# Patient Record
Sex: MALE | Race: WHITE | NOT HISPANIC OR LATINO | Employment: OTHER | URBAN - METROPOLITAN AREA
[De-identification: names, ages, dates, MRNs, and addresses within clinical notes are randomized per-mention and may not be internally consistent; named-entity substitution may affect disease eponyms.]

---

## 2017-01-18 ENCOUNTER — ALLSCRIPTS OFFICE VISIT (OUTPATIENT)
Dept: OTHER | Facility: OTHER | Age: 82
End: 2017-01-18

## 2017-01-18 DIAGNOSIS — E78.5 HYPERLIPIDEMIA: ICD-10-CM

## 2017-01-18 DIAGNOSIS — Z91.81 HISTORY OF FALLING: ICD-10-CM

## 2017-01-18 DIAGNOSIS — Z13.1 ENCOUNTER FOR SCREENING FOR DIABETES MELLITUS: ICD-10-CM

## 2017-01-18 DIAGNOSIS — M25.551 PAIN IN RIGHT HIP: ICD-10-CM

## 2017-01-20 ENCOUNTER — APPOINTMENT (OUTPATIENT)
Dept: LAB | Facility: CLINIC | Age: 82
End: 2017-01-20
Payer: COMMERCIAL

## 2017-01-20 ENCOUNTER — HOSPITAL ENCOUNTER (OUTPATIENT)
Dept: RADIOLOGY | Facility: CLINIC | Age: 82
Discharge: HOME | End: 2017-01-20
Payer: COMMERCIAL

## 2017-01-20 DIAGNOSIS — Z91.81 HISTORY OF FALLING: ICD-10-CM

## 2017-01-20 DIAGNOSIS — M25.551 PAIN IN RIGHT HIP: ICD-10-CM

## 2017-01-20 DIAGNOSIS — Z13.1 ENCOUNTER FOR SCREENING FOR DIABETES MELLITUS: ICD-10-CM

## 2017-01-20 DIAGNOSIS — E78.5 HYPERLIPIDEMIA: ICD-10-CM

## 2017-01-20 LAB
25(OH)D3 SERPL-MCNC: 27.4 NG/ML (ref 30–100)
ANION GAP SERPL CALCULATED.3IONS-SCNC: 5 MMOL/L (ref 4–13)
BUN SERPL-MCNC: 15 MG/DL (ref 5–25)
CALCIUM SERPL-MCNC: 9.1 MG/DL (ref 8.3–10.1)
CHLORIDE SERPL-SCNC: 105 MMOL/L (ref 100–108)
CHOLEST SERPL-MCNC: 153 MG/DL (ref 50–200)
CO2 SERPL-SCNC: 29 MMOL/L (ref 21–32)
CREAT SERPL-MCNC: 1.39 MG/DL (ref 0.6–1.3)
GFR SERPL CREATININE-BSD FRML MDRD: 48.8 ML/MIN/1.73SQ M
GLUCOSE SERPL-MCNC: 96 MG/DL (ref 65–140)
HDLC SERPL-MCNC: 102 MG/DL (ref 40–60)
LDLC SERPL CALC-MCNC: 40 MG/DL (ref 0–100)
POTASSIUM SERPL-SCNC: 4.3 MMOL/L (ref 3.5–5.3)
SODIUM SERPL-SCNC: 139 MMOL/L (ref 136–145)
TRIGL SERPL-MCNC: 56 MG/DL

## 2017-01-20 PROCEDURE — 36415 COLL VENOUS BLD VENIPUNCTURE: CPT

## 2017-01-20 PROCEDURE — 72100 X-RAY EXAM L-S SPINE 2/3 VWS: CPT

## 2017-01-20 PROCEDURE — 80048 BASIC METABOLIC PNL TOTAL CA: CPT

## 2017-01-20 PROCEDURE — 82306 VITAMIN D 25 HYDROXY: CPT

## 2017-01-20 PROCEDURE — 80061 LIPID PANEL: CPT

## 2017-01-20 PROCEDURE — 73502 X-RAY EXAM HIP UNI 2-3 VIEWS: CPT

## 2017-05-04 ENCOUNTER — ALLSCRIPTS OFFICE VISIT (OUTPATIENT)
Dept: OTHER | Facility: OTHER | Age: 82
End: 2017-05-04

## 2017-05-04 DIAGNOSIS — R68.89 OTHER GENERAL SYMPTOMS AND SIGNS: ICD-10-CM

## 2017-05-04 DIAGNOSIS — R20.0 ANESTHESIA OF SKIN: ICD-10-CM

## 2017-05-04 DIAGNOSIS — R20.2 PARESTHESIA OF SKIN: ICD-10-CM

## 2017-05-04 LAB — HBA1C MFR BLD HPLC: 5.3 %

## 2017-05-05 ENCOUNTER — LAB CONVERSION - ENCOUNTER (OUTPATIENT)
Dept: OTHER | Facility: OTHER | Age: 82
End: 2017-05-05

## 2017-05-05 LAB
TSH SERPL DL<=0.05 MIU/L-ACNC: 2 MIU/L (ref 0.4–4.5)
VIT B12 SERPL-MCNC: 1425 PG/ML (ref 200–1100)

## 2017-05-16 ENCOUNTER — ALLSCRIPTS OFFICE VISIT (OUTPATIENT)
Dept: OTHER | Facility: OTHER | Age: 82
End: 2017-05-16

## 2017-06-06 ENCOUNTER — OFFICE VISIT (OUTPATIENT)
Dept: URGENT CARE | Facility: CLINIC | Age: 82
End: 2017-06-06
Payer: COMMERCIAL

## 2017-06-06 PROCEDURE — 99204 OFFICE O/P NEW MOD 45 MIN: CPT

## 2017-06-06 PROCEDURE — S9088 SERVICES PROVIDED IN URGENT: HCPCS

## 2017-07-10 ENCOUNTER — ALLSCRIPTS OFFICE VISIT (OUTPATIENT)
Dept: OTHER | Facility: OTHER | Age: 82
End: 2017-07-10

## 2017-08-10 ENCOUNTER — ALLSCRIPTS OFFICE VISIT (OUTPATIENT)
Dept: OTHER | Facility: OTHER | Age: 82
End: 2017-08-10

## 2017-10-11 ENCOUNTER — ALLSCRIPTS OFFICE VISIT (OUTPATIENT)
Dept: OTHER | Facility: OTHER | Age: 82
End: 2017-10-11

## 2017-10-11 DIAGNOSIS — Z87.891 PERSONAL HISTORY OF NICOTINE DEPENDENCE: ICD-10-CM

## 2017-10-11 DIAGNOSIS — Z91.81 HISTORY OF FALLING: ICD-10-CM

## 2017-10-11 DIAGNOSIS — R26.81 UNSTEADINESS ON FEET: ICD-10-CM

## 2017-10-11 DIAGNOSIS — R68.89 OTHER GENERAL SYMPTOMS AND SIGNS: ICD-10-CM

## 2017-10-11 DIAGNOSIS — R06.02 SHORTNESS OF BREATH: ICD-10-CM

## 2017-10-11 DIAGNOSIS — E55.9 VITAMIN D DEFICIENCY: ICD-10-CM

## 2017-10-11 DIAGNOSIS — D64.9 ANEMIA: ICD-10-CM

## 2017-10-13 ENCOUNTER — LAB CONVERSION - ENCOUNTER (OUTPATIENT)
Dept: OTHER | Facility: OTHER | Age: 82
End: 2017-10-13

## 2017-10-13 LAB
BASOPHILS # BLD AUTO: 0.3 %
BASOPHILS # BLD AUTO: 32 CELLS/UL (ref 0–200)
DEPRECATED RDW RBC AUTO: 13 % (ref 11–15)
EOSINOPHIL # BLD AUTO: 1.8 %
EOSINOPHIL # BLD AUTO: 191 CELLS/UL (ref 15–500)
FOLATE SERPL-MCNC: 12.8 NG/ML
HCT VFR BLD AUTO: 32.3 % (ref 38.5–50)
HGB BLD-MCNC: 10.7 G/DL (ref 13.2–17.1)
IRON SATN MFR SERPL: 13 % (CALC) (ref 15–60)
IRON SERPL-MCNC: 32 MCG/DL (ref 50–180)
LYMPHOCYTES # BLD AUTO: 26.4 %
LYMPHOCYTES # BLD AUTO: 2798 CELLS/UL (ref 850–3900)
MCH RBC QN AUTO: 30 PG (ref 27–33)
MCHC RBC AUTO-ENTMCNC: 33.1 G/DL (ref 32–36)
MCV RBC AUTO: 90.5 FL (ref 80–100)
MONOCYTES # BLD AUTO: 869 CELLS/UL (ref 200–950)
MONOCYTES (HISTORICAL): 8.2 %
NEUTROPHILS # BLD AUTO: 63.3 %
NEUTROPHILS # BLD AUTO: 6710 CELLS/UL (ref 1500–7800)
PLATELET # BLD AUTO: 166 THOUSAND/UL (ref 140–400)
PMV BLD AUTO: 11.4 FL (ref 7.5–12.5)
RBC # BLD AUTO: 3.57 MILLION/UL (ref 4.2–5.8)
TIBC SERPL-MCNC: 247 MCG/DL (CALC) (ref 250–425)
TSH SERPL DL<=0.05 MIU/L-ACNC: 0.8 MIU/L (ref 0.4–4.5)
VIT B12 SERPL-MCNC: 1371 PG/ML (ref 200–1100)
WBC # BLD AUTO: 10.6 THOUSAND/UL (ref 3.8–10.8)

## 2017-10-13 NOTE — PROGRESS NOTES
Assessment  1  Status post fall (V15 88) (Z91 81)   2  Unstable gait (781 2) (R26 81)   3  Elevated blood sugar (790 29) (R73 9)    Plan  Cold intolerance, Unstable gait    · (1) TSH; Status:Active; Requested for:11Oct2017;   Elevated blood sugar    · Lancets Micro Thin 33G Miscellaneous; TEST 3 TIMES DAILY (Dx 250 00)   · Glucometer; Status:Complete;   Done: 60UHT1077   · Glucose Monitoring Strips; Status:Complete;   Done: 29WOG7921  Status post fall    · 1 - Tiki CARRIZALES, Miranda Ashford (Cardiology) Co-Management  *  Status: Active  Requested for:  75AMD2910  Care Summary provided  : Yes  Status post fall, Unstable gait    · (1) IRON PANEL; Status:Active; Requested for:11Oct2017;   Unstable gait    · Calcium 600+D 600-200 MG-UNIT Oral Tablet; TAKE 1 TABLET DAILY   · (1) CBC/PLT/DIFF; Status:Active; Requested for:11Oct2017;    · (1) FOLATE; Status:Active; Requested for:11Oct2017;    · (1) VITAMIN B12; Status:Active; Requested for:11Oct2017;     Discussion/Summary    S/P Fall and Unstable gait- will check TSH, CBC, B12, iron elevated at this time, he is not interested in physical therapy  Also referred to Cardiologist for heart evaluation  blood glucose- given a glucometer and supplies to test at home  up in 2-3 weeks  Possible side effects of new medications were reviewed with the patient/guardian today  The treatment plan was reviewed with the patient/guardian  The patient/guardian understands and agrees with the treatment plan      Chief Complaint  Patient is here today with complaints of being cold all the time and had a dizzy spell last night and fell in bathroom      History of Present Illness  Patient is here because he feel last night in the bathroom he felt dizzy  He says that he walked to the bathroom and fell down  He says that he is always cold  He denies head trauma  He denies any urinary or fecal incontinence when the episode occurred  He denies any palpitation or chest pain at this time   he was found to have elevated blood glucose in the past       Review of Systems    Constitutional: No fever or chills, feels well, no tiredness, no recent weight gain or weight loss  Eyes: No complaints of eye pain, no red eyes, no discharge from eyes, no itchy eyes  Cardiovascular: No complaints of slow heart rate, no fast heart rate, no chest pain, no palpitations, no leg claudication, no lower extremity  Respiratory: No complaints of shortness of breath, no wheezing, no cough, no SOB on exertion, no orthopnea or PND  Gastrointestinal: No complaints of abdominal pain, no constipation, no nausea or vomiting, no diarrhea or bloody stools  Musculoskeletal: as noted in HPI  Integumentary: No complaints of skin rash or skin lesions, no itching, no skin wound, no dry skin  Neurological: No compliants of headache, no confusion, no convulsions, no numbness or tingling, no dizziness or fainting, no limb weakness, no difficulty walking  Endocrine: No complaints of proptosis, no hot flashes, no muscle weakness, no erectile dysfunction, no deepening of the voice, no feelings of weakness  ROS reviewed  Active Problems  1  Cellulitis of chest wall (682 2) (L03 313)   2  Change in multiple pigmented skin lesions (709 00) (L81 9)   3  Chronic lower back pain (724 2,338 29) (M54 5,G89 29)   4  Chronic pain (338 29) (G89 29)   5  Cold intolerance (780 99) (R68 89)   6  Dermatitis (692 9) (L30 9)   7  Diabetes mellitus screening (V77 1) (Z13 1)   8  Elevated blood sugar (790 29) (R73 9)   9  Generalized osteoarthritis (715 00) (M15 9)   10  Herniated lumbar intervertebral disc (722 10) (M51 26)   11  Hyperlipidemia (272 4) (E78 5)   12  Impaired fasting glucose (790 21) (R73 01)   13  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)   14  Need for pneumococcal vaccination (V03 82) (Z23)   15  Needs flu shot (V04 81) (Z23)   16  Numbness and tingling in left hand (782 0) (R20 0,R20 2)   17   Pain in shoulder region, right (719 41) (M25 511)   18  Palpitations (785 1) (R00 2)   19  Positive depression screening (796 4) (Z13 89)   20  Right hip pain (719 45) (M25 551)   21  Rotator cuff tear arthropathy, right (716 81) (M12 811)   22  Screening for depression (V79 0) (Z13 89)   23  Screening for neurological condition (V80 09) (Z13 89)   24  Screening for prostate cancer (V76 44) (Z12 5)   25  Screening for skin condition (V82 0) (Z13 89)   26  Seborrheic keratosis (702 19) (L82 1)   27  Skin rash (782 1) (R21)   28  Status post fall (V15 88) (Z91 81)   29  Tachycardia (785 0) (R00 0)   30  Visual disturbances (368 9) (H53 9)    Past Medical History  1  History of Dupuytren contracture (728 6) (M72 0)   2  History of Elevated glucose (790 29) (R73 09)   3  History of Former smoker (V15 82) (N63 735)   4  History of Fracture of rib of left side (807 00) (S22 32XA)   5  History of Hemothorax, left (511 89) (J94 2)   6  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)    The active problems and past medical history were reviewed and updated today  Surgical History  1  History of Cataract Surgery   2  History of Eye Surgery   3  History of Hand Surgery   4  History of Knee Arthroscopy (Therapeutic)   5  History of Nerve Block Transforaminal Epidural   6  History of Thoracentesis With Insertion Of Tube    Family History  Mother    1  Family history of lung cancer (V16 1) (Z80 1)   2  Family history of malignant neoplasm of brain (V16 8) (Z80 8)  Father    3  Family history of Cerebrovascular accident (CVA) due to occlusion of cerebral artery   4  Family history of lung cancer (V16 1) (Z80 1)   5  Family history of malignant neoplasm of brain (V16 8) (Z80 8)    Social History   · Former smoker (V15 82) (X19 087)   ·    · Previous  service   · Retired   · Three children    Current Meds   1  Aspirin 81 MG TABS Recorded   2  Atenolol 25 MG Oral Tablet; TAKE ONE-HALF (1/2) TABLET DAILY;    Therapy: 65QPE2455 to (Last X32NQQ5862)  Requested for: 86MDI3145 Ordered   3  Clobetasol Propionate 0 05 % External Ointment; APPLY SPARINGLY TO AFFECTED   AREA(S) TWICE DAILY; Therapy: 21VVD6728 to (Last Rx:46Inr0555)  Requested for: 53GNZ5209 Ordered   4  Fish Oil CAPS; Therapy: (Recorded:2017) to Recorded   5  Lifescan One Touch Ultra Test Strips; test once daily dx diabetes; Therapy: 25Oam7896 to ([phone])  Requested for: 2017; Last   Rx:2017 Ordered   6  Lifescan One Touch Ultramini Meter; test once daily Dx 250; Therapy: 2017 to (Last Rx:2017)  Requested for: 26Cyc8194 Ordered   7  LifeScan Unistik II Lancets Miscellaneous; Test once daily (dx 250 00); Therapy: 05Eky9751 to (Last Rx:2017)  Requested for: 01Cqq6969 Ordered   8  Meloxicam 15 MG Oral Tablet; Take 1 tablet by mouth daily; Therapy: 2016 to (Evaluate:2018)  Requested for: 87XRE2092; Last   Rx:83Xnv2184 Ordered   9  Potassium Chloride ER 10 MEQ Oral Tablet Extended Release; Therapy: (Recorded:2017) to Recorded   10  Simvastatin 20 MG Oral Tablet; TAKE 1 TABLET DAILY; Therapy: 58VSB2090 to (Evaluate:2018)  Requested for: 82VOZ0653; Last    Rx:2017 Ordered    Allergies  1  No Known Drug Allergies    Vitals  Vital Signs    Recorded: 34AXS1702 04:08PM   Temperature 98 3 F   Heart Rate 55   Systolic 308   Diastolic 70   Height 5 ft 9 in   Weight 156 lb 2 08 oz   BMI Calculated 23 06   BSA Calculated 1 86   O2 Saturation 93     Physical Exam    Constitutional   General appearance: No acute distress, well appearing and well nourished  Eyes   Conjunctiva and lids: No swelling, erythema, or discharge  Pulmonary   Respiratory effort: No increased work of breathing or signs of respiratory distress  Auscultation of lungs: Clear to auscultation, equal breath sounds bilaterally, no wheezes, no rales, no rhonci      Cardiovascular   Auscultation of heart: Normal rate and rhythm, normal S1 and S2, without murmurs  Musculoskeletal unstable gait walks with a walker  Skin   Skin and subcutaneous tissue: Normal without rashes or lesions  Psychiatric   Orientation to person, place and time: Normal          Future Appointments    Date/Time Provider Specialty Site   02/14/2018 10:20 AM ANT [place]   Dermatology [facility]     Signatures   Electronically signed by : Glenna Connolly MD; Oct 11 2017  5:06PM EST                       (Author)

## 2017-10-25 ENCOUNTER — GENERIC CONVERSION - ENCOUNTER (OUTPATIENT)
Dept: OTHER | Facility: OTHER | Age: 82
End: 2017-10-25

## 2017-10-26 ENCOUNTER — LAB CONVERSION - ENCOUNTER (OUTPATIENT)
Dept: OTHER | Facility: OTHER | Age: 82
End: 2017-10-26

## 2017-10-26 LAB — 25(OH)D3 SERPL-MCNC: >150 NG/ML (ref 30–100)

## 2017-10-31 ENCOUNTER — ALLSCRIPTS OFFICE VISIT (OUTPATIENT)
Dept: OTHER | Facility: OTHER | Age: 82
End: 2017-10-31

## 2017-10-31 ENCOUNTER — GENERIC CONVERSION - ENCOUNTER (OUTPATIENT)
Dept: OTHER | Facility: OTHER | Age: 82
End: 2017-10-31

## 2017-11-01 ENCOUNTER — LAB CONVERSION - ENCOUNTER (OUTPATIENT)
Dept: OTHER | Facility: OTHER | Age: 82
End: 2017-11-01

## 2017-11-01 LAB — FECAL GLOBIN BY IMMUNOCHEM (HISTORICAL): NORMAL

## 2017-11-17 ENCOUNTER — HOSPITAL ENCOUNTER (OUTPATIENT)
Dept: NON INVASIVE DIAGNOSTICS | Facility: CLINIC | Age: 82
Discharge: HOME | End: 2017-11-17
Payer: COMMERCIAL

## 2017-11-17 DIAGNOSIS — Z87.891 PERSONAL HISTORY OF NICOTINE DEPENDENCE: ICD-10-CM

## 2017-11-17 DIAGNOSIS — R06.02 SHORTNESS OF BREATH: ICD-10-CM

## 2017-11-17 PROCEDURE — A9502 TC99M TETROFOSMIN: HCPCS

## 2017-11-17 PROCEDURE — 93017 CV STRESS TEST TRACING ONLY: CPT

## 2017-11-17 PROCEDURE — 93306 TTE W/DOPPLER COMPLETE: CPT

## 2017-11-17 PROCEDURE — 78452 HT MUSCLE IMAGE SPECT MULT: CPT

## 2017-11-17 RX ORDER — AMINOPHYLLINE DIHYDRATE 25 MG/ML
50 INJECTION, SOLUTION INTRAVENOUS ONCE
Status: CANCELLED | OUTPATIENT
Start: 2017-11-17 | End: 2017-11-17

## 2017-11-17 RX ADMIN — REGADENOSON 0.4 MG: 0.08 INJECTION, SOLUTION INTRAVENOUS at 12:55

## 2017-11-20 ENCOUNTER — GENERIC CONVERSION - ENCOUNTER (OUTPATIENT)
Dept: OTHER | Facility: OTHER | Age: 82
End: 2017-11-20

## 2017-11-28 ENCOUNTER — GENERIC CONVERSION - ENCOUNTER (OUTPATIENT)
Dept: OTHER | Facility: OTHER | Age: 82
End: 2017-11-28

## 2018-01-10 NOTE — RESULT NOTES
Verified Results  * XR SPINE CERVICAL COMPLETE 4 OR 5 VIEW 84Nra7879 10:32AM Marquis Picking Order Number: [id number]     Test Name Result Flag Reference   XR SPINE CERVICAL COMPLETE 4 OR 5 VW (Report)     CERVICAL SPINE     INDICATION: Paresthesias, left arm numbness     COMPARISON: None     VIEWS: 5; 5 images     FINDINGS:     No evidence of fracture or subluxation  Severe multilevel degenerative changes with posterior facet hypertrophy, neuroforaminal narrowing, mild anterolisthesis C5 on C6 and C6 on C7  Minimal disc space narrowing C6-T1  The prevertebral soft tissues are within normal limits  The lung apices are intact  IMPRESSION:     Severe degenerative change         Workstation performed: [id number]     Signed by:   Kareem Carrasco MD   7/20/16

## 2018-01-12 VITALS
DIASTOLIC BLOOD PRESSURE: 70 MMHG | WEIGHT: 156.13 LBS | HEIGHT: 69 IN | OXYGEN SATURATION: 93 % | BODY MASS INDEX: 23.12 KG/M2 | SYSTOLIC BLOOD PRESSURE: 118 MMHG | HEART RATE: 55 BPM | TEMPERATURE: 98.3 F

## 2018-01-12 VITALS
HEART RATE: 86 BPM | BODY MASS INDEX: 23.44 KG/M2 | OXYGEN SATURATION: 92 % | HEIGHT: 69 IN | WEIGHT: 158.25 LBS | TEMPERATURE: 96.9 F | SYSTOLIC BLOOD PRESSURE: 136 MMHG | DIASTOLIC BLOOD PRESSURE: 66 MMHG

## 2018-01-12 NOTE — RESULT NOTES
Verified Results  NM MYOCARDIAL PERFUSION SPECT (RX STRESS AND/OR REST) [id number] 10:07AM Teto Posada Order Number: [id number]    - Patient Instructions: To schedule this appointment, please contact Central Scheduling at [phone]  Test Name Result Flag Reference   NM MYOCARDIAL PERFUSION SPECT (RX STRESS AND/OR REST) (Report)     [address] [place], [address] [place] [address]   [phone]     Rest/Stress Gated SPECT Myocardial Perfusion Imaging After Regadenoson     Patient: Juliana Haddad   MR number: [id number]   Account number: [de-identified]   : 1933   Age: 80 years   Gender: Male   Status: Outpatient   Location: [facility]   Height: 69 in   Weight: 154 lb   BP: 116/ 84 mmHg     Allergies: ALLERGIES NOT ON FILE     Diagnosis: R06 02 - Shortness of breath     Primary Physician: Lopez Vega MD   Interpreting Physician: Elba Bruno MD   Referring Physician: Elba Bruno MD   Technician: Gabrielle Ulloa BS, BA, AART(N)   Group: [facility]   Other: Arlester Boast, MS, CCT     INDICATIONS: Detection of coronary artery disease  HISTORY: The patient is a 80year old  male  Chest pain status: no chest pain  Other symptoms: dyspnea  Coronary artery disease risk factors: dyslipidemia  Cardiovascular history: none significant  Medications: a lipid lowering   agent  Previous test results: abnormal ECG  PHYSICAL EXAM: Baseline physical exam screening: normal      REST ECG: Normal sinus rhythm  PROCEDURE: The study was performed at [address] [place] [address]  A regadenoson infusion pharmacologic stress test was performed  Gated SPECT myocardial perfusion imaging was performed after stress and at rest  Systolic blood pressure   was 116 mmHg, at the start of the study  Diastolic blood pressure was 84 mmHg, at the start of the study  The heart rate was 52 bpm, at the start of the study     Regadenoson protocol:   HR bpm SBP mmHg DBP mmHg Symptoms Rhythm/conduct   Baseline 52 116 84 none NSR, no ectopy   Immediate 75 146 66 none NSR, no ectopy   1 min 64 -- -- none NSR, no ectopy   2 min 63 136 66 none NSR, no ectopy     STRESS SUMMARY: Duration of pharmacologic stress was 3 min  Maximal heart rate during stress was 75 bpm  The rate-pressure product for the peak heart rate and blood pressure was 77638  There was no chest pain during stress  The stress test   was terminated due to protocol completion  The stress ECG was negative for ischemia  There were no stress arrhythmias or conduction abnormalities  ISOTOPE ADMINISTRATION:   Resting isotope administration Stress isotope administration   Agent Tetrofosmin Tetrofosmin   Dose 10 33 mCi 32 9 mCi   Date 11/17/2017 11/17/2017   Injection-image interval 30 min 45 min     The radiopharmaceutical was injected at the peak effect of pharmacologic stress  PERFUSION DEFECTS:   - There were no perfusion defects  GATED SPECT:   The calculated left ventricular ejection fraction was 62 %  Left ventricular ejection fraction was within normal limits by visual estimate  There was no left ventricular regional abnormality  SUMMARY:   - Stress results: There was no chest pain during stress  - ECG conclusions: The stress ECG was negative for ischemia  - Perfusion imaging: There were no perfusion defects    - Gated SPECT: The calculated left ventricular ejection fraction was 62 %  Left ventricular ejection fraction was within normal limits by visual estimate  There was no left ventricular regional abnormality  IMPRESSIONS: Normal study  Myocardial perfusion imaging was normal at rest and with stress  Prepared and signed by     Karsten Guardado MD   Signed 11/17/2017 17:57:32     ECHO COMPLETE WITH CONTRAST IF INDICATED 47ETK1730 10:05AM Tana Dockery Order Number: [id number]    - Patient Instructions:  To schedule this appointment, please contact Central Scheduling at [phone]  Test Name Result Flag Reference   ECHO COMPLETE WITH CONTRAST IF INDICATED (Report)     [facility], [address] [facility]   [phone]     Transthoracic Echocardiogram   2D, M-mode, Doppler, and Color Doppler     Study date: 2017     Patient: Duglas Caruso   MR number: [id number]   Account number: [de-identified]   : 1933   Age: 80 years   Gender: Male   Status: Outpatient   Location: [facility]   Height: 69 in   Weight: 154 lb   BP: 144/ 60 mmHg     Indications: Dyspnea on Excertion  Diagnoses: R06 09 - Other forms of dyspnea     Sonographer: Chappell RCS   Interpreting Physician: Vale Johnson MD   Primary Physician: Seda Pool MD   Referring Physician: Vale Johnson MD   Group: [facility]     LEFT VENTRICLE:   Systolic function was normal  Ejection fraction was estimated in the range of 55 % to 65 %  There were no regional wall motion abnormalities  There was mild assymetrical hypertrophy  Features were consistent with a pseudonormal left ventricular filling pattern, with concomitant abnormal relaxation and increased filling pressure (grade 2 diastolic dysfunction)  MITRAL VALVE:   There was mild regurgitation  AORTIC VALVE:   The valve was trileaflet  Leaflets exhibited mild calcification and normal cuspal separation  There was mild regurgitation  TRICUSPID VALVE:   There was trace regurgitation  PULMONIC VALVE:   There was mild regurgitation  HISTORY: PRIOR HISTORY: Risk factors: medication-treated hypercholesterolemia  PROCEDURE: The study was performed in the [address] [place] [address]  This was a routine study  The transthoracic approach was used  The study included complete 2D imaging, M-mode, complete spectral Doppler, and color Doppler  The   heart rate was 56 bpm, at the start of the study   Image quality was adequate  LEFT VENTRICLE: Size was normal  Systolic function was normal  Ejection fraction was estimated in the range of 55 % to 65 %  There were no regional wall motion abnormalities  There was mild assymetrical hypertrophy  DOPPLER: Features were   consistent with a pseudonormal left ventricular filling pattern, with concomitant abnormal relaxation and increased filling pressure (grade 2 diastolic dysfunction)  RIGHT VENTRICLE: The size was normal  Systolic function was normal  Wall thickness was normal      LEFT ATRIUM: Size was normal      RIGHT ATRIUM: Size was normal      MITRAL VALVE: Valve structure was normal  There was normal leaflet separation  DOPPLER: The transmitral velocity was within the normal range  There was no evidence for stenosis  There was mild regurgitation  AORTIC VALVE: The valve was trileaflet  Leaflets exhibited mild calcification and normal cuspal separation  DOPPLER: Transaortic velocity was within the normal range  There was no evidence for stenosis  There was mild regurgitation  TRICUSPID VALVE: The valve structure was normal  There was normal leaflet separation  DOPPLER: The transtricuspid velocity was within the normal range  There was no evidence for stenosis  There was trace regurgitation  PULMONIC VALVE: Leaflets exhibited normal thickness, no calcification, and normal cuspal separation  DOPPLER: The transpulmonic velocity was within the normal range  There was mild regurgitation  PERICARDIUM: There was no pericardial effusion  The pericardium was normal in appearance  AORTA: The root exhibited normal size  SYSTEMIC VEINS: IVC: The inferior vena cava was normal in size and course  Respirophasic changes were normal      SYSTEM MEASUREMENT TABLES     Apical four chamber   4 chamber Left Atrium Volume Index; Planimetry; End Systole; Apical four chamber;: 21 73 cm2   Left Ventricular End Diastolic Volume; Method of Disks, Single Plane;  Apical four chamber;: 86 4 ml   Left Ventricular End Systolic Volume; Method of Disks, Single Plane; Apical four chamber;: 58 8 ml   Right Atrium Systolic Area; Planimetry; End Systole; Apical four chamber;: 15 37 cm2   Right Ventricular Internal Diastolic Dimension; End Diastole; Apical four chamber;: 33 2 mm   TAPSE: 19 mm     Unspecified Scan Mode   Aortic Root Diameter; End Systole;: 35 9 mm   Left atrial diameter; End Diastole;: 36 2 mm   Cardiac Output; Teichholz; Systole;: 4 14 L/min   Interventricular Septum Diastolic Thickness; Teichholz; End Diastole;: 12 1 mm   Left Ventricle Internal End Diastolic Dimension; Teichholz;: 46 6 mm   Left Ventricle Internal Systolic Dimension; Teichholz; End Systole;: 26 2 mm   Left Ventricle Mass; Mass AVCube with Teichholz; End Diastole;: 178 g   Left Ventricle Posterior Wall Diastolic Thickness; Teichholz; End Diastole;: 9 3 mm   Left Ventricular Ejection Fraction; Teichholz;: 75 %   Left Ventricular End Diastolic Volume; Teichholz;: 100 3 ml   Left Ventricular End Systolic Volume; Teichholz;: 25 1 ml   Stroke volume;  Marrianne Risk;: 75 2 ml   Mitral Valve Area; Area by Pressure Half-Time; Systole;: 2 44 cm2   Mitral Valve E to A Ratio; Systole;: 1 1     Intersoci[employer] Commission Accredited Echocardiography Laboratory     Prepared and electronically signed by     Buck Tompkins MD   Signed [id number] 17:48:00

## 2018-01-13 VITALS
DIASTOLIC BLOOD PRESSURE: 66 MMHG | OXYGEN SATURATION: 93 % | TEMPERATURE: 97.4 F | HEIGHT: 69 IN | SYSTOLIC BLOOD PRESSURE: 128 MMHG | HEART RATE: 61 BPM | WEIGHT: 160 LBS | BODY MASS INDEX: 23.7 KG/M2

## 2018-01-14 VITALS
DIASTOLIC BLOOD PRESSURE: 82 MMHG | BODY MASS INDEX: 23.47 KG/M2 | HEART RATE: 88 BPM | TEMPERATURE: 96 F | OXYGEN SATURATION: 92 % | HEIGHT: 69 IN | WEIGHT: 158.5 LBS | SYSTOLIC BLOOD PRESSURE: 130 MMHG

## 2018-01-15 NOTE — RESULT NOTES
Verified Results  (1) LDL,DIRECT [id number] 10:08AM [facility]: [id number]  TW Order Number: [id number] Order Number: [id number] Order Number: [id number]     Test Name Result Flag Reference   LDL, DIRECT 56 mg/dl  0-100   LDL Cholesterol:        Optimal          <100 mg/dl        Near Optimal     100-129 mg/dl        Above Optimal          Borderline High   130-159 mg/dl          High              160-189 mg/dl          Very High        >189 mg/dl     (1) TRIGLYCERIDE 51PJC3494 10:08AM [facility] Number: [id number]  TW Order Number: [id number] Order Number: [id number] Order Number: [id number]     Test Name Result Flag Reference   TRIGLYCERIDES 49 mg/dL  <=150   Specimen collection should occur prior to N-Acetylcysteine or Metamizole administration due to the potential for falsely depressed results  Triglyceride:         Normal              <150 mg/dl       Borderline High    150-199 mg/dl       High               200-499 mg/dl       Very High          >499 mg/dl     (1) COMPREHENSIVE METABOLIC PANEL 04GAG2832 37:95XE [facility] Order Number: [id number]  TW Order Number: [id number] Order Number: [id number] Order Number: [id number]     Test Name Result Flag Reference   GLUCOSE,RANDM 102 mg/dL     If the patient is fasting, the ADA then defines impaired fasting glucose as > 100 mg/dL and diabetes as > or equal to 123 mg/dL     SODIUM 139 mmol/L  136-145   POTASSIUM 4 5 mmol/L  3 5-5 3   CHLORIDE 100 mmol/L  100-108   CARBON DIOXIDE 29 mmol/L  21-32   ANION GAP (CALC) 10 mmol/L  4-13   BLOOD UREA NITROGEN 19 mg/dL  5-25   CREATININE 1 41 mg/dL H 0 60-1 30   Standardized to IDMS reference method   CALCIUM 9 3 mg/dL  8 3-10 1   BILI, TOTAL 1 00 mg/dL  0 20-1 00   ALK PHOSPHATAS 61 U/L     ALT (SGPT) 24 U/L  12-78   AST(SGOT) 21 U/L 5-45   ALBUMIN 3 8 g/dL  3 5-5 0   TOTAL PROTEIN 8 0 g/dL  6 4-8 2   eGFR Non-African American 48 1 ml/min/1 73sq [facility] [employer] Disease Education Program recommendations are as follows:  GFR calculation is accurate only with a steady state creatinine  Chronic Kidney disease less than 60 ml/min/1 73 sq  meters  Kidney failure less than 15 ml/min/1 73 sq  meters

## 2018-01-15 NOTE — RESULT NOTES
Verified Results  (1) PSA (SCREEN) (Dx V76 44 Screen for Prostate Cancer) [id number] 09:46PM [facility] Order Number: [id number]     Order Number: [id number]     Test Name Result Flag Reference   PROSTATE SPECIFIC ANTIGEN 0 4 ng/mL  0 0-4 0     (1) COMPREHENSIVE METABOLIC PANEL [id number] 58:57QA [facility] Order Number: [id number]      National Kidney Disease Education Program recommendations are as follows:  GFR calculation is accurate only with a steady state creatinine  Chronic Kidney disease less than 60 ml/min/1 73 sq  meters  Kidney failure less than 15 ml/min/1 73 sq  meters  Test Name Result Flag Reference   GLUCOSE,RANDM 100 mg/dL     If the patient is fasting, the ADA then defines impaired fasting glucose as > 100 mg/dL and diabetes as > or equal to 123 mg/dL  SODIUM 140 mmol/L  136-145   POTASSIUM 4 6 mmol/L  3 5-5 3   CHLORIDE 102 mmol/L  100-108   CARBON DIOXIDE 26 mmol/L  21-32   ANION GAP (CALC) 12 mmol/L  4-13   BLOOD UREA NITROGEN 16 mg/dL  5-25   CREATININE 1 26 mg/dL  0 60-1 30   Standardized to IDMS reference method   CALCIUM 9 4 mg/dL  8 3-10 1   BILI, TOTAL 0 72 mg/dL  0 20-1 00   ALK PHOSPHATAS 41 U/L L    ALT (SGPT) 22 U/L  12-78   AST(SGOT) 16 U/L  5-45   ALBUMIN 3 5 g/dL  3 5-5 0   TOTAL PROTEIN 7 2 g/dL  6 4-8 2   eGFR Non-African American 54 8 ml/min/1 73sq m       (1) TSH [id number] 06:09PM [facility] Order Number: [id number]    Patients undergoing fluorescein dye angiography may retain small amounts of fluorescein in the body for 48-72 hours post procedure  Samples containing fluorescein can produce falsely depressed TSH values  If the patient had this procedure,a specimen should be resubmitted post fluorescein clearance       Test Name Result Flag Reference   TSH 1 980 uIU/mL  0 358-3 740     (1) LIPID PANEL FASTING W DIRECT LDL REFLEX [id number] 06:06PM [facility] Order Number: [id number]      Triglyceride:         Normal <150 mg/dl       Borderline High    150-199 mg/dl       High               200-499 mg/dl       Very High          >499 mg/dl  Cholesterol:         Desirable        <200 mg/dl      Borderline High  200-239 mg/dl      High             >239 mg/dl  HDL Cholesterol:        High    >59 mg/dL      Low     <41 mg/dL  LDL Cholesterol:        Optimal          <100 mg/dl         Near Optimal     100-129 mg/dl        Above Optimal          Borderline High   130-159 mg/dl          High              160-189 mg/dl          Very High        >189 mg/dl  LDL CALCULATED:    This screening LDL is a calculated result  It does not have the accuracy of the Direct Measured LDL in the monitoring of patients with hyperlipidemia and/or statin therapy  Direct Measure LDL (BXR196) must be ordered separately in these patients       Test Name Result Flag Reference   CHOLESTEROL 174 mg/dL     LDL CHOLESTEROL CALCULATED 60 mg/dL  0-100   TRIGLYCERIDES 40 mg/dL  <=150   HDL,DIRECT 106 mg/dL H 40-60     (1) CBC/PLT/DIFF [id number] 05:58PM [facility] Garcia Order Number: [id number]     Order Number: [id number]     Test Name Result Flag Reference   WBC COUNT 7 43 Thousand/uL  4 31-10 16   RBC COUNT 3 97 Million/uL  3 88-5 62   HEMOGLOBIN 12 7 g/dL  12 0-17 0   HEMATOCRIT 38 4 %  36 5-49 3   MCV 96 7 fL  82 0-98 0   MCH 32 0 pg  26 8-34 3   MCHC 33 1 g/dL  31 4-37 4   RDW 12 7 %  11 6-15 1   MPV 11 3 fL  8 9-12 7   PLATELET COUNT 968 Thousands/uL  149-390   NEUTROPHILS RELATIVE PERCENT 49 %  43-75   LYMPHOCYTES RELATIVE PERCENT 36 %  14-44   MONOCYTES RELATIVE PERCENT 9 %  4-12   EOSINOPHILS RELATIVE PERCENT 6 %  0-6   BASOPHILS RELATIVE PERCENT 0 %  0-1   NEUTROPHILS ABSOLUTE COUNT 3 69 Thousands/µL  1 85-7 62   LYMPHOCYTES ABSOLUTE COUNT 2 67 Thousands/µL  0 60-4 47   MONOCYTES ABSOLUTE COUNT 0 64 Thousand/µL  0 17-1 22   EOSINOPHILS ABSOLUTE COUNT 0 41 Thousand/µL  0 00-0 61   BASOPHILS ABSOLUTE COUNT 0 02 Thousands/µL  0 00-0 10

## 2018-01-17 NOTE — RESULT NOTES
Verified Results  (1) BASIC METABOLIC PROFILE [id number] 09:24AM Bunny Feliz Order Number: [id number]  TW Order Number: [id number]     Test Name Result Flag Reference   GLUCOSE,RANDM 101 mg/dL     If the patient is fasting, the ADA then defines impaired fasting glucose as > 100 mg/dL and diabetes as > or equal to 123 mg/dL  SODIUM 141 mmol/L  136-145   POTASSIUM 4 6 mmol/L  3 5-5 3   CHLORIDE 103 mmol/L  100-108   CARBON DIOXIDE 30 mmol/L  21-32   ANION GAP (CALC) 8 mmol/L  4-13   BLOOD UREA NITROGEN 24 mg/dL  5-25   CREATININE 1 41 mg/dL H 0 60-1 30   Standardized to IDMS reference method   CALCIUM 8 8 mg/dL  8 3-10 1   eGFR Non-African American 48 1 ml/min/1 73sq m     [facility] [employer] Disease Education Program recommendations are as follows:  GFR calculation is accurate only with a steady state creatinine  Chronic Kidney disease less than 60 ml/min/1 73 sq  meters  Kidney failure less than 15 ml/min/1 73 sq  meters

## 2018-01-22 VITALS
BODY MASS INDEX: 23.14 KG/M2 | HEART RATE: 52 BPM | WEIGHT: 156.25 LBS | TEMPERATURE: 97.5 F | HEIGHT: 69 IN | DIASTOLIC BLOOD PRESSURE: 62 MMHG | SYSTOLIC BLOOD PRESSURE: 136 MMHG | OXYGEN SATURATION: 93 %

## 2018-01-22 VITALS
SYSTOLIC BLOOD PRESSURE: 150 MMHG | WEIGHT: 159.5 LBS | HEART RATE: 80 BPM | HEIGHT: 69 IN | BODY MASS INDEX: 23.62 KG/M2 | DIASTOLIC BLOOD PRESSURE: 74 MMHG

## 2018-01-22 VITALS
WEIGHT: 154.38 LBS | DIASTOLIC BLOOD PRESSURE: 60 MMHG | SYSTOLIC BLOOD PRESSURE: 144 MMHG | HEART RATE: 52 BPM | BODY MASS INDEX: 22.87 KG/M2 | HEIGHT: 69 IN | OXYGEN SATURATION: 100 %

## 2018-02-05 ENCOUNTER — TRANSCRIBE ORDERS (OUTPATIENT)
Dept: ADMINISTRATIVE | Facility: HOSPITAL | Age: 83
End: 2018-02-05

## 2018-02-05 DIAGNOSIS — I73.9 PAD (PERIPHERAL ARTERY DISEASE) (HCC): Primary | ICD-10-CM

## 2018-02-08 ENCOUNTER — HOSPITAL ENCOUNTER (OUTPATIENT)
Dept: RADIOLOGY | Facility: MEDICAL CENTER | Age: 83
Discharge: HOME | End: 2018-02-08
Payer: COMMERCIAL

## 2018-02-08 DIAGNOSIS — I73.9 PAD (PERIPHERAL ARTERY DISEASE) (HCC): ICD-10-CM

## 2018-02-08 PROCEDURE — 93925 LOWER EXTREMITY STUDY: CPT

## 2018-02-08 PROCEDURE — 93922 UPR/L XTREMITY ART 2 LEVELS: CPT | Performed by: SURGERY

## 2018-02-08 PROCEDURE — 93923 UPR/LXTR ART STDY 3+ LVLS: CPT

## 2018-02-08 PROCEDURE — 93925 LOWER EXTREMITY STUDY: CPT | Performed by: SURGERY

## 2018-02-13 ENCOUNTER — OFFICE VISIT (OUTPATIENT)
Dept: VASCULAR SURGERY | Facility: CLINIC | Age: 83
End: 2018-02-13
Payer: COMMERCIAL

## 2018-02-13 VITALS
RESPIRATION RATE: 18 BRPM | WEIGHT: 156 LBS | SYSTOLIC BLOOD PRESSURE: 126 MMHG | BODY MASS INDEX: 20.67 KG/M2 | DIASTOLIC BLOOD PRESSURE: 74 MMHG | HEART RATE: 74 BPM | HEIGHT: 73 IN

## 2018-02-13 DIAGNOSIS — I73.9 SEVERE PERIPHERAL ARTERIAL DISEASE (HCC): Primary | ICD-10-CM

## 2018-02-13 PROCEDURE — 99204 OFFICE O/P NEW MOD 45 MIN: CPT | Performed by: PHYSICIAN ASSISTANT

## 2018-02-13 RX ORDER — MELOXICAM 15 MG/1
1 TABLET ORAL DAILY
COMMUNITY
Start: 2016-04-22 | End: 2018-04-13 | Stop reason: SDUPTHER

## 2018-02-13 RX ORDER — SIMVASTATIN 20 MG
1 TABLET ORAL DAILY
COMMUNITY
Start: 2016-01-14 | End: 2018-02-22 | Stop reason: SDUPTHER

## 2018-02-13 NOTE — PATIENT INSTRUCTIONS
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 ; est clearance 48     Patient complains of severe bilateral left greater than right foot pain with swelling numbness and paresthesias  He is unable to sleep due to foot pain requiring him to dangle his leg over the bed  Recent noninvasive studies suggest lower extremity vascular disease  Overall neurovascularly intact  No concerning open wounds  We reviewed the noninvasive studies together and treatment options  He would like to proceed with angiography +/- intervention  Recommend bilateral lower extremity angiography with an eye toward intervention particularly on the left for symptom relief  We discussed the indications, risks and benefits of angiography and the patient agrees to proceed  We will set him up for angiography issues can be coordinated  We discussed the increased risk of contrast induced nephropathy  He should be brought into the hospital couple hours earlier for IV hydration  Will need to follow up renal function  Check electrolytes  We will continue with risk reduction strategies, including: statin therapy, antiplatelet therapy, activity as tolerated and a heart healthy diet  We should consider intensifying statin therapy but this can be addressed in the future  He may also need Plavix which will also be addressed in the future  Peripheral Artery Disease   AMBULATORY CARE:   Peripheral artery disease (PAD)  is narrow, weak, or blocked arteries  It may affect any arteries outside of your heart and brain  PAD is usually the result of a buildup of fat and cholesterol, also called plaque, along your artery walls  Inflammation, a blood clot, or abnormal cell growth could also block your arteries  PAD prevents normal blood flow to your legs and arms   You are at risk of an amputation if poor blood flow keeps wounds from healing or causes gangrene (tissue death)  Without treatment, PAD can also cause a heart attack or stroke  Common symptoms include:  Mild PAD usually does not cause symptoms  As the disease worsens over time, you may have the following:  · Pain or cramps in your leg or hip while you walk     · A numb, weak, or heavy feeling in your legs     · Dry, scaly, red, or pale skin on your legs     · Thick or brittle nails, or hair loss on your arms and legs     · Foot sores that will not heal     · Burning or aching in your feet and toes while resting (this may be worse when you lie down)  Call 911 for the following:   · You have any of the following signs of a heart attack:      ¨ Squeezing, pressure, or pain in your chest that lasts longer than 5 minutes or returns    ¨ Discomfort or pain in your back, neck, jaw, stomach, or arm     ¨ Trouble breathing    ¨ Nausea or vomiting    ¨ Lightheadedness or a sudden cold sweat, especially with chest pain or trouble breathing    · You have any of the following signs of a stroke:      ¨ Numbness or drooping on one side of your face     ¨ Weakness in an arm or leg    ¨ Confusion or difficulty speaking    ¨ Dizziness, a severe headache, or vision loss  Seek care immediately if:   · You have sores or wounds that will not heal      · You notice black or discolored skin on your arm or leg  · Your skin is cool to the touch  Contact your healthcare provider if:   · You have leg pain when you walk 1/8 mile (200 meters) or less, even with treatment  · Your legs are red, dry, or pale, even with treatment  · You have questions or concerns about your condition or care  Treatment for PAD  can help reduce your risk of a heart attack, stroke, or amputation  You may need more than one of the following:  · Medicines  may be given to prevent blood clots and reduce the risk of a heart attack or stroke  You may be given medicine to help prevent your PAD from getting worse      · A supervised exercise program  helps you stay active in normal daily activities and may prevent disability  Healthcare providers will help you safely walk or do strength training exercises 3 times a week for 30 to 60 minutes  You will do this for several months, then transition to walking on your own  · Angioplasty  is a procedure to open your artery so blood can flow through normally  A thin tube called a catheter is used to insert a small balloon into your artery  The balloon is inflated to open your blocked artery, and then removed  A tube called a stent may be placed in your artery to hold it open  · Bypass surgery  is used to make a new connection to your artery with a vein from another part of your body, or an artificial graft  The vein or graft is attached to your artery above and below your blockage  This allows blood to flow around the blocked portion of your artery  Manage and prevent PAD:   · Walk for 30 to 60 minutes at least 4 times a week  Your healthcare provider may also refer you to an supervised exercise program  The program helps increase how far you can walk without pain  It also helps you stay active in normal daily activities and may prevent disability caused by PAD  · Do not smoke  Nicotine and other chemicals in cigarettes and cigars can worsen PAD  They can also increase your risk for a heart attack or stroke  Ask your healthcare provider for information if you currently smoke and need help to quit  E-cigarettes or smokeless tobacco still contain nicotine  Talk to your healthcare provider before you use these products  · Manage other health conditions  Take your medicines as directed and follow your healthcare provider's instructions if you have high blood pressure or high cholesterol  Perform foot care and check your blood sugar levels as directed if you have diabetes  · Eat heart healthy foods  Eat whole grains, fruits, and vegetables every day  Limit salt and high-fat foods   Ask your healthcare provider for more information on a heart healthy diet  Ask if you need to lose weight  Your healthcare provider can help you create a healthy weight-loss plan  Follow up with your healthcare provider as directed:  Write down your questions so you remember to ask them during your visits  © 2017 [address] [employer] Information is for End User's use only and may not be sold, redistributed or otherwise used for commercial purposes  All illustrations and images included in CareNotes® are the copyrighted property of [employer]  or [employer]  The above information is an  only  It is not intended as medical advice for individual conditions or treatments  Talk to your doctor, nurse or pharmacist before following any medical regimen to see if it is safe and effective for you  Angiogram   WHAT YOU NEED TO KNOW:   What do I need to know about an angiogram?  An angiogram is used to examine blood flow through your arteries  Arteries carry blood from your heart to your body  How do I prepare for the procedure? Your healthcare provider will tell you how to prepare for your procedure  He may tell you not to eat or drink anything after midnight on the day of your procedure  He will tell you what medicines to take or not take on the day of your procedure  Contrast liquid will be used during the procedure to help your arteries show up better in the pictures  Tell your healthcare provider if you have ever had an allergic reaction to contrast liquid  Arrange to have someone drive you home after your procedure  What will happen during the procedure? · You may be given medicine to help you relax or make you drowsy  You may get local anesthesia to numb the area where the angiogram catheter will go in  Hair may be removed from the procedure site  · A catheter will be put into an artery in your leg near your groin, or in your arm   The catheter travels through the artery to the area being studied  Contrast liquid is put through the catheter to help your blood vessels and organs show up better in the x-ray pictures  You may feel warm as the liquid is put into the catheter  You may get a headache or feel nauseated  These feelings should go away quickly  · Your healthcare providers will remove the catheter and apply pressure to the wound for several minutes  They may place a pressure bandage or other pressure device over the wound to help stop any bleeding  What will happen after the procedure? You will be on a heart monitor until you are fully awake  A heart monitor continuously records the electrical activity of your heart  Healthcare providers will frequently monitor your vital signs and pulses  They will also frequently check your wound for bleeding  Keep your leg straight  Do not  get out of bed until your healthcare provider says it is okay  After you are monitored for several hours, you may be able to go home  What are the risks of the procedure? · You may bleed heavily after your catheter is removed  The catheter may damage your artery, and you may need surgery to fix the damage  You could have kidney problems from the contrast liquid  You could have an allergic reaction to the contrast liquid or numbing medicine  · You may develop a blood clot that causes pain and swelling, and stops blood from flowing  A blood clot in your leg can break loose and travel to your lungs and become life-threatening  CARE AGREEMENT:   You have the right to help plan your care  Learn about your health condition and how it may be treated  Discuss treatment options with your caregivers to decide what care you want to receive  You always have the right to refuse treatment  The above information is an  only  It is not intended as medical advice for individual conditions or treatments   Talk to your doctor, nurse or pharmacist before following any medical regimen to see if it is safe and effective for you  © 2017 [facility] Information is for End User's use only and may not be sold, redistributed or otherwise used for commercial purposes  All illustrations and images included in CareNotes® are the copyrighted property of A [employer] M [employer]  or [employer] Pe[employer]

## 2018-02-13 NOTE — ASSESSMENT & PLAN NOTE
Peripheral arterial disease with rest pain  Will need lower extremity angiogram +/- LLE intervention  He will require a hydration protocol prior to angiography  We will schedule at next available

## 2018-02-13 NOTE — PROGRESS NOTES
Assessment/Plan:    Severe peripheral arterial disease ([address] )  Peripheral arterial disease with rest pain  Will need lower extremity angiogram +/- LLE intervention  He will require a hydration protocol prior to angiography  We will schedule at next available  Check CBC, lytes, PT/INR  Note: There was a problem with placing order into [facility] for AMB referral to IR for which help desk was notified  In the meantime, our office is aware and will help facilitate the study as soon as it can be coordinated  Discussion:    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 ; est clearance 48     Patient complains of severe bilateral left greater than right foot pain with swelling numbness and paresthesias  He is unable to sleep due to foot pain requiring him to dangle his leg over the bed  Recent noninvasive studies suggest lower extremity vascular disease  Overall neurovascularly intact  No concerning open wounds  We reviewed the noninvasive studies together and treatment options  He would like to proceed with angiography +/- intervention  Recommend bilateral lower extremity angiography with an eye toward intervention particularly on the left for symptom relief  We discussed the indications, risks and benefits of angiography and the patient agrees to proceed  We will set him up for angiography issues can be coordinated  We discussed the increased risk of contrast induced nephropathy  He should be brought into the hospital couple hours earlier for IV hydration  Will need to follow up renal function  Check electrolytes, cbc, PT/INR  Continue Tylenol for pain  We will continue with risk reduction strategies, including: statin therapy, antiplatelet therapy, activity as tolerated and a heart healthy diet  We should consider intensifying statin therapy but this can be addressed in the future    He may also need Plavix which will also be addressed in the future  Subjective:      Patient ID: Marguerite Blake is a 80 y o  male  HPI   Mr Marguerite Blake is an 70-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 who comes in for evaluation of peripheral arterial disease  His has a PMH s/f hypertension, hyperlipidemia, impaired glucose, chronic fatigue, chronic shortness of breath, dizziness, falls and chronic kidney disease stage 3 with serum creatinine 1 4 range  He comes in today for an urgent office visit for evaluation of leg pain  He describes left leg pain, swelling and paresthesias  He is very anxious because he is not sleeping well at night  For the past few weeks he has gotten up during the night for leg pain (particularly the left leg) requiring dangling his legs over the bed  He has no wounds or sores  No fevers or chills  His overall functional status is limited but gives no definite history of prior claudication symptoms  On examination, he has good, 2+ femoral pulses bilaterally but no palpable pulses in his feet  The left great toe has a small area of redness the medial aspect which she states occurred when he recently bumped his toe  No sign of infection or discharge  He is overall motor-sensory intact  There are AT/PT dopplerable signals on the left and an AT dopplerable signal on the right  We reviewed lower extremity arterial duplex study ABIs were noncompressible  Right and Left Met and Toe pressures were 88/48 and 44/44 respectively  No chest pain, pressure, shortness of breath  No fevers, chills  No Dizziness lightheadedness  No abdominal pain or bleeding  He gives no historical history of claudication  Cardiac testing for work up of dyspnea was negative  MPI 11/17/2017 - normal study and  Echo 10/2017 - Normal EF with grade 2 diastolic dysfunction, Mild MR/TR as per chart  Medical therapy includes aspirin 81 and simvastatin 20      The following portions of the patient's history were reviewed and updated as appropriate: allergies, current medications, past family history, past medical history, past social history, past surgical history and problem list     Review of Systems   Constitutional: Negative  Negative for chills, fever and unexpected weight change  HENT: Negative  Eyes: Negative  Negative for visual disturbance  Respiratory: Negative  Negative for cough, chest tightness and shortness of breath  Cardiovascular: Negative for chest pain and palpitations  Gastrointestinal: Negative  Negative for abdominal distention, abdominal pain and blood in stool  Endocrine: Negative  Negative for cold intolerance and heat intolerance  Genitourinary: Negative  Musculoskeletal: Negative  Negative for myalgias  Skin: Negative for wound  Neurological: Negative  Negative for dizziness, light-headedness and numbness  Hematological: Negative  Psychiatric/Behavioral: Negative  Objective:    Vitals:    02/13/18 0949   BP: 126/74   Pulse: 74   Resp: 18        Physical Exam   Constitutional: He is oriented to person, place, and time  He appears well-developed and well-nourished  He is cooperative  HENT:   Head: Normocephalic and atraumatic  Eyes: EOM are normal  Pupils are equal, round, and reactive to light  Neck: Trachea normal  Neck supple  No JVD present  No thyromegaly present  Cardiovascular: Normal rate, regular rhythm, S1 normal, S2 normal and normal heart sounds  Exam reveals no gallop and no friction rub  No murmur heard  Pulses:       Carotid pulses are 2+ on the right side, and 2+ on the left side  Radial pulses are 2+ on the right side, and 2+ on the left side  Femoral pulses are 2+ on the right side, and 2+ on the left side  1+ lower extremity edema left ankle  There is a small area of redness at the medial aspect of the left great toe current patient states he bumped the toe  Overall motor function intact and sensory intact      Dopplerable signals AT/PT on the left  Dopplerable AT signal on the right  Pulmonary/Chest: Effort normal and breath sounds normal  No accessory muscle usage  No respiratory distress  He has no wheezes  He has no rales  Abdominal: Soft  Bowel sounds are normal  He exhibits no distension  There is no hepatosplenomegaly  There is no tenderness  Musculoskeletal: Normal range of motion  He exhibits no edema or deformity  Neurological: He is alert and oriented to person, place, and time  Grossly normal    Skin: Skin is warm and dry  No lesion and no rash noted  No cyanosis  Nails show no clubbing  Psychiatric: He has a normal mood and affect  Nursing note and vitals reviewed  IMAGING:    VAS LE Arterial Duplex 02/08/2018: [facility] REPORT  CLINICAL:  Indications:  PVD, Unspecified [I73 9]  Patient complains of left great toe pain  Risk Factors: The patient has history of Hyperlipidemia  He has no history of  Diabetes or Hypertension  Right Brachial Pressure:  133/ mmHg, Left Brachial Pressure:  140/ mmHg  FINDINGS:     Segment                Rig       Left                                            PSV  EDV  Impression  PSV  EDV    Common Femoral Artery   52    0               70    0    Prox Profunda           51    0               64    0    Prox SFA                43    0               59    9    Mid SFA                 64    0               71    6    Dist SFA                57    0  >75%        184   13    Proximal Pop            40    7               41    7    Distal Pop              55    9               55    5    Prox Post Tibial        40    9  Occluded                Dist Post Tibial        57   13  Occluded                Prox  Ant  Tibial       49    8               41   12    Dist  Ant   Tibial       57   10               34   10             CONCLUSION:  Impression:  RIGHT LOWER LIMB:  This resting evaluation shows diffuse atherosclerosis of the lower extremity  arterial system  Ankle/Brachial index: unreliable due to non-compressible arteries  PVR/ PPG tracings are dampened  Metatarsal pressure of 86 mmHg  Great toe pressure of 48 mmHg, within the healing range     LEFT LOWER LIMB:  This resting evaluation shows diffuse atherosclerosis of the lower extremity  arterial system  There is a >75% stenosis in the distal superficial femoral artery  The posterior tibial artery is occluded  Ankle/Brachial index: unreliable due to non-compressible arteries  PVR/ PPG tracings are dampened    Metatarsal pressure of 77 mmHg  Great toe pressure of 44 mmHg, within the healing range

## 2018-02-14 ENCOUNTER — TRANSCRIBE ORDERS (OUTPATIENT)
Dept: VASCULAR SURGERY | Facility: CLINIC | Age: 83
End: 2018-02-14

## 2018-02-14 ENCOUNTER — TREATMENT (OUTPATIENT)
Dept: VASCULAR SURGERY | Facility: CLINIC | Age: 83
End: 2018-02-14

## 2018-02-14 DIAGNOSIS — I73.9 SEVERE PERIPHERAL ARTERIAL DISEASE (HCC): Primary | ICD-10-CM

## 2018-02-19 ENCOUNTER — TELEPHONE (OUTPATIENT)
Dept: VASCULAR SURGERY | Facility: CLINIC | Age: 83
End: 2018-02-19

## 2018-02-20 ENCOUNTER — APPOINTMENT (OUTPATIENT)
Dept: LAB | Facility: CLINIC | Age: 83
End: 2018-02-20
Payer: COMMERCIAL

## 2018-02-20 DIAGNOSIS — I73.9 SEVERE PERIPHERAL ARTERIAL DISEASE (HCC): ICD-10-CM

## 2018-02-20 LAB
ANION GAP SERPL CALCULATED.3IONS-SCNC: 8 MMOL/L (ref 4–13)
CHLORIDE SERPL-SCNC: 102 MMOL/L (ref 100–108)
CO2 SERPL-SCNC: 28 MMOL/L (ref 21–32)
ERYTHROCYTE [DISTWIDTH] IN BLOOD BY AUTOMATED COUNT: 14.1 % (ref 11.6–15.1)
HCT VFR BLD AUTO: 34 % (ref 36.5–49.3)
HGB BLD-MCNC: 11.3 G/DL (ref 12–17)
INR PPP: 1.16 (ref 0.86–1.16)
MCH RBC QN AUTO: 31 PG (ref 26.8–34.3)
MCHC RBC AUTO-ENTMCNC: 33.2 G/DL (ref 31.4–37.4)
MCV RBC AUTO: 93 FL (ref 82–98)
PLATELET # BLD AUTO: 201 THOUSANDS/UL (ref 149–390)
PMV BLD AUTO: 11.2 FL (ref 8.9–12.7)
POTASSIUM SERPL-SCNC: 4.7 MMOL/L (ref 3.5–5.3)
PROTHROMBIN TIME: 14.9 SECONDS (ref 12.1–14.4)
RBC # BLD AUTO: 3.65 MILLION/UL (ref 3.88–5.62)
SODIUM SERPL-SCNC: 138 MMOL/L (ref 136–145)
WBC # BLD AUTO: 8.78 THOUSAND/UL (ref 4.31–10.16)

## 2018-02-20 PROCEDURE — 80051 ELECTROLYTE PANEL: CPT

## 2018-02-20 PROCEDURE — 85610 PROTHROMBIN TIME: CPT

## 2018-02-20 PROCEDURE — 36415 COLL VENOUS BLD VENIPUNCTURE: CPT

## 2018-02-20 PROCEDURE — 85027 COMPLETE CBC AUTOMATED: CPT

## 2018-02-21 ENCOUNTER — TELEPHONE (OUTPATIENT)
Dept: RADIOLOGY | Facility: HOSPITAL | Age: 83
End: 2018-02-21

## 2018-02-21 RX ORDER — SODIUM CHLORIDE 9 MG/ML
75 INJECTION, SOLUTION INTRAVENOUS CONTINUOUS
Status: CANCELLED | OUTPATIENT
Start: 2018-02-21

## 2018-02-22 DIAGNOSIS — D50.9 IRON DEFICIENCY ANEMIA, UNSPECIFIED IRON DEFICIENCY ANEMIA TYPE: ICD-10-CM

## 2018-02-22 DIAGNOSIS — E78.5 HYPERLIPIDEMIA, UNSPECIFIED HYPERLIPIDEMIA TYPE: Primary | ICD-10-CM

## 2018-02-22 RX ORDER — FERROUS SULFATE 325(65) MG
1 TABLET ORAL 2 TIMES DAILY
COMMUNITY
Start: 2017-10-15 | End: 2018-02-22 | Stop reason: SDUPTHER

## 2018-02-22 RX ORDER — FERROUS SULFATE 325(65) MG
1 TABLET ORAL 2 TIMES DAILY
Qty: 60 TABLET | Refills: 3 | Status: SHIPPED | OUTPATIENT
Start: 2018-02-22 | End: 2018-07-02 | Stop reason: SDUPTHER

## 2018-02-22 RX ORDER — SIMVASTATIN 20 MG
20 TABLET ORAL DAILY
Qty: 30 TABLET | Refills: 3 | Status: SHIPPED | OUTPATIENT
Start: 2018-02-22 | End: 2018-03-16 | Stop reason: SDUPTHER

## 2018-02-25 ENCOUNTER — TELEPHONE (OUTPATIENT)
Dept: INPATIENT UNIT | Facility: HOSPITAL | Age: 83
End: 2018-02-25

## 2018-02-26 ENCOUNTER — HOSPITAL ENCOUNTER (OUTPATIENT)
Dept: RADIOLOGY | Facility: HOSPITAL | Age: 83
Discharge: HOME | End: 2018-02-26
Attending: RADIOLOGY | Admitting: RADIOLOGY
Payer: COMMERCIAL

## 2018-02-26 VITALS
RESPIRATION RATE: 18 BRPM | HEART RATE: 55 BPM | DIASTOLIC BLOOD PRESSURE: 74 MMHG | SYSTOLIC BLOOD PRESSURE: 155 MMHG | HEIGHT: 70 IN | WEIGHT: 156 LBS | TEMPERATURE: 97.5 F | BODY MASS INDEX: 22.33 KG/M2 | OXYGEN SATURATION: 96 %

## 2018-02-26 DIAGNOSIS — I73.9 SEVERE PERIPHERAL ARTERIAL DISEASE (HCC): ICD-10-CM

## 2018-02-26 LAB
ANION GAP BLD CALC-SCNC: 14 MMOL/L (ref 4–13)
BUN BLD-MCNC: 18 MG/DL (ref 5–25)
CA-I BLD-SCNC: 1.21 MMOL/L (ref 1.12–1.32)
CHLORIDE BLD-SCNC: 105 MMOL/L (ref 100–108)
CREAT BLD-MCNC: 1.3 MG/DL (ref 0.6–1.3)
ERYTHROCYTE [DISTWIDTH] IN BLOOD BY AUTOMATED COUNT: 13.9 % (ref 11.6–15.1)
GFR SERPL CREATININE-BSD FRML MDRD: 50 ML/MIN/1.73SQ M
GLUCOSE SERPL-MCNC: 96 MG/DL (ref 65–140)
HCT VFR BLD AUTO: 37.5 % (ref 36.5–49.3)
HCT VFR BLD CALC: 31 % (ref 36.5–49.3)
HGB BLD-MCNC: 12.4 G/DL (ref 12–17)
HGB BLDA-MCNC: 10.5 G/DL (ref 12–17)
INR PPP: 1.09 (ref 0.86–1.16)
MCH RBC QN AUTO: 30.7 PG (ref 26.8–34.3)
MCHC RBC AUTO-ENTMCNC: 33.1 G/DL (ref 31.4–37.4)
MCV RBC AUTO: 93 FL (ref 82–98)
PCO2 BLD: 29 MMOL/L (ref 21–32)
PLATELET # BLD AUTO: 191 THOUSANDS/UL (ref 149–390)
PMV BLD AUTO: 10.7 FL (ref 8.9–12.7)
POTASSIUM BLD-SCNC: 4.1 MMOL/L (ref 3.5–5.3)
PROTHROMBIN TIME: 14.1 SECONDS (ref 12.1–14.4)
RBC # BLD AUTO: 4.04 MILLION/UL (ref 3.88–5.62)
SODIUM BLD-SCNC: 143 MMOL/L (ref 136–145)
SPECIMEN SOURCE: ABNORMAL
WBC # BLD AUTO: 9.61 THOUSAND/UL (ref 4.31–10.16)

## 2018-02-26 PROCEDURE — C1769 GUIDE WIRE: HCPCS

## 2018-02-26 PROCEDURE — C1757 CATH, THROMBECTOMY/EMBOLECT: HCPCS

## 2018-02-26 PROCEDURE — 75625 CONTRAST EXAM ABDOMINL AORTA: CPT | Performed by: RADIOLOGY

## 2018-02-26 PROCEDURE — 75716 ARTERY X-RAYS ARMS/LEGS: CPT | Performed by: RADIOLOGY

## 2018-02-26 PROCEDURE — 99153 MOD SED SAME PHYS/QHP EA: CPT

## 2018-02-26 PROCEDURE — C1725 CATH, TRANSLUMIN NON-LASER: HCPCS

## 2018-02-26 PROCEDURE — C2623 CATH, TRANSLUMIN, DRUG-COAT: HCPCS

## 2018-02-26 PROCEDURE — 85610 PROTHROMBIN TIME: CPT | Performed by: RADIOLOGY

## 2018-02-26 PROCEDURE — 37224 HB FEM/POPL REVAS W/TLA: CPT

## 2018-02-26 PROCEDURE — C1760 CLOSURE DEV, VASC: HCPCS

## 2018-02-26 PROCEDURE — 75625 CONTRAST EXAM ABDOMINL AORTA: CPT

## 2018-02-26 PROCEDURE — C1894 INTRO/SHEATH, NON-LASER: HCPCS

## 2018-02-26 PROCEDURE — C1887 CATHETER, GUIDING: HCPCS

## 2018-02-26 PROCEDURE — 37227 HB FEM/POPL REVASC STNT & ATHER: CPT

## 2018-02-26 PROCEDURE — 85014 HEMATOCRIT: CPT

## 2018-02-26 PROCEDURE — 85027 COMPLETE CBC AUTOMATED: CPT | Performed by: RADIOLOGY

## 2018-02-26 PROCEDURE — 99152 MOD SED SAME PHYS/QHP 5/>YRS: CPT | Performed by: RADIOLOGY

## 2018-02-26 PROCEDURE — 80047 BASIC METABLC PNL IONIZED CA: CPT

## 2018-02-26 PROCEDURE — C1884 EMBOLIZATION PROTECT SYST: HCPCS

## 2018-02-26 PROCEDURE — 99152 MOD SED SAME PHYS/QHP 5/>YRS: CPT

## 2018-02-26 PROCEDURE — 37227 PR REVASCULARIZE FEM/POP ARTERY,ANGIOPLASTY/STENT/ATHERECTOMY: CPT | Performed by: RADIOLOGY

## 2018-02-26 PROCEDURE — C1874 STENT, COATED/COV W/DEL SYS: HCPCS

## 2018-02-26 PROCEDURE — 75710 ARTERY X-RAYS ARM/LEG: CPT

## 2018-02-26 PROCEDURE — 37184 PRIM ART M-THRMBC 1ST VSL: CPT

## 2018-02-26 RX ORDER — FENTANYL CITRATE 50 UG/ML
INJECTION, SOLUTION INTRAMUSCULAR; INTRAVENOUS CODE/TRAUMA/SEDATION MEDICATION
Status: COMPLETED | OUTPATIENT
Start: 2018-02-26 | End: 2018-02-26

## 2018-02-26 RX ORDER — SODIUM CHLORIDE 9 MG/ML
75 INJECTION, SOLUTION INTRAVENOUS CONTINUOUS
Status: DISCONTINUED | OUTPATIENT
Start: 2018-02-26 | End: 2018-02-26 | Stop reason: HOSPADM

## 2018-02-26 RX ORDER — HEPARIN SODIUM 1000 [USP'U]/ML
INJECTION, SOLUTION INTRAVENOUS; SUBCUTANEOUS CODE/TRAUMA/SEDATION MEDICATION
Status: COMPLETED | OUTPATIENT
Start: 2018-02-26 | End: 2018-02-26

## 2018-02-26 RX ORDER — LANOLIN ALCOHOL/MO/W.PET/CERES
CREAM (GRAM) TOPICAL DAILY
COMMUNITY
End: 2018-11-23 | Stop reason: ALTCHOICE

## 2018-02-26 RX ORDER — CEFAZOLIN SODIUM 1 G/3ML
INJECTION, POWDER, FOR SOLUTION INTRAMUSCULAR; INTRAVENOUS CODE/TRAUMA/SEDATION MEDICATION
Status: COMPLETED | OUTPATIENT
Start: 2018-02-26 | End: 2018-02-26

## 2018-02-26 RX ORDER — MIDAZOLAM HYDROCHLORIDE 1 MG/ML
INJECTION INTRAMUSCULAR; INTRAVENOUS CODE/TRAUMA/SEDATION MEDICATION
Status: COMPLETED | OUTPATIENT
Start: 2018-02-26 | End: 2018-02-26

## 2018-02-26 RX ORDER — MULTIVIT-MIN/IRON/FOLIC ACID/K 18-600-40
CAPSULE ORAL
COMMUNITY

## 2018-02-26 RX ADMIN — SODIUM CHLORIDE 75 ML/HR: 0.9 INJECTION, SOLUTION INTRAVENOUS at 08:30

## 2018-02-26 RX ADMIN — FENTANYL CITRATE 50 MCG: 50 INJECTION, SOLUTION INTRAMUSCULAR; INTRAVENOUS at 16:24

## 2018-02-26 RX ADMIN — MIDAZOLAM 1 MG: 1 INJECTION INTRAMUSCULAR; INTRAVENOUS at 14:41

## 2018-02-26 RX ADMIN — MIDAZOLAM 1 MG: 1 INJECTION INTRAMUSCULAR; INTRAVENOUS at 14:02

## 2018-02-26 RX ADMIN — MIDAZOLAM 1 MG: 1 INJECTION INTRAMUSCULAR; INTRAVENOUS at 13:40

## 2018-02-26 RX ADMIN — FENTANYL CITRATE 50 MCG: 50 INJECTION, SOLUTION INTRAMUSCULAR; INTRAVENOUS at 14:43

## 2018-02-26 RX ADMIN — FENTANYL CITRATE 50 MCG: 50 INJECTION, SOLUTION INTRAMUSCULAR; INTRAVENOUS at 13:41

## 2018-02-26 RX ADMIN — FENTANYL CITRATE 50 MCG: 50 INJECTION, SOLUTION INTRAMUSCULAR; INTRAVENOUS at 13:22

## 2018-02-26 RX ADMIN — HEPARIN SODIUM 2000 UNITS: 1000 INJECTION INTRAVENOUS; SUBCUTANEOUS at 15:50

## 2018-02-26 RX ADMIN — IOHEXOL 202 ML: 300 INJECTION, SOLUTION INTRAVENOUS at 17:30

## 2018-02-26 RX ADMIN — CEFAZOLIN 1000 MG: 1 INJECTION, POWDER, FOR SOLUTION INTRAVENOUS at 16:16

## 2018-02-26 RX ADMIN — MIDAZOLAM 1 MG: 1 INJECTION INTRAMUSCULAR; INTRAVENOUS at 13:22

## 2018-02-26 RX ADMIN — NITROGLYCERIN 100 MCG: 20 INJECTION INTRAVENOUS at 15:52

## 2018-02-26 RX ADMIN — NITROGLYCERIN 100 MCG: 20 INJECTION INTRAVENOUS at 15:47

## 2018-02-26 RX ADMIN — HEPARIN SODIUM 3000 UNITS: 1000 INJECTION INTRAVENOUS; SUBCUTANEOUS at 14:51

## 2018-02-26 RX ADMIN — HEPARIN SODIUM 3000 UNITS: 1000 INJECTION INTRAVENOUS; SUBCUTANEOUS at 13:59

## 2018-02-26 NOTE — DISCHARGE INSTRUCTIONS
ARTERIOGRAM    WHAT YOU SHOULD KNOW:   An angiogram is a procedure to look at arteries in your body  Arteries are the blood vessels that carry blood from your heart to your body  AFTER YOU LEAVE:     Self-care:   · Limit activity: Rest for the remainder of the day of your procedure  Have some one with you until the next morning  Keep your arm or leg straight as much as possible  Rest as much as possible, sitting lying or reclining  Walk only to go to the bathroom, to bed or to eat  If the angiogram catheter was put in your leg, use the stairs as little as possible  No driving  · Keep your wound clean and dry  You may shower 24 hours after your procedure  The bandage you have on should fall off in 2-3 days  If there is any drainage from the puncture site, you should put on a clean bandage  · Watch for bleeding and bruising: It is normal to have a bruise and soreness where the angiogram catheter went in  · Diet:   · You may resume your regular diet, Sips of flat soda will help with mild nausea  · Drink more liquids than usual for the next 24 hours      · IMMEDIATELY Contact Interventional Radiology at [phone] Kristal PATIENTS: Contact Interventional Radiology at [phone]) Gera Alfaro PATIENTS: Contact Interventional Radiology at [phone]) if any of the following occur:  · If your bruise gets larger or if you notice any active bleeding  APPLY DIRECT PRESSURE TO THE BLEEDING SITE  · If you notice increased swelling or have increased pain at the puncture site   · If you have any numbness or pain in the extremity of the puncture site   · If that extremity seems cold or pale      · You have fever greater than 101  · Persistent nausea or vomitting    Follow up with your primary healthcare provider  as directed: Write down your questions so you remember to ask them during your visits

## 2018-03-02 ENCOUNTER — TELEPHONE (OUTPATIENT)
Dept: VASCULAR SURGERY | Facility: CLINIC | Age: 83
End: 2018-03-02

## 2018-03-02 NOTE — TELEPHONE ENCOUNTER
Patients wife called and said patient is having rest pain and his left foot is cool ,  He has motor and sensory functions, but pain is increasing, he said when he elevates it is more painful  I spoke with dr Marbin Boland and he said bienvenido should go to ER , however bienvenido said he can not because he is snowed in       I told him if he can not move foot or looses feeling, he should call squad to take him  Dr Marbin Boland is aware  I will ask scheduling to bring him early next week

## 2018-03-05 ENCOUNTER — OFFICE VISIT (OUTPATIENT)
Dept: VASCULAR SURGERY | Facility: CLINIC | Age: 83
End: 2018-03-05
Payer: COMMERCIAL

## 2018-03-05 ENCOUNTER — HOSPITAL ENCOUNTER (OUTPATIENT)
Dept: NON INVASIVE DIAGNOSTICS | Facility: CLINIC | Age: 83
Discharge: HOME | End: 2018-03-05
Payer: COMMERCIAL

## 2018-03-05 VITALS
WEIGHT: 159 LBS | HEIGHT: 70 IN | HEART RATE: 78 BPM | DIASTOLIC BLOOD PRESSURE: 70 MMHG | RESPIRATION RATE: 16 BRPM | BODY MASS INDEX: 22.76 KG/M2 | TEMPERATURE: 96.8 F | SYSTOLIC BLOOD PRESSURE: 130 MMHG

## 2018-03-05 DIAGNOSIS — I73.9 SEVERE PERIPHERAL ARTERIAL DISEASE (HCC): ICD-10-CM

## 2018-03-05 DIAGNOSIS — Z95.820 S/P PERIPHERAL ARTERY ANGIOPLASTY WITH STENT PLACEMENT: ICD-10-CM

## 2018-03-05 DIAGNOSIS — E78.5 HYPERLIPIDEMIA, UNSPECIFIED HYPERLIPIDEMIA TYPE: ICD-10-CM

## 2018-03-05 DIAGNOSIS — N18.30 CKD (CHRONIC KIDNEY DISEASE), STAGE III (HCC): ICD-10-CM

## 2018-03-05 DIAGNOSIS — I73.9 SEVERE PERIPHERAL ARTERIAL DISEASE (HCC): Primary | ICD-10-CM

## 2018-03-05 PROBLEM — D64.9 ANEMIA: Status: ACTIVE | Noted: 2017-10-15

## 2018-03-05 PROBLEM — I45.2 BIFASCICULAR BLOCK: Status: ACTIVE | Noted: 2017-10-31

## 2018-03-05 PROBLEM — E55.9 VITAMIN D DEFICIENCY: Status: ACTIVE | Noted: 2017-10-25

## 2018-03-05 PROCEDURE — 93926 LOWER EXTREMITY STUDY: CPT

## 2018-03-05 PROCEDURE — 93925 LOWER EXTREMITY STUDY: CPT | Performed by: SURGERY

## 2018-03-05 PROCEDURE — 99214 OFFICE O/P EST MOD 30 MIN: CPT | Performed by: PHYSICIAN ASSISTANT

## 2018-03-05 RX ORDER — OXYCODONE HYDROCHLORIDE AND ACETAMINOPHEN 5; 325 MG/1; MG/1
1 TABLET ORAL EVERY 6 HOURS PRN
Qty: 12 TABLET | Refills: 0 | Status: SHIPPED | OUTPATIENT
Start: 2018-03-05 | End: 2018-05-21 | Stop reason: ALTCHOICE

## 2018-03-05 RX ORDER — SODIUM CHLORIDE 9 MG/ML
100 INJECTION, SOLUTION INTRAVENOUS CONTINUOUS
Status: CANCELLED | OUTPATIENT
Start: 2018-03-05

## 2018-03-05 RX ORDER — CLOPIDOGREL BISULFATE 75 MG/1
75 TABLET ORAL DAILY
Qty: 30 TABLET | Refills: 2 | Status: SHIPPED | OUTPATIENT
Start: 2018-03-05 | End: 2018-04-23 | Stop reason: SDUPTHER

## 2018-03-05 RX ORDER — CLOBETASOL PROPIONATE 0.5 MG/G
OINTMENT TOPICAL 2 TIMES DAILY
Refills: 3 | Status: ON HOLD | COMMUNITY
Start: 2018-02-15 | End: 2018-10-26 | Stop reason: SDDI

## 2018-03-05 NOTE — H&P
Severe peripheral arterial disease (HCC)  PAD w/persistent LLE rest pain and incomplete revascularization s/p PVI by IR 2/26/18 (BLE angiogram, L SFA PTA, DCB, atherectomy, Viabahn stent and L BK popliteal PTA, atherectomy, suction thrombectomy)  --recommend repeat LLE angiogram with possible endovascular intervention by surgeon asap  Patient requests [address]  --start Plavix due to recent stent and complex atherectomy  --continue aspirin 81 mg daily  --continue statin therapy  --Percocet p r n  for control of LLE rest pain  --STAT R groin pseudoaneurysm study obtained in office and negative  --will discuss case with Dr Lexi Alvarado      CKD (chronic kidney disease), stage III  CKD III w/baseline creat 1 3-1 4  --repeat BMP prior to angiogram due to recent contrast dye load 2/26/18  --will require IV hydration pre and postprocedure  --please see separate IV hydration orders    Hyperlipidemia  --continue statin therapy      Assessment/Plan   Diagnoses and all orders for this visit:    Severe peripheral arterial disease ([address] )  -     VAS pseudoaneurysm study; Future  -     clopidogrel (PLAVIX) 75 mg tablet; Take 1 tablet (75 mg total) by mouth daily  -     Cancel: IR consult; Future  -     Case request operating room: ARTERIOGRAM   Abdominal aortogram with left lower extremity runoff, possible endovascular intervention  Level 2-3; Standing  -     Case request operating room: ARTERIOGRAM   Abdominal aortogram with left lower extremity runoff, possible endovascular intervention  Level 2-3  -     oxyCODONE-acetaminophen (PERCOCET) 5-325 mg per tablet; Take 1 tablet by mouth every 6 (six) hours as needed for moderate pain Max Daily Amount: 4 tablets    S/P peripheral artery angioplasty with stent placement  -     Cancel: IR consult; Future    Hyperlipidemia, unspecified hyperlipidemia type    CKD (chronic kidney disease), stage III  -     Cancel: IR consult;  Future  -     Basic metabolic panel; Future    Other orders  -     clobetasol (TEMOVATE) 0 05 % ointment; 2 (two) times a day Apply sparingly to affected area(s)  -     Diet NPO; Sips with meds; Standing  -     Void on call to OR; Standing  -     Insert peripheral IV; Standing  -     Place sequential compression device; Standing  -     Shower/scrub; Standing  -     Nursing communcation Clip hair (do not shave) prior to surgery; Standing  -     sodium chloride 0 9 % infusion; Infuse 100 mL/hr into a venous catheter continuous         No chief complaint on file  Chief complaint:  Patient is s/p A-gram with angioplasty L superficial femoral and popliteal artery on 2/26  He has pain in the L foot pain and swelling that is slightly worse since the procedure at rest  He denies calf cramping or thigh  He has L foot coldness  Ulcer noted on medial L hallux  Subjective   Patient ID: Angela Duffy is a 80 y o  male  63-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 with a history HTN, HLD, CKD III (baseline creatinine 1 3-1 4) and significant BLE PAD with LLE rest pain, s/p recent BLE angiogram and LLE endovascular intervention by IR 2/26/2018 who presents back to the office today for urgent evaluation secondary to persistent LLE rest pain  Patient was seen in the office in consultation on 2/13/18 related to PAD with BLE rest pain, L>R  BLE angiogram was recommended the patient is now s/p abdominal aortogram with BLE runoff on 2/26/18 which demonstrated bilateral SFA disease and severe tibial disease w/ 2 vessel runoff as detailed below  In particular, there was a 80% stenosis distal left SFA, near occlusive high grade stenosis BK popliteal,  proximal PT and dominant runoff via AT with distal occlusion but reconstitution of DP  This was treated with L SFA PTA/atherectomy/Viabahn stent (due to refractory gain) and L BK popliteal PTA/atherectomy complicated by acute BK popliteal occlusion treated w/suction thrombectomy    Due to the length of the procedure, no intervention was performed on the AT and recommendations were consideration for repeat angiogram in the future if symptoms persisted  The patient was scheduled for follow-up with Dr Judit Hummel on 3/20/2018 to discuss angiogram results  The patient reports no significant change in his symptoms and if anything worsening of his left lower extremity rest pain  He complains of bilateral foot edema since the procedure  He denies right groin pain or bilateral lower extremity claudication  Presently, he denies right lower extremity rest pain  The patient does have a superficial interdigital ulceration between the left hallux and 2nd toe which has remained stable since his evaluation 2/13/2018  He denies new tissue loss  Sensation and motor function are intact BLE and feet are warm to touch  The patient's LLE rest pain keeps him from sleep and is relieved with dependency  The following portions of the patient's history were reviewed and updated as appropriate: allergies, current medications, past family history, past medical history, past social history, past surgical history and problem list     Review of Systems   Constitutional: Negative for chills, fatigue, fever and unexpected weight change  HENT: Negative for congestion, drooling, ear pain, facial swelling, hearing loss, sore throat, tinnitus, trouble swallowing and voice change  Eyes: Negative  Respiratory: Negative for shortness of breath, wheezing and stridor  Cardiovascular: Negative for chest pain, palpitations and leg swelling  Gastrointestinal: Negative for abdominal distention, abdominal pain, blood in stool, diarrhea and nausea  Endocrine: Negative  Genitourinary: Negative for difficulty urinating, dysuria, flank pain, frequency, hematuria and urgency  Musculoskeletal: Negative for back pain, gait problem, joint swelling, myalgias, neck pain and neck stiffness  Skin: Negative for pallor, rash and wound  Allergic/Immunologic: Negative  Neurological: Negative for dizziness, tremors, seizures, syncope, facial asymmetry, speech difficulty, weakness, light-headedness, numbness and headaches  Hematological: Negative for adenopathy  Does not bruise/bleed easily  Psychiatric/Behavioral: Negative for agitation, behavioral problems, confusion, hallucinations and sleep disturbance  The patient is not nervous/anxious  All other systems reviewed and are negative  Patient Active Problem List   Diagnosis    Severe peripheral arterial disease ([address] )    Hyperlipidemia    Vitamin D deficiency    Bifascicular block    Anemia    CKD (chronic kidney disease), stage III       History reviewed  No pertinent surgical history  Family History   Problem Relation Age of Onset    Lung cancer Mother     Diabetes Father     Aneurysm Father     Diabetes Maternal Grandmother        Social History     Social History    Marital status: /Civil Union     Spouse name: N/A    Number of children: N/A    Years of education: N/A     Occupational History    Not on file       Social History Main Topics    Smoking status: Former Smoker    Smokeless tobacco: Never Used    Alcohol use Not on file    Drug use: Unknown    Sexual activity: Not on file     Other Topics Concern    Not on file     Social History Narrative    No narrative on file       No Known Allergies      Current Outpatient Prescriptions:     aspirin 81 MG tablet, Take by mouth, Disp: , Rfl:     Calcium Carb-Cholecalciferol (CALCIUM 1000 + D) 1000-800 MG-UNIT TABS, Take 1 tablet by mouth daily, Disp: , Rfl:     Cholecalciferol (VITAMIN D) 2000 units CAPS, Take by mouth, Disp: , Rfl:     clobetasol (TEMOVATE) 0 05 % ointment, 2 (two) times a day Apply sparingly to affected area(s), Disp: , Rfl: 3    cyanocobalamin (VITAMIN B-12) 1,000 mcg tablet, Take by mouth daily, Disp: , Rfl:     ferrous sulfate 325 (65 Fe) mg tablet, Take 1 tablet (325 mg total) by mouth 2 (two) times a day, Disp: 60 tablet, Rfl: 3    meloxicam (MOBIC) 15 mg tablet, Take 1 tablet by mouth daily, Disp: , Rfl:     Omega-3 Fatty Acids (FISH OIL) 645 MG CAPS, Take by mouth, Disp: , Rfl:     simvastatin (ZOCOR) 20 mg tablet, Take 1 tablet (20 mg total) by mouth daily, Disp: 30 tablet, Rfl: 3    clopidogrel (PLAVIX) 75 mg tablet, Take 1 tablet (75 mg total) by mouth daily, Disp: 30 tablet, Rfl: 2    oxyCODONE-acetaminophen (PERCOCET) 5-325 mg per tablet, Take 1 tablet by mouth every 6 (six) hours as needed for moderate pain Max Daily Amount: 4 tablets, Disp: 12 tablet, Rfl: 0     Imaging studies:  Abdominal aortogram with bilateral lower extremity runoff and left lower extremity endovascular intervention (IR) 2/26/2018:  Report and images reviewed from imaging study and as noted above  Full report as noted below:  "Left:  Common iliac artery: Patent without significant stenosis  Internal iliac artery: Patent without significant stenosis  External iliac artery: Patent without significant stenosis  Common femoral artery: Patent with moderate calcified atherosclerotic disease noting 2 separate stenoses ranging from 50-60% stenosis  Profunda femoris artery: Patent without significant stenosis  Superficial femoral artery: Scattered calcific atherosclerosis most severe in the mid/distal portion noting a focal 1 cm 80% stenosis  Popliteal artery: 1 cm greater than 90% threadlike stenosis in the below the knee popliteal artery  Anterior tibial artery: Dominant runoff to foot  Severe scattered atherosclerotic disease with multifocal stenoses and distal intermittent focal occlusions with reconstitution of the artery at the ankle and runoff to a small caliber dorsalis pedis   artery  Tibioperoneal trunk: Patent without significant stenosis  Posterior tibial artery: Occluded beginning a few centimeters from its origin    Distally reconstituted at the ankle from the peroneal artery with runoff noting small-caliber calcaneal and plantar branches  Peroneal artery: Patent with scattered atherosclerotic disease and multifocal stenoses      Right:  Common iliac artery: Patent without significant stenosis  Internal iliac artery: Patent without significant stenosis  External iliac artery: Patent without significant stenosis  Common femoral artery: Patent with mild to moderate calcified atherosclerotic disease  Profunda femoris artery: Patent without significant stenosis and scattered calcified atherosclerotic disease  Superficial femoral artery: Multifocal calcific atherosclerosis throughout  Focal stenoses ranging from 30-70%  Popliteal artery: Patent with scattered calcific atherosclerosis  Anterior tibial artery: Severely diseased noting poor visualization in its mid/distal portion  Dorsalis pedis artery not readily visualized  Tibioperoneal trunk: Patent without significant stenosis  Posterior tibial artery: Dominant runoff to the foot, noting moderate to severe disease and multiple high-grade stenoses  Plantar arch visualized  Peroneal artery: Disease with poor visualization in its mid and distal portion  IMPRESSION:  1  Severe bilateral peripheral arterial disease, most significantly in the tibial region  2   The left posterior tibial artery is occluded  The left anterior tibial artery is the dominant runoff to the foot and is severely diseased  Treatment of this artery was not performed due to length of procedure and significant contrast load  The   patient should return for further intervention  3   High-grade stenosis of the distal left superficial femoral artery which was refractory to angioplasty and jetstream atherectomy  Significant luminal gain following Viabahn bond stent placement  4   High grade threadlike stenosis of the below the knee left popliteal artery  Temporary occlusion of this artery following jetstream atherectomy which was resolved following suction thrombectomy    Significant luminal gain was then demonstrated of this   region following prolonged 5 mm balloon angioplasty"    PIA 2/8/18:  >75% stenosis distal L SFA,  L PT   L SYLVAIN NC/77/44  R SYLVAIN NC/86/48      Objective     Physical Exam:    General appearance: alert and oriented, in no acute distress  Skin: Skin color, texture, turgor normal  No rashes or lesions  Neurologic: Grossly normal  Head: Normocephalic, without obvious abnormality, atraumatic  Eyes: PERRL  EOMI, sclerae nonicteric  Throat: lips, mucosa, and tongue normal; teeth and gums normal  Neck: no adenopathy, no carotid bruit, no JVD, supple, symmetrical, trachea midline and thyroid not enlarged, symmetric, no tenderness/mass/nodules  Back: symmetric, no curvature  ROM normal  No CVA tenderness  Lungs: clear to auscultation bilaterally  Chest wall: no tenderness  Heart: regular rate and rhythm, S1, S2 normal, no murmur, click, rub or gallop  Abdomen: soft, non-tender; bowel sounds normal; no masses,  no organomegaly and Nondistended  No abdominal bruits  Extremities: no edema, redness or tenderness in the calves or thighs and Bilateral lower extremities warm, pink and well perfused without pallor, cyanosis, mottling  Bilateral lower extremities motor and sensory intact  2+ edema left foot, 1+ edema right foot  Small, shallow interdigital ulceration between left hallux and 2nd toe without active drainage, eschar, gangrene or exposed subcutaneous tissue  No dependent rubor bilaterally  + prominent pulsation right groin access site but without palpable mass  Mild ecchymosis right groin  Pulse exam:  Radial: Right: 2+ Left[de-identified] 2+  Femoral: Right: 2+ Left: 2+  Popliteal: Right: non-palpable Left: non-palpable  DP: Right: non-palpable Left: doppler signal and non-palpable  PT: Right: doppler signal and non-palpable Left: doppler signal and non-palpable   Doppler signals:  Right:  Biphasic PT, peroneal   No dopplerable AT or DP    Left:  Biphasic PT, DP, AT and peroneal

## 2018-03-05 NOTE — PATIENT INSTRUCTIONS
--Percocet p r n  for left lower extremity rest pain  --start Plavix 75 mg daily due to recent stent  Will continue Plavix therapy x2 months  --continue aspirin 81 mg daily and statin therapy  --recommend repeat left lower extremity angiogram with possible endovascular intervention    --you will require IV hydration pre and postprocedure as was done previously on 2/26/2018  --office will call you with scheduled date and time  --please obtain blood work (BMP) prior to procedure as instructed in office  --right groin pseudoaneurysm study demonstrated no evidence pseudoaneurysm

## 2018-03-05 NOTE — ASSESSMENT & PLAN NOTE
CKD III w/baseline creat 1 3-1 4  --repeat BMP prior to angiogram due to recent contrast dye load 2/26/18  --will require IV hydration pre and postprocedure  --please see separate IV hydration orders

## 2018-03-05 NOTE — PROGRESS NOTES
Severe peripheral arterial disease (HCC)  PAD w/persistent LLE rest pain and incomplete revascularization s/p PVI by IR 2/26/18 (BLE angiogram, L SFA PTA, DCB, atherectomy, Viabahn stent and L BK popliteal PTA, atherectomy, suction thrombectomy)  --recommend repeat LLE angiogram with possible endovascular intervention by surgeon asap  Patient requests [facility]  --start Plavix due to recent stent and complex atherectomy  --continue aspirin 81 mg daily  --continue statin therapy  --Percocet p r n  for control of LLE rest pain  --STAT R groin pseudoaneurysm study obtained in office and negative  --will discuss case with Dr Elizabeth Reed      CKD (chronic kidney disease), stage III  CKD III w/baseline creat 1 3-1 4  --repeat BMP prior to angiogram due to recent contrast dye load 2/26/18  --will require IV hydration pre and postprocedure  --please see separate IV hydration orders    Hyperlipidemia  --continue statin therapy      Assessment/Plan   Diagnoses and all orders for this visit:    Severe peripheral arterial disease ([address] )  -     VAS pseudoaneurysm study; Future  -     clopidogrel (PLAVIX) 75 mg tablet; Take 1 tablet (75 mg total) by mouth daily  -     Cancel: IR consult; Future  -     Case request operating room: ARTERIOGRAM   Abdominal aortogram with left lower extremity runoff, possible endovascular intervention  Level 2-3; Standing  -     Case request operating room: ARTERIOGRAM   Abdominal aortogram with left lower extremity runoff, possible endovascular intervention  Level 2-3  -     oxyCODONE-acetaminophen (PERCOCET) 5-325 mg per tablet; Take 1 tablet by mouth every 6 (six) hours as needed for moderate pain Max Daily Amount: 4 tablets    S/P peripheral artery angioplasty with stent placement  -     Cancel: IR consult; Future    Hyperlipidemia, unspecified hyperlipidemia type    CKD (chronic kidney disease), stage III  -     Cancel: IR consult;  Future  -     Basic metabolic panel; Future    Other orders  -     clobetasol (TEMOVATE) 0 05 % ointment; 2 (two) times a day Apply sparingly to affected area(s)  -     Diet NPO; Sips with meds; Standing  -     Void on call to OR; Standing  -     Insert peripheral IV; Standing  -     Place sequential compression device; Standing  -     Shower/scrub; Standing  -     Nursing communcation Clip hair (do not shave) prior to surgery; Standing  -     sodium chloride 0 9 % infusion; Infuse 100 mL/hr into a venous catheter continuous         No chief complaint on file  Chief complaint:  Patient is s/p A-gram with angioplasty L superficial femoral and popliteal artery on 2/26  He has pain in the L foot pain and swelling that is slightly worse since the procedure at rest  He denies calf cramping or thigh  He has L foot coldness  Ulcer noted on medial L hallux  Subjective   Patient ID: Cristina Steven is a 80 y o  male  70-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 with a history HTN, HLD, CKD III (baseline creatinine 1 3-1 4) and significant BLE PAD with LLE rest pain, s/p recent BLE angiogram and LLE endovascular intervention by IR 2/26/2018 who presents back to the office today for urgent evaluation secondary to persistent LLE rest pain  Patient was seen in the office in consultation on 2/13/18 related to PAD with BLE rest pain, L>R  BLE angiogram was recommended the patient is now s/p abdominal aortogram with BLE runoff on 2/26/18 which demonstrated bilateral SFA disease and severe tibial disease w/ 2 vessel runoff as detailed below  In particular, there was a 80% stenosis distal left SFA, near occlusive high grade stenosis BK popliteal,  proximal PT and dominant runoff via AT with distal occlusion but reconstitution of DP  This was treated with L SFA PTA/atherectomy/Viabahn stent (due to refractory gain) and L BK popliteal PTA/atherectomy complicated by acute BK popliteal occlusion treated w/suction thrombectomy    Due to the length of the procedure, no intervention was performed on the AT and recommendations were consideration for repeat angiogram in the future if symptoms persisted  The patient was scheduled for follow-up with Dr Giorgio Crockett on 3/20/2018 to discuss angiogram results  The patient reports no significant change in his symptoms and if anything worsening of his left lower extremity rest pain  He complains of bilateral foot edema since the procedure  He denies right groin pain or bilateral lower extremity claudication  Presently, he denies right lower extremity rest pain  The patient does have a superficial interdigital ulceration between the left hallux and 2nd toe which has remained stable since his evaluation 2/13/2018  He denies new tissue loss  Sensation and motor function are intact BLE and feet are warm to touch  The patient's LLE rest pain keeps him from sleep and is relieved with dependency  The following portions of the patient's history were reviewed and updated as appropriate: allergies, current medications, past family history, past medical history, past social history, past surgical history and problem list     Review of Systems   Constitutional: Negative for chills, fatigue, fever and unexpected weight change  HENT: Negative for congestion, drooling, ear pain, facial swelling, hearing loss, sore throat, tinnitus, trouble swallowing and voice change  Eyes: Negative  Respiratory: Negative for shortness of breath, wheezing and stridor  Cardiovascular: Negative for chest pain, palpitations and leg swelling  Gastrointestinal: Negative for abdominal distention, abdominal pain, blood in stool, diarrhea and nausea  Endocrine: Negative  Genitourinary: Negative for difficulty urinating, dysuria, flank pain, frequency, hematuria and urgency  Musculoskeletal: Negative for back pain, gait problem, joint swelling, myalgias, neck pain and neck stiffness  Skin: Negative for pallor, rash and wound  Allergic/Immunologic: Negative  Neurological: Negative for dizziness, tremors, seizures, syncope, facial asymmetry, speech difficulty, weakness, light-headedness, numbness and headaches  Hematological: Negative for adenopathy  Does not bruise/bleed easily  Psychiatric/Behavioral: Negative for agitation, behavioral problems, confusion, hallucinations and sleep disturbance  The patient is not nervous/anxious  All other systems reviewed and are negative  Patient Active Problem List   Diagnosis    Severe peripheral arterial disease ([address] )    Hyperlipidemia    Vitamin D deficiency    Bifascicular block    Anemia    CKD (chronic kidney disease), stage III       History reviewed  No pertinent surgical history  Family History   Problem Relation Age of Onset    Lung cancer Mother     Diabetes Father     Aneurysm Father     Diabetes Maternal Grandmother        Social History     Social History    Marital status: /Civil Union     Spouse name: N/A    Number of children: N/A    Years of education: N/A     Occupational History    Not on file       Social History Main Topics    Smoking status: Former Smoker    Smokeless tobacco: Never Used    Alcohol use Not on file    Drug use: Unknown    Sexual activity: Not on file     Other Topics Concern    Not on file     Social History Narrative    No narrative on file       No Known Allergies      Current Outpatient Prescriptions:     aspirin 81 MG tablet, Take by mouth, Disp: , Rfl:     Calcium Carb-Cholecalciferol (CALCIUM 1000 + D) 1000-800 MG-UNIT TABS, Take 1 tablet by mouth daily, Disp: , Rfl:     Cholecalciferol (VITAMIN D) 2000 units CAPS, Take by mouth, Disp: , Rfl:     clobetasol (TEMOVATE) 0 05 % ointment, 2 (two) times a day Apply sparingly to affected area(s), Disp: , Rfl: 3    cyanocobalamin (VITAMIN B-12) 1,000 mcg tablet, Take by mouth daily, Disp: , Rfl:     ferrous sulfate 325 (65 Fe) mg tablet, Take 1 tablet (325 mg total) by mouth 2 (two) times a day, Disp: 60 tablet, Rfl: 3    meloxicam (MOBIC) 15 mg tablet, Take 1 tablet by mouth daily, Disp: , Rfl:     Omega-3 Fatty Acids (FISH OIL) 645 MG CAPS, Take by mouth, Disp: , Rfl:     simvastatin (ZOCOR) 20 mg tablet, Take 1 tablet (20 mg total) by mouth daily, Disp: 30 tablet, Rfl: 3    clopidogrel (PLAVIX) 75 mg tablet, Take 1 tablet (75 mg total) by mouth daily, Disp: 30 tablet, Rfl: 2    oxyCODONE-acetaminophen (PERCOCET) 5-325 mg per tablet, Take 1 tablet by mouth every 6 (six) hours as needed for moderate pain Max Daily Amount: 4 tablets, Disp: 12 tablet, Rfl: 0     Imaging studies:  Abdominal aortogram with bilateral lower extremity runoff and left lower extremity endovascular intervention (IR) 2/26/2018:  Report and images reviewed from imaging study and as noted above  Full report as noted below:  "Left:  Common iliac artery: Patent without significant stenosis  Internal iliac artery: Patent without significant stenosis  External iliac artery: Patent without significant stenosis  Common femoral artery: Patent with moderate calcified atherosclerotic disease noting 2 separate stenoses ranging from 50-60% stenosis  Profunda femoris artery: Patent without significant stenosis  Superficial femoral artery: Scattered calcific atherosclerosis most severe in the mid/distal portion noting a focal 1 cm 80% stenosis  Popliteal artery: 1 cm greater than 90% threadlike stenosis in the below the knee popliteal artery  Anterior tibial artery: Dominant runoff to foot  Severe scattered atherosclerotic disease with multifocal stenoses and distal intermittent focal occlusions with reconstitution of the artery at the ankle and runoff to a small caliber dorsalis pedis   artery  Tibioperoneal trunk: Patent without significant stenosis  Posterior tibial artery: Occluded beginning a few centimeters from its origin    Distally reconstituted at the ankle from the peroneal artery with runoff noting small-caliber calcaneal and plantar branches  Peroneal artery: Patent with scattered atherosclerotic disease and multifocal stenoses      Right:  Common iliac artery: Patent without significant stenosis  Internal iliac artery: Patent without significant stenosis  External iliac artery: Patent without significant stenosis  Common femoral artery: Patent with mild to moderate calcified atherosclerotic disease  Profunda femoris artery: Patent without significant stenosis and scattered calcified atherosclerotic disease  Superficial femoral artery: Multifocal calcific atherosclerosis throughout  Focal stenoses ranging from 30-70%  Popliteal artery: Patent with scattered calcific atherosclerosis  Anterior tibial artery: Severely diseased noting poor visualization in its mid/distal portion  Dorsalis pedis artery not readily visualized  Tibioperoneal trunk: Patent without significant stenosis  Posterior tibial artery: Dominant runoff to the foot, noting moderate to severe disease and multiple high-grade stenoses  Plantar arch visualized  Peroneal artery: Disease with poor visualization in its mid and distal portion  IMPRESSION:  1  Severe bilateral peripheral arterial disease, most significantly in the tibial region  2   The left posterior tibial artery is occluded  The left anterior tibial artery is the dominant runoff to the foot and is severely diseased  Treatment of this artery was not performed due to length of procedure and significant contrast load  The   patient should return for further intervention  3   High-grade stenosis of the distal left superficial femoral artery which was refractory to angioplasty and jetstream atherectomy  Significant luminal gain following Viabahn bond stent placement  4   High grade threadlike stenosis of the below the knee left popliteal artery  Temporary occlusion of this artery following jetstream atherectomy which was resolved following suction thrombectomy    Significant luminal gain was then demonstrated of this   region following prolonged 5 mm balloon angioplasty"    PIA 2/8/18:  >75% stenosis distal L SFA,  L PT   L SYLVAIN NC/77/44  R SYLVAIN NC/86/48      Objective     Physical Exam:    General appearance: alert and oriented, in no acute distress  Skin: Skin color, texture, turgor normal  No rashes or lesions  Neurologic: Grossly normal  Head: Normocephalic, without obvious abnormality, atraumatic  Eyes: PERRL  EOMI, sclerae nonicteric  Throat: lips, mucosa, and tongue normal; teeth and gums normal  Neck: no adenopathy, no carotid bruit, no JVD, supple, symmetrical, trachea midline and thyroid not enlarged, symmetric, no tenderness/mass/nodules  Back: symmetric, no curvature  ROM normal  No CVA tenderness  Lungs: clear to auscultation bilaterally  Chest wall: no tenderness  Heart: regular rate and rhythm, S1, S2 normal, no murmur, click, rub or gallop  Abdomen: soft, non-tender; bowel sounds normal; no masses,  no organomegaly and Nondistended  No abdominal bruits  Extremities: no edema, redness or tenderness in the calves or thighs and Bilateral lower extremities warm, pink and well perfused without pallor, cyanosis, mottling  Bilateral lower extremities motor and sensory intact  2+ edema left foot, 1+ edema right foot  Small, shallow interdigital ulceration between left hallux and 2nd toe without active drainage, eschar, gangrene or exposed subcutaneous tissue  No dependent rubor bilaterally  + prominent pulsation right groin access site but without palpable mass  Mild ecchymosis right groin  Pulse exam:  Radial: Right: 2+ Left[de-identified] 2+  Femoral: Right: 2+ Left: 2+  Popliteal: Right: non-palpable Left: non-palpable  DP: Right: non-palpable Left: doppler signal and non-palpable  PT: Right: doppler signal and non-palpable Left: doppler signal and non-palpable   Doppler signals:  Right:  Biphasic PT, peroneal   No dopplerable AT or DP    Left:  Biphasic PT, DP, AT and peroneal

## 2018-03-05 NOTE — ASSESSMENT & PLAN NOTE
PAD w/persistent LLE rest pain and incomplete revascularization s/p PVI by IR 2/26/18 (BLE angiogram, L SFA PTA, DCB, atherectomy, Viabahn stent and L BK popliteal PTA, atherectomy, suction thrombectomy)  --recommend repeat LLE angiogram with possible endovascular intervention by surgeon asap  Patient requests [facility]  --start Plavix due to recent stent and complex atherectomy  --continue aspirin 81 mg daily  --continue statin therapy  --Percocet p r n  for control of LLE rest pain  --STAT R groin pseudoaneurysm study obtained in office and negative    --will discuss case with Dr Hannah Guzman

## 2018-03-06 ENCOUNTER — TELEPHONE (OUTPATIENT)
Dept: VASCULAR SURGERY | Facility: CLINIC | Age: 83
End: 2018-03-06

## 2018-03-06 ENCOUNTER — APPOINTMENT (OUTPATIENT)
Dept: LAB | Facility: CLINIC | Age: 83
End: 2018-03-06
Payer: COMMERCIAL

## 2018-03-06 ENCOUNTER — PREP FOR PROCEDURE (OUTPATIENT)
Dept: VASCULAR SURGERY | Facility: CLINIC | Age: 83
End: 2018-03-06

## 2018-03-06 DIAGNOSIS — I70.222 ATHEROSCLEROSIS OF NATIVE ARTERY OF LEFT LOWER EXTREMITY WITH REST PAIN (HCC): Primary | ICD-10-CM

## 2018-03-06 DIAGNOSIS — N18.30 CKD (CHRONIC KIDNEY DISEASE), STAGE III (HCC): ICD-10-CM

## 2018-03-06 LAB
ANION GAP SERPL CALCULATED.3IONS-SCNC: 7 MMOL/L (ref 4–13)
BUN SERPL-MCNC: 16 MG/DL (ref 5–25)
CALCIUM SERPL-MCNC: 9.3 MG/DL (ref 8.3–10.1)
CHLORIDE SERPL-SCNC: 103 MMOL/L (ref 100–108)
CO2 SERPL-SCNC: 27 MMOL/L (ref 21–32)
CREAT SERPL-MCNC: 1.29 MG/DL (ref 0.6–1.3)
GFR SERPL CREATININE-BSD FRML MDRD: 51 ML/MIN/1.73SQ M
GLUCOSE P FAST SERPL-MCNC: 85 MG/DL (ref 65–99)
POTASSIUM SERPL-SCNC: 4.6 MMOL/L (ref 3.5–5.3)
SODIUM SERPL-SCNC: 137 MMOL/L (ref 136–145)

## 2018-03-06 PROCEDURE — 36415 COLL VENOUS BLD VENIPUNCTURE: CPT

## 2018-03-06 PROCEDURE — 80048 BASIC METABOLIC PNL TOTAL CA: CPT

## 2018-03-06 NOTE — TELEPHONE ENCOUNTER
Scheduled agram with patient for Friday 3/9/18 with Dr Rosanne Cordova at the [facility]  Patient will have 4 hours pre/post IV hydration  Patient had his bloodwork drawn and understands the instructions  He was advised not to hold his Plavix  A copy of the surgery scheduling sheet has been forwarded to precert dept for auth  F/U appt is 3/20/18 in [place] office with Dr Rosanne Cordova

## 2018-03-09 ENCOUNTER — HOSPITAL ENCOUNTER (OUTPATIENT)
Facility: HOSPITAL | Age: 83
Setting detail: OUTPATIENT SURGERY
Discharge: HOME | End: 2018-03-09
Attending: SURGERY | Admitting: SURGERY
Payer: COMMERCIAL

## 2018-03-09 ENCOUNTER — APPOINTMENT (OUTPATIENT)
Dept: RADIOLOGY | Facility: HOSPITAL | Age: 83
End: 2018-03-09
Payer: COMMERCIAL

## 2018-03-09 ENCOUNTER — ANESTHESIA EVENT (OUTPATIENT)
Dept: PERIOP | Facility: HOSPITAL | Age: 83
End: 2018-03-09
Payer: COMMERCIAL

## 2018-03-09 ENCOUNTER — ANESTHESIA (OUTPATIENT)
Dept: PERIOP | Facility: HOSPITAL | Age: 83
End: 2018-03-09
Payer: COMMERCIAL

## 2018-03-09 VITALS
RESPIRATION RATE: 18 BRPM | WEIGHT: 156 LBS | HEIGHT: 70 IN | BODY MASS INDEX: 22.33 KG/M2 | DIASTOLIC BLOOD PRESSURE: 68 MMHG | SYSTOLIC BLOOD PRESSURE: 142 MMHG | TEMPERATURE: 98.1 F | OXYGEN SATURATION: 98 % | HEART RATE: 67 BPM

## 2018-03-09 PROCEDURE — C1894 INTRO/SHEATH, NON-LASER: HCPCS | Performed by: SURGERY

## 2018-03-09 PROCEDURE — C1769 GUIDE WIRE: HCPCS | Performed by: SURGERY

## 2018-03-09 PROCEDURE — C1887 CATHETER, GUIDING: HCPCS | Performed by: SURGERY

## 2018-03-09 PROCEDURE — 96360 HYDRATION IV INFUSION INIT: CPT | Performed by: SURGERY

## 2018-03-09 PROCEDURE — 75710 ARTERY X-RAYS ARM/LEG: CPT | Performed by: SURGERY

## 2018-03-09 PROCEDURE — 37224 PR REVSC OPN/PRG FEM/POP W/ANGIOPLASTY UNI: CPT | Performed by: SURGERY

## 2018-03-09 PROCEDURE — C1725 CATH, TRANSLUMIN NON-LASER: HCPCS

## 2018-03-09 PROCEDURE — C1760 CLOSURE DEV, VASC: HCPCS | Performed by: SURGERY

## 2018-03-09 PROCEDURE — 75710 ARTERY X-RAYS ARM/LEG: CPT

## 2018-03-09 PROCEDURE — 96361 HYDRATE IV INFUSION ADD-ON: CPT | Performed by: SURGERY

## 2018-03-09 DEVICE — CLOSURE DEVICE MYNX ACE 6F/7FR: Type: IMPLANTABLE DEVICE | Site: ARTERIAL | Status: FUNCTIONAL

## 2018-03-09 RX ORDER — PROTAMINE SULFATE 10 MG/ML
INJECTION, SOLUTION INTRAVENOUS AS NEEDED
Status: DISCONTINUED | OUTPATIENT
Start: 2018-03-09 | End: 2018-03-09 | Stop reason: SURG

## 2018-03-09 RX ORDER — FENTANYL CITRATE 50 UG/ML
INJECTION, SOLUTION INTRAMUSCULAR; INTRAVENOUS AS NEEDED
Status: DISCONTINUED | OUTPATIENT
Start: 2018-03-09 | End: 2018-03-09 | Stop reason: SURG

## 2018-03-09 RX ORDER — SODIUM CHLORIDE 9 MG/ML
75 INJECTION, SOLUTION INTRAVENOUS CONTINUOUS
Status: DISPENSED | OUTPATIENT
Start: 2018-03-09 | End: 2018-03-09

## 2018-03-09 RX ORDER — MEPERIDINE HYDROCHLORIDE 25 MG/ML
25 INJECTION INTRAMUSCULAR; INTRAVENOUS; SUBCUTANEOUS AS NEEDED
Status: DISCONTINUED | OUTPATIENT
Start: 2018-03-09 | End: 2018-03-09 | Stop reason: HOSPADM

## 2018-03-09 RX ORDER — ACETYLCYSTEINE 200 MG/ML
1200 SOLUTION ORAL; RESPIRATORY (INHALATION) EVERY 12 HOURS SCHEDULED
Status: DISCONTINUED | OUTPATIENT
Start: 2018-03-09 | End: 2018-03-09 | Stop reason: HOSPADM

## 2018-03-09 RX ORDER — MIDAZOLAM HYDROCHLORIDE 1 MG/ML
INJECTION INTRAMUSCULAR; INTRAVENOUS AS NEEDED
Status: DISCONTINUED | OUTPATIENT
Start: 2018-03-09 | End: 2018-03-09 | Stop reason: SURG

## 2018-03-09 RX ORDER — FENTANYL CITRATE/PF 50 MCG/ML
25 SYRINGE (ML) INJECTION
Status: DISCONTINUED | OUTPATIENT
Start: 2018-03-09 | End: 2018-03-09 | Stop reason: HOSPADM

## 2018-03-09 RX ORDER — SODIUM CHLORIDE 9 MG/ML
100 INJECTION, SOLUTION INTRAVENOUS CONTINUOUS
Status: DISCONTINUED | OUTPATIENT
Start: 2018-03-09 | End: 2018-03-09 | Stop reason: HOSPADM

## 2018-03-09 RX ORDER — EPHEDRINE SULFATE 50 MG/ML
INJECTION, SOLUTION INTRAVENOUS AS NEEDED
Status: DISCONTINUED | OUTPATIENT
Start: 2018-03-09 | End: 2018-03-09 | Stop reason: SURG

## 2018-03-09 RX ORDER — HEPARIN SODIUM 1000 [USP'U]/ML
INJECTION, SOLUTION INTRAVENOUS; SUBCUTANEOUS AS NEEDED
Status: DISCONTINUED | OUTPATIENT
Start: 2018-03-09 | End: 2018-03-09 | Stop reason: SURG

## 2018-03-09 RX ORDER — SODIUM CHLORIDE 9 MG/ML
50 INJECTION, SOLUTION INTRAVENOUS CONTINUOUS
Status: DISCONTINUED | OUTPATIENT
Start: 2018-03-09 | End: 2018-03-09 | Stop reason: HOSPADM

## 2018-03-09 RX ORDER — SODIUM CHLORIDE 9 MG/ML
INJECTION, SOLUTION INTRAVENOUS CONTINUOUS PRN
Status: DISCONTINUED | OUTPATIENT
Start: 2018-03-09 | End: 2018-03-09 | Stop reason: SURG

## 2018-03-09 RX ORDER — PROPOFOL 10 MG/ML
INJECTION, EMULSION INTRAVENOUS CONTINUOUS PRN
Status: DISCONTINUED | OUTPATIENT
Start: 2018-03-09 | End: 2018-03-09 | Stop reason: SURG

## 2018-03-09 RX ORDER — LIDOCAINE HYDROCHLORIDE 10 MG/ML
INJECTION, SOLUTION INFILTRATION; PERINEURAL AS NEEDED
Status: DISCONTINUED | OUTPATIENT
Start: 2018-03-09 | End: 2018-03-09 | Stop reason: HOSPADM

## 2018-03-09 RX ADMIN — SODIUM CHLORIDE: 0.9 INJECTION, SOLUTION INTRAVENOUS at 10:14

## 2018-03-09 RX ADMIN — PHENYLEPHRINE HYDROCHLORIDE 10 MCG: 10 INJECTION INTRAVENOUS at 11:05

## 2018-03-09 RX ADMIN — FENTANYL CITRATE 25 MCG: 50 INJECTION, SOLUTION INTRAMUSCULAR; INTRAVENOUS at 11:57

## 2018-03-09 RX ADMIN — PROTAMINE SULFATE 10 MG: 10 INJECTION, SOLUTION INTRAVENOUS at 12:24

## 2018-03-09 RX ADMIN — EPHEDRINE SULFATE 5 MG: 50 INJECTION, SOLUTION INTRAMUSCULAR; INTRAVENOUS; SUBCUTANEOUS at 10:53

## 2018-03-09 RX ADMIN — HEPARIN SODIUM 1000 UNITS: 1000 INJECTION INTRAVENOUS; SUBCUTANEOUS at 11:55

## 2018-03-09 RX ADMIN — SODIUM CHLORIDE 75 ML/HR: 0.9 INJECTION, SOLUTION INTRAVENOUS at 13:37

## 2018-03-09 RX ADMIN — PROTAMINE SULFATE 10 MG: 10 INJECTION, SOLUTION INTRAVENOUS at 12:30

## 2018-03-09 RX ADMIN — HEPARIN SODIUM 2000 UNITS: 1000 INJECTION INTRAVENOUS; SUBCUTANEOUS at 11:10

## 2018-03-09 RX ADMIN — ACETYLCYSTEINE 1200 MG: 200 SOLUTION ORAL; RESPIRATORY (INHALATION) at 08:37

## 2018-03-09 RX ADMIN — MIDAZOLAM HYDROCHLORIDE 0.5 MG: 1 INJECTION, SOLUTION INTRAMUSCULAR; INTRAVENOUS at 10:25

## 2018-03-09 RX ADMIN — HEPARIN SODIUM 4000 UNITS: 1000 INJECTION INTRAVENOUS; SUBCUTANEOUS at 10:58

## 2018-03-09 RX ADMIN — PROPOFOL 40 MCG/KG/MIN: 10 INJECTION, EMULSION INTRAVENOUS at 10:26

## 2018-03-09 RX ADMIN — FENTANYL CITRATE 25 MCG: 50 INJECTION, SOLUTION INTRAMUSCULAR; INTRAVENOUS at 10:45

## 2018-03-09 RX ADMIN — SODIUM CHLORIDE 100 ML/HR: 0.9 INJECTION, SOLUTION INTRAVENOUS at 07:00

## 2018-03-09 NOTE — PERIOPERATIVE NURSING NOTE
Patient ambulated around the unit, able to void in the restroom  No evidence of hematoma to left groin

## 2018-03-09 NOTE — DISCHARGE INSTRUCTIONS
Angiogram   WHAT YOU NEED TO KNOW:   An angiogram is used to examine blood flow through your arteries  Arteries carry blood from your heart to your body  DISCHARGE INSTRUCTIONS:   Call 911 for any of the following:   · You have any of the following signs of a heart attack:      ¨ Squeezing, pressure, or pain in your chest that lasts longer than 5 minutes or returns    ¨ Discomfort or pain in your back, neck, jaw, stomach, or arm     ¨ Trouble breathing    ¨ Nausea or vomiting    ¨ Lightheadedness or a sudden cold sweat, especially with chest pain or trouble breathing    · You have any of the following signs of a stroke:      ¨ Numbness or drooping on one side of your face     ¨ Weakness in an arm or leg    ¨ Confusion or difficulty speaking    ¨ Dizziness, a severe headache, or vision loss  Seek care immediately if:   · Your arm or leg feels warm, tender, and painful  It may look swollen and red  · The leg or arm used for your angiogram is numb, painful, or changes color  · The bruise at your catheter site gets bigger or becomes swollen  · Your wound does not stop bleeding even after you apply firm pressure for 10 minutes  · You have weakness in an arm or leg  · You become confused or have difficulty speaking  · You have dizziness, a severe headache, or vision loss  Contact your healthcare provider if:   · You have a fever  · Your catheter site is red, leaks pus, or smells bad  · You have increasing pain at your catheter site  · You have questions or concerns about your condition or care  Follow up with your healthcare provider as directed:  Write down your questions so you remember to ask them during your visits  Watch for bleeding and bruising: It is normal to have a bruise and soreness where the catheter went in  Contact your healthcare provider if your bruise gets larger  If your wound bleeds, use your hand to put pressure on the bandage   If you do not have a bandage, use a Gloria Phan   MRN: [id number]    Department:  [facility] Emergency Department   Date of Visit:  1/29/2018           Disclosure     Insurance plans vary and the physician(s) referred by the ER may not be covered by your plan.  Please contact CARE PHYSICIAN AT ONCE OR RETURN IMMEDIATELY TO THE EMERGENCY DEPARTMENT. If you have been prescribed any medication(s), please fill your prescription right away and begin taking the medication(s) as directed.   If you believe that any of the medications clean cloth to put pressure over and just above the puncture site  Seek care immediately if the bleeding does not stop within 10 minutes  Protect your leg after your procedure:  Rest for the remainder of the day of your procedure  Keep your arm or leg straight as much as possible  If the angiogram catheter was put in your leg, do not use stairs for a few days after your angiogram  When you must use stairs, step up with the leg that was not used for the angiogram  Straighten this leg to move the other leg up to the next step without putting stress on it  You may be told not to lift more than 15 pounds for 5 days after your procedure  Your healthcare provider will tell you when it is safe to drive and start doing your other normal daily activities  Go slowly at first    Keep your wound clean and dry:  Ask your healthcare provider when you can bathe  Do not take baths or go in pools or hot tubs  Remove the pressure bandage before you shower  Carefully wash the wound with soap and water  Dry the area and put on new, clean bandages as directed  Change your bandage if it gets wet or dirty  Check your incision every day for signs of infection such as swelling, redness, or pus  Drink liquids as directed:  Ask your healthcare provider how much liquid to drink each day and which liquids are best for you  Liquids will help your body flush out the contrast liquid used during the procedure  Limit alcohol:  Do not drink alcohol for 24 hours after your procedure  Then limit alcohol  Women should limit alcohol to 1 drink a day  Men should limit alcohol to 2 drinks a day  A drink of alcohol is 12 ounces of beer, 5 ounces of wine, or 1½ ounces of liquor  Do not smoke:  Nicotine and other chemicals in cigarettes and cigars can damage your blood vessels  Ask your healthcare provider for information if you currently smoke and need help to quit  E-cigarettes or smokeless tobacco still contain nicotine   Talk to your healthcare provider before you use these products  © 2017 [address] Information is for End User's use only and may not be sold, redistributed or otherwise used for commercial purposes  All illustrations and images included in CareNotes® are the copyrighted property of [employer]  or [address]  The above information is an  only  It is not intended as medical advice for individual conditions or treatments  Talk to your doctor, nurse or pharmacist before following any medical regimen to see if it is safe and effective for you

## 2018-03-09 NOTE — ANESTHESIA PREPROCEDURE EVALUATION
Review of Systems/Medical History  Patient summary reviewed  Chart reviewed  No history of anesthetic complications     Cardiovascular  EKG reviewed, Hyperlipidemia, Dysrhythmias, ,   Comment: Bifascicular block, stress test neg  Echo good LV with milt regurg all 4 valves  Severe PAD,  Pulmonary  Negative pulmonary ROS        GI/Hepatic       Chronic kidney disease stage 3,        Endo/Other     GYN       Hematology  Anemia ,     Musculoskeletal  Negative musculoskeletal ROS        Neurology  Negative neurology ROS      Psychology   Negative psychology ROS              Physical Exam    Airway    Mallampati score: II  TM Distance: >3 FB  Neck ROM: full     Dental   upper dentures,     Cardiovascular      Pulmonary      Other Findings        Anesthesia Plan  ASA Score- 3     Anesthesia Type- IV sedation with anesthesia with ASA Monitors  Additional Monitors:   Airway Plan:         Plan Factors-  Patient did not smoke on day of surgery  Induction-     Postoperative Plan-     Informed Consent- Anesthetic plan and risks discussed with patient  I personally reviewed this patient with the CRNA  Discussed and agreed on the Anesthesia Plan with the [facility]

## 2018-03-09 NOTE — INTERVAL H&P NOTE
Update: (This section must be completed if the H&P was completed greater than 24 hrs to procedure or admission)    H&P reviewed  After examining the patient, I find no changed to the H&P since it had been written  Procedure plan and risks discussed with patient and family  Patient re-evaluated   Accept as history and physical     Hilton Byrd MD/March 9, 2018/9:47 AM

## 2018-03-09 NOTE — H&P (VIEW-ONLY)
Severe peripheral arterial disease (HCC)  PAD w/persistent LLE rest pain and incomplete revascularization s/p PVI by IR 2/26/18 (BLE angiogram, L SFA PTA, DCB, atherectomy, Viabahn stent and L BK popliteal PTA, atherectomy, suction thrombectomy)  --recommend repeat LLE angiogram with possible endovascular intervention by surgeon asap  Patient requests [facility]  --start Plavix due to recent stent and complex atherectomy  --continue aspirin 81 mg daily  --continue statin therapy  --Percocet p r n  for control of LLE rest pain  --STAT R groin pseudoaneurysm study obtained in office and negative  --will discuss case with Dr Trey Okeefe      CKD (chronic kidney disease), stage III  CKD III w/baseline creat 1 3-1 4  --repeat BMP prior to angiogram due to recent contrast dye load 2/26/18  --will require IV hydration pre and postprocedure  --please see separate IV hydration orders    Hyperlipidemia  --continue statin therapy      Assessment/Plan   Diagnoses and all orders for this visit:    Severe peripheral arterial disease ([address] )  -     VAS pseudoaneurysm study; Future  -     clopidogrel (PLAVIX) 75 mg tablet; Take 1 tablet (75 mg total) by mouth daily  -     Cancel: IR consult; Future  -     Case request operating room: ARTERIOGRAM   Abdominal aortogram with left lower extremity runoff, possible endovascular intervention  Level 2-3; Standing  -     Case request operating room: ARTERIOGRAM   Abdominal aortogram with left lower extremity runoff, possible endovascular intervention  Level 2-3  -     oxyCODONE-acetaminophen (PERCOCET) 5-325 mg per tablet; Take 1 tablet by mouth every 6 (six) hours as needed for moderate pain Max Daily Amount: 4 tablets    S/P peripheral artery angioplasty with stent placement  -     Cancel: IR consult; Future    Hyperlipidemia, unspecified hyperlipidemia type    CKD (chronic kidney disease), stage III  -     Cancel: IR consult;  Future  -     Basic metabolic panel; Future    Other orders  -     clobetasol (TEMOVATE) 0 05 % ointment; 2 (two) times a day Apply sparingly to affected area(s)  -     Diet NPO; Sips with meds; Standing  -     Void on call to OR; Standing  -     Insert peripheral IV; Standing  -     Place sequential compression device; Standing  -     Shower/scrub; Standing  -     Nursing communcation Clip hair (do not shave) prior to surgery; Standing  -     sodium chloride 0 9 % infusion; Infuse 100 mL/hr into a venous catheter continuous         No chief complaint on file  Chief complaint:  Patient is s/p A-gram with angioplasty L superficial femoral and popliteal artery on 2/26  He has pain in the L foot pain and swelling that is slightly worse since the procedure at rest  He denies calf cramping or thigh  He has L foot coldness  Ulcer noted on medial L hallux  Subjective   Patient ID: Dixon Willis is a 80 y o  male  80-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 with a history HTN, HLD, CKD III (baseline creatinine 1 3-1 4) and significant BLE PAD with LLE rest pain, s/p recent BLE angiogram and LLE endovascular intervention by IR 2/26/2018 who presents back to the office today for urgent evaluation secondary to persistent LLE rest pain  Patient was seen in the office in consultation on 2/13/18 related to PAD with BLE rest pain, L>R  BLE angiogram was recommended the patient is now s/p abdominal aortogram with BLE runoff on 2/26/18 which demonstrated bilateral SFA disease and severe tibial disease w/ 2 vessel runoff as detailed below  In particular, there was a 80% stenosis distal left SFA, near occlusive high grade stenosis BK popliteal,  proximal PT and dominant runoff via AT with distal occlusion but reconstitution of DP  This was treated with L SFA PTA/atherectomy/Viabahn stent (due to refractory gain) and L BK popliteal PTA/atherectomy complicated by acute BK popliteal occlusion treated w/suction thrombectomy    Due to the length of the procedure, no intervention was performed on the AT and recommendations were consideration for repeat angiogram in the future if symptoms persisted  The patient was scheduled for follow-up with Dr Trisha Stephens on 3/20/2018 to discuss angiogram results  The patient reports no significant change in his symptoms and if anything worsening of his left lower extremity rest pain  He complains of bilateral foot edema since the procedure  He denies right groin pain or bilateral lower extremity claudication  Presently, he denies right lower extremity rest pain  The patient does have a superficial interdigital ulceration between the left hallux and 2nd toe which has remained stable since his evaluation 2/13/2018  He denies new tissue loss  Sensation and motor function are intact BLE and feet are warm to touch  The patient's LLE rest pain keeps him from sleep and is relieved with dependency  The following portions of the patient's history were reviewed and updated as appropriate: allergies, current medications, past family history, past medical history, past social history, past surgical history and problem list     Review of Systems   Constitutional: Negative for chills, fatigue, fever and unexpected weight change  HENT: Negative for congestion, drooling, ear pain, facial swelling, hearing loss, sore throat, tinnitus, trouble swallowing and voice change  Eyes: Negative  Respiratory: Negative for shortness of breath, wheezing and stridor  Cardiovascular: Negative for chest pain, palpitations and leg swelling  Gastrointestinal: Negative for abdominal distention, abdominal pain, blood in stool, diarrhea and nausea  Endocrine: Negative  Genitourinary: Negative for difficulty urinating, dysuria, flank pain, frequency, hematuria and urgency  Musculoskeletal: Negative for back pain, gait problem, joint swelling, myalgias, neck pain and neck stiffness  Skin: Negative for pallor, rash and wound  Allergic/Immunologic: Negative  Neurological: Negative for dizziness, tremors, seizures, syncope, facial asymmetry, speech difficulty, weakness, light-headedness, numbness and headaches  Hematological: Negative for adenopathy  Does not bruise/bleed easily  Psychiatric/Behavioral: Negative for agitation, behavioral problems, confusion, hallucinations and sleep disturbance  The patient is not nervous/anxious  All other systems reviewed and are negative  Patient Active Problem List   Diagnosis    Severe peripheral arterial disease ([address] )    Hyperlipidemia    Vitamin D deficiency    Bifascicular block    Anemia    CKD (chronic kidney disease), stage III       History reviewed  No pertinent surgical history  Family History   Problem Relation Age of Onset    Lung cancer Mother     Diabetes Father     Aneurysm Father     Diabetes Maternal Grandmother        Social History     Social History    Marital status: /Civil Union     Spouse name: N/A    Number of children: N/A    Years of education: N/A     Occupational History    Not on file       Social History Main Topics    Smoking status: Former Smoker    Smokeless tobacco: Never Used    Alcohol use Not on file    Drug use: Unknown    Sexual activity: Not on file     Other Topics Concern    Not on file     Social History Narrative    No narrative on file       No Known Allergies      Current Outpatient Prescriptions:     aspirin 81 MG tablet, Take by mouth, Disp: , Rfl:     Calcium Carb-Cholecalciferol (CALCIUM 1000 + D) 1000-800 MG-UNIT TABS, Take 1 tablet by mouth daily, Disp: , Rfl:     Cholecalciferol (VITAMIN D) 2000 units CAPS, Take by mouth, Disp: , Rfl:     clobetasol (TEMOVATE) 0 05 % ointment, 2 (two) times a day Apply sparingly to affected area(s), Disp: , Rfl: 3    cyanocobalamin (VITAMIN B-12) 1,000 mcg tablet, Take by mouth daily, Disp: , Rfl:     ferrous sulfate 325 (65 Fe) mg tablet, Take 1 tablet (325 mg total) by mouth 2 (two) times a day, Disp: 60 tablet, Rfl: 3    meloxicam (MOBIC) 15 mg tablet, Take 1 tablet by mouth daily, Disp: , Rfl:     Omega-3 Fatty Acids (FISH OIL) 645 MG CAPS, Take by mouth, Disp: , Rfl:     simvastatin (ZOCOR) 20 mg tablet, Take 1 tablet (20 mg total) by mouth daily, Disp: 30 tablet, Rfl: 3    clopidogrel (PLAVIX) 75 mg tablet, Take 1 tablet (75 mg total) by mouth daily, Disp: 30 tablet, Rfl: 2    oxyCODONE-acetaminophen (PERCOCET) 5-325 mg per tablet, Take 1 tablet by mouth every 6 (six) hours as needed for moderate pain Max Daily Amount: 4 tablets, Disp: 12 tablet, Rfl: 0     Imaging studies:  Abdominal aortogram with bilateral lower extremity runoff and left lower extremity endovascular intervention (IR) 2/26/2018:  Report and images reviewed from imaging study and as noted above  Full report as noted below:  "Left:  Common iliac artery: Patent without significant stenosis  Internal iliac artery: Patent without significant stenosis  External iliac artery: Patent without significant stenosis  Common femoral artery: Patent with moderate calcified atherosclerotic disease noting 2 separate stenoses ranging from 50-60% stenosis  Profunda femoris artery: Patent without significant stenosis  Superficial femoral artery: Scattered calcific atherosclerosis most severe in the mid/distal portion noting a focal 1 cm 80% stenosis  Popliteal artery: 1 cm greater than 90% threadlike stenosis in the below the knee popliteal artery  Anterior tibial artery: Dominant runoff to foot  Severe scattered atherosclerotic disease with multifocal stenoses and distal intermittent focal occlusions with reconstitution of the artery at the ankle and runoff to a small caliber dorsalis pedis   artery  Tibioperoneal trunk: Patent without significant stenosis  Posterior tibial artery: Occluded beginning a few centimeters from its origin    Distally reconstituted at the ankle from the peroneal artery with runoff noting small-caliber calcaneal and plantar branches  Peroneal artery: Patent with scattered atherosclerotic disease and multifocal stenoses      Right:  Common iliac artery: Patent without significant stenosis  Internal iliac artery: Patent without significant stenosis  External iliac artery: Patent without significant stenosis  Common femoral artery: Patent with mild to moderate calcified atherosclerotic disease  Profunda femoris artery: Patent without significant stenosis and scattered calcified atherosclerotic disease  Superficial femoral artery: Multifocal calcific atherosclerosis throughout  Focal stenoses ranging from 30-70%  Popliteal artery: Patent with scattered calcific atherosclerosis  Anterior tibial artery: Severely diseased noting poor visualization in its mid/distal portion  Dorsalis pedis artery not readily visualized  Tibioperoneal trunk: Patent without significant stenosis  Posterior tibial artery: Dominant runoff to the foot, noting moderate to severe disease and multiple high-grade stenoses  Plantar arch visualized  Peroneal artery: Disease with poor visualization in its mid and distal portion  IMPRESSION:  1  Severe bilateral peripheral arterial disease, most significantly in the tibial region  2   The left posterior tibial artery is occluded  The left anterior tibial artery is the dominant runoff to the foot and is severely diseased  Treatment of this artery was not performed due to length of procedure and significant contrast load  The   patient should return for further intervention  3   High-grade stenosis of the distal left superficial femoral artery which was refractory to angioplasty and jetstream atherectomy  Significant luminal gain following Viabahn bond stent placement  4   High grade threadlike stenosis of the below the knee left popliteal artery  Temporary occlusion of this artery following jetstream atherectomy which was resolved following suction thrombectomy    Significant luminal gain was then demonstrated of this   region following prolonged 5 mm balloon angioplasty"    PIA 2/8/18:  >75% stenosis distal L SFA,  L PT   L SYLVAIN NC/77/44  R SYLVAIN NC/86/48      Objective     Physical Exam:    General appearance: alert and oriented, in no acute distress  Skin: Skin color, texture, turgor normal  No rashes or lesions  Neurologic: Grossly normal  Head: Normocephalic, without obvious abnormality, atraumatic  Eyes: PERRL  EOMI, sclerae nonicteric  Throat: lips, mucosa, and tongue normal; teeth and gums normal  Neck: no adenopathy, no carotid bruit, no JVD, supple, symmetrical, trachea midline and thyroid not enlarged, symmetric, no tenderness/mass/nodules  Back: symmetric, no curvature  ROM normal  No CVA tenderness  Lungs: clear to auscultation bilaterally  Chest wall: no tenderness  Heart: regular rate and rhythm, S1, S2 normal, no murmur, click, rub or gallop  Abdomen: soft, non-tender; bowel sounds normal; no masses,  no organomegaly and Nondistended  No abdominal bruits  Extremities: no edema, redness or tenderness in the calves or thighs and Bilateral lower extremities warm, pink and well perfused without pallor, cyanosis, mottling  Bilateral lower extremities motor and sensory intact  2+ edema left foot, 1+ edema right foot  Small, shallow interdigital ulceration between left hallux and 2nd toe without active drainage, eschar, gangrene or exposed subcutaneous tissue  No dependent rubor bilaterally  + prominent pulsation right groin access site but without palpable mass  Mild ecchymosis right groin  Pulse exam:  Radial: Right: 2+ Left[de-identified] 2+  Femoral: Right: 2+ Left: 2+  Popliteal: Right: non-palpable Left: non-palpable  DP: Right: non-palpable Left: doppler signal and non-palpable  PT: Right: doppler signal and non-palpable Left: doppler signal and non-palpable   Doppler signals:  Right:  Biphasic PT, peroneal   No dopplerable AT or DP    Left:  Biphasic PT, DP, AT and peroneal

## 2018-03-09 NOTE — ANESTHESIA POSTPROCEDURE EVALUATION
Post-Op Assessment Note      CV Status:  Stable    Mental Status:  Alert and awake    Hydration Status:  Euvolemic    PONV Controlled:  Controlled    Airway Patency:  Patent    Post Op Vitals Reviewed: Yes          Staff: Anesthesiologist, with CRNAs           BP      Temp     Pulse     Resp      SpO2

## 2018-03-10 NOTE — OP NOTE
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 (HCC) [I70 222]    Procedure(s) (LRB):  1  Ultrasound-guided access of the left superficial femoral artery-antegrade 2  Left lower extremity angiogram 3  Balloon angioplasty of the left below-knee popliteal artery using 5 mm x 20 mm cutting balloon and 5 mm x 40 mm impact drug coated balloon    Specimen(s):  * No specimens in log *    Estimated Blood Loss:   Minimal    Drains:       Anesthesia Type:   Conscious Sedation     Operative Indications: 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 [facility]) [[id number]]  22-year-old male with rest pain of the left foot who had previously undergone atherectomy and balloon angioplasty and stenting of the mid SFA lesion  He also had balloon angioplasty of the popliteal lesion but continued to have rest pain and hands presents for repeat angiogram with plan for intervention  Extensive discussion about the risks and benefits of the procedure the skye and his family before the procedure  Also explained that due to severe tibial disease he may not have improvement and may require bypass in future  Operative Findings:  Angiographic findings-left superficial femoral artery is patent with significant calcified walls  The previously placed Viabahn stent in the superficial femoral artery is patent  The above knee popliteal artery is patent but calcified  The below-knee popliteal artery in its midportion has a area of high-grade stenosis secondary to calcified plaque  This was the previously treated area   The tibioperoneal trunk is patent with calcified plaque protruding into the lumen  The anterior tibial artery is severely diseased but is patent in its proximal portion but has several areas of calcified plaque  In the mid to lower portion of the leg there are areas of short segment occlusions and then a long segment of occlusion in the distal leg with reconstitution of a faint dorsalis pedis which does not continue into the foot  The peroneal artery is also severely diseased with significant calcified plaque and multiple areas of occlusion and reconstitution  The distal peroneal artery is patent, small caliber, and he is artery branch that fills through collaterals the plantar artery  The posterior tibial artery is occluded throughout its course  We were unable to cross the lesions in any of the tibial artery despite multiple wire catheter combination at times  Balloon angioplasty with a cutting balloon and rolled coated balloon resulted in significant luminal gain in the mid popliteal artery stenosis with preserved runoff via diseased tibial outflow  Complications:   None    Procedure and Technique:  Patient was brought to the operating room and IV sedation was administered by anesthesia service  Bilateral groins were shaved and prepped and draped in the usual standard sterile fashion  After infiltrating lidocaine, the left superficial femoral artery in its proximal portion was started with an ultrasound  Was found to have calcific plaque but patent  It was punctured in the antegrade manner using micropuncture system  Micro wire was advanced into the SFA  We then inserted a micropuncture sheath and then subsequently a 5 [place] sheath over a Bentson wire  Patient was given 6000 units of intravenous heparin and additional heparin was given during the procedure  Left lower extremity angiogram was performed with limited contrast   Findings of which are as dictated above  We then cross the lesion in the popliteal artery using a we 14 wire  The also tried to cross the lesions in the renal arteries by selecting 1st anterior tibial artery, we were unable to pass the wire beyond the distal half of the artery due to dense calcific occluded plaque  We then subsequently selected the posterior tibial artery but were unable to advance the wire beyond its origin due to dense calcified   We now selected the peroneal artery and again were not able to cross the lesions in his mid to distal portion due to densely calcified chronic total occlusion  I asked my  Dr Julio Herrera to review the imaging and discuss treatment options  It was felt that since patient had no tissue loss but significant rest pain, treating toe high-grade stenosis in the popliteal artery would help improve inflow into a diseased tibial outflow  It was felt that a popliteal to plantar artery bypass would have limited patency and would be more useful in the setting of tissue loss  In the previous procedure patient had distal embolization from atherectomy due to a densely calcified plaque, also this time we did not have enough room to put a embolic protection device to protect all 3 tibial arteries because of the location of the lesion in the below-knee popliteal artery  Hence we decided against use of atherectomy but instead perform cutting balloon angioplasty to achieve luminal gain  We then exchanged the the 14 wire for a V18 wire  We then placed a 6 x 30 cm sheath in the left superficial femoral artery and performed cutting balloon angioplasty using [place] [employer] Angio[employer] balloon, 5 mm x 20 mm centered on the popliteal calcified lesion  It was inflated up to the 1st pressure for 2 minutes prolonged inflation  We then performed prolonged balloon angioplasty using [employer] impact 5 mm x 40 mm balloon  Repeat angioplasty demonstrated significant improvement in the high-grade stenosis    Now we were left with about a 10-15% residual stenosis due to a densely calcified plaque  There was brisk flow of contrast with intact diseased tibial runoff  20 mg of protamine was given to reverse the effect of heparin  Access will out and closed using a [employer] device  I was present for the entire procedure    Total contrast used -27 CC  Total fluoroscopy time-13  2min / 80 mGy    Patient Disposition:  PACU  and patient will return to OR for planned surgery as a staged procedure for creation of a superficial femoral artery to plantar artery bypass if this fails to relieve his rest pain  I had a detailed discussion with his family postprocedure  Will plan to observe him for the next 1-2 weeks to see if this helps improve his rest pain  A there is no improvement we can make an aggressive up attempt at atherectomy keeping in mind that there is a chance of distal embolization and possible limb loss involved because of the nature of the lesion  He will continue aspirin and plavix      SIGNATURE: Deneen Nino MD  DATE: March 9, 2018  TIME: 8:39 PM

## 2018-03-13 ENCOUNTER — TELEPHONE (OUTPATIENT)
Dept: VASCULAR SURGERY | Facility: CLINIC | Age: 83
End: 2018-03-13

## 2018-03-13 ENCOUNTER — OFFICE VISIT (OUTPATIENT)
Dept: VASCULAR SURGERY | Facility: CLINIC | Age: 83
End: 2018-03-13
Payer: COMMERCIAL

## 2018-03-13 VITALS
HEART RATE: 72 BPM | TEMPERATURE: 97 F | DIASTOLIC BLOOD PRESSURE: 60 MMHG | BODY MASS INDEX: 22.33 KG/M2 | HEIGHT: 70 IN | WEIGHT: 156 LBS | RESPIRATION RATE: 18 BRPM | SYSTOLIC BLOOD PRESSURE: 124 MMHG

## 2018-03-13 DIAGNOSIS — I70.222 ATHEROSCLEROSIS OF NATIVE ARTERY OF LEFT LOWER EXTREMITY WITH REST PAIN (HCC): Primary | ICD-10-CM

## 2018-03-13 PROCEDURE — 99213 OFFICE O/P EST LOW 20 MIN: CPT | Performed by: SURGERY

## 2018-03-13 RX ORDER — GABAPENTIN 300 MG/1
300 CAPSULE ORAL
Qty: 30 CAPSULE | Refills: 1 | Status: SHIPPED | OUTPATIENT
Start: 2018-03-13 | End: 2018-05-21 | Stop reason: ALTCHOICE

## 2018-03-13 NOTE — PROGRESS NOTES
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 [facility])  Patient underwent angioplasty of left popliteal artery  He was doing well at home when he started having shooting pain with walking from yesterday  The pain is mainly in the great toe  As such sharp shooting pain  His foot otherwise appears warm has brisk cap refill  Motor and sensory intact  There is a small kissing ulcer between the 4th and 2nd toes  I have recommended that he use a spacer good shin to prevent worsening of that wound maybe that was the cause of the pain  He also has some reperfusion related swelling  Recommend that we use a light Tubigrip compression  On the exam today he has a biphasic posterior tibial signal   I will see him back in 7-10 days  Diagnoses and all orders for this visit:    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 (HCC)          Subjective:      Patient ID: Melchor Gottlieb is a 80 y o  male  Patient is s/p A-gram with PTA L popliteal artery  Patient called because yesterday he developed shooting pain with walking and feels the ball of his foot is swollen and the great toe  Symptoms got worse today but were not present initially after the procedure  He denies any coldness  He is taking Plavix and ASA daily  HPI    The following portions of the patient's history were reviewed and updated as appropriate: allergies, current medications, past family history, past medical history, past social history, past surgical history and problem list     Review of Systems   Constitutional: Negative  HENT: Negative  Eyes: Negative  Respiratory: Negative  Cardiovascular: Positive for leg swelling  Gastrointestinal: Negative  Endocrine: Negative  Genitourinary: Negative  Musculoskeletal: Positive for joint swelling  Skin: Negative  Allergic/Immunologic: Negative  Neurological: Negative  Psychiatric/Behavioral: Negative  Objective:      /60 (BP Location: Right arm, Patient Position: Sitting, Cuff Size: Adult)   Pulse 72   Temp (!) 97 °F (36 1 °C) (Tympanic)   Resp 18   Ht 5' 10" (1 778 m)   Wt 70 8 kg (156 lb)   BMI 22 38 kg/m²          Physical Exam   Constitutional: He is oriented to person, place, and time  He appears well-developed and well-nourished  HENT:   Head: Normocephalic  Cardiovascular: Normal rate and regular rhythm  Left groin access without any complication  Left posterior tibial signal at the level of the ankle is biphasic  There is less than 2 second cap refill on the left foot  Pulmonary/Chest: Effort normal    Musculoskeletal: Normal range of motion  He exhibits edema  Neurological: He is alert and oriented to person, place, and time  Skin: Skin is warm and dry  Psychiatric: He has a normal mood and affect   His behavior is normal

## 2018-03-13 NOTE — ASSESSMENT & PLAN NOTE
Patient underwent angioplasty of left popliteal artery  He was doing well at home when he started having shooting pain with walking from yesterday  The pain is mainly in the great toe  As such sharp shooting pain  His foot otherwise appears warm has brisk cap refill  Motor and sensory intact  There is a small kissing ulcer between the 4th and 2nd toes  I have recommended that he use a spacer good shin to prevent worsening of that wound maybe that was the cause of the pain  He also has some reperfusion related swelling  Recommend that we use a light Tubigrip compression  On the exam today he has a biphasic posterior tibial signal   I will see him back in 7-10 days  Unclear if there is an element of neuropathy so I will start him for gabapentin 300 mg at nighttime

## 2018-03-16 DIAGNOSIS — E78.5 HYPERLIPIDEMIA, UNSPECIFIED HYPERLIPIDEMIA TYPE: ICD-10-CM

## 2018-03-16 RX ORDER — SIMVASTATIN 20 MG
20 TABLET ORAL DAILY
Qty: 90 TABLET | Refills: 1 | Status: SHIPPED | OUTPATIENT
Start: 2018-03-16 | End: 2018-11-23 | Stop reason: SDUPTHER

## 2018-03-20 ENCOUNTER — OFFICE VISIT (OUTPATIENT)
Dept: VASCULAR SURGERY | Facility: CLINIC | Age: 83
End: 2018-03-20
Payer: COMMERCIAL

## 2018-03-20 VITALS
SYSTOLIC BLOOD PRESSURE: 130 MMHG | TEMPERATURE: 95.6 F | HEART RATE: 76 BPM | BODY MASS INDEX: 22.19 KG/M2 | WEIGHT: 155 LBS | RESPIRATION RATE: 18 BRPM | DIASTOLIC BLOOD PRESSURE: 68 MMHG | HEIGHT: 70 IN

## 2018-03-20 DIAGNOSIS — L03.314 CELLULITIS OF LEFT GROIN: ICD-10-CM

## 2018-03-20 DIAGNOSIS — I70.222 ATHEROSCLEROSIS OF NATIVE ARTERY OF LEFT LOWER EXTREMITY WITH REST PAIN (HCC): Primary | ICD-10-CM

## 2018-03-20 PROCEDURE — 99213 OFFICE O/P EST LOW 20 MIN: CPT | Performed by: SURGERY

## 2018-03-20 RX ORDER — CEPHALEXIN 250 MG/1
500 CAPSULE ORAL EVERY 8 HOURS SCHEDULED
Qty: 30 CAPSULE | Refills: 0 | Status: SHIPPED | OUTPATIENT
Start: 2018-03-20 | End: 2018-03-25

## 2018-03-20 NOTE — PROGRESS NOTES
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 (HCC)  -     VAS lower limb arterial duplex, complete bilateral; Future    Cellulitis of left groin  -     cephalexin (KEFLEX) 250 mg capsule; Take 2 capsules (500 mg total) by mouth every 8 (eight) hours for 5 days               Patient ID: Iván Li is a 80 y o  male  Chief Complaint: REV BILATERAL AGRAM POSS PTA/STENT DONE [facility] 2/26/18  Pt states  sites are closed but left groin has a lump  Pt states he has some pain to left groin  Pt states left foot still hurts  Pt is changing dressing to left big toe daily  Pt has some swelling to left foot  Pt states walking has no improved  Pt is taking aspirin and plavix daily  HPI    The following portions of the patient's history were reviewed and updated as appropriate: allergies, current medications, past family history, past medical history, past social history, past surgical history and problem list     Review of Systems   Constitutional: Negative  HENT: Negative  Eyes: Negative  Respiratory: Negative  Cardiovascular: Negative  Gastrointestinal: Negative  Endocrine: Negative  Genitourinary: Negative  Musculoskeletal: Negative  Skin: Negative  Allergic/Immunologic: Negative  Neurological: Negative  Hematological: Negative  Psychiatric/Behavioral: Negative  Objective:      /68 (BP Location: Right arm, Patient Position: Sitting, Cuff Size: Adult)   Pulse 76   Temp (!) 95 6 °F (35 3 °C) (Tympanic)   Resp 18   Ht 5' 10" (1 778 m)   Wt 70 3 kg (155 lb)   BMI 22 24 kg/m²          Physical Exam   Constitutional: He is oriented to person, place, and time  He appears well-developed and well-nourished  HENT:   Head: Normocephalic and atraumatic  Neck: Normal range of motion  Cardiovascular: Normal rate  Pulmonary/Chest: Effort normal    Musculoskeletal: He exhibits edema  He exhibits no tenderness  Neurological: He is alert and oriented to person, place, and time  Skin: Skin is warm and dry  Psychiatric: He has a normal mood and affect  His behavior is normal        There is a erythematous lump in the left groin at the puncture site  Nontender  Nonfluctuant  Non pulsatile  No thrill  Left foot has brisk cap refill  The kissing ulcer between the 1st and 2nd toes is stable    There is a biphasic distal posterior tibial signal

## 2018-03-20 NOTE — ASSESSMENT & PLAN NOTE
Patient underwent angioplasty of a highly calcified left below-knee popliteal artery stenosis with a cutting balloon angioplasty and drug coated balloon angioplasty  There is a small kissing ulcer between the 1st and 2nd toes where I had recommended a use of spacer to prevent any worsening of that wound  How also started gabapentin 300 mg to treat neuropathy  I will get a lower extremity arterial Doppler to review improvement

## 2018-03-28 ENCOUNTER — TELEPHONE (OUTPATIENT)
Dept: VASCULAR SURGERY | Facility: CLINIC | Age: 83
End: 2018-03-28

## 2018-03-28 NOTE — TELEPHONE ENCOUNTER
Pts wife called to report that the pt has clear drainage coming from a pinhole on his left groin x a few days  Pt is still on the Keflex  No fever or chills  States the lump is getting smaller since it started to drain  He is not apply dressing to the area  It just runs down the leg  Should the pt be concerned? I s/w Dr Elvin Daugherty and he said that since the area is draining out and not developing a seroma and lump is decreasing that we should just watch it  If the lump gets larger, color changes, develops fever/chills, wound dehisces or anything changes for the worse pt is to call us   I called the pt and left this info on the machine

## 2018-04-13 DIAGNOSIS — G89.29 OTHER CHRONIC PAIN: Primary | ICD-10-CM

## 2018-04-13 RX ORDER — MELOXICAM 15 MG/1
TABLET ORAL DAILY
Qty: 90 TABLET | Refills: 0 | Status: SHIPPED | OUTPATIENT
Start: 2018-04-13 | End: 2018-05-21 | Stop reason: ALTCHOICE

## 2018-04-19 ENCOUNTER — HOSPITAL ENCOUNTER (OUTPATIENT)
Dept: NON INVASIVE DIAGNOSTICS | Facility: CLINIC | Age: 83
Discharge: HOME | End: 2018-04-19
Payer: COMMERCIAL

## 2018-04-19 DIAGNOSIS — I70.222 ATHEROSCLEROSIS OF NATIVE ARTERY OF LEFT LOWER EXTREMITY WITH REST PAIN (HCC): ICD-10-CM

## 2018-04-19 PROCEDURE — 93923 UPR/LXTR ART STDY 3+ LVLS: CPT

## 2018-04-19 PROCEDURE — 93925 LOWER EXTREMITY STUDY: CPT

## 2018-04-20 PROCEDURE — 93925 LOWER EXTREMITY STUDY: CPT | Performed by: SURGERY

## 2018-04-20 PROCEDURE — 93922 UPR/L XTREMITY ART 2 LEVELS: CPT | Performed by: SURGERY

## 2018-04-23 ENCOUNTER — OFFICE VISIT (OUTPATIENT)
Dept: VASCULAR SURGERY | Facility: CLINIC | Age: 83
End: 2018-04-23
Payer: COMMERCIAL

## 2018-04-23 VITALS
HEART RATE: 70 BPM | RESPIRATION RATE: 20 BRPM | HEIGHT: 70 IN | DIASTOLIC BLOOD PRESSURE: 74 MMHG | SYSTOLIC BLOOD PRESSURE: 116 MMHG | BODY MASS INDEX: 22.33 KG/M2 | WEIGHT: 156 LBS

## 2018-04-23 DIAGNOSIS — I73.9 SEVERE PERIPHERAL ARTERIAL DISEASE (HCC): ICD-10-CM

## 2018-04-23 DIAGNOSIS — I72.4 PSEUDOANEURYSM OF FEMORAL ARTERY (HCC): ICD-10-CM

## 2018-04-23 DIAGNOSIS — I70.222 ATHEROSCLEROSIS OF NATIVE ARTERY OF LEFT LOWER EXTREMITY WITH REST PAIN (HCC): Primary | ICD-10-CM

## 2018-04-23 PROCEDURE — 99214 OFFICE O/P EST MOD 30 MIN: CPT | Performed by: SURGERY

## 2018-04-23 RX ORDER — CLOPIDOGREL BISULFATE 75 MG/1
75 TABLET ORAL DAILY
Qty: 30 TABLET | Refills: 0 | OUTPATIENT
Start: 2018-04-23

## 2018-04-23 RX ORDER — CLOPIDOGREL BISULFATE 75 MG/1
75 TABLET ORAL DAILY
Qty: 30 TABLET | Refills: 0 | Status: SHIPPED | OUTPATIENT
Start: 2018-04-23 | End: 2018-05-21 | Stop reason: ALTCHOICE

## 2018-04-23 NOTE — ASSESSMENT & PLAN NOTE
Left foot rest pain is still persistent although the great toe pain has improved  He did not tolerate gabapentin as that did not make any improvement in his symptoms  He takes a Tylenol occasionally at night to help  He also has 2 get up at night and dangle his foot and sitting in the recliner which helps him with the pain  There is a kissing ulcer between the first and second toe that is healing slowly  There is also a small superficial abrasion between the third and fourth toe that is also slowly healing  His left posterior tibial has a strong biphasic signal     Reviewing his angiogram from about 6 weeks ago there is successful treatment of a heavily calcified and diseased distal popliteal artery on the left  He has severe tibial disease that could not be treated and there is ultimate reconstitution of the plantar arteries  Only surgical option would be a superficial femoral to plantar artery bypass which would be something with significant morbidity  Patient understands and would like to just continue with current management

## 2018-04-23 NOTE — PROGRESS NOTES
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 (HCC)  Left foot rest pain is still persistent although the great toe pain has improved  He did not tolerate gabapentin as that did not make any improvement in his symptoms  He takes a Tylenol occasionally at night to help  He also has 2 get up at night and dangle his foot and sitting in the recliner which helps him with the pain  There is a kissing ulcer between the first and second toe that is healing slowly  There is also a small superficial abrasion between the third and fourth toe that is also slowly healing  His left posterior tibial has a strong biphasic signal     Reviewing his angiogram from about 6 weeks ago there is successful treatment of a heavily calcified and diseased distal popliteal artery on the left  He has severe tibial disease that could not be treated and there is ultimate reconstitution of the plantar arteries  Only surgical option would be a superficial femoral to plantar artery bypass which would be something with significant morbidity  Patient understands and would like to just continue with current management  Diagnoses and all orders for this visit:    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 ([address] )    Pseudoaneurysm of femoral artery (HCC)  -     VAS upper pseudoaneurysm injection study; Future          Subjective:      Patient ID: Misael Arango is a 80 y o  male      Patient underwent Left lower extremity angiogram on March 9, 2018 via antegrade left superficial femoral artery access  At that time he underwent angioplasty of left distal popliteal artery heavily calcific plaque  Patient has had some improvement in the left foot rest pain but continues to have pain in his toes especially the small ulcers between his first and second toe and third and fourth toes  He is seeing his podiatrist regularly  The following portions of the patient's history were reviewed and updated as appropriate: allergies, current medications, past family history, past medical history, past social history, past surgical history and problem list     Review of Systems   Constitutional: Negative  HENT: Negative  Eyes: Negative  Respiratory: Negative  Cardiovascular: Negative  Gastrointestinal: Negative  Endocrine: Negative  Genitourinary: Negative  Musculoskeletal: Negative  Skin: Negative  Allergic/Immunologic: Negative  Neurological: Negative  Hematological: Negative  Psychiatric/Behavioral: Negative  Objective:      /74 (BP Location: Left arm, Patient Position: Sitting, Cuff Size: Standard)   Pulse 70   Resp 20   Ht 5' 10" (1 778 m)   Wt 70 8 kg (156 lb)   BMI 22 38 kg/m²          Physical Exam   Constitutional: He is oriented to person, place, and time  He appears well-developed and well-nourished  No distress  HENT:   Head: Normocephalic and atraumatic  Cardiovascular:   Strong biphasic distal left posterior tibial signal  Left groin indurated area at the puncture site but no palpable lump  No pulsatile lump  Musculoskeletal: He exhibits edema  Neurological: He is alert and oriented to person, place, and time  Skin: Skin is warm and dry  He is not diaphoretic  No erythema  Small punctate area off kissing ulcer between the first and second left toes  There is a superficial skin abrasion between the third and fourth toes  Psychiatric: He has a normal mood and affect   His behavior is normal

## 2018-04-23 NOTE — ASSESSMENT & PLAN NOTE
Small left common femoral artery pseudoaneurysm status post recent procedure about 6 weeks ago  It is asymptomatic  It is 1 5 cm in size with the narrow neck  I will get a repeat ultrasound in 3 weeks and if it is still of the same size and persistent we will perform thrombin injection  If it is reduced in size then We just continue to observe it and it will spontaneously thrombose

## 2018-04-23 NOTE — LETTER
April 23, 2018     Ahmet Guajardo  [address]    Patient: Noman Decker   YOB: 1933   Date of Visit: 4/23/2018       Dear Dr Jimenez Gusamn:    Thank you for referring Noman Decker to me for evaluation  Below are my notes for this consultation  If you have questions, please do not hesitate to call me  I look forward to following your patient along with you  Sincerely,        Shayy Li MD        CC: SUNNY Lipscomb MD  4/23/2018  1:44 PM  Sign at close encounter  Assessment/Plan:    Pseudoaneurysm of femoral artery ([address] )  Small left common femoral artery pseudoaneurysm status post recent procedure about 6 weeks ago  It is asymptomatic  It is 1 5 cm in size with the narrow neck  I will get a repeat ultrasound in 3 weeks and if it is still of the same size and persistent we will perform thrombin injection  If it is reduced in size then We just continue to observe it and it will spontaneously thrombose  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 (HCC)  Left foot rest pain is still persistent although the great toe pain has improved  He did not tolerate gabapentin as that did not make any improvement in his symptoms  He takes a Tylenol occasionally at night to help  He also has 2 get up at night and dangle his foot and sitting in the recliner which helps him with the pain  There is a kissing ulcer between the first and second toe that is healing slowly  There is also a small superficial abrasion between the third and fourth toe that is also slowly healing  His left posterior tibial has a strong biphasic signal     Reviewing his angiogram from about 6 weeks ago there is successful treatment of a heavily calcified and diseased distal popliteal artery on the left  He has severe tibial disease that could not be treated and there is ultimate reconstitution of the plantar arteries    Only surgical option would be a superficial femoral to plantar artery bypass which would be something with significant morbidity  Patient understands and would like to just continue with current management  Diagnoses and all orders for this visit:    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 ([address] )    Pseudoaneurysm of femoral artery (HCC)  -     VAS upper pseudoaneurysm injection study; Future          Subjective:      Patient ID: German Sauer is a 80 y o  male  Patient underwent Left lower extremity angiogram on March 9, 2018 via antegrade left superficial femoral artery access  At that time he underwent angioplasty of left distal popliteal artery heavily calcific plaque  Patient has had some improvement in the left foot rest pain but continues to have pain in his toes especially the small ulcers between his first and second toe and third and fourth toes  He is seeing his podiatrist regularly  The following portions of the patient's history were reviewed and updated as appropriate: allergies, current medications, past family history, past medical history, past social history, past surgical history and problem list     Review of Systems   Constitutional: Negative  HENT: Negative  Eyes: Negative  Respiratory: Negative  Cardiovascular: Negative  Gastrointestinal: Negative  Endocrine: Negative  Genitourinary: Negative  Musculoskeletal: Negative  Skin: Negative  Allergic/Immunologic: Negative  Neurological: Negative  Hematological: Negative  Psychiatric/Behavioral: Negative  Objective:      /74 (BP Location: Left arm, Patient Position: Sitting, Cuff Size: Standard)   Pulse 70   Resp 20   Ht 5' 10" (1 778 m)   Wt 70 8 kg (156 lb)   BMI 22 38 kg/m²           Physical Exam   Constitutional: He is oriented to person, place, and time  He appears well-developed and well-nourished  No distress     HENT:   Head: Normocephalic and atraumatic  Cardiovascular:   Strong biphasic distal left posterior tibial signal  Left groin indurated area at the puncture site but no palpable lump  No pulsatile lump  Musculoskeletal: He exhibits edema  Neurological: He is alert and oriented to person, place, and time  Skin: Skin is warm and dry  He is not diaphoretic  No erythema  Small punctate area off kissing ulcer between the first and second left toes  There is a superficial skin abrasion between the third and fourth toes  Psychiatric: He has a normal mood and affect   His behavior is normal

## 2018-04-26 DIAGNOSIS — I70.222 ATHEROSCLEROSIS OF NATIVE ARTERY OF LEFT LOWER EXTREMITY WITH REST PAIN (HCC): Primary | ICD-10-CM

## 2018-04-26 DIAGNOSIS — I72.4 PSEUDOANEURYSM OF LEFT FEMORAL ARTERY (HCC): ICD-10-CM

## 2018-04-26 DIAGNOSIS — I72.4 ANEURYSM OF FEMORAL ARTERY (HCC): Primary | ICD-10-CM

## 2018-04-26 DIAGNOSIS — I70.222 ATHEROSCLEROSIS OF NATIVE ARTERY OF LEFT LOWER EXTREMITY WITH REST PAIN (HCC): ICD-10-CM

## 2018-05-03 ENCOUNTER — TELEPHONE (OUTPATIENT)
Dept: VASCULAR SURGERY | Facility: CLINIC | Age: 83
End: 2018-05-03

## 2018-05-03 NOTE — TELEPHONE ENCOUNTER
Rec call back from patient and confirmed date 5/21/18 at [facility] for Lower pseudoaneurysm inj study

## 2018-05-21 ENCOUNTER — HOSPITAL ENCOUNTER (OUTPATIENT)
Dept: ULTRASOUND IMAGING | Facility: HOSPITAL | Age: 83
Discharge: HOME | End: 2018-05-21
Attending: SURGERY
Payer: COMMERCIAL

## 2018-05-21 VITALS
HEART RATE: 64 BPM | DIASTOLIC BLOOD PRESSURE: 72 MMHG | HEIGHT: 70 IN | WEIGHT: 156 LBS | RESPIRATION RATE: 16 BRPM | BODY MASS INDEX: 22.33 KG/M2 | TEMPERATURE: 97.6 F | OXYGEN SATURATION: 100 % | SYSTOLIC BLOOD PRESSURE: 158 MMHG

## 2018-05-21 DIAGNOSIS — I70.222 ATHEROSCLEROSIS OF NATIVE ARTERY OF LEFT LOWER EXTREMITY WITH REST PAIN (HCC): ICD-10-CM

## 2018-05-21 DIAGNOSIS — I73.9 SEVERE PERIPHERAL ARTERIAL DISEASE (HCC): Primary | ICD-10-CM

## 2018-05-21 DIAGNOSIS — S75.002D: ICD-10-CM

## 2018-05-21 PROCEDURE — 93925 LOWER EXTREMITY STUDY: CPT | Performed by: SURGERY

## 2018-05-21 PROCEDURE — 93926 LOWER EXTREMITY STUDY: CPT

## 2018-05-21 RX ORDER — GABAPENTIN 300 MG/1
300 CAPSULE ORAL
Qty: 30 CAPSULE | Refills: 3 | Status: SHIPPED | OUTPATIENT
Start: 2018-05-21 | End: 2018-09-17 | Stop reason: SDUPTHER

## 2018-05-21 NOTE — PROGRESS NOTES
As per Dr Bernardo Burns patient is ok to be discharged, patient only had ultrasound no intervention needed, and no medications given

## 2018-06-15 DIAGNOSIS — I73.9 SEVERE PERIPHERAL ARTERIAL DISEASE (HCC): ICD-10-CM

## 2018-06-18 RX ORDER — CLOPIDOGREL BISULFATE 75 MG/1
75 TABLET ORAL DAILY
Qty: 30 TABLET | Refills: 0 | Status: SHIPPED | OUTPATIENT
Start: 2018-06-18 | End: 2018-07-20 | Stop reason: SDUPTHER

## 2018-07-02 DIAGNOSIS — D50.9 IRON DEFICIENCY ANEMIA, UNSPECIFIED IRON DEFICIENCY ANEMIA TYPE: ICD-10-CM

## 2018-07-03 RX ORDER — FERROUS SULFATE 325(65) MG
1 TABLET ORAL 2 TIMES DAILY
Qty: 60 TABLET | Refills: 3 | Status: SHIPPED | OUTPATIENT
Start: 2018-07-03 | End: 2018-09-24 | Stop reason: SDUPTHER

## 2018-07-20 DIAGNOSIS — I73.9 SEVERE PERIPHERAL ARTERIAL DISEASE (HCC): ICD-10-CM

## 2018-07-20 DIAGNOSIS — R52 PAIN: Primary | ICD-10-CM

## 2018-07-20 RX ORDER — CLOPIDOGREL BISULFATE 75 MG/1
75 TABLET ORAL DAILY
Qty: 30 TABLET | Refills: 0 | Status: SHIPPED | OUTPATIENT
Start: 2018-07-20 | End: 2018-08-17 | Stop reason: SDUPTHER

## 2018-07-20 RX ORDER — MELOXICAM 15 MG/1
15 TABLET ORAL DAILY
Qty: 90 TABLET | Refills: 1 | Status: SHIPPED | OUTPATIENT
Start: 2018-07-20 | End: 2018-10-30 | Stop reason: HOSPADM

## 2018-08-15 RX ORDER — CLOBETASOL PROPIONATE 0.5 MG/G
OINTMENT TOPICAL
Qty: 60 G | Refills: 3 | OUTPATIENT
Start: 2018-08-15

## 2018-08-17 DIAGNOSIS — I73.9 SEVERE PERIPHERAL ARTERIAL DISEASE (HCC): ICD-10-CM

## 2018-08-20 RX ORDER — CLOPIDOGREL BISULFATE 75 MG/1
75 TABLET ORAL DAILY
Qty: 30 TABLET | Refills: 0 | Status: SHIPPED | OUTPATIENT
Start: 2018-08-20 | End: 2018-09-14 | Stop reason: SDUPTHER

## 2018-09-07 ENCOUNTER — CLINICAL SUPPORT (OUTPATIENT)
Dept: FAMILY MEDICINE CLINIC | Facility: CLINIC | Age: 83
End: 2018-09-07
Payer: COMMERCIAL

## 2018-09-07 DIAGNOSIS — Z23 FLU VACCINE NEED: Primary | ICD-10-CM

## 2018-09-07 PROCEDURE — 90662 IIV NO PRSV INCREASED AG IM: CPT | Performed by: FAMILY MEDICINE

## 2018-09-07 PROCEDURE — G0008 ADMIN INFLUENZA VIRUS VAC: HCPCS | Performed by: FAMILY MEDICINE

## 2018-09-14 DIAGNOSIS — I73.9 SEVERE PERIPHERAL ARTERIAL DISEASE (HCC): ICD-10-CM

## 2018-09-17 DIAGNOSIS — I73.9 SEVERE PERIPHERAL ARTERIAL DISEASE (HCC): ICD-10-CM

## 2018-09-18 RX ORDER — CLOPIDOGREL BISULFATE 75 MG/1
75 TABLET ORAL DAILY
Qty: 30 TABLET | Refills: 0 | Status: SHIPPED | OUTPATIENT
Start: 2018-09-18 | End: 2018-10-30 | Stop reason: HOSPADM

## 2018-09-18 RX ORDER — GABAPENTIN 300 MG/1
300 CAPSULE ORAL
Qty: 30 CAPSULE | Refills: 3 | Status: SHIPPED | OUTPATIENT
Start: 2018-09-18 | End: 2018-10-08 | Stop reason: ALTCHOICE

## 2018-09-24 DIAGNOSIS — D50.9 IRON DEFICIENCY ANEMIA, UNSPECIFIED IRON DEFICIENCY ANEMIA TYPE: ICD-10-CM

## 2018-09-24 RX ORDER — FERROUS SULFATE 325(65) MG
1 TABLET ORAL 2 TIMES DAILY
Qty: 60 TABLET | Refills: 1 | Status: SHIPPED | OUTPATIENT
Start: 2018-09-24 | End: 2018-09-25 | Stop reason: SDUPTHER

## 2018-09-25 DIAGNOSIS — D50.9 IRON DEFICIENCY ANEMIA, UNSPECIFIED IRON DEFICIENCY ANEMIA TYPE: ICD-10-CM

## 2018-09-26 RX ORDER — FERROUS SULFATE 325(65) MG
1 TABLET ORAL 2 TIMES DAILY
Qty: 90 TABLET | Refills: 2 | Status: SHIPPED | OUTPATIENT
Start: 2018-09-26 | End: 2021-01-01 | Stop reason: SDUPTHER

## 2018-10-08 ENCOUNTER — OFFICE VISIT (OUTPATIENT)
Dept: VASCULAR SURGERY | Facility: CLINIC | Age: 83
End: 2018-10-08
Payer: COMMERCIAL

## 2018-10-08 VITALS
SYSTOLIC BLOOD PRESSURE: 126 MMHG | HEIGHT: 70 IN | BODY MASS INDEX: 22.9 KG/M2 | WEIGHT: 160 LBS | DIASTOLIC BLOOD PRESSURE: 80 MMHG | TEMPERATURE: 96.4 F

## 2018-10-08 DIAGNOSIS — N18.30 CKD (CHRONIC KIDNEY DISEASE), STAGE III (HCC): ICD-10-CM

## 2018-10-08 DIAGNOSIS — I70.222 ATHEROSCLEROSIS OF NATIVE ARTERY OF LEFT LOWER EXTREMITY WITH REST PAIN (HCC): Primary | ICD-10-CM

## 2018-10-08 DIAGNOSIS — I70.245 ATHEROSCLEROSIS OF NATIVE ARTERIES OF LEFT LEG WITH ULCERATION OF OTHER PART OF FOOT (HCC): ICD-10-CM

## 2018-10-08 DIAGNOSIS — I73.9 SEVERE PERIPHERAL ARTERIAL DISEASE (HCC): ICD-10-CM

## 2018-10-08 PROBLEM — I72.4 PSEUDOANEURYSM OF FEMORAL ARTERY (HCC): Status: RESOLVED | Noted: 2018-04-23 | Resolved: 2018-10-08

## 2018-10-08 PROCEDURE — 99214 OFFICE O/P EST MOD 30 MIN: CPT | Performed by: SURGERY

## 2018-10-08 RX ORDER — TRAMADOL HYDROCHLORIDE 50 MG/1
50 TABLET ORAL EVERY 6 HOURS PRN
Qty: 30 TABLET | Refills: 0 | Status: SHIPPED | OUTPATIENT
Start: 2018-10-08 | End: 2018-10-30 | Stop reason: HOSPADM

## 2018-10-08 RX ORDER — SODIUM CHLORIDE 9 MG/ML
125 INJECTION, SOLUTION INTRAVENOUS CONTINUOUS
Status: CANCELLED | OUTPATIENT
Start: 2018-10-08 | End: 2018-10-09

## 2018-10-08 NOTE — LETTER
October 8, 2018     Kylah Coburn, [address]    Patient: Estephanie Hyde   YOB: 1933   Date of Visit: 10/8/2018       Dear Dr Lyla Zuñiga: Thank you for referring Estephanie Hyde to me for evaluation  Below are my notes for this consultation  If you have questions, please do not hesitate to call me  I look forward to following your patient along with you  Sincerely,        Chen Flores MD        CC: MD Chen Acosta MD  10/8/2018  2:21 PM  Sign at close encounter  Assessment/Plan:    No problem-specific Assessment & Plan notes found for this encounter  Diagnoses and all orders for this visit:    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 (HCC)  -     traMADol (ULTRAM) 50 mg tablet; Take 1 tablet (50 mg total) by mouth every 6 (six) hours as needed for moderate pain  -     silver sulfadiazine (SILVADENE,SSD) 1 % cream; Apply topically daily    Atherosclerosis of native arteries of left leg with ulceration of other part of foot ([facility] )               Patient ID: Estephanie Hyde is a 80 y o  male  Chief complaint: Pt was referred by podiatry for wound to left big toe and pain to left leg  Pt had AGRAM done 3/2018  Pt is c/o left leg/foot getting swollen  Pt denies use of compression  Pt is changing dressing daily  Pt is on ASA and plavix  Patient has undergone 2 prior endovascular interventions on his left lower extremity few months ago to improve the inflow  He had improvement in his rest pain but has now recurred since the past few weeks  It is difficult for him to fall asleep at night and he has to dangle his foot in the recliner to get some relief  It is a severe pain that is unrelenting  His toes also have feeling of numbness and pins and needles            The following portions of the patient's history were reviewed and updated as appropriate: allergies, current medications, past family history, past medical history, past social history, past surgical history and problem list     Review of Systems   Constitutional: Negative  HENT: Negative  Eyes: Negative  Respiratory: Negative  Cardiovascular: Positive for leg swelling  Gastrointestinal: Negative  Endocrine: Negative  Genitourinary: Negative  Musculoskeletal: Positive for back pain  Skin: Positive for wound  Allergic/Immunologic: Negative  Neurological: Negative  Hematological: Negative  Psychiatric/Behavioral: Positive for sleep disturbance  I have reviewed the review of systems as entered and made appropriate changes as necessary    Objective:      /80 (BP Location: Right arm, Patient Position: Sitting, Cuff Size: Adult)   Temp (!) 96 4 °F (35 8 °C) (Tympanic)   Ht 5' 10" (1 778 m)   Wt 72 6 kg (160 lb)   BMI 22 96 kg/m²           Physical Exam   Constitutional: He is oriented to person, place, and time  He appears well-developed and well-nourished  No distress  obese   HENT:   Head: Normocephalic and atraumatic  Cardiovascular: Normal rate, regular rhythm, normal heart sounds and intact distal pulses  Monophasic to biphasic left distal posterior tibial signal   Biphasic left popliteal signal   Pulmonary/Chest: Effort normal and breath sounds normal    Abdominal: Soft  Musculoskeletal: Normal range of motion  He exhibits edema  Neurological: He is alert and oriented to person, place, and time  Skin: Skin is warm and dry  He is not diaphoretic  Left foot has dependent rubor  There is a small ulceration between the 1st and 2nd toe on the medial aspect of the 2nd toe  It is about 3 mm in diameter involving the skin  Psychiatric: He has a normal mood and affect  His behavior is normal    Nursing note and vitals reviewed

## 2018-10-08 NOTE — ASSESSMENT & PLAN NOTE
Will plan for IV hydration prior to the angiogram to protect the kidneys    Will obtain a recent BUN creatinine prior to angiogram

## 2018-10-08 NOTE — PROGRESS NOTES
Assessment/Plan:    CKD (chronic kidney disease), stage III  Will plan for IV hydration prior to the angiogram to protect the kidneys  Will obtain a recent BUN creatinine prior to angiogram     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)  Rest pain and ulceration involving the left foot  This is a recurrent issue  Unfortunately has severe tibial disease and popliteal disease as well  He has stents in his superficial femoral artery that were placed about 6 months ago  I have reviewed the images myself from his previous to angiograms and also his Doppler studies  He will benefit from a left lower extremity intervention which would include a low contrast angiogram along with angioplasty stenting and possible atherectomy of the popliteal artery lesion  Risks of the procedure such as distal embolization were discussed with him today in the office  Consent is obtained  We should plan to do this in the next couple of weeks if possible  He should continue the aspirin and does not need to hold it for the procedure  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 (HCC)  There is no response of gabapentin and hence I have discontinued it  I have written him a prescription for tramadol to be used sparingly as needed for severe rest pain to help him till we get the angiogram procedure scheduled  Diagnoses and all orders for this visit:    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 (HCC)  -     traMADol (ULTRAM) 50 mg tablet;  Take 1 tablet (50 mg total) by mouth every 6 (six) hours as needed for moderate pain  -     silver sulfadiazine (SILVADENE,SSD) 1 % cream; Apply topically daily    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)    Severe peripheral arterial disease (HCC)    CKD (chronic kidney disease), stage III ([facility] )    Other orders  -     Case request operating room; Standing  -     Type and screen; Future  -     Prepare RBC; Future  -     Basic metabolic panel; Future  -     CBC and Platelet; Future  -     Protime-INR; Future  -     HEMOGLOBIN A1C W/ EAG ESTIMATION; Future  -     EKG 12 lead; Future  -     XR chest pa & lateral; Future  -     Ambulatory referral to Cardiology; Future  -     Diet NPO; Sips with meds; Standing  -     Void on call to OR; Standing  -     Insert peripheral IV; Standing  -     Shower/scrub; Standing  -     Place sequential compression device; Standing               Patient ID: Hesham Santoro is a 80 y o  male  Chief complaint: Pt was referred by podiatry for wound to left big toe and pain to left leg  Pt had AGRAM done 3/2018  Pt is c/o left leg/foot getting swollen  Pt denies use of compression  Pt is changing dressing daily  Pt is on ASA and plavix    Patient has undergone 2 prior endovascular interventions on his left lower extremity few months ago to improve the inflow  He had improvement in his rest pain but has now recurred since the past few weeks  It is difficult for him to fall asleep at night and he has to dangle his foot in the recliner to get some relief  It is a severe pain that is unrelenting  His toes also have feeling of numbness and pins and needles  The following portions of the patient's history were reviewed and updated as appropriate: allergies, current medications, past family history, past medical history, past social history, past surgical history and problem list     Review of Systems   Constitutional: Negative  HENT: Negative  Eyes: Negative  Respiratory: Negative  Cardiovascular: Positive for leg swelling  Gastrointestinal: Negative  Endocrine: Negative  Genitourinary: Negative  Musculoskeletal: Positive for back pain  Skin: Positive for wound  Allergic/Immunologic: Negative  Neurological: Negative  Hematological: Negative      Psychiatric/Behavioral: Positive for sleep disturbance  I have reviewed the review of systems as entered and made appropriate changes as necessary    Objective:      /80 (BP Location: Right arm, Patient Position: Sitting, Cuff Size: Adult)   Temp (!) 96 4 °F (35 8 °C) (Tympanic)   Ht 5' 10" (1 778 m)   Wt 72 6 kg (160 lb)   BMI 22 96 kg/m²          Physical Exam   Constitutional: He is oriented to person, place, and time  He appears well-developed and well-nourished  No distress  obese   HENT:   Head: Normocephalic and atraumatic  Cardiovascular: Normal rate, regular rhythm, normal heart sounds and intact distal pulses  Monophasic to biphasic left distal posterior tibial signal   Biphasic left popliteal signal   Pulmonary/Chest: Effort normal and breath sounds normal    Abdominal: Soft  Musculoskeletal: Normal range of motion  He exhibits edema  Neurological: He is alert and oriented to person, place, and time  Skin: Skin is warm and dry  He is not diaphoretic  Left foot has dependent rubor  There is a small ulceration between the 1st and 2nd toe on the medial aspect of the 2nd toe  It is about 3 mm in diameter involving the skin  Psychiatric: He has a normal mood and affect  His behavior is normal    Nursing note and vitals reviewed

## 2018-10-08 NOTE — ASSESSMENT & PLAN NOTE
Rest pain and ulceration involving the left foot  This is a recurrent issue  Unfortunately has severe tibial disease and popliteal disease as well  He has stents in his superficial femoral artery that were placed about 6 months ago  I have reviewed the images myself from his previous to angiograms and also his Doppler studies  He will benefit from a left lower extremity intervention which would include a low contrast angiogram along with angioplasty stenting and possible atherectomy of the popliteal artery lesion  Risks of the procedure such as distal embolization were discussed with him today in the office  Consent is obtained  We should plan to do this in the next couple of weeks if possible  He should continue the aspirin and does not need to hold it for the procedure

## 2018-10-08 NOTE — ASSESSMENT & PLAN NOTE
There is no response of gabapentin and hence I have discontinued it  I have written him a prescription for tramadol to be used sparingly as needed for severe rest pain to help him till we get the angiogram procedure scheduled

## 2018-10-09 DIAGNOSIS — I70.222 ATHEROSCLEROSIS OF NATIVE ARTERY OF LEFT LOWER EXTREMITY WITH REST PAIN (HCC): Primary | ICD-10-CM

## 2018-10-11 ENCOUNTER — TELEPHONE (OUTPATIENT)
Dept: VASCULAR SURGERY | Facility: CLINIC | Age: 83
End: 2018-10-11

## 2018-10-11 NOTE — TELEPHONE ENCOUNTER
I spoke to pts wife, Mary Reveles, and confirmed date of Agram for 10/26/18 with Dr Lacy Dillon at [facility]  Pt had the option to have it done at [facility] on 11/5, but Dr Lacy Dillon preferred sooner and pt agreed to have done at [facility]  He will go for blood work ordered  No hold on medications  Instructions reviewed and understood

## 2018-10-12 ENCOUNTER — APPOINTMENT (OUTPATIENT)
Dept: LAB | Facility: CLINIC | Age: 83
End: 2018-10-12
Payer: COMMERCIAL

## 2018-10-12 DIAGNOSIS — I70.222 ATHEROSCLEROSIS OF NATIVE ARTERY OF LEFT LOWER EXTREMITY WITH REST PAIN (HCC): ICD-10-CM

## 2018-10-12 LAB
ANION GAP SERPL CALCULATED.3IONS-SCNC: 5 MMOL/L (ref 4–13)
BUN SERPL-MCNC: 14 MG/DL (ref 5–25)
CALCIUM SERPL-MCNC: 9.1 MG/DL (ref 8.3–10.1)
CHLORIDE SERPL-SCNC: 101 MMOL/L (ref 100–108)
CO2 SERPL-SCNC: 29 MMOL/L (ref 21–32)
CREAT SERPL-MCNC: 1.53 MG/DL (ref 0.6–1.3)
ERYTHROCYTE [DISTWIDTH] IN BLOOD BY AUTOMATED COUNT: 13.4 % (ref 11.6–15.1)
GFR SERPL CREATININE-BSD FRML MDRD: 41 ML/MIN/1.73SQ M
GLUCOSE P FAST SERPL-MCNC: 117 MG/DL (ref 65–99)
HCT VFR BLD AUTO: 33.2 % (ref 36.5–49.3)
HGB BLD-MCNC: 10.5 G/DL (ref 12–17)
INR PPP: 1.22 (ref 0.86–1.17)
MCH RBC QN AUTO: 30.6 PG (ref 26.8–34.3)
MCHC RBC AUTO-ENTMCNC: 31.6 G/DL (ref 31.4–37.4)
MCV RBC AUTO: 97 FL (ref 82–98)
PLATELET # BLD AUTO: 186 THOUSANDS/UL (ref 149–390)
PMV BLD AUTO: 10.8 FL (ref 8.9–12.7)
POTASSIUM SERPL-SCNC: 3.8 MMOL/L (ref 3.5–5.3)
PROTHROMBIN TIME: 15.5 SECONDS (ref 11.8–14.2)
RBC # BLD AUTO: 3.43 MILLION/UL (ref 3.88–5.62)
SODIUM SERPL-SCNC: 135 MMOL/L (ref 136–145)
WBC # BLD AUTO: 6.75 THOUSAND/UL (ref 4.31–10.16)

## 2018-10-12 PROCEDURE — 36415 COLL VENOUS BLD VENIPUNCTURE: CPT

## 2018-10-12 PROCEDURE — 80048 BASIC METABOLIC PNL TOTAL CA: CPT

## 2018-10-12 PROCEDURE — 85027 COMPLETE CBC AUTOMATED: CPT

## 2018-10-12 PROCEDURE — 85610 PROTHROMBIN TIME: CPT

## 2018-10-15 ENCOUNTER — PREP FOR PROCEDURE (OUTPATIENT)
Dept: VASCULAR SURGERY | Facility: CLINIC | Age: 83
End: 2018-10-15

## 2018-10-19 NOTE — PRE-PROCEDURE INSTRUCTIONS
Pre-Surgery Instructions:   Medication Instructions    aspirin 81 MG tablet Patient was instructed by Physician and understands   Calcium Carb-Cholecalciferol (CALCIUM 1000 + D) 1000-800 MG-UNIT TABS Instructed patient per Anesthesia Guidelines   Cholecalciferol (VITAMIN D) 2000 units CAPS Instructed patient per Anesthesia Guidelines   clobetasol (TEMOVATE) 0 05 % ointment Patient was instructed by Physician and understands   clopidogrel (PLAVIX) 75 mg tablet Patient was instructed by Physician and understands   cyanocobalamin (VITAMIN B-12) 1,000 mcg tablet Patient was instructed by Physician and understands   ferrous sulfate 325 (65 Fe) mg tablet Patient was instructed by Physician and understands   meloxicam (MOBIC) 15 mg tablet Patient was instructed by Physician and understands   Omega-3 Fatty Acids (FISH OIL) 645 MG CAPS Patient was instructed by Physician and understands   silver sulfadiazine (SILVADENE,SSD) 1 % cream Patient was instructed by Physician and understands   simvastatin (ZOCOR) 20 mg tablet Patient was instructed by Physician and understands   traMADol (ULTRAM) 50 mg tablet Patient was instructed by Physician and understands  ASA Med Class: Aspirin     Should be discontinued at least one week prior to planned operation, unless specifically stated otherwise by surgical service  Your Surgeon may have patient stop taking aspirin up to a week before surgery if having intracranial, middle ear, posterior eye, spine surgery or prostate surgery  [Patients taking aspirin for coronary stents should be reviewed by an anesthesiologist in the optimization clinic  Please do not discontinue aspirin in patients with coronary stents unless given specific permission to do so by the cardiologist who prescribed medication ]   If your surgeon approves please continue to take this medication on your normal schedule    You may take this medication on the morning of your surgery with a sip of water  NSAID Med Class     Stop taking this medication at least 3 days prior to surgery/procedure  Opioid Med Class     Continue to take this medication on your normal schedule  If this is an oral medication and you take it in the morning, then you may take this medicine with a sip of water  Platelet Aggregation Inhibitor Clopidogrel (Plavix) Med Class     Should be discontinued at least one week prior to planned operation, unless specifically stated otherwise by surgical service  [Patients taking Plavix for coronary stents should be reviewed by an anesthesiologist in the optimization clinic  Please do not discontinue Plavix in patients with coronary stents unless given specific permission to do so by the cardiologist who prescribed medication ] If your surgeon approves please continue to take this medication on your normal schedule  You may take this medication on the morning of your surgery with a sip of water  Statin Med Class     Continue to take this medication on your normal schedule  If this is an oral medication and you take it in the morning, then you may take this medicine with a sip of water      Pre op instructions reviewed with patient and wife they are following med instructions from vas office, bathing and hospital instructions reviewed with understanding

## 2018-10-26 ENCOUNTER — ANESTHESIA (OUTPATIENT)
Dept: PERIOP | Facility: HOSPITAL | Age: 83
DRG: 271 | End: 2018-10-26
Payer: COMMERCIAL

## 2018-10-26 ENCOUNTER — HOSPITAL ENCOUNTER (INPATIENT)
Facility: HOSPITAL | Age: 83
LOS: 1 days | Discharge: HOME | DRG: 271 | End: 2018-10-27
Attending: SURGERY | Admitting: SURGERY
Payer: COMMERCIAL

## 2018-10-26 ENCOUNTER — ANESTHESIA EVENT (OUTPATIENT)
Dept: PERIOP | Facility: HOSPITAL | Age: 83
DRG: 271 | End: 2018-10-26
Payer: COMMERCIAL

## 2018-10-26 ENCOUNTER — APPOINTMENT (OUTPATIENT)
Dept: RADIOLOGY | Facility: HOSPITAL | Age: 83
DRG: 271 | End: 2018-10-26
Payer: COMMERCIAL

## 2018-10-26 DIAGNOSIS — I70.222 ATHEROSCLEROSIS OF NATIVE ARTERY OF LEFT LOWER EXTREMITY WITH REST PAIN (HCC): Primary | ICD-10-CM

## 2018-10-26 LAB
APTT PPP: >210 SECONDS (ref 24–36)
APTT PPP: >210 SECONDS (ref 24–36)
ERYTHROCYTE [DISTWIDTH] IN BLOOD BY AUTOMATED COUNT: 13.4 % (ref 11.6–15.1)
HCT VFR BLD AUTO: 33.6 % (ref 36.5–49.3)
HGB BLD-MCNC: 10.9 G/DL (ref 12–17)
INR PPP: 1.37 (ref 0.86–1.17)
KCT BLD-ACNC: 342 SEC (ref 89–137)
MCH RBC QN AUTO: 30.9 PG (ref 26.8–34.3)
MCHC RBC AUTO-ENTMCNC: 32.4 G/DL (ref 31.4–37.4)
MCV RBC AUTO: 95 FL (ref 82–98)
PLATELET # BLD AUTO: 162 THOUSANDS/UL (ref 149–390)
PMV BLD AUTO: 11 FL (ref 8.9–12.7)
PROTHROMBIN TIME: 17 SECONDS (ref 11.8–14.2)
RBC # BLD AUTO: 3.53 MILLION/UL (ref 3.88–5.62)
SPECIMEN SOURCE: ABNORMAL
WBC # BLD AUTO: 5.54 THOUSAND/UL (ref 4.31–10.16)

## 2018-10-26 PROCEDURE — 37228 PR REVASCULARIZE TIBIAL/PERON ARTERY,ANGIOPLASTY INITIAL: CPT | Performed by: SURGERY

## 2018-10-26 PROCEDURE — C1757 CATH, THROMBECTOMY/EMBOLECT: HCPCS | Performed by: SURGERY

## 2018-10-26 PROCEDURE — 37225 PR REVSC OPN/PRQ FEM/POP W/ATHRC/ANGIOP SM VSL: CPT | Performed by: SURGERY

## 2018-10-26 PROCEDURE — C1769 GUIDE WIRE: HCPCS | Performed by: SURGERY

## 2018-10-26 PROCEDURE — C1887 CATHETER, GUIDING: HCPCS | Performed by: SURGERY

## 2018-10-26 PROCEDURE — C1894 INTRO/SHEATH, NON-LASER: HCPCS | Performed by: SURGERY

## 2018-10-26 PROCEDURE — 047U3ZZ DILATION OF LEFT PERONEAL ARTERY, PERCUTANEOUS APPROACH: ICD-10-PCS | Performed by: SURGERY

## 2018-10-26 PROCEDURE — 3E06317 INTRODUCTION OF OTHER THROMBOLYTIC INTO CENTRAL ARTERY, PERCUTANEOUS APPROACH: ICD-10-PCS | Performed by: SURGERY

## 2018-10-26 PROCEDURE — 04CN3ZZ EXTIRPATION OF MATTER FROM LEFT POPLITEAL ARTERY, PERCUTANEOUS APPROACH: ICD-10-PCS | Performed by: SURGERY

## 2018-10-26 PROCEDURE — C1760 CLOSURE DEV, VASC: HCPCS | Performed by: SURGERY

## 2018-10-26 PROCEDURE — 85730 THROMBOPLASTIN TIME PARTIAL: CPT | Performed by: SURGERY

## 2018-10-26 PROCEDURE — 85610 PROTHROMBIN TIME: CPT | Performed by: SURGERY

## 2018-10-26 PROCEDURE — 047Q3ZZ DILATION OF LEFT ANTERIOR TIBIAL ARTERY, PERCUTANEOUS APPROACH: ICD-10-PCS | Performed by: SURGERY

## 2018-10-26 PROCEDURE — B41GYZZ FLUOROSCOPY OF LEFT LOWER EXTREMITY ARTERIES USING OTHER CONTRAST: ICD-10-PCS | Performed by: SURGERY

## 2018-10-26 PROCEDURE — 75710 ARTERY X-RAYS ARM/LEG: CPT | Performed by: SURGERY

## 2018-10-26 PROCEDURE — C1725 CATH, TRANSLUMIN NON-LASER: HCPCS | Performed by: SURGERY

## 2018-10-26 PROCEDURE — 76937 US GUIDE VASCULAR ACCESS: CPT

## 2018-10-26 PROCEDURE — 37186 SEC ART THROMBECTOMY ADD-ON: CPT | Performed by: SURGERY

## 2018-10-26 PROCEDURE — 85027 COMPLETE CBC AUTOMATED: CPT | Performed by: SURGERY

## 2018-10-26 PROCEDURE — 04CU3ZZ EXTIRPATION OF MATTER FROM LEFT PERONEAL ARTERY, PERCUTANEOUS APPROACH: ICD-10-PCS | Performed by: SURGERY

## 2018-10-26 PROCEDURE — 047N3ZZ DILATION OF LEFT POPLITEAL ARTERY, PERCUTANEOUS APPROACH: ICD-10-PCS | Performed by: SURGERY

## 2018-10-26 PROCEDURE — 85347 COAGULATION TIME ACTIVATED: CPT

## 2018-10-26 PROCEDURE — 04CQ3ZZ EXTIRPATION OF MATTER FROM LEFT ANTERIOR TIBIAL ARTERY, PERCUTANEOUS APPROACH: ICD-10-PCS | Performed by: SURGERY

## 2018-10-26 DEVICE — CLOSURE DEVICE MYNX ACE 6F/7FR: Type: IMPLANTABLE DEVICE | Site: ARTERIAL | Status: FUNCTIONAL

## 2018-10-26 RX ORDER — GLYCOPYRROLATE 0.2 MG/ML
INJECTION INTRAMUSCULAR; INTRAVENOUS AS NEEDED
Status: DISCONTINUED | OUTPATIENT
Start: 2018-10-26 | End: 2018-10-26 | Stop reason: SURG

## 2018-10-26 RX ORDER — SODIUM CHLORIDE 9 MG/ML
125 INJECTION, SOLUTION INTRAVENOUS CONTINUOUS
Status: DISPENSED | OUTPATIENT
Start: 2018-10-26 | End: 2018-10-26

## 2018-10-26 RX ORDER — CHOLECALCIFEROL (VITAMIN D3) 125 MCG
1000 CAPSULE ORAL DAILY
Status: DISCONTINUED | OUTPATIENT
Start: 2018-10-27 | End: 2018-10-27 | Stop reason: HOSPADM

## 2018-10-26 RX ORDER — PROPOFOL 10 MG/ML
INJECTION, EMULSION INTRAVENOUS AS NEEDED
Status: DISCONTINUED | OUTPATIENT
Start: 2018-10-26 | End: 2018-10-26 | Stop reason: SURG

## 2018-10-26 RX ORDER — HEPARIN SODIUM 1000 [USP'U]/ML
2800 INJECTION, SOLUTION INTRAVENOUS; SUBCUTANEOUS AS NEEDED
Status: DISCONTINUED | OUTPATIENT
Start: 2018-10-26 | End: 2018-10-27 | Stop reason: HOSPADM

## 2018-10-26 RX ORDER — PAPAVERINE HYDROCHLORIDE 30 MG/ML
INJECTION INTRAMUSCULAR; INTRAVENOUS AS NEEDED
Status: DISCONTINUED | OUTPATIENT
Start: 2018-10-26 | End: 2018-10-26 | Stop reason: HOSPADM

## 2018-10-26 RX ORDER — ROCURONIUM BROMIDE 10 MG/ML
INJECTION, SOLUTION INTRAVENOUS AS NEEDED
Status: DISCONTINUED | OUTPATIENT
Start: 2018-10-26 | End: 2018-10-26 | Stop reason: SURG

## 2018-10-26 RX ORDER — HEPARIN SODIUM 10000 [USP'U]/100ML
3-30 INJECTION, SOLUTION INTRAVENOUS
Status: DISCONTINUED | OUTPATIENT
Start: 2018-10-26 | End: 2018-10-27 | Stop reason: HOSPADM

## 2018-10-26 RX ORDER — CHLORAL HYDRATE 500 MG
1000 CAPSULE ORAL DAILY
Status: DISCONTINUED | OUTPATIENT
Start: 2018-10-27 | End: 2018-10-27 | Stop reason: HOSPADM

## 2018-10-26 RX ORDER — PRAVASTATIN SODIUM 20 MG
20 TABLET ORAL
Status: DISCONTINUED | OUTPATIENT
Start: 2018-10-26 | End: 2018-10-27 | Stop reason: HOSPADM

## 2018-10-26 RX ORDER — HEPARIN SODIUM 1000 [USP'U]/ML
5600 INJECTION, SOLUTION INTRAVENOUS; SUBCUTANEOUS AS NEEDED
Status: DISCONTINUED | OUTPATIENT
Start: 2018-10-26 | End: 2018-10-27 | Stop reason: HOSPADM

## 2018-10-26 RX ORDER — ONDANSETRON 2 MG/ML
4 INJECTION INTRAMUSCULAR; INTRAVENOUS EVERY 6 HOURS PRN
Status: DISCONTINUED | OUTPATIENT
Start: 2018-10-26 | End: 2018-10-27 | Stop reason: HOSPADM

## 2018-10-26 RX ORDER — FENTANYL CITRATE/PF 50 MCG/ML
12.5 SYRINGE (ML) INJECTION
Status: DISCONTINUED | OUTPATIENT
Start: 2018-10-26 | End: 2018-10-26 | Stop reason: HOSPADM

## 2018-10-26 RX ORDER — CLOPIDOGREL BISULFATE 75 MG/1
75 TABLET ORAL DAILY
Status: DISCONTINUED | OUTPATIENT
Start: 2018-10-27 | End: 2018-10-27 | Stop reason: HOSPADM

## 2018-10-26 RX ORDER — SODIUM CHLORIDE 9 MG/ML
100 INJECTION, SOLUTION INTRAVENOUS CONTINUOUS
Status: DISPENSED | OUTPATIENT
Start: 2018-10-26 | End: 2018-10-26

## 2018-10-26 RX ORDER — TRAMADOL HYDROCHLORIDE 50 MG/1
50 TABLET ORAL EVERY 6 HOURS PRN
Status: DISCONTINUED | OUTPATIENT
Start: 2018-10-26 | End: 2018-10-27 | Stop reason: HOSPADM

## 2018-10-26 RX ORDER — HYDRALAZINE HYDROCHLORIDE 20 MG/ML
5 INJECTION INTRAMUSCULAR; INTRAVENOUS
Status: DISCONTINUED | OUTPATIENT
Start: 2018-10-26 | End: 2018-10-27 | Stop reason: HOSPADM

## 2018-10-26 RX ORDER — HEPARIN SODIUM 1000 [USP'U]/ML
INJECTION, SOLUTION INTRAVENOUS; SUBCUTANEOUS AS NEEDED
Status: DISCONTINUED | OUTPATIENT
Start: 2018-10-26 | End: 2018-10-26 | Stop reason: SURG

## 2018-10-26 RX ORDER — ONDANSETRON 2 MG/ML
INJECTION INTRAMUSCULAR; INTRAVENOUS AS NEEDED
Status: DISCONTINUED | OUTPATIENT
Start: 2018-10-26 | End: 2018-10-26 | Stop reason: SURG

## 2018-10-26 RX ORDER — ONDANSETRON 2 MG/ML
4 INJECTION INTRAMUSCULAR; INTRAVENOUS ONCE AS NEEDED
Status: DISCONTINUED | OUTPATIENT
Start: 2018-10-26 | End: 2018-10-26 | Stop reason: HOSPADM

## 2018-10-26 RX ORDER — FENTANYL CITRATE 50 UG/ML
INJECTION, SOLUTION INTRAMUSCULAR; INTRAVENOUS AS NEEDED
Status: DISCONTINUED | OUTPATIENT
Start: 2018-10-26 | End: 2018-10-26 | Stop reason: SURG

## 2018-10-26 RX ORDER — FERROUS SULFATE 325(65) MG
325 TABLET ORAL 2 TIMES DAILY
Status: DISCONTINUED | OUTPATIENT
Start: 2018-10-26 | End: 2018-10-27 | Stop reason: HOSPADM

## 2018-10-26 RX ORDER — ASPIRIN 81 MG/1
81 TABLET, CHEWABLE ORAL DAILY
Status: DISCONTINUED | OUTPATIENT
Start: 2018-10-27 | End: 2018-10-27 | Stop reason: HOSPADM

## 2018-10-26 RX ORDER — CLOBETASOL PROPIONATE 0.5 MG/G
CREAM TOPICAL 2 TIMES DAILY
Status: DISCONTINUED | OUTPATIENT
Start: 2018-10-26 | End: 2018-10-27 | Stop reason: HOSPADM

## 2018-10-26 RX ORDER — SODIUM CHLORIDE 9 MG/ML
INJECTION, SOLUTION INTRAVENOUS CONTINUOUS PRN
Status: DISCONTINUED | OUTPATIENT
Start: 2018-10-26 | End: 2018-10-26 | Stop reason: SURG

## 2018-10-26 RX ADMIN — HYDRALAZINE HYDROCHLORIDE 5 MG: 20 INJECTION INTRAMUSCULAR; INTRAVENOUS at 16:52

## 2018-10-26 RX ADMIN — FENTANYL CITRATE 50 MCG: 50 INJECTION, SOLUTION INTRAMUSCULAR; INTRAVENOUS at 12:36

## 2018-10-26 RX ADMIN — FERROUS SULFATE TAB 325 MG (65 MG ELEMENTAL FE) 325 MG: 325 (65 FE) TAB at 21:07

## 2018-10-26 RX ADMIN — NEOSTIGMINE METHYLSULFATE 4 MG: 1 INJECTION, SOLUTION INTRAMUSCULAR; INTRAVENOUS; SUBCUTANEOUS at 14:55

## 2018-10-26 RX ADMIN — TRAMADOL HYDROCHLORIDE 50 MG: 50 TABLET, COATED ORAL at 21:52

## 2018-10-26 RX ADMIN — GLYCOPYRROLATE 0.6 MG: 0.2 INJECTION, SOLUTION INTRAMUSCULAR; INTRAVENOUS at 14:55

## 2018-10-26 RX ADMIN — HEPARIN SODIUM 7000 UNITS: 1000 INJECTION INTRAVENOUS; SUBCUTANEOUS at 13:05

## 2018-10-26 RX ADMIN — SODIUM CHLORIDE 100 ML/HR: 0.9 INJECTION, SOLUTION INTRAVENOUS at 18:34

## 2018-10-26 RX ADMIN — ROCURONIUM BROMIDE 30 MG: 10 INJECTION INTRAVENOUS at 12:37

## 2018-10-26 RX ADMIN — PRAVASTATIN SODIUM 20 MG: 20 TABLET ORAL at 21:07

## 2018-10-26 RX ADMIN — SODIUM CHLORIDE: 0.9 INJECTION, SOLUTION INTRAVENOUS at 09:33

## 2018-10-26 RX ADMIN — LIDOCAINE HYDROCHLORIDE 100 MG: 20 INJECTION, SOLUTION INTRAVENOUS at 12:36

## 2018-10-26 RX ADMIN — DEXAMETHASONE SODIUM PHOSPHATE 5 MG: 10 INJECTION INTRAMUSCULAR; INTRAVENOUS at 12:55

## 2018-10-26 RX ADMIN — ROCURONIUM BROMIDE 10 MG: 10 INJECTION INTRAVENOUS at 14:03

## 2018-10-26 RX ADMIN — HEPARIN SODIUM 2000 UNITS: 1000 INJECTION INTRAVENOUS; SUBCUTANEOUS at 13:29

## 2018-10-26 RX ADMIN — PHENYLEPHRINE HYDROCHLORIDE 50 MCG/MIN: 10 INJECTION INTRAVENOUS at 12:50

## 2018-10-26 RX ADMIN — ONDANSETRON 4 MG: 2 INJECTION INTRAMUSCULAR; INTRAVENOUS at 12:55

## 2018-10-26 RX ADMIN — PROPOFOL 150 MG: 10 INJECTION, EMULSION INTRAVENOUS at 12:36

## 2018-10-26 RX ADMIN — FENTANYL CITRATE 50 MCG: 50 INJECTION, SOLUTION INTRAMUSCULAR; INTRAVENOUS at 12:44

## 2018-10-26 RX ADMIN — GLYCOPYRROLATE 0.2 MG: 0.2 INJECTION, SOLUTION INTRAMUSCULAR; INTRAVENOUS at 12:55

## 2018-10-26 RX ADMIN — SODIUM CHLORIDE: 0.9 INJECTION, SOLUTION INTRAVENOUS at 12:44

## 2018-10-26 NOTE — OP NOTE
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 (HCC) [I70 222]    Procedure(s) (LRB):  1  Ultrasound-guided access of the left common femoral artery antegrade  2  Left lower extremity angiogram using limited contrast  3  Left below-knee popliteal artery atherectomy  4  Left anterior tibial artery atherectomy  5  Left tibioperoneal trunk and peroneal artery angioplasty using 2 mm x 40 mm balloon  6  Left below-knee popliteal artery angioplasty  7  Left anterior tibial artery angioplasty  8  Left lower extremity pharmacomechanical thrombectomy using Angiojet device and 14 mg of tPA        Specimen(s):  * No specimens in log *    Estimated Blood Loss:   50 mL    Drains:   None    Anesthesia Type:   General anesthesia via endotracheal intubation    Operative Indications: 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 ([facility] [address] ) [I70 222]    Patient has had rest pain for several months progressively getting worse  Two prior interventions have also been performed the 1st 1 was in 2018 very underwent atherectomy and angioplasty and stenting of the superficial femoral artery and angioplasty of the popliteal artery  He had complications of distal embolization despite the use of spider filter  He eventually recovered but had restenoses of his popliteal artery  And required repeat angioplasty of his popliteal artery  Risk of distal embolization with popliteal artery rectum he was high and so we just did a cutting balloon angioplasty at that time in 2018    Unfortunately within a few weeks of the procedure he had recurrent rest pain indicating restenoses  Patient was being managed with pain medications as any procedure would be fairly risky but he came back with worsening pain and hence it was decided to perform repeat intervention with the use of atherectomy  Risk of distal embolization and possible limb loss was discussed with him prior to procedure  Angiographic findings and radiographic interpretation of the test:  Left common femoral artery is patent but has diffuse calcific plaque that are not flow limiting  Profunda femoral artery is patent but has diffuse calcific lining  Superficial femoral arteries patent throughout its course including the previously stented portion  But it has significant irregular plaque throughout its length  Above knee popliteal artery is patent but has significant irregular plaque that is highly calcified  Below-knee popliteal artery has a near occlusive high-grade focal calcified stenosis in its distal portion  The proximal anterior tibial artery has a focal high-grade calcified stenosis  The remainder of the anterior tibial artery is heavily calcified and severely diseased  There are several areas of focal near occlusive stenosis and then it occludes in the distal 1/3 of the leg  The tibioperoneal trunk is patent but has a heavily calcified plaque at its origin  Peroneal artery is heavily diseased with several areas of near occlusive calcified stenosis  The entire arteries light with heavy calcification  Peroneal artery gives a an collateral that fills the tarsal artery which is the only artery going into the foot  The posterior tibial artery occludes a short distance after its origin  It is heavily calcified as well  Plantar arch is incomplete  Status post atherectomy and angioplasty there is excellent resolution of the stenosis of the popliteal artery    However there is evidence of distal embolization or thrombosis in the distal peroneal artery with no filling of the arteries of the foot  Status post balloon angioplasty and tPA pharmacomechanical therapy there is improvement but again no filling visualized within the foot  Complications:   Distal embolization and thrombosis of the sluggish outflow of left foot    Procedure and Technique:  Patient was brought to the hybrid room and placed in a supine position general anesthesia was induced via endotracheal intubation  Patient's bilateral groins were shaved prepped and draped in the usual standard sterile fashion  Using ultrasound guidance we punctured the left common femoral artery after marking the femoral head  This was punctured using micropuncture device  We then inserted a micro sheath into the left SFA after directing the wire into the left SFA  We then placed a [employer] wire into the distal left SFA and then placed a 5 [place] sheath into the left common femoral artery in an antegrade fashion  Patient was given 7000 units of intravenous heparin  Additional heparin was given during the case to achieve a therapeutic ACT  We then performed a left lower extremity angiogram using limited contrast   The area of heavily stenotic distal popliteal artery was identified as well an extremely poor tibial runoff which was heavily diseased was also identified  We exchanged to a 7 [place] by 30 cm long sheath  Using roadmap technique we cross the lesion using throughway wire  It then we performed atherectomy using a [employer] jet stream device 2 1 mm  The atherectomy was performed in the distal popliteal artery and extended into the anterior tibial artery origin  Following atherectomy were performed balloon angioplasty using a 3 mm x 60 mm balloon  Following which angiogram demonstrated no outflow  At this point repeat balloon angioplasty was performed and there was some improvement but very sluggish outflow  Patient was given additional intravenous heparin  4 mg of tPA was also injected    We then also additionally performed angioplasty of the origin of the TP trunk and the proximal peroneal artery using a 2 mm x 40 mm balloon  With all these maneuvers there was improvement in the outflow but there was still no filling of the pedal arteries  An Angiojet device, solent dista was advanced over an open or wire up to the proximal 1/3 of the peroneal artery  Beyond that due to heavy calcified occlusive stenosis we could not pass even the 1 mm [employer] Dista device  So at this point we pulse sprayed 10 mg of tPA mixed in 50 cc of saline  We then waited 5 minutes and perform repeat angiogram which demonstrated improvement in the outflow but again very sluggish filling of the pedal arch  This point has no further intervention could be performed due to very poor pedal outflow we pulled all sheaths and wires and close access using [employer] device  Protamine was not given  Pressure was held for 20 minutes for hemostasis  I was present for the entire procedure and A qualified resident physician was not available    Patient Disposition:  PACU  and guarded condition   Patient will be admitted for intravenous heparin and neurovascular monitoring  If no improvement in symptoms patient may require below-knee amputation      SIGNATURE: Lakisha Martinez MD  DATE: October 26, 2018  TIME: 3:30 PM

## 2018-10-26 NOTE — PROGRESS NOTES
Post-Op Check    Assessment: 80 y o  M  s/p Left CFA access for LLE angiogram w/ atherectomy of L BK pop and AT; Angioplasty to L TPT/BK pop/AT and then thrombectomy and thrombolysis with angiojet and tPA    Plan:  F/u PTT and restart VTE heparin  May be restarted on regular diet  Cont ASA and plavix  Dressing changes as needed to left groin      Subjective:  No complaints  Pain well controlled  Comfortable  Vitals:    10/26/18 1830   BP: 141/80   Pulse: 58   Resp: 16   Temp:    SpO2: 100%       PE:  Gen: NAD, AAOx3  CV: RRR  Pulm: no resp distress  Abd: Soft, non-distended, non-tender  Left groin with small amount of drainage  Improving  Hematoma  Soft  No dopplerable signals to Left foot  Intact motor  Good cap refill to toes       River Barber PGY3  General Surgery

## 2018-10-26 NOTE — H&P (VIEW-ONLY)
Assessment/Plan:    CKD (chronic kidney disease), stage III  Will plan for IV hydration prior to the angiogram to protect the kidneys  Will obtain a recent BUN creatinine prior to angiogram     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)  Rest pain and ulceration involving the left foot  This is a recurrent issue  Unfortunately has severe tibial disease and popliteal disease as well  He has stents in his superficial femoral artery that were placed about 6 months ago  I have reviewed the images myself from his previous to angiograms and also his Doppler studies  He will benefit from a left lower extremity intervention which would include a low contrast angiogram along with angioplasty stenting and possible atherectomy of the popliteal artery lesion  Risks of the procedure such as distal embolization were discussed with him today in the office  Consent is obtained  We should plan to do this in the next couple of weeks if possible  He should continue the aspirin and does not need to hold it for the procedure  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 (HCC)  There is no response of gabapentin and hence I have discontinued it  I have written him a prescription for tramadol to be used sparingly as needed for severe rest pain to help him till we get the angiogram procedure scheduled  Diagnoses and all orders for this visit:    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 (HCC)  -     traMADol (ULTRAM) 50 mg tablet;  Take 1 tablet (50 mg total) by mouth every 6 (six) hours as needed for moderate pain  -     silver sulfadiazine (SILVADENE,SSD) 1 % cream; Apply topically daily    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)    Severe peripheral arterial disease (HCC)    [facility] )    Other orders  -     Case request operating room; Standing  -     Type and screen; Future  -     Prepare RBC; Future  -     Basic metabolic panel; Future  -     CBC and Platelet; Future  -     Protime-INR; Future  -     HEMOGLOBIN A1C W/ EAG ESTIMATION; Future  -     EKG 12 lead; Future  -     XR chest pa & lateral; Future  -     Ambulatory referral to Cardiology; Future  -     Diet NPO; Sips with meds; Standing  -     Void on call to OR; Standing  -     Insert peripheral IV; Standing  -     Shower/scrub; Standing  -     Place sequential compression device; Standing               Patient ID: Iván Li is a 80 y o  male  Chief complaint: Pt was referred by podiatry for wound to left big toe and pain to left leg  Pt had AGRAM done 3/2018  Pt is c/o left leg/foot getting swollen  Pt denies use of compression  Pt is changing dressing daily  Pt is on ASA and plavix    Patient has undergone 2 prior endovascular interventions on his left lower extremity few months ago to improve the inflow  He had improvement in his rest pain but has now recurred since the past few weeks  It is difficult for him to fall asleep at night and he has to dangle his foot in the recliner to get some relief  It is a severe pain that is unrelenting  His toes also have feeling of numbness and pins and needles  The following portions of the patient's history were reviewed and updated as appropriate: allergies, current medications, past family history, past medical history, past social history, past surgical history and problem list     Review of Systems   Constitutional: Negative  HENT: Negative  Eyes: Negative  Respiratory: Negative  Cardiovascular: Positive for leg swelling  Gastrointestinal: Negative  Endocrine: Negative  Genitourinary: Negative  Musculoskeletal: Positive for back pain  Skin: Positive for wound  Allergic/Immunologic: Negative  Neurological: Negative  Hematological: Negative      Psychiatric/Behavioral: Positive for sleep disturbance  I have reviewed the review of systems as entered and made appropriate changes as necessary    Objective:      /80 (BP Location: Right arm, Patient Position: Sitting, Cuff Size: Adult)   Temp (!) 96 4 °F (35 8 °C) (Tympanic)   Ht 5' 10" (1 778 m)   Wt 72 6 kg (160 lb)   BMI 22 96 kg/m²          Physical Exam   Constitutional: He is oriented to person, place, and time  He appears well-developed and well-nourished  No distress  obese   HENT:   Head: Normocephalic and atraumatic  Cardiovascular: Normal rate, regular rhythm, normal heart sounds and intact distal pulses  Monophasic to biphasic left distal posterior tibial signal   Biphasic left popliteal signal   Pulmonary/Chest: Effort normal and breath sounds normal    Abdominal: Soft  Musculoskeletal: Normal range of motion  He exhibits edema  Neurological: He is alert and oriented to person, place, and time  Skin: Skin is warm and dry  He is not diaphoretic  Left foot has dependent rubor  There is a small ulceration between the 1st and 2nd toe on the medial aspect of the 2nd toe  It is about 3 mm in diameter involving the skin  Psychiatric: He has a normal mood and affect  His behavior is normal    Nursing note and vitals reviewed

## 2018-10-26 NOTE — ANESTHESIA PREPROCEDURE EVALUATION
Review of Systems/Medical History  Patient summary reviewed  Chart reviewed      Cardiovascular  Hyperlipidemia, Dysrhythmias ,    Pulmonary       GI/Hepatic       Kidney disease CKD,        Endo/Other     GYN       Hematology  Anemia anemia of chronic disease,     Musculoskeletal    Arthritis     Neurology   Psychology           Physical Exam    Airway    Mallampati score: I  TM Distance: >3 FB  Neck ROM: full     Dental       Cardiovascular      Pulmonary      Other Findings        Anesthesia Plan  ASA Score- 4     Anesthesia Type- general with ASA Monitors  Additional Monitors: arterial line  Airway Plan: ETT  Plan Factors-    Induction- intravenous  Postoperative Plan-     Informed Consent- Anesthetic plan and risks discussed with patient  I personally reviewed this patient with the CRNA  Discussed and agreed on the Anesthesia Plan with the CRNA  Adalberto Kumar

## 2018-10-26 NOTE — PERIOPERATIVE NURSING NOTE
Dr Pernell Green present and notified no pulses auscultated left foot  Dr Pernell Green changed left groin dressing

## 2018-10-26 NOTE — PERIOPERATIVE NURSING NOTE
Dr Santiago notified weak doppler present right posterior tib  and no pulses auscultated by doppler left foot

## 2018-10-26 NOTE — ANESTHESIA POSTPROCEDURE EVALUATION
Post-Op Assessment Note      CV Status:  Stable    Mental Status:  Alert and awake    Hydration Status:  Euvolemic    PONV Controlled:  Controlled    Airway Patency:  Patent    Post Op Vitals Reviewed: Yes          Staff: CRNA, Anesthesiologist           BP   130/67   Temp   97 7   Pulse  63   Resp   12   SpO2   100

## 2018-10-26 NOTE — INTERVAL H&P NOTE
H&P reviewed  After examining the patient I find no changes in the patients condition since the H&P had been written  Plan  For left leg angiogram with intervention  Risks of distal embolization with atherectomy were discussed

## 2018-10-26 NOTE — PERIOPERATIVE NURSING NOTE
Vascular resident at bedside  Redressed Left groin dressing  No hematoma present  Heparin gtt to remain on hold  Recheck PTT at 1900

## 2018-10-26 NOTE — ANESTHESIA PREPROCEDURE EVALUATION
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 of other part of foot ([address] )    ,  Pulmonary       GI/Hepatic            Endo/Other     GYN       Hematology   Musculoskeletal       Neurology   Psychology           Physical Exam    Airway    Mallampati score: I  TM Distance: >3 FB  Neck ROM: full     Dental       Cardiovascular      Pulmonary      Other Findings        Anesthesia Plan  ASA Score- 4     Anesthesia Type- general with ASA Monitors  Additional Monitors: arterial line  Airway Plan:         Plan Factors-    Induction- intravenous  Postoperative Plan-     Informed Consent- Anesthetic plan and risks discussed with patient  I personally reviewed this patient with the CRNA  Discussed and agreed on the Anesthesia Plan with the CRNA  Mary Jane Rowe

## 2018-10-27 VITALS
HEIGHT: 70 IN | TEMPERATURE: 98.5 F | BODY MASS INDEX: 21.3 KG/M2 | WEIGHT: 148.81 LBS | DIASTOLIC BLOOD PRESSURE: 50 MMHG | RESPIRATION RATE: 16 BRPM | HEART RATE: 75 BPM | SYSTOLIC BLOOD PRESSURE: 103 MMHG | OXYGEN SATURATION: 98 %

## 2018-10-27 LAB
ANION GAP SERPL CALCULATED.3IONS-SCNC: 6 MMOL/L (ref 4–13)
APTT PPP: 46 SECONDS (ref 24–36)
APTT PPP: >210 SECONDS (ref 24–36)
BUN SERPL-MCNC: 29 MG/DL (ref 5–25)
CALCIUM SERPL-MCNC: 7.9 MG/DL (ref 8.3–10.1)
CHLORIDE SERPL-SCNC: 104 MMOL/L (ref 100–108)
CO2 SERPL-SCNC: 25 MMOL/L (ref 21–32)
CREAT SERPL-MCNC: 1.5 MG/DL (ref 0.6–1.3)
ERYTHROCYTE [DISTWIDTH] IN BLOOD BY AUTOMATED COUNT: 13.3 % (ref 11.6–15.1)
GFR SERPL CREATININE-BSD FRML MDRD: 42 ML/MIN/1.73SQ M
GLUCOSE SERPL-MCNC: 107 MG/DL (ref 65–140)
HCT VFR BLD AUTO: 29.7 % (ref 36.5–49.3)
HGB BLD-MCNC: 9.7 G/DL (ref 12–17)
MCH RBC QN AUTO: 30.9 PG (ref 26.8–34.3)
MCHC RBC AUTO-ENTMCNC: 32.7 G/DL (ref 31.4–37.4)
MCV RBC AUTO: 95 FL (ref 82–98)
PLATELET # BLD AUTO: 142 THOUSANDS/UL (ref 149–390)
PMV BLD AUTO: 10.9 FL (ref 8.9–12.7)
POTASSIUM SERPL-SCNC: 4.3 MMOL/L (ref 3.5–5.3)
RBC # BLD AUTO: 3.14 MILLION/UL (ref 3.88–5.62)
SODIUM SERPL-SCNC: 135 MMOL/L (ref 136–145)
WBC # BLD AUTO: 8.75 THOUSAND/UL (ref 4.31–10.16)

## 2018-10-27 PROCEDURE — 80048 BASIC METABOLIC PNL TOTAL CA: CPT | Performed by: SURGERY

## 2018-10-27 PROCEDURE — 99231 SBSQ HOSP IP/OBS SF/LOW 25: CPT | Performed by: SURGERY

## 2018-10-27 PROCEDURE — 85730 THROMBOPLASTIN TIME PARTIAL: CPT | Performed by: SURGERY

## 2018-10-27 PROCEDURE — 85027 COMPLETE CBC AUTOMATED: CPT | Performed by: SURGERY

## 2018-10-27 RX ADMIN — CYANOCOBALAMIN TAB 500 MCG 1000 MCG: 500 TAB at 08:55

## 2018-10-27 RX ADMIN — CLOPIDOGREL BISULFATE 75 MG: 75 TABLET ORAL at 08:55

## 2018-10-27 RX ADMIN — SILVER SULFADIAZINE: 10 CREAM TOPICAL at 09:02

## 2018-10-27 RX ADMIN — Medication 1000 MG: at 08:55

## 2018-10-27 RX ADMIN — TRAMADOL HYDROCHLORIDE 50 MG: 50 TABLET, COATED ORAL at 08:57

## 2018-10-27 RX ADMIN — FERROUS SULFATE TAB 325 MG (65 MG ELEMENTAL FE) 325 MG: 325 (65 FE) TAB at 08:56

## 2018-10-27 RX ADMIN — HEPARIN SODIUM AND DEXTROSE 18 UNITS/KG/HR: 10000; 5 INJECTION INTRAVENOUS at 01:04

## 2018-10-27 RX ADMIN — Medication 81 MG: at 08:56

## 2018-10-27 NOTE — SOCIAL WORK
Script for Xarelto was sent to [facility]  Patient can get free 30 day and then cost is $32 90 for refill   Informed patient, RN and [facility]

## 2018-10-27 NOTE — NURSING NOTE
Spoke with Michaelle Olivas from vascular about strarting heparin gtt  PTT >210 @ 1600  PTT >210 @ 1900  Order to get PTT @ [facility] and follow heparin protocol

## 2018-10-27 NOTE — PLAN OF CARE
Problem: Prexisting or High Potential for Compromised Skin Integrity  Goal: Skin integrity is maintained or improved  INTERVENTIONS:  - Identify patients at risk for skin breakdown  - Assess and monitor skin integrity  - Assess and monitor nutrition and hydration status  - Monitor labs (i e  albumin)  - Assess for incontinence   - Turn and reposition patient  - Assist with mobility/ambulation  - Relieve pressure over bony prominences  - Avoid friction and shearing  - Provide appropriate hygiene as needed including keeping skin clean and dry  - Evaluate need for skin moisturizer/barrier cream  - Collaborate with interdisciplinary team (i e  Nutrition, Rehabilitation, etc )   - Patient/family teaching   Outcome: Progressing      Problem: SKIN/TISSUE INTEGRITY - ADULT  Goal: Skin integrity remains intact  INTERVENTIONS  - Identify patients at risk for skin breakdown  - Assess and monitor skin integrity  - Assess and monitor nutrition and hydration status  - Monitor labs (i e  albumin)  - Assess for incontinence   - Turn and reposition patient  - Assist with mobility/ambulation  - Relieve pressure over bony prominences  - Avoid friction and shearing  - Provide appropriate hygiene as needed including keeping skin clean and dry  - Evaluate need for skin moisturizer/barrier cream  - Collaborate with interdisciplinary team (i e  Nutrition, Rehabilitation, etc )   - Patient/family teaching  Outcome: Progressing    Goal: Incision(s), wounds(s) or drain site(s) healing without S/S of infection  INTERVENTIONS  - Assess and document risk factors for skin impairment   - Assess and document dressing, incision, wound bed, drain sites and surrounding tissue  - Initiate Nutrition services consult and/or wound management as needed  Outcome: Progressing    Goal: Oral mucous membranes remain intact  INTERVENTIONS  - Assess oral mucosa and hygiene practices  - Implement preventative oral hygiene regimen  - Implement oral medicated treatments as ordered  - Initiate Nutrition services referral as needed  Outcome: Progressing      Problem: MUSCULOSKELETAL - ADULT  Goal: Maintain or return mobility to safest level of function  INTERVENTIONS:  - Assess patient's ability to carry out ADLs; assess patient's baseline for ADL function and identify physical deficits which impact ability to perform ADLs (bathing, care of mouth/teeth, toileting, grooming, dressing, etc )  - Assess/evaluate cause of self-care deficits   - Assess range of motion  - Assess patient's mobility; develop plan if impaired  - Assess patient's need for assistive devices and provide as appropriate  - Encourage maximum independence but intervene and supervise when necessary  - Involve family in performance of ADLs  - Assess for home care needs following discharge   - Request OT consult to assist with ADL evaluation and planning for discharge  - Provide patient education as appropriate  Outcome: Progressing    Goal: Maintain proper alignment of affected body part  INTERVENTIONS:  - Support, maintain and protect limb and body alignment  - Provide pt/fam with appropriate education  Outcome: Progressing      Problem: PAIN - ADULT  Goal: Verbalizes/displays adequate comfort level or baseline comfort level  Interventions:  - Encourage patient to monitor pain and request assistance  - Assess pain using appropriate pain scale  - Administer analgesics based on type and severity of pain and evaluate response  - Implement non-pharmacological measures as appropriate and evaluate response  - Consider cultural and social influences on pain and pain management  - Notify physician/advanced practitioner if interventions unsuccessful or patient reports new pain  Outcome: Progressing      Problem: SAFETY ADULT  Goal: Patient will remain free of falls  INTERVENTIONS:  - Assess patient frequently for physical needs  -  Identify cognitive and physical deficits and behaviors that affect risk of falls    - [place] fall precautions as indicated by assessment   - Educate patient/family on patient safety including physical limitations  - Instruct patient to call for assistance with activity based on assessment  - Modify environment to reduce risk of injury  - Consider OT/PT consult to assist with strengthening/mobility  Outcome: Progressing    Goal: Maintain or return to baseline ADL function  INTERVENTIONS:  -  Assess patient's ability to carry out ADLs; assess patient's baseline for ADL function and identify physical deficits which impact ability to perform ADLs (bathing, care of mouth/teeth, toileting, grooming, dressing, etc )  - Assess/evaluate cause of self-care deficits   - Assess range of motion  - Assess patient's mobility; develop plan if impaired  - Assess patient's need for assistive devices and provide as appropriate  - Encourage maximum independence but intervene and supervise when necessary  ¯ Involve family in performance of ADLs  ¯ Assess for home care needs following discharge   ¯ Request OT consult to assist with ADL evaluation and planning for discharge  ¯ Provide patient education as appropriate  Outcome: Progressing    Goal: Maintain or return mobility status to optimal level  INTERVENTIONS:  - Assess patient's baseline mobility status (ambulation, transfers, stairs, etc )    - Identify cognitive and physical deficits and behaviors that affect mobility  - Identify mobility aids required to assist with transfers and/or ambulation (gait belt, sit-to-stand, lift, walker, cane, etc )  - [place] fall precautions as indicated by assessment  - Record patient progress and toleration of activity level on Mobility SBAR; progress patient to next Phase/Stage  - Instruct patient to call for assistance with activity based on assessment  - Request Rehabilitation consult to assist with strengthening/weightbearing, etc   Outcome: Progressing

## 2018-10-27 NOTE — PROGRESS NOTES
Progress Note - Vascular Surgery   Candi Golden 80 y o  male MRN: [id number]  Unit/Bed#: [facility] 501-01 Encounter: [id number]    Assessment:  84M who presented 10/26/18 for scheduled left leg angiogram with intervention  He had no acute events overnight and had just a bit of oozing from the puncture site in the immediate post-op period  Plan:  -f/u PTT, restart heparin drip as able while inpatient   -Continue ASA/plavix  -Diet as tolerated  -discharge planning; likely today    Subjective/Objective   Chief Complaint: none offered    Subjective: leg feels ok    Objective:   Blood pressure 158/64, pulse 80, temperature 98 4 °F (36 9 °C), temperature source Oral, resp  rate 22, height 5' 10" (1 778 m), weight 67 5 kg (148 lb 13 oz), SpO2 99 %  ,Body mass index is 21 35 kg/m²  Intake/Output Summary (Last 24 hours) at 10/26/18 2351  Last data filed at 10/26/18 2233   Gross per 24 hour   Intake          1298 33 ml   Output              250 ml   Net          1048 33 ml       Invasive Devices     Peripheral Intravenous Line            Peripheral IV 10/26/18 Left Forearm less than 1 day    Peripheral IV 10/26/18 Right Wrist less than 1 day                Physical Exam:   General: No acute distress  HEENT: Normocephalic, atraumatic  Neck: Normal ROM  No tracheal deviation  Cardio: Normal rate, regular rhythm  Pulm: Normal respiratory effort  Abdomen: Soft, non-tender, non-distended  Extremities: PAL, No edema  Left groin site CDI, with small hematoma  Left foot with no Doppler signals, but is motor/sensation intact  Neuro: Cranial nerves II-XII intact  Psych: Normal affect        Lab, Imaging and other studies:I have personally reviewed pertinent lab results      VTE Pharmacologic Prophylaxis: Heparin  VTE Mechanical Prophylaxis: sequential compression device

## 2018-10-27 NOTE — DISCHARGE SUMMARY
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 ([address] ) [[address] [id number]]    Discharge Diagnosis: PAD, s/p LLE arteriogram, L BK popliteal and AT arthrectomy, tibioperoneal trunk/ BK popliteal/ anterior tibial angioplasty, LLE angiojet and tPA    Resolved Problems  Date Reviewed: 10/26/2018    None          Attending: Dr Teto Garcia (admitting), Dr Gisela Guerrero (discharging)    Consulting Physician(s): n/a    Procedures Performed: 10/26- LLE angiogram, Left below knee popliteal artery arthrectomy, left anterior tibial artery atherectomy, left tibioperoneal trunk and peroneal artery angioplasty using 2 x 40 mm balloon, left below-knee popliteal artery angioplasty, left anterior tibial artery angioplasty, and left lower extremity pharmacomechanical thrombectomy using Angiojet device and tPA--Dr Huyen Kim    Pathology: [facility] Course:     Patient is an 40-year-old male, with history of left lower extremity rest pain and nonhealing ulcers of the left foot, who was admitted to the hospital on 10/26 for left lower extremity angiogram with intervention  Had history of 2 prior interventions, the first being in February 2018, in which he underwent arthrectomy and angioplasty and stenting of the superficial femoral artery and angioplasty of the popliteal artery, with complications of distal embolization despite these spider filter  His 2nd procedure was in March 2018, with cutting balloon angioplasty  Within a few weeks of that procedure, he had recurrent rest pain indicating restenoses    During this admission angiogram, the following was performed:  Left below knee popliteal artery arthrectomy, left anterior tibial artery atherectomy, left tibioperoneal trunk and peroneal artery angioplasty using 2 x 40 mm balloon, left below-knee popliteal artery angioplasty, left anterior tibial artery angioplasty, and left lower extremity pharmacomechanical thrombectomy using Angiojet device and tPA  Patient tolerated procedure well  At the end of procedure, there was evidence of distal embolization or thrombosis in the distal peroneal artery, with no filling of the arteries of the foot  Postprocedure, neither DP nor PT Doppler signals were identified in the left foot  No hematoma was identified in left groin  Patient was started on a therapeutic heparin drip, resumed a normal diet, and continue taking his aspirin and Plavix  On 10/27, patient was again examined, with left weak monophasic PT signal and no left DP signal   Left groin access was clean, dry, and intact, with no hematoma  The patient was discharged home in stable condition on Xarelto for anticoagulation  It was explained to the patient and his family that it was unlikely that there are further revascularization options given the severity of calcification and disease in his left lower extremity  Patient was instructed to follow up withVascular Surgery in the office on 11/06/2018  Condition at Discharge: good     Discharge instructions/Information to patient and family:   See after visit summary for information provided to patient and family  Provisions for Follow-Up Care:  See after visit summary for information related to follow-up care and any pertinent home health orders  Disposition: Home        Planned Readmission: No    Discharge Statement   I spent 30 minutes discharging the patient  This time was spent on the day of discharge  I had direct contact with the patient on the day of discharge  Additional documentation is required if more than 30 minutes were spent on discharge       Discharge Medications:  See after visit summary for reconciled discharge medications provided to patient and family

## 2018-10-27 NOTE — DISCHARGE INSTRUCTIONS
DISCHARGE INSTRUCTIONS  ARTERIOGRAM/ANGIOPLASTY/STENT    Following discharge from the hospital, you may have some questions about your procedure, your activities or your general condition  These instructions may answer some of your questions and help you adjust during the first few weeks following your operation  ACTIVITY: The evening following the procedure you should be sure someone remains with you until the next morning  Rest as much as possible, sitting, lying or reclining  Use the stairs as little as possible  The following day, limit your activity to walking  Avoid stooping or heavy lifting (no more than 30 lbs) for the first three days  Walking up steps and normal activities may be resumed as you feel ready  You should not drive a car for at least two days following discharge from the hospital  You may ride in a car  If you have any questions regarding a particular activity, please discuss with your doctor or nurse before you are discharged  DIET:  Resume your normal diet  Try to eat low fat and low cholesterol foods  Drink more liquids than usual for the next 24 hours  INCISION: Your doctor may have chosen to use a type of adhesive glue, to close your incision  The glue is used to cover the incision, assist in closure, and prevent contamination  This adhesive will darken and peel away on its own within one to two weeks  You may shower after the procedure, but do not scrub the incision  Sitting in a tub is not recommended for the two days following the procedure or if you have any open wounds  It is normal to have some bruising, swelling or mild discoloration around the incision  IF increasing redness or pain develops, call our office immediately  If present, you may remove the band-aid or steri-strips over your wound after two days  If you notice any active bleeding at the site, apply pressure to the site and call our office ([phone])      FOLLOW UP STUDIES:  Your doctor will discuss whether further treatments or follow-up studies are necessary at your first post procedure visit  PLEASE CALL THE OFFICE IF YOU HAVE ANY QUESTIONS  [phone] [facility] [phone]  [address] [place] [address] , [address], [place], [address]  [address], [place], [address]  [address], [place], [address]  [address] [facility], [facility], [address], [facility], [address]    [address], [address]  [address] [facility], [facility] [address]   [address] [facility], [address] [facility], [address] [facility]  [address] [facility], [address] [place] [address], [place] [address]

## 2018-10-27 NOTE — SOCIAL WORK
Met with the patient to complete assessment and explain role of CM  He lives with his wife in a one floor home with two ARMIN  Patient is independent with care and uses a quad cane  He does not drive due to low vision and his wife transports for all needs  Patient has no [facility] or other community services  He denies MH and D&A treatment  PCP is Dr Leslye Lowery  He has prescription coverage and uses [facility] [place]  Patient reports having a Living Will and was asked to provide a copy  CM reviewed d/c planning process including the following: identifying help at home, patient preference for d/c planning needs, Discharge Lounge, [facility] Meds to Bed program, availability of treatment team to discuss questions or concerns patient and/or family may have regarding understanding medications and recognizing signs and symptoms once discharged  CM also encouraged patient to follow up with all recommended appointments after discharge  Patient advised of importance for patient and family to participate in managing patients medical well being  Patient/caregiver received discharge checklist  Content reviewed  Patient/caregiver encouraged to participate in discharge plan of care prior to discharge home

## 2018-10-28 ENCOUNTER — HOSPITAL ENCOUNTER (EMERGENCY)
Facility: HOSPITAL | Age: 83
End: 2018-10-29
Attending: EMERGENCY MEDICINE
Payer: COMMERCIAL

## 2018-10-28 DIAGNOSIS — I60.9 SUBARACHNOID HEMORRHAGE (HCC): Primary | ICD-10-CM

## 2018-10-28 DIAGNOSIS — S00.83XA CONTUSION OF FOREHEAD, INITIAL ENCOUNTER: ICD-10-CM

## 2018-10-28 DIAGNOSIS — I73.9 PERIPHERAL ARTERY DISEASE (HCC): ICD-10-CM

## 2018-10-28 DIAGNOSIS — Z79.01 ANTICOAGULATED: ICD-10-CM

## 2018-10-28 DIAGNOSIS — R55 SYNCOPE AND COLLAPSE: ICD-10-CM

## 2018-10-28 PROCEDURE — 93005 ELECTROCARDIOGRAM TRACING: CPT

## 2018-10-28 PROCEDURE — 99285 EMERGENCY DEPT VISIT HI MDM: CPT

## 2018-10-29 ENCOUNTER — TRANSITIONAL CARE MANAGEMENT (OUTPATIENT)
Dept: FAMILY MEDICINE CLINIC | Facility: CLINIC | Age: 83
End: 2018-10-29

## 2018-10-29 ENCOUNTER — APPOINTMENT (EMERGENCY)
Dept: CT IMAGING | Facility: HOSPITAL | Age: 83
End: 2018-10-29
Payer: COMMERCIAL

## 2018-10-29 ENCOUNTER — APPOINTMENT (INPATIENT)
Dept: RADIOLOGY | Facility: HOSPITAL | Age: 83
DRG: 083 | End: 2018-10-29
Payer: COMMERCIAL

## 2018-10-29 ENCOUNTER — TELEPHONE (OUTPATIENT)
Dept: FAMILY MEDICINE CLINIC | Facility: CLINIC | Age: 83
End: 2018-10-29

## 2018-10-29 ENCOUNTER — HOSPITAL ENCOUNTER (INPATIENT)
Facility: HOSPITAL | Age: 83
LOS: 1 days | Discharge: HOME | DRG: 083 | End: 2018-10-30
Attending: SURGERY | Admitting: SURGERY
Payer: COMMERCIAL

## 2018-10-29 ENCOUNTER — APPOINTMENT (EMERGENCY)
Dept: RADIOLOGY | Facility: HOSPITAL | Age: 83
End: 2018-10-29
Payer: COMMERCIAL

## 2018-10-29 VITALS
SYSTOLIC BLOOD PRESSURE: 149 MMHG | OXYGEN SATURATION: 99 % | DIASTOLIC BLOOD PRESSURE: 72 MMHG | BODY MASS INDEX: 23 KG/M2 | RESPIRATION RATE: 16 BRPM | TEMPERATURE: 99 F | WEIGHT: 160.27 LBS | HEART RATE: 70 BPM
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
## 2018-10-29 DIAGNOSIS — N18.30 CKD (CHRONIC KIDNEY DISEASE), STAGE III (HCC): ICD-10-CM

## 2018-10-29 DIAGNOSIS — I70.222 ATHEROSCLEROSIS OF NATIVE ARTERY OF LEFT LOWER EXTREMITY WITH REST PAIN (HCC): ICD-10-CM

## 2018-10-29 DIAGNOSIS — I60.9 SUBARACHNOID HEMORRHAGE (HCC): Primary | ICD-10-CM

## 2018-10-29 DIAGNOSIS — E78.5 HYPERLIPIDEMIA, UNSPECIFIED HYPERLIPIDEMIA TYPE: ICD-10-CM

## 2018-10-29 PROBLEM — R41.3 SHORT-TERM MEMORY LOSS: Status: ACTIVE | Noted: 2018-10-29

## 2018-10-29 PROBLEM — R53.81 PHYSICAL DECONDITIONING: Status: ACTIVE | Noted: 2018-10-29

## 2018-10-29 PROBLEM — R26.2 AMBULATORY DYSFUNCTION: Status: ACTIVE | Noted: 2018-10-29

## 2018-10-29 LAB
ABO GROUP BLD: NORMAL
ALBUMIN SERPL BCP-MCNC: 3.2 G/DL (ref 3.5–5)
ALP SERPL-CCNC: 53 U/L (ref 46–116)
ALT SERPL W P-5'-P-CCNC: 15 U/L (ref 12–78)
ANION GAP SERPL CALCULATED.3IONS-SCNC: 7 MMOL/L (ref 4–13)
ANION GAP SERPL CALCULATED.3IONS-SCNC: 7 MMOL/L (ref 4–13)
APTT PPP: 61 SECONDS (ref 24–36)
APTT PPP: 61 SECONDS (ref 24–36)
APTT PPP: 62 SECONDS (ref 24–36)
AST SERPL W P-5'-P-CCNC: 18 U/L (ref 5–45)
ATRIAL RATE: 65 BPM
BASOPHILS # BLD AUTO: 0.01 THOUSANDS/ΜL (ref 0–0.1)
BASOPHILS # BLD AUTO: 0.02 THOUSANDS/ΜL (ref 0–0.1)
BASOPHILS NFR BLD AUTO: 0 % (ref 0–1)
BASOPHILS NFR BLD AUTO: 0 % (ref 0–1)
BILIRUB SERPL-MCNC: 1.1 MG/DL (ref 0.2–1)
BLD GP AB SCN SERPL QL: NEGATIVE
BUN SERPL-MCNC: 20 MG/DL (ref 5–25)
BUN SERPL-MCNC: 20 MG/DL (ref 5–25)
CA-I BLD-SCNC: 1.13 MMOL/L (ref 1.12–1.32)
CALCIUM SERPL-MCNC: 8.2 MG/DL (ref 8.3–10.1)
CALCIUM SERPL-MCNC: 8.8 MG/DL (ref 8.3–10.1)
CHLORIDE SERPL-SCNC: 100 MMOL/L (ref 100–108)
CHLORIDE SERPL-SCNC: 99 MMOL/L (ref 100–108)
CO2 SERPL-SCNC: 27 MMOL/L (ref 21–32)
CO2 SERPL-SCNC: 27 MMOL/L (ref 21–32)
CREAT SERPL-MCNC: 1.24 MG/DL (ref 0.6–1.3)
CREAT SERPL-MCNC: 1.36 MG/DL (ref 0.6–1.3)
EOSINOPHIL # BLD AUTO: 0.02 THOUSAND/ΜL (ref 0–0.61)
EOSINOPHIL # BLD AUTO: 0.04 THOUSAND/ΜL (ref 0–0.61)
EOSINOPHIL NFR BLD AUTO: 0 % (ref 0–6)
EOSINOPHIL NFR BLD AUTO: 0 % (ref 0–6)
ERYTHROCYTE [DISTWIDTH] IN BLOOD BY AUTOMATED COUNT: 13.2 % (ref 11.6–15.1)
ERYTHROCYTE [DISTWIDTH] IN BLOOD BY AUTOMATED COUNT: 13.2 % (ref 11.6–15.1)
GFR SERPL CREATININE-BSD FRML MDRD: 47 ML/MIN/1.73SQ M
GFR SERPL CREATININE-BSD FRML MDRD: 53 ML/MIN/1.73SQ M
GLUCOSE SERPL-MCNC: 110 MG/DL (ref 65–140)
GLUCOSE SERPL-MCNC: 129 MG/DL (ref 65–140)
GLUCOSE SERPL-MCNC: 149 MG/DL (ref 65–140)
HCT VFR BLD AUTO: 28 % (ref 36.5–49.3)
HCT VFR BLD AUTO: 32 % (ref 36.5–49.3)
HGB BLD-MCNC: 10.5 G/DL (ref 12–17)
HGB BLD-MCNC: 9.3 G/DL (ref 12–17)
IMM GRANULOCYTES # BLD AUTO: 0.05 THOUSAND/UL (ref 0–0.2)
IMM GRANULOCYTES # BLD AUTO: 0.06 THOUSAND/UL (ref 0–0.2)
IMM GRANULOCYTES NFR BLD AUTO: 0 % (ref 0–2)
IMM GRANULOCYTES NFR BLD AUTO: 1 % (ref 0–2)
INR PPP: 1.74 (ref 0.86–1.17)
INR PPP: 2.13 (ref 0.86–1.17)
INR PPP: 2.83 (ref 0.86–1.17)
LYMPHOCYTES # BLD AUTO: 1.04 THOUSANDS/ΜL (ref 0.6–4.47)
LYMPHOCYTES # BLD AUTO: 1.38 THOUSANDS/ΜL (ref 0.6–4.47)
LYMPHOCYTES NFR BLD AUTO: 11 % (ref 14–44)
LYMPHOCYTES NFR BLD AUTO: 8 % (ref 14–44)
MAGNESIUM SERPL-MCNC: 1.5 MG/DL (ref 1.6–2.6)
MCH RBC QN AUTO: 31 PG (ref 26.8–34.3)
MCH RBC QN AUTO: 31 PG (ref 26.8–34.3)
MCHC RBC AUTO-ENTMCNC: 32.8 G/DL (ref 31.4–37.4)
MCHC RBC AUTO-ENTMCNC: 33.2 G/DL (ref 31.4–37.4)
MCV RBC AUTO: 93 FL (ref 82–98)
MCV RBC AUTO: 94 FL (ref 82–98)
MONOCYTES # BLD AUTO: 0.99 THOUSAND/ΜL (ref 0.17–1.22)
MONOCYTES # BLD AUTO: 1.01 THOUSAND/ΜL (ref 0.17–1.22)
MONOCYTES NFR BLD AUTO: 8 % (ref 4–12)
MONOCYTES NFR BLD AUTO: 8 % (ref 4–12)
NEUTROPHILS # BLD AUTO: 10.09 THOUSANDS/ΜL (ref 1.85–7.62)
NEUTROPHILS # BLD AUTO: 10.28 THOUSANDS/ΜL (ref 1.85–7.62)
NEUTS SEG NFR BLD AUTO: 80 % (ref 43–75)
NEUTS SEG NFR BLD AUTO: 84 % (ref 43–75)
NRBC BLD AUTO-RTO: 0 /100 WBCS
NRBC BLD AUTO-RTO: 0 /100 WBCS
NT-PROBNP SERPL-MCNC: 770 PG/ML
P AXIS: 31 DEGREES
PHOSPHATE SERPL-MCNC: 2.5 MG/DL (ref 2.3–4.1)
PLATELET # BLD AUTO: 117 THOUSANDS/UL (ref 149–390)
PLATELET # BLD AUTO: 130 THOUSANDS/UL (ref 149–390)
PMV BLD AUTO: 11.3 FL (ref 8.9–12.7)
PMV BLD AUTO: 11.3 FL (ref 8.9–12.7)
POTASSIUM SERPL-SCNC: 3.6 MMOL/L (ref 3.5–5.3)
POTASSIUM SERPL-SCNC: 3.8 MMOL/L (ref 3.5–5.3)
PR INTERVAL: 266 MS
PROT SERPL-MCNC: 6.9 G/DL (ref 6.4–8.2)
PROTHROMBIN TIME: 20.4 SECONDS (ref 11.8–14.2)
PROTHROMBIN TIME: 23.9 SECONDS (ref 11.8–14.2)
PROTHROMBIN TIME: 29.4 SECONDS (ref 11.8–14.2)
QRS AXIS: -48 DEGREES
QRSD INTERVAL: 146 MS
QT INTERVAL: 438 MS
QTC INTERVAL: 455 MS
RBC # BLD AUTO: 3 MILLION/UL (ref 3.88–5.62)
RBC # BLD AUTO: 3.39 MILLION/UL (ref 3.88–5.62)
RH BLD: POSITIVE
SODIUM SERPL-SCNC: 133 MMOL/L (ref 136–145)
SODIUM SERPL-SCNC: 134 MMOL/L (ref 136–145)
SPECIMEN EXPIRATION DATE: NORMAL
T WAVE AXIS: 23 DEGREES
TROPONIN I SERPL-MCNC: <0.02 NG/ML
TSH SERPL DL<=0.05 MIU/L-ACNC: 0.77 UIU/ML (ref 0.36–3.74)
VENTRICULAR RATE: 65 BPM
WBC # BLD AUTO: 12.42 THOUSAND/UL (ref 4.31–10.16)
WBC # BLD AUTO: 12.57 THOUSAND/UL (ref 4.31–10.16)

## 2018-10-29 PROCEDURE — 85610 PROTHROMBIN TIME: CPT | Performed by: EMERGENCY MEDICINE

## 2018-10-29 PROCEDURE — 82330 ASSAY OF CALCIUM: CPT | Performed by: SURGERY

## 2018-10-29 PROCEDURE — 85610 PROTHROMBIN TIME: CPT | Performed by: SURGERY

## 2018-10-29 PROCEDURE — 97163 PT EVAL HIGH COMPLEX 45 MIN: CPT

## 2018-10-29 PROCEDURE — 86901 BLOOD TYPING SEROLOGIC RH(D): CPT | Performed by: EMERGENCY MEDICINE

## 2018-10-29 PROCEDURE — 84443 ASSAY THYROID STIM HORMONE: CPT | Performed by: EMERGENCY MEDICINE

## 2018-10-29 PROCEDURE — 84484 ASSAY OF TROPONIN QUANT: CPT | Performed by: EMERGENCY MEDICINE

## 2018-10-29 PROCEDURE — G8988 SELF CARE GOAL STATUS: HCPCS

## 2018-10-29 PROCEDURE — 99232 SBSQ HOSP IP/OBS MODERATE 35: CPT | Performed by: SURGERY

## 2018-10-29 PROCEDURE — 99285 EMERGENCY DEPT VISIT HI MDM: CPT

## 2018-10-29 PROCEDURE — 82948 REAGENT STRIP/BLOOD GLUCOSE: CPT

## 2018-10-29 PROCEDURE — 84100 ASSAY OF PHOSPHORUS: CPT | Performed by: SURGERY

## 2018-10-29 PROCEDURE — 71045 X-RAY EXAM CHEST 1 VIEW: CPT

## 2018-10-29 PROCEDURE — 80053 COMPREHEN METABOLIC PANEL: CPT | Performed by: EMERGENCY MEDICINE

## 2018-10-29 PROCEDURE — 70450 CT HEAD/BRAIN W/O DYE: CPT

## 2018-10-29 PROCEDURE — 93010 ELECTROCARDIOGRAM REPORT: CPT | Performed by: INTERNAL MEDICINE

## 2018-10-29 PROCEDURE — 99223 1ST HOSP IP/OBS HIGH 75: CPT | Performed by: NEUROLOGICAL SURGERY

## 2018-10-29 PROCEDURE — 86850 RBC ANTIBODY SCREEN: CPT | Performed by: EMERGENCY MEDICINE

## 2018-10-29 PROCEDURE — C9132 KCENTRA, PER I.U.: HCPCS | Performed by: EMERGENCY MEDICINE

## 2018-10-29 PROCEDURE — G8989 SELF CARE D/C STATUS: HCPCS

## 2018-10-29 PROCEDURE — 99291 CRITICAL CARE FIRST HOUR: CPT | Performed by: SURGERY

## 2018-10-29 PROCEDURE — 85025 COMPLETE CBC W/AUTO DIFF WBC: CPT | Performed by: EMERGENCY MEDICINE

## 2018-10-29 PROCEDURE — 85730 THROMBOPLASTIN TIME PARTIAL: CPT | Performed by: EMERGENCY MEDICINE

## 2018-10-29 PROCEDURE — 97167 OT EVAL HIGH COMPLEX 60 MIN: CPT

## 2018-10-29 PROCEDURE — G8980 MOBILITY D/C STATUS: HCPCS

## 2018-10-29 PROCEDURE — 80048 BASIC METABOLIC PNL TOTAL CA: CPT | Performed by: SURGERY

## 2018-10-29 PROCEDURE — G8987 SELF CARE CURRENT STATUS: HCPCS

## 2018-10-29 PROCEDURE — 83735 ASSAY OF MAGNESIUM: CPT | Performed by: SURGERY

## 2018-10-29 PROCEDURE — G8978 MOBILITY CURRENT STATUS: HCPCS

## 2018-10-29 PROCEDURE — 96374 THER/PROPH/DIAG INJ IV PUSH: CPT

## 2018-10-29 PROCEDURE — 85025 COMPLETE CBC W/AUTO DIFF WBC: CPT | Performed by: SURGERY

## 2018-10-29 PROCEDURE — 86900 BLOOD TYPING SEROLOGIC ABO: CPT | Performed by: EMERGENCY MEDICINE

## 2018-10-29 PROCEDURE — 99222 1ST HOSP IP/OBS MODERATE 55: CPT | Performed by: PHYSICIAN ASSISTANT

## 2018-10-29 PROCEDURE — 72125 CT NECK SPINE W/O DYE: CPT

## 2018-10-29 PROCEDURE — 85730 THROMBOPLASTIN TIME PARTIAL: CPT | Performed by: SURGERY

## 2018-10-29 PROCEDURE — 36415 COLL VENOUS BLD VENIPUNCTURE: CPT | Performed by: EMERGENCY MEDICINE

## 2018-10-29 PROCEDURE — G8979 MOBILITY GOAL STATUS: HCPCS

## 2018-10-29 PROCEDURE — 83880 ASSAY OF NATRIURETIC PEPTIDE: CPT | Performed by: EMERGENCY MEDICINE

## 2018-10-29 RX ORDER — AMOXICILLIN 250 MG
1 CAPSULE ORAL
Status: DISCONTINUED | OUTPATIENT
Start: 2018-10-29 | End: 2018-10-30 | Stop reason: HOSPADM

## 2018-10-29 RX ORDER — ACETAMINOPHEN 325 MG/1
650 TABLET ORAL EVERY 8 HOURS SCHEDULED
Status: DISCONTINUED | OUTPATIENT
Start: 2018-10-29 | End: 2018-10-30 | Stop reason: HOSPADM

## 2018-10-29 RX ORDER — FERROUS SULFATE 325(65) MG
325 TABLET ORAL 2 TIMES DAILY WITH MEALS
Status: DISCONTINUED | OUTPATIENT
Start: 2018-10-30 | End: 2018-10-29

## 2018-10-29 RX ORDER — SODIUM CHLORIDE 9 MG/ML
75 INJECTION, SOLUTION INTRAVENOUS CONTINUOUS
Status: DISCONTINUED | OUTPATIENT
Start: 2018-10-29 | End: 2018-10-29

## 2018-10-29 RX ORDER — MAGNESIUM SULFATE HEPTAHYDRATE 40 MG/ML
2 INJECTION, SOLUTION INTRAVENOUS ONCE
Status: COMPLETED | OUTPATIENT
Start: 2018-10-29 | End: 2018-10-29

## 2018-10-29 RX ORDER — FERROUS SULFATE 325(65) MG
325 TABLET ORAL 2 TIMES DAILY WITH MEALS
Status: DISCONTINUED | OUTPATIENT
Start: 2018-10-29 | End: 2018-10-30 | Stop reason: HOSPADM

## 2018-10-29 RX ORDER — FENTANYL CITRATE 50 UG/ML
25 INJECTION, SOLUTION INTRAMUSCULAR; INTRAVENOUS ONCE
Status: COMPLETED | OUTPATIENT
Start: 2018-10-29 | End: 2018-10-29

## 2018-10-29 RX ORDER — OXYCODONE HYDROCHLORIDE 5 MG/1
5 TABLET ORAL EVERY 4 HOURS PRN
Status: DISCONTINUED | OUTPATIENT
Start: 2018-10-29 | End: 2018-10-30 | Stop reason: HOSPADM

## 2018-10-29 RX ORDER — CHLORHEXIDINE GLUCONATE 0.12 MG/ML
15 RINSE ORAL EVERY 12 HOURS SCHEDULED
Status: DISCONTINUED | OUTPATIENT
Start: 2018-10-29 | End: 2018-10-30 | Stop reason: HOSPADM

## 2018-10-29 RX ORDER — PRAVASTATIN SODIUM 40 MG
40 TABLET ORAL
Status: DISCONTINUED | OUTPATIENT
Start: 2018-10-30 | End: 2018-10-29

## 2018-10-29 RX ORDER — LEVETIRACETAM 500 MG/1
500 TABLET ORAL EVERY 12 HOURS SCHEDULED
Status: DISCONTINUED | OUTPATIENT
Start: 2018-10-29 | End: 2018-10-30 | Stop reason: HOSPADM

## 2018-10-29 RX ORDER — OXYCODONE HYDROCHLORIDE 5 MG/1
2.5 TABLET ORAL EVERY 4 HOURS PRN
Status: DISCONTINUED | OUTPATIENT
Start: 2018-10-29 | End: 2018-10-30 | Stop reason: HOSPADM

## 2018-10-29 RX ORDER — AMOXICILLIN 250 MG
1 CAPSULE ORAL
Status: DISCONTINUED | OUTPATIENT
Start: 2018-10-29 | End: 2018-10-29

## 2018-10-29 RX ORDER — ACETAMINOPHEN 325 MG/1
650 TABLET ORAL EVERY 8 HOURS SCHEDULED
Status: DISCONTINUED | OUTPATIENT
Start: 2018-10-29 | End: 2018-10-29

## 2018-10-29 RX ORDER — POTASSIUM CHLORIDE 20 MEQ/1
20 TABLET, EXTENDED RELEASE ORAL ONCE
Status: COMPLETED | OUTPATIENT
Start: 2018-10-29 | End: 2018-10-29

## 2018-10-29 RX ORDER — PRAVASTATIN SODIUM 40 MG
40 TABLET ORAL
Status: DISCONTINUED | OUTPATIENT
Start: 2018-10-29 | End: 2018-10-30 | Stop reason: HOSPADM

## 2018-10-29 RX ADMIN — STANDARDIZED SENNA CONCENTRATE AND DOCUSATE SODIUM 1 TABLET: 8.6; 5 TABLET, FILM COATED ORAL at 22:15

## 2018-10-29 RX ADMIN — FENTANYL CITRATE 25 MCG: 50 INJECTION, SOLUTION INTRAMUSCULAR; INTRAVENOUS at 05:45

## 2018-10-29 RX ADMIN — POTASSIUM CHLORIDE 20 MEQ: 1500 TABLET, EXTENDED RELEASE ORAL at 10:18

## 2018-10-29 RX ADMIN — PRAVASTATIN SODIUM 40 MG: 40 TABLET ORAL at 22:15

## 2018-10-29 RX ADMIN — MAGNESIUM SULFATE IN WATER 2 G: 40 INJECTION, SOLUTION INTRAVENOUS at 10:18

## 2018-10-29 RX ADMIN — LEVETIRACETAM 500 MG: 500 TABLET, FILM COATED ORAL at 10:18

## 2018-10-29 RX ADMIN — FERROUS SULFATE TAB 325 MG (65 MG ELEMENTAL FE) 325 MG: 325 (65 FE) TAB at 22:15

## 2018-10-29 RX ADMIN — DIBASIC SODIUM PHOSPHATE, MONOBASIC POTASSIUM PHOSPHATE AND MONOBASIC SODIUM PHOSPHATE 2 TABLET: 852; 155; 130 TABLET ORAL at 10:18

## 2018-10-29 RX ADMIN — ACETAMINOPHEN 650 MG: 325 TABLET, FILM COATED ORAL at 22:15

## 2018-10-29 RX ADMIN — SILVER SULFADIAZINE: 10 CREAM TOPICAL at 22:15

## 2018-10-29 RX ADMIN — CHLORHEXIDINE GLUCONATE 15 ML: 1.2 RINSE ORAL at 10:17

## 2018-10-29 RX ADMIN — Medication 3500 UNITS: at 01:20

## 2018-10-29 RX ADMIN — DESMOPRESSIN ACETATE 20.4 MCG: 4 SOLUTION INTRAVENOUS at 03:27

## 2018-10-29 RX ADMIN — LEVETIRACETAM 500 MG: 500 TABLET, FILM COATED ORAL at 22:15

## 2018-10-29 NOTE — PROGRESS NOTES
Progress Note - Critical Care/ Acceptance note   Williams Perez 80 y o  male MRN: [id number]  Unit/Bed#: ED 05 Encounter: [id number]     Attending Physician: Juan Urena, DO    ______________________________________________________________________  Assessment and Plan:   Principal Problem:    Subarachnoid hemorrhage ([facility] [address] )  Active Problems:    Severe peripheral arterial disease ([facility] [address] )  Resolved Problems:    * No resolved hospital problems  *        Neuro:    - Q1 neuro check    - Q1 neuro-vascular check of L Leg   - CT head at 0600   - NeuroSx consulted   - Received K-centra prior to arrival   - DDAVP here   - Keppra 500 BID    CV:    - Sys <160   - NS IVF 75mL/hr   - Reversal of AC with K-centra and DDAVP   - No anticoagulation at this time due to intraparenchymal hemorrhage    Pulm:    - IS   - SpO2 >92    GI:    - NPO, sips with meds, until assessed by Neuro Sx    :    - monitor UOP    F/E/N:    - NS 75mL/hr   - Na 133, K 3 8   - NPO, sips with meds    ID:    - afebrile, w/out leukocytosis, no abx at this time    Heme:    - K-centra/ DDAVP for AC reversal   - NO AC at this time, SCD only to R leg   - Vascular consulted   - Monitor CBC    Endo:    - no hx of DM     Msk/Skin:    - monitor for expansion of scalp, groin hematoma    Disposition:    - ICU level care    Code Status: Level 1 - Full Code    Counseling / Coordination of Care  Total Critical Care time spent 45 minutes excluding procedures, teaching and family updates  ______________________________________________________________________    Chief Complaint:  Transfer from [facility], patient had a vascular procedure performed on his LLE on 10/26/18 Left below knee popliteal artery arthrectomy, left anterior tibial artery atherectomy, left tibioperoneal trunk and peroneal artery and placed on Xarelto, Plavix and aspirin  Patient states that tonight he fell in his bathroom and hit his head    Patient is unsure if he lost consciousness but unable to fully describe the incident  At [facility] found to have subarachnoid and questionable focus of intraparenchymal hemorrhage  24 Hour Events: Transfer from [facility]   Constitutional: Negative for chills, diaphoresis, fatigue and fever  HENT: Positive for facial swelling  Negative for nosebleeds, sinus pain, sinus pressure, sneezing and sore throat  Eyes: Negative for photophobia, pain and visual disturbance  Respiratory: Negative for apnea, cough, chest tightness, shortness of breath, wheezing and stridor  Cardiovascular: Negative for chest pain, palpitations and leg swelling  Gastrointestinal: Negative for abdominal distention, abdominal pain, blood in stool, constipation, diarrhea, nausea and vomiting  Genitourinary: Negative for dysuria, flank pain, frequency, hematuria and urgency  Musculoskeletal: Negative for arthralgias, back pain, joint swelling, myalgias, neck pain and neck stiffness  Skin: Negative for color change, pallor, rash and wound  Neurological: Negative for dizziness, facial asymmetry, weakness, light-headedness, numbness and headaches  Psychiatric/Behavioral: Negative for confusion and decreased concentration  ______________________________________________________________________    Physical Exam:   Physical Exam   Constitutional: He is oriented to person, place, and time  He appears well-developed and well-nourished  Non-toxic appearance  He does not have a sickly appearance  He does not appear ill  No distress  He is not intubated  HENT:   Head: Normocephalic  Not macrocephalic and not microcephalic  Head is without raccoon's eyes, without Lopez's sign and without laceration  Hair is normal        Right Ear: External ear normal    Left Ear: External ear normal    Nose: Nose normal    Mouth/Throat: Oropharynx is clear and moist  No oropharyngeal exudate     Mild ecchymosis and tenderness of L side of head   Eyes: Pupils are equal, round, and reactive to light  Conjunctivae and EOM are normal  Right eye exhibits no discharge  Left eye exhibits no discharge  No scleral icterus  Right eye exhibits normal extraocular motion and no nystagmus  Left eye exhibits normal extraocular motion and no nystagmus  Right pupil is round and reactive  Left pupil is round and reactive  Pupils are equal    Neck: Trachea normal, normal range of motion and full passive range of motion without pain  Neck supple  No JVD present  No tracheal tenderness, no spinous process tenderness and no muscular tenderness present  No neck rigidity  No tracheal deviation, no edema, no erythema and normal range of motion present  Cardiovascular: Normal rate, regular rhythm, S1 normal, S2 normal and normal heart sounds  Exam reveals no gallop, no S3, no S4, no distant heart sounds and no friction rub  No murmur heard  Dopplerable DP signal in left foot   Pulmonary/Chest: Effort normal and breath sounds normal  No accessory muscle usage or stridor  No apnea, no tachypnea and no bradypnea  He is not intubated  No respiratory distress  He has no decreased breath sounds  He has no wheezes  He has no rhonchi  He has no rales  He exhibits no tenderness  Abdominal: Soft  Normal appearance and bowel sounds are normal  He exhibits no distension, no pulsatile liver, no fluid wave, no pulsatile midline mass and no mass  There is no tenderness  There is no rigidity, no rebound and no guarding  Genitourinary:         Genitourinary Comments: Ecchymosis to the left groin site from prior procedure, non-expanding per patient   Neurological: He is alert and oriented to person, place, and time  He has normal strength  He is not disoriented  He displays no atrophy and no tremor  No cranial nerve deficit or sensory deficit  He exhibits normal muscle tone  He displays no seizure activity  GCS eye subscore is 4  GCS verbal subscore is 5  GCS motor subscore is 6     Cranial Nerves 2-12 without obvious deficit  Gross strength and sensation intact to B/L Upper and Lower Extremities     Skin: Skin is warm and dry  Capillary refill takes less than 2 seconds  No rash noted  He is not diaphoretic  No erythema  No pallor  Psychiatric: He has a normal mood and affect  His behavior is normal  Judgment and thought content normal    Nursing note and vitals reviewed  ______________________________________________________________________  Vitals:    10/29/18 0217 10/29/18 0218   BP: 169/74 169/74   BP Location: Right arm    Pulse: 72 65   Resp: 18 18   Temp: 98 °F (36 7 °C) 98 °F (36 7 °C)   TempSrc: Oral Oral   SpO2: 98% 98%   Weight:  67 6 kg (149 lb)       Temperature:   Temp (24hrs), Av 6 °F (37 °C), Min:98 °F (36 7 °C), Max:99 1 °F (37 3 °C)    Current Temperature: 98 °F (36 7 °C)  Weights:   IBW: -88 kg    Body mass index is 21 38 kg/m²  Weight (last 2 days)     Date/Time   Weight    10/29/18 02:18:21  67 6 (149)            Hemodynamic Monitoring:  N/A     Non-Invasive/Invasive Ventilation Settings:  Respiratory    Lab Data (Last 4 hours)    None         O2/Vent Data (Last 4 hours)    None              No results found for: PHART, QXP1LHS, PO2ART, WDU3XHR, Y3RSHEFL, BEART, SOURCE  SpO2: SpO2: 98 %, SpO2 Device: O2 Device: None (Room air)  Intake and Outputs:  I/O       10/27 07 - 10/28 0700 10/28 07 - 10/29 0700    Urine (mL/kg/hr)  150    Total Output   150    Net   -150                Nutrition:        Diet Orders            Start     Ordered    10/29/18 0239  Diet NPO; Sips with meds  Diet effective now     Question Answer Comment   Diet Type NPO    NPO Except: Sips with meds    RD to adjust diet per protocol?  Yes        10/29/18 0254        NPO at this time  Labs:     Results from last 7 days  Lab Units 10/29/18  0021 10/27/18  0716 10/26/18  1542   WBC Thousand/uL 12 42* 8 75 5 54   HEMOGLOBIN g/dL 10 5* 9 7* 10 9*   HEMATOCRIT % 32 0* 29 7* 33 6*   PLATELETS Thousands/uL 130* 142* 162 NEUTROS PCT % 84*  --   --    MONOS PCT % 8  --   --        Results from last 7 days  Lab Units 10/29/18  0021 10/27/18  0716   SODIUM mmol/L 133* 135*   POTASSIUM mmol/L 3 8 4 3   CHLORIDE mmol/L 99* 104   CO2 mmol/L 27 25   BUN mg/dL 20 29*   CREATININE mg/dL 1 36* 1 50*   CALCIUM mg/dL 8 8 7 9*   ALK PHOS U/L 53  --    ALT U/L 15  --    AST U/L 18  --          No results found for: PHOS     Results from last 7 days  Lab Units 10/29/18  0021 10/27/18  0716 10/26/18  2358  10/26/18  1542   INR  2 83*  --   --   --  1 37*   PTT seconds 61* >210* 46*  < > >210*   < > = values in this interval not displayed  0  Lab Value Date/Time   TROPONINI <0 02 10/29/2018 0021         ABG:No results found for: PHART, EDJ6NVD, PO2ART, FWW0JIH, P3FMUPWA, BEART, SOURCE  Imaging: Trauma - Ct Head Wo Contrast    Result Date: 10/29/2018  Impression: Subarachnoid hemorrhage as detailed above at the left temporal region  Questionable focus of intraparenchymal hemorrhage  Mild microvascular ischemic disease  Atrophy  Findings discussed with Dr Stacy Arreola at 12:37 AM, 10/29/2018 Workstation performed: [id number]     Ct Spine Cervical Without Contrast    Result Date: 10/29/2018  Impression: No cervical spine fracture or traumatic malalignment  Findings were discussed with Dr Stacy Arreola at 12:36 AM, 10/29/2018  Workstation performed: [id number]    I have personally reviewed pertinent reports      EKG: Sinus rhythm with 1st degree A-V block  Right bundle branch block  Left anterior fascicular block   Bifascicular block   Moderate voltage criteria for LVH, may be normal variant  Abnormal ECG  No previous ECGs available  Micro:  No results found for: Teri Persons, WOUNDCULT, SPUTUMCULTUR  Allergies: No Known Allergies  Medications:   Scheduled Meds:  Continuous Infusions:    sodium chloride 75 mL/hr     PRN Meds:    VTE Pharmacologic Prophylaxis: Pharmacologic VTE Prophylaxis contraindicated due to Intraparenchymal bleed  VTE Mechanical Prophylaxis: sequential compression device on R leg only  Invasive lines and devices: Invasive Devices     Peripheral Intravenous Line            Peripheral IV 10/28/18 Left Wrist less than 1 day    Peripheral IV 10/29/18 Right Forearm less than 1 day                     Portions of the record may have been created with voice recognition software  Occasional wrong word or "sound a like" substitutions may have occurred due to the inherent limitations of voice recognition software  Read the chart carefully and recognize, using context, where substitutions have occurred      Chaim Negron,

## 2018-10-29 NOTE — PROGRESS NOTES
10/29/18 1000   Clinical Encounter Type   Visited With Patient and family together   Routine Visit Introduction   Crisis Visit Critical Care

## 2018-10-29 NOTE — PROGRESS NOTES
CC Staff Note:  Seen on rounds in AM 10/29/18   Admitted to ICU with small SAH/ICH/IVH on ASA/plavix/xarelto    Given ddavp, [facility] for reversal of NOAC and antiplatelet agent    GCS 15 this AM, repeat CTH stable, neuro exam non-focal  Keppra x7 days     F/u neurosurgery and vascular consults  Continue serial neuro checks  OK for transfer to floor after neuro eval    25 minutes additional CC time spent

## 2018-10-29 NOTE — PROGRESS NOTES
Patient transferred to [facility] for [address]  His incidental finding of possible asbestos / interstitial lung disease can be followed up when he recovers from his acute illness

## 2018-10-29 NOTE — CONSULTS
12:30am:  Per Dr Richmond Shelton, repeat CT head tomorrow  If stable, can start ASA 81mg and follow up in 2 weeks outpatient with repeat imaging in order to start Plavix  Consultation - Neurosurgery   Angel Luis Coleman 80 y o  male MRN: [id number]  Unit/Bed#: CW -01 Encounter: [id number]   Consult completed on 10/29/18      Inpatient consult to Neurosurgery  Consult performed by: Asim Morrow ordered by: Marlon Garduno          Assessment/Plan     Assessment:  1  Acute traumatic left subarachnoid hemorrhage  2  Hx of atherosclerosis of native arteries in left leg  3  Ulceration of left foot  4  CKD  5  Hyperlipidemia  6  Severe peripheral artery disease  7  Recent arteriogram, angioplasty stent, and atherectomy, left abdomen    Plan:  · Exam:  GCS 15, Alert and oriented x 3, PAL without focal weakness, normal LT with chronic numbness in left foot, JPS 3/3, DST intact, no reyes or clonus noted, no drift, reflexes 2+ and symmetrical   · Imaging reviewed personally and with attending  Results are as follows:  · CT head wo contrast (10/29/18): Trace left middle cranial fossa SAH  · CT spine cervical wo contrast (10/29/18): No fracture or malalignment  · CT head wo contrast (10/29/18):  SAH at left temporal region  · Repeat CT head ordered and pending for tomorrow  · Repeat CT head STAT if GCS declines > 2pt/1hr  · Recommend discontinuing one or two AC / AP agents at this time due to increased risk for recurrent bleed  · Patient currently taking Plavix, aspirin, and Xarelto due s/p angioplasty stent and atherectomy   · Neurosurgery will discuss with vascular  · Continue Keppra 500mg PO q12hrs for seizure ppx  · Medical management per primary team  · DVT ppx: SCDs  · Hold pharm ppx due to recent [facility]  · Mobilize as tolerated with assistance  · Imaging results discussed with patient and family  Made aware that no surgery is recommended at this time    Expressed concerns about current blood thinners patient is taking and recommended that one or two of them be stopped due to increased risk of recurrent bleed  Patient and family understood  · Neurosurgery will continue to follow pending repeat CT head  No surgical intervention recommended at this time  Please call with questions or concerns  History of Present Illness   HPI: Marlon Wolf is a 80 y o  male with PMH including severe PAD, atherosclerosis of native arteries in left leg, hyperlipidemia, CKD, and arteriogram angioplasty stent and atherectomy on 10/26/18 who presents as a transfer from [facility] s/p fall  Patient was going to the bathroom on Sunday when he fell and hit his head  Unknown LOC; patient does not know why he fell but states he was feeling "woozy" all day prior to falling  States his wife found him on the floor, per sister in law he was dozing off, but patient denies confusion  Patient was brought to [facility] where CT head showed SAH at left temporal region  Patient denies back or neck pain, bowel / bladder problems, headaches, dizziness, changes in vision / hearing, numbness / tingling, weakness  Patient had angioplasty stent and atherectomy on 10/26/18 for severe PAD and atherosclerosis of native arteries in left leg  He states he has been taking Plavix and aspirin for a while but was placed on Xarelto on Saturday post op  Review of Systems   Constitutional: Negative for chills and fever  HENT: Negative for hearing loss, tinnitus and trouble swallowing  Eyes: Negative for visual disturbance  Respiratory: Negative for chest tightness and shortness of breath  Cardiovascular: Negative for chest pain  Gastrointestinal: Negative for abdominal pain, diarrhea, nausea and vomiting  Genitourinary: Negative for difficulty urinating  Musculoskeletal: Negative for back pain and neck pain  Skin: Positive for wound (ulcer, left foot)  Allergic/Immunologic: Negative for environmental allergies and food allergies  Neurological: Positive for light-headedness ("woozy")  Negative for weakness, numbness and headaches  Psychiatric/Behavioral: Negative for confusion  The patient is not nervous/anxious        Historical Information   Past Medical History:   Diagnosis Date    Arthritis     Chronic kidney disease     DVT femoral (deep venous thrombosis) with thrombophlebitis, right (HCC)     Hyperlipidemia     Vascular disease      Past Surgical History:   Procedure Laterality Date    AMPUTATION  1972    L thumb    CARPAL TUNNEL RELEASE  2016    L hand    CATARACT EXTRACTION      COLONOSCOPY      FRACTURE SURGERY      KNEE ARTHROSCOPY      [place] SLCTV CATHJ 3RD+ ORD SLCTV ABDL PEL/LXTR [facility] Left 3/9/2018    Procedure: LEFT LOWER EXTREMITY ARTERIOGRAM WITH PTA OF LEFT POPLITEAL ARTERY;  Surgeon: Hilton Byrd MD;  [facility];  Service: Vascular     History   Alcohol Use No     History   Drug Use No     History   Smoking Status    Former Smoker   Smokeless Tobacco    Never Used     Comment: quit 62 years ago     Family History   Problem Relation Age of Onset    Lung cancer Mother     Diabetes Father     Aneurysm Father     Diabetes Maternal Grandmother      Meds/Allergies   all current active meds have been reviewed, current meds:   Current Facility-Administered Medications   Medication Dose Route Frequency    chlorhexidine (PERIDEX) 0 12 % oral rinse 15 mL  15 mL Swish & Spit Q12H [address]    levETIRAcetam (KEPPRA) tablet 500 mg  500 mg Oral Q12H [address]    magnesium sulfate 2 g/50 mL IVPB (premix) 2 g  2 g Intravenous Once    oxyCODONE (ROXICODONE) IR tablet 2 5 mg  2 5 mg Oral Q4H PRN    oxyCODONE (ROXICODONE) IR tablet 5 mg  5 mg Oral Q4H PRN    potassium chloride (K-DUR,KLOR-CON) CR tablet 20 mEq  20 mEq Oral Once    potassium-sodium phosphateS (K-PHOS,PHOSPHA 250) -250 mg tablet 2 tablet  2 tablet Oral Once    and PTA meds:   Prior to Admission Medications   Prescriptions Last Dose Informant Patient Reported? Taking? Calcium Carb-Cholecalciferol (CALCIUM 1000 + D) 1000-800 MG-UNIT TABS  Self Yes Yes   Sig: Take 1 tablet by mouth daily   Cholecalciferol (VITAMIN D) 2000 units CAPS  Self Yes Yes   Sig: Take by mouth   Omega-3 Fatty Acids (FISH OIL) 645 MG CAPS  Self Yes Yes   Sig: Take by mouth   aspirin 81 MG tablet  Self Yes Yes   Sig: Take by mouth   clopidogrel (PLAVIX) 75 mg tablet   No Yes   Sig: TAKE 1 TABLET (75 MG TOTAL) BY MOUTH DAILY   cyanocobalamin (VITAMIN B-12) 1,000 mcg tablet  Self Yes Yes   Sig: Take by mouth daily   ferrous sulfate 325 (65 Fe) mg tablet   No Yes   Sig: Take 1 tablet (325 mg total) by mouth 2 (two) times a day   meloxicam (MOBIC) 15 mg tablet   No Yes   Sig: Take 1 tablet (15 mg total) by mouth daily   rivaroxaban (XARELTO) 15 mg tablet   No Yes   Sig: Take 1 tablet (15 mg total) by mouth 2 (two) times a day with meals for 21 days   silver sulfadiazine (SILVADENE,SSD) 1 % cream   No Yes   Sig: Apply topically daily   simvastatin (ZOCOR) 20 mg tablet   No Yes   Sig: Take 1 tablet (20 mg total) by mouth daily   traMADol (ULTRAM) 50 mg tablet   No Yes   Sig: Take 1 tablet (50 mg total) by mouth every 6 (six) hours as needed for moderate pain      Facility-Administered Medications: None     No Known Allergies    Objective   I/O       10/27 0701 - 10/28 0700 10/28 0701 - 10/29 0700 10/29 0701 - 10/30 0700    IV Piggyback  50     Total Intake(mL/kg)  50 (0 7)     Urine (mL/kg/hr)  150     Total Output   150      Net   -100                 Physical Exam   Constitutional: He is oriented to person, place, and time  Vital signs are normal  He appears well-developed and well-nourished  He is cooperative  HENT:   Head: Normocephalic  Head is with contusion (left side)  Eyes: Pupils are equal, round, and reactive to light  Conjunctivae and EOM are normal    Cardiovascular: Normal rate  Pulmonary/Chest: Effort normal  No respiratory distress     Musculoskeletal: Normal range of motion  Neurological: He is alert and oriented to person, place, and time  He has normal strength  He has a normal Finger-Nose-Finger Test    Reflex Scores:       Tricep reflexes are 2+ on the right side and 2+ on the left side  Bicep reflexes are 2+ on the right side and 2+ on the left side  Brachioradialis reflexes are 2+ on the right side and 2+ on the left side  Patellar reflexes are 2+ on the right side and 2+ on the left side  Achilles reflexes are 2+ on the right side and 2+ on the left side  Skin: Skin is warm and dry  Ulceration on left foot between big toe and second toe   Psychiatric: He has a normal mood and affect  His speech is normal and behavior is normal  Judgment and thought content normal  Cognition and memory are normal      Neurologic Exam     Mental Status   Oriented to person, place, and time  Registration: recalls 3 of 3 objects  Recall at 5 minutes: recalls 3 of 3 objects  Follows 1 step commands  Attention: normal  Concentration: normal    Speech: speech is normal   Level of consciousness: alert  Knowledge: good  Able to perform simple calculations  Able to name object  Able to repeat  Normal comprehension  Cranial Nerves     CN II   Visual fields full to confrontation  CN III, IV, VI   Pupils are equal, round, and reactive to light    Extraocular motions are normal    CN III: no CN III palsy  CN VI: no CN VI palsy  Nystagmus: none   Diplopia: none  Ophthalmoparesis: none  Upgaze: normal  Downgaze: normal  Conjugate gaze: present    CN V   Right facial sensation deficit: none  Left facial sensation deficit: none    CN VII   Right facial weakness: none  Left facial weakness: none    CN VIII   Hearing: intact    CN IX, X   CN IX normal    CN X normal      CN XI   Right trapezius strength: normal  Left trapezius strength: normal    CN XII   CN XII normal      Motor Exam   Muscle bulk: normal  Overall muscle tone: normal  Right arm pronator drift: absent  Left arm pronator drift: absent    Strength   Strength 5/5 throughout  Sensory Exam   Right arm light touch: normal  Left arm light touch: normal  Right leg light touch: normal  Left leg light touch: decreased from toes (chronic)  Proprioception normal    DST intact     Gait, Coordination, and Reflexes     Coordination   Finger to nose coordination: normal    Tremor   Resting tremor: absent  Intention tremor: absent  Action tremor: absent    Reflexes   Right brachioradialis: 2+  Left brachioradialis: 2+  Right biceps: 2+  Left biceps: 2+  Right triceps: 2+  Left triceps: 2+  Right patellar: 2+  Left patellar: 2+  Right achilles: 2+  Left achilles: 2+  Right : 2+  Left : 2+  Right Hall: absent  Left Hall: absent  Right ankle clonus: absent  Left ankle clonus: absent    Vitals:Blood pressure 125/58, pulse 74, temperature 98 5 °F (36 9 °C), temperature source Oral, resp  rate 22, height 5' 10" (1 778 m), weight 67 6 kg (149 lb 0 5 oz), SpO2 96 %  ,Body mass index is 21 38 kg/m²       Lab Results:     Results from last 7 days  Lab Units 10/29/18  0334 10/29/18  0021 10/27/18  0716   WBC Thousand/uL 12 57* 12 42* 8 75   HEMOGLOBIN g/dL 9 3* 10 5* 9 7*   HEMATOCRIT % 28 0* 32 0* 29 7*   PLATELETS Thousands/uL 117* 130* 142*   NEUTROS PCT % 80* 84*  --    MONOS PCT % 8 8  --        Results from last 7 days  Lab Units 10/29/18  0334 10/29/18  0021 10/27/18  0716   SODIUM mmol/L 134* 133* 135*   POTASSIUM mmol/L 3 6 3 8 4 3   CHLORIDE mmol/L 100 99* 104   CO2 mmol/L 27 27 25   BUN mg/dL 20 20 29*   CREATININE mg/dL 1 24 1 36* 1 50*   CALCIUM mg/dL 8 2* 8 8 7 9*   ALK PHOS U/L  --  53  --    ALT U/L  --  15  --    AST U/L  --  18  --        Results from last 7 days  Lab Units 10/29/18  0334   MAGNESIUM mg/dL 1 5*       Results from last 7 days  Lab Units 10/29/18  0334   PHOSPHORUS mg/dL 2 5       Results from last 7 days  Lab Units 10/29/18  0541 10/29/18  0334 10/29/18  0021   INR  2 13* 1 74* 2 83* PTT seconds 62* 61* 61*     No results found for: TROPONINT  ABG:No results found for: PHART, ZGJ9FLH, PO2ART, LWR1ZNV, E7EGHPIP, BEART, SOURCE    Imaging Studies: I have personally reviewed pertinent reports  and I have personally reviewed pertinent films in PACS    Xr Chest 1 View Portable    Result Date: 10/29/2018  Impression: Pleural calcifications may indicate asbestos related pleural disease  Additional interstitial prominence in the lungs could potentially be asbestosis  Correlate with patient history and clinical scenario  Other types of interstitial lung disease not excluded  Workstation performed: [id number]     Ct Head Wo Contrast    Result Date: 10/29/2018  Impression: Trace left middle cranial fossa subarachnoid hemorrhage  No significant interval change  Workstation performed: [id number]     Trauma - Ct Head Wo Contrast    Result Date: 10/29/2018  Impression: Subarachnoid hemorrhage as detailed above at the left temporal region  Questionable focus of intraparenchymal hemorrhage  Mild microvascular ischemic disease  Atrophy  Findings discussed with Dr Murali Bermudez at 12:37 AM, 10/29/2018 Workstation performed: [id number]     Ct Spine Cervical Without Contrast    Result Date: 10/29/2018  Impression: No cervical spine fracture or traumatic malalignment  Findings were discussed with Dr Murali Bermudez at 12:36 AM, 10/29/2018  Workstation performed: [id number]     EKG, Pathology, and Other Studies: I have personally reviewed pertinent reports  VTE Prophylaxis: Sequential compression device (Venodyne)  and Reason for no pharmacologic prophylaxis - recent SAH    Code Status: Level 1 - Full Code  Advance Directive and Living Will:      Power of :    POLST:      Counseling / Coordination of Care  I spent 45 minutes with the patient

## 2018-10-29 NOTE — CONSULTS
Consultation - Geriatrics   Chris Thakur 80 y o  male MRN: [id number]  Unit/Bed#: [address] -01 Encounter: [id number]      Assessment/Plan  1  Fall  Fall precautions   Home meds and include tramadol, do not recommend at discharge  Continue with home vitamin D supplementation, consider reducing to 1000 international units daily as higher doses have been associated with increased risk of falls  PT, OT  Patient may benefit from rehab    2  Ambulatory dysfunction  Previously ambulated with a cane  Recent surgery with vascular team on Friday  PT, OT  Recommend decreasing vitamin D3 to 1000 international units daily    3  Short-term memory loss  Patient scores 1/3 on delayed recall which is abnormal   Family at bedside reports the 1st cognitive assessment he has not done well on  Patient did receive dose of Keppra around 10:00 a m  This a m , may contribute  No signs of macrocytosis, will defer B12, folate at this time  Continue supportive care  Patient may benefit from formal cognitive assessment as outpatient, this be done at [facility] for positive aging upon discharge    4  Deconditioning  Secondary to hospital stay, multiple comorbidities  Mobilize frequently to prevent further decline  PT, OT  Suspect patient would benefit from rehab    5  Delirium precautions  Will add Tylenol 650 mg Q 8  Will add Lidoderm patch  Continue with p r n  Pain medications as ordered  Will add Senokot S daily for bowel regimen  Redirect unwanted patient behaviors as first line tx  Reorient patient frequently  Avoid deliriogenic meds including tramadol, benzodiazepines, benadryl  Good sleep hygiene important, limit night time interruptions  Encourage patient to stay awake during the day  Ensure adequate hydration/nutrition  Mobilize often     6  TBI/SAH  Neurosurgery consulted, recommendations made    7   Postop day 3 from left femoral thrombo embolectomy  Vascular surgery consulted and following      History of Present Illness Physician Requesting Consult: Ellis Lamb MD  Reason for Consult / Principal Problem: isar  Hx and PE limited by: n/a  HPI: Stella Hopson is a 80y o  year old male who presents to [facility] after falling  Patient was found have a TBI/SAH in setting of Xarelto use, patient was admitted under the trauma team, sent to the critical care unit for close monitoring  Neurosurgery consulted  Prior to arrival patient lives at home with his wife  He was independent with ADLs, used a cane for ambulation  He does not drive  PT, OT worked with the patient, felt his cognition was within normal limits, recommended patient go home with family and use quad cane at all times  Upon exam patient pleasant  5 family members at bedside  0/3 delayed recall  Family reports this is first cognitive assessment he failed today  Inpatient consult to Gerontology  Consult performed by: Annelise Nguyen ordered by: Thomas Cline          Review of Systems   Eyes: Positive for visual disturbance (chronic)  Respiratory: Negative for chest tightness and shortness of breath  Cardiovascular: Negative for chest pain and palpitations  Gastrointestinal: Negative for abdominal distention, abdominal pain, constipation, diarrhea, nausea and vomiting  Genitourinary: Negative for difficulty urinating  Musculoskeletal: Positive for gait problem and myalgias  Neurological: Negative for headaches  Psychiatric/Behavioral: Negative for confusion and dysphoric mood  The patient is not nervous/anxious  All other systems reviewed and are negative        Historical Information   Past Medical History:   Diagnosis Date    Arthritis     Chronic kidney disease     DVT femoral (deep venous thrombosis) with thrombophlebitis, right ([address] )     Hyperlipidemia     Vascular disease      Past Surgical History:   Procedure Laterality Date    AMPUTATION  1972    L thumb    CARPAL TUNNEL RELEASE  2016    L hand    CATARACT EXTRACTION      COLONOSCOPY      FRACTURE SURGERY      KNEE ARTHROSCOPY      [place] SLCTV CATHJ 3RD+ ORD SLCTV ABDL PEL/LXTR [facility] Left 3/9/2018    Procedure: LEFT LOWER EXTREMITY ARTERIOGRAM WITH PTA OF LEFT POPLITEAL ARTERY;  Surgeon: Deneen Nino MD;  [facility];  Service: Vascular     Social History   History   Alcohol Use No     History   Drug Use No     History   Smoking Status    Former Smoker   Smokeless Tobacco    Never Used     Comment: quit 57 years ago         Family History: non-contributory    Meds/Allergies   Current meds:   Current Facility-Administered Medications   Medication Dose Route Frequency    chlorhexidine (PERIDEX) 0 12 % oral rinse 15 mL  15 mL Swish & Spit Q12H [address]    levETIRAcetam (KEPPRA) tablet 500 mg  500 mg Oral Q12H [address]    oxyCODONE (ROXICODONE) IR tablet 2 5 mg  2 5 mg Oral Q4H PRN    oxyCODONE (ROXICODONE) IR tablet 5 mg  5 mg Oral Q4H PRN      Current PTA meds:  Prescriptions Prior to Admission   Medication    aspirin 81 MG tablet    Calcium Carb-Cholecalciferol (CALCIUM 1000 + D) 1000-800 MG-UNIT TABS    Cholecalciferol (VITAMIN D) 2000 units CAPS    clopidogrel (PLAVIX) 75 mg tablet    cyanocobalamin (VITAMIN B-12) 1,000 mcg tablet    ferrous sulfate 325 (65 Fe) mg tablet    meloxicam (MOBIC) 15 mg tablet    Omega-3 Fatty Acids (FISH OIL) 645 MG CAPS    rivaroxaban (XARELTO) 15 mg tablet    silver sulfadiazine (SILVADENE,SSD) 1 % cream    simvastatin (ZOCOR) 20 mg tablet    traMADol (ULTRAM) 50 mg tablet        No Known Allergies    Objective   Vitals: Blood pressure 111/53, pulse 70, temperature 98 °F (36 7 °C), temperature source Oral, resp  rate 20, height 5' 10" (1 778 m), weight 67 6 kg (149 lb 0 5 oz), SpO2 96 %  ,Body mass index is 21 38 kg/m²  Physical Exam   Constitutional: He is oriented to person, place, and time  He appears well-developed and well-nourished  No distress  HENT:   Head: Normocephalic and atraumatic  Mouth/Throat: No oropharyngeal exudate  Eyes: Conjunctivae and EOM are normal  No scleral icterus  Neck: Neck supple  Cardiovascular: Normal rate  Pulmonary/Chest: Effort normal and breath sounds normal  He has no wheezes  He has no rales  Abdominal: Soft  Bowel sounds are normal  He exhibits no distension  Musculoskeletal: He exhibits no edema  Neurological: He is alert and oriented to person, place, and time  Skin: Skin is warm and dry  Psychiatric: He has a normal mood and affect  His behavior is normal  Thought content normal  He is not agitated and not actively hallucinating  Cognition and memory are impaired  He exhibits abnormal recent memory  Patient is alert and oriented x4  Scores 1/3 on delayed recall which is abnormal   Five family members at bedside, so further cognitive assessment deferred at this time  No signs of delirium  Patient denies depression and anxiety  Nursing note and vitals reviewed  Lab Results:   Results from last 7 days  Lab Units 10/29/18  0334   WBC Thousand/uL 12 57*   HEMOGLOBIN g/dL 9 3*   HEMATOCRIT % 28 0*   PLATELETS Thousands/uL 117*        Results from last 7 days  Lab Units 10/29/18  0334 10/29/18  0021   SODIUM mmol/L 134* 133*   POTASSIUM mmol/L 3 6 3 8   CHLORIDE mmol/L 100 99*   CO2 mmol/L 27 27   BUN mg/dL 20 20   CREATININE mg/dL 1 24 1 36*   CALCIUM mg/dL 8 2* 8 8   ALK PHOS U/L  --  53   ALT U/L  --  15   AST U/L  --  18       Imaging Studies: I have personally reviewed pertinent reports  EKG, Pathology, and Other Studies: I have personally reviewed pertinent reports      VTE Prophylaxis: Sequential compression device (Venodyne)     Code Status: Level 1 - Full Code

## 2018-10-29 NOTE — UTILIZATION REVIEW
Initial Clinical Review    Admission: Date/Time/Statement: 10/29/18 @ 0254     Orders Placed This Encounter   Procedures    Inpatient Admission     Standing Status:   Standing     Number of Occurrences:   1     Order Specific Question:   Admitting Physician     Answer:   Richard Espinoza [[id number]]     Order Specific Question:   Level of Care     Answer:   Critical Care [15]     Order Specific Question:   Estimated length of stay     Answer:   More than 2 Midnights     Order Specific Question:   Certification     Answer:   I certify that inpatient services are medically necessary for this patient for a duration of greater than two midnights  See H&P and MD Progress Notes for additional information about the patient's course of treatment  ED: Date/Time/Mode of Arrival:   ED Arrival Information     Expected Arrival Acuity Means of Arrival Escorted By Service Admission Type    10/29/2018  10/29/2018 02:09 Immediate Ambulance [facility]) Critical Care/ICU Emergency    Arrival Complaint    Fall          Chief Complaint:   Chief Complaint   Patient presents with   [facility] Fall     pt reports falling at home in bathroom  Reports "I really dont know how i fell"  reports hitting head  Bruising on left side of head  GCS 15 A&Ox4       History of Illness   Juve Alvarez is a 80 y o  male who presents from home as a transfer from [facility] for evaluation after a fall from standing onto floor  Patient cannot recall events around fall, may have had LOC   Patient is on ASA Plavix and xarelto for peripheral artery disease, with recent endovascular intervention performed 2 days agoSubarachnoid hemorrhage in left temporal region:                  ED Vital Signs:   10/29/18 0217 10/29/18 0217 10/29/18 0217 10/29/18 0217 10/29/18 0217   98 °F (36 7 °C) 72 18 169/74 98 %      No Pain       10/29/18 67 6 kg (149 lb 0 5 oz)         Abnormal Labs/Diagnostic Test Results    Component Value Date     WBC 12 42 (H) 10/29/2018     HGB 10 5 (L) 10/29/2018     HCT 32 0 (L) 10/29/2018      (L) 10/29/2018     Component Value Date      (L) 10/29/2018     CL 99 (L) 10/29/2018     CREATININE 1 36 (H) 10/29/2018     Trauma - Ct Head Wo Contrast     Result Date: 10/29/2018  Impression: Subarachnoid hemorrhage as detailed above at the left temporal region  Questionable focus of intraparenchymal hemorrhage  Mild microvascular ischemic disease  Atrophy      ED Treatment:   Medication Administration from 10/29/2018 0139 to 10/29/2018 0458       Date/Time Order Dose Route     10/29/2018 0327 desmopressin (DDAVP) 20 4 mcg in sodium chloride 0 9 % 50 mL IVPB 20 4 mcg Intravenous       Past Medical/Surgical History:    Severe peripheral arterial disease ([facility] ) 02/13/2018    Hyperlipidemia 07/14/2016    Vitamin D deficiency 10/25/2017    Bifascicular block 10/31/2017    Anemia 10/15/2017    CKD (chronic kidney disease), stage III ([facility] ) 03/05/2018    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 ([facility] ) 03/06/2018    Atherosclerosis of native arteries of left leg with ulceration of other part of foot ([facility] ) 10/08/2018           Admitting Diagnosis: Subarachnoid hemorrhage ([address] ) [I60 9]  Head injury [S09 90XA]  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 Patient currently taking Plavix, aspirin, and Xarelto due s/p angioplasty stent and atherectomy   ?  Neurosurgery will discuss with vascular  · Continue Keppra 500mg PO q12hrs for seizure ppx    Consult vascular   Assessment:  84M who presents with a fall on aspirin, Plavix, and Xarelto  He was found to have subarachnoid hemorrhage on CT scan     Plan:  -agree with reversal of a/c  -please hold a/c  -appreciate NSG recs; restart a/c when they state it is safe      Admission Orders:    Consult gerontology  PT and OT eval and treat   Repeat Ct head   Npo sips  Neuro checks q1 hr       Scheduled Meds:     chlorhexidine 15 mL Swish & Spit Q12H [address]   levETIRAcetam 500 mg Oral Q12H [address]   magnesium sulfate 2 g Intravenous Once   oxyCODONE 2 5 mg Oral Q4H PRN   oxyCODONE 5 mg Oral Q4H PRN

## 2018-10-29 NOTE — ED PROVIDER NOTES
History  Chief Complaint   Patient presents with    Fall - Major     Pt fell today at home in the bathroom and hiot his head, pt was d/c from [place] today where he reciever a heparin gtt for DVTs     79-year-old male with a history of peripheral vascular disease on Xarelto Plavix and aspirin recent endovascular intervention through his left groin presenting with chief complaint of passing out  Patient was admitted to the hospital at [place] several days ago for peripheral vascular disease he had an intervention performed as noted, for severe peripheral vascular disease this is his 3rd intervention and it was suggested per the notes that he has no more vascular options he was discharged on Xarelto improved condition home with his family he states that he was in his usual health, he remembers walking into the bathroom and neck is remembers waking up on the floor  Patient believes he may have passed out his wife found him on the floor called a neighbor for help to help him get up, he reportedly had rapid return to baseline, he has a small contusion on his left forehead but otherwise offers no complaints  He otherwise denies headache neck pain chest pain cough shortness of breath palpitations abdominal pain nausea vomiting anorexia other muscle skeletal injury or complaint he otherwise denies a complete review systems as noted the exception of a small hematoma left groin which is been present since the procedure as well as some chronic appearing changes as left foot which the patient family state are baseline            Prior to Admission Medications   Prescriptions Last Dose Informant Patient Reported? Taking?    Calcium Carb-Cholecalciferol (CALCIUM 1000 + D) 1000-800 MG-UNIT TABS  Self Yes Yes   Sig: Take 1 tablet by mouth daily   Cholecalciferol (VITAMIN D) 2000 units CAPS  Self Yes Yes   Sig: Take by mouth   Omega-3 Fatty Acids (FISH OIL) 645 MG CAPS  Self Yes Yes   Sig: Take by mouth   aspirin 81 MG tablet  Self Yes Yes   Sig: Take by mouth   clopidogrel (PLAVIX) 75 mg tablet   No Yes   Sig: TAKE 1 TABLET (75 MG TOTAL) BY MOUTH DAILY   cyanocobalamin (VITAMIN B-12) 1,000 mcg tablet  Self Yes Yes   Sig: Take by mouth daily   ferrous sulfate 325 (65 Fe) mg tablet   No Yes   Sig: Take 1 tablet (325 mg total) by mouth 2 (two) times a day   meloxicam (MOBIC) 15 mg tablet   No Yes   Sig: Take 1 tablet (15 mg total) by mouth daily   rivaroxaban (XARELTO) 15 mg tablet   No Yes   Sig: Take 1 tablet (15 mg total) by mouth 2 (two) times a day with meals for 21 days   silver sulfadiazine (SILVADENE,SSD) 1 % cream   No Yes   Sig: Apply topically daily   simvastatin (ZOCOR) 20 mg tablet   No Yes   Sig: Take 1 tablet (20 mg total) by mouth daily   traMADol (ULTRAM) 50 mg tablet   No Yes   Sig: Take 1 tablet (50 mg total) by mouth every 6 (six) hours as needed for moderate pain      Facility-Administered Medications: None       Past Medical History:   Diagnosis Date    Arthritis     Chronic kidney disease     DVT femoral (deep venous thrombosis) with thrombophlebitis, right (HCC)     Hyperlipidemia     Vascular disease        Past Surgical History:   Procedure Laterality Date    AMPUTATION  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    L thumb    CARPAL TUNNEL RELEASE  2016    L hand    CATARACT EXTRACTION      COLONOSCOPY      FRACTURE SURGERY      KNEE ARTHROSCOPY      ID SLCTV CATHJ 3RD+ ORD SLCTV ABDL PEL/LXTR [facility] Left 3/9/2018    Procedure: LEFT LOWER EXTREMITY ARTERIOGRAM WITH PTA OF LEFT POPLITEAL ARTERY;  Surgeon: Ward Quintero MD;  [facility];  Service: Vascular       Family History   Problem Relation Age of Onset    Lung cancer Mother     Diabetes Father     Aneurysm Father     Diabetes Maternal Grandmother      I have reviewed and agree with the history as documented      Social History   Substance Use Topics    Smoking status: Former Smoker    Smokeless tobacco: Never Used      Comment: quit 57 years ago    Alcohol use No Review of Systems   Constitutional: Negative for activity change, appetite change, chills and fever  HENT: Negative for rhinorrhea and sore throat  Eyes: Negative for photophobia and pain  Respiratory: Negative for cough and shortness of breath  Cardiovascular: Negative for chest pain and palpitations  Gastrointestinal: Negative for abdominal pain, diarrhea, nausea and vomiting  Genitourinary: Negative for dysuria, flank pain, frequency and urgency  Musculoskeletal: Negative for arthralgias, back pain and myalgias  Skin: Positive for color change and wound  Negative for rash  Neurological: Positive for syncope  Negative for dizziness, weakness, light-headedness and headaches  Hematological: Negative for adenopathy  Does not bruise/bleed easily  Psychiatric/Behavioral: Negative for agitation and behavioral problems  All other systems reviewed and are negative  Physical Exam  Physical Exam   Constitutional: He is oriented to person, place, and time  He appears well-developed and well-nourished  No distress  Well-appearing for stated age sitting upright conversational no acute distress smiling pleasant   HENT:   Head: Normocephalic  Right Ear: External ear normal    Left Ear: External ear normal    Mouth/Throat: Oropharynx is clear and moist    Patient has a 2 cm closed minimal contusion on his left frontal/temporal forehead otherwise head neck and face nontender and atraumatic   Eyes: Pupils are equal, round, and reactive to light  EOM are normal    Neck: Normal range of motion  Neck supple  No tracheal deviation present  Cardiovascular: Normal rate, regular rhythm and normal heart sounds  Exam reveals no gallop and no friction rub  No murmur heard  Pulmonary/Chest: Effort normal and breath sounds normal  He has no wheezes  He has no rales  Abdominal: Soft  Bowel sounds are normal  He exhibits no distension  There is no tenderness  There is no rebound and no guarding  Musculoskeletal:   Complete head-to-toe exam head neck face chest abdomen and pelvis midline cervical thoracic lumbar spine are nontender he has full active and passive range of motion of both of his upper lower extremities without pain tenderness or injury the only thing of significance on complete head to toe trauma evaluation as a small hematoma in his left groin he has some mild discoloration of the dorsum of his left foot which is chronic appearing according to the family left foot is mildly more cold than the right again chronic he does have biphasic dorsalis pedis and posterior tibial pulses in the foot otherwise unremarkable exam   Neurological: He is alert and oriented to person, place, and time  No cranial nerve deficit  He exhibits normal muscle tone  Coordination normal    Skin: Skin is warm and dry  No rash noted  Psychiatric: He has a normal mood and affect  His behavior is normal    Nursing note and vitals reviewed        Vital Signs  ED Triage Vitals   Temperature Pulse Respirations Blood Pressure SpO2   10/28/18 2353 10/28/18 2349 10/28/18 2349 10/28/18 2349 10/28/18 2349   99 1 °F (37 3 °C) 78 18 169/79 97 %      Temp Source Heart Rate Source Patient Position - Orthostatic VS BP Location FiO2 (%)   10/28/18 2353 10/28/18 2349 10/28/18 2349 -- --   Oral Monitor Lying        Pain Score       10/28/18 2353       No Pain           Vitals:    10/28/18 2349 10/29/18 0023 10/29/18 0109   BP: 169/79 (!) 179/76 149/72   Pulse: 78 65 70   Patient Position - Orthostatic VS: Lying Lying Lying       Visual Acuity  Visual Acuity      Most Recent Value   L Pupil Size (mm)  3   R Pupil Size (mm)  3          ED Medications  Medications   prothrombin complex conc human (KCENTRA) 3,500 Units (3,500 Units Intravenous Given 10/29/18 0120)       Diagnostic Studies  Results Reviewed     Procedure Component Value Units Date/Time    TSH [[id number]]  (Normal) Collected:  10/29/18 0021    Lab Status:  Final result Specimen:  Blood from Arm, Right Updated:  10/29/18 0054     TSH 3RD GENERATON 0 773 uIU/mL     Narrative:         Patients undergoing fluorescein dye angiography may retain small amounts of fluorescein in the body for 48-72 hours post procedure  Samples containing fluorescein can produce falsely depressed TSH values  If the patient had this procedure,a specimen should be resubmitted post fluorescein clearance      B-type natriuretic peptide [[id number]]  (Abnormal) Collected:  10/29/18 0021    Lab Status:  Final result Specimen:  Blood from Arm, Right Updated:  10/29/18 0054     NT-proBNP 770 (H) pg/mL     Troponin I [[id number]]  (Normal) Collected:  10/29/18 0021    Lab Status:  Final result Specimen:  Blood from Arm, Right Updated:  10/29/18 0050     Troponin I <0 02 ng/mL     Protime-INR [[id number]]  (Abnormal) Collected:  10/29/18 0021    Lab Status:  Final result Specimen:  Blood from Arm, Right Updated:  10/29/18 0048     Protime 29 4 (H) seconds      INR 2 83 (H)    APTT [[id number]]  (Abnormal) Collected:  10/29/18 0021    Lab Status:  Final result Specimen:  Blood from Arm, Right Updated:  10/29/18 0048     PTT 61 (H) seconds     Comprehensive metabolic panel [[id number]]  (Abnormal) Collected:  10/29/18 0021    Lab Status:  Final result Specimen:  Blood from Arm, Right Updated:  10/29/18 0044     Sodium 133 (L) mmol/L      Potassium 3 8 mmol/L      Chloride 99 (L) mmol/L      CO2 27 mmol/L      ANION GAP 7 mmol/L      BUN 20 mg/dL      Creatinine 1 36 (H) mg/dL      Glucose 129 mg/dL      Calcium 8 8 mg/dL      AST 18 U/L      ALT 15 U/L      Alkaline Phosphatase 53 U/L      Total Protein 6 9 g/dL      Albumin 3 2 (L) g/dL      Total Bilirubin 1 10 (H) mg/dL      eGFR 47 ml/min/1 73sq m     Narrative:         National Kidney Disease Education Program recommendations are as follows:  GFR calculation is accurate only with a steady state creatinine  Chronic Kidney disease less than 60 ml/min/1 73 sq  meters  Kidney failure less than 15 ml/min/1 73 sq  meters  CBC and differential [[id number]]  (Abnormal) Collected:  10/29/18 0021    Lab Status:  Final result Specimen:  Blood from Arm, Right Updated:  10/29/18 0027     WBC 12 42 (H) Thousand/uL      RBC 3 39 (L) Million/uL      Hemoglobin 10 5 (L) g/dL      Hematocrit 32 0 (L) %      MCV 94 fL      MCH 31 0 pg      MCHC 32 8 g/dL      RDW 13 2 %      MPV 11 3 fL      Platelets 235 (L) Thousands/uL      nRBC 0 /100 WBCs      Neutrophils Relative 84 (H) %      Immat GRANS % 0 %      Lymphocytes Relative 8 (L) %      Monocytes Relative 8 %      Eosinophils Relative 0 %      Basophils Relative 0 %      Neutrophils Absolute 10 28 (H) Thousands/µL      Immature Grans Absolute 0 05 Thousand/uL      Lymphocytes Absolute 1 04 Thousands/µL      Monocytes Absolute 0 99 Thousand/µL      Eosinophils Absolute 0 04 Thousand/µL      Basophils Absolute 0 02 Thousands/µL                  XR chest 1 view portable   Final Result by Rosana Maldonado MD (10/29 1618)      Pleural calcifications may indicate asbestos related pleural disease  Additional interstitial prominence in the lungs could potentially be asbestosis  Correlate with patient history and clinical scenario  Other types of interstitial lung disease not    excluded  Workstation performed: [id number]         TRAUMA - CT head wo contrast   Final Result by Adela Morrison MD (10/29 0037)      Subarachnoid hemorrhage as detailed above at the left temporal region  Questionable focus of intraparenchymal hemorrhage  Mild microvascular ischemic disease  Atrophy  Findings discussed with Dr Myles Corey at 12:37 AM, 10/29/2018            Workstation performed: [id number]         CT spine cervical without contrast   Final Result by Adela Morrison MD (10/29 0037)      No cervical spine fracture or traumatic malalignment        Findings were discussed with Dr Myles Corey at 12:36 AM, 10/29/2018                   Workstation performed: [id number]                    Procedures  Procedures       Phone Contacts  ED Phone Contact    ED Course  ED Course as of Oct 29 1206   Mon Oct 29, 2018   0022 PAD, s/p LLE arteriogram, L BK popliteal and AT arthrectomy, tibioperoneal trunk/ BK popliteal/ anterior tibial angioplasty, LLE angiojet and tPA    0100 Patient has chronic ischemia his left lower extremity status post his 3rd intervention per notes and per family they did not want to tell the patient that he likely has no more revascularization options he per family he very well might end up losing his leg after discussion with vascular surgery, patient has traumatic subarachnoid hemorrhage on Xarelto Plavix and aspirin he also has chronic ischemic left leg status post intervention requiring Xarelto, strongly recommended  emergent reversal, Dr Clemente Varela of trauma surgery accepting physician recommend reversal patient is contemplating this at this time understands risks benefits alternatives he is his own decision maker and does not want to lose his leg although understands that he may have significant progressive decline requiring emergency surgery permanent disability prolonged hospitalization in death firm intracranial hemorrhage, understands that reversal may result in loss of leg from ischemic leg, patient deciding at this time he does have biphasic dopplerable dorsalis pedis posterior tibial pulses his left lower extremity that are marked transfer pending prior to 1    0112 Discussed risks benefits alternatives to rapid reversal understands life and limb-threatening patient has intracranial hemorrhage on Xarelto aspirin Plavix, EMS is here, consented to reversal with [facility] patient to be given [facility] now, hung over 20 minutes on route to EMS GCS 15                                MDM  Number of Diagnoses or Management Options  Anticoagulated:   Contusion of forehead, initial encounter:   Peripheral artery disease [facility]):   Subarachnoid hemorrhage [facility]):   Syncope and collapse:   Diagnosis management comments: 66-year-old male history of peripheral artery disease status post intervention 3 days ago currently on aspirin Plavix and Xarelto with likely syncopal episode did strike his head, he is asymptomatic now GCS 15 without further complaints on exam here he is afebrile normal vital signs clinically well-appearing his complete head-to-toe exam otherwise as noted, will be taken for CT of his head neck given his advanced age, cardiac evaluation, his EKG was noted for a bifascicular block without previous, patient will require observation though disposition pending further evaluation and reassessment    The patient presented with a condition in which there was a high probability of imminent or life-threatening deterioration, and critical care services (excluding separately billable procedures) totalled  minutes  Disposition  Final diagnoses:   Subarachnoid hemorrhage ([facility] )   Anticoagulated   Syncope and collapse   Contusion of forehead, initial encounter   Peripheral artery disease ([facility] )     Time reflects when diagnosis was documented in both MDM as applicable and the Disposition within this note     Time User Action Codes Description Comment    10/29/2018 12:59 AM Jennifer Rodríguezris Add [I60 9] Subarachnoid hemorrhage ([facility] )     10/29/2018 12:59 AM [place] Add [Z79 01] Anticoagulated     10/29/2018 12:59 AM Jennifer Rodríguez Add [R55] Syncope and collapse     10/29/2018 12:59 AM Jennifer Pittman Add [S00 83XA] Contusion of forehead, initial encounter     10/29/2018 12:59 AM Jennifer Pittman Add [I73 9] Peripheral artery disease [facility])       ED Disposition     ED Disposition Condition Comment    Transfer to Another Facility  Marlon Wolf should be transferred out to Claudette Valdovinos MD Documentation      Most Recent Value   Patient Condition  The patient has been stabilized such that within reasonable medical probability, no material deterioration of the patient condition or the condition of the unborn child(elier) is likely to result from the transfer   Reason for Transfer  Level of Care needed not available at this facility   Benefits of Transfer  Specialized equipment and/or services available at the receiving facility (Include comment)________________________, Continuity of care, Patient preference   Risks of Transfer  Potential for delay in receiving treatment, Potential deterioration of medical condition, Loss of IV, Increased discomfort during transfer, Possible worsening of condition or death during transfer   Accepting Physician  [address] Name, [address] [facility]   Sending MD Kim Connolly   Provider Certification  General risk, such as traffic hazards, adverse weather conditions, rough terrain or turbulence, possible failure of equipment (including vehicle or aircraft), or consequences of actions of persons outside the control of the transport personnel      RN Documentation      Most Recent Value   Accepting Facility Name, [address] [facility]   Report Given to  [facility], [facility]      Follow-up Information    None         Discharge Medication List as of 10/29/2018  1:29 AM      CONTINUE these medications which have NOT CHANGED    Details   aspirin 81 MG tablet Take by mouth, Historical Med      Calcium Carb-Cholecalciferol (CALCIUM 1000 + D) 1000-800 MG-UNIT TABS Take 1 tablet by mouth daily, Starting Wed 10/11/2017, Historical Med      Cholecalciferol (VITAMIN D) 2000 units CAPS Take by mouth, Historical Med      clopidogrel (PLAVIX) 75 mg tablet TAKE 1 TABLET (75 MG TOTAL) BY MOUTH DAILY, Starting Tue 9/18/2018, Normal      cyanocobalamin (VITAMIN B-12) 1,000 mcg tablet Take by mouth daily, Historical Med      ferrous sulfate 325 (65 Fe) mg tablet Take 1 tablet (325 mg total) by mouth 2 (two) times a day, Starting Wed 9/26/2018, Normal      meloxicam (MOBIC) 15 mg tablet Take 1 tablet (15 mg total) by mouth daily, Starting Fri 7/20/2018, Normal      Omega-3 Fatty Acids (FISH OIL) 645 MG CAPS Take by mouth, Historical Med      rivaroxaban (XARELTO) 15 mg tablet Take 1 tablet (15 mg total) by mouth 2 (two) times a day with meals for 21 days, Starting Sat 10/27/2018, Until Sat 11/17/2018, Print      silver sulfadiazine (SILVADENE,SSD) 1 % cream Apply topically daily, Starting Mon 10/8/2018, Normal      simvastatin (ZOCOR) 20 mg tablet Take 1 tablet (20 mg total) by mouth daily, Starting Fri 3/16/2018, Normal      traMADol (ULTRAM) 50 mg tablet Take 1 tablet (50 mg total) by mouth every 6 (six) hours as needed for moderate pain, Starting Mon 10/8/2018, Normal           No discharge procedures on file      ED Provider  Electronically Signed by           Willard Carvalho DO  10/29/18 8798

## 2018-10-29 NOTE — PHYSICAL THERAPY NOTE
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 ([facility] ) [I70 222]    Problem List  Patient Active Problem List   Diagnosis    Severe peripheral arterial disease ([facility] )    Hyperlipidemia    Vitamin D deficiency    Bifascicular block    Anemia    CKD (chronic kidney disease), stage III ([facility] )    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 ([facility] )    Atherosclerosis of native arteries of left leg with ulceration of other part of foot ([facility] )    Subarachnoid hemorrhage ([facility] )       Past Medical History  Past Medical History:   Diagnosis Date    Arthritis     Chronic kidney disease     DVT femoral (deep venous thrombosis) with thrombophlebitis, right ([facility] )     Hyperlipidemia     Vascular disease        Past Surgical History  Past Surgical History:   Procedure Laterality Date    AMPUTATION  1972    L thumb    CARPAL TUNNEL RELEASE  2016    L hand    CATARACT EXTRACTION      COLONOSCOPY      FRACTURE SURGERY      KNEE ARTHROSCOPY      OR Major Teixeira 3RD+ ORD SLCTV ABDL PEL/LXTR [facility] Left 3/9/2018    Procedure: LEFT LOWER EXTREMITY ARTERIOGRAM WITH PTA OF LEFT POPLITEAL ARTERY;  Surgeon: Clemente Penn MD;  Location: [facility];  Service: Vascular      10/29/18 1128   Note Type   Note type Eval only   Pain Assessment   Pain Assessment 0-10   Pain Score 8   Pain Type Acute pain;Surgical pain   Pain Location Foot   Pain Orientation Left   Home Living   Type of [address] One level;Stairs to enter with rails  (2 ARMIN)   Home Equipment Quad cane   Prior Function   Level of [place] Independent with ADLs and functional mobility   Lives With Spouse   Receives Help From Family   ADL Assistance Independent   IADLs Independent   Falls in the last 6 months 1 to 4  (1)   Vocational Retired Restrictions/Precautions   Weight Bearing Precautions Per Order No   Braces or Orthoses (SURGICAL SHOE L LE )   Other Precautions Pain; Fall Risk;Telemetry;Multiple lines   General   Family/Caregiver Present Yes   Cognition   Overall Cognitive Status WFL   RUE Assessment   RUE Assessment WFL   LUE Assessment   LUE Assessment WFL   RLE Assessment   RLE Assessment WFL  (4/5 (GROSSLY))   LLE Assessment   LLE Assessment WFL  (4/5 (GROSSLY))   Light Touch   RLE Light Touch Grossly intact   LLE Light Touch Grossly intact   Bed Mobility   Supine to Sit Unable to assess   Sit to Supine Unable to assess   Transfers   Sit to Stand 5  Supervision   Additional items Increased time required   Stand to Sit 5  Supervision   Ambulation/Elevation   Gait pattern Excessively slow; Antalgic   Gait Assistance 5  Supervision   Assistive Device Straight cane   Distance 50 FEET X2   Stair Management Assistance 5  Supervision   Stair Management Technique One rail L;Foreward;Nonreciprocal;With cane   Number of Stairs 2   Balance   Static Sitting Normal   Static Standing Good   Ambulatory Fair  (W/ SPC)   Endurance Deficit   Endurance Deficit Yes   Endurance Deficit Description PAIN IN L FOOT    Activity Tolerance   Activity Tolerance Patient tolerated treatment well;Patient limited by pain   Medical Staff Made Aware CRISPIN BOUDREAUX   Nurse Made Aware LILLY REYES   Assessment   Prognosis Good   Problem List Decreased endurance;Pain   Assessment PT COMPLETED EVALUATION OF 80YEAR OLD MALE ADMITTED TO [facility] ON 10/29/18 AS TRANSFER FROM [facility] S/P FALL  UNABLE TO RECALL EVENTS/?LOC  PATIENT ON PLAVIX S/P RECENT ENDOVASCULAR INTERVENTION PERFORMED 2 DAYS AGO (L FEMORAL THROMBOEMBOLECTOMY)  CT HEAD IDENTIFIED "TRACE LEFT MIDDLE CRANIAL FOSSA SUBARACHNOID HEMORRHAGE"  CURRENT MEDICAL AND PHYSICAL INSTABILITIES INCLUDE PAIN (L TOE), CONTINUED ICU ADMISSION, CONTINUOUS O2/HR MONITORING, AND A REGRESSION IN FUNCTIONAL STATUS FROM BASELINE   PMH IS SIGNIFICANT FOR  L THUMB AMPUTATION, B/L CARPAL TUNNEL, VASCULAR DISEASE, CKD, AND ARTHRITIS  PRIOR TO THIS ADMISSION PATIENT RESIDED WITH SPOUSE IN A ONE LEVEL HOME (2 ARMIN W/ L SIDED HANDRAIL) WHERE HE WAS PREVIOUSLY I WITH MOBILITY (QUAD CANE OUTSIDE OF THE HOME AND INSIDE OF THE HOME AT NIGHT), ADLS, AND IADLS  CURRENT IMPAIRMENTS INCLUDE PAIN (L TOE), GAIT DEVIATIONS, FALLS RISK (LINES), AND MILD BALANCE IMPAIRMENTS  DURING PT EVALUATION PATIENT EDUCATED ON IMPORTANCE OF PERFORMANCE OF SLOW POSITIONAL CHANGES (IE  SIT-->STAND) TO MAKE SURE HE IS NOT DIZZY BEFORE AMBULATING  HE REQUIRED S FOR SIT->STAND TRANSFER, AMBULATION, AND STAIR NAVIGATION  PATIENT AMBULATED 50 FEET X 2 W/ SPC PRESENTING WITH ANTALGIC GAIT, NO LOB, MILDLY REDUCED SPEED  HE ASCENDED/DESCENDED TWO STEPS NON RECIPROCALLY WITH L SIDED HAND RAIL AND SPC IN R UE (ASCEND WITH UNAFFECTED LE AND DESCENDING WITH AFFECTED LE)  ENCOURAGED PATIENT TO UTILIZE QUAD CANE AT ALL TIMES UPON D/C HOME WHICH HE IS AGREEABLE TO WHILE L FOOT IS ANTALGIC AND TO IMPROVE BALANCE  PATIENT/FAMILY DENYING QUESTIONS/COCNERNS ABOUT D/C HOME WHEN MED ELOINA  PT D/C RECOMMENDATION IS FOR HOME WITH USE OF QUAD CANE AND FAMILY ASSIST PRN  D/C INPT PT  RECOMMEND CONTINUED AMBULATION WITH RN STAFF THIS ADMISSION      Goals   Patient Goals TO GO HOME    Treatment Day 0   Recommendation   Recommendation Home with family support   Equipment Recommended Other (Comment)  (QUAD CANE- PATIENT HAS AT HOME)   PT - OK to Discharge Yes  (HOME, USE OF QUAD CANE ALL OF THE TIME, FAMILY ASSIST PRN )   Barthel Index   Feeding 10   Bathing 5   Grooming Score 5   Dressing Score 10   Bladder Score 10   Bowels Score 10   Toilet Use Score 10   Transfers (Bed/Chair) Score 15   Mobility (Level Surface) Score 10   Stairs Score 5   Barthel Index Score 90           Desmond Mckeon PT

## 2018-10-29 NOTE — EMTALA/ACUTE CARE TRANSFER
[address] [address]  Dept: [phone]      UGPLVG TRANSFER CONSENT    NAME Dixon Willis                                         1933                              MRN [id number]    I have been informed of my rights regarding examination, treatment, and transfer   by Dr Duc Del Cid DO    Benefits: Specialized equipment and/or services available at the receiving facility (Include comment)________________________, Continuity of care, Patient preference    Risks: Potential for delay in receiving treatment, Potential deterioration of medical condition, Loss of IV, Increased discomfort during transfer, Possible worsening of condition or death during transfer      Consent for Transfer:  I acknowledge that my medical condition has been evaluated and explained to me by the emergency department physician or other qualified medical person and/or my attending physician, who has recommended that I be transferred to the service of  Accepting Physician: Enrique Pierce at [facility] Name, [address] : [facility] Emergency Department  The above potential benefits of such transfer, the potential risks associated with such transfer, and the probable risks of not being transferred have been explained to me, and I fully understand them  The doctor has explained that, in my case, the benefits of transfer outweigh the risks  I agree to be transferred  I authorize the performance of emergency medical procedures and treatments upon me in both transit and upon arrival at the receiving facility  Additionally, I authorize the release of any and all medical records to the receiving facility and request they be transported with me, if possible  I understand that the safest mode of transportation during a medical emergency is an ambulance and that the Hospital advocates the use of this mode of transport   Risks of traveling to the receiving facility by car, including absence of medical control, life sustaining equipment, such as oxygen, and medical personnel has been explained to me and I fully understand them  (JIGNESH CORRECT BOX BELOW)  [  ]  I consent to the stated transfer and to be transported by ambulance/helicopter  [  ]  I consent to the stated transfer, but refuse transportation by ambulance and accept full responsibility for my transportation by car  I understand the risks of non-ambulance transfers and I exonerate the Hospital and its staff from any deterioration in my condition that results from this refusal     X___________________________________________    DATE  10/29/18  TIME________  Signature of patient or legally responsible individual signing on patient behalf           RELATIONSHIP TO PATIENT_________________________          Provider Certification    NAME Angela Duffy                                        [facility] 1933                              MRN [id number]    A medical screening exam was performed on the above named patient  Based on the examination:    Condition Necessitating Transfer The primary encounter diagnosis was Subarachnoid hemorrhage ([address] )  Diagnoses of Anticoagulated, Syncope and collapse, Contusion of forehead, initial encounter, and Peripheral artery disease ([address] ) were also pertinent to this visit      Patient Condition: The patient has been stabilized such that within reasonable medical probability, no material deterioration of the patient condition or the condition of the unborn child(elier) is likely to result from the transfer    Reason for Transfer: Level of Care needed not available at this facility    Transfer Requirements: Johnnychanning Hernandez [facility] Emergency Department   · Space available and qualified personnel available for treatment as acknowledged by    · Agreed to accept transfer and to provide appropriate medical treatment as acknowledged by       Lissette BECERRIL TRAUMA  · Appropriate medical records of the examination and treatment of the patient are provided at the time of transfer   [address] [place] [address], [address] _______  · Transfer will be performed by qualified personnel from    and appropriate transfer equipment as required, including the use of necessary and appropriate life support measures  Provider Certification: I have examined the patient and explained the following risks and benefits of being transferred/refusing transfer to the patient/family:  General risk, such as traffic hazards, adverse weather conditions, rough terrain or turbulence, possible failure of equipment (including vehicle or aircraft), or consequences of actions of persons outside the control of the transport personnel      Based on these reasonable risks and benefits to the patient and/or the unborn child(elier), and based upon the information available at the time of the patients examination, I certify that the medical benefits reasonably to be expected from the provision of appropriate medical treatments at another medical facility outweigh the increasing risks, if any, to the individuals medical condition, and in the case of labor to the unborn child, from effecting the transfer      X____________________________________________ DATE 10/29/18        TIME_______      ORIGINAL - SEND TO MEDICAL RECORDS   COPY - SEND WITH PATIENT DURING TRANSFER

## 2018-10-29 NOTE — UTILIZATION REVIEW
Thank you,  [address]  Utilization Review Department  Phone: [phone]; Fax [phone]  ATTENTION: Please call with any questions or concerns to [phone]  and carefully follow the prompts so that you are directed to the right person  Send all requests for admission clinical reviews, approved or denied determinations and any other requests to fax [phone]  All voicemails are confidential        Initial Clinical Review    [facility]  : 1933  MR# [id number]    Age/Sex: 80 y o  male    Surgery Date: 10/26/2018    Procedure:   1  Ultrasound-guided access of the left common femoral artery antegrade  2  Left lower extremity angiogram using limited contrast  3  Left below-knee popliteal artery atherectomy  4  Left anterior tibial artery atherectomy  5  Left tibioperoneal trunk and peroneal artery angioplasty using 2 mm x 40 mm balloon  6  Left below-knee popliteal artery angioplasty  7  Left anterior tibial artery angioplasty  8  Left lower extremity pharmacomechanical thrombectomy using Angiojet device and 14 mg of tPA     Anesthesia: General anesthesia via endotracheal intubation    Operative Indications: 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 ([facility] ) [I70 222]       Admission Orders: Date/Time/Statement: 10/26/18 @ 1530     Orders Placed This Encounter   Procedures    Inpatient Admission     Standing Status:   Standing     Number of Occurrences:   1     Order Specific Question:   Admitting Physician     Answer:   Desiree Orozco [[id number]]     Order Specific Question:   Level of Care     Answer:   Level 2 Stepdown / HOT [14]     Order Specific Question:   Estimated length of stay     Answer:   More than 2 Midnights     Order Specific Question:   Certification     Answer:   I certify that inpatient services are medically necessary for this patient for a duration of greater than two midnights   See H&P and MD Progress Notes for additional information about the patient's course of treatment         Vital Signs: /50 (BP Location: Right arm)   Pulse 75   Temp 98 5 °F (36 9 °C) (Oral)   Resp 16   Ht 5' 10" (1 778 m)   Wt 67 5 kg (148 lb 13 oz)   SpO2 98%   BMI 21 35 kg/m²     Diet: Cardiac diet    Mobility: activity as sandra    DVT Prophylaxis: venodynes RLE    Pain Control:   Pain Medications             meloxicam (MOBIC) 15 mg tablet Take 1 tablet (15 mg total) by mouth daily    traMADol (ULTRAM) 50 mg tablet Take 1 tablet (50 mg total) by mouth every 6 (six) hours as needed for moderate pain 10/26 x1, 10/27 x1

## 2018-10-29 NOTE — CONSULTS
Consultation - Vascular Surgery   Cristy Jones 80 y o  male MRN: [id number]  Unit/Bed#: [facility] -01 Encounter: [id number]    Assessment/Plan     Assessment:  84M who presents with a fall on aspirin, Plavix, and Xarelto  He was found to have subarachnoid hemorrhage on CT scan  Plan:  -agree with reversal of a/c  -please hold a/c  -appreciate NSG recs; restart a/c when they state it is safe  History of Present Illness   HPI:  Cristy Jones is a 80 y o  male who presents with a fall  He does not remember what happened  He used the bathroom, and awoke on the floor to his wife poking him  He had struck his head, so they went to the hospital  There he was found to have a SAH  Of note, he is POD 3 from a left femoral thromboembolectomy  His exam is identical to the one I obtained POD 0  Inpatient consult to Vascular Surgery     Date/Time 10/29/2018 8:49 AM     Performed by  Jigar Israel     Authorized by Jt Iyer              Review of Systems   Constitutional: Negative  Negative for activity change and appetite change  HENT: Negative  Negative for hearing loss and trouble swallowing  Eyes: Negative  Negative for photophobia and redness  Respiratory: Negative  Negative for chest tightness and shortness of breath  Cardiovascular: Negative  Negative for chest pain and palpitations  Gastrointestinal: Negative  Negative for abdominal distention and abdominal pain  Endocrine: Negative  Negative for cold intolerance and heat intolerance  Genitourinary: Negative  Negative for flank pain and genital sores  Musculoskeletal: Negative  Negative for arthralgias and back pain  Skin: Negative  Negative for color change and pallor  Allergic/Immunologic: Negative  Neurological: Positive for syncope  Negative for dizziness, tremors, seizures, facial asymmetry, speech difficulty, light-headedness, numbness and headaches  Hematological: Negative for adenopathy  Bruises/bleeds easily  Psychiatric/Behavioral: Negative  Negative for agitation and behavioral problems  Historical Information   Past Medical History:   Diagnosis Date    Arthritis     Chronic kidney disease     DVT femoral (deep venous thrombosis) with thrombophlebitis, right (HCC)     Hyperlipidemia     Vascular disease      Past Surgical History:   Procedure Laterality Date    AMPUTATION  1972    L thumb    CARPAL TUNNEL RELEASE  2016    L hand    CATARACT EXTRACTION      COLONOSCOPY      FRACTURE SURGERY      KNEE ARTHROSCOPY      STEVEN Fernandez 3RD+ ORD SLCTV ABDL PEL/LXTR [facility] Left 3/9/2018    Procedure: LEFT LOWER EXTREMITY ARTERIOGRAM WITH PTA OF LEFT POPLITEAL ARTERY;  Surgeon: Ward Quintero MD;  Location: [facility];  Service: Vascular     Social History   History   Alcohol Use No     History   Drug Use No     History   Smoking Status    Former Smoker   Smokeless Tobacco    Never Used     Comment: quit 62 years ago     Family History:   Family History   Problem Relation Age of Onset    Lung cancer Mother     Diabetes Father     Aneurysm Father     Diabetes Maternal Grandmother        Meds/Allergies   PTA meds:   Prior to Admission Medications   Prescriptions Last Dose Informant Patient Reported? Taking?    Calcium Carb-Cholecalciferol (CALCIUM 1000 + D) 1000-800 MG-UNIT TABS  Self Yes Yes   Sig: Take 1 tablet by mouth daily   Cholecalciferol (VITAMIN D) 2000 units CAPS  Self Yes Yes   Sig: Take by mouth   Omega-3 Fatty Acids (FISH OIL) 645 MG CAPS  Self Yes Yes   Sig: Take by mouth   aspirin 81 MG tablet  Self Yes Yes   Sig: Take by mouth   clopidogrel (PLAVIX) 75 mg tablet   No Yes   Sig: TAKE 1 TABLET (75 MG TOTAL) BY MOUTH DAILY   cyanocobalamin (VITAMIN B-12) 1,000 mcg tablet  Self Yes Yes   Sig: Take by mouth daily   ferrous sulfate 325 (65 Fe) mg tablet   No Yes   Sig: Take 1 tablet (325 mg total) by mouth 2 (two) times a day   meloxicam (MOBIC) 15 mg tablet   No Yes   Sig: Take 1 tablet (15 mg total) by mouth daily   rivaroxaban (XARELTO) 15 mg tablet   No Yes   Sig: Take 1 tablet (15 mg total) by mouth 2 (two) times a day with meals for 21 days   silver sulfadiazine (SILVADENE,SSD) 1 % cream   No Yes   Sig: Apply topically daily   simvastatin (ZOCOR) 20 mg tablet   No Yes   Sig: Take 1 tablet (20 mg total) by mouth daily   traMADol (ULTRAM) 50 mg tablet   No Yes   Sig: Take 1 tablet (50 mg total) by mouth every 6 (six) hours as needed for moderate pain      Facility-Administered Medications: None     No Known Allergies    Objective   First Vitals:   Blood Pressure: 169/74 (10/29/18 0217)  Pulse: 72 (10/29/18 0217)  Temperature: 98 °F (36 7 °C) (10/29/18 0217)  Temp Source: Oral (10/29/18 0217)  Respirations: 18 (10/29/18 0217)  Height: 5' 10" (177 8 cm) (10/29/18 0458)  Weight - Scale: 67 6 kg (149 lb) (10/29/18 0218)  SpO2: 98 % (10/29/18 0217)    Current Vitals:   Blood Pressure: 125/58 (10/29/18 0458)  Pulse: 74 (10/29/18 0500)  Temperature: 98 5 °F (36 9 °C) (10/29/18 0458)  Temp Source: Oral (10/29/18 0458)  Respirations: 22 (10/29/18 0625)  Height: 5' 10" (177 8 cm) (10/29/18 0458)  Weight - Scale: 67 6 kg (149 lb 0 5 oz) (10/29/18 0551)  SpO2: 96 % (10/29/18 0625)      Intake/Output Summary (Last 24 hours) at 10/29/18 0848  Last data filed at 10/29/18 0426   Gross per 24 hour   Intake               50 ml   Output              150 ml   Net             -100 ml       Invasive Devices     Peripheral Intravenous Line            Peripheral IV 10/28/18 Left Wrist less than 1 day    Peripheral IV 10/29/18 Right Forearm less than 1 day                Physical Exam   Constitutional: He is oriented to person, place, and time  He appears well-developed and well-nourished  No distress  HENT:   Head: Normocephalic and atraumatic  Right Ear: External ear normal    Left Ear: External ear normal    Eyes: Pupils are equal, round, and reactive to light   Conjunctivae and EOM are normal  Right eye exhibits no discharge  Left eye exhibits no discharge  No scleral icterus  Neck: No tracheal deviation present  Cardiovascular: Normal rate and intact distal pulses  Pulmonary/Chest: Effort normal  No stridor  No respiratory distress  Abdominal: Soft  He exhibits no distension  Musculoskeletal: Normal range of motion  He exhibits no edema or deformity  Left hip: He exhibits swelling  Legs:  Neurological: He is alert and oriented to person, place, and time  No cranial nerve deficit  Skin: Skin is warm and dry  No rash noted  He is not diaphoretic  No erythema  Vitals reviewed  B/l feet without Doppler signals at PT and DP, but he is motor and sensory intact  This is consistent with his baseline exam       Lab Results: I have personally reviewed pertinent lab results  Imaging: I have personally reviewed pertinent reports  EKG, Pathology, and Other Studies: I have personally reviewed pertinent reports

## 2018-10-29 NOTE — PROGRESS NOTES
10/29/18 1000   Spiritual Beliefs/Perceptions   Support Systems Spouse/significant other;Children;Family members   Stress Factors   Patient Stress Factors None identified   Family Stress Factors None identified   Plan of Care   Comments Pt  sitting up in chair family presence  Provided pastoral presence and prayer support     Assessment Completed by: Unit visit

## 2018-10-29 NOTE — PROGRESS NOTES
Progress Note - ICU Transfer to Step down/med  surg  Tristin Thornton 80 y o  male MRN: [id number]    Unit/Bed#: 3150 [facility] -01 Encounter: [id number]    Code Status: Level 1 - Full Code  POA:    POLST:      Reason for ICU adm: Subarachnoid hemorrhage, possible intraparenchymal hemorrhage    Active problems:   Principal Problem:    Subarachnoid hemorrhage (HCC)  Active Problems:    Severe peripheral arterial disease ([address] )    Consultants: Neurosurgery, Vascular    History of Present Illness/Summary of clinical course:   80M transfer from [place] with a hx of PAD and recent LLE thrombectomy and angioplasty on ASA, plavix, and xarelto, presenting sp fall from standing with SAH and intraparenchymal hemorrhage  He was recently discharged from our facility (10/27) after undergoing IR lysis and thrombectomy of a L popliteal and tibioperoneal trunk thrombus, and started on ASA, plavix, and xarelto given his complicated history of PAD requiring multiple interventions  Prior to this admission, he reports that he had a fall while using the restroom and cannot remember what happened  CT of the head showed a SAH and possible intraparenchymal hemorrhage  Given the variety of anticoagulant/antiplatelet agents he was given [place] [facility] [place] at [place] and DDAVP in our ED to correct his coagulopathy  Both neurosurgery and vascular were consulted, no surgical intervention needed at this time, and it was decided that his anticoagulation should be held at this time  He was admitted to the ICU for Q1Hr neuro checks  Overnight his neuro exam remained stable, and his AM CT head showed no interval change of his Subarachnoid hemorrhage  Anticoagulation is currently being held, and plans for restarting will need to be discussed with vascular and neurosurgery  He is currently stable for transfer to the floor       Recent or scheduled procedures: None    Outstanding/pending diagnostics: Repeat CT head tomorrow per neurosurgery       Mobilization Plan: OOB    Nutrition Plan: Regular diet    Discharge Plan:    Patient should be ready for discharge once medically stable and anticoagulation plan is finalized   Initial PT/OT/ST Recommendations: home with family support   Initial /Plan: pending     Specific Diagnosis Plan:    Spoke with Trauma PA regarding transfer  Portions of the record may have been created with voice recognition software  Occasional wrong word or "sound a like" substitutions may have occurred due to the inherent limitations of voice recognition software  Read the chart carefully and recognize, using context, where substitutions have occurred      Sang Finn MD

## 2018-10-29 NOTE — TRAUMA DOCUMENTATION
MEENA gutierrez pt to [facility] ED  Dr Jayne Marcos accepting   Report to be called to [phone]

## 2018-10-29 NOTE — SOCIAL WORK
CM met with pt and his wife, Magalis Glez, at bedside and reviewed role with dcp  Pt resides with his wife in a single story home with 2 ARMIN  Pt is independent with ADLs and using a SPC for safe ambulation  Pt and Magalis Glez are both retired  Pt does not drive and Magalis Glez provides all transportation  Pt has no hx of VNA or STR  No hx of MH or drug/alcohol abuse  Pharmacy is [facility] [place]  Pt has POA and LW  Copy requested  CM to follow  CM reviewed d/c planning process including the following: identifying help at home, patient preference for d/c planning needs, Discharge Lounge, [facility] Meds to Bed program, availability of treatment team to discuss questions or concerns patient and/or family may have regarding understanding medications and recognizing signs and symptoms once discharged  CM also encouraged patient to follow up with all recommended appointments after discharge  Patient advised of importance for patient and family to participate in managing patients medical well being

## 2018-10-29 NOTE — H&P
H&P Exam - Trauma   Stella Hopson 80 y o  male MRN: [id number]  Unit/Bed#: ED 05 Encounter: [id number]    Assessment/Plan   Trauma Alert: Evaluation  Model of Arrival: Ambulance  Trauma Team: Attending Isidra Alfred  Consultants: Neurosurgery: consulted  Time Called  consult placed    Trauma Active Problems/plan:   Subarachnoid hemorrhage in left temporal region:  -ICU admission with q1hr neurochecks  -Reverse ASA, plavix and xarelto with DDAVP and Kcentra  -Neurosurg consult  -CT head in AM  -rescan if change in neuro exam    DVT ppx: held in setting of bleed    Dispo: ICU admission    Chief Complaint: none    History of Present Illness   HPI:  Stella Hopson is a 80 y o  male who presents from home as a transfer from [facility] for evaluation after a fall from standing onto floor  Patient cannot recall events around fall, may have had LOC  Patient is on ASA Plavix and xarelto for peripheral artery disease, with recent endovascular intervention performed 2 days ago  Patient denying any symptoms on evaluation  Mechanism:Fall    Review of Systems   Constitutional: Negative for chills, fatigue and fever  HENT: Negative for nosebleeds and sore throat  Eyes: Negative for redness and visual disturbance  Respiratory: Negative for shortness of breath and wheezing  Cardiovascular: Negative for chest pain and palpitations  Gastrointestinal: Negative for abdominal pain and diarrhea  Endocrine: Negative for polyuria  Genitourinary: Negative for difficulty urinating and testicular pain  Musculoskeletal: Negative for back pain and neck stiffness  Skin: Negative for rash and wound  Neurological: Negative for seizures, speech difficulty and headaches  Psychiatric/Behavioral: Negative for dysphoric mood and hallucinations  All other systems reviewed and are negative        Historical Information       Past Medical History:   Diagnosis Date    Arthritis     Chronic kidney disease     DVT femoral (deep venous thrombosis) with thrombophlebitis, right ([address] )     Hyperlipidemia     Vascular disease      Past Surgical History:   Procedure Laterality Date    AMPUTATION  1972    L thumb    CARPAL TUNNEL RELEASE  2016    L hand    CATARACT EXTRACTION      COLONOSCOPY      FRACTURE SURGERY      KNEE ARTHROSCOPY      SD [facility] 3RD+ ORD SLCTV ABDL PEL/LXTR [facility] Left 3/9/2018    Procedure: LEFT LOWER EXTREMITY ARTERIOGRAM WITH PTA OF LEFT POPLITEAL ARTERY;  Surgeon: Camron Bianchi MD;  Location: [facility];  Service: Vascular     Social History   History   Alcohol Use No     History   Drug Use No     History   Smoking Status    Former Smoker   Smokeless Tobacco    Never Used     Comment: quit 57 years ago     Immunization History   Administered Date(s) Administered    Influenza Split High Dose Preservative Free IM 10/01/2015, 09/26/2016    Influenza, high dose seasonal 0 5 mL 09/07/2018    Pneumococcal Conjugate 13-Valent 01/14/2016    Pneumococcal Polysaccharide PPV23 05/04/2017    Tdap 10/01/2013     Last Tetanus: 10/1/2013  Family History: Non-contributory        Meds/Allergies   all current active meds have been reviewed    No Known Allergies      PHYSICAL EXAM      Objective   Vitals:   First set: Temperature: 98 °F (36 7 °C) (10/29/18 0217)  Pulse: 72 (10/29/18 0217)  Respirations: 18 (10/29/18 0217)  Blood Pressure: 169/74 (10/29/18 0217)    Primary Survey:   (A) Airway: in tact  (B) Breathing: CTAB  (C) Circulation: Pulses:   pedal  1/4, radial  2/4 and femoral  2/4, pedal pulse via doppler  (D) Disabliity:  GCS Total:  15  (E) Expose:  Completed    Secondary Survey: (Click on Physical Exam tab above)  Physical Exam   Constitutional: He is oriented to person, place, and time  He appears well-developed and well-nourished  No distress  HENT:   Head: Normocephalic and atraumatic     Right Ear: External ear normal    Left Ear: External ear normal    Mouth/Throat: Oropharynx is clear and moist    Eyes: Pupils are equal, round, and reactive to light  Neck: Normal range of motion  Cardiovascular: Normal rate, regular rhythm and normal heart sounds  Pulses on lower extremities found via Doppler bilaterally  Pulmonary/Chest: [place] normal and breath sounds normal  No respiratory distress  He has no wheezes  Abdominal: Soft  There is no tenderness  There is no rebound  Musculoskeletal: Normal range of motion  No c t or l spine tenderness   Neurological: He is alert and oriented to person, place, and time  No cranial nerve deficit  Mental Status: Alert and oriented to person place time and situation, language fluent with good comprehension and repetition  CN: PERRLA, extraocular muscles intact without nystagmus  Face symmetrical with full sensation  Hearing in tact to bilateral finger rub  Tongue protrudes midline and palate elevates symmetrically  Sternocleidomastoid and trapezius muscle have full strength bilaterally  Motor: Normal muscle bulk and tone throughout 5/5 strength in upper and lower extremities throughout  No clonus present  Sensory: Sensation intact to light touch  Coordination: Able to perform finger to nose to finger,   Skin: Skin is warm and dry  He is not diaphoretic  No erythema  Ecchymosis on left groin, mild tenderness to touch, normal per patient after surgery  Ecchymosis on left side of face near temple, non tender  Vitals reviewed        Invasive Devices     Peripheral Intravenous Line            Peripheral IV 10/28/18 Left Wrist less than 1 day    Peripheral IV 10/29/18 Right Forearm less than 1 day                Lab Results:   Results: I have personally reviewed pertinent reports   , BMP/CMP:   Lab Results   Component Value Date     (L) 10/29/2018    K 3 8 10/29/2018    CL 99 (L) 10/29/2018    CO2 27 10/29/2018    BUN 20 10/29/2018    CREATININE 1 36 (H) 10/29/2018    CALCIUM 8 8 10/29/2018    AST 18 10/29/2018    ALT 15 10/29/2018    ALKPHOS 53 10/29/2018 EGFR 47 10/29/2018   , CBC:   Lab Results   Component Value Date    WBC 12 42 (H) 10/29/2018    HGB 10 5 (L) 10/29/2018    HCT 32 0 (L) 10/29/2018    MCV 94 10/29/2018     (L) 10/29/2018    MCH 31 0 10/29/2018    MCHC 32 8 10/29/2018    RDW 13 2 10/29/2018    MPV 11 3 10/29/2018    NRBC 0 10/29/2018    and Coagulation:   Lab Results   Component Value Date    INR 2 83 (H) 10/29/2018     Imaging/EKG Studies: Results: I have personally reviewed pertinent reports  Trauma - Ct Head Wo Contrast    Result Date: 10/29/2018  Impression: Subarachnoid hemorrhage as detailed above at the left temporal region  Questionable focus of intraparenchymal hemorrhage  Mild microvascular ischemic disease  Atrophy  Findings discussed with Dr Linh Rea at 12:37 AM, 10/29/2018 Workstation performed: [id number]     Ct Spine Cervical Without Contrast    Result Date: 10/29/2018  Impression: No cervical spine fracture or traumatic malalignment   Findings were discussed with Dr Linh Rea at 12:36 AM, 10/29/2018  Workstation performed: [id number]       Code Status: full

## 2018-10-29 NOTE — OCCUPATIONAL THERAPY NOTE
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 with ulceration of other part of foot ([facility] [address]    Subarachnoid hemorrhage (HCC)       Past Medical History:   Diagnosis Date    Arthritis     Chronic kidney disease     DVT femoral (deep venous thrombosis) with thrombophlebitis, right ([address] )     Hyperlipidemia     Vascular disease        Past Surgical History:   Procedure Laterality Date    AMPUTATION  1972    L thumb    CARPAL TUNNEL RELEASE  2016    L hand    CATARACT EXTRACTION      COLONOSCOPY      FRACTURE SURGERY      KNEE ARTHROSCOPY      [place] [address] 3RD+ ORD SLCTV ABDL PEL/LXTR [facility] Left 3/9/2018    Procedure: LEFT LOWER EXTREMITY ARTERIOGRAM WITH PTA OF LEFT POPLITEAL ARTERY;  Surgeon: Leisa Burden MD;  [facility];  Service: Vascular      10/29/18 1127   Note Type   Note type Eval only   Restrictions/Precautions   Other Precautions Pain; Fall Risk;Telemetry;Multiple lines   Pain Assessment   Pain Assessment 0-10   Pain Score 8   Pain Type Acute pain;Surgical pain   Pain Location Foot   Pain Orientation Left   Home Living   Type of [address] One level;Stairs to enter with rails   Bathroom Shower/Tub Tub/shower unit   Bathroom Toilet Standard   Bathroom Equipment Grab bars in shower; Shower chair;Grab bars around toilet   Home Equipment Quad cane   Prior Function   Level of [place] Independent with ADLs and functional mobility   Lives With Rafal Help From Family   ADL Assistance Independent   IADLs Independent   Falls in the last 6 months 1 to 4   Comments lives w wife in one story home   Lifestyle Autonomy indpendent self care, make the bed, take care of the cat   Reciprocal Relationships supportive family   [employer] [employer], has his "shop" in the garage   Psychosocial   Psychosocial (WDL) WDL   Subjective   Subjective "My foot hurts"   ADL   Eating Assistance 7  Independent   Grooming Assistance 7  Independent   UB Bathing Assistance 7  Independent   LB [address]  7  Independent    [facility] [address]  5  Supervision/Setup   Transfers   Sit to Stand 5  Supervision   Additional items Increased time required   Stand to Sit 5  Supervision   Functional Mobility   Functional Mobility 5  Supervision   Additional items [facility]   Balance   Static Sitting Normal   Dynamic Sitting Good   Static Standing Fair   Dynamic Standing Fair   Activity Tolerance   Activity Tolerance Patient tolerated treatment well   Nurse Made Aware appropriate to see   RUE Assessment   RUE Assessment WFL   LUE Assessment   LUE Assessment WFL   Hand Function   Gross Motor Coordination Functional   Fine Motor Coordination Functional   Vision - Complex Assessment   Tracking Able to track stimulus in all quads without difficulty   Cognition   Arousal/Participation Alert; Cooperative   Attention Within functional limits   Orientation Level Oriented X4   Memory Within functional limits   Following Commands Follows one step commands without difficulty   Assessment   Assessment Pt is a 80 y o  male seen for OT evaluation s/p admit to [facility] on 10/29/2018 w/ Subarachnoid hemorrhage (HCC) s/p fall from standing  Pt currently in the ICU for ongoing monitoring  Comorbidities affecting pt's functional performance at time of assessment include: severe PVD, hyperlipidemia, vit D deficiency, CKD, atherosclerosis LLE, hx of procedure (arterografting) last week  Adan Arboleda He was in the hospital for procedure and dc'd home on 10/27   Now readmitted due to fall  Personal factors affecting pt at time of IE include:steps to enter environment  Prior to admission, pt was indpendent w self care, mobility w use of QC  He has had 2 falls recently  He lives at home w his wife in a one story home  He doesn't drive  Upon evaluation: Pt demonstrates the ability to complete self care w S, walking in the hallway w use of Cane and Supervision  He was educated on simple ECT/self pacing skills, educated on home safety  He appears to be close to his baseline  Pt scored 90  /100 on the barthel index  Based on findings from OT evaluation and functional performance deficits, pt has been identified as a high complexity evaluation  From OT standpoint, recommendation at time of d/c would be home w family support as patient is currently functioning close to his baseline     Goals   Patient Goals to go home   Plan   OT Frequency Eval only   Recommendation   OT Discharge Recommendation Home with family support   Barthel Index   Feeding 10   Bathing 5   Grooming Score 5   Dressing Score 10   Bladder Score 10   Bowels Score 10   Toilet Use Score 10   Transfers (Bed/Chair) Score 15   Mobility (Level Surface) Score 10   Stairs Score 5   Barthel Index Score 90

## 2018-10-30 ENCOUNTER — APPOINTMENT (INPATIENT)
Dept: RADIOLOGY | Facility: HOSPITAL | Age: 83
DRG: 083 | End: 2018-10-30
Payer: COMMERCIAL

## 2018-10-30 VITALS
HEART RATE: 58 BPM | SYSTOLIC BLOOD PRESSURE: 128 MMHG | DIASTOLIC BLOOD PRESSURE: 52 MMHG | RESPIRATION RATE: 18 BRPM | HEIGHT: 69 IN | BODY MASS INDEX: 22.2 KG/M2 | OXYGEN SATURATION: 100 % | TEMPERATURE: 98 F | WEIGHT: 149.91 LBS

## 2018-10-30 LAB
ANION GAP SERPL CALCULATED.3IONS-SCNC: 6 MMOL/L (ref 4–13)
BASOPHILS # BLD AUTO: 0.01 THOUSANDS/ΜL (ref 0–0.1)
BASOPHILS NFR BLD AUTO: 0 % (ref 0–1)
BUN SERPL-MCNC: 16 MG/DL (ref 5–25)
CALCIUM SERPL-MCNC: 8.2 MG/DL (ref 8.3–10.1)
CHLORIDE SERPL-SCNC: 102 MMOL/L (ref 100–108)
CO2 SERPL-SCNC: 27 MMOL/L (ref 21–32)
CREAT SERPL-MCNC: 1.06 MG/DL (ref 0.6–1.3)
EOSINOPHIL # BLD AUTO: 0.47 THOUSAND/ΜL (ref 0–0.61)
EOSINOPHIL NFR BLD AUTO: 5 % (ref 0–6)
ERYTHROCYTE [DISTWIDTH] IN BLOOD BY AUTOMATED COUNT: 13.1 % (ref 11.6–15.1)
GFR SERPL CREATININE-BSD FRML MDRD: 64 ML/MIN/1.73SQ M
GLUCOSE SERPL-MCNC: 98 MG/DL (ref 65–140)
HCT VFR BLD AUTO: 24.6 % (ref 36.5–49.3)
HGB BLD-MCNC: 8.1 G/DL (ref 12–17)
IMM GRANULOCYTES # BLD AUTO: 0.06 THOUSAND/UL (ref 0–0.2)
IMM GRANULOCYTES NFR BLD AUTO: 1 % (ref 0–2)
LYMPHOCYTES # BLD AUTO: 1.88 THOUSANDS/ΜL (ref 0.6–4.47)
LYMPHOCYTES NFR BLD AUTO: 18 % (ref 14–44)
MAGNESIUM SERPL-MCNC: 1.8 MG/DL (ref 1.6–2.6)
MCH RBC QN AUTO: 30.8 PG (ref 26.8–34.3)
MCHC RBC AUTO-ENTMCNC: 32.9 G/DL (ref 31.4–37.4)
MCV RBC AUTO: 94 FL (ref 82–98)
MONOCYTES # BLD AUTO: 0.94 THOUSAND/ΜL (ref 0.17–1.22)
MONOCYTES NFR BLD AUTO: 9 % (ref 4–12)
NEUTROPHILS # BLD AUTO: 7.1 THOUSANDS/ΜL (ref 1.85–7.62)
NEUTS SEG NFR BLD AUTO: 67 % (ref 43–75)
NRBC BLD AUTO-RTO: 0 /100 WBCS
PHOSPHATE SERPL-MCNC: 2.6 MG/DL (ref 2.3–4.1)
PLATELET # BLD AUTO: 109 THOUSANDS/UL (ref 149–390)
PMV BLD AUTO: 11.7 FL (ref 8.9–12.7)
POTASSIUM SERPL-SCNC: 3.8 MMOL/L (ref 3.5–5.3)
RBC # BLD AUTO: 2.63 MILLION/UL (ref 3.88–5.62)
SODIUM SERPL-SCNC: 135 MMOL/L (ref 136–145)
WBC # BLD AUTO: 10.46 THOUSAND/UL (ref 4.31–10.16)

## 2018-10-30 PROCEDURE — 80048 BASIC METABOLIC PNL TOTAL CA: CPT | Performed by: ORTHOPAEDIC SURGERY

## 2018-10-30 PROCEDURE — 83735 ASSAY OF MAGNESIUM: CPT | Performed by: ORTHOPAEDIC SURGERY

## 2018-10-30 PROCEDURE — 99232 SBSQ HOSP IP/OBS MODERATE 35: CPT | Performed by: PHYSICIAN ASSISTANT

## 2018-10-30 PROCEDURE — 85025 COMPLETE CBC W/AUTO DIFF WBC: CPT | Performed by: ORTHOPAEDIC SURGERY

## 2018-10-30 PROCEDURE — 99238 HOSP IP/OBS DSCHRG MGMT 30/<: CPT | Performed by: PHYSICIAN ASSISTANT

## 2018-10-30 PROCEDURE — 84100 ASSAY OF PHOSPHORUS: CPT | Performed by: ORTHOPAEDIC SURGERY

## 2018-10-30 PROCEDURE — 70450 CT HEAD/BRAIN W/O DYE: CPT

## 2018-10-30 RX ORDER — OXYCODONE HYDROCHLORIDE 5 MG/1
5 TABLET ORAL EVERY 4 HOURS PRN
Qty: 15 TABLET | Refills: 0 | Status: SHIPPED | OUTPATIENT
Start: 2018-10-30 | End: 2018-11-09

## 2018-10-30 RX ORDER — ASPIRIN 81 MG/1
81 TABLET, CHEWABLE ORAL DAILY
Status: DISCONTINUED | OUTPATIENT
Start: 2018-10-30 | End: 2018-10-30 | Stop reason: HOSPADM

## 2018-10-30 RX ORDER — ACETAMINOPHEN 325 MG/1
TABLET ORAL
Qty: 30 TABLET | Refills: 0 | Status: SHIPPED | OUTPATIENT
Start: 2018-10-30 | End: 2018-11-23 | Stop reason: ALTCHOICE

## 2018-10-30 RX ORDER — LEVETIRACETAM 500 MG/1
500 TABLET ORAL EVERY 12 HOURS SCHEDULED
Qty: 11 TABLET | Refills: 0 | Status: SHIPPED | OUTPATIENT
Start: 2018-10-30 | End: 2018-11-11 | Stop reason: ALTCHOICE

## 2018-10-30 RX ORDER — AMOXICILLIN 250 MG
1 CAPSULE ORAL
Refills: 0
Start: 2018-10-30 | End: 2018-11-13 | Stop reason: ALTCHOICE

## 2018-10-30 RX ADMIN — Medication 81 MG: at 10:33

## 2018-10-30 RX ADMIN — FERROUS SULFATE TAB 325 MG (65 MG ELEMENTAL FE) 325 MG: 325 (65 FE) TAB at 08:32

## 2018-10-30 RX ADMIN — LEVETIRACETAM 500 MG: 500 TABLET, FILM COATED ORAL at 08:32

## 2018-10-30 RX ADMIN — CHLORHEXIDINE GLUCONATE 15 ML: 1.2 RINSE ORAL at 08:32

## 2018-10-30 RX ADMIN — OXYCODONE HYDROCHLORIDE 2.5 MG: 5 TABLET ORAL at 11:40

## 2018-10-30 RX ADMIN — OXYCODONE HYDROCHLORIDE 5 MG: 5 TABLET ORAL at 03:23

## 2018-10-30 NOTE — PROGRESS NOTES
Progress Note - Neurosurgery   Сергей Jacinto 80 y o  male MRN: [id number]  Unit/Bed#: [facility] 926-01 Encounter: [id number]    Assessment:  1  Acute traumatic left subarachnoid hemorrhage  2  Hx of atherosclerosis of native arteries in left leg  3  Ulceration of left foot  4  CKD  5  Hyperlipidemia  6  Severe peripheral artery disease  7  Recent arteriogram, angioplasty stent, and atherectomy, left abdomen     Plan:  · Exam:  GCS 15, Alert and oriented x 3, PAL without focal weakness, normal LT with chronic numbness in left foot, JPS 3/3, DST intact, no reyes or clonus noted, no drift, reflexes 2+ and symmetrical, left groin hematoma stable with ecchymosis, distal pulses intact  · Imaging reviewed personally and with attending  Results are as follows:  ? CT head wo contrast (10/30/2018): No significant change in the appearance of a small left-sided subarachnoid hemorrhage compared with prior study  ? CT head wo contrast (10/29/18): Trace left middle cranial fossa SAH  ? CT spine cervical wo contrast (10/29/18): No fracture or malalignment  ? CT head wo contrast (10/29/18):  SAH at left temporal region  · Repeat CT head STAT if GCS declines > 2pt/1hr  · Patient may restart aspirin 81mg due to stable repeat CT head  ? Will need to follow up in 2 weeks outpatient with repeat imaging - if stable will restart Plavix  · Continue Keppra 500mg PO q12hrs x 1 week for seizure ppx  · Medical management per primary team  · DVT ppx: SCDs (worn on right leg)  ? Hold pharm ppx due to recent [facility]  · Mobilize as tolerated with assistance  · Imaging results discussed with patient and family  Will follow up in 2 weeks outpatient with repeat imaging  If stable at that time patient may restart Plavix  Patient would like to see doctor in [facility] if possible as this closer to his home  Patient was informed not to take any additional blood thinners while at home  Patient and family understood    · Neurosurgery will follow PRN hospitalization  No surgical intervention recommended at this time  Please call with questions or concerns  ? Will follow up in 2 weeks outpatient with repeat imaging - if stable scan may restart Plavix  Subjective/Objective   Chief Complaint: "I am wonderful " / follow up acute traumatic left subarachnoid hemorrhage    Subjective: The patient complains of 7/10 sharp left foot pain  He states this is better than yesterday  He denies headache, dizziness, chest pain, shortness of breath, nausea, vomiting, new numbness or tingling, weakness, no changes in hearing or vision  He is urinating her baseline; no bowel movement yet  Objective: laying in bed, NAD  I/O       10/28 0701 - 10/29 0700 10/29 0701 - 10/30 0700 10/30 0701 - 10/31 0700    P  O   460     IV Piggyback 50 50     Total Intake(mL/kg) 50 (0 7) 510 (7 5)     Urine (mL/kg/hr) 150 550 (0 3)     Total Output 150 550      Net -100 -40                   Invasive Devices     Peripheral Intravenous Line            Peripheral IV 10/29/18 Right Forearm 1 day                Physical Exam:  Vitals: Blood pressure 128/52, pulse 58, temperature 98 °F (36 7 °C), resp  rate 18, height 5' 9" (1 753 m), weight 68 kg (149 lb 14 6 oz), SpO2 100 %  ,Body mass index is 22 14 kg/m²      General appearance: alert, appears stated age, cooperative and no distress  Head: Normocephalic, without obvious abnormality, atraumatic  Eyes: EOMI, PERRL  Lungs: non labored breathing  Heart: regular heart rate  Vascular:  Stable left groin hematoma with old ecchymosis, intact distal pulses  Neurologic:   Mental status: Alert, oriented x 3, able to repeat his arms, able to name 3/3 objects and recall 1/3 objects after 5 minutes, follows commands, able to do simple calculations thought content appropriate  Cranial nerves: grossly intact (Cranial nerves II-XII)  Sensory: normal to LT, DST intact, right toe JPS 3/3, left ankle JPS 3/3 due to ulcer between left big and second toe  Motor: moving all extremities without focal weakness, strength 5/5  Reflexes: 2+ and symmetric, no reyes or clonus noted  Coordination: finger to nose normal bilaterally, no drift bilaterally    Lab Results:    Results from last 7 days  Lab Units 10/30/18  0522 10/29/18  0334 10/29/18  0021   WBC Thousand/uL 10 46* 12 57* 12 42*   HEMOGLOBIN g/dL 8 1* 9 3* 10 5*   HEMATOCRIT % 24 6* 28 0* 32 0*   PLATELETS Thousands/uL 109* 117* 130*   NEUTROS PCT % 67 80* 84*   MONOS PCT % 9 8 8       Results from last 7 days  Lab Units 10/30/18  0522 10/29/18  0334 10/29/18  0021   SODIUM mmol/L 135* 134* 133*   POTASSIUM mmol/L 3 8 3 6 3 8   CHLORIDE mmol/L 102 100 99*   CO2 mmol/L 27 27 27   BUN mg/dL 16 20 20   CREATININE mg/dL 1 06 1 24 1 36*   CALCIUM mg/dL 8 2* 8 2* 8 8   ALK PHOS U/L  --   --  53   ALT U/L  --   --  15   AST U/L  --   --  18       Results from last 7 days  Lab Units 10/30/18  0522 10/29/18  0334   MAGNESIUM mg/dL 1 8 1 5*       Results from last 7 days  Lab Units 10/30/18  0522 10/29/18  0334   PHOSPHORUS mg/dL 2 6 2 5       Results from last 7 days  Lab Units 10/29/18  0541 10/29/18  0334 10/29/18  0021   INR  2 13* 1 74* 2 83*   PTT seconds 62* 61* 61*     No results found for: TROPONINT  ABG:No results found for: PHART, ZLF2RBS, PO2ART, DMK0WOA, [id number], BEART, SOURCE    Imaging Studies: I have personally reviewed pertinent reports  and I have personally reviewed pertinent films in PACS    Xr Chest 1 View Portable    Result Date: 10/29/2018  Impression: Pleural calcifications may indicate asbestos related pleural disease  Additional interstitial prominence in the lungs could potentially be asbestosis  Correlate with patient history and clinical scenario  Other types of interstitial lung disease not excluded   Workstation performed: [id number]     Ct Head Wo Contrast    Result Date: 10/30/2018  Impression: No significant change in the appearance of the small left-sided subarachnoid hemorrhage compared with yesterday  No new findings Workstation performed: [id number]     Ct Head Wo Contrast    Result Date: 10/29/2018  Impression: Trace left middle cranial fossa subarachnoid hemorrhage  No significant interval change  Workstation performed: [id number]     Trauma - Ct Head Wo Contrast    Result Date: 10/29/2018  Impression: Subarachnoid hemorrhage as detailed above at the left temporal region  Questionable focus of intraparenchymal hemorrhage  Mild microvascular ischemic disease  Atrophy  Findings discussed with Dr Blas Castro at 12:37 AM, 10/29/2018 Workstation performed: [id number]     Ct Spine Cervical Without Contrast    Result Date: 10/29/2018  Impression: No cervical spine fracture or traumatic malalignment  Findings were discussed with Dr Blas Castro at 12:36 AM, 10/29/2018  Workstation performed: [id number]     EKG, Pathology, and Other Studies: I have personally reviewed pertinent reports        VTE Pharmacologic Prophylaxis: Reason for no pharmacologic prophylaxis - recent SAH    VTE Mechanical Prophylaxis: sequential compression device

## 2018-10-30 NOTE — PROGRESS NOTES
Progress Note - VascularSurgery   Chris Backer 80 y o  male MRN: [id number]  Unit/Bed#: [facility] 926-01 Encounter: [id number]    Assessment:  84M who presents with a fall on aspirin, Plavix, and Xarelto  He was found to have subarachnoid hemorrhage on CT scan  NAEON    Plan:  -continue ASA for now per NSG pending head CT today  -resume plavix in 2 weeks   -care per primary team    Subjective/Objective   Chief Complaint: none    Subjective: wants to go home    Objective:   Blood pressure 118/56, pulse 68, temperature 98 6 °F (37 °C), temperature source Oral, resp  rate 18, height 5' 9" (1 753 m), weight 68 kg (149 lb 14 6 oz), SpO2 98 %  ,Body mass index is 22 14 kg/m²  Intake/Output Summary (Last 24 hours) at 10/30/18 0459  Last data filed at 10/29/18 1900   Gross per 24 hour   Intake              410 ml   Output              550 ml   Net             -140 ml       Invasive Devices     Peripheral Intravenous Line            Peripheral IV 10/29/18 Right Forearm 1 day                Physical Exam:   General: No acute distress  HEENT: Normocephalic, atraumatic with MMM  No scleral icterus  Neck: Normal ROM  No tracheal deviation  Cardio: Normal rate, regular rhythym  No murmurs, rubs, or gallops  Pulm: Normal respiratory effort  Abdomen: Soft, non-tender, non-distended  Bowel sounds present  Extremities: PAL, No edema  LLE motor/sensory intact  L groin wound with old ecchymosis, no expanding hematoma  Neuro: Cranial nerves II-XII intact  Psych: Normal affect      Lab, Imaging and other studies:I have personally reviewed pertinent lab results      VTE Pharmacologic Prophylaxis: Sequential compression device (Venodyne)   VTE Mechanical Prophylaxis: sequential compression device

## 2018-10-30 NOTE — PLAN OF CARE
Problem: NEUROSENSORY - ADULT  Goal: Achieves stable or improved neurological status  INTERVENTIONS  - Monitor and report changes in neurological status  - Initiate measures to prevent increased intracranial pressure  - Maintain blood pressure and fluid volume within ordered parameters to optimize cerebral perfusion  - Monitor temperature, glucose, and sodium or any other associated labs   Initiate appropriate interventions as ordered  - Monitor for seizure activity   - Administer anti-seizure medications as ordered   Outcome: Progressing    Goal: Achieves maximal functionality and self care  INTERVENTIONS  - Monitor swallowing and airway patency with patient fatigue and changes in neurological status  - Encourage and assist patient to increase activity and self care with guidance from rehab services  - Encourage visually impaired, hearing impaired and aphasic patients to use assistive/communication devices   Outcome: Progressing      Problem: SKIN/TISSUE INTEGRITY - ADULT  Goal: Skin integrity remains intact  INTERVENTIONS  - Identify patients at risk for skin breakdown  - Assess and monitor skin integrity  - Assess and monitor nutrition and hydration status  - Monitor labs (i e  albumin)  - Assess for incontinence   - Turn and reposition patient  - Assist with mobility/ambulation  - Relieve pressure over bony prominences  - Avoid friction and shearing  - Provide appropriate hygiene as needed including keeping skin clean and dry  - Evaluate need for skin moisturizer/barrier cream  - Collaborate with interdisciplinary team (i e  Nutrition, Rehabilitation, etc )   - Patient/family teaching   Outcome: Progressing    Goal: Oral mucous membranes remain intact  INTERVENTIONS  - Assess oral mucosa and hygiene practices  - Implement preventative oral hygiene regimen  - Implement oral medicated treatments as ordered  - Initiate Nutrition services referral as needed   Outcome: Progressing      Problem: PAIN - ADULT  Goal: Verbalizes/displays adequate comfort level or baseline comfort level  Interventions:  - Encourage patient to monitor pain and request assistance  - Assess pain using appropriate pain scale  - Administer analgesics based on type and severity of pain and evaluate response  - Implement non-pharmacological measures as appropriate and evaluate response  - Consider cultural and social influences on pain and pain management  - Notify physician/advanced practitioner if interventions unsuccessful or patient reports new pain   Outcome: Progressing      Problem: INFECTION - ADULT  Goal: Absence or prevention of progression during hospitalization  INTERVENTIONS:  - Assess and monitor for signs and symptoms of infection  - Monitor lab/diagnostic results  - Monitor all insertion sites, i e  indwelling lines, tubes, and drains  - Monitor endotracheal (as able) and nasal secretions for changes in amount and color  - [place] appropriate cooling/warming therapies per order  - Administer medications as ordered  - Instruct and encourage patient and family to use good hand hygiene technique  - Identify and instruct in appropriate isolation precautions for identified infection/condition   Outcome: Progressing    Goal: Absence of fever/infection during neutropenic period  INTERVENTIONS:  - Monitor WBC  - Implement neutropenic guidelines   Outcome: Progressing      Problem: SAFETY ADULT  Goal: Patient will remain free of falls  INTERVENTIONS:  - Assess patient frequently for physical needs  -  Identify cognitive and physical deficits and behaviors that affect risk of falls    -  [place] fall precautions as indicated by assessment   - Educate patient/family on patient safety including physical limitations  - Instruct patient to call for assistance with activity based on assessment  - Modify environment to reduce risk of injury  - Consider OT/PT consult to assist with strengthening/mobility    Outcome: Progressing    Goal: Maintain or return to baseline ADL function  INTERVENTIONS:  -  Assess patient's ability to carry out ADLs; assess patient's baseline for ADL function and identify physical deficits which impact ability to perform ADLs (bathing, care of mouth/teeth, toileting, grooming, dressing, etc )  - Assess/evaluate cause of self-care deficits   - Assess range of motion  - Assess patient's mobility; develop plan if impaired  - Assess patient's need for assistive devices and provide as appropriate  - Encourage maximum independence but intervene and supervise when necessary  ¯ Involve family in performance of ADLs  ¯ Assess for home care needs following discharge   ¯ Request OT consult to assist with ADL evaluation and planning for discharge  ¯ Provide patient education as appropriate   Outcome: Progressing    Goal: Maintain or return mobility status to optimal level  INTERVENTIONS:  - Assess patient's baseline mobility status (ambulation, transfers, stairs, etc )    - Identify cognitive and physical deficits and behaviors that affect mobility  - Identify mobility aids required to assist with transfers and/or ambulation (gait belt, sit-to-stand, lift, walker, cane, etc )  - [place] fall precautions as indicated by assessment  - Record patient progress and toleration of activity level on Mobility SBAR; progress patient to next Phase/Stage  - Instruct patient to call for assistance with activity based on assessment  - Request Rehabilitation consult to assist with strengthening/weightbearing, etc    Outcome: Progressing      Problem: DISCHARGE PLANNING  Goal: Discharge to home or other facility with appropriate resources  INTERVENTIONS:  - Identify barriers to discharge w/patient and caregiver  - Arrange for needed discharge resources and transportation as appropriate  - Identify discharge learning needs (meds, wound care, etc )  - Arrange for interpretive services to assist at discharge as needed  - Refer to Case Management Department for coordinating discharge planning if the patient needs post-hospital services based on physician/advanced practitioner order or complex needs related to functional status, cognitive ability, or social support system   Outcome: Progressing      Problem: Knowledge Deficit  Goal: Patient/family/caregiver demonstrates understanding of disease process, treatment plan, medications, and discharge instructions  Complete learning assessment and assess knowledge base  Interventions:  - Provide teaching at level of understanding  - Provide teaching via preferred learning methods   Outcome: Progressing      Problem: Neurological Deficit  Goal: Neurological status is stable or improving  Interventions:  - Monitor and assess patient's level of consciousness, motor function, sensory function, and level of assistance needed for ADLs  - Monitor and report changes from baseline  Collaborate with interdisciplinary team to initiate plan and implement interventions as ordered  - Provide and maintain a safe environment  - Utilize seizure precautions  - Utilize fall precautions  - Utilize aspiration precautions  - Utilize bleeding precautions  Outcome: Progressing      Problem: Activity Intolerance/Impaired Mobility  Goal: Mobility/activity is maintained at optimum level for patient  Interventions:  - Assess and monitor patient  barriers to mobility and need for assistive/adaptive devices  - Assess patient's emotional response to limitations  - Collaborate with interdisciplinary team and initiate plans and interventions as ordered  - Encourage independent activity per ability   - Maintain proper body alignment  - Perform active/passive rom as tolerated/ordered  - Plan activities to conserve energy   - Turn patient   Outcome: Progressing      Problem: Communication Impairment  Goal: Ability to express needs and understand communication  Assess patient's communication skills and ability to understand information    Patient will demonstrate use of effective communication techniques, alternative methods of communication and understanding even if not able to speak  - Encourage communication and provide alternate methods of communication as needed  - Collaborate with case management/ for discharge needs  - Include patient/family/caregiver in decisions related to communication  Outcome: Progressing      Problem: Potential for Falls  Goal: Patient will remain free of falls  INTERVENTIONS:  - Assess patient frequently for physical needs  -  Identify cognitive and physical deficits and behaviors that affect risk of falls    -  [place] fall precautions as indicated by assessment   - Educate patient/family on patient safety including physical limitations  - Instruct patient to call for assistance with activity based on assessment  - Modify environment to reduce risk of injury  - Consider OT/PT consult to assist with strengthening/mobility    Outcome: Progressing

## 2018-10-30 NOTE — DISCHARGE SUMMARY
Discharge- Сергей Jacinto 1933, 80 y o  male MRN: [id number]    Unit/Bed#: [facility] 926-01 Encounter: [id number]    Primary Care Provider: Mckenzie Borrego MD   Date and time admitted to hospital: 10/29/2018  2:09 AM      42-year-old male status post fall    * Subarachnoid hemorrhage ([address] )   Assessment & Plan    -on aspirin/Plavix/Xarelto due to history of peripheral artery disease with recent last week  -aspirin/Plavix/Xarelto on hold due to subarachnoid hemorrhage  Administer DDAVP and [facility] on admission   -follow-up repeat CT head today was stable, resume aspirin 81 mg daily  -follow-up with Neurosurgery in 2 weeks with a repeat CT head at that time and possible resumption of Plavix and Xarelto then  Continue to hold these medications for now on discharge   -continue Keppra for 1 week for seizure prophylaxis  - GCS 15, nonfocal exam  -cleared for discharge today will follow up with Neurosurgery in 2 weeks with a repeat CT head  Severe peripheral arterial disease (HCC)   Assessment & Plan    -status post IR lysis and thrombectomy of a L popliteal and tibioperoneal trunk thrombus on 10/26/2018  It  -hold Plavix and Xarelto for now  Aspirin 81 mg daily resume today  Follow up with Neurosurgery prior to resumption of Plavix and Xarelto in 2 weeks due to new subarachnoid hemorrhage following fall  Ambulatory dysfunction   Assessment & Plan    PT OT cleared patient for discharge home with the use of quad cane  Family is in room and patient family educated on the importance of using the quad cane  Short-term memory loss   Assessment & Plan    Follow-up at [facility] due to cognitive deficits on exam yesterday       Physical deconditioning   Assessment & Plan    Please see above                 Resolved Problems  Date Reviewed: 10/30/2018    None          Admission Date:   Admission Orders     Ordered        10/29/18 0254  Inpatient Admission  Once               Admitting Diagnosis: Subarachnoid hemorrhage ([facility] [address] ) [I60 9]  Head injury [S09 90XA]  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 ([facility] [address] ) [I70 222]    HPI: As per Dr Traci Bustillo who evaluated the patient on admission, "Williams Perez is a 80 y o  male who presents from home as a transfer from [facility] for evaluation after a fall from standing onto floor  Patient cannot recall events around fall, may have had LOC  Patient is on ASA Plavix and xarelto for peripheral artery disease, with recent endovascular intervention performed 2 days ago  Patient denying any symptoms on evaluation "    Procedures Performed: No orders of the defined types were placed in this encounter  Summary of Hospital Course:   Patient was admitted to the trauma service due to subarachnoid hemorrhage  Patient was taking aspirin, Plavix, Xarelto due to recent lysis and thrombectomy for a left popliteal occlusion on 10/26  These medications were placed on hold was given DDAVP and [facility] for reversal   Repeat CT head on 10/30 was stable and upon discussions between Neurosurgery and vascular the decision was made to resume aspirin 81 mg daily on 10/30 2018  He will follow up with Neurosurgery in 2 weeks with a repeat CT head at that time and if stable discussions will be had at that time regarding resumption of Plavix and Xarelto  Vascular surgery on board with this plan as well  Patient was ambulatory with physical therapy and occupational therapy who recommended discharge home with use of quad cane  Patient was also seen by geriatrics who completed a cognitive assessment  which she scored abnormally on and was recommended for follow-up at the [facility]  Patient will follow up with Neurosurgery in 2 weeks with a repeat CT head will follow up with the 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  Additional interstitial prominence in the lungs could potentially be asbestosis  Correlate with patient history and clinical scenario  Other types of interstitial lung disease not excluded  Workstation performed: [id number]     Ct Head Wo Contrast    Result Date: 10/30/2018  Impression: No significant change in the appearance of the small left-sided subarachnoid hemorrhage compared with yesterday  No new findings Workstation performed: [id number]     Ct Head Wo Contrast    Result Date: 10/29/2018  Impression: Trace left middle cranial fossa subarachnoid hemorrhage  No significant interval change  Workstation performed: [id number]     Trauma - Ct Head Wo Contrast    Result Date: 10/29/2018  Impression: Subarachnoid hemorrhage as detailed above at the left temporal region  Questionable focus of intraparenchymal hemorrhage  Mild microvascular ischemic disease  Atrophy  Findings discussed with Dr Linh Rea at 12:37 AM, 10/29/2018 Workstation performed: [id number]     Ct Spine Cervical Without Contrast    Result Date: 10/29/2018  Impression: No cervical spine fracture or traumatic malalignment  Findings were discussed with Dr Linh Rea at 12:36 AM, 10/29/2018  Workstation performed: [id number]       Complications: none    Condition at Discharge: good         Discharge instructions/Information to patient and family:   See after visit summary for information provided to patient and family  Provisions for Follow-Up Care:  See after visit summary for information related to follow-up care and any pertinent home health orders  PCP: Charmayne Mylar, MD     Disposition: Home    Planned Readmission: No    Discharge Statement   I spent 30 minutes discharging the patient  This time was spent on the day of discharge   I had direct contact with the patient on the day of discharge  Additional documentation is required if more than 30 minutes were spent on discharge  Discharge Medications:  See after visit summary for reconciled discharge medications provided to patient and family

## 2018-10-30 NOTE — DISCHARGE INSTRUCTIONS
Discharge Instructions - Neurosurgery    Do not take any blood thinning medications (ie  No Advil  No motrin  No ibuprofen  No Aleve  No Aspirin  No fishoil  No heparin  No antiplatelet / no anticoagulation medication)  Refrain from activity that increases chance of trauma to head or falls  Recommend you take fall precaution  No strenuous activity or sports  Return to hospital Emergency Room if you experience worsening / new headache, nausea/vomiting, speech/vision change, seizure, confusion / mental status change, weakness, or other neurological changes  Please follow up in 2 weeks with the Neurosurgical group with repeat CT head without contrast 2-3 days prior to your appointment  You may go to any [facility] facility to get your imaging done  Please call [phone] to schedule your imaging appointment

## 2018-10-30 NOTE — ASSESSMENT & PLAN NOTE
-on aspirin/Plavix/Xarelto due to history of peripheral artery disease with recent last week  -aspirin/Plavix/Xarelto on hold due to subarachnoid hemorrhage  Administer DDAVP and [address] on admission   -follow-up repeat CT head today was stable, resume aspirin 81 mg daily  -follow-up with Neurosurgery in 2 weeks with a repeat CT head at that time and possible resumption of Plavix and Xarelto then  Continue to hold these medications for now on discharge   -continue Keppra for 1 week for seizure prophylaxis  - GCS 15, nonfocal exam  -cleared for discharge today will follow up with Neurosurgery in 2 weeks with a repeat CT head

## 2018-10-30 NOTE — ASSESSMENT & PLAN NOTE
PT OT cleared patient for discharge home with the use of quad cane  Family is in room and patient family educated on the importance of using the quad cane

## 2018-10-30 NOTE — PLAN OF CARE
Problem: NEUROSENSORY - ADULT  Goal: Achieves stable or improved neurological status  INTERVENTIONS  - Monitor and report changes in neurological status  - Initiate measures to prevent increased intracranial pressure  - Maintain blood pressure and fluid volume within ordered parameters to optimize cerebral perfusion  - Monitor temperature, glucose, and sodium or any other associated labs   Initiate appropriate interventions as ordered  - Monitor for seizure activity   - Administer anti-seizure medications as ordered   Outcome: Progressing

## 2018-10-30 NOTE — ASSESSMENT & PLAN NOTE
-status post IR lysis and thrombectomy of a L popliteal and tibioperoneal trunk thrombus on 10/26/2018  It  -hold Plavix and Xarelto for now  Aspirin 81 mg daily resume today  Follow up with Neurosurgery prior to resumption of Plavix and Xarelto in 2 weeks due to new subarachnoid hemorrhage following fall

## 2018-10-31 ENCOUNTER — TRANSITIONAL CARE MANAGEMENT (OUTPATIENT)
Dept: FAMILY MEDICINE CLINIC | Facility: CLINIC | Age: 83
End: 2018-10-31

## 2018-11-07 ENCOUNTER — PATIENT OUTREACH (OUTPATIENT)
Dept: FAMILY MEDICINE CLINIC | Facility: CLINIC | Age: 83
End: 2018-11-07

## 2018-11-08 ENCOUNTER — HOSPITAL ENCOUNTER (OUTPATIENT)
Dept: CT IMAGING | Facility: HOSPITAL | Age: 83
Discharge: HOME | End: 2018-11-08
Payer: COMMERCIAL

## 2018-11-08 DIAGNOSIS — I60.9 SUBARACHNOID HEMORRHAGE (HCC): ICD-10-CM

## 2018-11-08 PROCEDURE — 70450 CT HEAD/BRAIN W/O DYE: CPT

## 2018-11-09 ENCOUNTER — TELEPHONE (OUTPATIENT)
Dept: NEUROSURGERY | Facility: CLINIC | Age: 83
End: 2018-11-09

## 2018-11-09 NOTE — TELEPHONE ENCOUNTER
11/09/2018-(DEEPTHI 10/29/2018) WILL F/U OUTPT ON 11/12/2018 W/CARLEE AND REPEAT CTH-IF STABLE AT THIS POINT MAY START PLAVIX  CALLED PT, SPOKE TO PT'S WIFE (DESMOND) AND CONFIRMED 11/12/2018 APPT  CT COMPLETED ON 11/08/2018

## 2018-11-11 ENCOUNTER — HOSPITAL ENCOUNTER (EMERGENCY)
Facility: HOSPITAL | Age: 83
Discharge: HOME | End: 2018-11-11
Attending: EMERGENCY MEDICINE | Admitting: EMERGENCY MEDICINE
Payer: COMMERCIAL

## 2018-11-11 ENCOUNTER — TELEPHONE (OUTPATIENT)
Dept: OTHER | Facility: OTHER | Age: 83
End: 2018-11-11

## 2018-11-11 VITALS
DIASTOLIC BLOOD PRESSURE: 67 MMHG | TEMPERATURE: 98.5 F | SYSTOLIC BLOOD PRESSURE: 152 MMHG | WEIGHT: 150 LBS | HEART RATE: 62 BPM | RESPIRATION RATE: 18 BRPM | BODY MASS INDEX: 22.15 KG/M2 | OXYGEN SATURATION: 96 %

## 2018-11-11 DIAGNOSIS — M79.672 LEFT FOOT PAIN: Primary | ICD-10-CM

## 2018-11-11 DIAGNOSIS — I73.9 PERIPHERAL ARTERY DISEASE (HCC): ICD-10-CM

## 2018-11-11 LAB
ANION GAP SERPL CALCULATED.3IONS-SCNC: 7 MMOL/L (ref 4–13)
BASOPHILS # BLD AUTO: 0.03 THOUSANDS/ΜL (ref 0–0.1)
BASOPHILS NFR BLD AUTO: 0 % (ref 0–1)
BUN SERPL-MCNC: 20 MG/DL (ref 5–25)
CALCIUM SERPL-MCNC: 9 MG/DL (ref 8.3–10.1)
CHLORIDE SERPL-SCNC: 98 MMOL/L (ref 100–108)
CO2 SERPL-SCNC: 25 MMOL/L (ref 21–32)
CREAT SERPL-MCNC: 1.33 MG/DL (ref 0.6–1.3)
EOSINOPHIL # BLD AUTO: 0.04 THOUSAND/ΜL (ref 0–0.61)
EOSINOPHIL NFR BLD AUTO: 0 % (ref 0–6)
ERYTHROCYTE [DISTWIDTH] IN BLOOD BY AUTOMATED COUNT: 12.8 % (ref 11.6–15.1)
GFR SERPL CREATININE-BSD FRML MDRD: 48 ML/MIN/1.73SQ M
GLUCOSE SERPL-MCNC: 106 MG/DL (ref 65–140)
HCT VFR BLD AUTO: 30.4 % (ref 36.5–49.3)
HGB BLD-MCNC: 9.8 G/DL (ref 12–17)
IMM GRANULOCYTES # BLD AUTO: 0.08 THOUSAND/UL (ref 0–0.2)
IMM GRANULOCYTES NFR BLD AUTO: 1 % (ref 0–2)
LACTATE SERPL-SCNC: 2 MMOL/L (ref 0.5–2)
LYMPHOCYTES # BLD AUTO: 1.45 THOUSANDS/ΜL (ref 0.6–4.47)
LYMPHOCYTES NFR BLD AUTO: 12 % (ref 14–44)
MCH RBC QN AUTO: 30.2 PG (ref 26.8–34.3)
MCHC RBC AUTO-ENTMCNC: 32.2 G/DL (ref 31.4–37.4)
MCV RBC AUTO: 94 FL (ref 82–98)
MONOCYTES # BLD AUTO: 0.87 THOUSAND/ΜL (ref 0.17–1.22)
MONOCYTES NFR BLD AUTO: 7 % (ref 4–12)
NEUTROPHILS # BLD AUTO: 9.89 THOUSANDS/ΜL (ref 1.85–7.62)
NEUTS SEG NFR BLD AUTO: 80 % (ref 43–75)
NRBC BLD AUTO-RTO: 0 /100 WBCS
PLATELET # BLD AUTO: 293 THOUSANDS/UL (ref 149–390)
PMV BLD AUTO: 10.4 FL (ref 8.9–12.7)
POTASSIUM SERPL-SCNC: 3.6 MMOL/L (ref 3.5–5.3)
RBC # BLD AUTO: 3.24 MILLION/UL (ref 3.88–5.62)
SODIUM SERPL-SCNC: 130 MMOL/L (ref 136–145)
WBC # BLD AUTO: 12.36 THOUSAND/UL (ref 4.31–10.16)

## 2018-11-11 PROCEDURE — 36415 COLL VENOUS BLD VENIPUNCTURE: CPT | Performed by: EMERGENCY MEDICINE

## 2018-11-11 PROCEDURE — 83605 ASSAY OF LACTIC ACID: CPT | Performed by: EMERGENCY MEDICINE

## 2018-11-11 PROCEDURE — 96374 THER/PROPH/DIAG INJ IV PUSH: CPT

## 2018-11-11 PROCEDURE — 99283 EMERGENCY DEPT VISIT LOW MDM: CPT

## 2018-11-11 PROCEDURE — 99214 OFFICE O/P EST MOD 30 MIN: CPT | Performed by: SURGERY

## 2018-11-11 PROCEDURE — 85025 COMPLETE CBC W/AUTO DIFF WBC: CPT | Performed by: EMERGENCY MEDICINE

## 2018-11-11 PROCEDURE — 80048 BASIC METABOLIC PNL TOTAL CA: CPT | Performed by: EMERGENCY MEDICINE

## 2018-11-11 RX ORDER — MORPHINE SULFATE 4 MG/ML
4 INJECTION, SOLUTION INTRAMUSCULAR; INTRAVENOUS ONCE
Status: COMPLETED | OUTPATIENT
Start: 2018-11-11 | End: 2018-11-11

## 2018-11-11 RX ORDER — CEPHALEXIN 500 MG/1
500 CAPSULE ORAL 4 TIMES DAILY
Qty: 20 CAPSULE | Refills: 0 | Status: SHIPPED | OUTPATIENT
Start: 2018-11-11 | End: 2018-11-13 | Stop reason: ALTCHOICE

## 2018-11-11 RX ADMIN — MORPHINE SULFATE 4 MG: 4 INJECTION INTRAVENOUS at 10:36

## 2018-11-11 NOTE — CONSULTS
Consultation - Vascular Surgery   Ingris Perea 80 y o  male MRN: [id number]  Unit/Bed#: ED 21 Encounter: [id number]      Assessment/Plan      Assessment:  85M w/subacute critical limb ischemia of the LLE, presents with increased pain and discoloration of L foot  Plan:  Long discussion held bedside with patient, patient's wife, and patient's granddaughter  Discussed that vascular surgery would recommend he be restarted on blood thinners and be admitted to the hospital for possible intervention on right lower extremity  However, patient and his wife adamantly do not want to be started on any blood thinners secondary to his recent fall and subarachnoid hemorrhage  We discussed risks, benefits, and alternatives  Based on the fact that he did not want to be started on any blood thinners, we offered to admit the patient for pain control and continue discussions for possible left lower extremity amputation in the near distant future as we have exhausted our options for revascularization  However, the patient would prefer to go home and manage his pain with oxycodone with which he was discharged from the hospital at the end of October following his fall and subarachnoid hemorrhage (prescribed by the Trauma Service)  He does have an outpatient follow-up appointment with Dr Hannah Guzman on 11/13 and will follow up with Dr Hannah Guzman in the office  History of Present Illness   Physician Requesting Consult: Shaheed Harris MD  Reason for Consult / Principal Problem:  Left lower extremity subacute critical limb ischemia    HPI: Ingris Perea is a 80y o  year old male who presents with discoloration and pain in his left foot  Patient states that when he woke up this morning his foot was purple and he was having much more difficulties moving it as well as increased pain from baseline  Patient states for the past approximately 1 month he has had difficulties ambulating on his left foot    Because of this difficulty ambulating he had a fall at the end of October and was admitted to the trauma service for subarachnoid hemorrhage, at which point his aspirin/Plavix/Xarelto were held  He is well known to the vascular surgery service and was recently admitted to vascular for a left lower extremity arthrectomy and tibioperoneal trunk angioplasty with tPA infusion with Dr Trey Okeefe on 10/26  He does have a complicated history of peripheral arterial disease, and has been told previously that it is unlikely that there further revascularization options given the severity of disease in his left lower extremity  Consults    Review of Systems   Constitutional: Negative for chills and fever  HENT: Negative  Eyes: Negative  Respiratory: Negative  Cardiovascular: Negative  Gastrointestinal: Negative  Endocrine: Negative  Genitourinary: Negative  Musculoskeletal: Positive for gait problem  L foot pain     Skin: Positive for pallor (L foot)  Allergic/Immunologic: Negative  Neurological: Positive for weakness  Negative for dizziness and headaches  Hematological: Negative  Psychiatric/Behavioral: Negative          Historical Information   Past Medical History:   Diagnosis Date    Arthritis     Chronic kidney disease     DVT femoral (deep venous thrombosis) with thrombophlebitis, right (HCC)     Hyperlipidemia     Vascular disease      Past Surgical History:   Procedure Laterality Date    AMPUTATION  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 thumb    CARPAL TUNNEL RELEASE  2016    L hand    CATARACT EXTRACTION      COLONOSCOPY      FRACTURE SURGERY      IR LOWER EXTREMITY / INTERVENTION  10/26/2018    KNEE ARTHROSCOPY      MT SLCTV CATHJ 3RD+ ORD SLCTV AB[facility] Left 3/9/2018    Procedure: LEFT LOWER EXTREMITY ARTERIOGRAM WITH PTA OF LEFT POPLITEAL ARTERY;  Surgeon: Ward Quintero MD;  Location: [facility];  Service: Vascular    STEVEN Fernandez 3RD+ ORD SLCTV ABDL [facility] Left 10/26/2018    Procedure: ARTERIOGRAM, angioplasty stent and atherectomy;  Surgeon: Donovan Mancera MD;  Location: [facility];  Service: Vascular     Social History   History   Alcohol Use No     History   Drug Use No     History   Smoking Status    Former Smoker   Smokeless Tobacco    Never Used     Comment: quit 57 years ago     Family History: non-contributory}    Meds/Allergies   all current active meds have been reviewed  No Known Allergies    Objective   Vitals: Blood pressure 165/75, pulse 57, temperature 98 5 °F (36 9 °C), temperature source Oral, resp  rate 18, weight 68 kg (150 lb), SpO2 96 %  ,Body mass index is 22 15 kg/m²  No intake or output data in the 24 hours ending 11/11/18 1054  Invasive Devices          No matching active lines, drains, or airways          Physical Exam   Constitutional: He is oriented to person, place, and time  He appears well-developed and well-nourished  No distress  HENT:   Head: Normocephalic and atraumatic  Eyes: Pupils are equal, round, and reactive to light  EOM are normal  No scleral icterus  Neck: No JVD present  Cardiovascular: Normal rate, regular rhythm and normal heart sounds  Dopplerable left peroneal and PT signals   Pulmonary/Chest: Effort normal and breath sounds normal  No stridor  No respiratory distress  Abdominal: Soft  He exhibits no distension  There is no tenderness  There is no rebound and no guarding  Musculoskeletal: He exhibits no edema  Limited range of motion of left foot including flexion-extension ankle as compared to right  No change in sensation of left foot as compared to right   Neurological: He is alert and oriented to person, place, and time  No cranial nerve deficit  Skin: Skin is warm and dry  He is not diaphoretic  Psychiatric: He has a normal mood and affect         Lab Results:   CBC with diff:   Lab Results   Component Value Date    WBC 12 36 (H) 11/11/2018    HGB 9 8 (L) 11/11/2018    HCT 30 4 (L) 11/11/2018    MCV 94 11/11/2018    PLT 293 11/11/2018    MCH 30 2 11/11/2018    MCHC 32 2 11/11/2018    RDW 12 8 11/11/2018    MPV 10 4 11/11/2018    NRBC 0 11/11/2018   , BMP/CMP:   Lab Results   Component Value Date    SODIUM 130 (L) 11/11/2018    K 3 6 11/11/2018    CL 98 (L) 11/11/2018    CO2 25 11/11/2018    BUN 20 11/11/2018    CREATININE 1 33 (H) 11/11/2018    CALCIUM 9 0 11/11/2018    EGFR 48 11/11/2018     Imaging Studies: I have personally reviewed pertinent reports  EKG, Pathology, and Other Studies: I have personally reviewed pertinent reports         Code Status: Prior  Advance Directive and Living Will:      Power of :    POLST:      Counseling / Coordination of Care  None

## 2018-11-11 NOTE — ED ATTENDING ATTESTATION
Uziel Charles MD, saw and evaluated the patient  I have discussed the patient with the resident/non-physician practitioner and agree with the resident's/non-physician practitioner's findings, Plan of Care, and MDM as documented in the resident's/non-physician practitioner's note, except where noted  All available labs and Radiology studies were reviewed  At this point I agree with the current assessment done in the Emergency Department    I have conducted an independent evaluation of this patient a history and physical is as follows:  History of peripheral vascular disease status post stenting left femoral vascular surgery procedures  Patient has had increasing pain in his left ankle and foot with no falls or injuries  He is unable to be anticoagulated  On exam the patient is in no acute distress the foot is cool on the left no dopplerable PT or DP pulses noted the popliteal is normal  Impression : acute on chronic  arterial insufficiency left lower extremity   Vascular surgery has seen and evaluated patient in the ER they have advised the patient to be admitted the hospital under their service for pain control anticoagulation and possible intervention  The patient is unwilling to be admitted to hospital he states he is also not willing to use anticoagulation,  patient states he has pain medications at home  Patient will be discharged as per his request  understanding all risks family also aware  Critical Care Time  CritCare Time    Procedures

## 2018-11-11 NOTE — TELEPHONE ENCOUNTER
Dixon Willis 1933  CONFIDENTIALTY NOTICE: This fax transmission is intended only for the addressee  It contains information that is legally privileged,  confidential or otherwise protected from use or disclosure  If you are not the intended recipient, you are strictly prohibited from reviewing,  disclosing, copying using or disseminating any of this information or taking any action in reliance on or regarding this information  If you have  received this fax in error, please notify us immediately by telephone so that we can arrange for its return to us  Page: 1 of 2  Call Id: [id number]  Health Call  Standard Call Report  Health Call  Patient Name: Dixon Willis  Gender: Male  : 1933  Age: 80 Y 2 D  Return Phone  Number: [phone] (Home)  Address: [facility]: [place] [address]  Practice Name: [facility]  Practice Charged:  Physician:  [facility] Name: Ronaldo Valadez  Relationship To  Patient: Spouse  Return Phone Number: [phone] (Home)  Presenting Problem: "My  had surgery recently and  his leg is a purple/ blue color "  Service Type: Triage  Charged Service 1:  Pharmacy Name and  Number:  Nurse Assessment  Nurse: Emanuel Ni Date/Time: 2018 8:10:17 AM  Type of assessment required:  ---General (Adult or Child)  Duration of Current S/S  ---This morning  Location/Radiation  ---Left leg  Temperature (F) and route:  ---Denies fever  Symptom Specific Meds (Dose/Time):  ---Daily meds  Other S/S  ---Patient had surgery 18  Was able to walk and put weight on it  Woke up this  morning and foot and calf is "discolored " Patient cannot put weight on it and is painful  Foot is also cold to touch in comparison to right foot  Has S O B  but wife stated that  this is common for patient  Pain Scale on scale of 1-10, 10 being the worst:  ---8 out 10    Symptom progression:  ---worse  Intake and Output  Dixon Willis 1933  CONFIDENTIALTY NOTICE: This fax transmission is intended only for the addressee  It contains information that is legally privileged,  confidential or otherwise protected from use or disclosure  If you are not the intended recipient, you are strictly prohibited from reviewing,  disclosing, copying using or disseminating any of this information or taking any action in reliance on or regarding this information  If you have  received this fax in error, please notify us immediately by telephone so that we can arrange for its return to us  Page: 2 of 2  Call Id: [id number]  Nurse Assessment  ---WNL  Last Exam/Treatment:  ---Not assessed  Protocols  Protocol Title Nurse Date/Time  Leg Pain Huy Friends 11/11/2018 8:21:57 AM  Question Caller Affirmed  Disp  Time Disposition Final User  11/11/2018 8:21:49 AM Go to ED Now Viviana Kennedy RN, Maria Alejandra Yu  11/11/2018 8:22:19 AM RN Triaged Yes Viviana Kennedy RN, [facility] Advice Given Per Protocol  GO TO ED NOW: You need to be seen in the Emergency Department  Go to the ER at ______[facility]_ [facility]  Leave  now  Drive carefully  AMBULANCE TRANSPORT (NOTE TO Adilson Mccoy): If the patient cannot walk at all because of significant pain  or leg weakness, then the patient will need to be transported via ambulance and examined at the emergency department  The patient or  family members can arrange ambulance transport via private ambulance company or via   BRING MEDICINES: * Please bring  a list of your current medicines when you go to the Emergency Department (ER)  * It is also a good idea to bring the pill bottles too  This  will help the doctor to make certain you are taking the right medicines and the right dose    Caller Understands: Yes  Caller Disagree/Comply: Comply  PreDisposition: Unsure

## 2018-11-11 NOTE — ED NOTES
No recollect of APTT and INR at this time per Vascular physicians  Vascular physicians at bedside speaking with pt and family  Pt would like to go home  Vascular is fine with that    One green prescription slip given to Vascular per request        Leopoldo Ahuja, RN  [birth year] 0015

## 2018-11-12 NOTE — ED PROVIDER NOTES
History  Chief Complaint   Patient presents with    Foot Pain     Pt had vascular surgery about 3 weeks ago  Pt c/o left foot and toe pain and swelling  Patient is an 49-year-old male with history of peripheral artery disease who presents with left foot pain  Patient had a significant history of a fall several weeks ago that resulted in the subarachnoid hemorrhage  Due to this, the patient has been unable to be on any anticoagulation/antiplatelet therapy  Patient has a history of vascular surgery approximately 3 weeks ago which stented his femoral artery  Patient is describing approximately 4 day history of worsening left foot pain  He describes the pain is generally throughout the foot  He has noticed color change to the foot as well as a was initially blue this morning but now red  The pain has gotten to the point that he is no longer ambulatory on the foot  He also notes that the foot seems to be getting cooler than the other side  He has also developed an ulceration of his left 2nd toe on the medial aspect  He has been placing Neosporin at this area  Patient has an appointment with his vascular surgeon later this week but he could not wait until that appointment secondary to pain  The patient denies any trauma to the foot over the past several days  He denies any signs of systemic toxicity including nausea, vomiting, fevers, chills, p o  intolerance  He denies any urinary complaints including hematuria dysuria  He denies any bowel complaints including diarrhea, melena, hematochezia  Prior to Admission Medications   Prescriptions Last Dose Informant Patient Reported? Taking?    Calcium Carb-Cholecalciferol (CALCIUM 1000 + D) 1000-800 MG-UNIT TABS 11/10/2018 at Unknown time Self Yes Yes   Sig: Take 1 tablet by mouth daily   Cholecalciferol (VITAMIN D) 2000 units CAPS 11/10/2018 at Unknown time Self Yes Yes   Sig: Take by mouth   Omega-3 Fatty Acids (FISH OIL) 645 MG CAPS 11/10/2018 at Unknown time Self Yes Yes   Sig: Take by mouth   acetaminophen (TYLENOL) 325 mg tablet Unknown at Unknown time  No No   Si mg every 8 hours as needed for mild pain   aspirin 81 MG tablet 11/10/2018 at Unknown time Self Yes Yes   Sig: Take by mouth   cyanocobalamin (VITAMIN B-12) 1,000 mcg tablet 11/10/2018 at Unknown time Self Yes Yes   Sig: Take by mouth daily   ferrous sulfate 325 (65 Fe) mg tablet 11/10/2018 at Unknown time  No Yes   Sig: Take 1 tablet (325 mg total) by mouth 2 (two) times a day   senna-docusate sodium (SENOKOT S) 8 6-50 mg per tablet Not Taking at Unknown time  No No   Sig: Take 1 tablet by mouth daily at bedtime   Patient not taking: Reported on 2018    silver sulfadiazine (SILVADENE,SSD) 1 % cream 11/10/2018 at Unknown time  No Yes   Sig: Apply topically daily   simvastatin (ZOCOR) 20 mg tablet 11/10/2018 at Unknown time  No Yes   Sig: Take 1 tablet (20 mg total) by mouth daily      Facility-Administered Medications: None       Past Medical History:   Diagnosis Date    Arthritis     Chronic kidney disease     DVT femoral (deep venous thrombosis) with thrombophlebitis, right (HCC)     Hyperlipidemia     Vascular disease        Past Surgical History:   Procedure Laterality Date    AMPUTATION      L thumb    CARPAL TUNNEL RELEASE      L hand    CATARACT EXTRACTION      COLONOSCOPY      FRACTURE SURGERY      IR LOWER EXTREMITY / INTERVENTION  10/26/2018    KNEE ARTHROSCOPY      [facility] 3RD+ [facility] Left 3/9/2018    Procedure: LEFT LOWER EXTREMITY ARTERIOGRAM WITH PTA OF LEFT POPLITEAL ARTERY;  Surgeon: Ricci Murphy MD;  [facility];  Service: Vascular    [facility]    Procedure: ARTERIOGRAM, angioplasty stent and atherectomy;  Surgeon: Ricci Murphy MD;  [facility];  Service: Vascular       Family History   Problem Relation Age of Onset    Lung cancer Mother  Diabetes Father     Aneurysm Father     Diabetes Maternal Grandmother      I have reviewed and agree with the history as documented  Social History   Substance Use Topics    Smoking status: Former Smoker    Smokeless tobacco: Never Used      Comment: quit 57 years ago    Alcohol use No        Review of Systems   Constitutional: Negative for chills, diaphoresis, fatigue and fever  HENT: Negative for drooling, facial swelling, sore throat and trouble swallowing  Eyes: Negative for photophobia  Respiratory: Negative for cough, choking, chest tightness, shortness of breath, wheezing and stridor  Cardiovascular: Negative for chest pain, palpitations and leg swelling  Gastrointestinal: Negative for abdominal distention, abdominal pain, diarrhea, nausea and vomiting  Genitourinary: Negative for dysuria  Musculoskeletal: Negative for back pain, neck pain and neck stiffness  Left foot pain   Skin: Negative for color change, pallor and rash  Neurological: Negative for dizziness, speech difficulty, weakness, light-headedness, numbness and headaches  Hematological: Negative for adenopathy  Psychiatric/Behavioral: Negative for agitation  All other systems reviewed and are negative  Physical Exam  ED Triage Vitals [11/11/18 0955]   Temperature Pulse Respirations Blood Pressure SpO2   98 5 °F (36 9 °C) 57 18 165/75 96 %      Temp Source Heart Rate Source Patient Position - Orthostatic VS BP Location FiO2 (%)   Oral Monitor Lying Right arm --      Pain Score       Worst Possible Pain           Orthostatic Vital Signs  Vitals:    11/11/18 0955 11/11/18 1057   BP: 165/75 152/67   Pulse: 57 62   Patient Position - Orthostatic VS: Lying Lying       Physical Exam   Constitutional: He is oriented to person, place, and time  He appears well-developed and well-nourished  No distress  HENT:   Head: Normocephalic  Eyes: Pupils are equal, round, and reactive to light   EOM are normal    Neck: Normal range of motion  Neck supple  Cardiovascular: Normal rate, regular rhythm, normal heart sounds and intact distal pulses  Exam reveals no gallop and no friction rub  No murmur heard  Pulmonary/Chest: Effort normal and breath sounds normal    Abdominal: Soft  Bowel sounds are normal  He exhibits no distension  There is no tenderness  There is no guarding  Musculoskeletal: Normal range of motion  Left foot:  Patient with extensive tenderness throughout the left foot  Erythema noted is asymmetric  Cooler extremity as compared to the other side  No pulses could be found via Doppler in the DP and PT areas  Popliteal is intact  Ulceration at the PIP joint of the medial aspect of the left 2nd toe  Neurological: He is alert and oriented to person, place, and time  No cranial nerve deficit or sensory deficit  He exhibits normal muscle tone  Skin: Capillary refill takes less than 2 seconds  Psychiatric: He has a normal mood and affect  His behavior is normal  Judgment and thought content normal    Vitals reviewed  ED Medications  Medications   morphine (PF) 4 mg/mL injection 4 mg (4 mg Intravenous Given 11/11/18 1036)       Diagnostic Studies  Results Reviewed     Procedure Component Value Units Date/Time    Lactic acid, plasma [[id number]]  (Normal) Collected:  11/11/18 1015    Lab Status:  Final result Specimen:  Blood from Arm, Left Updated:  11/11/18 1056     LACTIC ACID 2 0 mmol/L     Narrative:         Result may be elevated if tourniquet was used during collection      Basic metabolic panel [[id number]]  (Abnormal) Collected:  11/11/18 1015    Lab Status:  Final result Specimen:  Blood from Arm, Left Updated:  11/11/18 1054     Sodium 130 (L) mmol/L      Potassium 3 6 mmol/L      Chloride 98 (L) mmol/L      CO2 25 mmol/L      ANION GAP 7 mmol/L      BUN 20 mg/dL      Creatinine 1 33 (H) mg/dL      Glucose 106 mg/dL      Calcium 9 0 mg/dL      eGFR 48 ml/min/1 73sq m     Narrative: National Kidney Disease Education Program recommendations are as follows:  GFR calculation is accurate only with a steady state creatinine  Chronic Kidney disease less than 60 ml/min/1 73 sq  meters  Kidney failure less than 15 ml/min/1 73 sq  meters  CBC and differential [[id number]]  (Abnormal) Collected:  11/11/18 1015    Lab Status:  Final result Specimen:  Blood from Arm, Left Updated:  11/11/18 1031     WBC 12 36 (H) Thousand/uL      RBC 3 24 (L) Million/uL      Hemoglobin 9 8 (L) g/dL      Hematocrit 30 4 (L) %      MCV 94 fL      MCH 30 2 pg      MCHC 32 2 g/dL      RDW 12 8 %      MPV 10 4 fL      Platelets 686 Thousands/uL      nRBC 0 /100 WBCs      Neutrophils Relative 80 (H) %      Immat GRANS % 1 %      Lymphocytes Relative 12 (L) %      Monocytes Relative 7 %      Eosinophils Relative 0 %      Basophils Relative 0 %      Neutrophils Absolute 9 89 (H) Thousands/µL      Immature Grans Absolute 0 08 Thousand/uL      Lymphocytes Absolute 1 45 Thousands/µL      Monocytes Absolute 0 87 Thousand/µL      Eosinophils Absolute 0 04 Thousand/µL      Basophils Absolute 0 03 Thousands/µL                  No orders to display         Procedures  Procedures      Phone Consults  ED Phone Contact    ED Course                               MDM  Number of Diagnoses or Management Options  Left foot pain: established and worsening  Peripheral artery disease ([facility] [address] ): established and worsening  Diagnosis management comments: Patient is an 17-year-old male who presents with acute on chronic ischemic left foot pain  Due to the fact the patient is unable to be anticoagulated secondary to his recent fall, the patient is likely having worsening vascular disease  Vascular surgery was consulted on the patient who advised admission for possible anticoagulation, analgesia, possible intervention  However patient was unwilling to undergo any further anticoagulation and he decided to go home against surgery's advice    I offered the patient opioid analgesics and he denied it secondary to or the having analgesics at home  The patient was advised to follow up with his vascular surgeon and return to care if he develops significant pain  Amount and/or Complexity of Data Reviewed  Clinical lab tests: ordered and reviewed  Tests in the radiology section of CPT®: ordered and reviewed    Risk of Complications, Morbidity, and/or Mortality  Presenting problems: moderate  Diagnostic procedures: low  Management options: low    Patient Progress  Patient progress: improved    CritCare Time    Disposition  Final diagnoses:   Left foot pain   Peripheral artery disease ([address] )     Time reflects when diagnosis was documented in both MDM as applicable and the Disposition within this note     Time User Action Codes Description Comment    11/11/2018 11:24 AM Pa Wood Add [M79 672] Left foot pain     11/11/2018 11:24 AM Grace Peace Add [I73 9] Peripheral artery disease [facility])       ED Disposition     ED Disposition Condition Comment    Discharge  [facility] discharge to home/self care      Condition at discharge: Good        Follow-up Information     Follow up With Specialties Details Why Contact Info Additional [address] Emergency Department Emergency Medicine  If symptoms worsen [address]  [phone]  ED, [address] [place] [address], [id number]          Discharge Medication List as of 11/11/2018 11:33 AM      START taking these medications    Details   cephalexin (KEFLEX) 500 mg capsule Take 1 capsule (500 mg total) by mouth 4 (four) times a day for 5 days, Starting Sun 11/11/2018, Until Fri 11/16/2018, Print         CONTINUE these medications which have NOT CHANGED    Details   aspirin 81 MG tablet Take by mouth, Historical Med      Calcium Carb-Cholecalciferol (CALCIUM 1000 + D) 1000-800 MG-UNIT TABS Take 1 tablet by mouth daily, Starting Wed 10/11/2017, Historical Med      Cholecalciferol (VITAMIN D) 2000 units CAPS Take by mouth, Historical Med      cyanocobalamin (VITAMIN B-12) 1,000 mcg tablet Take by mouth daily, Historical Med      ferrous sulfate 325 (65 Fe) mg tablet Take 1 tablet (325 mg total) by mouth 2 (two) times a day, Starting Wed 9/26/2018, Normal      Omega-3 Fatty Acids (FISH OIL) 645 MG CAPS Take by mouth, Historical Med      silver sulfadiazine (SILVADENE,SSD) 1 % cream Apply topically daily, Starting Mon 10/8/2018, Normal      simvastatin (ZOCOR) 20 mg tablet Take 1 tablet (20 mg total) by mouth daily, Starting Fri 3/16/2018, Normal      acetaminophen (TYLENOL) 325 mg tablet 650 mg every 8 hours as needed for mild pain, Normal      senna-docusate sodium (SENOKOT S) 8 6-50 mg per tablet Take 1 tablet by mouth daily at bedtime, Starting Tue 10/30/2018, No Print           No discharge procedures on file  ED Provider  Attending physically available and evaluated Malu Watts I managed the patient along with the ED Attending      Electronically Signed by         Yonathan Asencio MD  11/12/18 4667

## 2018-11-13 ENCOUNTER — OFFICE VISIT (OUTPATIENT)
Dept: VASCULAR SURGERY | Facility: CLINIC | Age: 83
End: 2018-11-13
Payer: COMMERCIAL

## 2018-11-13 VITALS
HEART RATE: 64 BPM | TEMPERATURE: 98.6 F | BODY MASS INDEX: 22.15 KG/M2 | DIASTOLIC BLOOD PRESSURE: 68 MMHG | SYSTOLIC BLOOD PRESSURE: 136 MMHG | RESPIRATION RATE: 18 BRPM | HEIGHT: 69 IN

## 2018-11-13 DIAGNOSIS — R53.81 PHYSICAL DECONDITIONING: ICD-10-CM

## 2018-11-13 DIAGNOSIS — R26.2 AMBULATORY DYSFUNCTION: ICD-10-CM

## 2018-11-13 DIAGNOSIS — I70.245 ATHEROSCLEROSIS OF NATIVE ARTERIES OF LEFT LEG WITH ULCERATION OF OTHER PART OF FOOT (HCC): ICD-10-CM

## 2018-11-13 DIAGNOSIS — I70.222 ATHEROSCLEROSIS OF NATIVE ARTERY OF LEFT LOWER EXTREMITY WITH REST PAIN (HCC): Primary | ICD-10-CM
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
## 2018-11-13 PROCEDURE — 99214 OFFICE O/P EST MOD 30 MIN: CPT | Performed by: SURGERY

## 2018-11-13 RX ORDER — OXYCODONE HYDROCHLORIDE 5 MG/1
5 TABLET ORAL EVERY 6 HOURS PRN
Qty: 30 TABLET | Refills: 0 | Status: SHIPPED | OUTPATIENT
Start: 2018-11-13 | End: 2018-11-26 | Stop reason: CLARIF

## 2018-11-13 RX ORDER — OXYCODONE HYDROCHLORIDE 5 MG/1
5 CAPSULE ORAL EVERY 8 HOURS PRN
Qty: 30 CAPSULE | Refills: 0 | Status: SHIPPED | OUTPATIENT
Start: 2018-11-13 | End: 2018-11-13 | Stop reason: SDUPTHER

## 2018-11-13 RX ORDER — OXYCODONE HYDROCHLORIDE 5 MG/1
5 CAPSULE ORAL EVERY 4 HOURS PRN
COMMUNITY
End: 2018-11-13 | Stop reason: SDUPTHER

## 2018-11-13 NOTE — PROGRESS NOTES
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 [facility])    Long discussion with the patient, his wife and son today in the office regarding there is also the angiogram that was performed  He had a successful treatment of his significant popliteal artery stenosis however there was distal embolization and thrombosis that required local tPA injection and balloon angioplasty  His tibial outflow is extremely poor with several areas of heavily calcified occlusive disease  He was discharged on aspirin Plavix and Xarelto  Unfortunately had a fall at home requiring readmission with a small subdural hematoma  That has remained stable unfortunately has had no neurological deficits  However it appears that he is very frail and has lost more weight and has had pretty significant functional decline  He clearly states that he is afraid of falling  When asking about his pain it appears that is patent rest pain has actually improved and he just needs half pill of oxycodone to get him through the night  The distal foot and toes appear hyperemic but there is a biphasic posterior tibial signal on the left ankle  At this point I would recommend that he continue with low-dose oxycodone as needed  Limited ambulation with the use of specialty prosthetic shoes  I will arrange for home physical therapy and home visiting nurse  We had a long discussion about the role of a below-knee amputation this case  Due to significant pre-existing ambulatory dysfunction and frail state he is at very high risk for nonhealing of the below-knee amputation and also inability to walk effectively after the below-knee amputation  Also rehabilitation process is going to be a very prolonged one  The patient understands all this  I would like to see him back in a few weeks for evaluation  there is no evidence of any cellulitis or infection in the foot    His rest pain his well tolerated/  Improved after the procedure  he is highly high risk for anticoagulation so at this point I will just continue with daily aspirin 81 mg          Diagnoses and all orders for this visit:    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 (HCC)    -     oxyCODONE (ROXICODONE) 5 mg immediate release tablet; Take 1 tablet (5 mg total) by mouth every 6 (six) hours as needed for moderate pain or severe pain Max Daily Amount: 20 mg    Ambulatory dysfunction  -     Ambulatory referral to Physical Therapy; Future    Fall, initial encounter  -     Ambulatory referral to Physical Therapy; Future    Physical deconditioning  -     Ambulatory referral to Physical Therapy; Future    Other orders  -     Discontinue: oxyCODONE (OXY-IR) 5 MG capsule; Take 5 mg by mouth every 4 (four) hours as needed for moderate pain          Subjective:      Patient ID: Jordan Cordoba is a 80 y o  male  Patient underwent left popliteal artery atherectomy and angioplasty 3 weeks ago  Complications included distal embolization and thrombosis that was managed with tPA injection during the procedure  He was subsequently discharged on aspirin Plavix and Xarelto  Patient then had a fall at home with a sub arachnoid hematoma and was readmitted  All anticoagulation was stopped  Repeat CT scan shows stability and improvement  He has had no neurological deficits from the event  Although he feels weak and frail and is afraid to walk because of fall  He has pain in his left foot that is mainly at night  At this point he does not have to dangle his foot all the time to get relief  He takes half of oxycodone that will get him through the night  During the daytime he does not need to take any pain medication  His foot can change color sometimes and turned blue  He was recently seen in the ER 2 days ago because of discoloration in his foot  According to wife overall he seems to be very frail is not eating enough and is weak    He walks using orthotic shoe and walker  The following portions of the patient's history were reviewed and updated as appropriate: allergies, current medications, past family history, past medical history, past social history, past surgical history and problem list     Review of Systems   HENT: Negative  Eyes: Negative  Respiratory: Negative  Cardiovascular: Negative  Gastrointestinal: Negative  Endocrine: Negative  Genitourinary: Negative  Musculoskeletal: Positive for gait problem  Foot pain   Skin: Positive for wound  Allergic/Immunologic: Negative  Neurological: Positive for weakness  Hematological: Bruises/bleeds easily  Psychiatric/Behavioral: Negative for sleep disturbance  I have reviewed the review of systems as entered and made appropriate changes as necessary    Objective:      /68 (BP Location: Left arm, Patient Position: Sitting)   Pulse 64   Temp 98 6 °F (37 °C)   Resp 18   Ht 5' 9" (1 753 m)   BMI 22 15 kg/m²          Physical Exam   Constitutional: He appears well-developed  He appears cachectic  No distress  HENT:   Head: Normocephalic and atraumatic  Cardiovascular: Normal rate and regular rhythm  Biphasic left posterior tibial signal   Pulmonary/Chest: Effort normal    Musculoskeletal: He exhibits edema ( left foot)  Neurological: He is alert  Skin: Skin is warm and dry  He is not diaphoretic  There is erythema ( dependent rubor distal forefoot left  Small ulceration -kissing ulcer between the 1st and 2nd toe)  Psychiatric: His behavior is normal  He exhibits a depressed mood

## 2018-11-13 NOTE — LETTER
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 [facility])    Long discussion with the patient, his wife and son today in the office regarding there is also the angiogram that was performed  He had a successful treatment of his significant popliteal artery stenosis however there was distal embolization and thrombosis that required local tPA injection and balloon angioplasty  His tibial outflow is extremely poor with several areas of heavily calcified occlusive disease  He was discharged on aspirin Plavix and Xarelto  Unfortunately had a fall at home requiring readmission with a small subdural hematoma  That has remained stable unfortunately has had no neurological deficits  However it appears that he is very frail and has lost more weight and has had pretty significant functional decline  He clearly states that he is afraid of falling  When asking about his pain it appears that is patent rest pain has actually improved and he just needs half pill of oxycodone to get him through the night  The distal foot and toes appear hyperemic but there is a biphasic posterior tibial signal on the left ankle  At this point I would recommend that he continue with low-dose oxycodone as needed  Limited ambulation with the use of specialty prosthetic shoes    I will arrange for home physical therapy and home visiting nurse  We had a long discussion about the role of a below-knee amputation this case  Due to significant pre-existing ambulatory dysfunction and frail state he is at very high risk for nonhealing of the below-knee amputation and also inability to walk effectively after the below-knee amputation  Also rehabilitation process is going to be a very prolonged one  The patient understands all this  I would like to see him back in a few weeks for evaluation  there is no evidence of any cellulitis or infection in the foot  His rest pain his well tolerated/  Improved after the procedure  he is highly high risk for anticoagulation so at this point I will just continue with daily aspirin 81 mg          Diagnoses and all orders for this visit:    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 (HCC)    -     oxyCODONE (ROXICODONE) 5 mg immediate release tablet; Take 1 tablet (5 mg total) by mouth every 6 (six) hours as needed for moderate pain or severe pain Max Daily Amount: 20 mg    Ambulatory dysfunction  -     Ambulatory referral to Physical Therapy; Future    Fall, initial encounter  -     Ambulatory referral to Physical Therapy; Future    Physical deconditioning  -     Ambulatory referral to Physical Therapy; Future    Other orders  -     Discontinue: oxyCODONE (OXY-IR) 5 MG capsule; Take 5 mg by mouth every 4 (four) hours as needed for moderate pain          Subjective:      Patient ID: Сергей Jacinto is a 80 y o  male  Patient underwent left popliteal artery atherectomy and angioplasty 3 weeks ago  Complications included distal embolization and thrombosis that was managed with tPA injection during the procedure  He was subsequently discharged on aspirin Plavix and Xarelto  Patient then had a fall at home with a sub arachnoid hematoma and was readmitted  All anticoagulation was stopped  Repeat CT scan shows stability and improvement    He has had no neurological deficits from the event  Although he feels weak and frail and is afraid to walk because of fall  He has pain in his left foot that is mainly at night  At this point he does not have to dangle his foot all the time to get relief  He takes half of oxycodone that will get him through the night  During the daytime he does not need to take any pain medication  His foot can change color sometimes and turned blue  He was recently seen in the ER 2 days ago because of discoloration in his foot  According to wife overall he seems to be very frail is not eating enough and is weak  He walks using orthotic shoe and walker  The following portions of the patient's history were reviewed and updated as appropriate: allergies, current medications, past family history, past medical history, past social history, past surgical history and problem list     Review of Systems   HENT: Negative  Eyes: Negative  Respiratory: Negative  Cardiovascular: Negative  Gastrointestinal: Negative  Endocrine: Negative  Genitourinary: Negative  Musculoskeletal: Positive for gait problem  Foot pain   Skin: Positive for wound  Allergic/Immunologic: Negative  Neurological: Positive for weakness  Hematological: Bruises/bleeds easily  Psychiatric/Behavioral: Negative for sleep disturbance  I have reviewed the review of systems as entered and made appropriate changes as necessary    Objective:      /68 (BP Location: Left arm, Patient Position: Sitting)   Pulse 64   Temp 98 6 °F (37 °C)   Resp 18   Ht 5' 9" (1 753 m)   BMI 22 15 kg/m²           Physical Exam   Constitutional: He appears well-developed  He appears cachectic  No distress  HENT:   Head: Normocephalic and atraumatic  Cardiovascular: Normal rate and regular rhythm  Biphasic left posterior tibial signal   Pulmonary/Chest: Effort normal    Musculoskeletal: He exhibits edema ( left foot)     Neurological: He is alert  Skin: Skin is warm and dry  He is not diaphoretic  There is erythema ( dependent rubor distal forefoot left  Small ulceration -kissing ulcer between the 1st and 2nd toe)  Psychiatric: His behavior is normal  He exhibits a depressed mood

## 2018-11-14 ENCOUNTER — TELEPHONE (OUTPATIENT)
Dept: VASCULAR SURGERY | Facility: CLINIC | Age: 83
End: 2018-11-14
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
## 2018-11-14 DIAGNOSIS — R26.2 AMBULATORY DYSFUNCTION: Primary | ICD-10-CM

## 2018-11-14 NOTE — ASSESSMENT & PLAN NOTE
Long discussion with the patient, his wife and son today in the office regarding there is also the angiogram that was performed  He had a successful treatment of his significant popliteal artery stenosis however there was distal embolization and thrombosis that required local tPA injection and balloon angioplasty  His tibial outflow is extremely poor with several areas of heavily calcified occlusive disease  He was discharged on aspirin Plavix and Xarelto  Unfortunately had a fall at home requiring readmission with a small subarachnoid hemorrhage  That has remained stable unfortunately has had no neurological deficits  However it appears that he is very frail and has lost more weight and has had pretty significant functional decline  He clearly states that he is afraid of falling  When asking about his pain it appears that is patent rest pain has actually improved and he just needs half pill of oxycodone to get him through the night  The distal foot and toes appear hyperemic but there is a biphasic posterior tibial signal on the left ankle  At this point I would recommend that he continue with low-dose oxycodone as needed  Limited ambulation with the use of specialty prosthetic shoes  I will arrange for home physical therapy and home visiting nurse  We had a long discussion about the role of a below-knee amputation this case  Due to significant pre-existing ambulatory dysfunction and frail state he is at very high risk for nonhealing of the below-knee amputation and also inability to walk effectively after the below-knee amputation  Also rehabilitation process is going to be a very prolonged one  The patient understands all this  I would like to see him back in a few weeks for evaluation  there is no evidence of any cellulitis or infection in the foot  His rest pain his well tolerated/  Improved after the procedure       he is highly high risk for anticoagulation so at this point I will just continue with daily aspirin 81 mg

## 2018-11-16 ENCOUNTER — PATIENT OUTREACH (OUTPATIENT)
Dept: FAMILY MEDICINE CLINIC | Facility: CLINIC | Age: 83
End: 2018-11-16

## 2018-11-19 DIAGNOSIS — I70.222 ATHEROSCLEROSIS OF NATIVE ARTERY OF LEFT LOWER EXTREMITY WITH REST PAIN (HCC): Primary | ICD-10-CM

## 2018-11-19 NOTE — PROGRESS NOTES
Eleno Briones from PT called and said that he needs an order for RN [employer]   He said it is mentioned in the note but there is no order for it so I placed the order and faxed it to [facility] [phone]

## 2018-11-20 ENCOUNTER — TELEPHONE (OUTPATIENT)
Dept: FAMILY MEDICINE CLINIC | Facility: CLINIC | Age: 83
End: 2018-11-20

## 2018-11-20 NOTE — TELEPHONE ENCOUNTER
Bartolo Kapoor from [place] [id number] called and stated that he started at home PT yesterday and patient was telling him that he is having some urinary incontinence issue and also lisinopril is not on his medication list but his family is giving it to him       Looks like patient is new to practice and has an appt with you on Friday to address these issues

## 2018-11-23 ENCOUNTER — OFFICE VISIT (OUTPATIENT)
Dept: FAMILY MEDICINE CLINIC | Facility: CLINIC | Age: 83
End: 2018-11-23
Payer: COMMERCIAL

## 2018-11-23 VITALS
RESPIRATION RATE: 18 BRPM | OXYGEN SATURATION: 99 % | DIASTOLIC BLOOD PRESSURE: 86 MMHG | HEIGHT: 69 IN | TEMPERATURE: 98.6 F | BODY MASS INDEX: 20.88 KG/M2 | SYSTOLIC BLOOD PRESSURE: 134 MMHG | WEIGHT: 141 LBS | HEART RATE: 68 BPM

## 2018-11-23 DIAGNOSIS — I60.9 SUBARACHNOID HEMORRHAGE (HCC): ICD-10-CM

## 2018-11-23 DIAGNOSIS — I73.9 SEVERE PERIPHERAL ARTERIAL DISEASE (HCC): ICD-10-CM

## 2018-11-23 DIAGNOSIS — I70.245 ATHEROSCLEROSIS OF NATIVE ARTERIES OF LEFT LEG WITH ULCERATION OF OTHER PART OF FOOT (HCC): ICD-10-CM

## 2018-11-23 DIAGNOSIS — R35.1 NOCTURIA: ICD-10-CM

## 2018-11-23 DIAGNOSIS — B37.89 CANDIDA RASH OF GROIN: ICD-10-CM

## 2018-11-23 DIAGNOSIS — E78.5 HYPERLIPIDEMIA, UNSPECIFIED HYPERLIPIDEMIA TYPE: ICD-10-CM

## 2018-11-23 DIAGNOSIS — R35.0 URINARY FREQUENCY: Primary | ICD-10-CM

## 2018-11-23 LAB
SL AMB  POCT GLUCOSE, UA: ABNORMAL
SL AMB LEUKOCYTE ESTERASE,UA: ABNORMAL
SL AMB POCT BILIRUBIN,UA: ABNORMAL
SL AMB POCT BLOOD,UA: ABNORMAL
SL AMB POCT CLARITY,UA: ABNORMAL
SL AMB POCT COLOR,UA: ABNORMAL
SL AMB POCT KETONES,UA: ABNORMAL
SL AMB POCT NITRITE,UA: ABNORMAL
SL AMB POCT PH,UA: 6
SL AMB POCT SPECIFIC GRAVITY,UA: 1.01
SL AMB POCT URINE PROTEIN: ABNORMAL
SL AMB POCT UROBILINOGEN: 1

## 2018-11-23 PROCEDURE — 81002 URINALYSIS NONAUTO W/O SCOPE: CPT | Performed by: NURSE PRACTITIONER

## 2018-11-23 PROCEDURE — 3725F SCREEN DEPRESSION PERFORMED: CPT | Performed by: NURSE PRACTITIONER

## 2018-11-23 PROCEDURE — 1160F RVW MEDS BY RX/DR IN RCRD: CPT | Performed by: NURSE PRACTITIONER

## 2018-11-23 PROCEDURE — 99214 OFFICE O/P EST MOD 30 MIN: CPT | Performed by: NURSE PRACTITIONER

## 2018-11-23 RX ORDER — SIMVASTATIN 20 MG
20 TABLET ORAL DAILY
Qty: 90 TABLET | Refills: 3 | Status: SHIPPED | OUTPATIENT
Start: 2018-11-23 | End: 2020-01-17

## 2018-11-23 RX ORDER — CLOTRIMAZOLE AND BETAMETHASONE DIPROPIONATE 10; .64 MG/G; MG/G
CREAM TOPICAL 2 TIMES DAILY
Qty: 45 G | Refills: 2 | Status: ON HOLD | OUTPATIENT
Start: 2018-11-23 | End: 2019-03-12

## 2018-11-23 NOTE — PROGRESS NOTES
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)    Subarachnoid hemorrhage (HCC)  Comments:  stable patient cleared for Aspirin 81 mg daily     Hyperlipidemia, unspecified hyperlipidemia type  -     simvastatin (ZOCOR) 20 mg tablet; Take 1 tablet (20 mg total) by mouth daily    Candida rash of groin  -     clotrimazole-betamethasone (LOTRISONE) 1-0 05 % cream; Apply topically 2 (two) times a day for 30 days Apply to groin    Nocturia  Comments:  OAB suspected           Subjective:      Patient ID: Melchor Gottlieb is a 80 y o  male  Patient here and reports that he has a problem with a rash on his bottom and reports that he has had for the past few weeks not itchy just painful  Patient is also having a problem with having urinary frequency getting up at least 5 times a night to urinate and emptying out his bladder when he urinates not hard to get the urine started or urinating  Patient does have a weak stream when he urinates and has been going on since his first hospital stay a few weeks ago on 10/26/18  No blood in the urine and no history of urine or prostate issues  No past urine infection  Had seen urology in the past for kidney stones distant past    Patient following closely with vascular r/t his severe PVD on his left leg and options for the leg  Patient also had a recent subdural hematoma and has been resolving will continue with the Aspirin  Urinary Frequency    Associated symptoms include frequency  Pertinent negatives include no chills         The following portions of the patient's history were reviewed and updated as appropriate:   He  has a past medical history of Anemia; Arthritis; Bifascicular block; Cellulitis of chest wall; Chronic kidney disease; Dupuytren contracture; DVT femoral (deep venous thrombosis) with thrombophlebitis, right ([address] ); Elevated glucose; Hemothorax; Hyperlipidemia; and Vascular disease  He   Patient Active Problem List    Diagnosis Date Noted    Candida rash of groin 11/23/2018    Urinary frequency 11/23/2018    Nocturia 11/23/2018    Subarachnoid hemorrhage ([facility] [address] ) 10/29/2018    Fall 10/29/2018    Ambulatory dysfunction 10/29/2018    Short-term memory loss 10/29/2018    Physical deconditioning 10/29/2018    Atherosclerosis of native arteries of left leg with ulceration of other part of foot ([facility] [address] ) 10/08/2018    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 ([facility] [address] ) 03/06/2018    CKD (chronic kidney disease), stage III ([facility] [address] ) 03/05/2018    Severe peripheral arterial disease ([facility] [address] ) 02/13/2018    Bifascicular block 10/31/2017    Vitamin D deficiency 10/25/2017    Anemia 10/15/2017    Hyperlipidemia 07/14/2016     He  has a past surgical history that includes Colonoscopy; Cataract extraction; Fracture surgery; Knee arthroscopy; Amputation (1972); Carpal tunnel release (2016); pr slctv cathj 3rd+ ord slctv abdl pel/lxtr brnch (Left, 3/9/2018); pr slctv cathj 3rd+ ord slctv abdl pel/lxtr brnch (Left, 10/26/2018); IR lower extremity / intervention (10/26/2018); Eye surgery; Hand surgery; NERVE BLOCK; and Thoracentesis  His family history includes Aneurysm in his father; Brain cancer in his father and mother; Diabetes in his father and maternal grandmother; Lung cancer in his father and mother; Stroke in his father  He  reports that he has quit smoking  He has never used smokeless tobacco  He reports that he does not drink alcohol or use drugs  He has No Known Allergies       Review of Systems   Constitutional: Positive for unexpected weight change   Negative for activity change, appetite change, chills, diaphoresis, fatigue and fever  HENT: Negative  Eyes: Negative  Respiratory: Negative  Cardiovascular: Negative  Gastrointestinal: Negative  Endocrine: Negative  Genitourinary: Positive for frequency  Musculoskeletal: Positive for arthralgias and gait problem  Skin: Positive for rash  Allergic/Immunologic: Negative  Hematological: Negative  Psychiatric/Behavioral: Positive for dysphoric mood  Objective:      /86   Pulse 68   Temp 98 6 °F (37 °C) (Tympanic)   Resp 18   Ht 5' 9" (1 753 m)   Wt 64 kg (141 lb)   SpO2 99%   BMI 20 82 kg/m²          Physical Exam   Constitutional: He is oriented to person, place, and time  Vital signs are normal  He appears well-developed and well-nourished  No distress  HENT:   Head: Normocephalic and atraumatic  Right Ear: Hearing and ear canal normal    Left Ear: Hearing, external ear and ear canal normal    Nose: Nose normal    Mouth/Throat: Uvula is midline and oropharynx is clear and moist    Cardiovascular: Normal rate, regular rhythm and normal heart sounds  Pulmonary/Chest: Effort normal and breath sounds normal    Abdominal: Normal appearance  Musculoskeletal:   Using the rolling walker for ambulation    Neurological: He is alert and oriented to person, place, and time  Skin: Skin is warm and dry  He is not diaphoretic  Nursing note and vitals reviewed

## 2018-11-26 ENCOUNTER — OFFICE VISIT (OUTPATIENT)
Dept: VASCULAR SURGERY | Facility: CLINIC | Age: 83
End: 2018-11-26
Payer: COMMERCIAL

## 2018-11-26 VITALS
SYSTOLIC BLOOD PRESSURE: 144 MMHG | HEIGHT: 69 IN | BODY MASS INDEX: 20.82 KG/M2 | TEMPERATURE: 97.3 F | DIASTOLIC BLOOD PRESSURE: 72 MMHG

## 2018-11-26 DIAGNOSIS — I70.222 ATHEROSCLEROSIS OF NATIVE ARTERY OF LEFT LOWER EXTREMITY WITH REST PAIN (HCC): Primary | ICD-10-CM

## 2018-11-26 PROCEDURE — 4040F PNEUMOC VAC/ADMIN/RCVD: CPT | Performed by: NURSE PRACTITIONER

## 2018-11-26 PROCEDURE — 99214 OFFICE O/P EST MOD 30 MIN: CPT | Performed by: NURSE PRACTITIONER

## 2018-11-26 RX ORDER — OXYCODONE HYDROCHLORIDE 5 MG/1
5 TABLET ORAL
Qty: 60 TABLET | Refills: 0 | Status: SHIPPED | OUTPATIENT
Start: 2018-11-26 | End: 2018-11-28 | Stop reason: SDUPTHER

## 2018-11-26 NOTE — ASSESSMENT & PLAN NOTE
-continue local wound care  -VNA assisting with dressing changes  -notify office immediately with any signs of infection

## 2018-11-26 NOTE — PROGRESS NOTES
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 (HCC)  -severe peripheral arterial disease with left 2nd toe ulcer and rest pain  -s/p RLE angiogram w/ popliteal atherectomy w/ distal embolization and thrombosis requiring tPA and balloon angioplasty  Was on ASA/Plavix/Xarelto post intervention, however Xarelto and Plavix were D/C after a fall w/ subdural hematoma, remains only on baby aspirin  -unfortunately at this time there are no further vascular options available for treatment of right leg  -experiences nightly rest pain  Continue Oxycodone, advised placing bed raisers under head of bed to keep bed in dependent position  -patient and wife aware that if his pain worsened or became intractable or his tissue loss degenerated he would necessitate BKA  In the interim remain as active as possible and continue local wound care  -Followup in 8-12 weeks for recheck  If symptoms worsen prior notify the office     Atherosclerosis of native arteries of left leg with ulceration of other part of foot ([facility] [address] )  -continue local wound care  -VNA assisting with dressing changes  -notify office immediately with any signs of infection    Hyperlipidemia  -continue simvastatin and fish oil  -management per primary team    I have spent 25 minutes with Patient and family today in which greater than 50% of this time was spent in counseling/coordination of care regarding Intructions for management  Diagnoses and all orders for this visit:    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 (HCC)  -     oxyCODONE (ROXICODONE) 5 mg immediate release tablet; Take 1 tablet (5 mg total) by mouth daily at bedtime as needed for moderate pain Max Daily Amount: 5 mg           Patient ID: Carrol Borges is a 80 y o  male  Chief complaint: 3 week f/u ANGIO,STENT ATHERECTOMY 10/26  Pt denies drainge or pain to groin area  Pt has wound to right foot   Pt states it has not gotten better since agram  Pt has VN coming twice a week  Pt is changing dressing daily  Pt is c/o pain to foot  Pt is on asa  Patient with severe PAD  Status post left lower extremity angiogram with L popliteal atherectomy with distal embolization s/p tPA and angioplasty  Was discharged home on aspirin, plavix and Xarelto, however after fall with subdural hematoma he remains only on aspirin  Unfortunately, there are no further vascular options available for revascularization of the LLE  He has been managing his nightly rest pain with use of Oxycodone  He states that he has been taking half a pill, but has been waking up up to 4 times per night with foot pain  He states that hanging his foot off the side of bed does not help  He denies any significant pain to the foot during the day  He is currently completing physical therapy, ambulates with walker  He is changing left 2nd toe dressing daily with Silvadene  The following portions of the patient's history were reviewed and updated as appropriate: allergies, current medications, past family history, past medical history, past social history, past surgical history and problem list     Review of Systems   Constitutional: Negative  HENT: Negative  Eyes: Negative  Respiratory: Positive for cough  Cardiovascular: Negative  Gastrointestinal: Negative  Endocrine: Negative  Genitourinary: Negative  Musculoskeletal: Positive for back pain  Leg pain   Skin: Positive for wound  Allergic/Immunologic: Negative  Neurological: Negative  Hematological: Bruises/bleeds easily  Psychiatric/Behavioral: Positive for sleep disturbance  Objective:       Physical Exam   Constitutional: He is oriented to person, place, and time  No distress  HENT:   Head: Normocephalic and atraumatic  Eyes: No scleral icterus  Neck: Neck supple  No JVD present  Cardiovascular: Normal rate and regular rhythm      Pulses:       Dorsalis pedis pulses are 0 on the right side, and 0 on the left side  Posterior tibial pulses are 0 on the right side, and 0 on the left side  Pulmonary/Chest: Effort normal  No respiratory distress  Musculoskeletal: He exhibits edema (Mild nonpitting edema bilateral lower extremities)  Ambulates with walker   Neurological: He is alert and oriented to person, place, and time  Skin:   Dependent rubor to right foot, small dry ulcer to left 2nd toe, no drainage, no malodor   Psychiatric: He has a normal mood and affect           Vitals:    11/26/18 0947   BP: 144/72   BP Location: Right arm   Patient Position: Sitting   Cuff Size: Adult   Temp: (!) 97 3 °F (36 3 °C)   TempSrc: Tympanic   Height: 5' 9" (1 753 m)       Patient Active Problem List   Diagnosis    Severe peripheral arterial disease (HCC)    Hyperlipidemia    Vitamin D deficiency    Bifascicular block    Anemia    CKD (chronic kidney disease), stage III (HCC)    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 (HCC)    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)    Subarachnoid hemorrhage (HCC)    Fall    Ambulatory dysfunction    Short-term memory loss    Physical deconditioning    Candida rash of groin    Urinary frequency    Nocturia       Past Surgical History:   Procedure Laterality Date    AMPUTATION  1972    L thumb    CARPAL TUNNEL RELEASE  2016    L hand    CATARACT EXTRACTION      COLONOSCOPY      EYE SURGERY      FRACTURE SURGERY      HAND SURGERY      IR LOWER EXTREMITY / INTERVENTION  10/26/2018    KNEE ARTHROSCOPY      NERVE BLOCK      ME SLCTV CATHJ 3RD+ ORD SLCTV ABDL PEL/LXTR [facility] Left 3/9/2018    Procedure: LEFT LOWER EXTREMITY ARTERIOGRAM WITH PTA OF LEFT POPLITEAL ARTERY;  Surgeon: Swati Shen MD;  [facility]: [facility];  Service: Vascular    STEVEN [place] Dates 3RD+ ORD SLCTV ABDL PEL/LXTR [facility] Left 10/26/2018    Procedure: ARTERIOGRAM, angioplasty stent and atherectomy;  Surgeon: Baron Edward MD;  Location: [facility];  Service: Vascular    THORACENTESIS         Family History   Problem Relation Age of Onset    Lung cancer Mother     Brain cancer Mother     Diabetes Father     Aneurysm Father     Stroke Father     Lung cancer Father     Brain cancer Father     Diabetes Maternal Grandmother        Social History     Social History    Marital status: /Civil Union     Spouse name: N/A    Number of children: 3    Years of education: N/A     Occupational History    retired      Social History Main Topics    Smoking status: Former Smoker    Smokeless tobacco: Never Used      Comment: quit 62 years ago    Alcohol use No    Drug use: No    Sexual activity: No     Other Topics Concern    Not on file     Social History Narrative    Previous        No Known Allergies      Current Outpatient Prescriptions:     aspirin 81 MG tablet, Take by mouth, Disp: , Rfl:     Cholecalciferol (VITAMIN D) 2000 units CAPS, Take by mouth, Disp: , Rfl:     clotrimazole-betamethasone (LOTRISONE) 1-0 05 % cream, Apply topically 2 (two) times a day for 30 days Apply to groin, Disp: 45 g, Rfl: 2    ferrous sulfate 325 (65 Fe) mg tablet, Take 1 tablet (325 mg total) by mouth 2 (two) times a day, Disp: 90 tablet, Rfl: 2    Omega-3 Fatty Acids (FISH OIL) 645 MG CAPS, Take by mouth, Disp: , Rfl:     oxyCODONE (ROXICODONE) 5 mg immediate release tablet, Take 1 tablet (5 mg total) by mouth daily at bedtime as needed for moderate pain Max Daily Amount: 5 mg, Disp: 60 tablet, Rfl: 0    silver sulfadiazine (SILVADENE,SSD) 1 % cream, Apply topically daily, Disp: 50 g, Rfl: 0    simvastatin (ZOCOR) 20 mg tablet, Take 1 tablet (20 mg total) by mouth daily, Disp: 90 tablet, Rfl: 3

## 2018-11-26 NOTE — LETTER
November 26, 2018     Emilia Tejeda, [address]  [address] [place] [address]  [address]    Patient: Neno Duenas   YOB: 1933   Date of Visit: 11/26/2018       Dear Dr Altagracia Zamora:    Thank you for referring Neno Duenas to me for evaluation  Below are my notes for this consultation  If you have questions, please do not hesitate to call me  I look forward to following your patient along with you  Sincerely,        QASIM Simmons        CC: No Recipients  QSAIM Simmons  11/26/2018 10:04 AM  Sign at close encounter  Assessment/Plan:    No problem-specific Assessment & Plan notes found for this encounter  There are no diagnoses linked to this encounter  Patient ID: Neno Duenas is a 80 y o  male  Chief complaint: 3 week f/u ANGIO,STENT ATHERECTOMY 10/26  Pt denies drainge or pain to groin area  Pt has wound to right foot  Pt states it has not gotten better since agram  Pt has VN coming twice a week  Pt is changing dressing daily  Pt is c/o pain to foot  Pt is on asa  HPI    The following portions of the patient's history were reviewed and updated as appropriate: allergies, current medications, past family history, past medical history, past social history, past surgical history and problem list     Review of Systems   Constitutional: Negative  HENT: Negative  Eyes: Negative  Respiratory: Positive for cough  Cardiovascular: Negative  Gastrointestinal: Negative  Endocrine: Negative  Genitourinary: Negative  Musculoskeletal: Positive for back pain  Leg pain   Skin: Positive for wound  Allergic/Immunologic: Negative  Neurological: Negative  Hematological: Bruises/bleeds easily  Psychiatric/Behavioral: Positive for sleep disturbance           Objective:       Physical Exam      Vitals:    11/26/18 0947   BP: 144/72   BP Location: Right arm   Patient Position: Sitting   Cuff Size: Adult   Temp: (!) 97 3 °F (36 3 °C)   TempSrc: Tympanic Height: 5' 9" (1 753 m)       Patient Active Problem List   Diagnosis    Severe peripheral arterial disease (HCC)    Hyperlipidemia    Vitamin D deficiency    Bifascicular block    Anemia    CKD (chronic kidney disease), stage III (HCC)    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 (HCC)    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)    Subarachnoid hemorrhage (HCC)    Fall    Ambulatory dysfunction    Short-term memory loss    Physical deconditioning    Candida rash of groin    Urinary frequency    Nocturia       Past Surgical History:   Procedure Laterality Date    AMPUTATION  1972    L thumb    CARPAL TUNNEL RELEASE  2016    L hand    CATARACT EXTRACTION      COLONOSCOPY      EYE SURGERY      FRACTURE SURGERY      HAND SURGERY      IR LOWER EXTREMITY / INTERVENTION  10/26/2018    KNEE ARTHROSCOPY      NERVE BLOCK      [place] SLCTV CATHJ 3RD+ ORD SLCTV ABDL PEL/LXTR [facility] Left 3/9/2018    Procedure: LEFT LOWER EXTREMITY ARTERIOGRAM WITH PTA OF LEFT POPLITEAL ARTERY;  Surgeon: Deneen Nino MD;  [facility];  Service: Vascular    [place] SLCTV CATHJ 3RD+ ORD SLCTV ABDL PEL/LXTR [facility] Left 10/26/2018    Procedure: ARTERIOGRAM, angioplasty stent and atherectomy;  Surgeon: Deneen Nino MD;  [facility];  Service: Vascular    THORACENTESIS         Family History   Problem Relation Age of Onset    Lung cancer Mother     Brain cancer Mother     Diabetes Father     Aneurysm Father     Stroke Father     Lung cancer Father     Brain cancer Father     Diabetes Maternal Grandmother        Social History     Social History    Marital status: /Civil Union     Spouse name: N/A    Number of children: 3    Years of education: N/A     Occupational History    retired      Social History Main Topics    Smoking status: Former Smoker    Smokeless tobacco: Never Used      Comment: quit 57 years ago  Alcohol use No    Drug use: No    Sexual activity: No     Other Topics Concern    Not on file     Social History Narrative    Previous        No Known Allergies      Current Outpatient Prescriptions:     aspirin 81 MG tablet, Take by mouth, Disp: , Rfl:     Cholecalciferol (VITAMIN D) 2000 units CAPS, Take by mouth, Disp: , Rfl:     clotrimazole-betamethasone (LOTRISONE) 1-0 05 % cream, Apply topically 2 (two) times a day for 30 days Apply to groin, Disp: 45 g, Rfl: 2    ferrous sulfate 325 (65 Fe) mg tablet, Take 1 tablet (325 mg total) by mouth 2 (two) times a day, Disp: 90 tablet, Rfl: 2    Omega-3 Fatty Acids (FISH OIL) 645 MG CAPS, Take by mouth, Disp: , Rfl:     silver sulfadiazine (SILVADENE,SSD) 1 % cream, Apply topically daily, Disp: 50 g, Rfl: 0    simvastatin (ZOCOR) 20 mg tablet, Take 1 tablet (20 mg total) by mouth daily, Disp: 90 tablet, Rfl: 3

## 2018-11-26 NOTE — ASSESSMENT & PLAN NOTE
-severe peripheral arterial disease with left 2nd toe ulcer and rest pain  -s/p RLE angiogram w/ popliteal atherectomy w/ distal embolization and thrombosis requiring tPA and balloon angioplasty  Was on ASA/Plavix/Xarelto post intervention, however Xarelto and Plavix were D/C after a fall w/ subdural hematoma, remains only on baby aspirin  -unfortunately at this time there are no further vascular options available for treatment of right leg  -experiences nightly rest pain  Continue Oxycodone, advised placing bed raisers under head of bed to keep bed in dependent position  -patient and wife aware that if his pain worsened or became intractable or his tissue loss degenerated he would necessitate BKA  In the interim remain as active as possible and continue local wound care  -Followup in 8-12 weeks for recheck    If symptoms worsen prior notify the office

## 2018-11-26 NOTE — PATIENT INSTRUCTIONS
Peripheral arterial disease with rest pain and toe ulcer, status post right leg angiogram  -At this time there is no further vascular intervention that will help the right leg  -We will continue to manage your pain with Oxycodone, take a full pill at night if needed   Also, place bed raiser or cinder blocks under the head of bed to keep foot in slightly dependent position  -Continue baby aspirin  -Followup in 8-12 weeks, if you pain or sore worsens prior to followup notify the office

## 2018-11-28 ENCOUNTER — TELEPHONE (OUTPATIENT)
Dept: FAMILY MEDICINE CLINIC | Facility: CLINIC | Age: 83
End: 2018-11-28

## 2018-11-28 ENCOUNTER — PATIENT OUTREACH (OUTPATIENT)
Dept: FAMILY MEDICINE CLINIC | Facility: CLINIC | Age: 83
End: 2018-11-28

## 2018-11-28 DIAGNOSIS — I70.222 ATHEROSCLEROSIS OF NATIVE ARTERY OF LEFT LEG WITH REST PAIN (HCC): Primary | ICD-10-CM

## 2018-11-28 DIAGNOSIS — R35.0 URINARY FREQUENCY: Primary | ICD-10-CM

## 2018-11-28 RX ORDER — AMOXICILLIN AND CLAVULANATE POTASSIUM 875; 125 MG/1; MG/1
1 TABLET, FILM COATED ORAL EVERY 12 HOURS SCHEDULED
Qty: 10 TABLET | Refills: 0 | Status: SHIPPED | OUTPATIENT
Start: 2018-11-28 | End: 2018-12-03

## 2018-11-28 RX ORDER — OXYCODONE HYDROCHLORIDE 5 MG/1
5 TABLET ORAL
Qty: 60 TABLET | Refills: 0 | Status: SHIPPED | OUTPATIENT
Start: 2018-11-28 | End: 2018-12-19 | Stop reason: SDUPTHER

## 2018-11-28 NOTE — TELEPHONE ENCOUNTER
Patient called still c/o having urinary frequency  He was seen on Friday by Claudia Osborn and a Culture was sent but the lab cancelled the specimen because they stated it was longer then 72 hours  Patient is upset and would like to know if you can prescribe an ABX?  He does not want to drive out in this weather

## 2018-11-28 NOTE — PROGRESS NOTES
Kaylen Vidal,    I agree with the care plan for Mr Leighton Rhoades  Any questions feel free to contact us      Sincerely,      Joe Gross MD

## 2018-11-29 ENCOUNTER — PATIENT OUTREACH (OUTPATIENT)
Dept: FAMILY MEDICINE CLINIC | Facility: CLINIC | Age: 83
End: 2018-11-29

## 2018-11-29 NOTE — PROGRESS NOTES
Luis Hooper in good spirits  Stated he is feeling well today  Lives with his wife in a 2 story home  Stated that he is retired  He is independent with ADLs and denied needing any assistance in the home  Reviewed over his medications  He is tolerating them  Educated on good medication management and importance of a good regime  He verbalized understanding  Stated that his wife manages his medications  Has no issues with obtaining prescriptions  He is up to date with PCP appointments  Does have chronic pain to his left foot due to his PAD  He follows up regularly with his vascular surgeon  Introduced him to care coordination  He is in agreement  Provided him with contact information if need be  Will f/u

## 2018-12-04 ENCOUNTER — TELEPHONE (OUTPATIENT)
Dept: FAMILY MEDICINE CLINIC | Facility: CLINIC | Age: 83
End: 2018-12-04

## 2018-12-04 ENCOUNTER — PATIENT OUTREACH (OUTPATIENT)
Dept: FAMILY MEDICINE CLINIC | Facility: CLINIC | Age: 83
End: 2018-12-04

## 2018-12-04 NOTE — TELEPHONE ENCOUNTER
Pt is dc from [facility]  Today  He doea have a small sore on 0 5x0 5 on the right foot between toes   The family said they were fine to treat this without homecare

## 2018-12-19 ENCOUNTER — OFFICE VISIT (OUTPATIENT)
Dept: VASCULAR SURGERY | Facility: CLINIC | Age: 83
End: 2018-12-19
Payer: COMMERCIAL

## 2018-12-19 VITALS
RESPIRATION RATE: 18 BRPM | WEIGHT: 143 LBS | BODY MASS INDEX: 21.18 KG/M2 | HEART RATE: 72 BPM | DIASTOLIC BLOOD PRESSURE: 70 MMHG | HEIGHT: 69 IN | TEMPERATURE: 98.1 F | SYSTOLIC BLOOD PRESSURE: 128 MMHG

## 2018-12-19 DIAGNOSIS — I70.245 ATHEROSCLEROSIS OF NATIVE ARTERIES OF LEFT LEG WITH ULCERATION OF OTHER PART OF FOOT (HCC): Primary | ICD-10-CM

## 2018-12-19 DIAGNOSIS — I70.222 ATHEROSCLEROSIS OF NATIVE ARTERY OF LEFT LEG WITH REST PAIN (HCC): ICD-10-CM

## 2018-12-19 DIAGNOSIS — I70.222 ATHEROSCLEROSIS OF NATIVE ARTERY OF LEFT LOWER EXTREMITY WITH REST PAIN (HCC): ICD-10-CM

## 2018-12-19 PROCEDURE — 99214 OFFICE O/P EST MOD 30 MIN: CPT | Performed by: NURSE PRACTITIONER

## 2018-12-19 RX ORDER — CEPHALEXIN 500 MG/1
500 CAPSULE ORAL EVERY 12 HOURS SCHEDULED
Qty: 10 CAPSULE | Refills: 0 | Status: SHIPPED | OUTPATIENT
Start: 2018-12-19 | End: 2018-12-24

## 2018-12-19 RX ORDER — OXYCODONE HYDROCHLORIDE 5 MG/1
5 TABLET ORAL EVERY 12 HOURS PRN
Qty: 60 TABLET | Refills: 0 | Status: ON HOLD | OUTPATIENT
Start: 2018-12-19 | End: 2019-02-05

## 2018-12-19 NOTE — LETTER
December 19, 2018     Seda Pool, [address]  [address] [facility]  [address]    Patient: Jordan Cordoba   YOB: 1933   Date of Visit: 12/19/2018       Dear Dr Sheela Suh:    Thank you for referring Jordan Cordoba to me for evaluation  Below are my notes for this consultation  If you have questions, please do not hesitate to call me  I look forward to following your patient along with you  Sincerely,        Priscilla Wylie MD        CC: No Recipients  QASIM Alvares  12/19/2018  3:25 PM  Sign at close encounter  Assessment/Plan:    81 y/o M with severe PAD s/p LLE angiogram w/ popliteal atherectomy w/ distal embolization treated w/ tPA and balloon angioplasty, was maintained on dual antiplatelet and Xarelto post intervention, c/b fall w/ subdural hematoma, now only on aspirin  Seen for evaluation of new L 3rd toe tissue loss  Atherosclerosis of native arteries of left leg with ulceration of other part of foot ([facility] [address] )  -patient with PAD w/ severe tibial disease  Unfortunately, there are no further vascular options available for revascularization  -he has developed new ischemic tissue loss to the left 3rd toe  -will treat toe wounds with Betadine paint daily and course of prophylactic Keflex  -off-loading boot to aide with ambulation  -Oxycodone as needed for rest pain at night  Maintain foot in dependent position at night to help with rest pain  -followup in 1 month for recheck, notify office immediately with any signs of infection       Diagnoses and all orders for this visit:    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)  -     cephalexin (KEFLEX) 500 mg capsule; Take 1 capsule (500 mg total) by mouth every 12 (twelve) hours for 5 days    Atherosclerosis of native artery of left leg with rest pain (HCC)  -     oxyCODONE (ROXICODONE) 5 mg immediate release tablet;  Take 1 tablet (5 mg total) by mouth every 12 (twelve) hours as needed for moderate pain Max Daily Amount: 10 mg    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 (HCC)  -     cephalexin (KEFLEX) 500 mg capsule; Take 1 capsule (500 mg total) by mouth every 12 (twelve) hours for 5 days        I have spent 25 minutes with Patient and family today in which greater than 50% of this time was spent in counseling/coordination of care regarding Risks and benefits of tx options, Intructions for management and Patient and family education  Subjective:      Patient ID: Alexa Nguyen is a 80 y o  male  Patient had ANGIO,STENT ATHERECTOMY 10/26 by Dr Rosanne Cordova  Patient has open are on l foot between toes  There is some drainage from middle toe left foot and pt states a change in color of middle toe in last 3-4 days Patient was recent discharged from [facility] family is treating open areas  Patient takes ASA and Zocor  Patient seen for evaluation of new left foot tissue loss  He continues with dry stable ulcer to the left 2nd toe  He has developed new ischemic tissue loss to the tip of left 3rd toe  He continues with unchanged pain to        The following portions of the patient's history were reviewed and updated as appropriate: allergies, current medications, past family history, past medical history, past social history, past surgical history and problem list     Review of Systems   Constitutional: Negative  HENT: Negative  Eyes: Negative  Respiratory: Negative  Cardiovascular: Negative  Gastrointestinal: Negative  Endocrine: Negative  Genitourinary: Negative  Musculoskeletal: Positive for gait problem  Skin: Positive for wound  Allergic/Immunologic: Negative  Hematological: Negative  Psychiatric/Behavioral: Negative  Objective:       Physical Exam   Constitutional: He is oriented to person, place, and time  No distress  HENT:   Head: Normocephalic and atraumatic  Eyes: No scleral icterus  Neck: Neck supple  No JVD present  Cardiovascular: Normal rate and regular rhythm  Biphasic AT/PT signal   Pulmonary/Chest: Effort normal and breath sounds normal    Musculoskeletal: He exhibits edema (Mild to moderate edema )  Ambulates with walker   Neurological: He is alert and oriented to person, place, and time  Skin:   Dry ulcer left 2nd toe, purple ischemic discoloration to the L 3rd toe, interdigital ulcer L 3rd-4th toes, erythema to forefoot, no signs gross infection   Psychiatric: He has a normal mood and affect               Vitals:    12/19/18 1413   BP: 128/70   BP Location: Right arm   Patient Position: Sitting   Pulse: 72   Resp: 18   Temp: 98 1 °F (36 7 °C)   Weight: 64 9 kg (143 lb)   Height: 5' 9" (1 753 m)       Patient Active Problem List   Diagnosis    Severe peripheral arterial disease (HCC)    Hyperlipidemia    Vitamin D deficiency    Bifascicular block    Anemia    CKD (chronic kidney disease), stage III (HCC)    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 (HCC)    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)    Subarachnoid hemorrhage (HCC)    Fall    Ambulatory dysfunction    Short-term memory loss    Physical deconditioning    Candida rash of groin    Urinary frequency    Nocturia       Past Surgical History:   Procedure Laterality Date    AMPUTATION  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    Procedure: LEFT LOWER EXTREMITY ARTERIOGRAM WITH PTA OF LEFT POPLITEAL ARTERY;  Surgeon: Donovan Mancera MD;  [facility];  Service: Vascular    [facility]DL [facility] Left 10/26/2018    Procedure: ARTERIOGRAM, angioplasty stent and atherectomy;  Surgeon: Yady Purdy MD;  [facility];  Service: Vascular    THORACENTESIS         Family History   Problem Relation Age of Onset    Lung cancer Mother     Brain cancer Mother     Diabetes Father     Aneurysm Father     Stroke Father     Lung cancer Father     Brain cancer Father     Diabetes Maternal Grandmother        Social History     Social History    Marital status: /Civil Union     Spouse name: N/A    Number of children: 3    Years of education: N/A     Occupational History    retired      Social History Main Topics    Smoking status: Former Smoker    Smokeless tobacco: Never Used      Comment: quit 62 years ago    Alcohol use No    Drug use: No    Sexual activity: No     Other Topics Concern    Not on file     Social History Narrative    Previous        No Known Allergies      Current Outpatient Prescriptions:     aspirin 81 MG tablet, Take by mouth, Disp: , Rfl:     Cholecalciferol (VITAMIN D) 2000 units CAPS, Take by mouth, Disp: , Rfl:     ferrous sulfate 325 (65 Fe) mg tablet, Take 1 tablet (325 mg total) by mouth 2 (two) times a day, Disp: 90 tablet, Rfl: 2    Omega-3 Fatty Acids (FISH OIL) 645 MG CAPS, Take by mouth, Disp: , Rfl:     oxyCODONE (ROXICODONE) 5 mg immediate release tablet, Take 1 tablet (5 mg total) by mouth every 12 (twelve) hours as needed for moderate pain Max Daily Amount: 10 mg, Disp: 60 tablet, Rfl: 0    silver sulfadiazine (SILVADENE,SSD) 1 % cream, Apply topically daily, Disp: 50 g, Rfl: 0    simvastatin (ZOCOR) 20 mg tablet, Take 1 tablet (20 mg total) by mouth daily, Disp: 90 tablet, Rfl: 3    cephalexin (KEFLEX) 500 mg capsule, Take 1 capsule (500 mg total) by mouth every 12 (twelve) hours for 5 days, Disp: 10 capsule, Rfl: 0    clotrimazole-betamethasone (LOTRISONE) 1-0 05 % cream, Apply topically 2 (two) times a day for 30 days Apply to groin (Patient not taking: Reported on 12/19/2018 ), Disp: 45 g, Rfl: 2

## 2018-12-19 NOTE — PROGRESS NOTES
Assessment/Plan:    81 y/o M with severe PAD s/p LLE angiogram w/ popliteal atherectomy w/ distal embolization treated w/ tPA and balloon angioplasty, was maintained on dual antiplatelet and Xarelto post intervention, c/b fall w/ subdural hematoma, now only on aspirin  Seen for evaluation of new L 3rd toe tissue loss  Atherosclerosis of native arteries of left leg with ulceration of other part of foot ([address] )  -patient with PAD w/ severe tibial disease  Unfortunately, there are no further vascular options available for revascularization  -he has developed new ischemic tissue loss to the left 3rd toe  -will treat toe wounds with Betadine paint daily and course of prophylactic Keflex  -forefoot off-loading boot to aide with ambulation  -Oxycodone as needed for rest pain at night  Maintain foot in dependent position at night to help with rest pain  -followup in 1 month for recheck, notify office immediately with any signs of infection               Diagnoses and all orders for this visit:    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)  -     cephalexin (KEFLEX) 500 mg capsule; Take 1 capsule (500 mg total) by mouth every 12 (twelve) hours for 5 days    Atherosclerosis of native artery of left leg with rest pain (HCC)  -     oxyCODONE (ROXICODONE) 5 mg immediate release tablet; Take 1 tablet (5 mg total) by mouth every 12 (twelve) hours as needed for moderate pain Max Daily Amount: 10 mg    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 (HCC)  -     cephalexin (KEFLEX) 500 mg capsule; Take 1 capsule (500 mg total) by mouth every 12 (twelve) hours for 5 days        I have spent 25 minutes with Patient and family today in which greater than 50% of this time was spent in counseling/coordination of care regarding Risks and benefits of tx options, Intructions for management and Patient and family education        Subjective:      Patient ID: Saji Denice Huy Garcia is a 80 y o  male  Patient had ANGIO,STENT ATHERECTOMY 10/26 by Dr Tami Horan  Patient has open are on l foot between toes  There is some drainage from middle toe left foot and pt states a change in color of middle toe in last 3-4 days Patient was recent discharged from [facility] family is treating open areas  Patient takes ASA and Zocor  Patient seen for evaluation of new left foot tissue loss  He continues with dry stable ulcer to the left 2nd toe  He has developed new ischemic tissue loss to the tip of left 3rd toe  He continues with unchanged pain to the left foot  He is relatively pain free all day  Pain is increased at night  Pain is manageable with oxycodone prior to bed  He denies any fever/chills or generalized malaise  The following portions of the patient's history were reviewed and updated as appropriate: allergies, current medications, past family history, past medical history, past social history, past surgical history and problem list     Review of Systems   Constitutional: Negative  HENT: Negative  Eyes: Negative  Respiratory: Negative  Cardiovascular: Negative  Gastrointestinal: Negative  Endocrine: Negative  Genitourinary: Negative  Musculoskeletal: Positive for gait problem  Skin: Positive for wound  Allergic/Immunologic: Negative  Hematological: Negative  Psychiatric/Behavioral: Negative  Objective:       Physical Exam   Constitutional: He is oriented to person, place, and time  No distress  HENT:   Head: Normocephalic and atraumatic  Eyes: No scleral icterus  Neck: Neck supple  No JVD present  Cardiovascular: Normal rate and regular rhythm  Biphasic AT/PT signal   Pulmonary/Chest: Effort normal and breath sounds normal    Musculoskeletal: He exhibits edema (Mild to moderate edema )  Ambulates with walker   Neurological: He is alert and oriented to person, place, and time     Skin:   Dry ulcer left 2nd toe, purple ischemic discoloration to the L 3rd toe, interdigital ulcer L 3rd-4th toes, erythema to forefoot, no signs gross infection   Psychiatric: He has a normal mood and affect           Vitals:    12/19/18 1413   BP: 128/70   BP Location: Right arm   Patient Position: Sitting   Pulse: 72   Resp: 18   Temp: 98 1 °F (36 7 °C)   Weight: 64 9 kg (143 lb)   Height: 5' 9" (1 753 m)       Patient Active Problem List   Diagnosis    Severe peripheral arterial disease (HCC)    Hyperlipidemia    Vitamin D deficiency    Bifascicular block    Anemia    CKD (chronic kidney disease), stage III (HCC)    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 (HCC)    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)    Subarachnoid hemorrhage (HCC)    Fall    Ambulatory dysfunction    Short-term memory loss    Physical deconditioning    Candida rash of groin    Urinary frequency    Nocturia       Past Surgical History:   Procedure Laterality Date    AMPUTATION  1972    L thumb    CARPAL TUNNEL RELEASE  2016    L hand    CATARACT EXTRACTION      COLONOSCOPY      EYE SURGERY      FRACTURE SURGERY      HAND SURGERY      IR LOWER EXTREMITY / INTERVENTION  10/26/2018    KNEE ARTHROSCOPY      NERVE BLOCK      [place] SLCTV CATHJ 3RD+ ORD SLCTV ABDL [facility] Left 3/9/2018    Procedure: LEFT LOWER EXTREMITY ARTERIOGRAM WITH PTA OF LEFT POPLITEAL ARTERY;  Surgeon: Lakisha Martinez MD;  Location[facility];  Service: Vascular    [facility] Left 10/26/2018    Procedure: ARTERIOGRAM, angioplasty stent and atherectomy;  Surgeon: Lakisha Martinez MD;  Location: [facility];  Service: Vascular    THORACENTESIS         Family History   Problem Relation Age of Onset    Lung cancer Mother     Brain cancer Mother     Diabetes Father     Aneurysm Father     Stroke Father     Lung cancer Father     Brain cancer Father     Diabetes Maternal Grandmother        Social History     Social History    Marital status: /Civil Union     Spouse name: N/A    Number of children: 3    Years of education: N/A     Occupational History    retired      Social History Main Topics    Smoking status: Former Smoker    Smokeless tobacco: Never Used      Comment: quit 62 years ago    Alcohol use No    Drug use: No    Sexual activity: No     Other Topics Concern    Not on file     Social History Narrative    Previous        No Known Allergies      Current Outpatient Prescriptions:     aspirin 81 MG tablet, Take by mouth, Disp: , Rfl:     Cholecalciferol (VITAMIN D) 2000 units CAPS, Take by mouth, Disp: , Rfl:     ferrous sulfate 325 (65 Fe) mg tablet, Take 1 tablet (325 mg total) by mouth 2 (two) times a day, Disp: 90 tablet, Rfl: 2    Omega-3 Fatty Acids (FISH OIL) 645 MG CAPS, Take by mouth, Disp: , Rfl:     oxyCODONE (ROXICODONE) 5 mg immediate release tablet, Take 1 tablet (5 mg total) by mouth every 12 (twelve) hours as needed for moderate pain Max Daily Amount: 10 mg, Disp: 60 tablet, Rfl: 0    silver sulfadiazine (SILVADENE,SSD) 1 % cream, Apply topically daily, Disp: 50 g, Rfl: 0    simvastatin (ZOCOR) 20 mg tablet, Take 1 tablet (20 mg total) by mouth daily, Disp: 90 tablet, Rfl: 3    cephalexin (KEFLEX) 500 mg capsule, Take 1 capsule (500 mg total) by mouth every 12 (twelve) hours for 5 days, Disp: 10 capsule, Rfl: 0    clotrimazole-betamethasone (LOTRISONE) 1-0 05 % cream, Apply topically 2 (two) times a day for 30 days Apply to groin (Patient not taking: Reported on 12/19/2018 ), Disp: 45 g, Rfl: 2

## 2018-12-19 NOTE — ASSESSMENT & PLAN NOTE
-patient with PAD w/ severe tibial disease  Unfortunately, there are no further vascular options available for revascularization  -he has developed new ischemic tissue loss to the left 3rd toe  -will treat toe wounds with Betadine paint daily and course of prophylactic Keflex  -forefoot off-loading boot to aide with ambulation  -Oxycodone as needed for rest pain at night    Maintain foot in dependent position at night to help with rest pain  -followup in 1 month for recheck, notify office immediately with any signs of infection

## 2019-01-02 ENCOUNTER — TELEPHONE (OUTPATIENT)
Dept: VASCULAR SURGERY | Facility: CLINIC | Age: 84
End: 2019-01-02

## 2019-01-02 ENCOUNTER — PATIENT OUTREACH (OUTPATIENT)
Dept: FAMILY MEDICINE CLINIC | Facility: CLINIC | Age: 84
End: 2019-01-02

## 2019-01-02 ENCOUNTER — OFFICE VISIT (OUTPATIENT)
Dept: VASCULAR SURGERY | Facility: CLINIC | Age: 84
End: 2019-01-02
Payer: COMMERCIAL

## 2019-01-02 VITALS
HEIGHT: 70 IN | HEART RATE: 72 BPM | WEIGHT: 143 LBS | SYSTOLIC BLOOD PRESSURE: 126 MMHG | DIASTOLIC BLOOD PRESSURE: 70 MMHG | RESPIRATION RATE: 18 BRPM | BODY MASS INDEX: 20.47 KG/M2 | TEMPERATURE: 100.3 F

## 2019-01-02 DIAGNOSIS — I70.245 ATHEROSCLEROSIS OF NATIVE ARTERIES OF LEFT LEG WITH ULCERATION OF OTHER PART OF FOOT (HCC): ICD-10-CM

## 2019-01-02 DIAGNOSIS — I70.222 ATHEROSCLEROSIS OF NATIVE ARTERY OF LEFT LOWER EXTREMITY WITH REST PAIN (HCC): Primary | ICD-10-CM

## 2019-01-02 PROCEDURE — 99213 OFFICE O/P EST LOW 20 MIN: CPT | Performed by: SURGERY

## 2019-01-02 NOTE — PROGRESS NOTES
Assessment/Plan:    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)   Severe tibial disease  Patient had undergone a successful atherectomy of the heavily diseased popliteal artery stenosis but had complications of distal embolization  He did recover with heparini overnight and was discharged on antiplatelets and anticoagulation but then had a subdural hematoma with fall  Pain is reasonably well tolerated  He does have to use Percocet at night but during the daytime he has no major issues  He is ambulating slowly with a walker in the house and is independent with his day-to-day activity  His wife is taking really good   Care with daily Betadine paint  Changes in the toe are natural progression of the gangrene  The 3rd toe is already trying out  The 2nd toe is in the process and the tip of the 4th toe is in the process  The rest of the foot looks viable  There is a very strong distal posterior tibial signal now  At this point I would recommend to continue with the local wound care  Diagnoses and all orders for this visit:    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 (HCC)    Atherosclerosis of native arteries of left leg with ulceration of other part of foot ([facility]          Subjective:      Patient ID: Candi Golden is a 80 y o  male  Patient is here for follow-up of his left foot toe gangrene  He has had several procedures in the past for peripheral artery disease  The following portions of the patient's history were reviewed and updated as appropriate: allergies, current medications, past family history, past medical history, past social history, past surgical history and problem list     Review of Systems   Constitutional: Negative  HENT: Negative  Eyes: Negative  Respiratory: Negative  Cardiovascular:        Swollen foot and toes   Gastrointestinal: Negative  Endocrine: Negative  Genitourinary: Negative  Musculoskeletal: Positive for gait problem  Skin: Positive for wound  Allergic/Immunologic: Negative  I have reviewed the review of systems as entered and made appropriate changes as necessary    Objective:      /70 (BP Location: Right arm, Patient Position: Sitting)   Pulse 72   Temp 100 3 °F (37 9 °C)   Resp 18   Ht 5' 10" (1 778 m)   Wt 64 9 kg (143 lb)   BMI 20 52 kg/m²          Physical Exam   Constitutional: He is oriented to person, place, and time  He appears well-developed  No distress  HENT:   Head: Normocephalic and atraumatic  Cardiovascular: Normal rate and regular rhythm  Pulmonary/Chest: Effort normal  No respiratory distress  Musculoskeletal: Normal range of motion  He exhibits edema (  Left foot)  Neurological: He is alert and oriented to person, place, and time  Skin: Skin is warm and dry  He is not diaphoretic  There is erythema (  Dependent erythema of the left foot)  Gangrene of the left 2nd and 3rd toe and the 4th toe tip as shown in the pictures  No evidence of cellulitis or infection  Psychiatric: He has a normal mood and affect  His behavior is normal    Nursing note and vitals reviewed

## 2019-01-02 NOTE — ASSESSMENT & PLAN NOTE
Severe tibial disease  Patient had undergone a successful atherectomy of the heavily diseased popliteal artery stenosis but had complications of distal embolization  He did recover with heparini overnight and was discharged on antiplatelets and anticoagulation but then had a subdural hematoma with fall  Pain is reasonably well tolerated  He does have to use Percocet at night but during the daytime he has no major issues  He is ambulating slowly with a walker in the house and is independent with his day-to-day activity  His wife is taking really good   Care with daily Betadine paint  Changes in the toe are natural progression of the gangrene  The 3rd toe is already trying out  The 2nd toe is in the process and the tip of the 4th toe is in the process  The rest of the foot looks viable  There is a very strong distal posterior tibial signal now  At this point I would recommend to continue with the local wound care

## 2019-01-02 NOTE — TELEPHONE ENCOUNTER
Received call from pt's wife regarding L foot  Per wife pt's 1st-3rd toes on the L foot are now black and pt has swelling to the 3rd toe  Pt saw Dr Jamshid Pickard on 12/19 and per OV note no other options available for re-vascularization  Pt was ordered to treat wound w/ betadine and was ordered keflex/oxycodone for pain  Pt added to Dr Yissel Prabhakar schedule for today

## 2019-01-15 DIAGNOSIS — I73.9 PERIPHERAL VASCULAR DISEASE (HCC): Primary | ICD-10-CM

## 2019-01-15 NOTE — PROGRESS NOTES
Wife called, pt has been using a walker w/ 2 wheels thay they have borrowed and that person needs it back  She ? If we can order one for pt    Ok per [employer], order mailed to pt per wife request

## 2019-01-21 ENCOUNTER — PATIENT OUTREACH (OUTPATIENT)
Dept: FAMILY MEDICINE CLINIC | Facility: CLINIC | Age: 84
End: 2019-01-21

## 2019-01-21 NOTE — PROGRESS NOTES
Spoke to Serjio wife  Currently declined f/u phone calls at this time  Will continue on surveillance yearly due to [facility] case  Last NCQS assessment completed was 11/28/19  Will continue to monitor

## 2019-01-28 ENCOUNTER — HOSPITAL ENCOUNTER (INPATIENT)
Facility: HOSPITAL | Age: 84
LOS: 8 days | Discharge: NURSING, REHAB OR LONG-TERM CARE | DRG: 240 | End: 2019-02-05
Attending: SURGERY | Admitting: SURGERY
Payer: COMMERCIAL

## 2019-01-28 ENCOUNTER — PATIENT OUTREACH (OUTPATIENT)
Dept: FAMILY MEDICINE CLINIC | Facility: CLINIC | Age: 84
End: 2019-01-28

## 2019-01-28 ENCOUNTER — OFFICE VISIT (OUTPATIENT)
Dept: VASCULAR SURGERY | Facility: CLINIC | Age: 84
End: 2019-01-28
Payer: COMMERCIAL

## 2019-01-28 ENCOUNTER — TELEPHONE (OUTPATIENT)
Dept: VASCULAR SURGERY | Facility: CLINIC | Age: 84
End: 2019-01-28

## 2019-01-28 ENCOUNTER — APPOINTMENT (INPATIENT)
Dept: RADIOLOGY | Facility: HOSPITAL | Age: 84
DRG: 240 | End: 2019-01-28
Payer: COMMERCIAL

## 2019-01-28 VITALS
BODY MASS INDEX: 20.52 KG/M2 | HEIGHT: 70 IN | RESPIRATION RATE: 20 BRPM | SYSTOLIC BLOOD PRESSURE: 138 MMHG | DIASTOLIC BLOOD PRESSURE: 68 MMHG | TEMPERATURE: 98.1 F

## 2019-01-28 DIAGNOSIS — I70.222 ATHEROSCLEROSIS OF NATIVE ARTERY OF LEFT LOWER EXTREMITY WITH REST PAIN (HCC): ICD-10-CM

## 2019-01-28 DIAGNOSIS — L03.116 CELLULITIS OF LEFT FOOT: ICD-10-CM

## 2019-01-28 DIAGNOSIS — Z89.512 STATUS POST BELOW KNEE AMPUTATION OF LEFT LOWER EXTREMITY: ICD-10-CM

## 2019-01-28 DIAGNOSIS — I70.245 ATHEROSCLEROSIS OF NATIVE ARTERIES OF LEFT LEG WITH ULCERATION OF OTHER PART OF FOOT (HCC): Primary | ICD-10-CM

## 2019-01-28 DIAGNOSIS — I70.245 ATHEROSCLEROSIS OF NATIVE ARTERY OF LEFT LOWER EXTREMITY WITH ULCERATION OF OTHER PART OF FOOT (HCC): ICD-10-CM

## 2019-01-28 DIAGNOSIS — I70.222 ATHEROSCLEROSIS OF NATIVE ARTERY OF LEFT LEG WITH REST PAIN (HCC): ICD-10-CM

## 2019-01-28 DIAGNOSIS — E78.5 HYPERLIPIDEMIA, UNSPECIFIED HYPERLIPIDEMIA TYPE: ICD-10-CM

## 2019-01-28 DIAGNOSIS — I73.9 SEVERE PERIPHERAL ARTERIAL DISEASE (HCC): Primary | ICD-10-CM

## 2019-01-28 DIAGNOSIS — I73.9 PVD (PERIPHERAL VASCULAR DISEASE) (HCC): ICD-10-CM

## 2019-01-28 PROBLEM — I96 GANGRENE OF TOE OF RIGHT FOOT (HCC): Chronic | Status: ACTIVE | Noted: 2019-01-28

## 2019-01-28 LAB
ALBUMIN SERPL BCP-MCNC: 2.8 G/DL (ref 3.5–5)
ALP SERPL-CCNC: 62 U/L (ref 46–116)
ALT SERPL W P-5'-P-CCNC: 16 U/L (ref 12–78)
ANION GAP SERPL CALCULATED.3IONS-SCNC: 5 MMOL/L (ref 4–13)
APTT PPP: 50 SECONDS (ref 26–38)
AST SERPL W P-5'-P-CCNC: 15 U/L (ref 5–45)
BASOPHILS # BLD AUTO: 0.02 THOUSANDS/ΜL (ref 0–0.1)
BASOPHILS NFR BLD AUTO: 0 % (ref 0–1)
BILIRUB SERPL-MCNC: 0.47 MG/DL (ref 0.2–1)
BUN SERPL-MCNC: 14 MG/DL (ref 5–25)
CALCIUM SERPL-MCNC: 9.3 MG/DL (ref 8.3–10.1)
CHLORIDE SERPL-SCNC: 100 MMOL/L (ref 100–108)
CO2 SERPL-SCNC: 29 MMOL/L (ref 21–32)
CREAT SERPL-MCNC: 1.11 MG/DL (ref 0.6–1.3)
EOSINOPHIL # BLD AUTO: 0.16 THOUSAND/ΜL (ref 0–0.61)
EOSINOPHIL NFR BLD AUTO: 2 % (ref 0–6)
ERYTHROCYTE [DISTWIDTH] IN BLOOD BY AUTOMATED COUNT: 14.1 % (ref 11.6–15.1)
EST. AVERAGE GLUCOSE BLD GHB EST-MCNC: 120 MG/DL
GFR SERPL CREATININE-BSD FRML MDRD: 60 ML/MIN/1.73SQ M
GLUCOSE SERPL-MCNC: 85 MG/DL (ref 65–140)
HBA1C MFR BLD: 5.8 % (ref 4.2–6.3)
HCT VFR BLD AUTO: 31 % (ref 36.5–49.3)
HGB BLD-MCNC: 9.8 G/DL (ref 12–17)
IMM GRANULOCYTES # BLD AUTO: 0.05 THOUSAND/UL (ref 0–0.2)
IMM GRANULOCYTES NFR BLD AUTO: 1 % (ref 0–2)
INR PPP: 1.15 (ref 0.86–1.17)
LYMPHOCYTES # BLD AUTO: 1.57 THOUSANDS/ΜL (ref 0.6–4.47)
LYMPHOCYTES NFR BLD AUTO: 19 % (ref 14–44)
MCH RBC QN AUTO: 28.9 PG (ref 26.8–34.3)
MCHC RBC AUTO-ENTMCNC: 31.6 G/DL (ref 31.4–37.4)
MCV RBC AUTO: 91 FL (ref 82–98)
MONOCYTES # BLD AUTO: 0.67 THOUSAND/ΜL (ref 0.17–1.22)
MONOCYTES NFR BLD AUTO: 8 % (ref 4–12)
NEUTROPHILS # BLD AUTO: 5.8 THOUSANDS/ΜL (ref 1.85–7.62)
NEUTS SEG NFR BLD AUTO: 70 % (ref 43–75)
NRBC BLD AUTO-RTO: 0 /100 WBCS
PLATELET # BLD AUTO: 244 THOUSANDS/UL (ref 149–390)
PMV BLD AUTO: 9.7 FL (ref 8.9–12.7)
POTASSIUM SERPL-SCNC: 3.9 MMOL/L (ref 3.5–5.3)
PROT SERPL-MCNC: 7.5 G/DL (ref 6.4–8.2)
PROTHROMBIN TIME: 14.8 SECONDS (ref 11.8–14.2)
RBC # BLD AUTO: 3.39 MILLION/UL (ref 3.88–5.62)
SODIUM SERPL-SCNC: 134 MMOL/L (ref 136–145)
WBC # BLD AUTO: 8.27 THOUSAND/UL (ref 4.31–10.16)

## 2019-01-28 PROCEDURE — 83540 ASSAY OF IRON: CPT | Performed by: INTERNAL MEDICINE

## 2019-01-28 PROCEDURE — 99214 OFFICE O/P EST MOD 30 MIN: CPT | Performed by: SURGERY

## 2019-01-28 PROCEDURE — 85730 THROMBOPLASTIN TIME PARTIAL: CPT | Performed by: PHYSICIAN ASSISTANT

## 2019-01-28 PROCEDURE — 83036 HEMOGLOBIN GLYCOSYLATED A1C: CPT | Performed by: PHYSICIAN ASSISTANT

## 2019-01-28 PROCEDURE — 93005 ELECTROCARDIOGRAM TRACING: CPT

## 2019-01-28 PROCEDURE — 80053 COMPREHEN METABOLIC PANEL: CPT | Performed by: PHYSICIAN ASSISTANT

## 2019-01-28 PROCEDURE — 85025 COMPLETE CBC W/AUTO DIFF WBC: CPT | Performed by: PHYSICIAN ASSISTANT

## 2019-01-28 PROCEDURE — 85610 PROTHROMBIN TIME: CPT | Performed by: PHYSICIAN ASSISTANT

## 2019-01-28 PROCEDURE — 71046 X-RAY EXAM CHEST 2 VIEWS: CPT

## 2019-01-28 PROCEDURE — 82728 ASSAY OF FERRITIN: CPT | Performed by: INTERNAL MEDICINE

## 2019-01-28 PROCEDURE — 99223 1ST HOSP IP/OBS HIGH 75: CPT | Performed by: SURGERY

## 2019-01-28 PROCEDURE — 83550 IRON BINDING TEST: CPT | Performed by: INTERNAL MEDICINE

## 2019-01-28 RX ORDER — HEPARIN SODIUM 5000 [USP'U]/ML
5000 INJECTION, SOLUTION INTRAVENOUS; SUBCUTANEOUS EVERY 8 HOURS SCHEDULED
Status: DISCONTINUED | OUTPATIENT
Start: 2019-01-28 | End: 2019-02-05 | Stop reason: HOSPADM

## 2019-01-28 RX ORDER — PRAVASTATIN SODIUM 40 MG
40 TABLET ORAL
Status: DISCONTINUED | OUTPATIENT
Start: 2019-01-28 | End: 2019-02-05 | Stop reason: HOSPADM

## 2019-01-28 RX ORDER — FERROUS SULFATE 325(65) MG
325 TABLET ORAL 2 TIMES DAILY
Status: DISCONTINUED | OUTPATIENT
Start: 2019-01-28 | End: 2019-02-05 | Stop reason: HOSPADM

## 2019-01-28 RX ORDER — ASPIRIN 81 MG/1
81 TABLET, CHEWABLE ORAL DAILY
Status: DISCONTINUED | OUTPATIENT
Start: 2019-01-29 | End: 2019-02-05 | Stop reason: HOSPADM

## 2019-01-28 RX ORDER — ACETAMINOPHEN 325 MG/1
650 TABLET ORAL EVERY 6 HOURS PRN
Status: DISCONTINUED | OUTPATIENT
Start: 2019-01-28 | End: 2019-02-05 | Stop reason: HOSPADM

## 2019-01-28 RX ORDER — OXYCODONE HYDROCHLORIDE 10 MG/1
10 TABLET ORAL EVERY 4 HOURS PRN
Status: DISCONTINUED | OUTPATIENT
Start: 2019-01-28 | End: 2019-01-31

## 2019-01-28 RX ORDER — HYDROMORPHONE HCL/PF 1 MG/ML
0.5 SYRINGE (ML) INJECTION EVERY 4 HOURS PRN
Status: DISCONTINUED | OUTPATIENT
Start: 2019-01-28 | End: 2019-02-05 | Stop reason: HOSPADM

## 2019-01-28 RX ORDER — MELATONIN
2000 DAILY
Status: DISCONTINUED | OUTPATIENT
Start: 2019-01-29 | End: 2019-02-05 | Stop reason: HOSPADM

## 2019-01-28 RX ORDER — DOCUSATE SODIUM 100 MG/1
100 CAPSULE, LIQUID FILLED ORAL 2 TIMES DAILY
Status: DISCONTINUED | OUTPATIENT
Start: 2019-01-28 | End: 2019-02-05 | Stop reason: HOSPADM

## 2019-01-28 RX ORDER — OXYCODONE HYDROCHLORIDE 5 MG/1
5 TABLET ORAL EVERY 4 HOURS PRN
Status: DISCONTINUED | OUTPATIENT
Start: 2019-01-28 | End: 2019-01-31

## 2019-01-28 RX ORDER — SODIUM CHLORIDE 9 MG/ML
75 INJECTION, SOLUTION INTRAVENOUS CONTINUOUS
Status: DISCONTINUED | OUTPATIENT
Start: 2019-01-28 | End: 2019-01-29

## 2019-01-28 RX ADMIN — PRAVASTATIN SODIUM 40 MG: 40 TABLET ORAL at 18:14

## 2019-01-28 RX ADMIN — FERROUS SULFATE TAB 325 MG (65 MG ELEMENTAL FE) 325 MG: 325 (65 FE) TAB at 18:10

## 2019-01-28 RX ADMIN — METRONIDAZOLE 500 MG: 500 INJECTION, SOLUTION INTRAVENOUS at 18:09

## 2019-01-28 RX ADMIN — OXYCODONE HYDROCHLORIDE 10 MG: 10 TABLET ORAL at 23:38

## 2019-01-28 RX ADMIN — DOCUSATE SODIUM 100 MG: 100 CAPSULE, LIQUID FILLED ORAL at 18:10

## 2019-01-28 RX ADMIN — CEFAZOLIN SODIUM 1000 MG: 10 INJECTION, POWDER, FOR SOLUTION INTRAVENOUS at 19:54

## 2019-01-28 RX ADMIN — SODIUM CHLORIDE 75 ML/HR: 0.9 INJECTION, SOLUTION INTRAVENOUS at 18:10

## 2019-01-28 NOTE — H&P
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 & L GT wound w/ cellulitis  --admit for >2midn stays  --IV Abx (Ancef, Flagyl)  --Likely will progress to R BKA  --R LEAD  --SLIM consult for medical mgmt  --Podi consult  --ID consult  --Cards consult for risk stratification  --CXR (p)  --Labs (p)  --EKG (p)  --LWC w/ betadine paint  --cont antiplt (ASA 81mg), statin      Anemia (samialey Chronic)        ___________________________________________________________________  Chief Complaint: R GT wound, sent by Dr Tami Horan for antibiotics    HPI: Julia Lombardo is a 80 y o  male known to the Vascular service for PAD having undergone LLE Agram w/ BK popliteal and PT atherectomy, TPT and peroneal PTA, BK popliteal PTA, and AT PTA  Unfortunately, the procedure was complicated by distal embolization requiring pharmacomechanical thrombectomy with Angiojet and tPA administration  This was performed in October of 2018  The patient was subsequently placed on dual anti-platelet therapy along with anticoagulation  Unfortunately he then suffered a fall with a 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 at which time anticoagulation was discontinued  Hugo Mix is followed for the past several months by Dr Tami Horan for progressive tissue loss with left 2nd, 3rd, and 4th toe dry gangrene  Unfortunately, he now presented with cellulitis and tissue loss of the left great toe  With known significant calcified tibial occlusive disease, he does not have any revascularization options and will likely not heal a TMA  He has therefore resigned himself to inevitable need for BKA, and is agreeable   He was seen by Dr Tami Horan today in office evaluation at which time recommendation was for admission for further imaging, medical maximization, and IV antibiotics, and likely plan for left BKA during the hospitalization  Esther Howard notes rest pain in the foot  He often will take to oxycodone at bedtime in order to sleep  He complains of pain in the left heel  Over the multi months of therapy, he notes an approximately 15 lb weight loss  He has felt well otherwise-specifically, he denies fever, chills, nor sweats    He does note some shortness of breath at baseline    Risk factors for PAD:  · Age  · Sex  · Dyslipidemia  · Remote tobacco abuse- quitting 57 years ago    Review of Systems:  General: positive for  - as noted in HPI  Cardiovascular: SOB, worse w/ ambulation  Respiratory: positive for - shortness of breath  Gastrointestinal: no abdominal pain, change in bowel habits, or black or bloody stools  Genitourinary: no dysuria, trouble voiding, or hematuria  Musculoskeletal: positive for - as noted in HPI  Neurological: no TIA or stroke symptoms  Hematological and Lymphatic: negative  Dermatological: negative  Psychological: negative  Ophthalmic: negative  ENT: negative    Past Medical History:  Past Medical History:   Diagnosis Date    Anemia     Arthritis     Bifascicular block     Cellulitis of chest wall     Chronic kidney disease     Dupuytren contracture     DVT femoral (deep venous thrombosis) with thrombophlebitis, right (HCC)     Elevated glucose     Hemothorax     Left    Hyperlipidemia     Vascular disease        Past Surgical History:  Past Surgical History:   Procedure Laterality Date    AMPUTATION  1972    L thumb    CARPAL TUNNEL RELEASE  2016    L hand    CATARACT EXTRACTION      COLONOSCOPY      EYE SURGERY      FRACTURE SURGERY      HAND SURGERY      IR LOWER EXTREMITY / INTERVENTION  10/26/2018    KNEE ARTHROSCOPY      NERVE BLOCK      KS SLCTV CATHJ 3RD+ ORD SLCTV ABDL PEL/[facility] Left 3/9/2018    Procedure: LEFT LOWER EXTREMITY ARTERIOGRAM WITH PTA OF LEFT POPLITEAL ARTERY;  Surgeon: Kristyn Villalobos MD;  Location: [facility];  Service: Vascular    KS SLCTV CATHJ 3RD+ ORD SLCTV ABDL PEL/[facility] Left 10/26/2018    Procedure: ARTERIOGRAM, angioplasty stent and atherectomy;  Surgeon: Bob Segundo MD;  Location: [facility];  Service: Vascular    THORACENTESIS         Social History:  History   Alcohol Use No     History   Drug Use No     History   Smoking Status    Former Smoker   Smokeless Tobacco    Never Used     Comment: quit 62 years ago       Family History:  Family History   Problem Relation Age of Onset    Lung cancer Mother     Brain cancer Mother     Diabetes Father     Aneurysm Father     Stroke Father     Lung cancer Father     Brain cancer Father     Diabetes Maternal Grandmother        Allergies:  No Known Allergies    Medications:  Home meds:   Prior to Admission medications    Medication Sig Start Date End Date Taking?  Authorizing Provider   aspirin 81 MG tablet Take by mouth    Historical Provider, MD   Cholecalciferol (VITAMIN D) 2000 units CAPS Take by mouth    Historical Provider, MD   clotrimazole-betamethasone (Tano Hummer) 1-0 05 % cream Apply topically 2 (two) times a day for 30 days Apply to groin  Patient not taking: Reported on 12/19/2018 11/23/18 12/23/18  Ashish PersonQASIM   ferrous sulfate 325 (65 Fe) mg tablet Take 1 tablet (325 mg total) by mouth 2 (two) times a day 9/26/18   Melyssa Moore MD   Omega-3 Fatty Acids ([address]) 645 MG CAPS Take by mouth    Historical Provider, MD   oxyCODONE (ROXICODONE) 5 mg immediate release tablet Take 1 tablet (5 mg total) by mouth every 12 (twelve) hours as needed for moderate pain Max Daily Amount: 10 mg 12/19/18   QASIM Silva   simvastatin (ZOCOR) 20 mg tablet Take 1 tablet (20 mg total) by mouth daily 11/23/18 11/23/19  Ashish PersonQASIM   silver sulfadiazine (SILVADENE,SSD) 1 % cream Apply topically daily 10/8/18 1/28/19  Bob Segundo MD         Vitals:  /66   Pulse 80   Temp 98 1 °F (36 7 °C)   Resp 18   SpO2 100%   There is no height or weight on file to calculate BMI   Weight (last 2 days)     None          I/Os:  No intake or output data in the 24 hours ending 01/28/19 1834    Physical Exam:    General appearance: alert, appears stated age and cooperative  Accompanied by wife and granddaughter  Head: Normocephalic, without obvious abnormality, atraumatic  Eyes: conjunctivae/corneas clear  PERRL, EOM's intact  Throat: lips, mucosa, and tongue normal; teeth and gums normal  Neck: no adenopathy, no carotid bruit, no JVD and supple, symmetrical, trachea midline  Back: symmetric, no curvature  ROM normal  No CVA tenderness  Lungs: mildly course b/l  Chest wall: no tenderness  Heart: S1, S2 normal  Soft sys murmur  Abdomen: soft, non-tender; bowel sounds normal; no masses,  no organomegaly  Genitalia: deferred  Rectal: deferred  Extremities:   Nonthreatened  + M/S  (-) Hair growth  --LLE w/ GT wound, cellulitis  2,3,4 dry gangrene  Forefoot eschar  + Edema   Skin scaling          Skin: Skin color, texture, turgor normal  No rashes or lesions  Neurologic: Grossly normal    Pulse exam:  Radial: Right: 2+               Left: 2+  Femoral: Right: 2+                   Left: 2+  Popliteal: Right: 2+                    Left: 2+  AT: Right: doppler signal          Left: absent  PT: Right: doppler signal         Left: doppler signal   Peroneal: Right: doppler signal                    Left: doppler signal      Lab Results and Cultures:   CBC with diff:   Lab Results   Component Value Date    WBC 8 27 01/28/2019    HGB 9 8 (L) 01/28/2019    HCT 31 0 (L) 01/28/2019    MCV 91 01/28/2019     01/28/2019    MCH 28 9 01/28/2019    MCHC 31 6 01/28/2019    RDW 14 1 01/28/2019    MPV 9 7 01/28/2019    NRBC 0 01/28/2019      BMP/CMP: (p)      Coags: (p)    Lipid Panel: No results found for: CHOL  Lab Results   Component Value Date     (H) 01/20/2017     Lab Results   Component Value Date     (H) 01/20/2017     Lab Results   Component Value Date    LDLCALC 40 01/20/2017 Lab Results   Component Value Date    TRIG 56 01/20/2017       HgbA1c: (p)    Imaging:      EKG, Pathology, and Other Studies:   VTE Prophylaxis: Sequential compression device (Venodyne)- RLE  and Heparin SQ     Code Status: Level 2 - DNAR: but accepts endotracheal intubation  Advance Directive and Living Will:      Power of :    POLST:      Francisco Hughes PA-C  1/28/2019

## 2019-01-28 NOTE — PROGRESS NOTES
Assessment/Plan:    Atherosclerosis of native arteries of left leg with ulceration of other part of foot ([facility] )  Over the course of 3 weeks there has been a progression of tissue loss involving the left great toe and now  The 2nd 3rd and 4th toes have progressed to dry gangrene which is expected course of progression of the disease  Unfortunately due to significant calcified tibial occlusive disease he does not have any revascularization options  It is unlikely that he will heal a transmetatarsal amputation  I also suspect there is a component of cellulitis around the great toe  I have recommended inpatient admission for intravenous antibiotics, leg elevation, podiatry evaluation and arterial duplex to check ankle pressure and popliteal pressure  We had a conversation with the patient his wife and son today regarding further options  More than likely he will require a left below-knee amputation surgery  Patient appears to be mentally ready for this  Expected course of rehab was also discussed with him  Diagnoses and all orders for this visit:    Atherosclerosis of native arteries of left leg with ulceration of other part of foot ([facility] )  -     Transfer to other facility    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          Subjective:      Patient ID: Yvonnie Kayser is a 80 y o  male  Patient has left lower extremity angiogram and atherectomy of the left below-knee popliteal artery and left anterior tibial artery and tibioperoneal trunk performed for severe ischemic rest pain and tissue loss of the left foot  This was done in October 2018  Intra procedure patient had complications of distal embolization and outflow thrombosis  Patient underwent intravenous anticoagulation along with aspirin and Plavix  He was discharged home after 2 days in the hospital and had a fall causing subdural hematoma  On anticoagulation and Plavix was discontinued    Patient has been maintained on aspirin  As a sequela patient has developed dry gangrene on his 2nd 3rd and 4th toes  He was being followed up closely in the office and now has new tissue loss with an ulceration over the left great toe  He is also having increasing pain in his heel  He is now requiring 2 Percocets at night instead of 1  He was also having more swelling in his foot  He denies any fever  Patient's wife was also present along with her son today  History is also obtained from patient's wife who denies any fevers  Patient's wife has been performing local wound care  According to wife patient is also getting short of breath walking short distances at home  The following portions of the patient's history were reviewed and updated as appropriate: allergies, current medications, past family history, past medical history, past social history, past surgical history and problem list     Review of Systems   Constitutional: Negative  HENT: Negative  Eyes: Negative  Respiratory: Negative  Cardiovascular: Negative  Gastrointestinal: Negative  Endocrine: Negative  Genitourinary: Negative  Musculoskeletal: Negative  Skin: Positive for wound  Allergic/Immunologic: Negative  Neurological: Negative  Hematological: Negative  Psychiatric/Behavioral: Negative  I have reviewed the review of systems as entered and made appropriate changes as necessary    Objective:      /68 (BP Location: Left arm, Patient Position: Sitting)   Temp 98 1 °F (36 7 °C)   Resp 20   Ht 5' 10" (1 778 m)   BMI 20 52 kg/m²          Physical Exam   Constitutional: He is oriented to person, place, and time  He appears well-developed  He appears cachectic  No distress  HENT:   Head: Normocephalic and atraumatic  Cardiovascular: Normal rate and regular rhythm  Strong biphasic posterior tibial signal over left ankle   Pulmonary/Chest: No respiratory distress     Musculoskeletal: Normal range of motion  He exhibits edema (Left foot) and tenderness (Left foot and great toe)  Neurological: He is alert and oriented to person, place, and time  Skin: Skin is warm and dry  He is not diaphoretic  There is erythema (Left foot)  Dry gangrene of left 2nd 3rd and 4th toes  Ulceration overlying the left 1st toe with exposed subcutaneous tissues  Tenderness overlying this area  Psychiatric: He has a normal mood and affect   His behavior is normal

## 2019-01-28 NOTE — ASSESSMENT & PLAN NOTE
Over the course of 3 weeks there has been a progression of tissue loss involving the left great toe and now  The 2nd 3rd and 4th toes have progressed to dry gangrene which is expected course of progression of the disease  I have reviewed the angiogram that was performed in October 2016  Unfortunately due to significant calcified tibial occlusive disease he does not have any revascularization options  It is unlikely that he will heal a transmetatarsal amputation  I also suspect there is a component of cellulitis around the great toe  I have recommended inpatient admission for intravenous antibiotics, leg elevation, podiatry evaluation and arterial duplex to check ankle pressure and popliteal pressure  We had a discussion and counseling with the patient, his wife and son today regarding further options  One option is to do course of local wound care and intravenous antibiotics  More than likely he will require a left below-knee amputation surgery  Patient appears to be mentally ready for this  He understands that eventually he will progress to below-knee amputation and would like to just move forward with it  Expected course of rehab was also discussed with him  As there is surrounding cellulitis around the wound in the foot He will require intravenous for medical optimization with intravenous antibiotics and optimizing fluid status prior to below-knee amputation

## 2019-01-29 ENCOUNTER — APPOINTMENT (INPATIENT)
Dept: NON INVASIVE DIAGNOSTICS | Facility: HOSPITAL | Age: 84
DRG: 240 | End: 2019-01-29
Payer: COMMERCIAL

## 2019-01-29 PROBLEM — Z01.818 PRE-OP EVALUATION: Status: ACTIVE | Noted: 2019-01-29

## 2019-01-29 PROBLEM — R05.9 COUGH: Status: ACTIVE | Noted: 2019-01-29

## 2019-01-29 PROBLEM — L28.0 LICHEN SIMPLEX CHRONICUS: Status: ACTIVE | Noted: 2017-07-10

## 2019-01-29 PROBLEM — R73.01 IMPAIRED FASTING GLUCOSE: Status: ACTIVE | Noted: 2017-03-07

## 2019-01-29 PROBLEM — G56.00 CARPAL TUNNEL SYNDROME: Status: ACTIVE | Noted: 2019-01-29

## 2019-01-29 PROBLEM — L30.9 DERMATITIS: Status: ACTIVE | Noted: 2017-06-06

## 2019-01-29 PROBLEM — L81.9 CHANGE IN MULTIPLE PIGMENTED SKIN LESIONS: Status: ACTIVE | Noted: 2017-05-16

## 2019-01-29 PROBLEM — R06.00 DYSPNEA ON EXERTION: Status: ACTIVE | Noted: 2017-10-31

## 2019-01-29 PROBLEM — R68.89 COLD INTOLERANCE: Status: ACTIVE | Noted: 2017-05-04

## 2019-01-29 PROBLEM — R53.82 CHRONIC FATIGUE: Status: ACTIVE | Noted: 2017-10-31

## 2019-01-29 PROBLEM — L82.1 SEBORRHEIC KERATOSIS: Status: ACTIVE | Noted: 2017-07-10

## 2019-01-29 PROBLEM — R26.81 UNSTABLE GAIT: Status: ACTIVE | Noted: 2017-10-11

## 2019-01-29 LAB
ANION GAP SERPL CALCULATED.3IONS-SCNC: 5 MMOL/L (ref 4–13)
ATRIAL RATE: 70 BPM
BUN SERPL-MCNC: 11 MG/DL (ref 5–25)
CALCIUM SERPL-MCNC: 8.1 MG/DL (ref 8.3–10.1)
CHLORIDE SERPL-SCNC: 104 MMOL/L (ref 100–108)
CO2 SERPL-SCNC: 29 MMOL/L (ref 21–32)
CREAT SERPL-MCNC: 0.99 MG/DL (ref 0.6–1.3)
ERYTHROCYTE [DISTWIDTH] IN BLOOD BY AUTOMATED COUNT: 13.9 % (ref 11.6–15.1)
FERRITIN SERPL-MCNC: 241 NG/ML (ref 8–388)
FERRITIN SERPL-MCNC: 271 NG/ML (ref 8–388)
GFR SERPL CREATININE-BSD FRML MDRD: 69 ML/MIN/1.73SQ M
GLUCOSE SERPL-MCNC: 92 MG/DL (ref 65–140)
HCT VFR BLD AUTO: 27.7 % (ref 36.5–49.3)
HGB BLD-MCNC: 8.8 G/DL (ref 12–17)
IRON SATN MFR SERPL: 11 %
IRON SERPL-MCNC: 22 UG/DL (ref 65–175)
MCH RBC QN AUTO: 29 PG (ref 26.8–34.3)
MCHC RBC AUTO-ENTMCNC: 31.8 G/DL (ref 31.4–37.4)
MCV RBC AUTO: 91 FL (ref 82–98)
P AXIS: 19 DEGREES
PLATELET # BLD AUTO: 210 THOUSANDS/UL (ref 149–390)
PMV BLD AUTO: 9.8 FL (ref 8.9–12.7)
POTASSIUM SERPL-SCNC: 3.9 MMOL/L (ref 3.5–5.3)
PR INTERVAL: 240 MS
QRS AXIS: -47 DEGREES
QRSD INTERVAL: 138 MS
QT INTERVAL: 432 MS
QTC INTERVAL: 466 MS
RBC # BLD AUTO: 3.03 MILLION/UL (ref 3.88–5.62)
SODIUM SERPL-SCNC: 138 MMOL/L (ref 136–145)
T WAVE AXIS: 21 DEGREES
TIBC SERPL-MCNC: 192 UG/DL (ref 250–450)
VENTRICULAR RATE: 70 BPM
WBC # BLD AUTO: 7.73 THOUSAND/UL (ref 4.31–10.16)

## 2019-01-29 PROCEDURE — 99233 SBSQ HOSP IP/OBS HIGH 50: CPT | Performed by: PHYSICIAN ASSISTANT

## 2019-01-29 PROCEDURE — 99253 IP/OBS CNSLTJ NEW/EST LOW 45: CPT | Performed by: INTERNAL MEDICINE

## 2019-01-29 PROCEDURE — 93926 LOWER EXTREMITY STUDY: CPT

## 2019-01-29 PROCEDURE — 99223 1ST HOSP IP/OBS HIGH 75: CPT | Performed by: INTERNAL MEDICINE

## 2019-01-29 PROCEDURE — 93922 UPR/L XTREMITY ART 2 LEVELS: CPT | Performed by: SURGERY

## 2019-01-29 PROCEDURE — 80048 BASIC METABOLIC PNL TOTAL CA: CPT | Performed by: PHYSICIAN ASSISTANT

## 2019-01-29 PROCEDURE — 99222 1ST HOSP IP/OBS MODERATE 55: CPT | Performed by: INTERNAL MEDICINE

## 2019-01-29 PROCEDURE — 93926 LOWER EXTREMITY STUDY: CPT | Performed by: SURGERY

## 2019-01-29 PROCEDURE — 85027 COMPLETE CBC AUTOMATED: CPT | Performed by: PHYSICIAN ASSISTANT

## 2019-01-29 PROCEDURE — 82728 ASSAY OF FERRITIN: CPT | Performed by: INTERNAL MEDICINE

## 2019-01-29 PROCEDURE — 93010 ELECTROCARDIOGRAM REPORT: CPT | Performed by: INTERNAL MEDICINE

## 2019-01-29 RX ADMIN — CEFAZOLIN SODIUM 1000 MG: 10 INJECTION, POWDER, FOR SOLUTION INTRAVENOUS at 12:22

## 2019-01-29 RX ADMIN — DOCUSATE SODIUM 100 MG: 100 CAPSULE, LIQUID FILLED ORAL at 17:08

## 2019-01-29 RX ADMIN — FERROUS SULFATE TAB 325 MG (65 MG ELEMENTAL FE) 325 MG: 325 (65 FE) TAB at 08:21

## 2019-01-29 RX ADMIN — DOCUSATE SODIUM 100 MG: 100 CAPSULE, LIQUID FILLED ORAL at 08:21

## 2019-01-29 RX ADMIN — METRONIDAZOLE 500 MG: 500 INJECTION, SOLUTION INTRAVENOUS at 11:20

## 2019-01-29 RX ADMIN — PRAVASTATIN SODIUM 40 MG: 40 TABLET ORAL at 17:08

## 2019-01-29 RX ADMIN — VITAMIN D, TAB 1000IU (100/BT) 2000 UNITS: 25 TAB at 08:21

## 2019-01-29 RX ADMIN — OXYCODONE HYDROCHLORIDE 10 MG: 10 TABLET ORAL at 20:59

## 2019-01-29 RX ADMIN — METRONIDAZOLE 500 MG: 500 INJECTION, SOLUTION INTRAVENOUS at 03:07

## 2019-01-29 RX ADMIN — FERROUS SULFATE TAB 325 MG (65 MG ELEMENTAL FE) 325 MG: 325 (65 FE) TAB at 17:08

## 2019-01-29 RX ADMIN — ASPIRIN 81 MG 81 MG: 81 TABLET ORAL at 08:21

## 2019-01-29 RX ADMIN — CEFAZOLIN SODIUM 1000 MG: 10 INJECTION, POWDER, FOR SOLUTION INTRAVENOUS at 04:01

## 2019-01-29 RX ADMIN — CEFAZOLIN SODIUM 1000 MG: 10 INJECTION, POWDER, FOR SOLUTION INTRAVENOUS at 20:59

## 2019-01-29 NOTE — PROGRESS NOTES
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 & L GT wound w/ cellulitis  --IV Abx (Ancef, Flagyl)  --Likely will progress to R BKA  --R LEAD (p)  --SLIM consult for medical mgmt (p)  --Podi consult (p)  --ID consult (p)  --Cards consult for risk stratification (p)  --CXR (p)  --EKG (p)  --LWC w/ betadine paint  --cont antiplt (ASA 81mg), statin     Anemia (samialey Chronic) w/ acute loss- dilutional from IVF  -monitor    H/O [facility] w/ IPH 10/2018 after Fall/ TBI  DVT px  - pt refusing SQ Heparin for DVT px given h/o head bleed  - SCDs not utilized given significant PAD        ____________________________________________________________  Subjective/ 24 Hour Events:  Ok nite  Offers no real c/o        Medications:   Scheduled Meds:  Current Facility-Administered Medications:  acetaminophen 650 mg Oral Q6H PRN Wayna Paco, PA-C    aspirin 81 mg Oral Daily Wayna Paco, PA-C    cefazolin 1,000 mg Intravenous Q8H Waycarole Antonio, PA-C Last Rate: 1,000 mg (01/29/19 0401)   cholecalciferol 2,000 Units Oral Daily Wayna Paco, PA-C    docusate sodium 100 mg Oral BID Wayna Paco, PA-C    ferrous sulfate 325 mg Oral BID Wayna Paco, PA-C    heparin (porcine) 5,000 Units Subcutaneous [facility] Wayna Paco, PA-C    oxyCODONE 5 mg Oral Q4H PRN Wayna Paco, PA-C    Or        oxyCODONE 10 mg Oral Q4H PRN Wayna Paco, PA-C    Or        HYDROmorphone 0 5 mg Intravenous Q4H PRN Kristopherna Paco, PA-C    metroNIDAZOLE 500 mg Intravenous Q8H Wayna Paco, PA-C Last Rate: Stopped (01/29/19 0401)   pravastatin 40 mg Oral Daily With Colette Ibarra PA-C    sodium chloride 75 mL/hr Intravenous Continuous [place] Antonio PA-C Last Rate: 75 mL/hr (01/28/19 1810)     Continuous Infusions:  sodium chloride 75 mL/hr Last Rate: 75 mL/hr (01/28/19 1810)     PRN Meds:    acetaminophen    oxyCODONE **OR** oxyCODONE **OR** HYDROmorphone    Vitals:   Vitals:    01/28/19 1545 01/28/19 2357   BP: 136/66 (!) 111/45   Pulse: 80 61   Resp: 18 18   Temp: 98 1 °F (36 7 °C) 98 1 °F (36 7 °C)   SpO2: 100% 99%       Intake/Output:   I/O       01/27 0701 - 01/28 0700 01/28 0701 - 01/29 0700    Urine  375    Total Output   375    Net   -375              Labs/ Cultures:     Results from last 7 days  Lab Units 01/29/19  0502 01/28/19  1752   WBC Thousand/uL 7 73 8 27   HEMOGLOBIN g/dL 8 8* 9 8*   HEMATOCRIT % 27 7* 31 0*   PLATELETS Thousands/uL 210 244       Results from last 7 days  Lab Units 01/28/19  1752   POTASSIUM mmol/L 3 9   CHLORIDE mmol/L 100   CO2 mmol/L 29   BUN mg/dL 14   CREATININE mg/dL 1 11   CALCIUM mg/dL 9 3   EGFR ml/min/1 73sq m 60       Results from last 7 days  Lab Units 01/28/19  1752   INR  1 15   PTT seconds 50*       Studies:      Invasive Lines/Tubes:  Invasive Devices     Peripheral Intravenous Line            Peripheral IV 01/28/19 Right;Upper Arm less than 1 day                Physical Exam:    General: A & O x3 in NAD  HEENT/NECK:  PERRLA  EOMI  Heart: S1/S2  Soft sys murmur  Lungs: CTA b/l  Abdomen:  Soft, NT/ND  Neuro: Alert and oriented X 3  Sensation is grossly intact  No focal deficits  Extremities:  warm/dry  BLE edema  -LLE: 2,3,4 multi-toe dry gangrene  GT wound more dry  Generalized rubar/ erythema of forefoot        Pulse exam:  Radial:   - R: 2+   - L: 2+  Popliteal:   -L: 2+  DP:   -L: doppler signal  PT:  -L: doppler signal   Peroneal:   -L: doppler signal    VTE Pharmacologic Prophylaxis: Heparin SQ- pt currently refusing 2* h/o head bleed  VTE Mechanical Prophylaxis: reason for no mechanical VTE prophylaxis - significant PAD    Home meds:   Prior to Admission medications    Medication Sig Start Date End Date Taking?  Authorizing Provider   aspirin 81 MG tablet Take by mouth    Historical Provider, MD   Cholecalciferol (VITAMIN D) 2000 units CAPS Take by mouth    Historical Provider, MD   clotrimazole-betamethasone (LOTRISONE) 1-0 05 % cream Apply topically 2 (two) times a day for 30 days Apply to groin  Patient not taking: Reported on 12/19/2018 11/23/18 12/23/18  QASIM Levine   ferrous sulfate 325 (65 Fe) mg tablet Take 1 tablet (325 mg total) by mouth 2 (two) times a day 9/26/18   Edwar León MD   Omega-3 Fatty Acids (FISH OIL) 645 MG CAPS Take by mouth    Historical Provider, MD   oxyCODONE (ROXICODONE) 5 mg immediate release tablet Take 1 tablet (5 mg total) by mouth every 12 (twelve) hours as needed for moderate pain Max Daily Amount: 10 mg 12/19/18   QASIM Silva   simvastatin (ZOCOR) 20 mg tablet Take 1 tablet (20 mg total) by mouth daily 11/23/18 11/23/19  QASIM Levine PA-C  1/29/2019                ra

## 2019-01-29 NOTE — PROGRESS NOTES
[facility] team Surgery PA paged regarding patient's refusal of Heparin  Patient states that after his head bleed (Fall), he was told that he was not to take anticoagulants

## 2019-01-29 NOTE — ASSESSMENT & PLAN NOTE
Chronic, unchanged over time with previous history of smoking  Will schedule nebulizer treatment  Consider PFTs with formal lack capacity evaluation as outpatient

## 2019-01-29 NOTE — ASSESSMENT & PLAN NOTE
Limited evaluation of the patient exertion capacity secondary to limited mobility secondary to left foot gangrene  At this time, patient denies symptoms such as chest pain, shortness of breath  No previous history of MI, stroke, possible history of undiagnosed COPD given finding on chest x-ray and chronic cough  Cough is chronic and unchanged over period of almost 1 year  EKG with 1st degree AV block, left anterior fascicular block, right bundle-branch block, unchanged when compared to prior  Previous echocardiogram in 2017 with ejection fraction between 65 and 55% with grade 2 diastolic dysfunction  Chest x-ray without acute findings  At this time, given limited ability to assess the patient he remains high risk for any surgical procedure  Although, left BKA appears to be necessary as patient is experiencing gangrene of part of the foot with superimposed cellulitis fortunately at this time without any other systemic symptoms    High risk for developing sepsis

## 2019-01-29 NOTE — PLAN OF CARE
DISCHARGE PLANNING     Discharge to home or other facility with appropriate resources Progressing        GASTROINTESTINAL - ADULT     Maintains adequate nutritional intake Progressing        INFECTION - ADULT     Absence or prevention of progression during hospitalization Progressing     Absence of fever/infection during neutropenic period Progressing        Knowledge Deficit     Patient/family/caregiver demonstrates understanding of disease process, treatment plan, medications, and discharge instructions Progressing        MUSCULOSKELETAL - ADULT     Maintain or return mobility to safest level of function Progressing     Maintain proper alignment of affected body part Progressing        Nutrition/Hydration-ADULT     Nutrient/Hydration intake appropriate for improving, restoring or maintaining nutritional needs Progressing        PAIN - ADULT     Verbalizes/displays adequate comfort level or baseline comfort level Progressing        Potential for Falls     Patient will remain free of falls Progressing        Prexisting or High Potential for Compromised Skin Integrity     Skin integrity is maintained or improved Progressing        SAFETY ADULT     Maintain or return to baseline ADL function Progressing     Maintain or return mobility status to optimal level Progressing        SKIN/TISSUE INTEGRITY - ADULT     Skin integrity remains intact Progressing     Incision(s), wounds(s) or drain site(s) healing without S/S of infection Progressing

## 2019-01-29 NOTE — ASSESSMENT & PLAN NOTE
Stage 2-3, GFR today above 60  Discontinue IV fluid, patient eating and drinking appropriately  BMP in the morning  Avoid hypotension  Avoid NSAID  Avoid nephrotoxin

## 2019-01-29 NOTE — CONSULTS
Consult- Jordan Cordoba 1933, 80 y o  male MRN: [id number]    Unit/Bed#: CW3 338-01 Encounter: [id number]    Primary Care Provider: Seda Pool MD   Date and time admitted to hospital: 1/28/2019  3:10 PM      Inpatient consult to Internal Medicine  Consult performed by: Salbador Sam ordered by: Moreno Arreola          Cough   Assessment & Plan    Chronic, unchanged over time with previous history of smoking  Will schedule nebulizer treatment  Consider PFTs with formal lack capacity evaluation as outpatient     Subarachnoid hemorrhage [facility])   Assessment & Plan    Remote history of subarachnoid hemorrhage, patient currently refusing DVT prophylaxis with heparin  Currently no concerns     CKD (chronic kidney disease), stage III ([facility] [address] )   Assessment & Plan    Stage 2-3, GFR today above 60  Discontinue IV fluid, patient eating and drinking appropriately  BMP in the morning  Avoid hypotension  Avoid NSAID  Avoid nephrotoxin     Anemia   Assessment & Plan    Likely related to chronic kidney disease/chronic disease  Check iron storages  Continue with iron supplement  Hemoglobin is stable, transfuse for hemoglobin less than 7     Severe peripheral arterial disease ([facility] [address] )   Assessment & Plan    Severe peripheral vascular disease status post multiple failed attempted revascularization  Currently with superimposed cellulitis of lower extremity  Continue with Ancef and Flagyl as per primary thing  Pending Infectious Disease consult  Continue with serial exam of the limb  Plan for BKA, date and time to be determined  Pain is currently controlled  Fall precautions     * Pre-op evaluation   Assessment & Plan    Limited evaluation of the patient exertion capacity secondary to limited mobility secondary to left foot gangrene  At this time, patient denies symptoms such as chest pain, shortness of breath  No previous history of MI, stroke, possible history of undiagnosed COPD given finding on chest x-ray and chronic cough  Cough is chronic and unchanged over period of almost 1 year  EKG with 1st degree AV block, left anterior fascicular block, right bundle-branch block, unchanged when compared to prior  Previous echocardiogram in 2017 with ejection fraction between 65 and 55% with grade 2 diastolic dysfunction  Chest x-ray without acute findings  At this time, given limited ability to assess the patient he remains high risk for any surgical procedure  Although, left BKA appears to be necessary as patient is experiencing gangrene of part of the foot with superimposed cellulitis fortunately at this time without any other systemic symptoms  High risk for developing sepsis           VTE Prophylaxis: Reason for no pharmacologic prophylaxis Patient refusal  / reason for no mechanical VTE prophylaxis Severe PAD     Recommendations for Discharge:  · None yet    Counseling / Coordination of Care Time: 1 hour  Greater than 50% of total time spent on patient counseling and coordination of care  Collaboration of Care: Were Recommendations Directly Discussed with Primary Treatment Team? - No     History of Present Illness:    Julia Lombardo is a 80 y o  male who is originally admitted to the vascular surgery service due to left foot gangrene  We are consulted for medical management  This is an 59-year-old male with past medical history of anemia, chronic kidney disease, right lower extremity DVT not currently on anticoagulation, history of severe peripheral vascular disease who came to the hospital for elective vascular surgical intervention  The patient has been battling left lower extremity gangrene for now about 8 months  He had multiple endovascular interventions as an outpatient in attempt to revascularize the team without success  Ultimately is currently admitted to the vascular surgery service electively for possible left BKA  At this time, other than pain on the left foot the patient bears no other complaints    Upon conversation with the patient he reports remote history of smoking, 2 packs a day since he was 6year-old although he quit about 58 years ago  He does have chronic cough since at least 1 year which has been unchanged in the past   He reports poor mobility secondary to left lower extremity gangrene but before that he was and highly functional 80year-old living independently with his wife  Review of Systems:    Review of Systems   Constitutional: Negative for chills and fever  Respiratory: Positive for cough  Negative for shortness of breath  Cardiovascular: Negative for chest pain  All other systems reviewed and are negative        Past Medical and Surgical History:     Past Medical History:   Diagnosis Date    Anemia     Arthritis     Bifascicular block     Cellulitis of chest wall     Chronic kidney disease     Dupuytren contracture     DVT femoral (deep venous thrombosis) with thrombophlebitis, right (HCC)     Elevated glucose     Hemothorax     Left    Hyperlipidemia     Vascular disease        Past Surgical History:   Procedure Laterality Date    AMPUTATION  1972    L thumb    CARPAL TUNNEL RELEASE  2016    L hand    CATARACT EXTRACTION      COLONOSCOPY      EYE SURGERY      FRACTURE SURGERY      HAND SURGERY      IR LOWER EXTREMITY / INTERVENTION  10/26/2018    KNEE ARTHROSCOPY      NERVE BLOCK      MD SLCTV CATHJ 3RD+ ORD SLCTV ABDL PEL/LXTR [facility] Left 3/9/2018    Procedure: LEFT LOWER EXTREMITY ARTERIOGRAM WITH PTA OF LEFT POPLITEAL ARTERY;  Surgeon: Chen Flores MD;  [facility]: [facility];  Service: Vascular    MD Curtis Pedraza 3RD+ ORD SLCTV ABDL PEL/LXTR [facility] Left 10/26/2018    Procedure: ARTERIOGRAM, angioplasty stent and atherectomy;  Surgeon: Chen Flores MD;  Location: [facility];  Service: Vascular    THORACENTESIS         Meds/Allergies:    all medications and allergies reviewed    Allergies: No Known Allergies    Social History:     Marital Status: /Civil [place]    Substance Use History:   History   Alcohol Use No     History   Smoking Status    Former Smoker   Smokeless Tobacco    Never Used     Comment: quit 57 years ago     History   Drug Use No       Family History:    non-contributory    Physical Exam:     Vitals:   Blood Pressure: (!) 111/45 (01/28/19 2357)  Pulse: 61 (01/28/19 2357)  Temperature: 98 1 °F (36 7 °C) (01/28/19 2357)  Respirations: 18 (01/28/19 2357)  SpO2: 99 % (01/28/19 2357)    Physical Exam   Constitutional: He is oriented to person, place, and time  He appears well-developed  Cardiovascular: Normal rate, regular rhythm and normal heart sounds  Exam reveals no friction rub  No murmur heard  Pulmonary/Chest: Effort normal  No respiratory distress  He has no wheezes  He has no rales  Abdominal: Soft  He exhibits no distension  There is no tenderness  There is no rebound  Musculoskeletal: He exhibits edema (Trace to +1 left lower extremity)  Neurological: He is alert and oriented to person, place, and time  He exhibits normal muscle tone  Skin: Skin is warm  Psychiatric: He has a normal mood and affect  Judgment and thought content normal            Additional Data:     Lab Results: I have personally reviewed pertinent reports          Results from last 7 days  Lab Units 01/29/19  0502 01/28/19  1752   WBC Thousand/uL 7 73 8 27   HEMOGLOBIN g/dL 8 8* 9 8*   HEMATOCRIT % 27 7* 31 0*   PLATELETS Thousands/uL 210 244   NEUTROS PCT %  --  70   LYMPHS PCT %  --  19   MONOS PCT %  --  8   EOS PCT %  --  2       Results from last 7 days  Lab Units 01/29/19  0503 01/28/19  1752   SODIUM mmol/L 138 134*   POTASSIUM mmol/L 3 9 3 9   CHLORIDE mmol/L 104 100   CO2 mmol/L 29 29   BUN mg/dL 11 14   CREATININE mg/dL 0 99 1 11   ANION GAP mmol/L 5 5   CALCIUM mg/dL 8 1* 9 3   ALBUMIN g/dL  --  2 8*   TOTAL BILIRUBIN mg/dL  --  0 47   ALK PHOS U/L  --  62   ALT U/L  --  16   AST U/L  --  15   GLUCOSE RANDOM mg/dL 92 85       Results from last 7 days  Lab Units 01/28/19  1752   INR  1 15         Lab Results   Component Value Date/Time    HGBA1C 5 8 01/28/2019 05:52 PM    HGBA1C 5 3 05/04/2017 08:25 AM               Imaging: I have personally reviewed pertinent reports  XR chest pa & lateral   Final Result by Arturo Alfonso MD (01/29 7482)   Underlying COPD and prominent interstitial markings as before  No acute cardiopulmonary disease  Workstation performed: [id number]         VAS lower limb arterial duplex, limited/unilateral    (Results Pending)       EKG, Pathology, and Other Studies Reviewed on Admission:   · EKG:  Left anterior fascicular block, right bundle-branch block, 1st degree AV block, multiple PACs    ** Please Note: This note has been constructed using a voice recognition system   **

## 2019-01-29 NOTE — CONSULTS
Progress Note - Wound   Estephanie Hyde 80 y o  male MRN: [id number]  Unit/Bed#: CW3 338-01 Encounter: [id number]      Assessment: Patient is seen for wound care consult   The patient is seen while in bed for the assessment of the skin   The patient has a consult for podiatry for stable gangrene of the left foot   Patient is being followed for vascular and noted plan for a Left BKA  As noted in podiatry notes   Assessment Findings   1  Sacral bilateral buttocks scattered old scarring blanchable intact skin   Patient reports he had open areas in the past   Allevyn placed for prevention   2  Podiatry following lower foot gangrene   3  Vascular surgery managing plan of care       Skin Care Plan:  1-Cleanse sacrum , buttocks with soap and water apply allevyn foam chantale with a P and date , check skin integrity every shift then reapply change every 3 days or if soiled   2-Turn/reposition q2h for pressure re-distribution on skin  3-Soft care cushion when out of bed  4-Elevate heels to offload pressure  5-Moisturize skin daily with skin nourishing cream   6-Hydraguard to bilateral heels BID and PRN          Objective:    Vitals: Blood pressure 107/61, pulse 65, temperature 97 7 °F (36 5 °C), resp  rate 16, height 5' 10" (1 778 m), weight 64 9 kg (143 lb), SpO2 100 %  ,Body mass index is 20 52 kg/m²        Wound care will sign off - prevention orders for sacral placed     Ann Bowers RN BSN [facility]OCN

## 2019-01-29 NOTE — CONSULTS
Consultation - Cardiology Team One  Chris Thakur 80 y o  male MRN: [id number]  Unit/Bed#: CW3 338-01 Encounter: [id number]    Inpatient consult to Cardiology  Consult performed by: [facility]  Consult ordered by: Jennifer Ferrera      Physician Requesting Consult: Beryl Tucker MD  Reason for Consult / Principal Problem: Cardiac risk stratification/ clearance for ? L BKA 2/1/2019    Assessment/ Plan    Pre-operative risk: He is an acceptable cardiac risk for proposed R BKA  He denies anginal symptoms  His mobility has been limited by weather conditions and pain in R leg from severe PAD, however he reports the ability to ambulate 1-2 blocks  At baseline, he ambulates with a walker and has some mild dyspnea at longer distances  Nuclear stress test in 2017 did not demonstrate perfusion defects and echocardiogram with preserved LV function at that time  Hyperlipidemia- last LDL 40, continue statin    Peripheral arterial disease   See HPI, plan for L BKA by vascular surgery team later this week  CKD stage 3- creatinine 0 99 today    Chronic anemia- likely 2/2 chronic kidney disease, hgb stable 8 8    History of Present Illness   HPI: Chris Thakur is a 80y o  year old male who has a history of hyperlipidemia, CKD stage 3, former tobacco use, and severe PAD  He has no history of coronary artery disease, heart failure, or valvular disease  Last ischemic evaluation was a nuclear stress test in November 2017 which did not demonstrate any perfusion defects  Echocardiogram at the same time showed preserved LV function with EF 55-65% and grade 2 diastolic dysfunction with mild MR and AK  He takes 81 mg ASA and 20 mg simvastatin at home and does not follow with an outpatient cardiologist      He presented to [facility] 1/28/19 from vascular surgery (Dr Raulito Worley) office with 3 weeks of progressive tissue loss involving the left great toe and subsequently the 2nd, 3rd, and 4th toes which has progressed to dry gangrene   He underwent LLE Agram with atherectomy and multiple percutaneous transluminal angioplasties in 14/1251 that was complicated by distal embolization requiring angiojet and tPA administration  He was subsequently placed on DAPT and anticoagulation but suffered a fall with left temporal SAH at which time anticoagulation was discontinued  He remains only on ASA 81 mg  He has known significant calcified tibial occlusive disease without revascularization options  It is also unlikely, per Dr Julee Pal note, that he would heal from from TMA so he was admitted to the hospital for IV antibiotics/medical maximization, further imaging, and 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  Cardiology has been consulted for pre-operative cardiac risk stratification  EKG reviewed personally: NSR with 1st degree AV block and RBBB    Telemetry reviewed personally: No telemetry to review  Nuclear stress test 11/17/17- No perfusion defects    Echo 11/17/17- EF 55-65%, mild asymmetrical hypertrophy, grade 2 diastolic dysfunction  Mild MR, mild MO  Review of Systems   Constitution: Negative for weakness and malaise/fatigue  Cardiovascular: Positive for claudication  Negative for chest pain, irregular heartbeat, leg swelling, orthopnea, palpitations, paroxysmal nocturnal dyspnea and syncope  Mild dyspnea at longer distances   Respiratory: Positive for cough  Negative for sleep disturbances due to breathing and sputum production  Gastrointestinal: Negative for bloating and nausea  Neurological: Negative for dizziness and light-headedness  Psychiatric/Behavioral: Negative for altered mental status  All other systems reviewed and are negative      Historical Information   Past Medical History:   Diagnosis Date    Anemia     Arthritis     Bifascicular block     Cellulitis of chest wall     Chronic kidney disease     Dupuytren contracture     DVT femoral (deep venous thrombosis) with thrombophlebitis, right (HCC)     Elevated glucose     Hemothorax     Left    Hyperlipidemia     Vascular disease      Past Surgical History:   Procedure Laterality Date    AMPUTATION  1972    L thumb    CARPAL TUNNEL RELEASE  2016    L hand    CATARACT EXTRACTION      COLONOSCOPY      EYE SURGERY      FRACTURE SURGERY      HAND SURGERY      IR LOWER EXTREMITY / INTERVENTION  10/26/2018    KNEE ARTHROSCOPY      NERVE BLOCK      VT SLCTV CATHJ 3RD+ ORD SLCTV ABDL PEL/TR [facility] Left 3/9/2018    Procedure: LEFT LOWER EXTREMITY ARTERIOGRAM WITH PTA OF LEFT POPLITEAL ARTERY;  Surgeon: Swati Shen MD;  [facility]: [facility];  Service: Vascular    VT SLCTV CATHJ 3RD+ ORD SLCTV ABDL PEL/[facility] Left 10/26/2018    Procedure: ARTERIOGRAM, angioplasty stent and atherectomy;  Surgeon: Swati Shen MD;  Location: [facility];  Service: Vascular    THORACENTESIS       History   Alcohol Use No     History   Drug Use No     History   Smoking Status    Former Smoker   Smokeless Tobacco    Never Used     Comment: quit 62 years ago     Family History:   Family History   Problem Relation Age of Onset    Lung cancer Mother     Brain cancer Mother     Diabetes Father     Aneurysm Father     Stroke Father     Lung cancer Father     Brain cancer Father     Diabetes Maternal Grandmother      Meds/Allergies   all current active meds have been reviewed and current meds:   Current Facility-Administered Medications   Medication Dose Route Frequency    acetaminophen (TYLENOL) tablet 650 mg  650 mg Oral Q6H PRN    aspirin chewable tablet 81 mg  81 mg Oral Daily    ceFAZolin (ANCEF) 1 g in sodium chloride 0 9% 50 ml IVPB  1,000 mg Intravenous Q8H    cholecalciferol (VITAMIN D3) tablet 2,000 Units  2,000 Units Oral Daily    docusate sodium (COLACE) capsule 100 mg  100 mg Oral BID    ferrous sulfate tablet 325 mg  325 mg Oral BID    heparin (porcine) subcutaneous injection 5,000 Units  5,000 Units Subcutaneous Q8H [address]    oxyCODONE (ROXICODONE) IR tablet 5 mg  5 mg Oral Q4H PRN    Or    oxyCODONE (ROXICODONE) immediate release tablet 10 mg  10 mg Oral Q4H PRN    Or    HYDROmorphone (DILAUDID) injection 0 5 mg  0 5 mg Intravenous Q4H PRN    metroNIDAZOLE (FLAGYL) IVPB (premix) 500 mg  500 mg Intravenous Q8H    pravastatin (PRAVACHOL) tablet 40 mg  40 mg Oral Daily With Dinner        No Known Allergies    Objective   Vitals: Blood pressure (!) 111/45, pulse 61, temperature 98 1 °F (36 7 °C), resp  rate 18, SpO2 99 %  ,     There is no height or weight on file to calculate BMI ,     Systolic (67EIJ), XRA:756 , Min:111 , BNI:611     Diastolic (42JES), FE, Min:45, Max:68    Intake/Output Summary (Last 24 hours) at 19 0955  Last data filed at 19 8325   Gross per 24 hour   Intake              150 ml   Output              375 ml   Net             -225 ml     Weight (last 2 days)     None        Invasive Devices     Peripheral Intravenous Line            Peripheral IV 19 Right;Upper Arm less than 1 day              Physical Exam   Constitutional: He is oriented to person, place, and time  He appears well-developed  No distress  Neck: Neck supple  No JVD present  Cardiovascular: Normal rate, regular rhythm and normal heart sounds  Exam reveals no gallop and no friction rub  No murmur heard  Pulmonary/Chest: Effort normal  No respiratory distress  He has no rales  Decreased breath sounds bilateral bases, fine crackles L lower lobe  Abdominal: Soft  Bowel sounds are normal  He exhibits no distension  Musculoskeletal: He exhibits edema  1+ LLE edema   Neurological: He is alert and oriented to person, place, and time  Skin: Skin is warm and dry  L foot with blackened, gangrenous toes   Psychiatric: He has a normal mood and affect       LABORATORY RESULTS:      CBC with diff:   Results from last 7 days  Lab Units 19  0502 19  1752   WBC Thousand/uL 7 73 8 27   HEMOGLOBIN g/dL 8 8* 9 8*   HEMATOCRIT % 27 7* 31 0*   MCV fL 91 91 PLATELETS Thousands/uL 210 244   MCH pg 29 0 28 9   MCHC g/dL 31 8 31 6   RDW % 13 9 14 1   MPV fL 9 8 9 7   NRBC AUTO /100 WBCs  --  0     CMP:  Results from last 7 days  Lab Units 19  0503 19  1752   POTASSIUM mmol/L 3 9 3 9   CHLORIDE mmol/L 104 100   CO2 mmol/L 29 29   BUN mg/dL 11 14   CREATININE mg/dL 0 99 1 11   CALCIUM mg/dL 8 1* 9 3   AST U/L  --  15   ALT U/L  --  16   ALK PHOS U/L  --  62   EGFR ml/min/1 73sq m 69 60       BMP:  Results from last 7 days  Lab Units 19  0503 19  1752   POTASSIUM mmol/L 3 9 3 9   CHLORIDE mmol/L 104 100   CO2 mmol/L 29 29   BUN mg/dL 11 14   CREATININE mg/dL 0 99 1 11   CALCIUM mg/dL 8 1* 9 3      Lab Results   Component Value Date    NTBNP 770 (H) 10/29/2018      Results from last 7 days  Lab Units 19  1752   HEMOGLOBIN A1C % 5 8       Results from last 7 days  Lab Units 19  1752   INR  1 15     Lipid Profile:   No results found for: CHOL  Lab Results   Component Value Date     (H) 2017     (H) 2016     Lab Results   Component Value Date    LDLCALC 40 2017    LDLCALC 60 2016     Lab Results   Component Value Date    TRIG 56 2017    TRIG 49 2016    TRIG 40 2016     Cardiac testing:   Results for orders placed during the hospital encounter of 17   Echo complete with contrast if indicated    Narrative [facility], [address] [facility]  [phone]    Transthoracic Echocardiogram  2D, M-mode, Doppler, and Color Doppler    Study date:  2017    Patient: Fatou Chiu  MR number: [id number]  Account number: [de-identified]  : 1933  Age: 80 years  Gender: Male  Status: Outpatient  Location: [facility]  Height: 69 in  Weight: 154 lb  BP: 144/ 60 mmHg    Indications: Dyspnea on Excertion      Diagnoses: R06 09 - Other forms of dyspnea    Sonographer:  Phyliss Hammans,, RCS  Interpreting Physician:  Dago Shannon, MD  Primary Physician:  Beverley Lira MD  Referring Physician:  Diane Watson MD  Group:  [facility]    LEFT VENTRICLE:  Systolic function was normal  Ejection fraction was estimated in the range of 55 % to 65 %  There were no regional wall motion abnormalities  There was mild assymetrical hypertrophy  Features were consistent with a pseudonormal left ventricular filling pattern, with concomitant abnormal relaxation and increased filling pressure (grade 2 diastolic dysfunction)  MITRAL VALVE:  There was mild regurgitation  AORTIC VALVE:  The valve was trileaflet  Leaflets exhibited mild calcification and normal cuspal separation  There was mild regurgitation  TRICUSPID VALVE:  There was trace regurgitation  PULMONIC VALVE:  There was mild regurgitation  HISTORY: PRIOR HISTORY: Risk factors: medication-treated hypercholesterolemia  PROCEDURE: The study was performed in the [address] [place] [address]  This was a routine study  The transthoracic approach was used  The study included complete 2D imaging, M-mode, complete spectral Doppler, and color Doppler  The  heart rate was 56 bpm, at the start of the study  Image quality was adequate  LEFT VENTRICLE: Size was normal  Systolic function was normal  Ejection fraction was estimated in the range of 55 % to 65 %  There were no regional wall motion abnormalities  There was mild assymetrical hypertrophy  DOPPLER: Features were  consistent with a pseudonormal left ventricular filling pattern, with concomitant abnormal relaxation and increased filling pressure (grade 2 diastolic dysfunction)  RIGHT VENTRICLE: The size was normal  Systolic function was normal  Wall thickness was normal     LEFT ATRIUM: Size was normal     RIGHT ATRIUM: Size was normal     MITRAL VALVE: Valve structure was normal  There was normal leaflet separation  DOPPLER: The transmitral velocity was within the normal range   There was no evidence for stenosis  There was mild regurgitation  AORTIC VALVE: The valve was trileaflet  Leaflets exhibited mild calcification and normal cuspal separation  DOPPLER: Transaortic velocity was within the normal range  There was no evidence for stenosis  There was mild regurgitation  TRICUSPID VALVE: The valve structure was normal  There was normal leaflet separation  DOPPLER: The transtricuspid velocity was within the normal range  There was no evidence for stenosis  There was trace regurgitation  PULMONIC VALVE: Leaflets exhibited normal thickness, no calcification, and normal cuspal separation  DOPPLER: The transpulmonic velocity was within the normal range  There was mild regurgitation  PERICARDIUM: There was no pericardial effusion  The pericardium was normal in appearance  AORTA: The root exhibited normal size  SYSTEMIC VEINS: IVC: The inferior vena cava was normal in size and course  Respirophasic changes were normal     SYSTEM MEASUREMENT TABLES    Apical four chamber  4 chamber Left Atrium Volume Index; Planimetry; End Systole; Apical four chamber;: 21 73 cm2  Left Ventricular End Diastolic Volume; Method of Disks, Single Plane; Apical four chamber;: 86 4 ml  Left Ventricular End Systolic Volume; Method of Disks, Single Plane; Apical four chamber;: 58 8 ml  Right Atrium Systolic Area; Planimetry; End Systole; Apical four chamber;: 15 37 cm2  Right Ventricular Internal Diastolic Dimension; End Diastole; Apical four chamber;: 33 2 mm  TAPSE: 19 mm    Unspecified Scan Mode  Aortic Root Diameter; End Systole;: 35 9 mm  Left atrial diameter; End Diastole;: 36 2 mm  Cardiac Output; Teichholz; Systole;: 4 14 L/min  Interventricular Septum Diastolic Thickness; Teichholz; End Diastole;: 12 1 mm  Left Ventricle Internal End Diastolic Dimension; Teichholz;: 46 6 mm  Left Ventricle Internal Systolic Dimension; Teichholz; End Systole;: 26 2 mm  Left Ventricle Mass;  Mass AVCube with Teichholz; End Diastole;: 178 g  Left Ventricle Posterior Wall Diastolic Thickness; Teichholz; End Diastole;: 9 3 mm  Left Ventricular Ejection Fraction; Teichholz;: 75 %  Left Ventricular End Diastolic Volume; Teichholz;: 100 3 ml  Left Ventricular End Systolic Volume; Teichholz;: 25 1 ml  Stroke volume; Rana Jenni;: 75 2 ml  Mitral Valve Area; Area by Pressure Half-Time; Systole;: 2 44 cm2  Mitral Valve E to A Ratio; Systole;: 1 1    [facility]    Prepared and electronically signed by    Henny Castro MD  Signed [id number] 17:48:00       Imaging: I have personally reviewed pertinent reports  Xr Chest Pa & Lateral    Result Date: 1/29/2019  Narrative: CHEST INDICATION:   preop eval  COMPARISON:  10/29/2018 EXAM PERFORMED/VIEWS:  XR CHEST PA & LATERAL FINDINGS: Cardiomediastinal silhouette appears unremarkable  Lungs are hyperinflated suggesting COPD  Prominent interstitial markings as before  No consolidation, pleural effusion or pneumothorax  Osseous structures appear within normal limits for patient age  Impression: Underlying COPD and prominent interstitial markings as before  No acute cardiopulmonary disease  Workstation performed: [id number]     Assessment  Principal Problem:    Severe peripheral arterial disease (HCC)  Active Problems:    Gangrene of toe of right foot ([address] )    Thank you for allowing us to participate in this patient's care  This pt will follow up with   once discharged  Counseling / Coordination of Care  Total floor / unit time spent today 45 minutes  Greater than 50% of total time was spent with the patient and / or family counseling and / or coordination of care  A description of the counseling / coordination of care: Review of history, current assessment, development of a plan        Code Status: Level 2 - DNAR: but accepts endotracheal intubation    ** Please Note: Dragon 360 Dictation voice to text software may have been used in the creation of this document   **

## 2019-01-29 NOTE — ASSESSMENT & PLAN NOTE
Likely related to chronic kidney disease/chronic disease  Check iron storages  Continue with iron supplement  Hemoglobin is stable, transfuse for hemoglobin less than 7

## 2019-01-29 NOTE — DISCHARGE INSTR - OTHER ORDERS
Skin Care Plan:  1-Cleanse sacrum , buttocks with soap and water apply allevyn foam chantale with a P and date , check skin integrity every shift then reapply change every 3 days or if soiled   2-Turn/reposition q2h for pressure re-distribution on skin    3-Soft care cushion when out of bed  4-Elevate heels to offload pressure  5-Moisturize skin daily with skin nourishing cream   6-Hydraguard to bilateral heels BID and PRN

## 2019-01-29 NOTE — SOCIAL WORK
CM introduced self and explained role at the bedside  Pt lives with his wife Norm Cook [phone] who is his emergency contact and POA in a [facility] with 1 oswaldo and + grab bars and shower seat in shower  PTA pt was independent with ADL's and ambulation with a walker; alos has access to a cane  Pt is retired and does not drive + family support for transportation  PCP is Dr Nissa Martinez and preferred pharmacy is [facility]  Pt denies etoh/drug abuse or tx and no mental health dx  Pt has hx with [facility] and is willing to use again if there is a need  CM reviewed d/c planning process including the following: identifying help at home, patient preference for d/c planning needs, Discharge Lounge, [facility] Meds to Bed program, availability of treatment team to discuss questions or concerns patient and/or family may have regarding understanding medications and recognizing signs and symptoms once discharged  CM also encouraged patient to follow up with all recommended appointments after discharge  Patient advised of importance for patient and family to participate in managing patients medical well being        Juwan Billy,  [phone]

## 2019-01-29 NOTE — CONSULTS
Consultation - Infectious Disease   Estephanie Hyde 80 y o  male MRN: [id number]  Unit/Bed#: CW3 338-01 Encounter: [id number]      IMPRESSION & RECOMMENDATIONS:   Impression/Recommendations:  1  Left foot cellulitis  In the setting of gangrenous changes of the toes  Cellulitis appears very minimal   No associated fevers, leukocytosis  Patient with no systemic complaints  No drainage on exam   Currently on IV cefazolin  Okay to continue for now preoperatively  -continue IV cefazolin 1 g q 8 hours for now  -serial foot exams  -monitor temperatures and hemodynamics  -follow up pending blood cultures    2  Gangrene of 2nd, 3rd, 4th toes  In the setting of severe peripheral vascular disease  Clinically appears dry  Will likely undergo BKA  -antibiotic plan as above  Likely stop antibiotics postoperatively  -plan for BKA by vascular surgery noted  -serial exams    3  Severe peripheral arterial disease  With multiple previous unsuccessful revascularization attempts  Patient admitted electively for probable BKA  -post vascular surgery follow-up ongoing  -likely plan for BKA noted    4  CKD 3  Creatinine stable  Will dose adjust antibiotics based on renal function  Monitor BMP closely  Antibiotics:  Cefazolin D2    Discussed above plan with patient  Thank you for this consultation  We will follow along with you  HISTORY OF PRESENT ILLNESS:  Reason for Consult: Foot gangrene    HPI: Estephanie Hyde is a 80 y o  male with history of CKD, severe peripheral arterial disease who was admitted on 01/28/2019 secondary to chronic left foot gangrene that has been on going for 8 months  He has underwent multiple endovascular interventions as an outpatient in attempt to revascularize without success  Ultimately, the decision was made to admit patient for probable BKA in the setting persistent dry gangrene involving left 2nd, 3rd, 4th toes  Patient denies worsening redness  Denies any drainage    No fevers or chills or any other systemic complaints  No nausea, vomiting, diarrhea  Denies any recent antibiotic use  Patient does have a chronic cough, which he states is unchanged  He has a longstanding history of tobacco abuse but quit many years ago  REVIEW OF SYSTEMS:  A complete system-based review of systems is otherwise negative  PAST MEDICAL HISTORY:  Past Medical History:   Diagnosis Date    Anemia     Arthritis     Bifascicular block     Cellulitis of chest wall     Chronic kidney disease     Dupuytren contracture     DVT femoral (deep venous thrombosis) with thrombophlebitis, right (HCC)     Elevated glucose     Hemothorax     Left    Hyperlipidemia     Vascular disease      Past Surgical History:   Procedure Laterality Date    AMPUTATION  1972    L thumb    CARPAL TUNNEL RELEASE  2016    L hand    CATARACT EXTRACTION      COLONOSCOPY      EYE SURGERY      FRACTURE SURGERY      HAND SURGERY      IR LOWER EXTREMITY / INTERVENTION  10/26/2018    KNEE ARTHROSCOPY      NERVE BLOCK      NM SLCTV CATHJ 3RD+ ORD SLCTV ABDL PEL/[facility] Left 3/9/2018    Procedure: LEFT LOWER EXTREMITY ARTERIOGRAM WITH PTA OF LEFT POPLITEAL ARTERY;  Surgeon: hCen Flores MD;  Location: [facility];  Service: Vascular    NM Curtis Pedraza 3RD+ ORD SLCTV ABDL PEL/[facility] Left 10/26/2018    Procedure: ARTERIOGRAM, angioplasty stent and atherectomy;  Surgeon: Chen Flores MD;  Location: [facility];  Service: Vascular    THORACENTESIS         FAMILY HISTORY:  Non-contributory    SOCIAL HISTORY:  History   Alcohol Use No     History   Drug Use No     History   Smoking Status    Former Smoker   Smokeless Tobacco    Never Used     Comment: quit 57 years ago       ALLERGIES:  No Known Allergies    MEDICATIONS:  All current active medications have been reviewed      PHYSICAL EXAM:  Vitals:  Temp:  [98 1 °F (36 7 °C)] 98 1 °F (36 7 °C)  HR:  [61-80] 61  Resp:  [18] 18  BP: (111-136)/(45-66) 111/45  SpO2:  [99 %-100 %] 99 %  Temp (24hrs), Av 1 °F (36 7 °C), Min:98 1 °F (36 7 °C), Max:98 1 °F (36 7 °C)  Current: Temperature: 98 1 °F (36 7 °C)     Physical Exam:  General:  No acute distress, elderly  Eyes:  Conjunctive clear with no hemorrhages or effusions  Oropharynx:  No ulcers, no lesions  Neck:  Supple, no lymphadenopathy  Lungs:  Clear to auscultation bilaterally, no accessory muscle use  Cardiac:  Regular rate and rhythm, no murmurs  Abdomen:  Soft, non-tender, non-distended  Extremities:  No peripheral cyanosis, clubbing, or edema  Left foot dry gangrene involving the 2nd to 4th toes  Mild crissy wound erythema and warmth  No fluctuance, active drainage  Skin:  No rashes, no ulcers  Neurological:  Moves all four extremities spontaneously, sensation grossly intact      LABS, IMAGING, & OTHER STUDIES:  Lab Results:  I have personally reviewed pertinent labs  Results from last 7 days  Lab Units 19  0503 19  1752   POTASSIUM mmol/L 3 9 3 9   CHLORIDE mmol/L 104 100   CO2 mmol/L 29 29   BUN mg/dL 11 14   CREATININE mg/dL 0 99 1 11   EGFR ml/min/1 73sq m 69 60   CALCIUM mg/dL 8 1* 9 3   AST U/L  --  15   ALT U/L  --  16   ALK PHOS U/L  --  62       Results from last 7 days  Lab Units 19  0502 19  1752   WBC Thousand/uL 7 73 8 27   HEMOGLOBIN g/dL 8 8* 9 8*   PLATELETS Thousands/uL 210 244       Imaging Studies:   I have personally reviewed pertinent imaging study reports and images in PACS  Chest x-ray shows no acute cardiopulmonary disease    EKG, Pathology, and Other Studies:   I have personally reviewed pertinent reports

## 2019-01-29 NOTE — UTILIZATION REVIEW
Initial Clinical Review    Admission: Date/Time/Statement: 1/28/19 @ 1645    Orders Placed This Encounter   Procedures    Inpatient Admission     Standing Status:   Standing     Number of Occurrences:   1     Order Specific Question:   Admitting Physician     Answer:   Tim Whitmore [[id number]]     Order Specific Question:   Level of Care     Answer:   Med Surg [[id number]]     Order Specific Question:   Estimated length of stay     Answer:   More than 2 Midnights     Order Specific Question:   Certification     Answer:   I certify that inpatient services are medically necessary for this patient for a duration of greater than two midnights  See H&P and MD Progress Notes for additional information about the patient's course of treatment  Date/Time/Mode of Arrival: 1/28 Direct Admission from [facility]    Chief Complaint: Direct admission    History of Illness: 80 y o  male known to the Vascular service for PAD having undergone LLE Agram w/ BK popliteal and PT atherectomy, TPT and peroneal PTA, BK popliteal PTA, and AT PTA  Vital Signs:   Vitals   Temperature Pulse Respirations Blood Pressure SpO2   01/28/19 1545 01/28/19 1545 01/28/19 1545 01/28/19 1545 01/28/19 1545   98 1 °F (36 7 °C) 80 18 136/66 100 %      Temp src Heart Rate Source Patient Position - Orthostatic VS BP Location FiO2 (%)   Pain Score       01/28/19 1957       8        Wt Readings from Last 1 Encounters:   01/02/19 64 9 kg (143 lb)     Vital Signs (abnormal): B/P = 111/45    Pertinent Labs/Diagnostic Test Results:   CXR - Underlying COPD and prominent interstitial markings as before  No acute cardiopulmonary disease  VAS lower limb arterial duplex - LEFT LOWER LIMB:  Diffuse disease in the femoral and popliteal arteries with a 50-75% stenosis of  the mid superficial femoral artery  Findings suggest tibioperoneal occlusive disease  Na = 134  H/H = 8 8/27 7     Past Medical/Surgical History:    Active Ambulatory Problems     Diagnosis Date Noted    Severe peripheral arterial disease ([facility] ) 02/13/2018    Hyperlipidemia 07/14/2016    Vitamin D deficiency 10/25/2017    Bifascicular block 10/31/2017    Anemia 10/15/2017    CKD (chronic kidney disease), stage III ([facility] ) 03/05/2018    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 ([facility] ) 03/06/2018    Atherosclerosis of native arteries of left leg with ulceration of other part of foot ([facility] ) 10/08/2018    Subarachnoid hemorrhage ([facility] ) 10/29/2018    Fall 10/29/2018    Ambulatory dysfunction 10/29/2018    Short-term memory loss 10/29/2018    Physical deconditioning 10/29/2018    Candida rash of groin 11/23/2018    Urinary frequency 11/23/2018    Nocturia 11/23/2018    Cellulitis of left foot 01/28/2019    Gangrene of toe of right foot ([facility] ) 01/28/2019    Carpal tunnel syndrome 01/29/2019    Change in multiple pigmented skin lesions 05/16/2017    Chronic lower back pain 01/14/2016    Chronic fatigue 10/31/2017    Cold intolerance 05/04/2017    Dermatitis 06/06/2017    Dyspnea on exertion 10/31/2017    Generalized osteoarthritis 01/14/2016    Herniated lumbar intervertebral disc 01/14/2016    Impaired fasting glucose 49/20/0941    Lichen simplex chronicus 07/10/2017    Positive depression screening 01/14/2016    Rotator cuff tear arthropathy, right 01/14/2016    Seborrheic keratosis 07/10/2017    Unstable gait 10/11/2017     Resolved Ambulatory Problems     Diagnosis Date Noted    Pseudoaneurysm of femoral artery ([facility] ) 04/23/2018     Past Medical History:   Diagnosis Date    Anemia     Arthritis     Bifascicular block     Cellulitis of chest wall     Chronic kidney disease     Dupuytren contracture     DVT femoral (deep venous thrombosis) with thrombophlebitis, right (HCC)     Elevated glucose     Hemothorax     Hyperlipidemia     Vascular disease      Admitting Diagnosis: Atheroembolism of left lower extremity [I75 022]     Age/Sex: 80 y o  male Assessment/Plan:   80y Male, Direct admission with cellulitis and tissue loss of the left great toe  male known to the Vascular service for PAD having undergone LLE Agram w/ BK popliteal and PT atherectomy, TPT and peroneal PTA, BK popliteal PTA, and AT PTA  Unfortunately, the procedure was complicated by distal embolization requiring pharmacomechanical thrombectomy with Angiojet and tPA administration  This was performed in October of 2018  The patient was subsequently placed on dual anti-platelet therapy along with anticoagulation  Unfortunately he then suffered a fall with a 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 at which time anticoagulation was discontinued  Unfortunately he then suffered a fall with a 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 at which time anticoagulation was discontinued  He was seen by Dr Raulito Jeffries today in office evaluation at which time recommendation was for admission for further imaging, medical maximization, and IV antibiotics, and likely plan for left BKA during the hospitalization      PAD w/ chronic L multi-toe dry gangrene & L GT wound w/ cellulitis  -IV Abx (Ancef, Flagyl)  --Likely will progress to R BKA  --R LEAD  --SLIM consult for medical mgmt  --Podiatry consult  --Infectious Disease consult  --Cards consult for risk stratification  --CXR (p)  --Labs (p)  --EKG (p)  --LWC w/ betadine paint  --cont antiplt (ASA 81mg), statin    2/1 Tentative OR - AMPUTATION BELOW KNEE (BKA) (Left Leg Upper)        Admission Orders:  Neurovascular checks q4h  Cardiology cons  Internal Medicine cons  Podiatry cons  Infectious Disease cons  Wound Care cons    Scheduled Meds:   Current Facility-Administered Medications:  aspirin 81 mg Oral Daily   cefazolin 1,000 mg Intravenous Q8H   cholecalciferol 2,000 Units Oral Daily   docusate sodium 100 mg Oral BID   ferrous sulfate 325 mg Oral BID   heparin (porcine) 5,000 Units Subcutaneous Q8H [address]   Or      Or metroNIDAZOLE 500 mg Intravenous Q8H   pravastatin 40 mg Oral Daily With Dinner     Continuous Infusions:    PRN Meds:   acetaminophen

## 2019-01-29 NOTE — ASSESSMENT & PLAN NOTE
Severe peripheral vascular disease status post multiple failed attempted revascularization  Currently with superimposed cellulitis of lower extremity  Continue with Ancef and Flagyl as per primary thing  Pending Infectious Disease consult  Continue with serial exam of the limb  Plan for BKA, date and time to be determined  Pain is currently controlled  Fall precautions

## 2019-01-29 NOTE — ASSESSMENT & PLAN NOTE
Remote history of subarachnoid hemorrhage, patient currently refusing DVT prophylaxis with heparin  Currently no concerns

## 2019-01-29 NOTE — CONSULTS
Consult - Podiatry   Ronald Bardales 80 y o  male MRN: [id number]  Unit/Bed#: CW3 338-01 Encounter: [id number]    Assessment/Plan     Assessment:  1  Dry, stable gangrene to digits 1-4 left foot  2  PAD  3  CKD stage 3    Plan:  · Per vascular, plan for left BKA during this admission  Podiatry will monitor gangrene, and provide local wound care until surgery  · Nursing to do daily Betadine paint  · Rest of care per primary service  History of Present Illness     HPI:  Ronald Bardales is a 80 y o  male who presents with left foot dry gangrene  He states he has been dealing with nonhealing digit wounds on her left foot for a couple months  He states he was sent to the hospital due to decreased blood flow in his left foot  He was followed by vascular surgery since October 2018  He states that vascular surgeries recommending a left BKA as he has no revascularization options available  Per chart review, he will most likely not heal a TMA  He states he has rest pain in the left foot  Patient denies any recent nausea, vomiting, fever, chills, shortness of breath, or chest pains       Consults  Review of Systems   Constitutional: Negative  HENT: Negative  Eyes: Negative  Respiratory: Negative  Cardiovascular: Negative  Gastrointestinal: Negative  Musculoskeletal:  Left foot pain  Skin:  Left foot dry gangrene   Neurological: Negative  Psych: Negative         Historical Information   Past Medical History:   Diagnosis Date    Anemia     Arthritis     Bifascicular block     Cellulitis of chest wall     Chronic kidney disease     Dupuytren contracture     DVT femoral (deep venous thrombosis) with thrombophlebitis, right (HCC)     Elevated glucose     Hemothorax     Left    Hyperlipidemia     Vascular disease      Past Surgical History:   Procedure Laterality Date    AMPUTATION  1972    L thumb    CARPAL TUNNEL RELEASE  2016    L hand    CATARACT EXTRACTION      COLONOSCOPY      EYE SURGERY      FRACTURE SURGERY      HAND SURGERY      IR LOWER EXTREMITY / INTERVENTION  10/26/2018    KNEE ARTHROSCOPY      NERVE BLOCK      NH SLCTV CATHJ 3RD+ ORD SLCTV ABDL PEL/TR [facility] Left 3/9/2018    Procedure: LEFT LOWER EXTREMITY ARTERIOGRAM WITH PTA OF LEFT POPLITEAL ARTERY;  Surgeon: Sudeep Holland MD;  [facility]: [facility];  Service: Vascular    NH SLCTV CATHJ 3RD+ ORD SLCTV ABDL PEL/[facility] Left 10/26/2018    Procedure: ARTERIOGRAM, angioplasty stent and atherectomy;  Surgeon: Sudeep Holland MD;  [facility]: [facility];  Service: Vascular    THORACENTESIS       Social History   History   Alcohol Use No     History   Drug Use No     History   Smoking Status    Former Smoker   Smokeless Tobacco    Never Used     Comment: quit 62 years ago     Family History:   Family History   Problem Relation Age of Onset    Lung cancer Mother     Brain cancer Mother     Diabetes Father     Aneurysm Father     Stroke Father     Lung cancer Father     Brain cancer Father     Diabetes Maternal Grandmother        Meds/Allergies   Prescriptions Prior to Admission   Medication    aspirin 81 MG tablet    Cholecalciferol (VITAMIN D) 2000 units CAPS    clotrimazole-betamethasone (LOTRISONE) 1-0 05 % cream    ferrous sulfate 325 (65 Fe) mg tablet    Omega-3 Fatty Acids (FISH OIL) 645 MG CAPS    oxyCODONE (ROXICODONE) 5 mg immediate release tablet    simvastatin (ZOCOR) 20 mg tablet     No Known Allergies    Objective   First Vitals:   Blood Pressure: 136/66 (01/28/19 1545)  Pulse: 80 (01/28/19 1545)  Temperature: 98 1 °F (36 7 °C) (01/28/19 1545)  Respirations: 18 (01/28/19 1545)  Height: 5' 10" (177 8 cm) (01/29/19 0800)  Weight - Scale: 64 9 kg (143 lb) (01/29/19 0800)  SpO2: 100 % (01/28/19 1545)    Current Vitals:   Blood Pressure: 107/61 (01/29/19 0808)  Pulse: 65 (01/29/19 0808)  Temperature: 97 7 °F (36 5 °C) (01/29/19 0808)  Respirations: 16 (01/29/19 0808)  Height: 5' 10" (177 8 cm) (01/29/19 0800)  Weight - Scale: 64 9 kg (143 lb) (01/29/19 0800)  SpO2: 100 % (01/29/19 0808)        /61   Pulse 65   Temp 97 7 °F (36 5 °C)   Resp 16   Ht 5' 10" (1 778 m)   Wt 64 9 kg (143 lb)   SpO2 100%   BMI 20 52 kg/m²      General Appearance:    Alert, cooperative, no distress   Head:    Normocephalic, without obvious abnormality, atraumatic   Eyes:    PERRL, conjunctiva/corneas clear, EOM's intact        Nose:   Moist mucous membranes   Neck:   Supple, symmetrical, trachea midline   Back:     Symmetric   Lungs:     Respirations unlabored   Heart:    Regular rate and rhythm, S1 and S2 normal, no murmur, rub   or gallop   Abdomen:     Soft, non-tender   Extremities:   MMT is 4/5 to all compartments of the LE, +0/4 edema B/L, mild pain on palpation to the right foot  No calf tenderness noted bilaterally  Pulses:   Pedal pulses bilaterally are nonpalpable  CFT< 3sec to all digits  Pedal hair is Absent   Skin:   Dry, stable gangrene noted to the left digits 1-4  No conversion to wet gangrene  Digits 2, and 3 are completely necrotic  Erythema present on the left foot extending to the ankle  No open wounds, or drainage  Neurologic:   Gross sensation is Intact  Protective sensation is Intact         Left foot        Lab, Imaging and other studies:   CBC:   Lab Results   Component Value Date    WBC 7 73 01/29/2019    HGB 8 8 (L) 01/29/2019    HCT 27 7 (L) 01/29/2019    MCV 91 01/29/2019     01/29/2019    MCH 29 0 01/29/2019    MCHC 31 8 01/29/2019    RDW 13 9 01/29/2019    MPV 9 8 01/29/2019    NRBC 0 01/28/2019   , CMP:   Lab Results   Component Value Date    SODIUM 138 01/29/2019    K 3 9 01/29/2019     01/29/2019    CO2 29 01/29/2019    BUN 11 01/29/2019    CREATININE 0 99 01/29/2019    CALCIUM 8 1 (L) 01/29/2019    AST 15 01/28/2019    ALT 16 01/28/2019    ALKPHOS 62 01/28/2019    EGFR 69 01/29/2019         Imaging: I have personally reviewed pertinent films in PACS  EKG, Pathology, and Other Studies: I have personally reviewed pertinent reports

## 2019-01-30 PROBLEM — E87.6 HYPOKALEMIA: Status: ACTIVE | Noted: 2019-01-30

## 2019-01-30 LAB
ANION GAP SERPL CALCULATED.3IONS-SCNC: 5 MMOL/L (ref 4–13)
BASOPHILS # BLD AUTO: 0.03 THOUSANDS/ΜL (ref 0–0.1)
BASOPHILS NFR BLD AUTO: 0 % (ref 0–1)
BUN SERPL-MCNC: 10 MG/DL (ref 5–25)
CALCIUM SERPL-MCNC: 8.2 MG/DL (ref 8.3–10.1)
CHLORIDE SERPL-SCNC: 103 MMOL/L (ref 100–108)
CO2 SERPL-SCNC: 28 MMOL/L (ref 21–32)
CREAT SERPL-MCNC: 0.89 MG/DL (ref 0.6–1.3)
EOSINOPHIL # BLD AUTO: 0.39 THOUSAND/ΜL (ref 0–0.61)
EOSINOPHIL NFR BLD AUTO: 5 % (ref 0–6)
ERYTHROCYTE [DISTWIDTH] IN BLOOD BY AUTOMATED COUNT: 13.7 % (ref 11.6–15.1)
GFR SERPL CREATININE-BSD FRML MDRD: 78 ML/MIN/1.73SQ M
GLUCOSE SERPL-MCNC: 95 MG/DL (ref 65–140)
HCT VFR BLD AUTO: 27 % (ref 36.5–49.3)
HGB BLD-MCNC: 8.5 G/DL (ref 12–17)
IMM GRANULOCYTES # BLD AUTO: 0.04 THOUSAND/UL (ref 0–0.2)
IMM GRANULOCYTES NFR BLD AUTO: 1 % (ref 0–2)
LYMPHOCYTES # BLD AUTO: 1.81 THOUSANDS/ΜL (ref 0.6–4.47)
LYMPHOCYTES NFR BLD AUTO: 21 % (ref 14–44)
MAGNESIUM SERPL-MCNC: 1.7 MG/DL (ref 1.6–2.6)
MCH RBC QN AUTO: 28.5 PG (ref 26.8–34.3)
MCHC RBC AUTO-ENTMCNC: 31.5 G/DL (ref 31.4–37.4)
MCV RBC AUTO: 91 FL (ref 82–98)
MONOCYTES # BLD AUTO: 0.61 THOUSAND/ΜL (ref 0.17–1.22)
MONOCYTES NFR BLD AUTO: 7 % (ref 4–12)
NEUTROPHILS # BLD AUTO: 5.71 THOUSANDS/ΜL (ref 1.85–7.62)
NEUTS SEG NFR BLD AUTO: 66 % (ref 43–75)
NRBC BLD AUTO-RTO: 0 /100 WBCS
PHOSPHATE SERPL-MCNC: 2.6 MG/DL (ref 2.3–4.1)
PLATELET # BLD AUTO: 208 THOUSANDS/UL (ref 149–390)
PMV BLD AUTO: 9.5 FL (ref 8.9–12.7)
POTASSIUM SERPL-SCNC: 3.4 MMOL/L (ref 3.5–5.3)
RBC # BLD AUTO: 2.98 MILLION/UL (ref 3.88–5.62)
SODIUM SERPL-SCNC: 136 MMOL/L (ref 136–145)
WBC # BLD AUTO: 8.59 THOUSAND/UL (ref 4.31–10.16)

## 2019-01-30 PROCEDURE — 80048 BASIC METABOLIC PNL TOTAL CA: CPT | Performed by: INTERNAL MEDICINE

## 2019-01-30 PROCEDURE — 83735 ASSAY OF MAGNESIUM: CPT | Performed by: INTERNAL MEDICINE

## 2019-01-30 PROCEDURE — 84100 ASSAY OF PHOSPHORUS: CPT | Performed by: INTERNAL MEDICINE

## 2019-01-30 PROCEDURE — 85025 COMPLETE CBC W/AUTO DIFF WBC: CPT | Performed by: INTERNAL MEDICINE

## 2019-01-30 PROCEDURE — 99232 SBSQ HOSP IP/OBS MODERATE 35: CPT | Performed by: INTERNAL MEDICINE

## 2019-01-30 PROCEDURE — 99232 SBSQ HOSP IP/OBS MODERATE 35: CPT | Performed by: PHYSICIAN ASSISTANT

## 2019-01-30 RX ADMIN — ASPIRIN 81 MG 81 MG: 81 TABLET ORAL at 08:35

## 2019-01-30 RX ADMIN — FERROUS SULFATE TAB 325 MG (65 MG ELEMENTAL FE) 325 MG: 325 (65 FE) TAB at 17:20

## 2019-01-30 RX ADMIN — CEFAZOLIN SODIUM 1000 MG: 10 INJECTION, POWDER, FOR SOLUTION INTRAVENOUS at 11:29

## 2019-01-30 RX ADMIN — PRAVASTATIN SODIUM 40 MG: 40 TABLET ORAL at 17:19

## 2019-01-30 RX ADMIN — OXYCODONE HYDROCHLORIDE 10 MG: 10 TABLET ORAL at 21:16

## 2019-01-30 RX ADMIN — VITAMIN D, TAB 1000IU (100/BT) 2000 UNITS: 25 TAB at 08:35

## 2019-01-30 RX ADMIN — FERROUS SULFATE TAB 325 MG (65 MG ELEMENTAL FE) 325 MG: 325 (65 FE) TAB at 08:35

## 2019-01-30 RX ADMIN — CEFAZOLIN SODIUM 1000 MG: 10 INJECTION, POWDER, FOR SOLUTION INTRAVENOUS at 04:58

## 2019-01-30 RX ADMIN — CEFAZOLIN SODIUM 1000 MG: 10 INJECTION, POWDER, FOR SOLUTION INTRAVENOUS at 22:10

## 2019-01-30 NOTE — ASSESSMENT & PLAN NOTE
· Continue with Ancef for now  Discontinue antibiotics postoperatively given surgical cure  · Continue with serial exam of the limb

## 2019-01-30 NOTE — ASSESSMENT & PLAN NOTE
· Remote history of subarachnoid hemorrhage, patient currently refusing DVT prophylaxis with heparin  · Currently no concerns

## 2019-01-30 NOTE — PROGRESS NOTES
Progress Note - Dixon Willis 1933, 80 y o  male MRN: [id number]  Unit/Bed#: CW3 338-01 Encounter: [id number]  Primary Care Provider: Ba Golden MD   Date and time admitted to hospital: 1/28/2019  3:10 PM    * Severe peripheral arterial disease ([facility] )   Assessment & Plan    · Severe peripheral vascular disease status post multiple failed attempted revascularization  · Gangrene of 2nd, 3rd and 4th toes  · Currently with superimposed cellulitis of lower extremity  · Plan for BKA on Friday, 2/1/19 per surgery  · Pain is currently controlled  · Fall precautions     Hypokalemia   Assessment & Plan    · Replete and monitor  Cellulitis of left foot   Assessment & Plan    · Continue with Ancef for now  Discontinue antibiotics postoperatively given surgical cure  · Continue with serial exam of the limb  Subarachnoid hemorrhage (HCC)   Assessment & Plan    · Remote history of subarachnoid hemorrhage, patient currently refusing DVT prophylaxis with heparin  · Currently no concerns     CKD (chronic kidney disease), stage III (HCC)   Assessment & Plan    · Stage 2-3, GFR today above 60  · Monitor renal function closely  · Avoid hypotension  · Avoid NSAID  · Avoid nephrotoxic agents  VTE Pharmacologic Prophylaxis:   Pharmacologic: Heparin  Mechanical: Mechanical VTE prophylaxis in place  Patient Centered Rounds: I have performed bedside rounds with nursing staff today  Discussions with Specialists or Other Care Team Provider: None  Education and Discussions with Family / Patient: All patient questions answered to the best of my ability  Time Spent for Care: 20 minutes  More than 50% of total time spent on counseling and coordination of care as described above  Current Length of Stay: 2 day(s)  Current Patient Status: Inpatient   Certification Statement: The patient will continue to require additional inpatient hospital stay due to need for surgical intervention      Discharge Plan: Per primary service  Code Status: Level 2 - DNAR: but accepts endotracheal intubation    Subjective:   Patient's current complaint is only of being cold  He has pain in the foot at night mostly but it is controlled  Otherwise, he has no complaints  Objective:   Vitals:   Temp (24hrs), Av 2 °F (36 8 °C), Min:97 7 °F (36 5 °C), Max:98 6 °F (37 °C)    Temp:  [97 7 °F (36 5 °C)-98 6 °F (37 °C)] 98 6 °F (37 °C)  HR:  [62-68] 62  Resp:  [18] 18  BP: (110-127)/(55-60) 127/60  SpO2:  [97 %-100 %] 97 %  Body mass index is 20 21 kg/m²  Input and Output Summary (last 24 hours): Intake/Output Summary (Last 24 hours) at 19 1238  Last data filed at 19 1231   Gross per 24 hour   Intake              420 ml   Output             1000 ml   Net             -580 ml       Physical Exam:     Physical Exam   HENT:   Head: Normocephalic and atraumatic  Mouth/Throat: Oropharynx is clear and moist and mucous membranes are normal    Eyes: No scleral icterus  Cardiovascular: Normal rate and regular rhythm  No murmur heard  Pulmonary/Chest: Breath sounds normal  He has no wheezes  He has no rales  He exhibits no tenderness  Abdominal: Soft  Bowel sounds are normal  He exhibits no distension  There is no tenderness  Musculoskeletal: Normal range of motion  He exhibits no edema  Left foot is covered and sock was not removed  Skin: Skin is warm and dry  No rash noted  Psychiatric: He has a normal mood and affect  Vitals reviewed      Additional Data:   Labs:    Results from last 7 days  Lab Units 19  0516   WBC Thousand/uL 8 59   HEMOGLOBIN g/dL 8 5*   HEMATOCRIT % 27 0*   PLATELETS Thousands/uL 208   NEUTROS PCT % 66   LYMPHS PCT % 21   MONOS PCT % 7   EOS PCT % 5       Results from last 7 days  Lab Units 19  0516  19  1752   POTASSIUM mmol/L 3 4*  < > 3 9   CHLORIDE mmol/L 103  < > 100   CO2 mmol/L 28  < > 29   BUN mg/dL 10  < > 14   CREATININE mg/dL 0 89  < > 1 11   CALCIUM mg/dL 8 2*  < > 9  3   ALK PHOS U/L  --   --  62   ALT U/L  --   --  16   AST U/L  --   --  15   < > = values in this interval not displayed  Results from last 7 days  Lab Units 01/28/19  1752   INR  1 15       * I Have Reviewed All Lab Data Listed Above  * Additional Pertinent Lab Tests Reviewed: All Labs Within Last 24 Hours Reviewed    Imaging:    Imaging Reports Reviewed Today Include: None new    Cultures:   Blood Culture: No results found for: BLOODCX  Urine Culture: No results found for: URINECX  Sputum Culture: No components found for: SPUTUMCX  Wound Culture: No results found for: WOUNDCULT    Last 24 Hours Medication List:     Current Facility-Administered Medications:  acetaminophen 650 mg Oral Q6H PRN Cleaster Ronde, PA-C    aspirin 81 mg Oral Daily Cleaster Ronde, PA-C    cefazolin 1,000 mg Intravenous Q8H Tita Aldea, DO Last Rate: 1,000 mg (01/30/19 1129)   cholecalciferol 2,000 Units Oral Daily Cleaster Ronde, PA-C    docusate sodium 100 mg Oral BID Cleaster Ronde, PA-C    ferrous sulfate 325 mg Oral BID Cleaster Ronde, PA-C    heparin (porcine) 5,000 Units Subcutaneous Q8H [address] Cleaster Ronde, PA-C    oxyCODONE 5 mg Oral Q4H PRN Cleaster Ronde, PA-C    Or        oxyCODONE 10 mg Oral Q4H PRN Cleaster Cecilde, PA-C    Or        HYDROmorphone 0 5 mg Intravenous Q4H PRN Cleaster Sharad, PA-C    pravastatin 40 mg Oral Daily With Sebastian Okeefe PA-C         Today, Patient Was Seen By: Kaykay Kim PA-C    ** Please Note: Dragon 360 Dictation voice to text software may have been used in the creation of this document   **

## 2019-01-30 NOTE — PLAN OF CARE
DISCHARGE PLANNING     Discharge to home or other facility with appropriate resources Progressing        DISCHARGE PLANNING - CARE MANAGEMENT     Discharge to post-acute care or home with appropriate resources Progressing        GASTROINTESTINAL - ADULT     Maintains adequate nutritional intake Progressing        INFECTION - ADULT     Absence or prevention of progression during hospitalization Progressing     Absence of fever/infection during neutropenic period Progressing        Knowledge Deficit     Patient/family/caregiver demonstrates understanding of disease process, treatment plan, medications, and discharge instructions Progressing        MUSCULOSKELETAL - ADULT     Maintain or return mobility to safest level of function Progressing     Maintain proper alignment of affected body part Progressing        Nutrition/Hydration-ADULT     Nutrient/Hydration intake appropriate for improving, restoring or maintaining nutritional needs Progressing        PAIN - ADULT     Verbalizes/displays adequate comfort level or baseline comfort level Progressing        Potential for Falls     Patient will remain free of falls Progressing        Prexisting or High Potential for Compromised Skin Integrity     Skin integrity is maintained or improved Progressing        SAFETY ADULT     Maintain or return to baseline ADL function Progressing     Maintain or return mobility status to optimal level Progressing        SKIN/TISSUE INTEGRITY - ADULT     Skin integrity remains intact Progressing     Incision(s), wounds(s) or drain site(s) healing without S/S of infection Progressing

## 2019-01-30 NOTE — ASSESSMENT & PLAN NOTE
· Stage 2-3, GFR today above 60  · Monitor renal function closely  · Avoid hypotension  · Avoid NSAID  · Avoid nephrotoxic agents

## 2019-01-30 NOTE — PROGRESS NOTES
Progress Note - Infectious Disease   Chris Thakur 80 y o  male MRN: [id number]  Unit/Bed#: CW3 338-01 Encounter: [id number]      Impression/Recommendations:  1  Left foot cellulitis  In the setting of gangrenous changes of the toes  Cellulitis appears very minimal   No associated fevers, leukocytosis  Patient with no systemic complaints  No drainage on exam   Currently on IV cefazolin  Okay to continue for now preoperatively      -continue IV cefazolin 1 g q 8 hours for now  Plan to stop post BKA  -serial foot exams  -monitor temperatures and hemodynamics     2  Gangrene of 2nd, 3rd, 4th toes  In the setting of severe peripheral vascular disease  Clinically appears dry  Plan for BKA on  noted      -antibiotic plan as above  Will plan to stop IV cefazolin postoperatively  -plan for BKA by vascular surgery on  noted  -serial exams     3  Severe peripheral arterial disease  With multiple previous unsuccessful revascularization attempts  Patient admitted electively for probable BKA     -post vascular surgery follow-up ongoing  -plan for BKA noted     4  CKD 3  Creatinine stable  Will dose adjust antibiotics based on renal function  Monitor BMP closely      Antibiotics:  Cefazolin D2     Discussed above plan with patient  Subjective:  Patient going for left BKA on Friday  He has no new complaints  No fevers or chills  Tolerating oral intake      Objective:  Vitals:  Temp:  [97 7 °F (36 5 °C)-98 6 °F (37 °C)] 98 6 °F (37 °C)  HR:  [62-68] 62  Resp:  [18] 18  BP: (110-127)/(55-60) 127/60  SpO2:  [97 %-100 %] 97 %  Temp (24hrs), Av 2 °F (36 8 °C), Min:97 7 °F (36 5 °C), Max:98 6 °F (37 °C)  Current: Temperature: 98 6 °F (37 °C)    Physical Exam:   General:  No acute distress, elderly  Psychiatric:  Awake and alert  Pulmonary:  Normal respiratory excursion without accessory muscle use  Abdomen:  Soft, nontender  Extremities:  No edema  Stable left foot dry gangrene involving 2nd to 4th toes   Mild stable crissy wound erythema  No fluctuance or drainage  Skin:  No rashes    Lab Results:  I have personally reviewed pertinent labs  Results from last 7 days  Lab Units 01/30/19  0516 01/29/19  0503 01/28/19  1752   POTASSIUM mmol/L 3 4* 3 9 3 9   CHLORIDE mmol/L 103 104 100   CO2 mmol/L 28 29 29   BUN mg/dL 10 11 14   CREATININE mg/dL 0 89 0 99 1 11   EGFR ml/min/1 73sq m 78 69 60   CALCIUM mg/dL 8 2* 8 1* 9 3   AST U/L  --   --  15   ALT U/L  --   --  16   ALK PHOS U/L  --   --  62       Results from last 7 days  Lab Units 01/30/19  0516 01/29/19  0502 01/28/19  1752   WBC Thousand/uL 8 59 7 73 8 27   HEMOGLOBIN g/dL 8 5* 8 8* 9 8*   PLATELETS Thousands/uL 208 210 244           Imaging Studies:   I have personally reviewed pertinent imaging study reports and images in PACS  EKG, Pathology, and Other Studies:   I have personally reviewed pertinent reports

## 2019-01-30 NOTE — ASSESSMENT & PLAN NOTE
· Severe peripheral vascular disease status post multiple failed attempted revascularization  · Gangrene of 2nd, 3rd and 4th toes  · Currently with superimposed cellulitis of lower extremity  · Plan for BKA on Friday, 2/1/19 per surgery    · Pain is currently controlled  · Fall precautions

## 2019-01-30 NOTE — PROGRESS NOTES
Progress Note - General Surgery   Godfrey Moe 80 y o  male MRN: [id number]  Unit/Bed#: CW3 338-01 Encounter: [id number]    Assessment:  79 yo male with PAD and L multi-toe gangrene     Plan:  - Plan for L BKA Friday 2/1  - Diet Regular; Regular House  - Cont ancef per ID  - Local wound care per Podiatry  - Pulmonary Toilet, IS  - PPx: SQH, Colace  - Cont ASA, statin  - Pain and Nausea control PRN      Subjective/Objective   Chief Complaint: L foot pain    Subjective:  No acute events overnight  Patient continues to have intermittent left foot pain, still finds relief with pain medications  Objective:     Blood pressure 112/55, pulse 68, temperature 98 4 °F (36 9 °C), resp  rate 18, height 5' 10" (1 778 m), weight 63 9 kg (140 lb 14 oz), SpO2 100 %  ,Body mass index is 20 21 kg/m²        Intake/Output Summary (Last 24 hours) at 01/30/19 0607  Last data filed at 01/30/19 0557   Gross per 24 hour   Intake          1568 75 ml   Output              800 ml   Net           768 75 ml       Invasive Devices     Peripheral Intravenous Line            Peripheral IV 01/29/19 Left;Upper Arm less than 1 day                Physical Exam:   Gen: NAD, A&O, Comfortable   Chest: Normal work of breathing, no resp distress  Abd: S, ND, NT  Ext: No edema, LLE with dry gangrene of toes 1-4, 2+ femoral, +signals PT/DP  Skin: warm, dry, intact      Lab, Imaging and other studies:  CBC:   Lab Results   Component Value Date    WBC 8 59 01/30/2019    HGB 8 5 (L) 01/30/2019    HCT 27 0 (L) 01/30/2019    MCV 91 01/30/2019     01/30/2019    MCH 28 5 01/30/2019    MCHC 31 5 01/30/2019    RDW 13 7 01/30/2019    MPV 9 5 01/30/2019    NRBC 0 01/30/2019   , CMP:   Lab Results   Component Value Date    SODIUM 136 01/30/2019    K 3 4 (L) 01/30/2019     01/30/2019    CO2 28 01/30/2019    BUN 10 01/30/2019    CREATININE 0 89 01/30/2019    CALCIUM 8 2 (L) 01/30/2019    EGFR 78 01/30/2019   , Coagulation: No results found for: PT, INR, APTT, Urinalysis: No results found for: Kylah Comber, SPECGRAV, PHUR, LEUKOCYTESUR, NITRITE, PROTEINUA, GLUCOSEU, KETONESU, BILIRUBINUR, BLOODU, Amylase: No results found for: AMYLASE, Lipase: No results found for: LIPASE  VTE Pharmacologic Prophylaxis: Heparin  VTE Mechanical Prophylaxis: sequential compression device

## 2019-01-31 ENCOUNTER — ANESTHESIA EVENT (INPATIENT)
Dept: PERIOP | Facility: HOSPITAL | Age: 84
DRG: 240 | End: 2019-01-31
Payer: COMMERCIAL

## 2019-01-31 PROBLEM — E87.6 HYPOKALEMIA: Status: RESOLVED | Noted: 2019-01-30 | Resolved: 2019-01-31

## 2019-01-31 LAB
ABO GROUP BLD: NORMAL
ANION GAP SERPL CALCULATED.3IONS-SCNC: 6 MMOL/L (ref 4–13)
BLD GP AB SCN SERPL QL: NEGATIVE
BUN SERPL-MCNC: 8 MG/DL (ref 5–25)
CALCIUM SERPL-MCNC: 8.5 MG/DL (ref 8.3–10.1)
CHLORIDE SERPL-SCNC: 101 MMOL/L (ref 100–108)
CO2 SERPL-SCNC: 30 MMOL/L (ref 21–32)
CREAT SERPL-MCNC: 0.89 MG/DL (ref 0.6–1.3)
GFR SERPL CREATININE-BSD FRML MDRD: 78 ML/MIN/1.73SQ M
GLUCOSE SERPL-MCNC: 91 MG/DL (ref 65–140)
POTASSIUM SERPL-SCNC: 3.8 MMOL/L (ref 3.5–5.3)
RH BLD: POSITIVE
SODIUM SERPL-SCNC: 137 MMOL/L (ref 136–145)
SPECIMEN EXPIRATION DATE: NORMAL

## 2019-01-31 PROCEDURE — 99232 SBSQ HOSP IP/OBS MODERATE 35: CPT | Performed by: INTERNAL MEDICINE

## 2019-01-31 PROCEDURE — 86850 RBC ANTIBODY SCREEN: CPT | Performed by: PHYSICIAN ASSISTANT

## 2019-01-31 PROCEDURE — 86901 BLOOD TYPING SEROLOGIC RH(D): CPT | Performed by: PHYSICIAN ASSISTANT

## 2019-01-31 PROCEDURE — 86920 COMPATIBILITY TEST SPIN: CPT

## 2019-01-31 PROCEDURE — 86900 BLOOD TYPING SEROLOGIC ABO: CPT | Performed by: PHYSICIAN ASSISTANT

## 2019-01-31 PROCEDURE — 80048 BASIC METABOLIC PNL TOTAL CA: CPT | Performed by: PHYSICIAN ASSISTANT

## 2019-01-31 PROCEDURE — 99232 SBSQ HOSP IP/OBS MODERATE 35: CPT | Performed by: PHYSICIAN ASSISTANT

## 2019-01-31 RX ORDER — CHLORHEXIDINE GLUCONATE 0.12 MG/ML
15 RINSE ORAL EVERY 12 HOURS SCHEDULED
Status: DISCONTINUED | OUTPATIENT
Start: 2019-01-31 | End: 2019-02-05 | Stop reason: HOSPADM

## 2019-01-31 RX ORDER — OXYCODONE HYDROCHLORIDE 10 MG/1
10 TABLET ORAL EVERY 4 HOURS PRN
Status: DISCONTINUED | OUTPATIENT
Start: 2019-01-31 | End: 2019-01-31 | Stop reason: SDUPTHER

## 2019-01-31 RX ORDER — DEXTROSE, SODIUM CHLORIDE, AND POTASSIUM CHLORIDE 5; .45; .15 G/100ML; G/100ML; G/100ML
100 INJECTION INTRAVENOUS CONTINUOUS
Status: DISCONTINUED | OUTPATIENT
Start: 2019-02-01 | End: 2019-02-01

## 2019-01-31 RX ORDER — OXYCODONE HYDROCHLORIDE 10 MG/1
10 TABLET ORAL EVERY 4 HOURS PRN
Status: DISCONTINUED | OUTPATIENT
Start: 2019-01-31 | End: 2019-02-05 | Stop reason: HOSPADM

## 2019-01-31 RX ORDER — OXYCODONE HYDROCHLORIDE 5 MG/1
5 TABLET ORAL EVERY 4 HOURS PRN
Status: DISCONTINUED | OUTPATIENT
Start: 2019-01-31 | End: 2019-02-05 | Stop reason: HOSPADM

## 2019-01-31 RX ADMIN — PRAVASTATIN SODIUM 40 MG: 40 TABLET ORAL at 18:05

## 2019-01-31 RX ADMIN — ASPIRIN 81 MG 81 MG: 81 TABLET ORAL at 08:06

## 2019-01-31 RX ADMIN — FERROUS SULFATE TAB 325 MG (65 MG ELEMENTAL FE) 325 MG: 325 (65 FE) TAB at 18:05

## 2019-01-31 RX ADMIN — CEFAZOLIN SODIUM 1000 MG: 10 INJECTION, POWDER, FOR SOLUTION INTRAVENOUS at 20:11

## 2019-01-31 RX ADMIN — OXYCODONE HYDROCHLORIDE 10 MG: 10 TABLET ORAL at 08:09

## 2019-01-31 RX ADMIN — VITAMIN D, TAB 1000IU (100/BT) 2000 UNITS: 25 TAB at 08:07

## 2019-01-31 RX ADMIN — FERROUS SULFATE TAB 325 MG (65 MG ELEMENTAL FE) 325 MG: 325 (65 FE) TAB at 08:06

## 2019-01-31 RX ADMIN — DOCUSATE SODIUM 100 MG: 100 CAPSULE, LIQUID FILLED ORAL at 08:07

## 2019-01-31 RX ADMIN — OXYCODONE HYDROCHLORIDE 10 MG: 10 TABLET ORAL at 20:05

## 2019-01-31 RX ADMIN — CHLORHEXIDINE GLUCONATE 15 ML: 1.2 RINSE ORAL at 22:15

## 2019-01-31 RX ADMIN — CEFAZOLIN SODIUM 1000 MG: 10 INJECTION, POWDER, FOR SOLUTION INTRAVENOUS at 12:07

## 2019-01-31 RX ADMIN — CEFAZOLIN SODIUM 1000 MG: 10 INJECTION, POWDER, FOR SOLUTION INTRAVENOUS at 05:17

## 2019-01-31 NOTE — PROGRESS NOTES
Progress Note - Vascular Surgery   Neida Murrell 80 y o  male MRN: [id number]  Unit/Bed#: CW3 338-01 Encounter: [id number]    Assessment:  81 yo male with PAD and L multi-toe gangrene  Plan:  - OR tomorrow for L BKA  - continue regular diet as tolerated now, NPO past midnight  - IVF to start at midnight  - ancef per ID  - local wound care per podiatry  - ASA/statin  - prn analgesia  - PT/OT  - SQH advised but patient refusing/SCDs at least for 18 hours        Subjective/Objective   Chief Complaint: L foot pain    Subjective:  No acute events overnight, is complaining of left foot pain    Objective:     Blood pressure 150/64, pulse 78, temperature 98 2 °F (36 8 °C), temperature source Oral, resp  rate 19, height 5' 10" (1 778 m), weight 63 9 kg (140 lb 14 oz), SpO2 98 %  ,Body mass index is 20 21 kg/m²        Intake/Output Summary (Last 24 hours) at 01/31/19 0543  Last data filed at 01/31/19 0523   Gross per 24 hour   Intake              420 ml   Output             1200 ml   Net             -780 ml       Invasive Devices     Peripheral Intravenous Line            Peripheral IV 01/29/19 Left;Upper Arm 1 day                Physical Exam:   NAD, alert and oriented x3  Normocephalic, atraumatic  MMM, EOMI, PERRLA  Norm resp effort on RA  RRR  Abd soft, NT/ND  No calf tenderness or peripheral edema  Motor/sensation intact in distal extremities  L foot dry gangrene, dopp PT, palp fem  R palp fem  CN grossly intact  -rash/lesions        Lab, Imaging and other studies:  CBC:   No results found for: WBC, HGB, HCT, MCV, PLT, ADJUSTEDWBC, MCH, MCHC, RDW, MPV, NRBC, CMP:   No results found for: SODIUM, K, CL, CO2, ANIONGAP, BUN, CREATININE, GLUCOSE, CALCIUM, AST, ALT, ALKPHOS, PROT, BILITOT, EGFR, Coagulation: No results found for: PT, INR, APTT, Urinalysis: No results found for: COLORU, CLARITYU, SPECGRAV, PHUR, LEUKOCYTESUR, NITRITE, PROTEINUA, GLUCOSEU, KETONESU, BILIRUBINUR, BLOODU, Amylase: No results found for: AMYLASE, Lipase: No results found for: LIPASE  VTE Pharmacologic Prophylaxis: Heparin  VTE Mechanical Prophylaxis: sequential compression device

## 2019-01-31 NOTE — ASSESSMENT & PLAN NOTE
· Stage 2-3, GFR today above 60  Appears to be stable   · Monitor renal function closely  · Avoid hypotension  · Avoid NSAID  · Avoid nephrotoxic agents

## 2019-01-31 NOTE — PROGRESS NOTES
Progress Note - Infectious Disease   Dixon Willis 80 y o  male MRN: [id number]  Unit/Bed#: CW3 338-01 Encounter: [id number]      Impression/Recommendations:  1  Left foot cellulitis   In the setting of gangrenous changes of the toes   Cellulitis appears very minimal   No associated fevers, leukocytosis   Patient with no systemic complaints   No drainage on exam   Currently on IV cefazolin   Okay to continue for now preoperatively  Foot remains stable      -continue IV cefazolin 1 g q 8 hours  Will order to stop antibiotic postoperatively  -serial foot exams  -monitor temperatures and hemodynamics     2  Gangrene of 2nd, 3rd, 4th toes   In the setting of severe peripheral vascular disease   Clinically appears dry  Plan for BKA on  noted      -plan for BKA by vascular surgery tomorrow  -antibiotic postoperatively as presumed surgical cure of infection  -serial exams     3  Severe peripheral arterial disease   With multiple previous unsuccessful revascularization attempts   Patient admitted electively for probable BKA     -post vascular surgery follow-up ongoing  -plan for BKA noted     4  CKD 3  Creatinine stable   Will dose adjust antibiotics based on renal function   Monitor BMP closely      Antibiotics:  Cefazolin D3     Discussed above plan with patient and his family at bedside  Will sign off  Please call with any new questions  Subjective:  Patient with no new complaints  No pain  Denies fevers, chills, or sweats  Denies nausea, vomiting, or diarrhea        Objective:  Vitals:  Temp:  [98 °F (36 7 °C)-98 2 °F (36 8 °C)] 98 °F (36 7 °C)  HR:  [69-78] 69  Resp:  [18-19] 18  BP: (147-150)/(64-65) 147/65  SpO2:  [96 %-99 %] 99 %  Temp (24hrs), Av 1 °F (36 7 °C), Min:98 °F (36 7 °C), Max:98 2 °F (36 8 °C)  Current: Temperature: 98 °F (36 7 °C)    Physical Exam:   General:  No acute distress  Psychiatric:  Awake and alert  Pulmonary:  Normal respiratory excursion without accessory muscle use  Abdomen:  Soft, nontender  Extremities:  No edema  Stable left foot dry gangrene involving 2nd to 4th toes  Mild stable crissy wound erythema  No fluctuance or drainage  Skin:  No rashes    Lab Results:  I have personally reviewed pertinent labs  Results from last 7 days  Lab Units 01/31/19  0502 01/30/19  0516 01/29/19  0503 01/28/19  1752   POTASSIUM mmol/L 3 8 3 4* 3 9 3 9   CHLORIDE mmol/L 101 103 104 100   CO2 mmol/L 30 28 29 29   BUN mg/dL 8 10 11 14   CREATININE mg/dL 0 89 0 89 0 99 1 11   EGFR ml/min/1 73sq m 78 78 69 60   CALCIUM mg/dL 8 5 8 2* 8 1* 9 3   AST U/L  --   --   --  15   ALT U/L  --   --   --  16   ALK PHOS U/L  --   --   --  62       Results from last 7 days  Lab Units 01/30/19  0516 01/29/19  0502 01/28/19  1752   WBC Thousand/uL 8 59 7 73 8 27   HEMOGLOBIN g/dL 8 5* 8 8* 9 8*   PLATELETS Thousands/uL 208 210 244           Imaging Studies:   I have personally reviewed pertinent imaging study reports and images in PACS  EKG, Pathology, and Other Studies:   I have personally reviewed pertinent reports

## 2019-01-31 NOTE — ASSESSMENT & PLAN NOTE
· Severe peripheral vascular disease status post multiple failed attempted revascularization  · Gangrene of 2nd, 3rd and 4th toes  · Currently with superimposed cellulitis of lower extremity  · Plan for BKA on Friday, 2/1/19 per surgery    · Pain is currently controlled  · Fall precautions  · Internal Medicine will sign off, please call or reconsult with questions

## 2019-01-31 NOTE — PROGRESS NOTES
[facility] Internal Medicine  Progress Note - Williams Perez 1933, 80 y o  male MRN: [id number]    Unit/Bed#: CW3 338-01 Encounter: [id number]    Primary Care Provider: Jerman Haskins MD   Date and time admitted to hospital: 1/28/2019  3:10 PM        * Severe peripheral arterial disease ([address] )   Assessment & Plan    · Severe peripheral vascular disease status post multiple failed attempted revascularization  · Gangrene of 2nd, 3rd and 4th toes  · Currently with superimposed cellulitis of lower extremity  · Plan for BKA on Friday, 2/1/19 per surgery  · Pain is currently controlled  · Fall precautions  · Internal Medicine will sign off, please call or reconsult with questions      Cellulitis of left foot   Assessment & Plan    · Continue with Ancef for now  Discontinue antibiotics postoperatively given surgical cure  · Continue with serial exam of the limb  CKD (chronic kidney disease), stage III (HCC)   Assessment & Plan    · Stage 2-3, GFR today above 60  Appears to be stable   · Monitor renal function closely  · Avoid hypotension  · Avoid NSAID  · Avoid nephrotoxic agents  Subarachnoid hemorrhage (HCC)   Assessment & Plan    · Remote history of subarachnoid hemorrhage, patient currently refusing DVT prophylaxis with heparin  · Currently no concerns     Hypokalemiaresolved as of 1/31/2019   Assessment & Plan    · Resolved       VTE Pharmacologic Prophylaxis:   Pharmacologic: Heparin  Mechanical VTE Prophylaxis in Place: Yes    Patient Centered Rounds: I have performed bedside rounds with nursing staff today  Discussions with Specialists or Other Care Team Provider: Discussed with RN    Education and Discussions with Family / Patient: Discussed with patient, multiple family members at bedside     Time Spent for Care: 30 minutes  More than 50% of total time spent on counseling and coordination of care as described above      Current Length of Stay: 3 day(s)    Current Patient Status: Inpatient Certification Statement: The patient will continue to require additional inpatient hospital stay due to [address] tomorrow     Discharge Plan: Pending as per vascular surgery     Code Status: Level 2 - DNAR: but accepts endotracheal intubation      Subjective:   Patient reports that he is cold today, but as per family this is normal  Denies pain in the leg  Denies chest pain or shortness of breath  Objective:     Vitals:   Temp (24hrs), Av °F (36 7 °C), Min:97 8 °F (36 6 °C), Max:98 2 °F (36 8 °C)    Temp:  [97 8 °F (36 6 °C)-98 2 °F (36 8 °C)] 98 °F (36 7 °C)  HR:  [69-78] 69  Resp:  [18-20] 18  BP: (136-150)/(62-65) 147/65  SpO2:  [96 %-98 %] 98 %  Body mass index is 20 06 kg/m²  Input and Output Summary (last 24 hours): Intake/Output Summary (Last 24 hours) at 19 1217  Last data filed at 19 1207   Gross per 24 hour   Intake              645 ml   Output             1000 ml   Net             -355 ml       Physical Exam:     Physical Exam   Constitutional: He is oriented to person, place, and time  Vital signs are normal  He appears well-developed  He appears cachectic  Non-toxic appearance  No distress  HENT:   Head: Normocephalic and atraumatic  Eyes: Pupils are equal, round, and reactive to light  Conjunctivae and EOM are normal    Neck: Neck supple  Cardiovascular: Normal rate, regular rhythm, S1 normal, S2 normal, normal heart sounds and intact distal pulses  Exam reveals no S3 and no S4  No murmur heard  Pulmonary/Chest: Effort normal and breath sounds normal  No accessory muscle usage  No respiratory distress  He has no decreased breath sounds  He has no wheezes  He has no rhonchi  He has no rales  He exhibits no tenderness  Abdominal: Soft  Bowel sounds are normal  He exhibits no distension and no mass  There is no tenderness  There is no rigidity, no rebound and no guarding  Neurological: He is alert and oriented to person, place, and time  He is not disoriented  GCS eye subscore is 4  GCS verbal subscore is 5  GCS motor subscore is 6  Skin: Skin is warm and dry  There is erythema (In the dorsal midfoot area with gangrenous changes in the 2nd, 3rd, and 4th digits)  Additional Data:     Labs:      Results from last 7 days  Lab Units 01/30/19  0516   WBC Thousand/uL 8 59   HEMOGLOBIN g/dL 8 5*   HEMATOCRIT % 27 0*   PLATELETS Thousands/uL 208   NEUTROS PCT % 66   LYMPHS PCT % 21   MONOS PCT % 7   EOS PCT % 5       Results from last 7 days  Lab Units 01/31/19  0502  01/28/19  1752   POTASSIUM mmol/L 3 8  < > 3 9   CHLORIDE mmol/L 101  < > 100   CO2 mmol/L 30  < > 29   BUN mg/dL 8  < > 14   CREATININE mg/dL 0 89  < > 1 11   CALCIUM mg/dL 8 5  < > 9 3   ALK PHOS U/L  --   --  62   ALT U/L  --   --  16   AST U/L  --   --  15   < > = values in this interval not displayed  Results from last 7 days  Lab Units 01/28/19  1752   INR  1 15       * I Have Reviewed All Lab Data Listed Above  * Additional Pertinent Lab Tests Reviewed:  [facility]    Imaging:    Imaging Reports Reviewed Today Include: None  Imaging Personally Reviewed by Myself Includes:  None    Recent Cultures (last 7 days):           Last 24 Hours Medication List:     Current Facility-Administered Medications:  acetaminophen 650 mg Oral Q6H PRN Idamae Hazard, PA-C    aspirin 81 mg Oral Daily Idamae Hazard, PA-C    cefazolin 1,000 mg Intravenous Q8H Tita Lulu,  Last Rate: 1,000 mg (01/31/19 1207)   [START ON 2/1/2019] cefazolin 1,000 mg Intravenous Once Juju Carlson MD    chlorhexidine 15 mL Swish & Spit Q12H [facility] Juju Carlson MD    cholecalciferol 2,000 Units Oral Daily Idamae Chanel, PA-C    [START ON 2/1/2019] dextrose 5 % and sodium chloride 0 45 % with KCl 20 mEq/L 100 mL/hr Intravenous Continuous Juju Carlson MD    docusate sodium 100 mg Oral BID Idamae Hazard, PA-C    ferrous sulfate 325 mg Oral BID Idamae Hazard, PA-C    heparin (porcine) 5,000 Units Subcutaneous [facility] Francisco Hughes PA-C    HYDROmorphone 0 5 mg Intravenous Q4H PRN Roxianne CLAUDY Hughes    oxyCODONE 10 mg Oral Q4H PRN Roxianne CLAUDY Hughes    oxyCODONE 5 mg Oral Q4H PRN Roxianne CLAUDY Hughes    pravastatin 40 mg Oral Daily With Josiane Ibarra PA-C         Today, Patient Was Seen By: Ale Edmondson PA-C    ** Please Note: Dictation voice to text software may have been used in the creation of this document   **

## 2019-01-31 NOTE — PLAN OF CARE
Discharge to home or other facility with appropriate resources Progressing      Discharge to post-acute care or home with appropriate resources Progressing      Maintains adequate nutritional intake Progressing      Absence or prevention of progression during hospitalization Progressing      Absence of fever/infection during neutropenic period Progressing      Patient/family/caregiver demonstrates understanding of disease process, treatment plan, medications, and discharge instructions Progressing      Maintain or return mobility to safest level of function Progressing      Maintain proper alignment of affected body part Progressing      Nutrient/Hydration intake appropriate for improving, restoring or maintaining nutritional needs Progressing      Verbalizes/displays adequate comfort level or baseline comfort level Progressing      Patient will remain free of falls Progressing      Skin integrity is maintained or improved Progressing      Maintain or return to baseline ADL function Progressing      Maintain or return mobility status to optimal level Progressing      Skin integrity remains intact Progressing      Incision(s), wounds(s) or drain site(s) healing without S/S of infection Progressing

## 2019-01-31 NOTE — SOCIAL WORK
CM met with patient and family to further discuss a discharge plan as patient will be having BKA tomorrow  Patient and family prefer patient to be discharged to rehab post surgery  CM provided a SNF list  Patient and family are interested in acute level of care at [facility] and requested CM to send a referral via [facility]; referral sent  Cm to continue following

## 2019-02-01 ENCOUNTER — ANESTHESIA (INPATIENT)
Dept: PERIOP | Facility: HOSPITAL | Age: 84
DRG: 240 | End: 2019-02-01
Payer: COMMERCIAL

## 2019-02-01 LAB
BASE EXCESS BLDA CALC-SCNC: 2 MMOL/L (ref -2–3)
BASOPHILS # BLD AUTO: 0.01 THOUSANDS/ΜL (ref 0–0.1)
BASOPHILS NFR BLD AUTO: 0 % (ref 0–1)
CA-I BLD-SCNC: 1.16 MMOL/L (ref 1.12–1.32)
EOSINOPHIL # BLD AUTO: 0.01 THOUSAND/ΜL (ref 0–0.61)
EOSINOPHIL NFR BLD AUTO: 0 % (ref 0–6)
ERYTHROCYTE [DISTWIDTH] IN BLOOD BY AUTOMATED COUNT: 14.2 % (ref 11.6–15.1)
GLUCOSE SERPL-MCNC: 108 MG/DL (ref 65–140)
HCO3 BLDA-SCNC: 27.1 MMOL/L (ref 24–30)
HCT VFR BLD AUTO: 20.6 % (ref 36.5–49.3)
HCT VFR BLD CALC: 24 % (ref 36.5–49.3)
HGB BLD-MCNC: 6.6 G/DL (ref 12–17)
HGB BLDA-MCNC: 8.2 G/DL (ref 12–17)
IMM GRANULOCYTES # BLD AUTO: 0.08 THOUSAND/UL (ref 0–0.2)
IMM GRANULOCYTES NFR BLD AUTO: 1 % (ref 0–2)
LYMPHOCYTES # BLD AUTO: 0.62 THOUSANDS/ΜL (ref 0.6–4.47)
LYMPHOCYTES NFR BLD AUTO: 5 % (ref 14–44)
MCH RBC QN AUTO: 29.5 PG (ref 26.8–34.3)
MCHC RBC AUTO-ENTMCNC: 32 G/DL (ref 31.4–37.4)
MCV RBC AUTO: 92 FL (ref 82–98)
MONOCYTES # BLD AUTO: 0.27 THOUSAND/ΜL (ref 0.17–1.22)
MONOCYTES NFR BLD AUTO: 2 % (ref 4–12)
NEUTROPHILS # BLD AUTO: 10.51 THOUSANDS/ΜL (ref 1.85–7.62)
NEUTS SEG NFR BLD AUTO: 92 % (ref 43–75)
NRBC BLD AUTO-RTO: 0 /100 WBCS
PCO2 BLD: 29 MMOL/L (ref 21–32)
PCO2 BLD: 47 MM HG (ref 42–50)
PH BLD: 7.37 [PH] (ref 7.3–7.4)
PLATELET # BLD AUTO: 181 THOUSANDS/UL (ref 149–390)
PMV BLD AUTO: 9.8 FL (ref 8.9–12.7)
PO2 BLD: 34 MM HG (ref 35–45)
POTASSIUM BLD-SCNC: 4 MMOL/L (ref 3.5–5.3)
RBC # BLD AUTO: 2.24 MILLION/UL (ref 3.88–5.62)
SAO2 % BLD FROM PO2: 63 % (ref 95–98)
SODIUM BLD-SCNC: 135 MMOL/L (ref 136–145)
SPECIMEN SOURCE: ABNORMAL
WBC # BLD AUTO: 11.5 THOUSAND/UL (ref 4.31–10.16)

## 2019-02-01 PROCEDURE — 0Y6J0Z2 DETACHMENT AT LEFT LOWER LEG, MID, OPEN APPROACH: ICD-10-PCS | Performed by: SURGERY

## 2019-02-01 PROCEDURE — P9016 RBC LEUKOCYTES REDUCED: HCPCS

## 2019-02-01 PROCEDURE — 82803 BLOOD GASES ANY COMBINATION: CPT

## 2019-02-01 PROCEDURE — 88300 SURGICAL PATH GROSS: CPT | Performed by: PATHOLOGY

## 2019-02-01 PROCEDURE — 82330 ASSAY OF CALCIUM: CPT

## 2019-02-01 PROCEDURE — 84295 ASSAY OF SERUM SODIUM: CPT

## 2019-02-01 PROCEDURE — 82947 ASSAY GLUCOSE BLOOD QUANT: CPT

## 2019-02-01 PROCEDURE — 30233N1 TRANSFUSION OF NONAUTOLOGOUS RED BLOOD CELLS INTO PERIPHERAL VEIN, PERCUTANEOUS APPROACH: ICD-10-PCS | Performed by: SURGERY

## 2019-02-01 PROCEDURE — 93005 ELECTROCARDIOGRAM TRACING: CPT

## 2019-02-01 PROCEDURE — 85025 COMPLETE CBC W/AUTO DIFF WBC: CPT | Performed by: SURGERY

## 2019-02-01 PROCEDURE — 27880 AMPUTATION OF LOWER LEG: CPT | Performed by: SURGERY

## 2019-02-01 PROCEDURE — 84132 ASSAY OF SERUM POTASSIUM: CPT

## 2019-02-01 PROCEDURE — 85014 HEMATOCRIT: CPT

## 2019-02-01 RX ORDER — ONDANSETRON 2 MG/ML
4 INJECTION INTRAMUSCULAR; INTRAVENOUS ONCE AS NEEDED
Status: DISCONTINUED | OUTPATIENT
Start: 2019-02-01 | End: 2019-02-01 | Stop reason: HOSPADM

## 2019-02-01 RX ORDER — PROPOFOL 10 MG/ML
INJECTION, EMULSION INTRAVENOUS AS NEEDED
Status: DISCONTINUED | OUTPATIENT
Start: 2019-02-01 | End: 2019-02-01 | Stop reason: SURG

## 2019-02-01 RX ORDER — LIDOCAINE HYDROCHLORIDE 10 MG/ML
INJECTION, SOLUTION INFILTRATION; PERINEURAL AS NEEDED
Status: DISCONTINUED | OUTPATIENT
Start: 2019-02-01 | End: 2019-02-01 | Stop reason: SURG

## 2019-02-01 RX ORDER — HYDROMORPHONE HCL/PF 1 MG/ML
0.5 SYRINGE (ML) INJECTION
Status: DISCONTINUED | OUTPATIENT
Start: 2019-02-01 | End: 2019-02-01 | Stop reason: HOSPADM

## 2019-02-01 RX ORDER — MAGNESIUM HYDROXIDE 1200 MG/15ML
LIQUID ORAL AS NEEDED
Status: DISCONTINUED | OUTPATIENT
Start: 2019-02-01 | End: 2019-02-01 | Stop reason: HOSPADM

## 2019-02-01 RX ORDER — SODIUM CHLORIDE, SODIUM LACTATE, POTASSIUM CHLORIDE, CALCIUM CHLORIDE 600; 310; 30; 20 MG/100ML; MG/100ML; MG/100ML; MG/100ML
125 INJECTION, SOLUTION INTRAVENOUS CONTINUOUS
Status: DISCONTINUED | OUTPATIENT
Start: 2019-02-01 | End: 2019-02-01

## 2019-02-01 RX ORDER — SODIUM CHLORIDE 9 MG/ML
INJECTION, SOLUTION INTRAVENOUS CONTINUOUS PRN
Status: DISCONTINUED | OUTPATIENT
Start: 2019-02-01 | End: 2019-02-01 | Stop reason: SURG

## 2019-02-01 RX ORDER — ROCURONIUM BROMIDE 10 MG/ML
INJECTION, SOLUTION INTRAVENOUS AS NEEDED
Status: DISCONTINUED | OUTPATIENT
Start: 2019-02-01 | End: 2019-02-01 | Stop reason: SURG

## 2019-02-01 RX ORDER — NEOSTIGMINE METHYLSULFATE 1 MG/ML
INJECTION INTRAVENOUS AS NEEDED
Status: DISCONTINUED | OUTPATIENT
Start: 2019-02-01 | End: 2019-02-01 | Stop reason: SURG

## 2019-02-01 RX ORDER — EPHEDRINE SULFATE 50 MG/ML
INJECTION, SOLUTION INTRAVENOUS AS NEEDED
Status: DISCONTINUED | OUTPATIENT
Start: 2019-02-01 | End: 2019-02-01 | Stop reason: SURG

## 2019-02-01 RX ORDER — GLYCOPYRROLATE 0.2 MG/ML
INJECTION INTRAMUSCULAR; INTRAVENOUS AS NEEDED
Status: DISCONTINUED | OUTPATIENT
Start: 2019-02-01 | End: 2019-02-01 | Stop reason: SURG

## 2019-02-01 RX ORDER — SODIUM CHLORIDE, SODIUM LACTATE, POTASSIUM CHLORIDE, CALCIUM CHLORIDE 600; 310; 30; 20 MG/100ML; MG/100ML; MG/100ML; MG/100ML
INJECTION, SOLUTION INTRAVENOUS CONTINUOUS PRN
Status: DISCONTINUED | OUTPATIENT
Start: 2019-02-01 | End: 2019-02-01 | Stop reason: SURG

## 2019-02-01 RX ORDER — METOCLOPRAMIDE HYDROCHLORIDE 5 MG/ML
10 INJECTION INTRAMUSCULAR; INTRAVENOUS ONCE AS NEEDED
Status: DISCONTINUED | OUTPATIENT
Start: 2019-02-01 | End: 2019-02-01 | Stop reason: HOSPADM

## 2019-02-01 RX ORDER — ALBUMIN, HUMAN INJ 5% 5 %
SOLUTION INTRAVENOUS CONTINUOUS PRN
Status: DISCONTINUED | OUTPATIENT
Start: 2019-02-01 | End: 2019-02-01 | Stop reason: SURG

## 2019-02-01 RX ORDER — FENTANYL CITRATE 50 UG/ML
INJECTION, SOLUTION INTRAMUSCULAR; INTRAVENOUS AS NEEDED
Status: DISCONTINUED | OUTPATIENT
Start: 2019-02-01 | End: 2019-02-01 | Stop reason: SURG

## 2019-02-01 RX ORDER — ONDANSETRON 2 MG/ML
INJECTION INTRAMUSCULAR; INTRAVENOUS AS NEEDED
Status: DISCONTINUED | OUTPATIENT
Start: 2019-02-01 | End: 2019-02-01 | Stop reason: SURG

## 2019-02-01 RX ORDER — FENTANYL CITRATE/PF 50 MCG/ML
50 SYRINGE (ML) INJECTION
Status: DISCONTINUED | OUTPATIENT
Start: 2019-02-01 | End: 2019-02-01 | Stop reason: HOSPADM

## 2019-02-01 RX ADMIN — ALBUMIN (HUMAN): 12.5 SOLUTION INTRAVENOUS at 14:48

## 2019-02-01 RX ADMIN — CEFAZOLIN SODIUM 1000 MG: 10 INJECTION, POWDER, FOR SOLUTION INTRAVENOUS at 04:22

## 2019-02-01 RX ADMIN — VITAMIN D, TAB 1000IU (100/BT) 2000 UNITS: 25 TAB at 10:24

## 2019-02-01 RX ADMIN — FENTANYL CITRATE 100 MCG: 50 INJECTION, SOLUTION INTRAMUSCULAR; INTRAVENOUS at 13:57

## 2019-02-01 RX ADMIN — LIDOCAINE HYDROCHLORIDE 50 MG: 10 INJECTION, SOLUTION INFILTRATION; PERINEURAL at 13:57

## 2019-02-01 RX ADMIN — DEXTROSE, SODIUM CHLORIDE, AND POTASSIUM CHLORIDE 100 ML/HR: 5; .45; .15 INJECTION INTRAVENOUS at 11:37

## 2019-02-01 RX ADMIN — FENTANYL CITRATE 25 MCG: 50 INJECTION, SOLUTION INTRAMUSCULAR; INTRAVENOUS at 15:52

## 2019-02-01 RX ADMIN — EPHEDRINE SULFATE 5 MG: 50 INJECTION, SOLUTION INTRAMUSCULAR; INTRAVENOUS; SUBCUTANEOUS at 16:02

## 2019-02-01 RX ADMIN — CEFAZOLIN SODIUM 1000 MG: 10 INJECTION, POWDER, FOR SOLUTION INTRAVENOUS at 14:10

## 2019-02-01 RX ADMIN — CEFAZOLIN SODIUM 1000 MG: 10 INJECTION, POWDER, FOR SOLUTION INTRAVENOUS at 11:37

## 2019-02-01 RX ADMIN — SODIUM CHLORIDE, SODIUM LACTATE, POTASSIUM CHLORIDE, AND CALCIUM CHLORIDE: .6; .31; .03; .02 INJECTION, SOLUTION INTRAVENOUS at 13:51

## 2019-02-01 RX ADMIN — CHLORHEXIDINE GLUCONATE 15 ML: 1.2 RINSE ORAL at 10:24

## 2019-02-01 RX ADMIN — ALBUMIN (HUMAN): 12.5 SOLUTION INTRAVENOUS at 15:00

## 2019-02-01 RX ADMIN — EPHEDRINE SULFATE 5 MG: 50 INJECTION, SOLUTION INTRAMUSCULAR; INTRAVENOUS; SUBCUTANEOUS at 15:07

## 2019-02-01 RX ADMIN — SODIUM CHLORIDE, SODIUM LACTATE, POTASSIUM CHLORIDE, AND CALCIUM CHLORIDE 125 ML/HR: .6; .31; .03; .02 INJECTION, SOLUTION INTRAVENOUS at 17:31

## 2019-02-01 RX ADMIN — PHENYLEPHRINE HYDROCHLORIDE 20 MCG/MIN: 10 INJECTION INTRAVENOUS at 14:27

## 2019-02-01 RX ADMIN — NEOSTIGMINE METHYLSULFATE 3 MG: 1 INJECTION INTRAVENOUS at 15:47

## 2019-02-01 RX ADMIN — FENTANYL CITRATE 25 MCG: 50 INJECTION, SOLUTION INTRAMUSCULAR; INTRAVENOUS at 15:47

## 2019-02-01 RX ADMIN — EPHEDRINE SULFATE 5 MG: 50 INJECTION, SOLUTION INTRAMUSCULAR; INTRAVENOUS; SUBCUTANEOUS at 16:06

## 2019-02-01 RX ADMIN — EPHEDRINE SULFATE 10 MG: 50 INJECTION, SOLUTION INTRAMUSCULAR; INTRAVENOUS; SUBCUTANEOUS at 16:32

## 2019-02-01 RX ADMIN — DEXAMETHASONE SODIUM PHOSPHATE 4 MG: 10 INJECTION INTRAMUSCULAR; INTRAVENOUS at 14:17

## 2019-02-01 RX ADMIN — PROPOFOL 120 MG: 10 INJECTION, EMULSION INTRAVENOUS at 13:57

## 2019-02-01 RX ADMIN — OXYCODONE HYDROCHLORIDE 10 MG: 10 TABLET ORAL at 10:19

## 2019-02-01 RX ADMIN — FERROUS SULFATE TAB 325 MG (65 MG ELEMENTAL FE) 325 MG: 325 (65 FE) TAB at 10:24

## 2019-02-01 RX ADMIN — ONDANSETRON 4 MG: 2 INJECTION INTRAMUSCULAR; INTRAVENOUS at 15:30

## 2019-02-01 RX ADMIN — ROCURONIUM BROMIDE 30 MG: 10 INJECTION INTRAVENOUS at 13:57

## 2019-02-01 RX ADMIN — SODIUM CHLORIDE, SODIUM LACTATE, POTASSIUM CHLORIDE, AND CALCIUM CHLORIDE 1000 ML: .6; .31; .03; .02 INJECTION, SOLUTION INTRAVENOUS at 16:32

## 2019-02-01 RX ADMIN — EPHEDRINE SULFATE 5 MG: 50 INJECTION, SOLUTION INTRAMUSCULAR; INTRAVENOUS; SUBCUTANEOUS at 14:36

## 2019-02-01 RX ADMIN — SODIUM CHLORIDE: 0.9 INJECTION, SOLUTION INTRAVENOUS at 14:00

## 2019-02-01 RX ADMIN — EPHEDRINE SULFATE 5 MG: 50 INJECTION, SOLUTION INTRAMUSCULAR; INTRAVENOUS; SUBCUTANEOUS at 14:18

## 2019-02-01 RX ADMIN — DEXTROSE, SODIUM CHLORIDE, AND POTASSIUM CHLORIDE 100 ML/HR: 5; .45; .15 INJECTION INTRAVENOUS at 00:21

## 2019-02-01 RX ADMIN — ASPIRIN 81 MG 81 MG: 81 TABLET ORAL at 10:23

## 2019-02-01 RX ADMIN — PHENYLEPHRINE HYDROCHLORIDE 30 MCG/MIN: 10 INJECTION INTRAVENOUS at 16:53

## 2019-02-01 RX ADMIN — GLYCOPYRROLATE 0.4 MG: 0.2 INJECTION, SOLUTION INTRAMUSCULAR; INTRAVENOUS at 15:47

## 2019-02-01 RX ADMIN — FENTANYL CITRATE 25 MCG: 50 INJECTION, SOLUTION INTRAMUSCULAR; INTRAVENOUS at 15:23

## 2019-02-01 RX ADMIN — CHLORHEXIDINE GLUCONATE 15 ML: 1.2 RINSE ORAL at 21:13

## 2019-02-01 NOTE — ANESTHESIA PREPROCEDURE EVALUATION
Review of Systems/Medical History  Patient summary reviewed  Chart reviewed      Cardiovascular  EKG reviewed, Hyperlipidemia, PVD,    Pulmonary       GI/Hepatic            Endo/Other  Negative endo/other ROS      GYN  Negative gynecology ROS          Hematology  Anemia ,     Musculoskeletal    Arthritis     Neurology  Negative neurology ROS      Psychology   Negative psychology ROS              Physical Exam    Airway    Mallampati score: II         Dental   upper dentures,     Cardiovascular  Rhythm: regular, Rate: normal,     Pulmonary  Pulmonary exam normal     Other Findings        Anesthesia Plan  ASA Score- 3     Anesthesia Type- general with ASA Monitors  Additional Monitors:   Airway Plan: ETT  Plan Factors-Patient not instructed to abstain from smoking on day of procedure  Patient did not smoke on day of surgery  Induction- intravenous  Postoperative Plan- Plan for postoperative opioid use  Planned trial extubation    Informed Consent- Anesthetic plan and risks discussed with patient  I personally reviewed this patient with the CRNA  Discussed and agreed on the Anesthesia Plan with the CRNA  Alejandro Groves

## 2019-02-01 NOTE — ANESTHESIA POSTPROCEDURE EVALUATION
Post-Op Assessment Note      CV Status:  Stable (BP management per Dr Cyndi Richmond and surgery team)    Mental Status:  Alert and awake    Hydration Status:  Euvolemic    PONV Controlled:  Controlled    Airway Patency:  Patent    Post Op Vitals Reviewed: Yes          Staff: YI           BP (!) 105/43 (02/01/19 1640)    Temp 97 5 °F (36 4 °C) (02/01/19 1632)    Pulse 74 (02/01/19 1640)   Resp (!) 25 (02/01/19 1640)    SpO2 100 % (02/01/19 1640)

## 2019-02-01 NOTE — OP NOTE
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 ([address] ) [I70 245]    Procedure(s) (LRB):  AMPUTATION BELOW KNEE (BKA) (Left)    Specimen(s):  ID Type Source Tests Collected by Time Destination   1 : Left leg BKA Tissue Leg, Left TISSUE EXAM Araceli Sharp MD 2019 1520        Estimated Blood Loss:   350 mL    Drains:  Urethral Catheter Latex 16 Fr  (Active)   Number of days: 0       Anesthesia Type:   General    Operative Indications: 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 ([facility] ) [I70 245]      Operative Findings:  Non healing ischemic foot wounds    Complications:   None    Procedure and Technique:  After informed consent was obtained, the patient was brought to the operating room and placed in the supine position  Perioperative IV antibiotics were given  He was given anesthesia and endotracheally intubated  He was prepped and draped in the usual sterile fashion exposing the leg circumferentially  A timeout was performed  A marking pen was used to chantale the incision, approximately four fingerbreadths below the tibial tuberosity and with a posterior flap  A 10-blade was used to make the incision circumferentially  Cautery was used to dissect through the soft tissue and fascia  The greater saphenous vein was identified and ligated  The muscles of the anterior and lateral compartments were transected with cautery  The anterior tibial bundle was identified and suture ligated   We then moved medially and divided the muscles of the deep posterior compartment  The posterior tibial bundle was identified and suture ligated  The tibia and fibula were freed of all soft tissue attachments  The peroneal bundle was identified and suture ligated  The tibia was transected with the [employer] saw at the level of the anterior skin incision  The fibula was transected ~2cm more proximal to the tibia  The superficial posterior compartment muscles were sharply divided with the amputation knife and the specimen was labeled "left leg" and handed off to be sent to pathology  The anterior surface of the tibia was beveled with the saw and all edges were smoothed  The wound was then checked for hemostasis and copiously irrigated with antibiotic saline solution  The tibial and sural nerves were identified and as proximally as possible, were crushed, ligated, injected with 0 5% marcaine, sharply transected and allowed to retract back into the soft tissue  The posterior flap was then re-approximated to the crissy-ostium with 2-0 vicryl sutures  The wound was then closed using 2-0 vicryl interrupted simple sutures at the fascia, 3-0 vicryl interrupted horizontal mattress in the deep dermis, and 3-0 nylon interrupted vertical mattress sutures at the skin  The wound was dressed with xeroform, fluffs, kerlix, and ACE wraps  He was allowed to awaken and was extubated  He was transferred to the PACU for postoperative care     I was present for the entire procedure    Patient Disposition:  PACU     SIGNATURE: Cinthya Benitez MD  DATE: February 1, 2019  TIME: 4:23 PM

## 2019-02-02 LAB
ANION GAP SERPL CALCULATED.3IONS-SCNC: 7 MMOL/L (ref 4–13)
ATRIAL RATE: 72 BPM
BASOPHILS # BLD AUTO: 0.01 THOUSANDS/ΜL (ref 0–0.1)
BASOPHILS NFR BLD AUTO: 0 % (ref 0–1)
BUN SERPL-MCNC: 14 MG/DL (ref 5–25)
CALCIUM SERPL-MCNC: 8.1 MG/DL (ref 8.3–10.1)
CHLORIDE SERPL-SCNC: 103 MMOL/L (ref 100–108)
CO2 SERPL-SCNC: 26 MMOL/L (ref 21–32)
CREAT SERPL-MCNC: 0.91 MG/DL (ref 0.6–1.3)
EOSINOPHIL # BLD AUTO: 0.01 THOUSAND/ΜL (ref 0–0.61)
EOSINOPHIL NFR BLD AUTO: 0 % (ref 0–6)
ERYTHROCYTE [DISTWIDTH] IN BLOOD BY AUTOMATED COUNT: 14 % (ref 11.6–15.1)
GFR SERPL CREATININE-BSD FRML MDRD: 77 ML/MIN/1.73SQ M
GLUCOSE SERPL-MCNC: 134 MG/DL (ref 65–140)
HCT VFR BLD AUTO: 27.1 % (ref 36.5–49.3)
HGB BLD-MCNC: 8.9 G/DL (ref 12–17)
IMM GRANULOCYTES # BLD AUTO: 0.08 THOUSAND/UL (ref 0–0.2)
IMM GRANULOCYTES NFR BLD AUTO: 1 % (ref 0–2)
LYMPHOCYTES # BLD AUTO: 1.36 THOUSANDS/ΜL (ref 0.6–4.47)
LYMPHOCYTES NFR BLD AUTO: 12 % (ref 14–44)
MCH RBC QN AUTO: 29.3 PG (ref 26.8–34.3)
MCHC RBC AUTO-ENTMCNC: 32.8 G/DL (ref 31.4–37.4)
MCV RBC AUTO: 89 FL (ref 82–98)
MONOCYTES # BLD AUTO: 0.95 THOUSAND/ΜL (ref 0.17–1.22)
MONOCYTES NFR BLD AUTO: 9 % (ref 4–12)
NEUTROPHILS # BLD AUTO: 8.82 THOUSANDS/ΜL (ref 1.85–7.62)
NEUTS SEG NFR BLD AUTO: 78 % (ref 43–75)
NRBC BLD AUTO-RTO: 0 /100 WBCS
P AXIS: 14 DEGREES
PLATELET # BLD AUTO: 149 THOUSANDS/UL (ref 149–390)
PMV BLD AUTO: 10 FL (ref 8.9–12.7)
POTASSIUM SERPL-SCNC: 4.7 MMOL/L (ref 3.5–5.3)
PR INTERVAL: 208 MS
QRS AXIS: -46 DEGREES
QRSD INTERVAL: 129 MS
QT INTERVAL: 425 MS
QTC INTERVAL: 466 MS
RBC # BLD AUTO: 3.04 MILLION/UL (ref 3.88–5.62)
SODIUM SERPL-SCNC: 136 MMOL/L (ref 136–145)
T WAVE AXIS: 41 DEGREES
VENTRICULAR RATE: 72 BPM
WBC # BLD AUTO: 11.23 THOUSAND/UL (ref 4.31–10.16)

## 2019-02-02 PROCEDURE — 80048 BASIC METABOLIC PNL TOTAL CA: CPT | Performed by: SURGERY

## 2019-02-02 PROCEDURE — 85025 COMPLETE CBC W/AUTO DIFF WBC: CPT | Performed by: SURGERY

## 2019-02-02 PROCEDURE — 99024 POSTOP FOLLOW-UP VISIT: CPT | Performed by: SURGERY

## 2019-02-02 PROCEDURE — 93010 ELECTROCARDIOGRAM REPORT: CPT | Performed by: INTERNAL MEDICINE

## 2019-02-02 RX ORDER — METOCLOPRAMIDE HYDROCHLORIDE 5 MG/ML
10 INJECTION INTRAMUSCULAR; INTRAVENOUS EVERY 6 HOURS PRN
Status: DISCONTINUED | OUTPATIENT
Start: 2019-02-02 | End: 2019-02-05 | Stop reason: HOSPADM

## 2019-02-02 RX ORDER — ONDANSETRON 2 MG/ML
4 INJECTION INTRAMUSCULAR; INTRAVENOUS EVERY 4 HOURS PRN
Status: DISCONTINUED | OUTPATIENT
Start: 2019-02-02 | End: 2019-02-03

## 2019-02-02 RX ADMIN — ACETAMINOPHEN 650 MG: 325 TABLET, FILM COATED ORAL at 18:23

## 2019-02-02 RX ADMIN — DOCUSATE SODIUM 100 MG: 100 CAPSULE, LIQUID FILLED ORAL at 09:02

## 2019-02-02 RX ADMIN — DOCUSATE SODIUM 100 MG: 100 CAPSULE, LIQUID FILLED ORAL at 18:16

## 2019-02-02 RX ADMIN — CHLORHEXIDINE GLUCONATE 15 ML: 1.2 RINSE ORAL at 09:03

## 2019-02-02 RX ADMIN — FERROUS SULFATE TAB 325 MG (65 MG ELEMENTAL FE) 325 MG: 325 (65 FE) TAB at 18:16

## 2019-02-02 RX ADMIN — VITAMIN D, TAB 1000IU (100/BT) 2000 UNITS: 25 TAB at 09:02

## 2019-02-02 RX ADMIN — FERROUS SULFATE TAB 325 MG (65 MG ELEMENTAL FE) 325 MG: 325 (65 FE) TAB at 09:03

## 2019-02-02 RX ADMIN — PRAVASTATIN SODIUM 40 MG: 40 TABLET ORAL at 16:18

## 2019-02-02 RX ADMIN — HEPARIN SODIUM 5000 UNITS: 5000 INJECTION INTRAVENOUS; SUBCUTANEOUS at 21:22

## 2019-02-02 RX ADMIN — OXYCODONE HYDROCHLORIDE 10 MG: 10 TABLET ORAL at 16:18

## 2019-02-02 RX ADMIN — ASPIRIN 81 MG 81 MG: 81 TABLET ORAL at 09:02

## 2019-02-02 NOTE — PROGRESS NOTES
PGY1 Post-Op Check Note     S:    Received 2u  PRBC post-operatively for Hgb of 6 6  Pt currently has no complaints  O:     Vitals:    02/01/19 2245   BP: 149/65   Pulse: 65   Resp:    Temp:    SpO2:         I/O       01/31 0701 - 02/01 0700 02/01 0701 - 02/02 0700    P  O  700 0    I V  (mL/kg)  1789 8 (28 1)    Blood  370    IV Piggyback 50 1500    Total Intake(mL/kg) 750 (11 8) 3659 8 (57 5)    Urine (mL/kg/hr) 850 (0 6) 1135 (1 1)    Blood  350    Total Output 850 1485    Net -100 +2174 8          Unmeasured Urine Occurrence 2 x           PE:  GEN: NAD  HEENT: MMM  CV: RRR  Lung: normal effort  Ab: Soft, NT/ND  Extrem: L BKA stump dressing c/d/i  Neuro:  A+Ox3, motor and sensation grossly intact       Lab Results   Component Value Date    WBC 11 50 (H) 02/01/2019    HGB 6 6 (LL) 02/01/2019    HCT 20 6 (L) 02/01/2019    MCV 92 02/01/2019     02/01/2019     Lab Results   Component Value Date    GLUCOSE 108 02/01/2019    CALCIUM 8 5 01/31/2019    K 3 8 01/31/2019    CO2 29 02/01/2019     01/31/2019    BUN 8 01/31/2019    CREATININE 0 89 01/31/2019           A/P:   79 y/o M s/p L BKA  --f/u post-transfusion H/H  --Continue Phenylephrine PRN for pressor support  --Regular diet  --Pain control  --f/u AM labs

## 2019-02-02 NOTE — PROGRESS NOTES
Progress Note - Vascular Surgery   Ronald Bardales 80 y o  male MRN: [id number]  Unit/Bed#: [facility] 531-01 Encounter: [id number]    Assessment:  79 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, now s/p L BKA, POD #1    Plan:  --Wean Phenylephrine gtt, currently @10  --f/u AM CBC  --Regular diet  --Pain control  --Keep L knee straight    Subjective/Objective     Subjective:     No acute events overnight  Pt feels well this AM, denies any complaints  Objective:     Blood pressure (!) 103/48, pulse 58, temperature 97 6 °F (36 4 °C), temperature source Oral, resp  rate 16, height 5' 10" (1 778 m), weight 63 6 kg (140 lb 3 4 oz), SpO2 100 %  ,Body mass index is 20 12 kg/m²  Intake/Output Summary (Last 24 hours) at 02/02/19 0422  Last data filed at 02/02/19 0153   Gross per 24 hour   Intake          4075 07 ml   Output             2260 ml   Net          1815 07 ml       Invasive Devices     Peripheral Intravenous Line            Peripheral IV 01/29/19 Left;Upper Arm 3 days    Peripheral IV 02/01/19 Left Hand less than 1 day          Drain            Urethral Catheter Latex 16 Fr  less than 1 day              Physical Exam:     GEN: NAD  HEENT: MMM  CV: RRR  Lung: normal effort  Ab: Soft, NT/ND  Extrem: L BKA stump dressing c/d/i  Neuro:  A+Ox3, motor and sensation grossly intact      Lab, Imaging and other studies:  CBC:   Lab Results   Component Value Date    WBC 11 50 (H) 02/01/2019    HGB 6 6 (LL) 02/01/2019    HCT 20 6 (L) 02/01/2019    MCV 92 02/01/2019     02/01/2019    MCH 29 5 02/01/2019    MCHC 32 0 02/01/2019    RDW 14 2 02/01/2019    MPV 9 8 02/01/2019    NRBC 0 02/01/2019   , CMP:   Lab Results   Component Value Date    CO2 29 02/01/2019    GLUCOSE 108 02/01/2019   , Coagulation: No results found for: PT, INR, APTT, Urinalysis: No results found for: COLORU, CLARITYU, SPECGRAV, PHUR, LEUKOCYTESUR, NITRITE, PROTEINUA, GLUCOSEU, KETONESU, BILIRUBINUR, BLOODU, Amylase: No results found for: AMYLASE, Lipase: No results found for: LIPASE  VTE Pharmacologic Prophylaxis: Heparin  VTE Mechanical Prophylaxis: sequential compression device

## 2019-02-02 NOTE — PROGRESS NOTES
RN notified on call blue surgery MD that pt's lesly gtt was weaned off per Vascular MD request at , and that pt's BP at 1630- 100/46 MAP- 72, 1700- 86/46 MAP 63, 1730- 89/48 MAP- 59  RN still holding cardene gtt per MD's order and will notify MD if BP is still in the 80's in 1 hour per MD's request  No other new orders at this time

## 2019-02-02 NOTE — NURSING NOTE
Pt refused heparin shot, noted in chart that patient has refused previously for same reason r/t fall causing SAH and PCP not wanting him to be on any "thinners" other than "baby ASA"

## 2019-02-03 LAB
ABO GROUP BLD BPU: NORMAL
ABO GROUP BLD BPU: NORMAL
ANION GAP SERPL CALCULATED.3IONS-SCNC: 5 MMOL/L (ref 4–13)
BASOPHILS # BLD AUTO: 0.02 THOUSANDS/ΜL (ref 0–0.1)
BASOPHILS NFR BLD AUTO: 0 % (ref 0–1)
BPU ID: NORMAL
BPU ID: NORMAL
BUN SERPL-MCNC: 17 MG/DL (ref 5–25)
CALCIUM SERPL-MCNC: 8.2 MG/DL (ref 8.3–10.1)
CHLORIDE SERPL-SCNC: 102 MMOL/L (ref 100–108)
CO2 SERPL-SCNC: 29 MMOL/L (ref 21–32)
CREAT SERPL-MCNC: 0.89 MG/DL (ref 0.6–1.3)
CROSSMATCH: NORMAL
CROSSMATCH: NORMAL
EOSINOPHIL # BLD AUTO: 0.26 THOUSAND/ΜL (ref 0–0.61)
EOSINOPHIL NFR BLD AUTO: 3 % (ref 0–6)
ERYTHROCYTE [DISTWIDTH] IN BLOOD BY AUTOMATED COUNT: 14.3 % (ref 11.6–15.1)
GFR SERPL CREATININE-BSD FRML MDRD: 78 ML/MIN/1.73SQ M
GLUCOSE SERPL-MCNC: 108 MG/DL (ref 65–140)
HCT VFR BLD AUTO: 25.5 % (ref 36.5–49.3)
HGB BLD-MCNC: 8.3 G/DL (ref 12–17)
IMM GRANULOCYTES # BLD AUTO: 0.06 THOUSAND/UL (ref 0–0.2)
IMM GRANULOCYTES NFR BLD AUTO: 1 % (ref 0–2)
LYMPHOCYTES # BLD AUTO: 2.1 THOUSANDS/ΜL (ref 0.6–4.47)
LYMPHOCYTES NFR BLD AUTO: 21 % (ref 14–44)
MCH RBC QN AUTO: 29.5 PG (ref 26.8–34.3)
MCHC RBC AUTO-ENTMCNC: 32.5 G/DL (ref 31.4–37.4)
MCV RBC AUTO: 91 FL (ref 82–98)
MONOCYTES # BLD AUTO: 0.86 THOUSAND/ΜL (ref 0.17–1.22)
MONOCYTES NFR BLD AUTO: 9 % (ref 4–12)
NEUTROPHILS # BLD AUTO: 6.63 THOUSANDS/ΜL (ref 1.85–7.62)
NEUTS SEG NFR BLD AUTO: 66 % (ref 43–75)
NRBC BLD AUTO-RTO: 0 /100 WBCS
PLATELET # BLD AUTO: 148 THOUSANDS/UL (ref 149–390)
PMV BLD AUTO: 9.8 FL (ref 8.9–12.7)
POTASSIUM SERPL-SCNC: 4 MMOL/L (ref 3.5–5.3)
RBC # BLD AUTO: 2.81 MILLION/UL (ref 3.88–5.62)
SODIUM SERPL-SCNC: 136 MMOL/L (ref 136–145)
UNIT DISPENSE STATUS: NORMAL
UNIT DISPENSE STATUS: NORMAL
UNIT PRODUCT CODE: NORMAL
UNIT PRODUCT CODE: NORMAL
UNIT RH: NORMAL
UNIT RH: NORMAL
WBC # BLD AUTO: 9.93 THOUSAND/UL (ref 4.31–10.16)

## 2019-02-03 PROCEDURE — 80048 BASIC METABOLIC PNL TOTAL CA: CPT | Performed by: SURGERY

## 2019-02-03 PROCEDURE — G8988 SELF CARE GOAL STATUS: HCPCS

## 2019-02-03 PROCEDURE — G8978 MOBILITY CURRENT STATUS: HCPCS

## 2019-02-03 PROCEDURE — 97166 OT EVAL MOD COMPLEX 45 MIN: CPT

## 2019-02-03 PROCEDURE — 85025 COMPLETE CBC W/AUTO DIFF WBC: CPT | Performed by: SURGERY

## 2019-02-03 PROCEDURE — 97163 PT EVAL HIGH COMPLEX 45 MIN: CPT

## 2019-02-03 PROCEDURE — G8987 SELF CARE CURRENT STATUS: HCPCS

## 2019-02-03 PROCEDURE — G8979 MOBILITY GOAL STATUS: HCPCS

## 2019-02-03 RX ORDER — ONDANSETRON 2 MG/ML
4 INJECTION INTRAMUSCULAR; INTRAVENOUS EVERY 4 HOURS PRN
Status: DISCONTINUED | OUTPATIENT
Start: 2019-02-03 | End: 2019-02-05 | Stop reason: HOSPADM

## 2019-02-03 RX ADMIN — DOCUSATE SODIUM 100 MG: 100 CAPSULE, LIQUID FILLED ORAL at 17:12

## 2019-02-03 RX ADMIN — VITAMIN D, TAB 1000IU (100/BT) 2000 UNITS: 25 TAB at 09:12

## 2019-02-03 RX ADMIN — HEPARIN SODIUM 5000 UNITS: 5000 INJECTION INTRAVENOUS; SUBCUTANEOUS at 15:00

## 2019-02-03 RX ADMIN — FERROUS SULFATE TAB 325 MG (65 MG ELEMENTAL FE) 325 MG: 325 (65 FE) TAB at 09:11

## 2019-02-03 RX ADMIN — ACETAMINOPHEN 650 MG: 325 TABLET, FILM COATED ORAL at 13:25

## 2019-02-03 RX ADMIN — OXYCODONE HYDROCHLORIDE 10 MG: 10 TABLET ORAL at 23:12

## 2019-02-03 RX ADMIN — ASPIRIN 81 MG 81 MG: 81 TABLET ORAL at 09:12

## 2019-02-03 RX ADMIN — FERROUS SULFATE TAB 325 MG (65 MG ELEMENTAL FE) 325 MG: 325 (65 FE) TAB at 17:12

## 2019-02-03 RX ADMIN — CHLORHEXIDINE GLUCONATE 15 ML: 1.2 RINSE ORAL at 20:28

## 2019-02-03 RX ADMIN — DOCUSATE SODIUM 100 MG: 100 CAPSULE, LIQUID FILLED ORAL at 09:11

## 2019-02-03 RX ADMIN — HEPARIN SODIUM 5000 UNITS: 5000 INJECTION INTRAVENOUS; SUBCUTANEOUS at 21:39

## 2019-02-03 RX ADMIN — HEPARIN SODIUM 5000 UNITS: 5000 INJECTION INTRAVENOUS; SUBCUTANEOUS at 05:36

## 2019-02-03 RX ADMIN — OXYCODONE HYDROCHLORIDE 10 MG: 10 TABLET ORAL at 09:12

## 2019-02-03 RX ADMIN — OXYCODONE HYDROCHLORIDE 10 MG: 10 TABLET ORAL at 01:31

## 2019-02-03 RX ADMIN — PRAVASTATIN SODIUM 40 MG: 40 TABLET ORAL at 17:12

## 2019-02-03 RX ADMIN — OXYCODONE HYDROCHLORIDE 10 MG: 10 TABLET ORAL at 13:22

## 2019-02-03 RX ADMIN — CHLORHEXIDINE GLUCONATE 15 ML: 1.2 RINSE ORAL at 09:12

## 2019-02-03 NOTE — PLAN OF CARE
Problem: PHYSICAL THERAPY ADULT  Goal: Performs mobility at highest level of function for planned discharge setting  See evaluation for individualized goals  Treatment/Interventions: Functional transfer training, LE strengthening/ROM, Therapeutic exercise, Endurance training, Patient/family training, Equipment eval/education, Bed mobility, Gait training  Equipment Recommended: Ruthine Prime, Wheelchair       See flowsheet documentation for full assessment, interventions and recommendations  Prognosis: Good  Problem List: Decreased strength, Decreased endurance, Impaired balance, Decreased range of motion, Decreased mobility, Decreased skin integrity, Pain, Orthopedic restrictions  Assessment: Pt is a 80 y o  male seen for PT evaluation s/p admit to [place] [address] on 1/28/2019 w/ Severe peripheral arterial disease ([facility] [address] )  Order placed for PT  Comorbidities affecting pt's physical performance at time of assessment listed above  Personal factors affecting pt at time of IE include: steps to enter environment, limited home support, advanced age, inability to perform IADLs, inability to perform ADLs and inability to ambulate household distances  Prior to admission, pt was was independent w/ all functional mobility w/ RW, lived in one floor environment, had 1 ARMIN unknown railing and lived with wife  Upon evaluation: Pt requires mod A for bed mobility, min A x2 for sit to stand, and min A x2 for SPT with use of RW  Pt educated on importance of LE elevation, techniques for desensitization, and prevention of contracture  (Please find full objective findings from PT assessment regarding body systems outlined above)  Impairments and limitations also listed above, especially due to  weakness, decreased ROM, impaired balance, decreased endurance, pain, decreased activity tolerance, altered sensation and fall risk  The following objective measures performed on IE also reveal limitations: Barthel Index 45/100   Pt's clinical presentation is currently unstable/unpredictable seen in pt's presentation of new amputation/WBS, use of supplemental O2, on continuous pulse ox/telemetry, and fall risk  Pt to benefit from continued skilled PT tx while in hospital and upon DC to address deficits as defined above and maximize level of functional mobility  From PT/mobility standpoint, recommendation at time of d/c would be inpatient rehab pending progress in order to maximize pt's functional independence and consistency w/ mobility in order to facilitate return to PLOF  Recommend progression of bed mobility, transfers, and ambulation as appropriate  Recommendation: Short-term skilled PT          See flowsheet documentation for full assessment

## 2019-02-03 NOTE — PLAN OF CARE
Problem: OCCUPATIONAL THERAPY ADULT  Goal: Performs self-care activities at highest level of function for planned discharge setting  See evaluation for individualized goals  Treatment Interventions: ADL retraining, Functional transfer training, UE strengthening/ROM, Endurance training, Equipment evaluation/education, Activityengagement          See flowsheet documentation for full assessment, interventions and recommendations  Limitation: Decreased ADL status, Decreased UE strength, Decreased endurance, Decreased self-care trans, Decreased high-level ADLs (Pain )  Prognosis: Good (very good rehab canidate! )  Assessment: Patient is an 79 y/o male admitted with L cellulitis with progressive loss of tissue of L great toe and 2nd, 3rd and 4th toes noted to have dry gangreene  Patient underwent L BKA 2/1, required post-op blood transfusion and is now referred to OT for functional assessment of ADL and IADL ability for discharge recommendations  Patient is very alert and oriented, follows directions and demostrates good awarness and insight in to medical condition  Patient currently presents with decreased activity tolerance, decreased dynamic sitting and standing balance as well as pain in residual limb limiting occupational performance in areas of bathing, dressing, toileting, ADL transfers and functional mobility  Patient reports fully I with ADLs and  most IADLs (does not drive) PTA  Reports wife is supportive and can provide some ADL support if needed but can not physically A with mobility  From OT standpoint, anticipate need for short course of in-patient rehab prior to discharge home with family support  Anticipate patient may be canidate for prosthetic leg in future, physiatry consult may be helpful  Will continue to follow for acute care OT services to progress ADL skills to highest level of indepdence and safety via rehabiltiative and adaptive strategies       Recommendation: PT consult, Physiatry Consult  OT Discharge Recommendation: Short Term Rehab

## 2019-02-03 NOTE — PLAN OF CARE
Problem: Prexisting or High Potential for Compromised Skin Integrity  Goal: Skin integrity is maintained or improved  INTERVENTIONS:  - Identify patients at risk for skin breakdown  - Assess and monitor skin integrity  - Assess and monitor nutrition and hydration status  - Monitor labs (i e  albumin)  - Assess for incontinence   - Turn and reposition patient  - Assist with mobility/ambulation  - Relieve pressure over bony prominences  - Avoid friction and shearing  - Provide appropriate hygiene as needed including keeping skin clean and dry  - Evaluate need for skin moisturizer/barrier cream  - Collaborate with interdisciplinary team (i e  Nutrition, Rehabilitation, etc )   - Patient/family teaching   Outcome: Progressing      Problem: Potential for Falls  Goal: Patient will remain free of falls  INTERVENTIONS:  - Assess patient frequently for physical needs  -  Identify cognitive and physical deficits and behaviors that affect risk of falls  -  [place] fall precautions as indicated by assessment   - Educate patient/family on patient safety including physical limitations  - Instruct patient to call for assistance with activity based on assessment  - Modify environment to reduce risk of injury  - Consider OT/PT consult to assist with strengthening/mobility   Outcome: Progressing      Problem: Nutrition/Hydration-ADULT  Goal: Nutrient/Hydration intake appropriate for improving, restoring or maintaining nutritional needs  Monitor and assess patient's nutrition/hydration status for malnutrition (ex- brittle hair, bruises, dry skin, pale skin and conjunctiva, muscle wasting, smooth red tongue, and disorientation)  Collaborate with interdisciplinary team and initiate plan and interventions as ordered  Monitor patient's weight and dietary intake as ordered or per policy  Utilize nutrition screening tool and intervene per policy   Determine patient's food preferences and provide high-protein, high-caloric foods as appropriate       INTERVENTIONS:  - Monitor oral intake, urinary output, labs, and treatment plans  - Assess nutrition and hydration status and recommend course of action  - Evaluate amount of meals eaten  - Assist patient with eating if necessary   - Allow adequate time for meals  - Recommend/ encourage appropriate diets, oral nutritional supplements, and vitamin/mineral supplements  - Order, calculate, and assess calorie counts as needed  - Recommend, monitor, and adjust tube feedings and TPN/PPN based on assessed needs  - Assess need for intravenous fluids  - Provide specific nutrition/hydration education as appropriate  - Include patient/family/caregiver in decisions related to nutrition   Outcome: Progressing      Problem: PAIN - ADULT  Goal: Verbalizes/displays adequate comfort level or baseline comfort level  Interventions:  - Encourage patient to monitor pain and request assistance  - Assess pain using appropriate pain scale  - Administer analgesics based on type and severity of pain and evaluate response  - Implement non-pharmacological measures as appropriate and evaluate response  - Consider cultural and social influences on pain and pain management  - Notify physician/advanced practitioner if interventions unsuccessful or patient reports new pain   Outcome: Progressing      Problem: INFECTION - ADULT  Goal: Absence or prevention of progression during hospitalization  INTERVENTIONS:  - Assess and monitor for signs and symptoms of infection  - Monitor lab/diagnostic results  - Monitor all insertion sites, i e  indwelling lines, tubes, and drains  - Monitor endotracheal (as able) and nasal secretions for changes in amount and color  - [place] appropriate cooling/warming therapies per order  - Administer medications as ordered  - Instruct and encourage patient and family to use good hand hygiene technique  - Identify and instruct in appropriate isolation precautions for identified infection/condition Outcome: Progressing    Goal: Absence of fever/infection during neutropenic period  INTERVENTIONS:  - Monitor WBC     Outcome: Progressing      Problem: SAFETY ADULT  Goal: Maintain or return to baseline ADL function  INTERVENTIONS:  -  Assess patient's ability to carry out ADLs; assess patient's baseline for ADL function and identify physical deficits which impact ability to perform ADLs (bathing, care of mouth/teeth, toileting, grooming, dressing, etc )  - Assess/evaluate cause of self-care deficits   - Assess range of motion  - Assess patient's mobility; develop plan if impaired  - Assess patient's need for assistive devices and provide as appropriate  - Encourage maximum independence but intervene and supervise when necessary  ¯ Involve family in performance of ADLs  ¯ Assess for home care needs following discharge   ¯ Request OT consult to assist with ADL evaluation and planning for discharge  ¯ Provide patient education as appropriate   Outcome: Progressing    Goal: Maintain or return mobility status to optimal level  INTERVENTIONS:  - Assess patient's baseline mobility status (ambulation, transfers, stairs, etc )    - Identify cognitive and physical deficits and behaviors that affect mobility  - Identify mobility aids required to assist with transfers and/or ambulation (gait belt, sit-to-stand, lift, walker, cane, etc )  - [place] fall precautions as indicated by assessment  - Record patient progress and toleration of activity level on Mobility SBAR; progress patient to next Phase/Stage  - Instruct patient to call for assistance with activity based on assessment  - Request Rehabilitation consult to assist with strengthening/weightbearing, etc    Outcome: Progressing      Problem: DISCHARGE PLANNING  Goal: Discharge to home or other facility with appropriate resources  INTERVENTIONS:  - Identify barriers to discharge w/patient and caregiver  - Arrange for needed discharge resources and transportation as appropriate  - Identify discharge learning needs (meds, wound care, etc )  - Arrange for interpretive services to assist at discharge as needed  - Refer to Case Management Department for coordinating discharge planning if the patient needs post-hospital services based on physician/advanced practitioner order or complex needs related to functional status, cognitive ability, or social support system   Outcome: Progressing      Problem: Knowledge Deficit  Goal: Patient/family/caregiver demonstrates understanding of disease process, treatment plan, medications, and discharge instructions  Complete learning assessment and assess knowledge base    Interventions:  - Provide teaching at level of understanding  - Provide teaching via preferred learning methods   Outcome: Progressing      Problem: GASTROINTESTINAL - ADULT  Goal: Maintains adequate nutritional intake  INTERVENTIONS:  - Monitor percentage of each meal consumed  - Identify factors contributing to decreased intake, treat as appropriate  - Assist with meals as needed  - Monitor I&O, WT and lab values  - Obtain nutrition services referral as needed   Outcome: Progressing      Problem: SKIN/TISSUE INTEGRITY - ADULT  Goal: Skin integrity remains intact  INTERVENTIONS  - Identify patients at risk for skin breakdown  - Assess and monitor skin integrity  - Assess and monitor nutrition and hydration status  - Monitor labs (i e  albumin)  - Assess for incontinence   - Turn and reposition patient  - Assist with mobility/ambulation  - Relieve pressure over bony prominences  - Avoid friction and shearing  - Provide appropriate hygiene as needed including keeping skin clean and dry  - Evaluate need for skin moisturizer/barrier cream  - Collaborate with interdisciplinary team (i e  Nutrition, Rehabilitation, etc )   - Patient/family teaching   Outcome: Progressing    Goal: Incision(s), wounds(s) or drain site(s) healing without S/S of infection  INTERVENTIONS  - Assess and document risk factors for skin impairment   - Assess and document dressing, incision, wound bed, drain sites and surrounding tissue  - Initiate Nutrition services consult and/or wound management as needed   Outcome: Progressing      Problem: MUSCULOSKELETAL - ADULT  Goal: Maintain or return mobility to safest level of function  INTERVENTIONS:  - Assess patient's ability to carry out ADLs; assess patient's baseline for ADL function and identify physical deficits which impact ability to perform ADLs (bathing, care of mouth/teeth, toileting, grooming, dressing, etc )  - Assess/evaluate cause of self-care deficits   - Assess range of motion  - Assess patient's mobility; develop plan if impaired  - Assess patient's need for assistive devices and provide as appropriate  - Encourage maximum independence but intervene and supervise when necessary  - Involve family in performance of ADLs  - Assess for home care needs following discharge   - Request OT consult to assist with ADL evaluation and planning for discharge  - Provide patient education as appropriate   Outcome: Progressing    Goal: Maintain proper alignment of affected body part  INTERVENTIONS:  - Support, maintain and protect limb and body alignment  - Provide pt/fam with appropriate education   Outcome: Progressing      Problem: DISCHARGE PLANNING - CARE MANAGEMENT  Goal: Discharge to post-acute care or home with appropriate resources  INTERVENTIONS:  - Conduct assessment to determine patient/family and health care team treatment goals, and need for post-acute services based on payer coverage, community resources, and patient preferences, and barriers to discharge  - Address psychosocial, clinical, and financial barriers to discharge as identified in assessment in conjunction with the patient/family and health care team  - Arrange appropriate level of post-acute services according to patient's   needs and preference and payer coverage in collaboration with the physician and health care team  - Communicate with and update the patient/family, physician, and health care team regarding progress on the discharge plan  - Arrange appropriate transportation to post-acute venues   Outcome: Progressing

## 2019-02-03 NOTE — OCCUPATIONAL THERAPY NOTE
Occupational Therapy Evaluation      [facility]    2/3/2019    Patient Active Problem List   Diagnosis    Severe peripheral arterial disease ([address] )    Hyperlipidemia    Vitamin D deficiency    Bifascicular block    Anemia    CKD (chronic kidney disease), stage III (HCC)    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 (HCC)    Atherosclerosis of native arteries of left leg with ulceration of other part of foot ([address] )    Subarachnoid hemorrhage ([facility] )    Fall    Ambulatory dysfunction    Short-term memory loss    Physical deconditioning    Candida rash of groin    Urinary frequency    Nocturia    Cellulitis of left foot    Gangrene of toe of right foot ([address] )    Carpal tunnel syndrome    Change in multiple pigmented skin lesions    Chronic lower back pain    Chronic fatigue    Cold intolerance    Dermatitis    Dyspnea on exertion    Generalized osteoarthritis    Herniated lumbar intervertebral disc    Impaired fasting glucose    Lichen simplex chronicus    Positive depression screening    Rotator cuff tear arthropathy, right    Seborrheic keratosis    Unstable gait    Pre-op evaluation    Cough       Past Medical History:   Diagnosis Date    Anemia     Arthritis     Bifascicular block     Cellulitis of chest wall     Chronic kidney disease     Dupuytren contracture     DVT femoral (deep venous thrombosis) with thrombophlebitis, right (HCC)     Elevated glucose     Hemothorax     Left    Hyperlipidemia     Vascular disease        Past Surgical History:   Procedure Laterality Date    AMPUTATION  1972    L thumb    CARPAL TUNNEL RELEASE  2016    L hand    CATARACT EXTRACTION      COLONOSCOPY      EYE SURGERY      FRACTURE SURGERY      HAND SURGERY      IR LOWER EXTREMITY / INTERVENTION  10/26/2018    KNEE ARTHROSCOPY      NERVE BLOCK      MT SLCTV CATHJ 3RD+ ORD SLCTV ABDL PEL/LXTR [facility] Left 3/9/2018    Procedure: LEFT LOWER EXTREMITY ARTERIOGRAM WITH PTA OF LEFT POPLITEAL ARTERY;  Surgeon: Mary Beckett MD;  [facility];  Service: Vascular    [facility] Francy Phillips 3RD+ ORD SLCTV ABDL PEL/LXTR [address] Left 10/26/2018    Procedure: ARTERIOGRAM, angioplasty stent and atherectomy;  Surgeon: Mary Beckett MD;  Location: [facility];  Service: Vascular    THORACENTESIS        02/03/19 0955   Restrictions/Precautions   Weight Bearing Precautions Per Order Yes   LLE Weight Bearing Per Order NWB   Other Precautions O2;Fall Risk; Chair Alarm   Pain Assessment   Pain Assessment 0-10   Pain Score 4   Pain Type Acute pain   Pain Location Leg   Pain Orientation Left  (residual limb -occasional phantom pain )   Pain Descriptors Sharp  (very sentative to touch on posterior aspect of L knee/thigh )   Pain Frequency Intermittent   Patient's Stated Pain Goal No pain   Response to Interventions demonstrates good understanding on need for proper positoning and desensitization techs    Home Living   Type of [address] One level;Stairs to enter without rails   Bathroom Shower/Tub Tub/shower unit   [facility]   Prior Function   Level of [place] Independent with ADLs and functional mobility   Lives With Dc-Morgan Help From Family   ADL Assistance Independent   IADLs Independent  (Wife drives )   Vocational Retired   Lifestyle   Autonomy Reports I with ADLs and most IADLs PTA   Reciprocal Relationships Patient is , reports wife is in good health and can provide ADL support if needed    Reports he and wife moved back to [place] from [place] to be closer to children due to their age, there are children in local area    Service to Others Retired woodwork     [address] enjoys wood working    [address] "I guess I am doing okay, so far it's not that bad"   ADL   Eating Assistance [address] 5 Supervision/Setup   UB Bathing Assistance 4  Minimal Assistance   LB Bathing Assistance 3  Moderate Assistance   UB Dressing Assistance 4  Minimal Assistance   LB Dressing Assistance 2  Maximal Assistance   Toileting Assistance  1  Total Assistance   Transfers   Sit to Stand 4  Minimal assistance   Additional items Assist x 2   Stand to Sit 4  Minimal assistance   Additional items Assist x 2   Stand pivot 4  Minimal assistance   Additional items Assist x 2;Verbal cues; Increased time required  (RW )   Balance   Static Sitting Fair   Dynamic Sitting Fair -   Static Standing Fair -   Dynamic Standing Poor +   Activity Tolerance   Activity Tolerance Patient limited by fatigue   Nurse Made Aware OT spoke with Addison Degroot PT and Rosalia Jones RN    RUE Assessment   RUE Assessment WFL  (3+/5 )   LUE Assessment   LUE Assessment WFL  (3+/5 )   Hand Function   Gross Motor Coordination Functional   Fine Motor Coordination Functional   Cognition   Overall Cognitive Status WFL   Arousal/Participation Alert   Attention Within functional limits   Orientation Level Oriented X4   Memory Within functional limits   Following Commands Follows all commands and directions without difficulty   Assessment   Limitation Decreased ADL status; Decreased UE strength;Decreased endurance;Decreased self-care trans;Decreased high-level ADLs  (Pain )   Prognosis Good  (very good rehab canidate! 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, required post-op blood transfusion and is now referred to OT for functional assessment of ADL and IADL ability for discharge recommendations  Patient is very alert and oriented, follows directions and demostrates good awarness and insight in to medical condition     Patient currently presents with decreased activity tolerance, decreased dynamic sitting and standing balance as well as pain in residual limb limiting occupational performance in areas of bathing, dressing, toileting, ADL transfers and functional mobility  Patient reports fully I with ADLs and  most IADLs (does not drive) PTA  Reports wife is supportive and can provide some ADL support if needed but can not physically A with mobility  From OT standpoint, anticipate need for short course of in-patient rehab prior to discharge home with family support  Anticipate patient may be canidate for prosthetic leg in future, physiatry consult may be helpful  Will continue to follow for acute care OT services to progress ADL skills to highest level of indepdence and safety via rehabiltiative and adaptive strategies  Goals   Patient Goals "I know I need to go to rehab"   LTG Time Frame 3-7   Long Term Goal #1 1  Patient will complete UB bathing, dressing and grooming at Mod I level seated in chair or wheelchair at sink  2   Patient will increase sit to stand to SBA level for LB ADLs and clothing mgt in stance; 3  Patient will increase LB bathing/dressing to Min A level with G dynamic sitting/standing balance; 4  Patient will complete transfers bed/chair/toilet to SBA level with G safety awanress5  Patient will be I with BUE HEP to improve and promote UB strength to 4/5   Plan   Treatment Interventions ADL retraining;Functional transfer training;UE strengthening/ROM; Endurance training;Equipment evaluation/education; Activityengagement   Goal Expiration Date 02/10/19   OT Frequency 5x/wk   Recommendation   Recommendation PT consult; Physiatry Consult   OT Discharge Recommendation Short Term Rehab   Barthel Index   Feeding 10   Bathing 0   Grooming Score 5   Dressing Score 5   Bladder Score 0   Bowels Score 10   Toilet Use Score 5   Transfers (Bed/Chair) Score 10   Mobility (Level Surface) Score 0   Stairs Score 0   Barthel Index Score 45   Ml Villalobos, OT

## 2019-02-03 NOTE — PROGRESS NOTES
Progress Note - Vascular Surgery   Iván Li 80 y o  male MRN: [id number]  Unit/Bed#: [facility] 531-01 Encounter: [id number]    Assessment:  79 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, now s/p L BKA (2/1, [place])    Plan:  --Weaned Phenylephrine gtt, BP low 90s-100s overnight, will continue to monitor off Edwin  --Regular diet  --Pain control  --F/u PT/OT    Subjective/Objective     Subjective:     No acute events overnight  Pt feels well this AM  No new complaints  Objective:     Blood pressure (!) 100/48, pulse 66, temperature 98 6 °F (37 °C), temperature source Oral, resp  rate 16, height 5' 10" (1 778 m), weight 60 6 kg (133 lb 9 6 oz), SpO2 98 %  ,Body mass index is 19 17 kg/m²  Intake/Output Summary (Last 24 hours) at 02/03/19 0648  Last data filed at 02/03/19 0546   Gross per 24 hour   Intake              120 ml   Output             1610 ml   Net            -1490 ml       Invasive Devices     Peripheral Intravenous Line            Peripheral IV 02/01/19 Left Hand 1 day    Peripheral IV 02/02/19 Right Wrist 1 day          Drain            Urethral Catheter Latex 16 Fr  1 day              Physical Exam:     GEN: NAD  HEENT: MMM  CV: RRR  Lung: normal effort  Ab: Soft, NT/ND  Extrem: L BKA stump dressing c/d/i  Neuro:  A+Ox3, motor and sensation grossly intact      Lab, Imaging and other studies:  CBC:   Lab Results   Component Value Date    WBC 9 93 02/03/2019    HGB 8 3 (L) 02/03/2019    HCT 25 5 (L) 02/03/2019    MCV 91 02/03/2019     (L) 02/03/2019    MCH 29 5 02/03/2019    MCHC 32 5 02/03/2019    RDW 14 3 02/03/2019    MPV 9 8 02/03/2019    NRBC 0 02/03/2019   , CMP:   Lab Results   Component Value Date    SODIUM 136 02/03/2019    K 4 0 02/03/2019     02/03/2019    CO2 29 02/03/2019    BUN 17 02/03/2019    CREATININE 0 89 02/03/2019    CALCIUM 8 2 (L) 02/03/2019    EGFR 78 02/03/2019   , Coagulation: No results found for: PT, INR, APTT, Urinalysis: No results found for: ROSENDA Amaya, Barbara Walker, NITRITE, PROTEINUA, GLUCOSEU, KETONESU, BILIRUBINUR, BLOODU, Amylase: No results found for: AMYLASE, Lipase: No results found for: LIPASE  VTE Pharmacologic Prophylaxis: Heparin  VTE Mechanical Prophylaxis: sequential compression device

## 2019-02-03 NOTE — PHYSICAL THERAPY NOTE
PHYSICAL THERAPY EVALUATION  NAME: Iván Li  AGE:   80 y o  MRN:  [id number]  ADMIT DX: Atheroembolism of left lower extremity [I75 [id number]]    PMH:   Past Medical History:   Diagnosis Date    Anemia     Arthritis     Bifascicular block     Cellulitis of chest wall     Chronic kidney disease     Dupuytren contracture     DVT femoral (deep venous thrombosis) with thrombophlebitis, right (HCC)     Elevated glucose     Hemothorax     Left    Hyperlipidemia     Vascular disease      LENGTH OF STAY: 6     19 0952   Pain Assessment   Pain Assessment 0-10   Pain Score 4   Pain Type Acute pain   Pain Location Leg   Pain Orientation Left   Home Living   Type of [address] One level;Stairs to enter with rails  (1 ARMIN)   Home Equipment Walker   Additional Comments Ambulates with mod I and RW at baseline  Prior Function   Level of [place] Independent with ADLs and functional mobility   Lives With Spouse   ADL Assistance Independent   IADLs (wife drives)   Vocational Retired   Restrictions/Precautions   [place] Bearing Precautions Per Order Yes   LLE [place] Bearing Per Order NWB  (LLE BKA)   Other Precautions Chair Alarm;WBS; Telemetry; Fall Risk;Pain;O2  (2 L O2 NC)   General   Family/Caregiver Present No   Cognition   Overall Cognitive Status WFL   Arousal/Participation Cooperative   Orientation Level Oriented X4   Memory Within functional limits   Following Commands Follows one step commands without difficulty   Comments Pt identified by name and   RLE Assessment   RLE Assessment X   Strength RLE   RLE Overall Strength 4/5  (functionally)   LLE Assessment   LLE Assessment X   Strength LLE   LLE Overall Strength 2-/5  (functionally)   Coordination   Sensation X  (intermittent LLE phantom sensation)   Bed Mobility   Supine to Sit 3  Moderate assistance   Additional items Assist x 1;HOB elevated; Bedrails; Increased time required;Verbal cues;LE management   Sit to Supine Unable to assess  (left OOB in chair post session with alarm intact)   Transfers   Sit to Stand 4  Minimal assistance   Additional items Assist x 2; Increased time required;Verbal cues  (bed elevated)   Stand to Sit 4  Minimal assistance   Additional items Assist x 2; Increased time required;Verbal cues   Stand pivot 4  Minimal assistance   Additional items Assist x 2; Increased time required;Verbal cues  (with RW; increased time to pivot RLE)   Ambulation/Elevation   Gait pattern Not appropriate  (at this time)   Balance   Static Sitting Fair   Dynamic Sitting Poor +   Static Standing Poor +   Dynamic Standing Poor +   Endurance Deficit   Endurance Deficit Yes   Endurance Deficit Description limited standing tolerance, reports fatigue post mobility   Activity Tolerance   Activity Tolerance Patient limited by fatigue   Nurse Made Aware Per RN, pt appropriate to evaluate   Assessment   Prognosis Good   Problem List Decreased strength;Decreased endurance; Impaired balance;Decreased range of motion;Decreased mobility; Decreased skin integrity;Pain;Orthopedic restrictions   Goals   Patient Goals Pt would like to go home as soon as possible  STG Expiration Date 02/12/19   Short Term Goal #1 Pt will be able to: (1) perform bed mobility with supervision (2) perform sit to stand with supervision (3) perform SPT with min A x1 (4) ambulate at least 10` with min A x1 and use of RW (5) initiate HEP (6) increase standing balance by 1 grade   Treatment Day 0   Plan   Treatment/Interventions Functional transfer training;LE strengthening/ROM; Therapeutic exercise; Endurance training;Patient/family training;Equipment eval/education; Bed mobility;Gait training   PT Frequency (3-6x/week)   Recommendation   Recommendation Short-term skilled PT   Equipment Recommended Walker; Wheelchair   Barthel Index   Feeding 10   Bathing 0   Grooming Score 5   Dressing Score 5   Bladder Score 0   Bowels Score 10   Toilet Use Score 5   Transfers (Bed/Chair) Score 10 Mobility (Level Surface) Score 0   Stairs Score 0   Barthel Index Score 45       Assessment: Pt is a 80 y o  male seen for PT evaluation s/p admit to [facility] on 1/28/2019 w/ Severe peripheral arterial disease ([address] )  S/p L BKA (2/1)  Order placed for PT  Comorbidities affecting pt's physical performance at time of assessment listed above  Personal factors affecting pt at time of IE include: steps to enter environment, limited home support, advanced age, inability to perform IADLs, inability to perform ADLs and inability to ambulate household distances  Prior to admission, pt was was independent w/ all functional mobility w/ RW, lived in one floor environment, had 1 ARMIN unknown railing and lived with wife  Upon evaluation: Pt requires mod A for bed mobility, min A x2 for sit to stand, and min A x2 for SPT with use of RW  Pt educated on importance of LE elevation, techniques for desensitization, and prevention of contracture  (Please find full objective findings from PT assessment regarding body systems outlined above)  Impairments and limitations also listed above, especially due to  weakness, decreased ROM, impaired balance, decreased endurance, pain, decreased activity tolerance, altered sensation and fall risk  The following objective measures performed on IE also reveal limitations: Barthel Index 45/100  Pt's clinical presentation is currently unstable/unpredictable seen in pt's presentation of new amputation/WBS, use of supplemental O2, on continuous pulse ox/telemetry, and fall risk  Pt to benefit from continued skilled PT tx while in hospital and upon DC to address deficits as defined above and maximize level of functional mobility  From PT/mobility standpoint, recommendation at time of d/c would be inpatient rehab pending progress in order to maximize pt's functional independence and consistency w/ mobility in order to facilitate return to PLOF    Recommend progression of bed mobility, transfers, and ambulation as appropriate        Hanna Mendoza, PT,DPT

## 2019-02-04 LAB
ABO GROUP BLD BPU: NORMAL
ABO GROUP BLD BPU: NORMAL
ANION GAP SERPL CALCULATED.3IONS-SCNC: 5 MMOL/L (ref 4–13)
BASOPHILS # BLD AUTO: 0.01 THOUSANDS/ΜL (ref 0–0.1)
BASOPHILS NFR BLD AUTO: 0 % (ref 0–1)
BPU ID: NORMAL
BPU ID: NORMAL
BUN SERPL-MCNC: 17 MG/DL (ref 5–25)
CALCIUM SERPL-MCNC: 8 MG/DL (ref 8.3–10.1)
CHLORIDE SERPL-SCNC: 100 MMOL/L (ref 100–108)
CO2 SERPL-SCNC: 30 MMOL/L (ref 21–32)
CREAT SERPL-MCNC: 0.9 MG/DL (ref 0.6–1.3)
CROSSMATCH: NORMAL
CROSSMATCH: NORMAL
EOSINOPHIL # BLD AUTO: 0.19 THOUSAND/ΜL (ref 0–0.61)
EOSINOPHIL NFR BLD AUTO: 2 % (ref 0–6)
ERYTHROCYTE [DISTWIDTH] IN BLOOD BY AUTOMATED COUNT: 14.1 % (ref 11.6–15.1)
GFR SERPL CREATININE-BSD FRML MDRD: 78 ML/MIN/1.73SQ M
GLUCOSE SERPL-MCNC: 115 MG/DL (ref 65–140)
HCT VFR BLD AUTO: 24.4 % (ref 36.5–49.3)
HGB BLD-MCNC: 7.8 G/DL (ref 12–17)
IMM GRANULOCYTES # BLD AUTO: 0.03 THOUSAND/UL (ref 0–0.2)
IMM GRANULOCYTES NFR BLD AUTO: 0 % (ref 0–2)
LYMPHOCYTES # BLD AUTO: 1.93 THOUSANDS/ΜL (ref 0.6–4.47)
LYMPHOCYTES NFR BLD AUTO: 22 % (ref 14–44)
MCH RBC QN AUTO: 29.2 PG (ref 26.8–34.3)
MCHC RBC AUTO-ENTMCNC: 32 G/DL (ref 31.4–37.4)
MCV RBC AUTO: 91 FL (ref 82–98)
MONOCYTES # BLD AUTO: 0.96 THOUSAND/ΜL (ref 0.17–1.22)
MONOCYTES NFR BLD AUTO: 11 % (ref 4–12)
NEUTROPHILS # BLD AUTO: 5.85 THOUSANDS/ΜL (ref 1.85–7.62)
NEUTS SEG NFR BLD AUTO: 65 % (ref 43–75)
NRBC BLD AUTO-RTO: 0 /100 WBCS
PLATELET # BLD AUTO: 170 THOUSANDS/UL (ref 149–390)
PMV BLD AUTO: 10 FL (ref 8.9–12.7)
POTASSIUM SERPL-SCNC: 4 MMOL/L (ref 3.5–5.3)
RBC # BLD AUTO: 2.67 MILLION/UL (ref 3.88–5.62)
SODIUM SERPL-SCNC: 135 MMOL/L (ref 136–145)
UNIT DISPENSE STATUS: NORMAL
UNIT DISPENSE STATUS: NORMAL
UNIT PRODUCT CODE: NORMAL
UNIT PRODUCT CODE: NORMAL
UNIT RH: NORMAL
UNIT RH: NORMAL
WBC # BLD AUTO: 8.97 THOUSAND/UL (ref 4.31–10.16)

## 2019-02-04 PROCEDURE — 99222 1ST HOSP IP/OBS MODERATE 55: CPT | Performed by: PHYSICAL MEDICINE & REHABILITATION

## 2019-02-04 PROCEDURE — 97530 THERAPEUTIC ACTIVITIES: CPT

## 2019-02-04 PROCEDURE — 80048 BASIC METABOLIC PNL TOTAL CA: CPT | Performed by: SURGERY

## 2019-02-04 PROCEDURE — 85025 COMPLETE CBC W/AUTO DIFF WBC: CPT | Performed by: SURGERY

## 2019-02-04 PROCEDURE — 99024 POSTOP FOLLOW-UP VISIT: CPT | Performed by: PHYSICIAN ASSISTANT

## 2019-02-04 RX ORDER — MAGNESIUM CARB/ALUMINUM HYDROX 105-160MG
30 TABLET,CHEWABLE ORAL ONCE
Status: DISCONTINUED | OUTPATIENT
Start: 2019-02-04 | End: 2019-02-05 | Stop reason: HOSPADM

## 2019-02-04 RX ORDER — POLYETHYLENE GLYCOL 3350 17 G/17G
17 POWDER, FOR SOLUTION ORAL ONCE
Status: COMPLETED | OUTPATIENT
Start: 2019-02-04 | End: 2019-02-04

## 2019-02-04 RX ORDER — POLYETHYLENE GLYCOL 3350 17 G/17G
17 POWDER, FOR SOLUTION ORAL DAILY PRN
Status: DISCONTINUED | OUTPATIENT
Start: 2019-02-04 | End: 2019-02-05 | Stop reason: HOSPADM

## 2019-02-04 RX ORDER — BISACODYL 10 MG
10 SUPPOSITORY, RECTAL RECTAL DAILY PRN
Status: DISCONTINUED | OUTPATIENT
Start: 2019-02-04 | End: 2019-02-05 | Stop reason: HOSPADM

## 2019-02-04 RX ADMIN — POLYETHYLENE GLYCOL 3350 17 G: 17 POWDER, FOR SOLUTION ORAL at 17:05

## 2019-02-04 RX ADMIN — VITAMIN D, TAB 1000IU (100/BT) 2000 UNITS: 25 TAB at 09:00

## 2019-02-04 RX ADMIN — CHLORHEXIDINE GLUCONATE 15 ML: 1.2 RINSE ORAL at 21:23

## 2019-02-04 RX ADMIN — FERROUS SULFATE TAB 325 MG (65 MG ELEMENTAL FE) 325 MG: 325 (65 FE) TAB at 09:00

## 2019-02-04 RX ADMIN — ASPIRIN 81 MG 81 MG: 81 TABLET ORAL at 09:00

## 2019-02-04 RX ADMIN — ACETAMINOPHEN 650 MG: 325 TABLET, FILM COATED ORAL at 09:25

## 2019-02-04 RX ADMIN — SODIUM CHLORIDE 500 ML: 0.9 INJECTION, SOLUTION INTRAVENOUS at 03:15

## 2019-02-04 RX ADMIN — DOCUSATE SODIUM 100 MG: 100 CAPSULE, LIQUID FILLED ORAL at 09:00

## 2019-02-04 RX ADMIN — CHLORHEXIDINE GLUCONATE 15 ML: 1.2 RINSE ORAL at 09:00

## 2019-02-04 RX ADMIN — HEPARIN SODIUM 5000 UNITS: 5000 INJECTION INTRAVENOUS; SUBCUTANEOUS at 21:23

## 2019-02-04 RX ADMIN — HEPARIN SODIUM 5000 UNITS: 5000 INJECTION INTRAVENOUS; SUBCUTANEOUS at 14:00

## 2019-02-04 RX ADMIN — OXYCODONE HYDROCHLORIDE 10 MG: 10 TABLET ORAL at 21:23

## 2019-02-04 RX ADMIN — OXYCODONE HYDROCHLORIDE 10 MG: 10 TABLET ORAL at 17:08

## 2019-02-04 RX ADMIN — HEPARIN SODIUM 5000 UNITS: 5000 INJECTION INTRAVENOUS; SUBCUTANEOUS at 05:09

## 2019-02-04 RX ADMIN — OXYCODONE HYDROCHLORIDE 10 MG: 10 TABLET ORAL at 06:15

## 2019-02-04 RX ADMIN — DOCUSATE SODIUM 100 MG: 100 CAPSULE, LIQUID FILLED ORAL at 17:04

## 2019-02-04 RX ADMIN — FERROUS SULFATE TAB 325 MG (65 MG ELEMENTAL FE) 325 MG: 325 (65 FE) TAB at 17:04

## 2019-02-04 RX ADMIN — PRAVASTATIN SODIUM 40 MG: 40 TABLET ORAL at 17:04

## 2019-02-04 RX ADMIN — OXYCODONE HYDROCHLORIDE 5 MG: 5 TABLET ORAL at 12:29

## 2019-02-04 NOTE — PLAN OF CARE
Problem: Prexisting or High Potential for Compromised Skin Integrity  Goal: Skin integrity is maintained or improved  INTERVENTIONS:  - Identify patients at risk for skin breakdown  - Assess and monitor skin integrity  - Assess and monitor nutrition and hydration status  - Monitor labs (i e  albumin)  - Assess for incontinence   - Turn and reposition patient  - Assist with mobility/ambulation  - Relieve pressure over bony prominences  - Avoid friction and shearing  - Provide appropriate hygiene as needed including keeping skin clean and dry  - Evaluate need for skin moisturizer/barrier cream  - Collaborate with interdisciplinary team (i e  Nutrition, Rehabilitation, etc )   - Patient/family teaching   Outcome: Progressing      Problem: Potential for Falls  Goal: Patient will remain free of falls  INTERVENTIONS:  - Assess patient frequently for physical needs  -  Identify cognitive and physical deficits and behaviors that affect risk of falls  -  [place] fall precautions as indicated by assessment   - Educate patient/family on patient safety including physical limitations  - Instruct patient to call for assistance with activity based on assessment  - Modify environment to reduce risk of injury  - Consider OT/PT consult to assist with strengthening/mobility   Outcome: Progressing      Problem: Nutrition/Hydration-ADULT  Goal: Nutrient/Hydration intake appropriate for improving, restoring or maintaining nutritional needs  Monitor and assess patient's nutrition/hydration status for malnutrition (ex- brittle hair, bruises, dry skin, pale skin and conjunctiva, muscle wasting, smooth red tongue, and disorientation)  Collaborate with interdisciplinary team and initiate plan and interventions as ordered  Monitor patient's weight and dietary intake as ordered or per policy  Utilize nutrition screening tool and intervene per policy   Determine patient's food preferences and provide high-protein, high-caloric foods as appropriate       INTERVENTIONS:  - Monitor oral intake, urinary output, labs, and treatment plans  - Assess nutrition and hydration status and recommend course of action  - Evaluate amount of meals eaten  - Assist patient with eating if necessary   - Allow adequate time for meals  - Recommend/ encourage appropriate diets, oral nutritional supplements, and vitamin/mineral supplements  - Order, calculate, and assess calorie counts as needed  - Recommend, monitor, and adjust tube feedings and TPN/PPN based on assessed needs  - Assess need for intravenous fluids  - Provide specific nutrition/hydration education as appropriate  - Include patient/family/caregiver in decisions related to nutrition   Outcome: Progressing      Problem: PAIN - ADULT  Goal: Verbalizes/displays adequate comfort level or baseline comfort level  Interventions:  - Encourage patient to monitor pain and request assistance  - Assess pain using appropriate pain scale  - Administer analgesics based on type and severity of pain and evaluate response  - Implement non-pharmacological measures as appropriate and evaluate response  - Consider cultural and social influences on pain and pain management  - Notify physician/advanced practitioner if interventions unsuccessful or patient reports new pain   Outcome: Progressing      Problem: INFECTION - ADULT  Goal: Absence or prevention of progression during hospitalization  INTERVENTIONS:  - Assess and monitor for signs and symptoms of infection  - Monitor lab/diagnostic results  - Monitor all insertion sites, i e  indwelling lines, tubes, and drains  - Monitor endotracheal (as able) and nasal secretions for changes in amount and color  - [place] appropriate cooling/warming therapies per order  - Administer medications as ordered  - Instruct and encourage patient and family to use good hand hygiene technique  - Identify and instruct in appropriate isolation precautions for identified infection/condition Outcome: Progressing    Goal: Absence of fever/infection during neutropenic period  INTERVENTIONS:  - Monitor WBC     Outcome: Progressing      Problem: SAFETY ADULT  Goal: Maintain or return to baseline ADL function  INTERVENTIONS:  -  Assess patient's ability to carry out ADLs; assess patient's baseline for ADL function and identify physical deficits which impact ability to perform ADLs (bathing, care of mouth/teeth, toileting, grooming, dressing, etc )  - Assess/evaluate cause of self-care deficits   - Assess range of motion  - Assess patient's mobility; develop plan if impaired  - Assess patient's need for assistive devices and provide as appropriate  - Encourage maximum independence but intervene and supervise when necessary  ¯ Involve family in performance of ADLs  ¯ Assess for home care needs following discharge   ¯ Request OT consult to assist with ADL evaluation and planning for discharge  ¯ Provide patient education as appropriate   Outcome: Progressing    Goal: Maintain or return mobility status to optimal level  INTERVENTIONS:  - Assess patient's baseline mobility status (ambulation, transfers, stairs, etc )    - Identify cognitive and physical deficits and behaviors that affect mobility  - Identify mobility aids required to assist with transfers and/or ambulation (gait belt, sit-to-stand, lift, walker, cane, etc )  - [place] fall precautions as indicated by assessment  - Record patient progress and toleration of activity level on Mobility SBAR; progress patient to next Phase/Stage  - Instruct patient to call for assistance with activity based on assessment  - Request Rehabilitation consult to assist with strengthening/weightbearing, etc    Outcome: Progressing      Problem: DISCHARGE PLANNING  Goal: Discharge to home or other facility with appropriate resources  INTERVENTIONS:  - Identify barriers to discharge w/patient and caregiver  - Arrange for needed discharge resources and transportation as appropriate  - Identify discharge learning needs (meds, wound care, etc )  - Arrange for interpretive services to assist at discharge as needed  - Refer to Case Management Department for coordinating discharge planning if the patient needs post-hospital services based on physician/advanced practitioner order or complex needs related to functional status, cognitive ability, or social support system   Outcome: Progressing      Problem: Knowledge Deficit  Goal: Patient/family/caregiver demonstrates understanding of disease process, treatment plan, medications, and discharge instructions  Complete learning assessment and assess knowledge base    Interventions:  - Provide teaching at level of understanding  - Provide teaching via preferred learning methods   Outcome: Progressing      Problem: GASTROINTESTINAL - ADULT  Goal: Maintains adequate nutritional intake  INTERVENTIONS:  - Monitor percentage of each meal consumed  - Identify factors contributing to decreased intake, treat as appropriate  - Assist with meals as needed  - Monitor I&O, WT and lab values  - Obtain nutrition services referral as needed   Outcome: Progressing      Problem: SKIN/TISSUE INTEGRITY - ADULT  Goal: Skin integrity remains intact  INTERVENTIONS  - Identify patients at risk for skin breakdown  - Assess and monitor skin integrity  - Assess and monitor nutrition and hydration status  - Monitor labs (i e  albumin)  - Assess for incontinence   - Turn and reposition patient  - Assist with mobility/ambulation  - Relieve pressure over bony prominences  - Avoid friction and shearing  - Provide appropriate hygiene as needed including keeping skin clean and dry  - Evaluate need for skin moisturizer/barrier cream  - Collaborate with interdisciplinary team (i e  Nutrition, Rehabilitation, etc )   - Patient/family teaching   Outcome: Progressing    Goal: Incision(s), wounds(s) or drain site(s) healing without S/S of infection  INTERVENTIONS  - Assess and document risk factors for skin impairment   - Assess and document dressing, incision, wound bed, drain sites and surrounding tissue  - Initiate Nutrition services consult and/or wound management as needed   Outcome: Progressing      Problem: MUSCULOSKELETAL - ADULT  Goal: Maintain or return mobility to safest level of function  INTERVENTIONS:  - Assess patient's ability to carry out ADLs; assess patient's baseline for ADL function and identify physical deficits which impact ability to perform ADLs (bathing, care of mouth/teeth, toileting, grooming, dressing, etc )  - Assess/evaluate cause of self-care deficits   - Assess range of motion  - Assess patient's mobility; develop plan if impaired  - Assess patient's need for assistive devices and provide as appropriate  - Encourage maximum independence but intervene and supervise when necessary  - Involve family in performance of ADLs  - Assess for home care needs following discharge   - Request OT consult to assist with ADL evaluation and planning for discharge  - Provide patient education as appropriate   Outcome: Progressing    Goal: Maintain proper alignment of affected body part  INTERVENTIONS:  - Support, maintain and protect limb and body alignment  - Provide pt/fam with appropriate education   Outcome: Progressing      Problem: DISCHARGE PLANNING - CARE MANAGEMENT  Goal: Discharge to post-acute care or home with appropriate resources  INTERVENTIONS:  - Conduct assessment to determine patient/family and health care team treatment goals, and need for post-acute services based on payer coverage, community resources, and patient preferences, and barriers to discharge  - Address psychosocial, clinical, and financial barriers to discharge as identified in assessment in conjunction with the patient/family and health care team  - Arrange appropriate level of post-acute services according to patient's   needs and preference and payer coverage in collaboration with the physician and health care team  - Communicate with and update the patient/family, physician, and health care team regarding progress on the discharge plan  - Arrange appropriate transportation to post-acute venues   Outcome: Progressing

## 2019-02-04 NOTE — PLAN OF CARE
Problem: PHYSICAL THERAPY ADULT  Goal: Performs mobility at highest level of function for planned discharge setting  See evaluation for individualized goals  Treatment/Interventions: Functional transfer training, LE strengthening/ROM, Therapeutic exercise, Endurance training, Patient/family training, Equipment eval/education, Bed mobility, Gait training  Equipment Recommended: Linh Malloy, Wheelchair       See flowsheet documentation for full assessment, interventions and recommendations  Prognosis: Good  Problem List: Decreased strength, Decreased endurance, Impaired balance, Decreased range of motion, Decreased mobility, Decreased skin integrity, Pain, Orthopedic restrictions  Assessment: Pt was agreeable to PT treatment session  He was educated on desensitization of the residual limb with gentle tapping and rubbing to minimize phantom pain  He also performed supine quad sets for strengthening and stretching of the LLE  Pt tolerated sitting EOB with passive gentle L knee extension stretching to reduce knee flexion contracture and education on performing LAQ while seated to strengthen and stretch the limb  Pt required AAROM for LAQ of the LLE for full extension with visible muscle fatigue  He tolerated stand pivot transfer with mod A x1 and standby A x1 for safety to the bedside chair with cues for upright postures and A for stability  Post-transfer, pt demonstrated SOB and reported fatigue  He would continue to benefit from skilled PT to improve strength, endurance, balance, and mobility to return to PLOF  Recommendation: Short-term skilled PT (PMR consult)     PT - OK to Discharge: Yes    See flowsheet documentation for full assessment

## 2019-02-04 NOTE — ASSESSMENT & PLAN NOTE
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

## 2019-02-04 NOTE — UTILIZATION REVIEW
Continued Stay Review    Date: 2/2/19    Vital Signs:   02/02/19 2330  --  70  --   107/44  --  --  --   02/02/19 2312  98 4 °F (36 9 °C)  77  18   100/42  --  98 %  --   02/02/19 2300  --  78  --   99/45  --  --  --   02/02/19 2128  --  --  --   95/45  --  --  --   02/02/19 2100  --  78  --   103/45  --  --  Nasal cannula   02/02/19 1530  --  80  --   98/46  --  --  --   02/02/19 0228  97 6 °F (36 4 °C)  58  --   103/48  73  100 %  --   02/02/19 0145  --   53  16  130/55  82  100 %  --   02/02/19 0100   97 4 °F (36 3 °C)  63  16  127/57  --  --  --   02/02/19 0030   97 4 °F (36 3 °C)   51  16  142/60  97  100 %  --     Assessment/Plan:   2/2 Vascular Surgery Progress Note  79 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, now s/p L BKA, POD #1     Plan:  --Wean Phenylephrine gtt, currently @10  --f/u AM CBC  --Regular diet  --Pain control  --Keep L knee straight     Medications:   Scheduled Meds:   Current Facility-Administered Medications:  acetaminophen 650 mg Oral Q6H PRN   aspirin 81 mg Oral Daily   chlorhexidine 15 mL Swish & Spit Q12H [facility]   cholecalciferol 2,000 Units Oral Daily   docusate sodium 100 mg Oral BID   ferrous sulfate 325 mg Oral BID   heparin (porcine) 5,000 Units Subcutaneous Q8H [facility]   HYDROmorphone 0 5 mg Intravenous Q4H PRN   metoclopramide 10 mg Intravenous Q6H PRN   ondansetron 4 mg Intravenous Q4H PRN   oxyCODONE 10 mg Oral Q4H PRN   oxyCODONE 5 mg Oral Q4H PRN   pravastatin 40 mg Oral Daily With Dinner     Continuous Infusions:  Edwin-Synephrine drip titrating     PRN Meds:   Acetaminophen x1    [DISCONTINUED] oxyCODONE **OR** [DISCONTINUED] oxyCODONE **OR** HYDROmorphone x1    metoclopramide    ondansetron    oxyCODONE    oxyCODONE    Pertinent Labs/Diagnostic Results:   Clement 8 1  WBC 11 23  H/H 8 9/27 1    Age/Sex: 80 y o  male     Discharge Plan: [address] young    Network Utilization Review Department  Phone: [phone]; Fax [phone]  [email or web address]  ATTENTION: Please call with any questions or concerns to [phone]  and carefully listen to the prompts so that you are directed to the right person  Send all requests for admission clinical reviews, approved or denied determinations and any other requests to fax [phone]   All voicemails are confidential

## 2019-02-04 NOTE — CONSULTS
PHYSICAL MEDICINE AND REHABILITATION CONSULT NOTE  Yvonnie Kayser 80 y o  male MRN: [id number]  Unit/Bed#: [facility] 531-01 Encounter: [id number]    Requested by (Physician/Service): Mary Beckett MD  Reason for Consultation:  Assessment of rehabilitation needs    Chief complaint: "left leg pain 6/10"     HPI: Yvonnie Kayser is a 80 y o  male with a PMH of PAD having undergone a LLE agram with BK popliteal and PT atherectomy, TPT and peroneal PTA, BK popliteal PTA and AT PTA with vascular surgery on 10/2018  His procedure was complicated by distal embolization requiring pharmacomechanical thrombectomy  He fell and suffered a left temporal SAH with IPH and anticoagulation was stopped  He was followed for 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, 3rd, 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  He presented on 1/28/2019 with cellulitis and tissue loss of the left great toe  It was recommended that he undergo a left BKA as a TMA would likely on heal   He underwent the procedure on 2/1/2019  He required a lesly gtt post-op which has been weaned off  PM&R were consulted for rehabilitation recommendations      Subjective: The patient was seen in his room with family at bedside  He reports he feels well but that "he keeps looking further down" in reference to his residual limb  He reports some SOB with exertion but otherwise feels fine and states that therapy is going well  His spouse is able to provide supervision but can not provide physical assistance  His spouse and daughter are hoping for him to go to [facility]: A 10-point review of systems was performed  Negative except as listed above  - Chart reviewed  - Labs reviewed  - Imaging reviewed     Assessment/Plan:   Rehab  - Continue PT/OT while inpatient  - DVT ppx:  Heparin SQ and SCD to right  - The patient is a good candidate for acute inpatient rehabilitation once medically stable      Severe peripheral arterial disease s/p left BKA  - Vascular following  - Pain control Oxycodone 5 to 10 mg Q 4 hours as needed     Hypotension  - Continue to monitor  - Improved from fluid bolus    Thank you for allowing the PM&R service to participate in the care of this patient  We will continue to follow Sandi Gross progress with you  Please do not hesitate to call with questions or concerns       PT OT    [phone]   Pain Assessment   Pain Assessment 0-10   Pain Score 4   Pain Type Acute pain   Pain Location Leg   Pain Orientation Left   Home Living   Type of [address] One level;Stairs to enter with rails  (1 ARMIN)   Home Equipment Walker   Additional Comments Ambulates with mod I and RW at baseline  Prior Function   Level of [place] Independent with ADLs and functional mobility   Lives With Spouse   ADL Assistance Independent   IADLs (wife drives)   Vocational Retired   Restrictions/Precautions   [place] Bearing Precautions Per Order Yes   LLE [place] Bearing Per Order NWB  (LLE BKA)   Other Precautions Chair Alarm;WBS; Telemetry; Fall Risk;Pain;O2  (2 L O2 NC)   General   Family/Caregiver Present No   Cognition   Overall Cognitive Status WFL   Arousal/Participation Cooperative   Orientation Level Oriented X4   Memory Within functional limits   Following Commands Follows one step commands without difficulty   Comments Pt identified by name and   RLE Assessment   RLE Assessment X   Strength RLE   RLE Overall Strength 4/5  (functionally)   LLE Assessment   LLE Assessment X   Strength LLE   LLE Overall Strength 2-/5  (functionally)   Coordination   Sensation X  (intermittent LLE phantom sensation)   Bed Mobility   Supine to Sit 3  Moderate assistance   Additional items Assist x 1;HOB elevated; Bedrails; Increased time required;Verbal cues;LE management   Sit to Supine Unable to assess  (left OOB in chair post session with alarm intact)   Transfers   Sit to Stand 4  Minimal assistance   Additional items Assist x 2; Increased time required;Verbal cues  (bed elevated)   Stand to Sit 4  Minimal assistance   Additional items Assist x 2; Increased time required;Verbal cues   Stand pivot 4  Minimal assistance   Additional items Assist x 2; Increased time required;Verbal cues  (with RW; increased time to pivot RLE)   Ambulation/Elevation   Gait pattern Not appropriate  (at this time)   Balance   Static Sitting Fair   Dynamic Sitting Poor +   Static Standing Poor +   Dynamic Standing Poor +   Endurance Deficit   Endurance Deficit Yes   Endurance Deficit Description limited standing tolerance, reports fatigue post mobility   Activity Tolerance   Activity Tolerance Patient limited by fatigue   Nurse Made Aware Per RN, pt appropriate to evaluate   Assessment   Prognosis Good   Problem List Decreased strength;Decreased endurance; Impaired balance;Decreased range of motion;Decreased mobility; Decreased skin integrity;Pain;Orthopedic restrictions   Goals   Patient Goals Pt would like to go home as soon as possible  STG Expiration Date 02/12/19   Short Term Goal #1 Pt will be able to: (1) perform bed mobility with supervision (2) perform sit to stand with supervision (3) perform SPT with min A x1 (4) ambulate at least 10` with min A x1 and use of RW (5) initiate HEP (6) increase standing balance by 1 grade   Treatment Day 0   Plan   Treatment/Interventions Functional transfer training;LE strengthening/ROM; Therapeutic exercise; Endurance training;Patient/family training;Equipment eval/education; Bed mobility;Gait training   PT Frequency (3-6x/week)   Recommendation   Recommendation Short-term skilled PT   Equipment Recommended Walker; Wheelchair   Barthel Index   Feeding 10   Bathing 0   Grooming Score 5   Dressing Score 5   Bladder Score 0   Bowels Score 10   Toilet Use Score 5   Transfers (Bed/Chair) Score 10   Mobility (Level Surface) Score 0   Stairs Score 0   Barthel Index Score 45       02/03/19 0955   Restrictions/Precautions   Weight Bearing Precautions Per Order Yes   LLE Weight Bearing Per Order NWB   Other Precautions O2;Fall Risk; Chair Alarm   Pain Assessment   Pain Assessment 0-10   Pain Score 4   Pain Type Acute pain   Pain Location Leg   Pain Orientation Left  (residual limb -occasional phantom pain )   Pain Descriptors Sharp  (very sentative to touch on posterior aspect of L knee/thigh )   Pain Frequency Intermittent   Patient's Stated Pain Goal No pain   Response to Interventions demonstrates good understanding on need for proper positoning and desensitization techs    Home Living   Type of [address] One level;Stairs to enter without rails   Bathroom Shower/Tub Tub/shower unit   [facility]   Prior Function   Level of [place] Independent with ADLs and functional mobility   Lives With Dc-Morgan Help From Family   ADL Assistance Independent   IADLs Independent  (Wife drives )   Vocational Retired   Lifestyle   Autonomy Reports I with ADLs and most IADLs PTA   Reciprocal Relationships Patient is , reports wife is in good health and can provide ADL support if needed    Reports he and wife moved back to [place] from [place] to be closer to children due to their age, there are children in local area    Service to Others Retired woodwork     April 139 enjoys wood working    [address] (W[address]) [address] "I guess I am doing okay, so far it's not that bad"   ADL   Eating Assistance 7  [facility] 5  Supervision/Setup   UB [address] 4  Minimal Assistance   LB [address] 3  Moderate Assistance   [address] 4  Minimal Assistance    [facility] 2  Maximal [address]  1  Total Assistance   Transfers   Sit to Stand 4  Minimal assistance   Additional items Assist x 2   Stand to Sit 4  Minimal assistance   Additional items Assist x 2   Stand pivot 4  Minimal assistance   Additional items Assist x 2;Verbal cues; Increased time required  (RW )   Balance   Static Sitting Fair   Dynamic Sitting Fair -   Static Standing Fair -   Dynamic Standing Poor +   Activity Tolerance   Activity Tolerance Patient limited by fatigue   Nurse Made Aware OT spoke with Moises Rodas PT and Lenny Carlson RN    RUE Assessment   RUE Assessment WFL  (3+/5 )   LUE Assessment   LUE Assessment WFL  (3+/5 )   Hand Function   Gross Motor Coordination Functional   Fine Motor Coordination Functional   Cognition   Overall Cognitive Status WFL   Arousal/Participation Alert   Attention Within functional limits   Orientation Level Oriented X4   Memory Within functional limits   Following Commands Follows all commands and directions without difficulty   Assessment   Limitation Decreased ADL status; Decreased UE strength;Decreased endurance;Decreased self-care trans;Decreased high-level ADLs  (Pain )   Prognosis Good  (very good rehab canidate! )   Assessment Patient is an 81 y/o male admitted with L cellulitis with progressive loss of tissue of L great toe and 2nd, 3rd and 4th toes noted to have dry gangreene  Patient underwent L BKA 2/1, required post-op blood transfusion and is now referred to OT for functional assessment of ADL and IADL ability for discharge recommendations  Patient is very alert and oriented, follows directions and demostrates good awarness and insight in to medical condition  Patient currently presents with decreased activity tolerance, decreased dynamic sitting and standing balance as well as pain in residual limb limiting occupational performance in areas of bathing, dressing, toileting, ADL transfers and functional mobility  Patient reports fully I with ADLs and  most IADLs (does not drive) PTA  Reports wife is supportive and can provide some ADL support if needed but can not physically A with mobility     From OT standpoint, anticipate need for short course of in-patient rehab prior to discharge home with family support  Anticipate patient may be canidate for prosthetic leg in future, physiatry consult may be helpful  Will continue to follow for acute care OT services to progress ADL skills to highest level of indepdence and safety via rehabiltiative and adaptive strategies  Goals   Patient Goals "I know I need to go to rehab"   LTG Time Frame 3-7   Long Term Goal #1 1  Patient will complete UB bathing, dressing and grooming at Mod I level seated in chair or wheelchair at sink  2   Patient will increase sit to stand to SBA level for LB ADLs and clothing mgt in stance; 3  Patient will increase LB bathing/dressing to Min A level with G dynamic sitting/standing balance; 4  Patient will complete transfers bed/chair/toilet to SBA level with G safety awanress5  Patient will be I with BUE HEP to improve and promote UB strength to 4/5   Plan   Treatment Interventions ADL retraining;Functional transfer training;UE strengthening/ROM; Endurance training;Equipment evaluation/education; Activityengagement   Goal Expiration Date 02/10/19   OT Frequency 5x/wk   Recommendation   Recommendation PT consult; Physiatry Consult   OT Discharge Recommendation Short Term Rehab   Barthel Index   Feeding 10   Bathing 0   Grooming Score 5   Dressing Score 5   Bladder Score 0   Bowels Score 10   Toilet Use Score 5   Transfers (Bed/Chair) Score 10   Mobility (Level Surface) Score 0   Stairs Score 0   Barthel Index Score 45          Physical Exam:  General: alert, no apparent distress, cooperative and comfortable  HEENT:  Head: Normal, normocephalic, atraumatic  CARDIAC:  normal apical impulse  LUNGS:  normal air entry, lungs clear to auscultation  ABDOMEN:  soft, non-tender   Bowel sounds normal  No masses, no organomegaly  EXTREMITIES:  extremities normal, warm and well-perfused; no cyanosis, clubbing, or edema  NEURO:   normal without focal findings and mental status, speech normal, alert and oriented x3  PSYCH: Normal   INCISION:  dressings present  Musculoskeletal - Strength:   Right  Left  Site  Right  Left  Site    5 5  S Ab: Shoulder Abductors  5  5  HF: Hip Flexors    5 5  EF: Elbow Flexors  5 NT KF: Knee Flexors    5  5  EE: Elbow Extensors  5  NT  KE: Knee Extensors    5  5  WE: Wrist Extensors  5  NA DR: Dorsi Flexors    5  5  FF: Finger Flexors  5  NA PF: Plantar Flexors    5  5  HI: Hand Intrinsics  5  NA EHL: Extensor Hallucis Longus     Julia Lombardo Lives with: lives with their spouse  He lives in [facility] single family home  The living area: can live on one level  Equipment in home: Shower Chair, [address], [address] [facility] and [address]  There no steps to enter the home  Patient/family's goals: Return to previous home/apartment    The patient will have 24 hour ARC Supervision/physical assistance: supervision available upon discharge      Allergies:   No Known Allergies     Past Medical History:   Past Surgical History:   Family History:   Social history:   Past Medical History:   Diagnosis Date    Anemia     Arthritis     Bifascicular block     Cellulitis of chest wall     Chronic kidney disease     Dupuytren contracture     DVT femoral (deep venous thrombosis) with thrombophlebitis, right (HCC)     Elevated glucose     Hemothorax     Left    Hyperlipidemia     Vascular disease     Past Surgical History:   Procedure Laterality Date    AMPUTATION  1972    L thumb    CARPAL TUNNEL RELEASE  2016    L hand    CATARACT EXTRACTION      COLONOSCOPY      EYE SURGERY      FRACTURE SURGERY      HAND SURGERY      IR LOWER EXTREMITY / INTERVENTION  10/26/2018    KNEE ARTHROSCOPY      NERVE BLOCK      NC SLCTV CATHJ 3RD+ ORD SLCTV ABDL [facility] Left 3/9/2018    Procedure: LEFT LOWER EXTREMITY ARTERIOGRAM WITH PTA OF LEFT POPLITEAL ARTERY;  Surgeon: Clemente Penn MD;  [facility];  Service: Vascular    NC Major Teixeira 3RD+ ORD SLCTV ABDL [facility] Left 10/26/2018 Procedure: ARTERIOGRAM, angioplasty stent and atherectomy;  Surgeon: Yvon Preston MD;  [facility];  Service: Vascular    THORACENTESIS       Family History   Problem Relation Age of Onset    Lung cancer Mother     Brain cancer Mother     Diabetes Father     Aneurysm Father     Stroke Father     Lung cancer Father     Brain cancer Father     Diabetes Maternal Grandmother       Social History     Social History    Marital status: /Civil Union     Spouse name: N/A    Number of children: 3    Years of education: N/A     Occupational History    retired      Social History Main Topics    Smoking status: Former Smoker    Smokeless tobacco: Never Used      Comment: quit 57 years ago    Alcohol use No    Drug use: No    Sexual activity: No     Other Topics Concern    Not on file     Social History Narrative    Previous           Vital Signs: Reviewed    Temp:  [98 1 °F (36 7 °C)-98 7 °F (37 1 °C)] 98 1 °F (36 7 °C)  HR:  [82-99] 88  Resp:  [18-19] 19  BP: ()/(49-58) 106/51   Intake/Output Summary (Last 24 hours) at 02/04/19 1228  Last data filed at 02/04/19 1025   Gross per 24 hour   Intake              920 ml   Output             1480 ml   Net             -560 ml        Laboratory: Reviewed    Lab Results   Component Value Date    HGB 7 8 (L) 02/04/2019    HGB 10 7 (L) 10/12/2017    HCT 24 4 (L) 02/04/2019    HCT 32 3 (L) 10/12/2017    WBC 8 97 02/04/2019    WBC 10 6 10/12/2017     Lab Results   Component Value Date    BUN 17 02/04/2019    K 4 0 02/04/2019     02/04/2019    GLUCOSE 108 02/01/2019    CREATININE 0 90 02/04/2019     Lab Results   Component Value Date    PROTIME 14 8 (H) 01/28/2019    INR 1 15 01/28/2019        Imaging: Reviewed  Xr Chest Pa & Lateral    Result Date: 1/29/2019  Impression: Underlying COPD and prominent interstitial markings as before  No acute cardiopulmonary disease   Workstation performed: [id number]       Current Medications: Current Facility-Administered Medications:     acetaminophen (TYLENOL) tablet 650 mg, 650 mg, Oral, Q6H PRN, Renie Najjar, PA-C, 650 mg at 02/04/19 0937    aspirin chewable tablet 81 mg, 81 mg, Oral, Daily, Renie Najjar, PA-C, 81 mg at 02/04/19 0900    chlorhexidine (PERIDEX) 0 12 % oral rinse 15 mL, 15 mL, [employer], Q12H [facility], Rj Edwards MD, 15 mL at 02/04/19 0900    cholecalciferol (VITAMIN D3) tablet 2,000 Units, 2,000 Units, Oral, Daily, Renie Najjar, PA-C, 2,000 Units at 02/04/19 0900    docusate sodium (COLACE) capsule 100 mg, 100 mg, Oral, BID, Renie Najjar, PA-C, 100 mg at 02/04/19 0900    ferrous sulfate tablet 325 mg, 325 mg, Oral, BID, Renie Najjar, PA-C, 325 mg at 02/04/19 0900    heparin (porcine) subcutaneous injection 5,000 Units, 5,000 Units, Subcutaneous, Q8H [facility], 5,000 Units at 02/04/19 0509 **AND** [CANCELED] Platelet count, , , Once, Renie Najjar, PA-C    [DISCONTINUED] oxyCODONE (ROXICODONE) IR tablet 5 mg, 5 mg, Oral, Q4H PRN **OR** [DISCONTINUED] oxyCODONE (ROXICODONE) immediate release tablet 10 mg, 10 mg, Oral, Q4H PRN, 10 mg at 01/31/19 0809 **OR** HYDROmorphone (DILAUDID) injection 0 5 mg, 0 5 mg, Intravenous, Q4H PRN, Renie Najjar, PA-C    metoclopramide (REGLAN) injection 10 mg, 10 mg, Intravenous, Q6H PRN, Chayito Chery MD    ondansetron [facility]) injection 4 mg, 4 mg, Intravenous, Q4H PRN, Kristyn Villalobos MD    oxyCODONE (ROXICODONE) immediate release tablet 10 mg, 10 mg, Oral, Q4H PRN, Renie Najjar, PA-C, 10 mg at 02/04/19 0615    oxyCODONE (ROXICODONE) IR tablet 5 mg, 5 mg, Oral, Q4H PRN, Renie Najjar, PA-C    pravastatin (PRAVACHOL) tablet 40 mg, 40 mg, Oral, Daily With Bethanie Ibarra PA-C, 40 mg at 02/03/19 7162

## 2019-02-04 NOTE — SOCIAL WORK
Met with patient and wife at their request  Update provided and wife requests therapy work with patient while she is here today until 4pm  They hope that patient is approved by  and are aware that update was sent

## 2019-02-04 NOTE — PHYSICAL THERAPY NOTE
PT Treatment       02/04/19 1729   Pain Assessment   Pain Assessment 0-10   Pain Score No Pain   Pain Type Acute pain   Pain Location Leg   Pain Orientation Left   Restrictions/Precautions   Weight Bearing Precautions Per Order Yes   LLE Weight Bearing Per Order NWB   Other Precautions Telemetry; Fall Risk;Pain   General   Chart Reviewed Yes   Additional Pertinent History L BKA   Response to Previous Treatment Patient with no complaints from previous session  Family/Caregiver Present No   Cognition   Overall Cognitive Status WFL   Arousal/Participation Alert   Attention Within functional limits   Orientation Level Oriented X4   Memory Within functional limits   Following Commands Follows all commands and directions without difficulty   Comments Pt was pleasant and agreeable to PT treatment session  Bed Mobility   Supine to Sit 3  Moderate assistance   Additional items Assist x 1; Increased time required;LE management   Additional Comments seated in bedside chair at end of session   Transfers   Sit to Stand 3  Moderate assistance   Additional items Assist x 1; Increased time required   Stand to Sit 3  Moderate assistance   Additional items Assist x 1; Increased time required   Stand pivot 3  Moderate assistance   Additional items Assist x 1; Increased time required;Verbal cues   Ambulation/Elevation   Gait pattern Not appropriate   Balance   Static Sitting Poor +   Dynamic Sitting Poor +   Static Standing Poor   Dynamic Standing Poor   Endurance Deficit   Endurance Deficit Yes   Endurance Deficit Description fatigue, muscle weakness   Activity Tolerance   Activity Tolerance Patient limited by fatigue   Nurse Made Aware RN cleared pt for PT session   Exercises   Quad Sets 10 reps; Supine;Left   Knee AROM Long Arc Quad 10 reps;AAROM;AROM; Bilateral;Sitting  (AAROM for LLE)   Assessment   Prognosis Good   Problem List Decreased strength;Decreased endurance; Impaired balance;Decreased range of motion;Decreased mobility; Decreased skin integrity;Pain;Orthopedic restrictions   Assessment Pt was agreeable to PT treatment session  He was educated on desensitization of the residual limb with gentle tapping and rubbing to minimize phantom pain  He also performed supine quad sets for strengthening and stretching of the LLE  Pt tolerated sitting EOB with passive gentle L knee extension stretching to reduce knee flexion contracture and education on performing LAQ while seated to strengthen and stretch the limb  Pt required AAROM for LAQ of the LLE for full extension with visible muscle fatigue  He tolerated stand pivot transfer with mod A x1 and standby A x1 for safety to the bedside chair with cues for upright postures and A for stability  Post-transfer, pt demonstrated SOB and reported fatigue  He would continue to benefit from skilled PT to improve strength, endurance, balance, and mobility to return to PLOF      Goals   Patient Goals Would like to walk with a prosthetic limb   STG Expiration Date 02/12/19   Short Term Goal #1 Pt will be able to: (1) perform bed mobility with supervision (2) perform sit to stand with supervision (3) perform SPT with min A x1 (4) ambulate at least 10` with min A x1 and use of RW (5) initiate HEP (6) increase standing balance by 1 grade   Treatment Day 1   Plan   Treatment/Interventions Functional transfer training;LE strengthening/ROM   Progress Progressing toward goals   PT Frequency (3-6x/wk)   Recommendation   Recommendation Short-term skilled PT  (PMR consult)   Equipment Recommended Wheelchair;Walker   PT - OK to Discharge Yes   Additional Comments To acute rehab when medically stable     Gin Hauser, PT, DPT

## 2019-02-04 NOTE — RESTORATIVE TECHNICIAN NOTE
Restorative Specialist Mobility Note       Activity: Bedrest, Dangle, Stand at bedside, Turn, Chair     Assistive Device: Front wheel walker     Ambulation Response: Tolerated fairly well  Repositioned: Sitting, Up in chair                          Assisted therapy during session  For all/additional information please see therapy note(s)        Jay Vásquez Restorative Technician, BS

## 2019-02-04 NOTE — PROGRESS NOTES
Vascular Surgery    Progress Note - Estephanie Hyde 1933, 80 y o  male MRN: [id number]    Unit/Bed#: 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    Plan:  --Pain control  --ACE wrap  --PT/OT  --Rehab planning               Subjective:  Patient c/o incisional pain, low SBP overnight required fluid bolus w/ improvement per nursing    Vitals:  /53 (BP Location: Right arm)   Pulse 82   Temp 98 6 °F (37 °C) (Oral)   Resp 18   Ht 5' 10" (1 778 m)   Wt 60 2 kg (132 lb 11 5 oz)   SpO2 97%   BMI 19 04 kg/m²     I/Os:  I/O last 3 completed shifts: In: 600 [P O :600]  Out: 1835 [[facility]:7025]  I/O this shift:   In: 500 [IV Piggyback:500]  [facility]: 5856 [facility]]    Lab Results and Cultures:   Lab Results   Component Value Date    WBC 8 97 02/04/2019    HGB 7 8 (L) 02/04/2019    HCT 24 4 (L) 02/04/2019    MCV 91 02/04/2019     02/04/2019     Lab Results   Component Value Date    GLUCOSE 108 02/01/2019    CALCIUM 8 0 (L) 02/04/2019    K 4 0 02/04/2019    CO2 30 02/04/2019     02/04/2019    BUN 17 02/04/2019    CREATININE 0 90 02/04/2019     Lab Results   Component Value Date    INR 1 15 01/28/2019    INR 2 13 (H) 10/29/2018    INR 1 74 (H) 10/29/2018    PROTIME 14 8 (H) 01/28/2019    PROTIME 23 9 (H) 10/29/2018    PROTIME 20 4 (H) 10/29/2018        Blood Culture: No results found for: BLOODCX,   Urinalysis:   Lab Results   Component Value Date    COLORU n/a 11/23/2018    CLARITYU n/a 11/23/2018    LEUKOCYTESUR neg 11/23/2018    NITRITE neg 11/23/2018    GLUCOSEU neg 11/23/2018    KETONESU neg 11/23/2018    BILIRUBINUR neg 11/23/2018    BLOODU trace 11/23/2018   ,   Urine Culture: No results found for: URINECX,   Wound Culure: No results found for: WOUNDCULT    Medications:  Current Facility-Administered Medications   Medication Dose Route Frequency    acetaminophen (TYLENOL) tablet 650 mg  650 mg Oral Q6H PRN    aspirin chewable tablet 81 mg  81 mg Oral Daily    chlorhexidine (PERIDEX) 0 12 % oral rinse 15 mL  15 mL Swish & Spit Q12H [facility]    cholecalciferol (VITAMIN D3) tablet 2,000 Units  2,000 Units Oral Daily    docusate sodium (COLACE) capsule 100 mg  100 mg Oral BID    ferrous sulfate tablet 325 mg  325 mg Oral BID    heparin (porcine) subcutaneous injection 5,000 Units  5,000 Units Subcutaneous Q8H [facility]    HYDROmorphone (DILAUDID) injection 0 5 mg  0 5 mg Intravenous Q4H PRN    metoclopramide (REGLAN) injection 10 mg  10 mg Intravenous Q6H PRN    ondansetron (ZOFRAN) injection 4 mg  4 mg Intravenous Q4H PRN    oxyCODONE (ROXICODONE) immediate release tablet 10 mg  10 mg Oral Q4H PRN    oxyCODONE (ROXICODONE) IR tablet 5 mg  5 mg Oral Q4H PRN    phenylephrine (JUAN FRANCISCO-SYNEPHRINE) 50 mg (STANDARD CONCENTRATION) in sodium chloride 0 9% 250 mL   mcg/min Intravenous Titrated    pravastatin (PRAVACHOL) tablet 40 mg  40 mg Oral Daily With Dinner    sodium chloride 0 9 % bolus 500 mL  500 mL Intravenous Once       Physical Exam:    General appearance: alert and oriented, in no acute distress  Lungs: clear to auscultation bilaterally  Heart: regular rate and rhythm, S1, S2 normal, no murmur, click, rub or gallop  Abdomen: soft, non-tender; bowel sounds normal; no masses,  no organomegaly  Extremities: S/P Left BKA, RIGHT no CCE    Wound/Incision:  L BKA stump incision clean, dry, and intact      Iban Holt PA-C  2/4/2019

## 2019-02-05 VITALS
WEIGHT: 132.72 LBS | RESPIRATION RATE: 18 BRPM | TEMPERATURE: 98.4 F | DIASTOLIC BLOOD PRESSURE: 56 MMHG | SYSTOLIC BLOOD PRESSURE: 117 MMHG | HEIGHT: 70 IN | BODY MASS INDEX: 19 KG/M2 | HEART RATE: 75 BPM | OXYGEN SATURATION: 95 %

## 2019-02-05 LAB
ANION GAP SERPL CALCULATED.3IONS-SCNC: 3 MMOL/L (ref 4–13)
BUN SERPL-MCNC: 16 MG/DL (ref 5–25)
CALCIUM SERPL-MCNC: 8.2 MG/DL (ref 8.3–10.1)
CHLORIDE SERPL-SCNC: 98 MMOL/L (ref 100–108)
CO2 SERPL-SCNC: 32 MMOL/L (ref 21–32)
CREAT SERPL-MCNC: 0.9 MG/DL (ref 0.6–1.3)
ERYTHROCYTE [DISTWIDTH] IN BLOOD BY AUTOMATED COUNT: 13.9 % (ref 11.6–15.1)
GFR SERPL CREATININE-BSD FRML MDRD: 78 ML/MIN/1.73SQ M
GLUCOSE SERPL-MCNC: 104 MG/DL (ref 65–140)
HCT VFR BLD AUTO: 23.5 % (ref 36.5–49.3)
HGB BLD-MCNC: 7.6 G/DL (ref 12–17)
MCH RBC QN AUTO: 29.6 PG (ref 26.8–34.3)
MCHC RBC AUTO-ENTMCNC: 32.3 G/DL (ref 31.4–37.4)
MCV RBC AUTO: 91 FL (ref 82–98)
PLATELET # BLD AUTO: 189 THOUSANDS/UL (ref 149–390)
PMV BLD AUTO: 9.6 FL (ref 8.9–12.7)
POTASSIUM SERPL-SCNC: 4 MMOL/L (ref 3.5–5.3)
RBC # BLD AUTO: 2.57 MILLION/UL (ref 3.88–5.62)
SODIUM SERPL-SCNC: 133 MMOL/L (ref 136–145)
WBC # BLD AUTO: 8.5 THOUSAND/UL (ref 4.31–10.16)

## 2019-02-05 PROCEDURE — 99024 POSTOP FOLLOW-UP VISIT: CPT | Performed by: PHYSICIAN ASSISTANT

## 2019-02-05 PROCEDURE — 85027 COMPLETE CBC AUTOMATED: CPT | Performed by: PHYSICIAN ASSISTANT

## 2019-02-05 PROCEDURE — 80048 BASIC METABOLIC PNL TOTAL CA: CPT | Performed by: PHYSICIAN ASSISTANT

## 2019-02-05 RX ORDER — DOCUSATE SODIUM 100 MG/1
100 CAPSULE, LIQUID FILLED ORAL 2 TIMES DAILY
Qty: 10 CAPSULE | Refills: 0
Start: 2019-02-05 | End: 2019-06-03

## 2019-02-05 RX ORDER — OXYCODONE HYDROCHLORIDE 5 MG/1
5 TABLET ORAL EVERY 6 HOURS PRN
Qty: 60 TABLET | Refills: 0
Start: 2019-02-05 | End: 2019-02-15

## 2019-02-05 RX ORDER — POLYETHYLENE GLYCOL 3350 17 G/17G
17 POWDER, FOR SOLUTION ORAL DAILY PRN
Qty: 14 EACH | Refills: 0
Start: 2019-02-05 | End: 2019-04-29 | Stop reason: ALTCHOICE

## 2019-02-05 RX ORDER — ACETAMINOPHEN 325 MG/1
650 TABLET ORAL EVERY 6 HOURS PRN
Qty: 30 TABLET | Refills: 0
Start: 2019-02-05 | End: 2022-01-01 | Stop reason: ALTCHOICE

## 2019-02-05 RX ORDER — OXYCODONE HYDROCHLORIDE 5 MG/1
5 TABLET ORAL ONCE
Status: COMPLETED | OUTPATIENT
Start: 2019-02-05 | End: 2019-02-05

## 2019-02-05 RX ADMIN — HEPARIN SODIUM 5000 UNITS: 5000 INJECTION INTRAVENOUS; SUBCUTANEOUS at 05:35

## 2019-02-05 RX ADMIN — OXYCODONE HYDROCHLORIDE 5 MG: 5 TABLET ORAL at 16:12

## 2019-02-05 RX ADMIN — VITAMIN D, TAB 1000IU (100/BT) 2000 UNITS: 25 TAB at 08:51

## 2019-02-05 RX ADMIN — FERROUS SULFATE TAB 325 MG (65 MG ELEMENTAL FE) 325 MG: 325 (65 FE) TAB at 08:51

## 2019-02-05 RX ADMIN — HEPARIN SODIUM 5000 UNITS: 5000 INJECTION INTRAVENOUS; SUBCUTANEOUS at 14:35

## 2019-02-05 RX ADMIN — DOCUSATE SODIUM 100 MG: 100 CAPSULE, LIQUID FILLED ORAL at 08:52

## 2019-02-05 RX ADMIN — CHLORHEXIDINE GLUCONATE 15 ML: 1.2 RINSE ORAL at 08:53

## 2019-02-05 RX ADMIN — OXYCODONE HYDROCHLORIDE 5 MG: 5 TABLET ORAL at 04:04

## 2019-02-05 RX ADMIN — BISACODYL 10 MG: 10 SUPPOSITORY RECTAL at 08:36

## 2019-02-05 RX ADMIN — OXYCODONE HYDROCHLORIDE 5 MG: 5 TABLET ORAL at 09:31

## 2019-02-05 RX ADMIN — ASPIRIN 81 MG 81 MG: 81 TABLET ORAL at 08:51

## 2019-02-05 NOTE — ASSESSMENT & PLAN NOTE
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

## 2019-02-05 NOTE — PROGRESS NOTES
02/05/19 1200   Spiritual Beliefs/Perceptions   Concept of God Accepting   God's Role in Disease Natural   Support Systems Children;Family members   Psychosocial   Length of Time/Family Visitation 16-30 min   Stress Factors   Patient Stress Factors Health changes   Coping Responses   Patient Coping Accepting;Open/discussion   Plan of Care   Comments Facilitated story telling  Cultivated a relationship of care and support  Provided prayer  Expressed intermediate hope  Normaliized experience of patient  Provided chaplaincy education      Assessment Completed by: Unit visit

## 2019-02-05 NOTE — DISCHARGE SUMMARY
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    Plan:  --Pain control  --ACE wrap  --PT/OT/Rehab  --Outpatient Follow-up in the Vascular Center     Anemia   Assessment & Plan    Acute blood loss anemia on chronic anemia    Plan:  --Monitor  --Iron supplements           Secondary Diagnosis:  Past Medical History:   Diagnosis Date    Anemia     Arthritis     Bifascicular block     Cellulitis of chest wall     Chronic kidney disease     Dupuytren contracture     DVT femoral (deep venous thrombosis) with thrombophlebitis, right (HCC)     Elevated glucose     Hemothorax     Left    Hyperlipidemia     Vascular disease      Past Surgical History:   Procedure Laterality Date    AMPUTATION  1972    L thumb    CARPAL TUNNEL RELEASE  2016    L hand    CATARACT EXTRACTION      COLONOSCOPY      EYE SURGERY      FRACTURE SURGERY      HAND SURGERY      IR LOWER EXTREMITY / INTERVENTION  10/26/2018    KNEE ARTHROSCOPY      NERVE BLOCK      GA AMPUTATION LOW LEG THRU TIB/FIB Left 2/1/2019    Procedure: AMPUTATION BELOW KNEE (BKA);   Surgeon: Rubi Ramirez MD;  Location: [facility];  Service: Vascular    GA Dennis [facility]veri [facility]RD+ [facility] Left 3/9/2018    Procedure: LEFT LOWER EXTREMITY ARTERIOGRAM WITH PTA OF LEFT POPLITEAL ARTERY;  Surgeon: Bbo Segundo MD;  Location: [facility];  Service: Vascular    GA Dennis Jhaveri 3RD+ ORD [facility] Left 10/26/2018    Procedure: ARTERIOGRAM, angioplasty stent and atherectomy;  Surgeon: Bob Segundo MD;  Location: [facility];  Service: Vascular    THORACENTESIS          Admitting Diagnosis: Atheroembolism of left lower extremity [I99 022]    Attending: Dr Oscar Batista    Consultants:   [phone] [facility]  [facility]  Podiatry  Infectious disease  PM&R    Procedures Performed: Left BKA 2/1- D[place]    Discharge Medications:  See after visit summary for reconciled discharge medications provided to patient and family  HPI: Yvonnie Kayser is a 80 y o  male known to the Vascular service for PAD having undergone LLE Agram w/ BK popliteal and PT atherectomy, TPT and peroneal PTA, BK popliteal PTA, and AT PTA  Unfortunately, the procedure was complicated by distal embolization requiring pharmacomechanical thrombectomy with Angiojet and tPA administration  This was performed in October of 2018  The patient was subsequently placed on dual anti-platelet therapy along with anticoagulation  Unfortunately he then suffered a fall with a 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 at which time anticoagulation was discontinued  Candelario Brito is followed for the past several months by Dr Oscar Batista for progressive tissue loss with left 2nd, 3rd, and 4th toe dry gangrene  Unfortunately, he now presented with cellulitis and tissue loss of the left great toe  With known significant calcified tibial occlusive disease, he does not have any revascularization options and will likely not heal a TMA  He has therefore resigned himself to inevitable need for BKA, and is agreeable  He was seen by Dr Oscar Batista today in office evaluation at which time recommendation was for admission for further imaging, medical maximization, and IV antibiotics, and likely plan for left BKA during the hospitalization      Hospital Course: He was admitted to [facility] on 1/28/19 in anticipation for Left BKA  Pre-operatively he was seen in consultation by cardiology and medicine teams and was cleared to proceed with surgery   He was seen and followed by ID and was treated with Ancef prior to surgery and podiatry also evaluated him an recommended betadine paint to the toes until amputation completed  He was taken to the operating room on 2/1 and underwent Left BKA as planned  He did require blood transfusion perioperatively for acute blood loss on chronic anemia  He tolerated surgery well  He dis require lesly gtt for low blood pressure immediately post-op  His blood pressures improved and lesly was discontinued on POD #2  The remainder of his hospital course was without complications  Surgical dressing was changed on POD#3 and the wound was healing well without evidence for infection or necrosis  He was seen and evaluated by PT/OT and PM&R physicians and was recommended for discharge to rehab  He was approved for [facility] to [facility] on 2/5/19  His pain was well controlled prior to discharge  Condition at Discharge: stable     Provisions for Follow-Up Care:  See after visit summary for information related to follow-up care and any pertinent home health orders  Disposition: See After Visit Summary for discharge disposition information  Discharge instructions/Information to patient and family:   See after visit summary for information provided to patient and family  Planned Readmission: No    Discharge Statement   I spent 30 minutes discharging the patient  This time was spent on the day of discharge  I had direct contact with the patient on the day of discharge  Additional documentation is required if more than 30 minutes were spent on discharge

## 2019-02-05 NOTE — SOCIAL WORK
Cm advised pt cleared for d/c  Cm relayed information to [facility] and they will submit to [facility] for Ottis Bosworth

## 2019-02-05 NOTE — PROGRESS NOTES
02/05/19 1200   Clinical Encounter Type   Visited With Patient   Routine Visit Introduction   Faith Encounters   Faith Needs Prayer   Patient Spiritual Encounters   Spiritual Assessment 3   Suffering Severity 4   Coping 5

## 2019-02-05 NOTE — PROGRESS NOTES
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    Plan:  --Pain control  --ACE wrap  --PT/OT  --Tentative discharge today if discharge arrangements complete         Subjective:  Pt resting comfortably    Vitals:  /57   Pulse 63   Temp 98 4 °F (36 9 °C) (Oral)   Resp 18   Ht 5' 10" (1 778 m)   Wt 60 2 kg (132 lb 11 5 oz)   SpO2 100%   BMI 19 04 kg/m²     I/Os:  I/O last 3 completed shifts:   In: 1390 [P O :890; IV Piggyback:500]  Out: 7991 [Urine:2246]  I/O this shift:  In: -   Out: 1012 [Urine:1012]    Lab Results and Cultures:   Lab Results   Component Value Date    WBC 8 50 02/05/2019    HGB 7 6 (L) 02/05/2019    HCT 23 5 (L) 02/05/2019    MCV 91 02/05/2019     02/05/2019     Lab Results   Component Value Date    GLUCOSE 108 02/01/2019    CALCIUM 8 2 (L) 02/05/2019    K 4 0 02/05/2019    CO2 32 02/05/2019    CL 98 (L) 02/05/2019    BUN 16 02/05/2019    CREATININE 0 90 02/05/2019     Lab Results   Component Value Date    INR 1 15 01/28/2019    INR 2 13 (H) 10/29/2018    INR 1 74 (H) 10/29/2018    PROTIME 14 8 (H) 01/28/2019    PROTIME 23 9 (H) 10/29/2018    PROTIME 20 4 (H) 10/29/2018        Blood Culture: No results found for: BLOODCX,   Urinalysis:   Lab Results   Component Value Date    COLORU n/a 11/23/2018    CLARITYU n/a 11/23/2018    LEUKOCYTESUR neg 11/23/2018    NITRITE neg 11/23/2018    GLUCOSEU neg 11/23/2018    KETONESU neg 11/23/2018    BILIRUBINUR neg 11/23/2018    BLOODU trace 11/23/2018   ,   Urine Culture: No results found for: URINECX,   Wound Culure: No results found for: WOUNDCULT    Medications:  Current Facility-Administered Medications   Medication Dose Route Frequency    acetaminophen (TYLENOL) tablet 650 mg  650 mg Oral Q6H PRN    aspirin chewable tablet 81 mg  81 mg Oral Daily    bisacodyl (DULCOLAX) rectal suppository 10 mg  10 mg Rectal Daily PRN    chlorhexidine (PERIDEX) 0 12 % oral rinse 15 mL  15 mL Swish & Spit Q12H [facility]    cholecalciferol (VITAMIN D3) tablet 2,000 Units  2,000 Units Oral Daily    docusate sodium (COLACE) capsule 100 mg  100 mg Oral BID    ferrous sulfate tablet 325 mg  325 mg Oral BID    heparin (porcine) subcutaneous injection 5,000 Units  5,000 Units Subcutaneous Q8H [facility]    HYDROmorphone (DILAUDID) injection 0 5 mg  0 5 mg Intravenous Q4H PRN    metoclopramide (REGLAN) injection 10 mg  10 mg Intravenous Q6H PRN    mineral oil liquid 30 mL  30 mL Oral Once    ondansetron (ZOFRAN) injection 4 mg  4 mg Intravenous Q4H PRN    oxyCODONE (ROXICODONE) immediate release tablet 10 mg  10 mg Oral Q4H PRN    oxyCODONE (ROXICODONE) IR tablet 5 mg  5 mg Oral Q4H PRN    polyethylene glycol (MIRALAX) packet 17 g  17 g Oral Daily PRN    pravastatin (PRAVACHOL) tablet 40 mg  40 mg Oral Daily With Dinner           Physical Exam:    General appearance: alert and oriented, in no acute distress  Lungs: clear to auscultation bilaterally  Heart: regular rate and rhythm, S1, S2 normal, no murmur, click, rub or gallop  Abdomen: soft, non-tender; bowel sounds normal; no masses,  no organomegaly  Extremities: extremities normal, warm and well-perfused; no cyanosis, clubbing, or edema    Wound/Incision:  Left BKA dressing clean, dry, and intact        Isaias Vaughn PA-C  2/5/2019

## 2019-02-05 NOTE — ASSESSMENT & PLAN NOTE
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

## 2019-02-05 NOTE — SOCIAL WORK
Cm received call from Saji Lindquist has denied the request for subacute auth a [id number] can be completed by 4pm tomorrow by calling [phone] [facility] Cristy Blanca was also provided [id number]  Cm made pt and family aware, blue surgery paged to see if peer to peer can be completed, awaiting call back  Spouse provided sub acute choice of [facility], cm spoke with blayne Bustamante to see if beds available and will place referral once [facility] is accessible  Cm received call from Dianna cisneros for [facility] and they will be able to accept pt today  Cm provided auth # [id number] and advised family and pt who are willing to accept the bed   Cm contacted mylene spoke with Vilma and regi/leilani Lorenz [facility] transport scheduled for 16:30, pt and family agreeable to transport cost

## 2019-02-05 NOTE — DISCHARGE INSTRUCTIONS
DISCHARGE INSTRUCTIONS  LOWER EXTREMITY AMPUTATION    Following discharge from the hospital, you may have some questions about your operation, your activities or your general condition  These instructions may answer some of your questions and help you adjust during the first few weeks following your operation  REHABILITATION:   All patients require some form of rehabilitation after this procedure  This will assist with your return to daily activities by incorporating exercise and balance training  This may be in the form of visiting nurses and physical therapy in your home  However, admission to a rehabilitation facility is also common for a period of time before you return home  ACTIVITY:  Your limitations for activity will be discussed prior to discharge  Physical therapy and rehab staff will direct progression of your activity based on your progression in the physical therapy  Please follow their recommendations closely  DIET:  Resume your normal diet  Try to eat low fat and low cholesterol foods  INCISION:  Wash incision daily with soap and water  Pat dry thoroughly  Clean, dry gauze and ACE wrap to assist with edema control/stump shrinkage  It is normal to have swelling or discoloration around the incision  If increasing redness or pain develops, call our office immediately  You will have stitches or staples present after your surgery and these will be evaluated at your first post-operative visit  If any of your incisions are open and require dressing changes, you will be given instructions for your daily incision care  If you are not able to change the dressings, a visiting nurse will be arranged  PLEASE CALL THE OFFICE IF YOU HAVE ANY QUESTIONS  [phone] [facility] [phone] [facility] FREE [phone]  [address] [place] [address] , [address], [facility] [place], [address] [facility] 99, [place], [address] 1594 7507 [facility], [place], [address]  [place] [address], [address] [facility] [address] [place] [address] [address], [address], [address] [facility], [address] [facility], [facility], [facility] 97   [address] [facility], 8614 [facility], [address] [place] [address]  [facility], [address] [place] [address], [facility], [address], Julio Pacheco 6

## 2019-02-12 ENCOUNTER — TELEPHONE (OUTPATIENT)
Dept: VASCULAR SURGERY | Facility: CLINIC | Age: 84
End: 2019-02-12

## 2019-02-12 NOTE — TELEPHONE ENCOUNTER
Procedure: AMPUTATION BELOW KNEE (BKA) (Left)    Date of Procedure: 2/1/2019    Surgeon: Samara Feliz MD - Primary     * Angelika Coe MD - Assisting    Discharge Date: 2/5/2019    Discharge Disposition: Rehab    Chest Pain?: No    Shortness of Breath?:No     Increased Pain, Swelling, Warmth, or Redness? :No    Purulent Drainage?:No    Foul- smelling drainage?: No    Signs of dehiscence?:No    Fever/Chills? :No    Bleeding?:No    Anticoagulation?: Aspirin    Uncontrolled Pain?:No      NEXT OFFICE VISIT SCHEDULED:     Vascular: 3/5/2019 1330 [address] Roland  Transportation: Confirmed with Minerva Calles of [facility]     Any further questions/concerns? None  Minerva Calles, who personally assessed and dressed incision yesterday, reports no issues with incision  Patient has been compliant with medications as well  Appointment time, location and transportation confirmed with Minerva Calles

## 2019-02-14 ENCOUNTER — PATIENT OUTREACH (OUTPATIENT)
Dept: FAMILY MEDICINE CLINIC | Facility: CLINIC | Age: 84
End: 2019-02-14

## 2019-02-14 NOTE — PROGRESS NOTES
Handoff of care from ANT Evans RN  Chart review done  [facility] with next [facility] due 11/01/19  Recent admission, former CM contacted pt and wife to introduce Outpatient Care Management, f/u on health any needed services, but pts wife declined future calls  Will continue with annual SNP assessments unless patient presents to ER or is admitted; will monitor for same

## 2019-02-18 NOTE — PROGRESS NOTES
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)  Left foot progressive tissue loss and rest pain in spite of multiple endovascular attempts to salvage, patient has extensive tibial disease  Eventually was recommended BKA and patient recently had the below-knee amputation surgery at [facility]  BKA stump is healing except for small area of black eschar in the midportion of the incision line  Decubitus ulcer of right heel, unstageable ([address] )  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  I reviewed his arteriogram from February 2018 where he had diagnostic angiogram of the right leg and intervention on the left side  The right lower extremity shows diffusely calcified SFA that is patent, is popliteal arteries patent he has a single-vessel runoff via posterior tibial artery that is heavily calcified and has high-grade stick calcified stenosis scattered throughout its length  Would recommend arteriogram of the right lower extremity with possible intervention of heavily calcified tibial occlusive disease     Patient remains at high risk of limb loss due to advanced atherosclerotic changes  Diagnoses and all orders for this visit:    Severe peripheral arterial disease ([address] )  -     Case request operating room: ARTERIOGRAM  Right lower extremity angiogram angioplasty stent as necessary  Right groin antegrade puncture    Can be done either in hybrid room or in interventional radiology at [facility], Dr Shashi Concepcion ; Standing  -     Basic metabolic panel; Future  -     CBC and Platelet; Future  -     Protime-INR; Future  -     Case request operating room: ARTERIOGRAM  Right lower extremity angiogram angioplasty stent as necessary  Right groin antegrade puncture  Can be done either in hybrid room or in interventional radiology at [facility], Dr Shashi Concepcion  Decubitus ulcer of right heel, unstageable ([facility] [address] )  -     Case request operating room: ARTERIOGRAM  Right lower extremity angiogram angioplasty stent as necessary  Right groin antegrade puncture  Can be done either in hybrid room or in interventional radiology at [facility], Dr Shashi Concepcion ; Standing  -     Case request operating room: ARTERIOGRAM  Right lower extremity angiogram angioplasty stent as necessary  Right groin antegrade puncture  Can be done either in hybrid room or in interventional radiology at [facility], Dr Shashi Concepcion  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 ([facility] [address] )    Other orders  -     Diet NPO; Sips with meds; Standing  -     Void on call to OR; Standing  -     Insert peripheral IV; Standing  -     Shower/scrub; Standing          Subjective:      Patient ID: Ronald Bardales is a 80 y o  male     Patient is here for follow-up after his left below-knee amputation performed on February 1, 2019  Patient complains of occasional phantom pain in the left side  But overall has been doing well after the left below-knee amputation  Unfortunately he developed a right heel ulceration  Initially was a source what during the hospital admission and that has rapidly progressed into a blackened area  It hurts him when laying in bed  He has started to use an offloading boot    Patient is on aspirin and statin          The following portions of the patient's history were reviewed and updated as appropriate: allergies, current medications, past family history, past medical history, past social history, past surgical history and problem list     Review of Systems Constitutional: Negative  HENT: Negative  Eyes: Negative  Respiratory: Negative  Cardiovascular: Negative  Gastrointestinal: Negative  Endocrine: Negative  Genitourinary: Negative  Musculoskeletal: Positive for gait problem  Skin: Positive for wound  Allergic/Immunologic: Negative  Hematological: Negative  Psychiatric/Behavioral: Negative  I have reviewed the review of systems as entered and made appropriate changes as necessary    Objective:      /60 (BP Location: Left arm, Patient Position: Sitting)   Pulse 88   Temp 99 1 °F (37 3 °C)   Resp 18          Physical Exam   Constitutional: He is oriented to person, place, and time  He appears well-developed  He appears cachectic  No distress  HENT:   Head: Normocephalic and atraumatic  Right Ear: External ear normal    Left Ear: External ear normal    Mouth/Throat: No oropharyngeal exudate  Cardiovascular: Normal rate  Pulmonary/Chest: Effort normal  No respiratory distress  Abdominal: Soft  Musculoskeletal: He exhibits deformity (left BKA)  He exhibits no edema  Neurological: He is alert and oriented to person, place, and time  Skin: Skin is warm and dry  He is not diaphoretic  No erythema  There is pallor  Right heel ulceration with unstageable ulcer    Left  BKA stump is healing well, there is a small area of superficial necrosis in mid portion  Psychiatric: He has a normal mood and affect   His behavior is normal

## 2019-02-19 ENCOUNTER — OFFICE VISIT (OUTPATIENT)
Dept: VASCULAR SURGERY | Facility: CLINIC | Age: 84
End: 2019-02-19
Payer: COMMERCIAL

## 2019-02-19 VITALS
SYSTOLIC BLOOD PRESSURE: 120 MMHG | HEART RATE: 88 BPM | TEMPERATURE: 99.1 F | DIASTOLIC BLOOD PRESSURE: 60 MMHG | RESPIRATION RATE: 18 BRPM

## 2019-02-19 DIAGNOSIS — I73.9 SEVERE PERIPHERAL ARTERIAL DISEASE (HCC): Primary | ICD-10-CM

## 2019-02-19 DIAGNOSIS — L89.610 DECUBITUS ULCER OF RIGHT HEEL, UNSTAGEABLE (HCC): ICD-10-CM

## 2019-02-19 DIAGNOSIS — Z89.512 STATUS POST BELOW KNEE AMPUTATION OF LEFT LOWER EXTREMITY: ICD-10-CM

## 2019-02-19 PROBLEM — L03.116 CELLULITIS OF LEFT FOOT: Status: RESOLVED | Noted: 2019-01-28 | Resolved: 2019-02-19

## 2019-02-19 PROBLEM — I70.222 ATHEROSCLEROSIS OF NATIVE ARTERY OF LEFT LOWER EXTREMITY WITH REST PAIN (HCC): Status: RESOLVED | Noted: 2018-03-06 | Resolved: 2019-02-19

## 2019-02-19 PROBLEM — I70.245 ATHEROSCLEROSIS OF NATIVE ARTERIES OF LEFT LEG WITH ULCERATION OF OTHER PART OF FOOT (HCC): Status: RESOLVED | Noted: 2018-10-08 | Resolved: 2019-02-19

## 2019-02-19 PROCEDURE — 99213 OFFICE O/P EST LOW 20 MIN: CPT | Performed by: SURGERY

## 2019-02-19 NOTE — ASSESSMENT & PLAN NOTE
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  I reviewed his arteriogram from February 2018 where he had diagnostic angiogram of the right leg and intervention on the left side  The right lower extremity shows diffusely calcified SFA that is patent, is popliteal arteries patent he has a single-vessel runoff via posterior tibial artery that is heavily calcified and has high-grade stick calcified stenosis scattered throughout its length  Would recommend arteriogram of the right lower extremity with possible intervention of heavily calcified tibial occlusive disease     Patient remains at high risk of limb loss due to advanced atherosclerotic changes

## 2019-02-19 NOTE — LETTER
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)  Left foot progressive tissue loss and rest pain in spite of multiple endovascular attempts to salvage, patient has extensive tibial disease  Eventually was recommended BKA and patient recently had the below-knee amputation surgery at [facility]  BKA stump is healing except for small area of black eschar in the midportion of the incision line  Decubitus ulcer of right heel, unstageable ([address] )  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  I reviewed his arteriogram from February 2018 where he had diagnostic angiogram of the right leg and intervention on the left side    The right lower extremity shows diffusely calcified SFA that is patent, is popliteal arteries patent he has a single-vessel runoff via posterior tibial artery that is heavily calcified and has high-grade stick calcified stenosis scattered throughout its length  Would recommend arteriogram of the right lower extremity with possible intervention of heavily calcified tibial occlusive disease     Patient remains at high risk of limb loss due to advanced atherosclerotic changes  Diagnoses and all orders for this visit:    Severe peripheral arterial disease ([address] )  -     Case request operating room: ARTERIOGRAM  Right lower extremity angiogram angioplasty stent as necessary  Right groin antegrade puncture  Can be done either in hybrid room or in interventional radiology at [facility], Dr Faisal Albarran ; Standing  -     Basic metabolic panel; Future  -     CBC and Platelet; Future  -     Protime-INR; Future  -     Case request operating room: ARTERIOGRAM  Right lower extremity angiogram angioplasty stent as necessary  Right groin antegrade puncture  Can be done either in hybrid room or in interventional radiology at [facility], Dr Faisal Albarran  Decubitus ulcer of right heel, unstageable ([facility] [address] )  -     Case request operating room: ARTERIOGRAM  Right lower extremity angiogram angioplasty stent as necessary  Right groin antegrade puncture  Can be done either in hybrid room or in interventional radiology at [facility], Dr Faisal Albarran ; Standing  -     Case request operating room: ARTERIOGRAM  Right lower extremity angiogram angioplasty stent as necessary  Right groin antegrade puncture  Can be done either in hybrid room or in interventional radiology at [facility], Dr Faisal Albarran  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 ([facility] [address] )    Other orders  -     Diet NPO; Sips with meds; Standing  -     Void on call to OR; Standing  -     Insert peripheral IV; Standing  -     Shower/scrub; Standing          Subjective:      Patient ID: Lauro Pitts is a 80 y o  male     Patient is here for follow-up after his left below-knee amputation performed on February 1, 2019  Patient complains of occasional phantom pain in the left side    But overall has been doing well after the left below-knee amputation  Unfortunately he developed a right heel ulceration  Initially was a source what during the hospital admission and that has rapidly progressed into a blackened area  It hurts him when laying in bed  He has started to use an offloading boot  Patient is on aspirin and statin          The following portions of the patient's history were reviewed and updated as appropriate: allergies, current medications, past family history, past medical history, past social history, past surgical history and problem list     Review of Systems   Constitutional: Negative  HENT: Negative  Eyes: Negative  Respiratory: Negative  Cardiovascular: Negative  Gastrointestinal: Negative  Endocrine: Negative  Genitourinary: Negative  Musculoskeletal: Positive for gait problem  Skin: Positive for wound  Allergic/Immunologic: Negative  Hematological: Negative  Psychiatric/Behavioral: Negative  I have reviewed the review of systems as entered and made appropriate changes as necessary    Objective:      /60 (BP Location: Left arm, Patient Position: Sitting)   Pulse 88   Temp 99 1 °F (37 3 °C)   Resp 18          Physical Exam   Constitutional: He is oriented to person, place, and time  He appears well-developed  He appears cachectic  No distress  HENT:   Head: Normocephalic and atraumatic  Right Ear: External ear normal    Left Ear: External ear normal    Mouth/Throat: No oropharyngeal exudate  Cardiovascular: Normal rate  Pulmonary/Chest: Effort normal  No respiratory distress  Abdominal: Soft  Musculoskeletal: He exhibits deformity (left BKA)  He exhibits no edema  Neurological: He is alert and oriented to person, place, and time  Skin: Skin is warm and dry  He is not diaphoretic  No erythema  There is pallor  Right heel ulceration with unstageable ulcer    Left  BKA stump is healing well, there is a small area of superficial necrosis in mid portion     Psychiatric: He has a normal mood and affect   His behavior is normal

## 2019-02-19 NOTE — ASSESSMENT & PLAN NOTE
Left foot progressive tissue loss and rest pain in spite of multiple endovascular attempts to salvage, patient has extensive tibial disease  Eventually was recommended BKA and patient recently had the below-knee amputation surgery at [facility]  BKA stump is healing except for small area of black eschar in the midportion of the incision line

## 2019-02-25 ENCOUNTER — TELEPHONE (OUTPATIENT)
Dept: VASCULAR SURGERY | Facility: CLINIC | Age: 84
End: 2019-02-25

## 2019-02-25 ENCOUNTER — PREP FOR PROCEDURE (OUTPATIENT)
Dept: VASCULAR SURGERY | Facility: CLINIC | Age: 84
End: 2019-02-25

## 2019-02-25 NOTE — TELEPHONE ENCOUNTER
Patient is in [place] [address] and spoke to the patients nurse Jeff Payotn to schedule surgery for 3-11-19 at [facility] with Dr Valle Dates Patient is to not eat or drink after midnight on 3-10-19  Patient is to have blood work done before the surgery and we faxed over the scripts to the nursing home  Nursing home told us that they provide suburban transport to take him to the surgery  Nursing home confirm and understood instructions [facility]

## 2019-02-25 NOTE — TELEPHONE ENCOUNTER
Spoke to wife to schedule surgery and wife told me that her  is in [address] [place] [address] Dr hooks

## 2019-02-28 ENCOUNTER — APPOINTMENT (OUTPATIENT)
Dept: WOUND CARE | Facility: HOSPITAL | Age: 84
End: 2019-02-28
Payer: COMMERCIAL

## 2019-02-28 PROCEDURE — 99213 OFFICE O/P EST LOW 20 MIN: CPT

## 2019-03-04 ENCOUNTER — TRANSITIONAL CARE MANAGEMENT (OUTPATIENT)
Dept: FAMILY MEDICINE CLINIC | Facility: CLINIC | Age: 84
End: 2019-03-04

## 2019-03-05 ENCOUNTER — OFFICE VISIT (OUTPATIENT)
Dept: VASCULAR SURGERY | Facility: CLINIC | Age: 84
End: 2019-03-05
Payer: COMMERCIAL

## 2019-03-05 VITALS
HEIGHT: 70 IN | BODY MASS INDEX: 19.04 KG/M2 | DIASTOLIC BLOOD PRESSURE: 64 MMHG | RESPIRATION RATE: 18 BRPM | TEMPERATURE: 96.6 F | HEART RATE: 80 BPM | SYSTOLIC BLOOD PRESSURE: 118 MMHG

## 2019-03-05 DIAGNOSIS — R26.2 AMBULATORY DYSFUNCTION: Primary | ICD-10-CM

## 2019-03-05 DIAGNOSIS — I73.9 SEVERE PERIPHERAL ARTERIAL DISEASE (HCC): ICD-10-CM

## 2019-03-05 DIAGNOSIS — E43 SEVERE PROTEIN-CALORIE MALNUTRITION (HCC): ICD-10-CM

## 2019-03-05 DIAGNOSIS — L89.610 DECUBITUS ULCER OF RIGHT HEEL, UNSTAGEABLE (HCC): ICD-10-CM

## 2019-03-05 DIAGNOSIS — Z89.512 STATUS POST BELOW KNEE AMPUTATION OF LEFT LOWER EXTREMITY: ICD-10-CM

## 2019-03-05 PROCEDURE — 99214 OFFICE O/P EST MOD 30 MIN: CPT | Performed by: SURGERY

## 2019-03-05 NOTE — ASSESSMENT & PLAN NOTE
Secondary to left below-knee amputation and right heel ulceration  Patient is using the special offloading shoes for limited ambulation and transfers

## 2019-03-05 NOTE — PROGRESS NOTES
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)  All sutures and staples removed today  Most of the wound has healed except for the midportion with there is dry scabs overlying the wound  Continue daily dry dressings and wait for the scabbed to slowly heal   Underneath the scab portion there appears to be healthy bleeding  Severe peripheral arterial disease ([address] )  Reviewed the angiogram of the right lower extremity that was performed in February 2018 when he had underwent intervention on the left side  It shows significant tibial disease on the right side with diffuse calcific arteries  There are several areas of high-grade calcified stenosis  Because he has a nonhealing heel ulceration he will benefit from angiogram with attempt to angioplasty and treat these lesions  The lesions are fairly distal and may not be amenable for atherectomy  I discussed with the patient and his son and wife today the risks of the procedure such as distal embolization and groin hematoma  Patient has been scheduled for angiogram with my partner Dr Jose Osorio next week  Today in the office the patient does have strong biphasic posterior tibial signal in the distal posterior tibial artery on the right  Severe protein-calorie malnutrition ([address] )  Malnutrition Findings:     temporal wasting and poor wound healing       BMI Findings: Body mass index is 19 04 kg/m²  Patient is taking Ensure and Chandler supplementation to help with wound healing  Now that he has discharge from [facility] he is eating better at home  Decubitus ulcer of right heel, unstageable ([address] )  Since last visit 2 weeks ago wound remains unchanged  He went to [place] [facility] to see Dr Dann Ernst and has been getting special offloading shoes and offloading prostheses to use at night  No debridement was performed due to significant peripheral arterial disease      Ambulatory dysfunction  Secondary to left below-knee amputation and right heel ulceration  Patient is using the special offloading shoes for limited ambulation and transfers  Diagnoses and all orders for this visit:    Ambulatory dysfunction    Decubitus ulcer of right heel, unstageable (HCC)    Severe peripheral arterial disease ([facility] [address] )    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)    Severe protein-calorie malnutrition ([facility] [address] )          Subjective:      Patient ID: Malu Watts is a 80 y o  male  Patient is here for follow-up after his left below-knee amputation performed on February 1, 2019  He is also here for follow-up is right heel ulceration  He does have pain along the incision on the left below-knee amputation and sometimes it wakes him up at night  Right heel ulceration developed during the hospital stay for left foot wounds  He has been going to see Podiatry at [address] [place] [address] and special offloading boots were provided as well as offloading boots were night  He was also started on nutritional supplementation  He was advised to restrict his ambulation to help in wound healing  No debridement was performed secondary to advanced peripheral artery disease  Patient has been taking his aspirin and Zocor regularly  He is now back home from [facility] and is eating better  Overall he is in better spirits today  The following portions of the patient's history were reviewed and updated as appropriate: allergies, current medications, past family history, past medical history, past social history, past surgical history and problem list     Review of Systems   Constitutional: Positive for activity change  HENT: Negative  Eyes: Negative  Respiratory: Negative  Cardiovascular: Negative  Endocrine: Negative  Genitourinary: Negative  Musculoskeletal: Positive for arthralgias, back pain and gait problem  Skin: Positive for wound  Allergic/Immunologic: Negative  Hematological: Negative  Psychiatric/Behavioral: Negative  I have reviewed the review of systems as entered and made appropriate changes as necessary    Objective:      /64 (BP Location: Left arm)   Pulse 80   Temp (!) 96 6 °F (35 9 °C)   Resp 18   Ht 5' 10" (1 778 m)   BMI 19 04 kg/m²          Physical Exam   Constitutional: He is oriented to person, place, and time  He appears well-developed  He appears cachectic  No distress  Mal nourished   HENT:   Head: Normocephalic and atraumatic  Right Ear: External ear normal    Left Ear: External ear normal    Mouth/Throat: No oropharyngeal exudate  Eyes: Conjunctivae are normal  Right eye exhibits no discharge  Left eye exhibits no discharge  No scleral icterus  Neck: Normal range of motion  Neck supple  Cardiovascular: Normal rate and regular rhythm  Pulmonary/Chest: Effort normal  No respiratory distress  Abdominal: Soft  He exhibits no distension  Musculoskeletal: He exhibits deformity (left BKA)  He exhibits no edema  Neurological: He is alert and oriented to person, place, and time  Skin: Skin is warm and dry  Capillary refill takes less than 2 seconds  He is not diaphoretic  No erythema  There is pallor  Right heel ulceration with unstageable ulcer  Ulceration is unchanged from prior office visit  Left  BKA stump is healing well, there is a portion of superficial necrosis with dry scab in the midportion of the BKA stump  Psychiatric: He has a normal mood and affect   His behavior is normal

## 2019-03-05 NOTE — ASSESSMENT & PLAN NOTE
Reviewed the angiogram of the right lower extremity that was performed in February 2018 when he had underwent intervention on the left side  It shows significant tibial disease on the right side with diffuse calcific arteries  There are several areas of high-grade calcified stenosis  Because he has a nonhealing heel ulceration he will benefit from angiogram with attempt to angioplasty and treat these lesions  The lesions are fairly distal and may not be amenable for atherectomy  I discussed with the patient and his son and wife today the risks of the procedure such as distal embolization and groin hematoma  Patient has been scheduled for angiogram with my partner Dr Arben Belcher next week  Today in the office the patient does have strong biphasic posterior tibial signal in the distal posterior tibial artery on the right

## 2019-03-05 NOTE — H&P (VIEW-ONLY)
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)  All sutures and staples removed today  Most of the wound has healed except for the midportion with there is dry scabs overlying the wound  Continue daily dry dressings and wait for the scabbed to slowly heal   Underneath the scab portion there appears to be healthy bleeding  Severe peripheral arterial disease ([address] )  Reviewed the angiogram of the right lower extremity that was performed in February 2018 when he had underwent intervention on the left side  It shows significant tibial disease on the right side with diffuse calcific arteries  There are several areas of high-grade calcified stenosis  Because he has a nonhealing heel ulceration he will benefit from angiogram with attempt to angioplasty and treat these lesions  The lesions are fairly distal and may not be amenable for atherectomy  I discussed with the patient and his son and wife today the risks of the procedure such as distal embolization and groin hematoma  Patient has been scheduled for angiogram with my partner Dr Shea Queen next week  Today in the office the patient does have strong biphasic posterior tibial signal in the distal posterior tibial artery on the right  Severe protein-calorie malnutrition ([address] )  Malnutrition Findings:     temporal wasting and poor wound healing       BMI Findings: Body mass index is 19 04 kg/m²  Patient is taking Ensure and Chandler supplementation to help with wound healing  Now that he has discharge from [facility] he is eating better at home  Decubitus ulcer of right heel, unstageable ([address] )  Since last visit 2 weeks ago wound remains unchanged  He went to [facility] wound [facility] to see Dr Jez Acuña and has been getting special offloading shoes and offloading prostheses to use at night  No debridement was performed due to significant peripheral arterial disease      Ambulatory dysfunction  Secondary to left below-knee amputation and right heel ulceration  Patient is using the special offloading shoes for limited ambulation and transfers  Diagnoses and all orders for this visit:    Ambulatory dysfunction    Decubitus ulcer of right heel, unstageable (HCC)    Severe peripheral arterial disease ([facility] )    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 ([facility])    Severe protein-calorie malnutrition ([facility] )          Subjective:      Patient ID: Andrés Sanchez is a 80 y o  male  Patient is here for follow-up after his left below-knee amputation performed on February 1, 2019  He is also here for follow-up is right heel ulceration  He does have pain along the incision on the left below-knee amputation and sometimes it wakes him up at night  Right heel ulceration developed during the hospital stay for left foot wounds  He has been going to see Podiatry at [address] and special offloading boots were provided as well as offloading boots were night  He was also started on nutritional supplementation  He was advised to restrict his ambulation to help in wound healing  No debridement was performed secondary to advanced peripheral artery disease  Patient has been taking his aspirin and Zocor regularly  He is now back home from [facility] [address] [address] and is eating better  Overall he is in better spirits today  The following portions of the patient's history were reviewed and updated as appropriate: allergies, current medications, past family history, past medical history, past social history, past surgical history and problem list     Review of Systems   Constitutional: Positive for activity change  HENT: Negative  Eyes: Negative  Respiratory: Negative  Cardiovascular: Negative  Endocrine: Negative  Genitourinary: Negative  Musculoskeletal: Positive for arthralgias, back pain and gait problem  Skin: Positive for wound  Allergic/Immunologic: Negative  Hematological: Negative  Psychiatric/Behavioral: Negative  I have reviewed the review of systems as entered and made appropriate changes as necessary    Objective:      /64 (BP Location: Left arm)   Pulse 80   Temp (!) 96 6 °F (35 9 °C)   Resp 18   Ht 5' 10" (1 778 m)   BMI 19 04 kg/m²          Physical Exam   Constitutional: He is oriented to person, place, and time  He appears well-developed  He appears cachectic  No distress  Mal nourished   HENT:   Head: Normocephalic and atraumatic  Right Ear: External ear normal    Left Ear: External ear normal    Mouth/Throat: No oropharyngeal exudate  Eyes: Conjunctivae are normal  Right eye exhibits no discharge  Left eye exhibits no discharge  No scleral icterus  Neck: Normal range of motion  Neck supple  Cardiovascular: Normal rate and regular rhythm  Pulmonary/Chest: Effort normal  No respiratory distress  Abdominal: Soft  He exhibits no distension  Musculoskeletal: He exhibits deformity (left BKA)  He exhibits no edema  Neurological: He is alert and oriented to person, place, and time  Skin: Skin is warm and dry  Capillary refill takes less than 2 seconds  He is not diaphoretic  No erythema  There is pallor  Right heel ulceration with unstageable ulcer  Ulceration is unchanged from prior office visit  Left  BKA stump is healing well, there is a portion of superficial necrosis with dry scab in the midportion of the BKA stump  Psychiatric: He has a normal mood and affect   His behavior is normal

## 2019-03-05 NOTE — ASSESSMENT & PLAN NOTE
Since last visit 2 weeks ago wound remains unchanged  He went to [facility] wound care to see Dr Shai Ospina and has been getting special offloading shoes and offloading prostheses to use at night  No debridement was performed due to significant peripheral arterial disease

## 2019-03-05 NOTE — LETTER
March 5, 2019     Guerline Back, [address] [facility]  [address] 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 ([address] )  All sutures and staples removed today  Most of the wound has healed except for the midportion with there is dry scabs overlying the wound  Continue daily dry dressings and wait for the scabbed to slowly heal   Underneath the scab portion there appears to be healthy bleeding  Severe peripheral arterial disease ([address] )  Reviewed the angiogram of the right lower extremity that was performed in February 2018 when he had underwent intervention on the left side  It shows significant tibial disease on the right side with diffuse calcific arteries  There are several areas of high-grade calcified stenosis  Because he has a nonhealing heel ulceration he will benefit from angiogram with attempt to angioplasty and treat these lesions  The lesions are fairly distal and may not be amenable for atherectomy  I discussed with the patient and his son and wife today the risks of the procedure such as distal embolization and groin hematoma  Patient has been scheduled for angiogram with my partner Dr Richar Jordan next week  Today in the office the patient does have strong biphasic posterior tibial signal in the distal posterior tibial artery on the right      Severe protein-calorie malnutrition ([address] )  Malnutrition Findings:     temporal wasting and poor wound healing       BMI Findings: Body mass index is 19 04 kg/m²  Patient is taking Ensure and Chandler supplementation to help with wound healing  Now that he has discharge from [facility] he is eating better at home  Decubitus ulcer of right heel, unstageable ([address] )  Since last visit 2 weeks ago wound remains unchanged  He went to [facility] wound care to see Dr Lisa Caceres and has been getting special offloading shoes and offloading prostheses to use at night  No debridement was performed due to significant peripheral arterial disease  Ambulatory dysfunction  Secondary to left below-knee amputation and right heel ulceration  Patient is using the special offloading shoes for limited ambulation and transfers  Diagnoses and all orders for this visit:    Ambulatory dysfunction    Decubitus ulcer of right heel, unstageable (HCC)    Severe peripheral arterial disease ([address] )    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 ([facility])    Severe protein-calorie malnutrition ([facility] )          Subjective:      Patient ID: Yonathan Hunt is a 80 y o  male  Patient is here for follow-up after his left below-knee amputation performed on February 1, 2019  He is also here for follow-up is right heel ulceration  He does have pain along the incision on the left below-knee amputation and sometimes it wakes him up at night  Right heel ulceration developed during the hospital stay for left foot wounds  He has been going to see Podiatry at [facility] [address] and special offloading boots were provided as well as offloading boots were night  He was also started on nutritional supplementation  He was advised to restrict his ambulation to help in wound healing  No debridement was performed secondary to advanced peripheral artery disease  Patient has been taking his aspirin and Zocor regularly  He is now back home from [facility] and is eating better  Overall he is in better spirits today            The following portions of the patient's history were reviewed and updated as appropriate: allergies, current medications, past family history, past medical history, past social history, past surgical history and problem list     Review of Systems   Constitutional: Positive for activity change  HENT: Negative  Eyes: Negative  Respiratory: Negative  Cardiovascular: Negative  Endocrine: Negative  Genitourinary: Negative  Musculoskeletal: Positive for arthralgias, back pain and gait problem  Skin: Positive for wound  Allergic/Immunologic: Negative  Hematological: Negative  Psychiatric/Behavioral: Negative  I have reviewed the review of systems as entered and made appropriate changes as necessary    Objective:      /64 (BP Location: Left arm)   Pulse 80   Temp (!) 96 6 °F (35 9 °C)   Resp 18   Ht 5' 10" (1 778 m)   BMI 19 04 kg/m²           Physical Exam   Constitutional: He is oriented to person, place, and time  He appears well-developed  He appears cachectic  No distress  Mal nourished   HENT:   Head: Normocephalic and atraumatic  Right Ear: External ear normal    Left Ear: External ear normal    Mouth/Throat: No oropharyngeal exudate  Eyes: Conjunctivae are normal  Right eye exhibits no discharge  Left eye exhibits no discharge  No scleral icterus  Neck: Normal range of motion  Neck supple  Cardiovascular: Normal rate and regular rhythm  Pulmonary/Chest: Effort normal  No respiratory distress  Abdominal: Soft  He exhibits no distension  Musculoskeletal: He exhibits deformity (left BKA)  He exhibits no edema  Neurological: He is alert and oriented to person, place, and time  Skin: Skin is warm and dry  Capillary refill takes less than 2 seconds  He is not diaphoretic  No erythema  There is pallor  Right heel ulceration with unstageable ulcer  Ulceration is unchanged from prior office visit      Left  BKA stump is healing well, there is a portion of superficial necrosis with dry scab in the midportion of the BKA stump  Psychiatric: He has a normal mood and affect   His behavior is normal

## 2019-03-05 NOTE — ASSESSMENT & PLAN NOTE
Malnutrition Findings:     temporal wasting and poor wound healing       BMI Findings: Body mass index is 19 04 kg/m²  Patient is taking Ensure and Chandler supplementation to help with wound healing  Now that he has discharge from [facility] he is eating better at home

## 2019-03-06 ENCOUNTER — TELEPHONE (OUTPATIENT)
Dept: VASCULAR SURGERY | Facility: CLINIC | Age: 84
End: 2019-03-06

## 2019-03-06 DIAGNOSIS — I73.9 SEVERE PERIPHERAL ARTERIAL DISEASE (HCC): Primary | ICD-10-CM

## 2019-03-06 RX ORDER — OXYCODONE HYDROCHLORIDE 5 MG/1
5 TABLET ORAL EVERY 8 HOURS PRN
Qty: 30 TABLET | Refills: 0 | Status: SHIPPED | OUTPATIENT
Start: 2019-03-06 | End: 2019-03-27 | Stop reason: SDUPTHER

## 2019-03-06 NOTE — TELEPHONE ENCOUNTER
Ron's wife called and requested a refill on his oxycodone  He just saw you on 3/5/19 and she thought that she had enough  He uses the [facility] on [address]

## 2019-03-06 NOTE — TELEPHONE ENCOUNTER
[facility] called now also and wants to know if pt was also supposed to be given a script for gabapentin at [address] [facility]  Dr Giorgio Crockett, please advise

## 2019-03-07 ENCOUNTER — TELEPHONE (OUTPATIENT)
Dept: VASCULAR SURGERY | Facility: CLINIC | Age: 84
End: 2019-03-07

## 2019-03-07 NOTE — TELEPHONE ENCOUNTER
Spoke to patient to let him know that his surgery has been reschedule to 3-12-19 at [facility] at 8:00am Patient confirmed and understood instructions

## 2019-03-08 ENCOUNTER — TELEPHONE (OUTPATIENT)
Dept: VASCULAR SURGERY | Facility: CLINIC | Age: 84
End: 2019-03-08

## 2019-03-11 ENCOUNTER — TELEPHONE (OUTPATIENT)
Dept: INPATIENT UNIT | Facility: HOSPITAL | Age: 84
End: 2019-03-11

## 2019-03-12 ENCOUNTER — HOSPITAL ENCOUNTER (OUTPATIENT)
Dept: NON INVASIVE DIAGNOSTICS | Facility: HOSPITAL | Age: 84
Discharge: HOME | End: 2019-03-12
Attending: SURGERY | Admitting: SURGERY
Payer: COMMERCIAL

## 2019-03-12 ENCOUNTER — PATIENT OUTREACH (OUTPATIENT)
Dept: FAMILY MEDICINE CLINIC | Facility: CLINIC | Age: 84
End: 2019-03-12

## 2019-03-12 ENCOUNTER — APPOINTMENT (OUTPATIENT)
Dept: NON INVASIVE DIAGNOSTICS | Facility: HOSPITAL | Age: 84
End: 2019-03-12
Payer: COMMERCIAL

## 2019-03-12 VITALS
HEART RATE: 59 BPM | RESPIRATION RATE: 18 BRPM | DIASTOLIC BLOOD PRESSURE: 63 MMHG | OXYGEN SATURATION: 98 % | SYSTOLIC BLOOD PRESSURE: 143 MMHG

## 2019-03-12 DIAGNOSIS — I73.9 SEVERE PERIPHERAL ARTERIAL DISEASE (HCC): ICD-10-CM

## 2019-03-12 LAB
ANION GAP SERPL CALCULATED.3IONS-SCNC: 5 MMOL/L (ref 4–13)
BUN SERPL-MCNC: 33 MG/DL (ref 5–25)
CALCIUM SERPL-MCNC: 9.1 MG/DL (ref 8.3–10.1)
CHLORIDE SERPL-SCNC: 101 MMOL/L (ref 100–108)
CO2 SERPL-SCNC: 29 MMOL/L (ref 21–32)
CREAT SERPL-MCNC: 1.01 MG/DL (ref 0.6–1.3)
ERYTHROCYTE [DISTWIDTH] IN BLOOD BY AUTOMATED COUNT: 14.6 % (ref 11.6–15.1)
GFR SERPL CREATININE-BSD FRML MDRD: 68 ML/MIN/1.73SQ M
GLUCOSE P FAST SERPL-MCNC: 98 MG/DL (ref 65–99)
GLUCOSE SERPL-MCNC: 98 MG/DL (ref 65–140)
HCT VFR BLD AUTO: 27.9 % (ref 36.5–49.3)
HGB BLD-MCNC: 9 G/DL (ref 12–17)
INR PPP: 1.1 (ref 0.86–1.17)
KCT BLD-ACNC: 248 SEC (ref 89–137)
KCT BLD-ACNC: 266 SEC (ref 89–137)
MCH RBC QN AUTO: 29.5 PG (ref 26.8–34.3)
MCHC RBC AUTO-ENTMCNC: 32.3 G/DL (ref 31.4–37.4)
MCV RBC AUTO: 92 FL (ref 82–98)
PLATELET # BLD AUTO: 248 THOUSANDS/UL (ref 149–390)
PMV BLD AUTO: 9.9 FL (ref 8.9–12.7)
POTASSIUM SERPL-SCNC: 3.9 MMOL/L (ref 3.5–5.3)
PROTHROMBIN TIME: 14.3 SECONDS (ref 11.8–14.2)
RBC # BLD AUTO: 3.05 MILLION/UL (ref 3.88–5.62)
SODIUM SERPL-SCNC: 135 MMOL/L (ref 136–145)
SPECIMEN SOURCE: ABNORMAL
SPECIMEN SOURCE: ABNORMAL
WBC # BLD AUTO: 8.01 THOUSAND/UL (ref 4.31–10.16)

## 2019-03-12 PROCEDURE — C1769 GUIDE WIRE: HCPCS

## 2019-03-12 PROCEDURE — 85347 COAGULATION TIME ACTIVATED: CPT

## 2019-03-12 PROCEDURE — C1887 CATHETER, GUIDING: HCPCS

## 2019-03-12 PROCEDURE — 80048 BASIC METABOLIC PNL TOTAL CA: CPT | Performed by: SURGERY

## 2019-03-12 PROCEDURE — 85610 PROTHROMBIN TIME: CPT | Performed by: SURGERY

## 2019-03-12 PROCEDURE — 75710 ARTERY X-RAYS ARM/LEG: CPT | Performed by: SURGERY

## 2019-03-12 PROCEDURE — C1725 CATH, TRANSLUMIN NON-LASER: HCPCS

## 2019-03-12 PROCEDURE — 99153 MOD SED SAME PHYS/QHP EA: CPT | Performed by: SURGERY

## 2019-03-12 PROCEDURE — C1894 INTRO/SHEATH, NON-LASER: HCPCS

## 2019-03-12 PROCEDURE — 85027 COMPLETE CBC AUTOMATED: CPT | Performed by: SURGERY

## 2019-03-12 PROCEDURE — 93971 EXTREMITY STUDY: CPT

## 2019-03-12 PROCEDURE — 37221 HB ILIAC REVASC W/STENT (AKA CPT37221): CPT | Performed by: SURGERY

## 2019-03-12 PROCEDURE — 37226 HB FEM/POPL REVASC W/STENT (AKA CPT37226): CPT | Performed by: SURGERY

## 2019-03-12 PROCEDURE — C2623 CATH, TRANSLUMIN, DRUG-COAT: HCPCS

## 2019-03-12 PROCEDURE — C1760 CLOSURE DEV, VASC: HCPCS

## 2019-03-12 PROCEDURE — 99152 MOD SED SAME PHYS/QHP 5/>YRS: CPT | Performed by: SURGERY

## 2019-03-12 PROCEDURE — 37224 PR REVSC OPN/PRG FEM/POP W/ANGIOPLASTY UNI: CPT | Performed by: SURGERY

## 2019-03-12 RX ORDER — ACETAMINOPHEN 325 MG/1
650 TABLET ORAL EVERY 8 HOURS PRN
Status: DISCONTINUED | OUTPATIENT
Start: 2019-03-12 | End: 2019-03-12 | Stop reason: HOSPADM

## 2019-03-12 RX ORDER — LIDOCAINE HYDROCHLORIDE 10 MG/ML
INJECTION, SOLUTION INFILTRATION; PERINEURAL CODE/TRAUMA/SEDATION MEDICATION
Status: COMPLETED | OUTPATIENT
Start: 2019-03-12 | End: 2019-03-12

## 2019-03-12 RX ORDER — HEPARIN SODIUM 1000 [USP'U]/ML
INJECTION, SOLUTION INTRAVENOUS; SUBCUTANEOUS CODE/TRAUMA/SEDATION MEDICATION
Status: COMPLETED | OUTPATIENT
Start: 2019-03-12 | End: 2019-03-12

## 2019-03-12 RX ORDER — MIDAZOLAM HYDROCHLORIDE 1 MG/ML
INJECTION INTRAMUSCULAR; INTRAVENOUS CODE/TRAUMA/SEDATION MEDICATION
Status: COMPLETED | OUTPATIENT
Start: 2019-03-12 | End: 2019-03-12

## 2019-03-12 RX ORDER — SODIUM CHLORIDE 9 MG/ML
100 INJECTION, SOLUTION INTRAVENOUS CONTINUOUS
Status: DISCONTINUED | OUTPATIENT
Start: 2019-03-12 | End: 2019-03-12 | Stop reason: HOSPADM

## 2019-03-12 RX ORDER — FENTANYL CITRATE 50 UG/ML
INJECTION, SOLUTION INTRAMUSCULAR; INTRAVENOUS CODE/TRAUMA/SEDATION MEDICATION
Status: COMPLETED | OUTPATIENT
Start: 2019-03-12 | End: 2019-03-12

## 2019-03-12 RX ADMIN — FENTANYL CITRATE 50 MCG: 50 INJECTION, SOLUTION INTRAMUSCULAR; INTRAVENOUS at 08:51

## 2019-03-12 RX ADMIN — MIDAZOLAM 1 MG: 1 INJECTION INTRAMUSCULAR; INTRAVENOUS at 08:51

## 2019-03-12 RX ADMIN — LIDOCAINE HYDROCHLORIDE 2 ML: 10 INJECTION, SOLUTION INFILTRATION; PERINEURAL at 08:35

## 2019-03-12 RX ADMIN — MIDAZOLAM 1 MG: 1 INJECTION INTRAMUSCULAR; INTRAVENOUS at 08:25

## 2019-03-12 RX ADMIN — HEPARIN SODIUM 6000 UNITS: 1000 INJECTION INTRAVENOUS; SUBCUTANEOUS at 08:58

## 2019-03-12 RX ADMIN — IODIXANOL 50 ML: 270 INJECTION, SOLUTION INTRAVASCULAR at 10:04

## 2019-03-12 RX ADMIN — FENTANYL CITRATE 25 MCG: 50 INJECTION, SOLUTION INTRAMUSCULAR; INTRAVENOUS at 08:24

## 2019-03-12 NOTE — PROGRESS NOTES
Pt is a recent handoff of care from [facility]  This CM received alert that pt was admitted to Short Stay Unit for angiogram today  Chart review done  [facility] patient with next annual assessment due 11/01/19  Former CM contacted pt and wife after last inpatient admission to introduce Outpatient Care Management, f/u on health any needed services, but pts wife declined future calls    Will continue with annual SNP assessments unless patient presents to ER or is admitted (inpatient); will monitor for same

## 2019-03-12 NOTE — DISCHARGE INSTRUCTIONS
1  Please see the post angiography dishcarge instructions  No heavy lifting, greater than 10 lbs  or strenuous  activity for 48 hrs  2 Remove band aid tomorrow  Shower and wash groin area gently with soap and water- beginning tomorrow  Rinse and pat dry  Apply new water seal band aid  Repeat this process for 5 days  No powders, creams lotions or antibiotic ointments  for 5 days  No tub baths, hot tubs or swimming for 5 days  3  Please call our office ([phone]) if you have any fever, redness, swelling, discharge from your groin access site  4 No driving for 1 day       Peripheral Vascular Disease, Ambulatory Care   GENERAL INFORMATION:   Peripheral vascular disease  is a condition that causes decreased blood flow to your limbs because of blocked blood vessels  The blockage is usually caused by atherosclerosis  This is when material, such as cholesterol, sticks to the inside of your blood vessels and makes them narrow  Common symptoms include the following:   · Painful cramps in your hip, thigh, or calf muscles, especially after you walk or climb stairs    · Burning pain in your hands, fingers, feet, or toes    · Shiny, tight, cold skin, and uneven hair growth on your skin    · A change in your skin color    · Sores on your skin that do not heal    · Weakness or numbness of your hands or feet  Seek immediate care for the following symptoms:   · Pain in your legs that does not go away with rest    · Dark areas on the skin of your legs    · Chest pain that feels like squeezing, pressure, fullness, or pain    · Chest pain that lasts for more than a few minutes or returns    · Nausea and trouble breathing    · Cold sweat and feeling lightheaded or dizzy  Treatment for peripheral vascular disease  may include any of the following:  · Cholesterol medicine  helps decrease the amount of cholesterol in your blood  · Antiplatelets  help prevent blood clots   This medicine makes it more likely for you to bleed or bruise  · Vasodilators  help blood vessels dilate (open wider) and increase your blood flow  · Angioplasty  may be needed to open your blood vessel  A catheter is inserted into a blood vessel and threaded to the blockage  A small balloon is inflated to open the blocked blood vessel  Metal or plastic stents (tubes) may be put in where the artery was blocked to keep it open  · Bypass surgery  may be done to place a new blood vessel near the blocked vessel  The blood then flows through the new blood vessel around the blockage  This new vessel may be made of plastic or other material     · Reconstructive surgery  may be done to replace the area of blood vessel that is blocked or narrow  An artificial vessel, or a vessel taken from another part of your body, is used to replace it  Manage peripheral vascular disease:   · Do not smoke  If you smoke, it is never too late to quit  Ask for information if you need help quitting  · Get regular exercise  Ask about the best exercise plan for you  Walking is a low-impact way to exercise and increase your blood flow  Stop and rest if you have pain in your legs  · Care for your feet  Look closely at your feet every day  Check for cracks or sores  Wash your feet daily with mild soap and dry them well  Do not walk barefoot in case you step on a hard or sharp object  · Change your sleep position  You may have pain in your legs or feet when you sleep  Raise the head of your bed 4 inches, or use pillows to prop your upper body higher than your legs  This may help more blood go to your feet, decreasing pain  · Protect and cushion your feet and hands  If you have ulcers on your feet, you may need to wear bandages with heel pads  You may also wear foam rubber booties  Hand or foot warmers may decrease pain in your hands or feet  Prevent peripheral vascular disease:   · Eat a variety of healthy foods    Healthy foods include fruits, vegetables, whole-grain breads, low-fat dairy products, beans, lean meats, and fish  Ask if you need to be on a special diet  · Maintain a healthy weight  Ask your healthcare provider how much you should weigh  Ask him to help you create a weight loss plan if you are overweight  · Manage your diabetes  Keep your blood sugar level in the correct range   Check your blood sugar level often  Ask if you should make changes to your diet, exercise, or medications  Follow up with your healthcare provider as directed:  Write down your questions so you remember to ask them during your visits  CARE AGREEMENT:   You have the right to help plan your care  Learn about your health condition and how it may be treated  Discuss treatment options with your caregivers to decide what care you want to receive  You always have the right to refuse treatment  The above information is an  only  It is not intended as medical advice for individual conditions or treatments  Talk to your doctor, nurse or pharmacist before following any medical regimen to see if it is safe and effective for you  © 2014 [address] is for End User's use only and may not be sold, redistributed or otherwise used for commercial purposes  All illustrations and images included in CareNotes® are the copyrighted property of A D A M , Inc  or Chance Peres

## 2019-03-12 NOTE — INTERVAL H&P NOTE
Update:     H&P reviewed  After examining the patient, I find no changed to the H&P since it had been written  Patient here for a right lower limb angiogram with possible intervention  Prior notes and imaging reviewed  Procedure, indication and risk of procedure explained in detail  Patient agrees to procedure  Consent obtained    Patient re-evaluated   Accept as history and physical     Daryle Mountain, MD/March 12, 2019/10:06 AM

## 2019-03-13 PROCEDURE — 99024 POSTOP FOLLOW-UP VISIT: CPT | Performed by: SURGERY

## 2019-03-13 NOTE — OP NOTE
INDICATIONS:  Patient is an 55-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 with severe peripheral arterial occlusive disease with a limb-threatening wound to the right heel  Patient presents for diagnostic angiography with concomitant therapeutic intervention if deemed appropriate  PROCEDURE PERFORMED:  1  Ultrasound-guided access of the left common femoral artery  2  Diagnostic aortogram with right lower extremity runoff  3  Right superficial femoral artery angioplasty with a 6 mm x 6 cm length balloon angioplasty catheter followed by a 7 mm x 6 cm length DCB    SURGEON:  Phillip Bryan MD    FINDINGS:  Aorta:  Widely patent  Right common femoral and profunda femoral arteries patent  Right superficial femoral artery:  Patent in the proximal 1/3 with profound irregular plaque burden noted throughout  A approximately 70-80% stenosis at the mid superficial femoral artery  Multiple areas of irregularity resulting in multiple areas of stenosis ranging from 30-50%  Popliteal:  Significant plaque irregularity  No significant stenosis noted  Anterior tibial:  Patent in the proximal 1/3  Significant plaque irregularity noted throughout  There is noted to be short-segment occlusion of the anterior tibial artery with reconstitution at the level of the ankle forming an atretic dorsalis pedis  Tibioperoneal trunk:  Patent  Posterior tibial artery:  Again profound plaque burden noted  Proximal 1/3 patent  Occludes in middle 1/3 of leg with reconstitution at level of ankle  ANESTHESIA:  Local with sedation (MAC) under my supervision    OPERATIVE PROCEDURE:  Patient was brought to the cardiac catheterization suite placed supine on the procedure table  Bilateral groins were cleaned, prepped and draped  Appropriate time-out was conducted  Ultrasound guidance was used to cannulate the left common femoral artery  Micro introducer sheath was placed    Micro sheath angiogram was performed to ensure we were in the proper portion of the common femoral artery, this was ensured  Five Urdu sheath was placed  Crossover catheter was then used to cannulate the right common femoral artery  Diagnostic angiography of the right lower extremity was then performed which revealed severe stenosis in the mid superficial femoral artery  The entire superficial femoral and popliteal arteries were noted to have profound plaque burden with multiple areas of stenosis  However only 1 stenosis at the mid superficial femoral artery represented greater than 70% stenosis  A Storq wire was placed  A 90 cm 6 [place] destination sheath was then placed  The patient received 6000 units of intravenous heparin  Superficial femoral artery was treated with a 6 mm balloon angioplasty catheter followed by a 7 mm drug coated balloon  A 120 cm angled glide catheter and Advantage wire were then used to cannulate the right anterior tibial artery  The occlusion within the proximal anterior tibial artery could not be successfully crossed  Due to the profound plaque burden within the anterior tibial artery the [place] catheter could not be passed distal to the orifice  A 150 cm 0 014 CXI catheter was placed in an attempt to cross the orifice and the occluded portion of the anterior tibial artery  We were able to cross the origin however the CXI catheter would not advance more than 2 cm distal to the origin  At this point we attempted to cross the occluded posterior tibial artery  Again we were unable to cross the occluded segment  The CXI catheter would not pass further than the initial 10 cm  Although there was no identifiable severe stenosis within the proximal posterior tibial artery due to the profound plaque irregularity the catheters would not pass  Therefore we attempted to pre dilate the posterior tibial artery at the proximal 10 cm with a 3 mm balloon angioplasty catheter    The CXI catheter was then replaced however would still not pass distal to the proximal portion of the posterior tibial artery  The tibioperoneal trunk was then cannulated however again catheter was unable to be advanced to provide treatment of the more distal occlusion  At this point the procedure was aborted  The 6 [place] sheath was removed  A Mynx closure device was used to seal the left common femoral artery which was successful  Patient left the interventional suite in stable condition

## 2019-03-14 ENCOUNTER — APPOINTMENT (OUTPATIENT)
Dept: WOUND CARE | Facility: HOSPITAL | Age: 84
End: 2019-03-14
Payer: COMMERCIAL

## 2019-03-14 PROCEDURE — 99212 OFFICE O/P EST SF 10 MIN: CPT

## 2019-03-26 NOTE — PROGRESS NOTES
Assessment/Plan:    Atherosclerosis of artery of extremity with ulceration (HCC)  Right heel nonhealing ulceration  Initially started as a decubitus ulcer however he has significant underlying peripheral arterial disease which has led to nonhealing  I have reviewed his lower extremity arterial Doppler and also the recently performed angiogram by my partner Dr Patsey Boas  He was unable to recanalize an occluded mid segment of the posterior tibial due to very heavy calcified disease  I discussed with the patient and family that there are 2 options in front of us 1 is to continue with long-term local wound care and strict offloading  The 2nd option is to consider an SFA to tarsal branch bypass using CT of [place] vein  Unfortunately his own vein mapping is suggestive of small caliber saphenous vein  There are significant risks associated with the bypass surgery as well which include nonhealing of wounds due to malnutrition, failure of bypass especially as there is no autogenous conduit available,   Limited patency of the bypass and possible limb loss  There is also concern as a distal bypass would require anticoagulation to maintain patency  Patient recently had a subdural bleed after he was placed on anticoagulation for endovascular procedures of his left leg  At this point as the wound is healing slowly and actually improved compared to last office visit I recommend that we continue with local wound care  The proximal SFA angioplasty might have also improved some perfusion to the level of the heel  Strict offloading was also recommended and reinforced with the patient and his family  He should continue follow-up with the wound care center  I will see him back in 2 weeks      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)    Left BKA was performed for advanced peripheral arterial disease and failure of endovascular intervention needing to then she will progressive gangrene and limb loss   Patient is healing well from the below-knee amputation standpoint  There are areas of scab which should slowly   Heal underneath and fall off  Severe protein-calorie malnutrition ([facility] )   Continue with nutritional supplementation         Ambulatory dysfunction   Secondary to right lower extremity heel ulceration and left below-knee amputation  Patient is using offloading shoes for transfers  Phantom pain after amputation of lower extremity ([facility] )  Details of mirror therapy given  Prescription for oxycodone has been renewed  Diagnoses and all orders for this visit:    Ambulatory dysfunction    CKD (chronic kidney disease), stage III ([facility] )    Decubitus ulcer of right heel, unstageable (HCC)    Severe protein-calorie malnutrition ([facility] )    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 ([facility] )    Atherosclerosis of artery of extremity with ulceration ([facility])    Phantom pain after amputation of lower extremity ([facility])    Severe peripheral arterial disease ([facility])  -     oxyCODONE (ROXICODONE) 5 mg immediate release tablet; Take 1 tablet (5 mg total) by mouth every 8 (eight) hours as needed for severe painMax Daily Amount: 15 mg          Subjective:      Patient ID: Julia Lombardo is a 80 y o  male  Longstanding bilateral lower extremity peripheral arterial disease  Unfortunately the left side had to undergo below-knee amputation due to intractable rest pain and gangrene  The left below-knee amputation stump is healing slowly  He has been doing regular dressing changes  There is continued phantom pain in the amputation area  On the left side he has a heel ulceration  He recently underwent right lower extremity angiogram with SFA angioplasty by Dr Marzena ochoa on March 12th  Right heel ulceration is continuing to heal slowly  He is following at [address]  He denies any right leg pain  Occasional right heel pain    He is taking oxycodone for pain control and it is helping him       The following portions of the patient's history were reviewed and updated as appropriate: allergies, current medications, past family history, past medical history, past social history, past surgical history and problem list     Review of Systems   Constitutional: Negative  HENT: Negative  Eyes: Negative  Respiratory: Negative  Cardiovascular: Negative  Gastrointestinal: Negative  Endocrine: Negative  Genitourinary: Negative  Musculoskeletal: Positive for gait problem  Skin:        Ulcer R heel   Allergic/Immunologic: Negative  Hematological: Negative  Psychiatric/Behavioral: Negative  I have reviewed the review of systems as entered and made appropriate changes as necessary    Objective:      BP 90/50 (BP Location: Left arm, Patient Position: Sitting, Cuff Size: Adult)   Pulse 72          Physical Exam   Constitutional: He is oriented to person, place, and time  He appears well-developed  He appears cachectic  No distress  Mal nourished   HENT:   Head: Normocephalic and atraumatic  Right Ear: External ear normal    Left Ear: External ear normal    Nose: Nose normal    Mouth/Throat: No oropharyngeal exudate  Eyes: Conjunctivae are normal  Right eye exhibits no discharge  Left eye exhibits no discharge  No scleral icterus  Neck: Normal range of motion  Neck supple  Cardiovascular: Normal rate, regular rhythm and normal heart sounds  Strong biphasic signals in the right posterior tibial   Pulmonary/Chest: Effort normal and breath sounds normal  No respiratory distress  Abdominal: Soft  He exhibits no distension  Musculoskeletal: He exhibits deformity (left BKA)  He exhibits no edema  Neurological: He is alert and oriented to person, place, and time  Skin: Skin is warm and dry  Capillary refill takes less than 2 seconds  He is not diaphoretic  No erythema  There is pallor  Right heel ulceration with unstageable ulcer    Ulceration is Significantly improved from prior office visit with more epithelialization  There is a dry scab at the level of the heel  Left  BKA stump is healing well, there is a portion of superficial necrosis with dry scab in the midportion of the BKA stump  Overall looks improved compared to last office visit  Underneath the scab there is healthy granulation tissue  Psychiatric: He has a normal mood and affect  His behavior is normal    Nursing note and vitals reviewed

## 2019-03-27 ENCOUNTER — OFFICE VISIT (OUTPATIENT)
Dept: VASCULAR SURGERY | Facility: CLINIC | Age: 84
End: 2019-03-27
Payer: COMMERCIAL

## 2019-03-27 VITALS — DIASTOLIC BLOOD PRESSURE: 50 MMHG | SYSTOLIC BLOOD PRESSURE: 90 MMHG | HEART RATE: 72 BPM

## 2019-03-27 DIAGNOSIS — E43 SEVERE PROTEIN-CALORIE MALNUTRITION (HCC): ICD-10-CM

## 2019-03-27 DIAGNOSIS — G54.6 PHANTOM PAIN AFTER AMPUTATION OF LOWER EXTREMITY (HCC): ICD-10-CM

## 2019-03-27 DIAGNOSIS — L97.909 ATHEROSCLEROSIS OF ARTERY OF EXTREMITY WITH ULCERATION (HCC): ICD-10-CM

## 2019-03-27 DIAGNOSIS — R26.2 AMBULATORY DYSFUNCTION: Primary | ICD-10-CM

## 2019-03-27 DIAGNOSIS — I70.299 ATHEROSCLEROSIS OF ARTERY OF EXTREMITY WITH ULCERATION (HCC): ICD-10-CM

## 2019-03-27 DIAGNOSIS — Z89.512 STATUS POST BELOW KNEE AMPUTATION OF LEFT LOWER EXTREMITY: ICD-10-CM

## 2019-03-27 DIAGNOSIS — L89.610 DECUBITUS ULCER OF RIGHT HEEL, UNSTAGEABLE (HCC): ICD-10-CM

## 2019-03-27 DIAGNOSIS — I73.9 SEVERE PERIPHERAL ARTERIAL DISEASE (HCC): ICD-10-CM

## 2019-03-27 DIAGNOSIS — N18.30 CKD (CHRONIC KIDNEY DISEASE), STAGE III (HCC): ICD-10-CM

## 2019-03-27 PROCEDURE — 99215 OFFICE O/P EST HI 40 MIN: CPT | Performed by: SURGERY

## 2019-03-27 RX ORDER — OXYCODONE HYDROCHLORIDE 5 MG/1
5 TABLET ORAL EVERY 8 HOURS PRN
Qty: 30 TABLET | Refills: 0 | Status: SHIPPED | OUTPATIENT
Start: 2019-03-27 | End: 2019-04-29 | Stop reason: ALTCHOICE

## 2019-03-27 NOTE — ASSESSMENT & PLAN NOTE
Secondary to right lower extremity heel ulceration and left below-knee amputation  Patient is using offloading shoes for transfers

## 2019-03-27 NOTE — LETTER
March 27, 2019     Dennie Number, [id number] [employer] [address]  [address]    Patient: Yonathan Hunt   YOB: 1933   Date of Visit: 3/27/2019       Dear Dr Griffin Flores:    If you have questions, please do not hesitate to call me  I look forward to following your patient along with you  Sincerely,        Deneen Nino MD        CC: Aaron Mendez MD  [address] [place] [address]  Deneen Nino MD  3/27/2019  1:01 PM  Sign at close encounter  Assessment/Plan:    Atherosclerosis of artery of extremity with ulceration (HCC)  Right heel nonhealing ulceration  Initially started as a decubitus ulcer however he has significant underlying peripheral arterial disease which has led to nonhealing  I have reviewed his lower extremity arterial Doppler and also the recently performed angiogram by my partner Dr Davon Acuña  He was unable to recanalize an occluded mid segment of the posterior tibial due to very heavy calcified disease  I discussed with the patient and family that there are 2 options in front of us 1 is to continue with long-term local wound care and strict offloading  The 2nd option is to consider an SFA to tarsal branch bypass using CT of [place] vein  Unfortunately his own vein mapping is suggestive of small caliber saphenous vein  There are significant risks associated with the bypass surgery as well which include nonhealing of wounds due to malnutrition, failure of bypass especially as there is no autogenous conduit available,   Limited patency of the bypass and possible limb loss  There is also concern as a distal bypass would require anticoagulation to maintain patency  Patient recently had a subdural bleed after he was placed on anticoagulation for endovascular procedures of his left leg  At this point as the wound is healing slowly and actually improved compared to last office visit I recommend that we continue with local wound care  The proximal SFA angioplasty might have also improved some perfusion to the level of the heel  Strict offloading was also recommended and reinforced with the patient and his family  He should continue follow-up with the wound care center  I will see him back in 2 weeks  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)    Left BKA was performed for advanced peripheral arterial disease and failure of endovascular intervention needing to then she will progressive gangrene and limb loss  Patient is healing well from the below-knee amputation standpoint  There are areas of scab which should slowly   Heal underneath and fall off  Severe protein-calorie malnutrition ([address] )   Continue with nutritional supplementation         Ambulatory dysfunction   Secondary to right lower extremity heel ulceration and left below-knee amputation  Patient is using offloading shoes for transfers  Phantom pain after amputation of lower extremity ([address] )  Details of mirror therapy given  Prescription for oxycodone has been renewed  Diagnoses and all orders for this visit:    Ambulatory dysfunction    CKD (chronic kidney disease), stage III ([address] )    Decubitus ulcer of right heel, unstageable (HCC)    Severe protein-calorie malnutrition ([address] )    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 ([address] )    Atherosclerosis of artery of extremity with ulceration (HCC)    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)    Severe peripheral arterial disease (HCC)  -     oxyCODONE (ROXICODONE) 5 mg immediate release tablet; Take 1 tablet (5 mg total) by mouth every 8 (eight) hours as needed for severe painMax Daily Amount: 15 mg          Subjective:      Patient ID: Estephanie Hyde is a 80 y o  male  Longstanding bilateral lower extremity peripheral arterial disease  Unfortunately the left side had to undergo below-knee amputation due to intractable rest pain and gangrene    The left below-knee amputation stump is healing slowly  He has been doing regular dressing changes  There is continued phantom pain in the amputation area  On the left side he has a heel ulceration  He recently underwent right lower extremity angiogram with SFA angioplasty by Dr Marzena ochoa on March 12th  Right heel ulceration is continuing to heal slowly  He is following at [address]  He denies any right leg pain  Occasional right heel pain  He is taking oxycodone for pain control and it is helping him  The following portions of the patient's history were reviewed and updated as appropriate: allergies, current medications, past family history, past medical history, past social history, past surgical history and problem list     Review of Systems   Constitutional: Negative  HENT: Negative  Eyes: Negative  Respiratory: Negative  Cardiovascular: Negative  Gastrointestinal: Negative  Endocrine: Negative  Genitourinary: Negative  Musculoskeletal: Positive for gait problem  Skin:        Ulcer R heel   Allergic/Immunologic: Negative  Hematological: Negative  Psychiatric/Behavioral: Negative  I have reviewed the review of systems as entered and made appropriate changes as necessary    Objective:      BP 90/50 (BP Location: Left arm, Patient Position: Sitting, Cuff Size: Adult)   Pulse 72          Physical Exam   Constitutional: He is oriented to person, place, and time  He appears well-developed  He appears cachectic  No distress  Mal nourished   HENT:   Head: Normocephalic and atraumatic  Right Ear: External ear normal    Left Ear: External ear normal    Nose: Nose normal    Mouth/Throat: No oropharyngeal exudate  Eyes: Conjunctivae are normal  Right eye exhibits no discharge  Left eye exhibits no discharge  No scleral icterus  Neck: Normal range of motion  Neck supple  Cardiovascular: Normal rate, regular rhythm and normal heart sounds       Strong biphasic signals in the right posterior tibial   Pulmonary/Chest: Effort normal and breath sounds normal  No respiratory distress  Abdominal: Soft  He exhibits no distension  Musculoskeletal: He exhibits deformity (left BKA)  He exhibits no edema  Neurological: He is alert and oriented to person, place, and time  Skin: Skin is warm and dry  Capillary refill takes less than 2 seconds  He is not diaphoretic  No erythema  There is pallor  Right heel ulceration with unstageable ulcer  Ulceration is  Significantly improved from prior office visit with more epithelialization  There is a dry scab at the level of the heel  Left  BKA stump is healing well, there is a portion of superficial necrosis with dry scab in the midportion of the BKA stump  Overall looks improved compared to last office visit  Underneath the scab there is healthy granulation tissue  Psychiatric: He has a normal mood and affect  His behavior is normal    Nursing note and vitals reviewed

## 2019-03-27 NOTE — ASSESSMENT & PLAN NOTE
Right heel nonhealing ulceration  Initially started as a decubitus ulcer however he has significant underlying peripheral arterial disease which has led to nonhealing  I have reviewed his lower extremity arterial Doppler and also the recently performed angiogram by my partner Dr Zaman  He was unable to recanalize an occluded mid segment of the posterior tibial due to very heavy calcified disease  I discussed with the patient and family that there are 2 options in front of us 1 is to continue with long-term local wound care and strict offloading  The 2nd option is to consider an SFA to tarsal branch bypass using CT of [place] vein  Unfortunately his own vein mapping is suggestive of small caliber saphenous vein  There are significant risks associated with the bypass surgery as well which include nonhealing of wounds due to malnutrition, failure of bypass especially as there is no autogenous conduit available,   Limited patency of the bypass and possible limb loss  There is also concern as a distal bypass would require anticoagulation to maintain patency  Patient recently had a subdural bleed after he was placed on anticoagulation for endovascular procedures of his left leg  At this point as the wound is healing slowly and actually improved compared to last office visit I recommend that we continue with local wound care  The proximal SFA angioplasty might have also improved some perfusion to the level of the heel  Strict offloading was also recommended and reinforced with the patient and his family  He should continue follow-up with the wound care center  I will see him back in 2 weeks

## 2019-03-27 NOTE — ASSESSMENT & PLAN NOTE
Left BKA was performed for advanced peripheral arterial disease and failure of endovascular intervention needing to then she will progressive gangrene and limb loss  Patient is healing well from the below-knee amputation standpoint  There are areas of scab which should slowly   Heal underneath and fall off

## 2019-03-28 ENCOUNTER — APPOINTMENT (OUTPATIENT)
Dept: WOUND CARE | Facility: HOSPITAL | Age: 84
End: 2019-03-28
Payer: COMMERCIAL

## 2019-03-28 PROCEDURE — 99212 OFFICE O/P EST SF 10 MIN: CPT

## 2019-04-01 ENCOUNTER — TELEPHONE (OUTPATIENT)
Dept: VASCULAR SURGERY | Facility: CLINIC | Age: 84
End: 2019-04-01

## 2019-04-16 ENCOUNTER — OFFICE VISIT (OUTPATIENT)
Dept: VASCULAR SURGERY | Facility: CLINIC | Age: 84
End: 2019-04-16

## 2019-04-16 VITALS
SYSTOLIC BLOOD PRESSURE: 110 MMHG | HEART RATE: 68 BPM | TEMPERATURE: 97.3 F | BODY MASS INDEX: 19.04 KG/M2 | DIASTOLIC BLOOD PRESSURE: 68 MMHG | HEIGHT: 70 IN

## 2019-04-16 DIAGNOSIS — L97.909 ATHEROSCLEROSIS OF ARTERY OF EXTREMITY WITH ULCERATION (HCC): ICD-10-CM

## 2019-04-16 DIAGNOSIS — I73.9 SEVERE PERIPHERAL ARTERIAL DISEASE (HCC): Primary | ICD-10-CM

## 2019-04-16 DIAGNOSIS — I70.299 ATHEROSCLEROSIS OF ARTERY OF EXTREMITY WITH ULCERATION (HCC): ICD-10-CM

## 2019-04-16 DIAGNOSIS — L89.610 DECUBITUS ULCER OF RIGHT HEEL, UNSTAGEABLE (HCC): ICD-10-CM

## 2019-04-16 DIAGNOSIS — Z89.512 STATUS POST BELOW KNEE AMPUTATION OF LEFT LOWER EXTREMITY: ICD-10-CM

## 2019-04-16 PROCEDURE — 99024 POSTOP FOLLOW-UP VISIT: CPT | Performed by: NURSE PRACTITIONER

## 2019-04-24 ENCOUNTER — TELEPHONE (OUTPATIENT)
Dept: VASCULAR SURGERY | Facility: CLINIC | Age: 84
End: 2019-04-24

## 2019-04-25 ENCOUNTER — APPOINTMENT (OUTPATIENT)
Dept: WOUND CARE | Facility: HOSPITAL | Age: 84
End: 2019-04-25
Payer: COMMERCIAL

## 2019-04-25 PROCEDURE — 11042 DBRDMT SUBQ TIS 1ST 20SQCM/<: CPT

## 2019-04-29 ENCOUNTER — APPOINTMENT (OUTPATIENT)
Dept: LAB | Facility: CLINIC | Age: 84
End: 2019-04-29
Payer: COMMERCIAL

## 2019-04-29 ENCOUNTER — APPOINTMENT (OUTPATIENT)
Dept: RADIOLOGY | Facility: CLINIC | Age: 84
End: 2019-04-29
Payer: COMMERCIAL

## 2019-04-29 ENCOUNTER — OFFICE VISIT (OUTPATIENT)
Dept: FAMILY MEDICINE CLINIC | Facility: CLINIC | Age: 84
End: 2019-04-29
Payer: COMMERCIAL

## 2019-04-29 VITALS
TEMPERATURE: 97.7 F | OXYGEN SATURATION: 97 % | DIASTOLIC BLOOD PRESSURE: 66 MMHG | RESPIRATION RATE: 18 BRPM | SYSTOLIC BLOOD PRESSURE: 124 MMHG | HEART RATE: 82 BPM

## 2019-04-29 DIAGNOSIS — R26.2 AMBULATORY DYSFUNCTION: ICD-10-CM

## 2019-04-29 DIAGNOSIS — R73.01 IMPAIRED FASTING GLUCOSE: ICD-10-CM

## 2019-04-29 DIAGNOSIS — R05.9 COUGH: ICD-10-CM

## 2019-04-29 DIAGNOSIS — E55.9 VITAMIN D DEFICIENCY: ICD-10-CM

## 2019-04-29 DIAGNOSIS — N18.30 CKD (CHRONIC KIDNEY DISEASE), STAGE III (HCC): ICD-10-CM

## 2019-04-29 DIAGNOSIS — L89.610 DECUBITUS ULCER OF RIGHT HEEL, UNSTAGEABLE (HCC): ICD-10-CM

## 2019-04-29 DIAGNOSIS — R53.81 PHYSICAL DECONDITIONING: ICD-10-CM

## 2019-04-29 DIAGNOSIS — D64.9 ANEMIA, UNSPECIFIED TYPE: ICD-10-CM

## 2019-04-29 DIAGNOSIS — R26.81 UNSTABLE GAIT: ICD-10-CM

## 2019-04-29 DIAGNOSIS — E78.5 HYPERLIPIDEMIA, UNSPECIFIED HYPERLIPIDEMIA TYPE: ICD-10-CM

## 2019-04-29 DIAGNOSIS — I73.9 SEVERE PERIPHERAL ARTERIAL DISEASE (HCC): Primary | ICD-10-CM

## 2019-04-29 DIAGNOSIS — E43 SEVERE PROTEIN-CALORIE MALNUTRITION (HCC): ICD-10-CM

## 2019-04-29 DIAGNOSIS — Z00.00 MEDICARE ANNUAL WELLNESS VISIT, SUBSEQUENT: ICD-10-CM

## 2019-04-29 DIAGNOSIS — L81.9 CHANGE IN MULTIPLE PIGMENTED SKIN LESIONS: ICD-10-CM

## 2019-04-29 PROBLEM — R06.00 DYSPNEA ON EXERTION: Status: RESOLVED | Noted: 2017-10-31 | Resolved: 2019-04-29
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
## 2019-04-29 PROBLEM — B37.89 CANDIDA RASH OF GROIN: Status: RESOLVED | Noted: 2018-11-23 | Resolved: 2019-04-29

## 2019-04-29 PROBLEM — R41.3 SHORT-TERM MEMORY LOSS: Status: RESOLVED | Noted: 2018-10-29 | Resolved: 2019-04-29

## 2019-04-29 PROBLEM — L30.9 DERMATITIS: Status: RESOLVED | Noted: 2017-06-06 | Resolved: 2019-04-29

## 2019-04-29 PROBLEM — I60.9 SUBARACHNOID HEMORRHAGE (HCC): Status: RESOLVED | Noted: 2018-10-29 | Resolved: 2019-04-29

## 2019-04-29 PROBLEM — R35.0 URINARY FREQUENCY: Status: RESOLVED | Noted: 2018-11-23 | Resolved: 2019-04-29

## 2019-04-29 PROBLEM — Z01.818 PRE-OP EVALUATION: Status: RESOLVED | Noted: 2019-01-29 | Resolved: 2019-04-29

## 2019-04-29 LAB
25(OH)D3 SERPL-MCNC: 73.8 NG/ML (ref 30–100)
ALBUMIN SERPL BCP-MCNC: 3.5 G/DL (ref 3.5–5)
ALP SERPL-CCNC: 72 U/L (ref 46–116)
ALT SERPL W P-5'-P-CCNC: 33 U/L (ref 12–78)
ANION GAP SERPL CALCULATED.3IONS-SCNC: 8 MMOL/L (ref 4–13)
AST SERPL W P-5'-P-CCNC: 20 U/L (ref 5–45)
BASOPHILS # BLD AUTO: 0.07 THOUSANDS/ΜL (ref 0–0.1)
BASOPHILS NFR BLD AUTO: 1 % (ref 0–1)
BILIRUB SERPL-MCNC: 0.59 MG/DL (ref 0.2–1)
BUN SERPL-MCNC: 27 MG/DL (ref 5–25)
CALCIUM SERPL-MCNC: 9.9 MG/DL (ref 8.3–10.1)
CHLORIDE SERPL-SCNC: 100 MMOL/L (ref 100–108)
CHOLEST SERPL-MCNC: 139 MG/DL (ref 50–200)
CO2 SERPL-SCNC: 27 MMOL/L (ref 21–32)
CREAT SERPL-MCNC: 1.14 MG/DL (ref 0.6–1.3)
EOSINOPHIL # BLD AUTO: 0.24 THOUSAND/ΜL (ref 0–0.61)
EOSINOPHIL NFR BLD AUTO: 3 % (ref 0–6)
ERYTHROCYTE [DISTWIDTH] IN BLOOD BY AUTOMATED COUNT: 14.8 % (ref 11.6–15.1)
EST. AVERAGE GLUCOSE BLD GHB EST-MCNC: 111 MG/DL
GFR SERPL CREATININE-BSD FRML MDRD: 58 ML/MIN/1.73SQ M
GLUCOSE SERPL-MCNC: 94 MG/DL (ref 65–140)
HBA1C MFR BLD: 5.5 % (ref 4.2–6.3)
HCT VFR BLD AUTO: 40.4 % (ref 36.5–49.3)
HDLC SERPL-MCNC: 55 MG/DL (ref 40–60)
HGB BLD-MCNC: 13 G/DL (ref 12–17)
IMM GRANULOCYTES # BLD AUTO: 0.05 THOUSAND/UL (ref 0–0.2)
IMM GRANULOCYTES NFR BLD AUTO: 1 % (ref 0–2)
IRON SERPL-MCNC: 37 UG/DL (ref 65–175)
LDLC SERPL CALC-MCNC: 68 MG/DL (ref 0–100)
LYMPHOCYTES # BLD AUTO: 2.37 THOUSANDS/ΜL (ref 0.6–4.47)
LYMPHOCYTES NFR BLD AUTO: 33 % (ref 14–44)
MCH RBC QN AUTO: 29.7 PG (ref 26.8–34.3)
MCHC RBC AUTO-ENTMCNC: 32.2 G/DL (ref 31.4–37.4)
MCV RBC AUTO: 92 FL (ref 82–98)
MONOCYTES # BLD AUTO: 0.73 THOUSAND/ΜL (ref 0.17–1.22)
MONOCYTES NFR BLD AUTO: 10 % (ref 4–12)
NEUTROPHILS # BLD AUTO: 3.77 THOUSANDS/ΜL (ref 1.85–7.62)
NEUTS SEG NFR BLD AUTO: 52 % (ref 43–75)
NRBC BLD AUTO-RTO: 0 /100 WBCS
PLATELET # BLD AUTO: 221 THOUSANDS/UL (ref 149–390)
PMV BLD AUTO: 11.1 FL (ref 8.9–12.7)
POTASSIUM SERPL-SCNC: 4 MMOL/L (ref 3.5–5.3)
PROT SERPL-MCNC: 8.3 G/DL (ref 6.4–8.2)
RBC # BLD AUTO: 4.38 MILLION/UL (ref 3.88–5.62)
SODIUM SERPL-SCNC: 135 MMOL/L (ref 136–145)
TRIGL SERPL-MCNC: 81 MG/DL
WBC # BLD AUTO: 7.23 THOUSAND/UL (ref 4.31–10.16)

## 2019-04-29 PROCEDURE — 36415 COLL VENOUS BLD VENIPUNCTURE: CPT

## 2019-04-29 PROCEDURE — 80053 COMPREHEN METABOLIC PANEL: CPT

## 2019-04-29 PROCEDURE — 83036 HEMOGLOBIN GLYCOSYLATED A1C: CPT

## 2019-04-29 PROCEDURE — 1125F AMNT PAIN NOTED PAIN PRSNT: CPT | Performed by: NURSE PRACTITIONER

## 2019-04-29 PROCEDURE — 99214 OFFICE O/P EST MOD 30 MIN: CPT | Performed by: NURSE PRACTITIONER

## 2019-04-29 PROCEDURE — 82306 VITAMIN D 25 HYDROXY: CPT

## 2019-04-29 PROCEDURE — 71046 X-RAY EXAM CHEST 2 VIEWS: CPT

## 2019-04-29 PROCEDURE — 85025 COMPLETE CBC W/AUTO DIFF WBC: CPT

## 2019-04-29 PROCEDURE — 83540 ASSAY OF IRON: CPT

## 2019-04-29 PROCEDURE — G0439 PPPS, SUBSEQ VISIT: HCPCS | Performed by: NURSE PRACTITIONER

## 2019-04-29 PROCEDURE — 80061 LIPID PANEL: CPT

## 2019-04-29 PROCEDURE — 1170F FXNL STATUS ASSESSED: CPT | Performed by: NURSE PRACTITIONER

## 2019-05-01 ENCOUNTER — TELEPHONE (OUTPATIENT)
Dept: FAMILY MEDICINE CLINIC | Facility: CLINIC | Age: 84
End: 2019-05-01

## 2019-05-01 DIAGNOSIS — R93.89 ABNORMAL CHEST X-RAY: Primary | ICD-10-CM

## 2019-05-06 ENCOUNTER — TELEPHONE (OUTPATIENT)
Dept: VASCULAR SURGERY | Facility: CLINIC | Age: 84
End: 2019-05-06

## 2019-05-09 ENCOUNTER — APPOINTMENT (OUTPATIENT)
Dept: WOUND CARE | Facility: HOSPITAL | Age: 84
End: 2019-05-09
Payer: COMMERCIAL

## 2019-05-09 PROCEDURE — 11042 DBRDMT SUBQ TIS 1ST 20SQCM/<: CPT

## 2019-05-13 ENCOUNTER — OFFICE VISIT (OUTPATIENT)
Dept: VASCULAR SURGERY | Facility: CLINIC | Age: 84
End: 2019-05-13
Payer: COMMERCIAL

## 2019-05-13 VITALS — SYSTOLIC BLOOD PRESSURE: 130 MMHG | HEART RATE: 60 BPM | DIASTOLIC BLOOD PRESSURE: 70 MMHG | TEMPERATURE: 96.9 F

## 2019-05-13 DIAGNOSIS — I70.299 ATHEROSCLEROSIS OF ARTERY OF EXTREMITY WITH ULCERATION (HCC): ICD-10-CM

## 2019-05-13 DIAGNOSIS — Z89.512 STATUS POST BELOW KNEE AMPUTATION OF LEFT LOWER EXTREMITY: ICD-10-CM

## 2019-05-13 DIAGNOSIS — L97.909 ATHEROSCLEROSIS OF ARTERY OF EXTREMITY WITH ULCERATION (HCC): ICD-10-CM

## 2019-05-13 DIAGNOSIS — L89.610 DECUBITUS ULCER OF RIGHT HEEL, UNSTAGEABLE (HCC): Primary | ICD-10-CM

## 2019-05-13 DIAGNOSIS — G54.6 PHANTOM PAIN AFTER AMPUTATION OF LOWER EXTREMITY (HCC): ICD-10-CM

## 2019-05-13 DIAGNOSIS — R53.81 PHYSICAL DECONDITIONING: ICD-10-CM

## 2019-05-13 PROCEDURE — 99213 OFFICE O/P EST LOW 20 MIN: CPT | Performed by: SURGERY

## 2019-05-14 ENCOUNTER — TELEPHONE (OUTPATIENT)
Dept: ADMINISTRATIVE | Facility: HOSPITAL | Age: 84
End: 2019-05-14

## 2019-05-23 ENCOUNTER — APPOINTMENT (OUTPATIENT)
Dept: WOUND CARE | Facility: HOSPITAL | Age: 84
End: 2019-05-23
Payer: COMMERCIAL

## 2019-05-23 PROCEDURE — 99212 OFFICE O/P EST SF 10 MIN: CPT

## 2019-06-06 ENCOUNTER — APPOINTMENT (OUTPATIENT)
Dept: WOUND CARE | Facility: HOSPITAL | Age: 84
End: 2019-06-06
Payer: COMMERCIAL

## 2019-06-06 PROCEDURE — 88305 TISSUE EXAM BY PATHOLOGIST: CPT | Performed by: PATHOLOGY

## 2019-06-06 PROCEDURE — 88311 DECALCIFY TISSUE: CPT | Performed by: PATHOLOGY

## 2019-06-06 PROCEDURE — 20220 BONE BIOPSY TROCAR/NDL SUPFC: CPT

## 2019-06-07 ENCOUNTER — LAB REQUISITION (OUTPATIENT)
Dept: LAB | Facility: HOSPITAL | Age: 84
End: 2019-06-07
Payer: COMMERCIAL

## 2019-06-07 DIAGNOSIS — L89.610 UNSTAGEABLE PRESSURE ULCER OF RIGHT HEEL (HCC): ICD-10-CM

## 2019-06-10 ENCOUNTER — OFFICE VISIT (OUTPATIENT)
Dept: VASCULAR SURGERY | Facility: CLINIC | Age: 84
End: 2019-06-10
Payer: COMMERCIAL

## 2019-06-10 VITALS
HEART RATE: 82 BPM | DIASTOLIC BLOOD PRESSURE: 60 MMHG | SYSTOLIC BLOOD PRESSURE: 120 MMHG | HEIGHT: 70 IN | BODY MASS INDEX: 19.04 KG/M2 | TEMPERATURE: 97.4 F

## 2019-06-10 DIAGNOSIS — L97.909 ATHEROSCLEROSIS OF ARTERY OF EXTREMITY WITH ULCERATION (HCC): Primary | ICD-10-CM

## 2019-06-10 DIAGNOSIS — L89.610 DECUBITUS ULCER OF RIGHT HEEL, UNSTAGEABLE (HCC): ICD-10-CM

## 2019-06-10 DIAGNOSIS — G54.6 PHANTOM PAIN AFTER AMPUTATION OF LOWER EXTREMITY (HCC): ICD-10-CM

## 2019-06-10 DIAGNOSIS — Z89.512 STATUS POST BELOW KNEE AMPUTATION OF LEFT LOWER EXTREMITY: ICD-10-CM

## 2019-06-10 DIAGNOSIS — I70.299 ATHEROSCLEROSIS OF ARTERY OF EXTREMITY WITH ULCERATION (HCC): Primary | ICD-10-CM

## 2019-06-10 PROCEDURE — 99212 OFFICE O/P EST SF 10 MIN: CPT | Performed by: SURGERY

## 2019-06-10 RX ORDER — COLLAGENASE SANTYL 250 [ARB'U]/G
OINTMENT TOPICAL
Refills: 0 | COMMUNITY
Start: 2019-05-24 | End: 2020-01-28 | Stop reason: ALTCHOICE

## 2019-06-20 ENCOUNTER — HOSPITAL ENCOUNTER (OUTPATIENT)
Dept: RADIOLOGY | Facility: HOSPITAL | Age: 84
Discharge: HOME | End: 2019-06-20
Attending: PODIATRIST
Payer: COMMERCIAL

## 2019-06-20 ENCOUNTER — APPOINTMENT (OUTPATIENT)
Dept: WOUND CARE | Facility: HOSPITAL | Age: 84
End: 2019-06-20
Payer: COMMERCIAL

## 2019-06-20 ENCOUNTER — TRANSCRIBE ORDERS (OUTPATIENT)
Dept: RADIOLOGY | Facility: HOSPITAL | Age: 84
End: 2019-06-20

## 2019-06-20 DIAGNOSIS — L89.610 PRESSURE ULCER, HEEL, RIGHT, UNSTAGEABLE (HCC): ICD-10-CM

## 2019-06-20 DIAGNOSIS — L89.610 PRESSURE ULCER, HEEL, RIGHT, UNSTAGEABLE (HCC): Primary | ICD-10-CM

## 2019-06-20 PROCEDURE — 97597 DBRDMT OPN WND 1ST 20 CM/<: CPT

## 2019-06-20 PROCEDURE — 73630 X-RAY EXAM OF FOOT: CPT

## 2019-06-21 DIAGNOSIS — Z89.512 STATUS POST BELOW KNEE AMPUTATION OF LEFT LOWER EXTREMITY: Primary | ICD-10-CM

## 2019-07-02 ENCOUNTER — EVALUATION (OUTPATIENT)
Dept: PHYSICAL THERAPY | Facility: CLINIC | Age: 84
End: 2019-07-02
Payer: COMMERCIAL

## 2019-07-02 DIAGNOSIS — Z89.512 HX OF BKA, LEFT (HCC): Primary | ICD-10-CM

## 2019-07-02 DIAGNOSIS — R26.89 IMBALANCE: ICD-10-CM

## 2019-07-02 PROCEDURE — 97163 PT EVAL HIGH COMPLEX 45 MIN: CPT | Performed by: PHYSICAL THERAPIST

## 2019-07-02 PROCEDURE — 97110 THERAPEUTIC EXERCISES: CPT | Performed by: PHYSICAL THERAPIST

## 2019-07-02 NOTE — PROGRESS NOTES
PT Evaluation     Today's date: 2019  Patient name: Rishi Pacheco  : 1933  MRN: [id number]  Referring provider: Charles Lo MD  Dx:   Encounter Diagnosis     ICD-10-CM    1  Hx of BKA, left ([address] ) Z89 512    2  Imbalance R26 89        Start Time: 1600  Stop Time: 1700  Total time in clinic (min): 60 minutes    Assessment  Assessment details: Rishi Pacheco is a 80 y o  male presenting to outpatient physical therapy with diagnosis of Hx of left BKA and imbalance  Patient's current impairments include phantom pain, impaired soft tissue mobility, reduced range of motion, reduced strength, reduced postural awareness, and reduced activity tolerance  Patient's present functional limitations include difficulty with ADLs with increased need for assistance, reliance on medication and/or modalities for pain relief, poor tolerance for functional mobility and activity, and difficulty completing householdl responsibilities  Patient to benefit from skilled outpatient physical therapy 2x/week for 8-10 weeks in order to reduce pain, maximize pain free range of motion, increase strength and stability, and improve functional mobility/functional activity in order to maximize return to prior level of function with reduced limitations  Thank you for your referral     Impairments: abnormal coordination, abnormal gait, abnormal muscle tone, abnormal or restricted ROM, activity intolerance, impaired balance, impaired physical strength, lacks appropriate home exercise program, pain with function and weight-bearing intolerance  Other impairment: Limitation to [address] on right heel    Goals  In 4 weeks, patient will:  1  Demonstrate ability to perform 30-45 minutes of activity without requiring seated rest break  2   Demonstrate ability to maintain upright balance unsupported by UEs while reaching above shoulder height without resistance to promote stability with ADLs  3     Demonstrate ability to lift up to 10 lbs from  knees to waist unsupported by UEs to promote stability with ADLs  4   Demonstrate increased strength of bilateral UEs to allow for improved transfer quality and stability  5  Demonstrate increased strength of bilateral LEs to allow for improved transfer quality and stability    In 4-10 weeks, patient will:  1  Demonstrate reduced fall risk based on outcome measures  2  Demonstrate consistent carryover with HEP  3  Return to community ambulation with least restrictive AD for at least 300 feet  4  Demonstrate ability to transfer stand to/from floor without physical assist      Plan  Patient would benefit from: skilled physical therapy  Other planned modality interventions: Modalities prn for symptom management  Planned therapy interventions: manual therapy, neuromuscular re-education, therapeutic exercise, home exercise program and self care  Frequency: 2x week  Duration in weeks: 10        Subjective Evaluation    History of Present Illness  Mechanism of injury: Patient s/p LADARIUS on 2/1/19  He was hospitalized in [facility] x 1 week, transferred to [facility] x 1 month  During this time, he developed a right heel ulceration requiring debridement  He was ultimately d/c home in March  He saw home therapy x 2 weeks and has been discharged > 1 month  Since that time, he has been following with a physician every 2 weeks  He had his prosthetic delivered last Thursday and is presently walking approximately 50 feet with a RW  He was told to avoid WBing on right heel at this time  Functional deficits/goals: Wishes to walk, not use w/c   his wood ship to work, not sit in w/c  Negotiate stairs  Don prosthetic and liner independently  Enter/exit vehicle independently  Quality of life: good    Pain  Location: Notes ongoing phantom pains    Social Support  Exterior steps/ramp assessed: Ramp  Interior stairs assessed: Ramp  Patient lives at: Single floor set up    Lives with: spouse    Employment status: not working  Hand dominance: right    Treatments  Previous treatment: physical therapy and occupational therapy  Current treatment: physical therapy  Patient Goals  Patient goals for therapy: increased strength, increased motion, improved balance, independence with ADLs/IADLs and return to sport/leisure activities          Objective     Active Range of Motion     Right Hip   Flexion: 100 degrees     Right Knee   Flexion: 90 (Seated  Prone knee flexion 50 degrees) degrees     Strength/Myotome Testing     Left Shoulder     Planes of Motion   Flexion: 3+   Abduction: 3+     Right Shoulder     Planes of Motion   Flexion: 3+   Abduction: 3+     Left Elbow   Flexion: 3+  Extension: 3+    Right Elbow   Flexion: 3+  Extension: 3+    Left Wrist/Hand   Wrist extension: 4  Wrist flexion: 4    Right Wrist/Hand   Wrist extension: 4  Wrist flexion: 4    Left Hip   Planes of Motion   Flexion: 3+  Extension: 2  Abduction: 3-    Right Hip   Planes of Motion   Flexion: 3+  Extension: 2  Abduction: 3-    Left Knee   Flexion: 3-  Extension: 3-    Right Knee   Flexion: 3-  Extension: 3-    Left Ankle/Foot   Dorsiflexion: 3+  Plantar flexion: 3+    Ambulation     Ambulation: Stairs   Able to perform: no  Neuro Exam:     Sensation   Light touch LE: left impaired and right impaired  Proprioception LE: left WNL and right WNL    Coordination   Heel to shin: left dysmetric and right dysmetric    Wheelchair mobility   WC mobility: yes  Propelling: independent with prosthesis: yes  Turning: independent with prosthesis: yes    Transfers   Sit to stand: minimum assist with prosthesis: yes  Wheelchair to mat transfer: Supervision  Mat to wheelchair transfer: Supervision    Sit to supine: independent with prosthesis: no  Supine to sit: independent with prosthesis: no  Roll: minimum assist (Min-mod A to roll to prone)    Functional outcomes   5x sit to stand: > 1 min (seconds)  Functional outcome gait comment: Deferred at initial evaluation    Amputee Level of amputation: left   Phantom pain: phantom pain  Gait comment: NT at IE due to [facility] restriction right heel             Precautions: Falls, skin integrity  Re-eval Date: 8/1/19    Date 7/2       Visit Count 1       FOTO See IE       Pain In See IE       Pain Out See IE               Manual          Upcoming N/V               Prone hip flexion strech  Prone knee flexion stretch  Supine hip ABd stretch  Hamstring stretch    Exercise Diary         Aerobic                SLR x 4        Prone knee flexion        Bridge        Hip ABd hooklying        Clamshell        90/90                Quad alt UE/LE        Bicep curls Tall kneel       Tricep extn Tall kneel        MTP/LTP Tall kneel       IR/ER Tall kneel       Leg Press        Outdoor ambulation                ADL Suite                Dynavision                Floor transfers                Stairs            Modalities

## 2019-07-03 ENCOUNTER — TELEPHONE (OUTPATIENT)
Dept: VASCULAR SURGERY | Facility: CLINIC | Age: 84
End: 2019-07-03

## 2019-07-08 NOTE — TELEPHONE ENCOUNTER
sw pts wife he has a therapy appointment tomorrow and would like to wait until after that appointment to schedule with our practice

## 2019-07-09 ENCOUNTER — OFFICE VISIT (OUTPATIENT)
Dept: PHYSICAL THERAPY | Facility: CLINIC | Age: 84
End: 2019-07-09
Payer: COMMERCIAL

## 2019-07-09 DIAGNOSIS — Z89.512 HX OF BKA, LEFT (HCC): Primary | ICD-10-CM

## 2019-07-09 PROCEDURE — 97110 THERAPEUTIC EXERCISES: CPT

## 2019-07-09 PROCEDURE — 97112 NEUROMUSCULAR REEDUCATION: CPT

## 2019-07-09 NOTE — PROGRESS NOTES
Daily Note     Today's date: 2019  Patient name: Merced Ramirez  : 1933  MRN: [id number]  Referring provider: Cas Smith MD  Dx:   Encounter Diagnosis     ICD-10-CM    1  Hx of BKA, left (HCC) Z89 512                   Subjective: Seeing WC for R heel  Pain as 2/10  Trying to walk around the house more but not outside yet      Objective: See treatment diary below      Assessment: Tolerated treatment fairly well  Denied any increase pain  Lacks Heel strike BL with pt utilizing WC shoe R LE  Pt provided cues for floor negotiation  Increase LBP with tall kneel static posture  Patient would benefit from continued PT      Plan: Continue per plan of care          Precautions: Falls, skin integrity  Re-eval Date: 19    Date       Visit Count 1 2      FOTO See IE       Pain In See IE 2/10 L heel      Pain Out See IE           Manual          Upcoming N/V               Prone hip flexion strech  Prone knee flexion stretch  Supine hip ABd stretch  Hamstring stretch    Exercise Diary         Aerobic  Nustep  L3 10 min              SLR x 4        Prone knee flexion  Pt unable to sandra prone      Bridge  2x10      Hip ABd hooklying  Green   10x      Clamshell  Green  2x10      90/90  upcoming              Quad alt UE/LE  2x10  UE only      Bicep curls Tall kneel Static tall kneel  2 trials to pt tolerance  Increase LBP  Trial 1 15"  Trial 2 30"      Tricep extn Tall kneel        MTP/LTP Tall kneel       IR/ER Tall kneel       Leg Press        Outdoor ambulation  Ambulation around clinic   Cues heel strike L LE  RW advancement  2x 5 min              ADL Suite                Dyn[employer]ision                Floor transfers  10 min              Stairs            Modalities

## 2019-07-11 ENCOUNTER — APPOINTMENT (OUTPATIENT)
Dept: WOUND CARE | Facility: HOSPITAL | Age: 84
End: 2019-07-11
Payer: COMMERCIAL

## 2019-07-11 ENCOUNTER — OFFICE VISIT (OUTPATIENT)
Dept: PHYSICAL THERAPY | Facility: CLINIC | Age: 84
End: 2019-07-11
Payer: COMMERCIAL

## 2019-07-11 DIAGNOSIS — Z89.512 HX OF BKA, LEFT (HCC): Primary | ICD-10-CM

## 2019-07-11 DIAGNOSIS — R26.89 IMBALANCE: ICD-10-CM

## 2019-07-11 PROCEDURE — 97112 NEUROMUSCULAR REEDUCATION: CPT | Performed by: PHYSICAL THERAPIST

## 2019-07-11 PROCEDURE — 97110 THERAPEUTIC EXERCISES: CPT | Performed by: PHYSICAL THERAPIST

## 2019-07-11 PROCEDURE — 11042 DBRDMT SUBQ TIS 1ST 20SQCM/<: CPT

## 2019-07-11 NOTE — PROGRESS NOTES
Daily Note     Today's date: 2019  Patient name: Reed Gonsales  : 1933  MRN: [id number]  Referring provider: Sharla Lantigua MD  Dx:   Encounter Diagnosis     ICD-10-CM    1  Hx of BKA, left ([address] ) Z89 512    2  Imbalance R26 89                   Subjective: I just had my heel worked on again  It started bleeding      Objective: See treatment diary below      Assessment: Tolerated treatment well  Patient demonstrated fatigue post treatment and would benefit from continued PT  Patient program modified for heel off weighting  Pain in heel and bleeding at wound care  Plan: Continue per plan of care        Precautions: Falls, skin integrity  Re-eval Date: 19    Date      Visit Count 1 2 3     FOTO See IE       Pain In See IE 2/10 L heel 3/10 L heel     Pain Out See IE           Manual          Upcoming N/V               Prone hip flexion strech  Prone knee flexion stretch  Supine hip ABd stretch  Hamstring stretch    Exercise Diary         Aerobic  Nustep  L3 10 min Nustep  L3 10 min             SLR x 4        Prone knee flexion  Pt unable to sandra prone 2x10     Bridge  2x10 2x10     Hip ABd hooklying  Green   10x Green   10x     Clamshell  Green  2x10 Green  15x     90/90  upcoming AROM  2x10             Quad alt UE/LE  2x10  UE only 2x10  UE only     Bicep curls Tall kneel Static tall kneel  2 trials to pt tolerance  Increase LBP  Trial 1 15"  Trial 2 30" Seated on black disc  2#  2x10 ea biceps  Punch  Diagonals  B/L     Tricep extn Tall kneel        MTP/LTP Tall kneel       IR/ER Tall kneel       Leg Press        Outdoor ambulation  Ambulation around clinic   Cues heel strike L LE  RW advancement  2x 5 min              ADL Suite                Dyn[employer]                Floor transfers  10 min              Stairs            Modalities

## 2019-07-16 ENCOUNTER — OFFICE VISIT (OUTPATIENT)
Dept: PHYSICAL THERAPY | Facility: CLINIC | Age: 84
End: 2019-07-16
Payer: COMMERCIAL

## 2019-07-16 DIAGNOSIS — R26.89 IMBALANCE: ICD-10-CM

## 2019-07-16 DIAGNOSIS — Z89.512 HX OF BKA, LEFT (HCC): Primary | ICD-10-CM

## 2019-07-16 PROCEDURE — 97112 NEUROMUSCULAR REEDUCATION: CPT | Performed by: PHYSICAL THERAPIST

## 2019-07-16 PROCEDURE — 97110 THERAPEUTIC EXERCISES: CPT | Performed by: PHYSICAL THERAPIST

## 2019-07-16 NOTE — PROGRESS NOTES
Daily Note     Today's date: 2019  Patient name: Jaxon Ayala  : 1933  MRN: [id number]  Referring provider: Tomas Robles MD  Dx:   Encounter Diagnosis     ICD-10-CM    1  Hx of BKA, left ([address] ) Z89 512    2  Imbalance R26 89                   Subjective: Patient reports he is doing well  Some discomfort in his left knee  "Nerve pain" at times  Objective: See treatment diary below      Assessment: Tolerated treatment well  Patient demonstrated fatigue post treatment and would benefit from continued PT  Ptaient demonstrates decreased weight shift to left LE  Completed standing TE open chain on right to minimize WBing on right  Plan: Continue per plan of care        Precautions: Falls, skin integrity  Re-eval Date: 19    Date     Visit Count 1 2 3 4    FOTO See IE       Pain In See IE 2/10 L heel 3/10 L heel 3/10 R heel    Pain Out See IE           Manual          Upcoming N/V               Prone hip flexion strech  Prone knee flexion stretch  Supine hip ABd stretch  Hamstring stretch    Exercise Diary         Aerobic  Nustep  L3 10 min Nustep  L3 10 min Nustep  L3 10 min            SLR x 4    Stand  1x10 w/ 2 UE  1x10 w/ 1 UE    Mini Squats    Stand  1x10 w/ 2 UE  1x10 w/ 1 UE    HS Curls    Stand  1x10 w/ 2 UE  1x10 w/ 1 UE            Prone knee flexion  Pt unable to sandra prone 2x10     Bridge  2x10 2x10     Hip ABd hooklying  Green   10x Green   10x     Clamshell  Green  2x10 Green  15x     90/90  upcoming AROM  2x10             Quad alt UE/LE  2x10  UE only 2x10  UE only     Bicep curls Tall kneel Static tall kneel  2 trials to pt tolerance  Increase LBP  Trial 1 15"  Trial 2 30" Seated on black disc  2#  2x10 ea biceps  Punch  Diagonals  B/L     Tricep extn Tall kneel        MTP/LTP Tall kneel       IR/ER Tall kneel       Leg Press        Outdoor ambulation  Ambulation around clinic   Cues heel strike L LE  RW advancement  2x 5 min              ADL Suite [employer]                Floor transfers  10 min              Stairs    Min A  B/L UEs  4x1    ** Standing TE 1x per week, mat program 1x per week  Will continue with HEP  Adjustment to ply level from 1 ply to 3 ply this date  Good fit     Skin inspection    Modalities

## 2019-07-18 ENCOUNTER — OFFICE VISIT (OUTPATIENT)
Dept: PHYSICAL THERAPY | Facility: CLINIC | Age: 84
End: 2019-07-18
Payer: COMMERCIAL

## 2019-07-18 DIAGNOSIS — Z89.512 HX OF BKA, LEFT (HCC): Primary | ICD-10-CM

## 2019-07-18 PROCEDURE — 97112 NEUROMUSCULAR REEDUCATION: CPT

## 2019-07-18 PROCEDURE — 97110 THERAPEUTIC EXERCISES: CPT

## 2019-07-18 NOTE — PROGRESS NOTES
Daily Note     Today's date: 2019  Patient name: Junior Worley  : 1933  MRN: [id number]  Referring provider: Mayuri Isbell MD  Dx:   Encounter Diagnosis     ICD-10-CM    1  Hx of BKA, left (HCC) [id number]                   Subjective: Things are going good with PT  Going up the stairs easier      Objective: See treatment diary below      Assessment: Tolerated treatment fairly well  Denied any increase pain but increase mm fatigue  Cues for heel strike LLE, postural awareness and > stride step with ambulation  Patient would benefit from continued PT      Plan: Continue per plan of care        Precautions: Falls, skin integrity  Re-eval Date: 19    Date    Visit Count 1 2 3 4 5   FOTO See IE       Pain In See IE 2/10 L heel 3/10 L heel 3/10 R heel 2/10 R heel   Pain Out See IE           Manual          Upcoming N/V               Prone hip flexion strech  Prone knee flexion stretch  Supine hip ABd stretch  Hamstring stretch    Exercise Diary         Aerobic  Nustep  L3 10 min Nustep  L3 10 min Nustep  L3 10 min Nustep  L3 10 min           SLR x 4    Stand  1x10 w/ 2 UE  1x10 w/ 1 UE Stand  Hip 3 way  WB LLE  AROM R LE       Mini Squats    Stand  1x10 w/ 2 UE  1x10 w/ 1 UE Stand  3x10  2 UE   HS Curls    Stand  1x10 w/ 2 UE  1x10 w/ 1 UE Stand  1x10 w/ 2 UE  1x10 w/ 1 UE           Prone knee flexion  Pt unable to sandra prone 2x10     Bridge  2x10 2x10  3x10   Hip ABd hooklying  Green   10x Green   10x     Clamshell  Green  2x10 Green  15x  Green  3x10   90/90  upcoming AROM  2x10  2x10           Quad alt UE/LE  2x10  UE only 2x10  UE only     Bicep curls Tall kneel Static tall kneel  2 trials to pt tolerance  Increase LBP  Trial 1 15"  Trial 2 30" Seated on black disc  2#  2x10 ea biceps  Punch  Diagonals  B/L  Seated  Disc  3# 10x ea  R/L, BL, alt     Tricep extn Tall kneel        MTP/LTP Tall kneel    Sit/disc  Green  3x10   IR/ER Tall kneel       Leg Press        Outdoor ambulation  Ambulation around clinic   Cues heel strike L LE  RW advancement  2x 5 min   Ambulation around clinic   Cues heel strike L LE  RW advancement  2x 5 min           ADL Suite                D[employer]                Floor transfers  10 min              Stairs    Min A  B/L UEs  4x1 CS  Cues  BL  Step to  3x    ** Standing TE 1x per week, mat program 1x per week  Will continue with HEP  Adjustment to ply level from 1 ply to 3 ply this date  Good fit     Skin inspection    Modalities

## 2019-07-23 ENCOUNTER — OFFICE VISIT (OUTPATIENT)
Dept: PHYSICAL THERAPY | Facility: CLINIC | Age: 84
End: 2019-07-23
Payer: COMMERCIAL

## 2019-07-23 DIAGNOSIS — Z89.512 HX OF BKA, LEFT (HCC): Primary | ICD-10-CM

## 2019-07-23 PROCEDURE — 97112 NEUROMUSCULAR REEDUCATION: CPT

## 2019-07-23 PROCEDURE — 97110 THERAPEUTIC EXERCISES: CPT

## 2019-07-23 NOTE — PROGRESS NOTES
Daily Note     Today's date: 2019  Patient name: Tee Tamayo  : 1933  MRN: [id number]  Referring provider: Aaron Ochoa MD  Dx:   Encounter Diagnosis     ICD-10-CM    1  Hx of BKA, left (HCC) [id number]                   Subjective: I can only get #1 ply sock on today  #3 I tried and was unable to get on this morning      Objective: See treatment diary below      Assessment: Tolerated treatment fairly well with pt increase wts at tolerated with UE  Pt ambulated initially with 1 single ply sock with noted increase IR L prosthtetic  Reapplied sleeve with pt educate proper donning and applying 3ply sock  Noted improved  Neutral alignment of prosthetic  Pt indicated improved/tighter fit  Patient would benefit from continued PT to master proper donning of L LE prosthetic, increase LE strengthening/balance and stability  Lacks heel strike LLE cues with ambulation      Plan: Continue per plan of care        Precautions: Falls, skin integrity  Re-eval Date: 19    Date        Visit Count 6       FOTO        Pain In        Pain Out            Manual          Upcoming N/V               Prone hip flexion strech  Prone knee flexion stretch  Supine hip ABd stretch  Hamstring stretch    Exercise Diary         Aerobic Nustep  L3 10 min               SLR x 4 Stand  Hip 3 way  WB LLE  AROM R LE  2x10           Mini Squats Stand  3x10  2 UE       HS Curls Stand  2x10 w/ 1 UE               Prone knee flexion        Bridge W/ ABD  Green  3x10       Hip ABd hooklying Blue  3x10       Clamshell Green  3x10       90/90                Quad alt UE/LE        Bicep curls Seated  Disc  4# 10x ea  R/L, BL, alt         Tricep extn         MTP/LTP Sit/disc  Blue  3x10 ea       IR/ER        Leg Press        Outdoor ambulation Ambulation around clinic   Cues heel strike L LE  RW advancement  2x 5 min               ADL Suite                D[employer]                Floor transfers                Stairs resume       ** Standing TE 1x per week, mat program 1x per week  Will continue with HEP  Adjustment to ply level from 1 ply to 3 ply this date  Good fit     Skin inspection    Modalities

## 2019-07-25 ENCOUNTER — OFFICE VISIT (OUTPATIENT)
Dept: PHYSICAL THERAPY | Facility: CLINIC | Age: 84
End: 2019-07-25
Payer: COMMERCIAL

## 2019-07-25 DIAGNOSIS — Z89.512 HX OF BKA, LEFT (HCC): Primary | ICD-10-CM

## 2019-07-25 PROCEDURE — 97110 THERAPEUTIC EXERCISES: CPT

## 2019-07-25 PROCEDURE — 97112 NEUROMUSCULAR REEDUCATION: CPT

## 2019-07-25 NOTE — PROGRESS NOTES
Daily Note     Today's date: 2019  Patient name: Antwon Bernal  : 1933  MRN: [id number]  Referring provider: Corinne Houser MD  Dx:   Encounter Diagnosis     ICD-10-CM    1  Hx of BKA, left (HCC) Z89 512                   Subjective: I want to be doing more than what I am  I like working outside in my wood shop  Walking up/down ramp with RW      Objective: See treatment diary below      Assessment: Tolerated treatment fairly well  Denied any increase pain/sx's today  Pt with 3 ply sock today with prosthetic with improved fitting  Pt to have adjustment made up coming  Pt trial ambulation with AC 1 & 2  Noted increase LOB with change of surfaces, Manuel for balance recovery  Pt required several seated rest 2* to endurance/mm fatigue  Patient would benefit from continued PT      Plan: Continue per plan of care        Precautions: Falls, skin integrity  Re-eval Date: 19    Date       Visit Count 6 7      FOTO  NV      Pain In        Pain Out            Manual          Upcoming N/V               Prone hip flexion strech  Prone knee flexion stretch  Supine hip ABd stretch  Hamstring stretch    Exercise Diary         Aerobic Nustep  L3 10 min Nustep  L3 15 min              SLR x 4 Stand  Hip 3 way  WB LLE  AROM R LE  2x10           Mini Squats Stand  3x10  2 UE       HS Curls Stand  2x10 w/ 1 UE               Prone knee flexion        Bridge W/ ABD  Green  3x10       Hip ABd hooklying Blue  3x10       Clamshell Green  3x10       90/90                Quad alt UE/LE        Bicep curls Seated  Disc  4# 10x ea  R/L, BL, alt         Tricep extn         MTP/LTP Sit/disc  Blue  3x10 ea       IR/ER        Leg Press        Outdoor ambulation Ambulation around clinic   Cues heel strike L LE  RW advancement  2x 5 min Ambulation 2 trials with BL AC's in solo step  1x ~35'  1x ~70'  CGA    1 trial with single AC  Solo step  ~70' CGA with stair negotiation, up/down 4 x   Step to and step over step    1 trial clinic to parking lot, uneven surface  1 AC  CG/Manuel  LOB  Cues with ambulation/AD placement              ADL Suite                D[employer]                Floor transfers                Stairs resume       ** Standing TE 1x per week, mat program 1x per week  Will continue with HEP  Adjustment to ply level from 1 ply to 3 ply this date  Good fit     Skin inspection    Modalities

## 2019-07-30 ENCOUNTER — OFFICE VISIT (OUTPATIENT)
Dept: PHYSICAL THERAPY | Facility: CLINIC | Age: 84
End: 2019-07-30
Payer: COMMERCIAL

## 2019-07-30 DIAGNOSIS — Z89.512 HX OF BKA, LEFT (HCC): Primary | ICD-10-CM

## 2019-07-30 PROCEDURE — 97110 THERAPEUTIC EXERCISES: CPT

## 2019-07-30 PROCEDURE — 97112 NEUROMUSCULAR REEDUCATION: CPT

## 2019-07-30 NOTE — PROGRESS NOTES
Daily Note     Today's date: 2019  Patient name: Antwon Bernal  : 1933  MRN: [id number]  Referring provider: Corinne Houser MD  Dx:   Encounter Diagnosis     ICD-10-CM    1  Hx of BKA, left ([address] ) [id number]             Precautions: Falls, skin integrity  Re-eval Date: 19    Date  30 19     Visit Count 6 7 8     FOTO   NV     Pain In   Sore @ back of heel R Foot     Pain Out               Subjective:  Pt  states he does not want to do activities that he feels are a waste of his time while he's here for therapy  Says one specific example  is the [employer]  Was agreeable only to a 5 minute warm-up on the Bike  Objective: See treatment diary below      Assessment: Tolerated treatment Fair+ overall    Patient would benefit from continued PT      Plan: Progress treatment as tolerated  Con't services 2x/week as per POC/Goals             Manual    19      Upcoming N/V  Begin NV             Prone hip flexion strech  Prone knee flexion stretch  Supine hip ABd stretch  Hamstring stretch    Exercise Diary     19     Aerobic Nustep  L3 10 min Nustep  L3 15 min Nustep  L3 5 min             SLR x 4 Stand  Hip 3 way  WB LLE  AROM R LE  2x10  Stand  Hip 3 way  WB LLE  AROM R LE  2x10         Mini Squats Stand  3x10  2 UE  Stand  3x10  2 UE     HS Curls Stand  2x10 w/ 1 UE  Stand  2x10 w/ 1 UE             Prone knee flexion        Bridge W/ ABD  Green  3x10       Hip ABd hooklying Blue  3x10       Clamshell Green  3x10       90/90                  Quad alt UE/LE        Bicep curls Seated  Disc  4# 10x ea  R/L, BL, alt  Seated  Disc  4# 10x ea  R/L, BL, alt       Tricep extn         MTP/LTP Sit/disc  Blue  3x10 ea  Sit/disc  Blue  3x10 ea     IR/ER        Leg Press        Outdoor ambulation Ambulation around clinic   Cues heel strike L LE  RW advancement  2x 5 min Ambulation 2 trials with BL AC's in solo step  1x ~35'  1x ~70'  CGA    1 trial with single AC  Solo step  ~70' CGA with stair negotiation, up/down 4 x   Step to and step over step    1 trial clinic to parking lot, uneven surface  1 AC  CG/Manuel  LOB  Cues with ambulation/AD placement Ambulation 2 trials with BL AC's in solo step  1x ~35'  1x ~70'  CGA    1 trial with single AC  Solo step  ~70' CGA with stair negotiation, up/down 4 x   Step to and step over step               Resisted walk on [address]   *trial attempted for Fwd and Bwd  0# 2 reps x 2-3 steps only each dir  ADL Suite                D[employer]                Floor transfers                Stairs resume       ** Standing TE 1x per week, mat program 1x per week  Will continue with HEP  Adjustment to ply level from 1 ply to 3 ply this date  Good fit     Skin inspection    Modalities    7 30 19

## 2019-08-01 ENCOUNTER — OFFICE VISIT (OUTPATIENT)
Dept: PHYSICAL THERAPY | Facility: CLINIC | Age: 84
End: 2019-08-01
Payer: COMMERCIAL

## 2019-08-01 ENCOUNTER — APPOINTMENT (OUTPATIENT)
Dept: WOUND CARE | Facility: HOSPITAL | Age: 84
End: 2019-08-01
Payer: COMMERCIAL

## 2019-08-01 DIAGNOSIS — Z89.512 HX OF BKA, LEFT (HCC): Primary | ICD-10-CM

## 2019-08-01 PROCEDURE — 97110 THERAPEUTIC EXERCISES: CPT

## 2019-08-01 PROCEDURE — 97112 NEUROMUSCULAR REEDUCATION: CPT

## 2019-08-01 PROCEDURE — 11042 DBRDMT SUBQ TIS 1ST 20SQCM/<: CPT

## 2019-08-01 NOTE — PROGRESS NOTES
Daily Note     Today's date: 2019  Patient name: Albertina Sommer  : 1933  MRN: [id number]  Referring provider: Mary Kay Kolb MD  Dx:   Encounter Diagnosis     ICD-10-CM    1  Hx of BKA, left (HCC) [id number]                   Subjective: They took a lot of skin off of my right heel from [facility] today  Saima Feather it was healing good and put cream on to stimulate sensation  It is burning      Objective: See treatment diary below      Assessment: Tolerated treatment fair  Pt co's of hypersensitivity L distal LE with light palpation  Pt/family educ desensitization and encourage carryover  No redness/pressure areas noted with prosthetic  Progression with ambulation with functional activities added today   Patient would benefit from continued PT      Plan: Continue per plan of care              Precautions: Falls, skin integrity  Re-eval Date: 19    Date 19    Visit Count 6 7 8 9    FOTO   NV Completed    Pain In   Sore @ back of heel R Foot     Pain Out          Manual    19      Upcoming N/V  Begin NV NP            Prone hip flexion strech  Prone knee flexion stretch  Supine hip ABd stretch  Hamstring stretch    Exercise Diary     19     Aerobic Nustep  L3 10 min Nustep  L3 15 min Nustep  L3 5 min Nustep  L3 15 min            SLR x 4 Stand  Hip 3 way  WB LLE  AROM R LE  2x10  Stand  Hip 3 way  WB LLE  AROM R LE  2x10 resume        Mini Squats Stand  3x10  2 UE  Stand  3x10  2 UE resume    HS Curls Stand  2x10 w/ 1 UE  Stand  2x10 w/ 1 UE resume            Prone knee flexion        Bridge W/ ABD  Green  3x10       Hip ABd hooklying Blue  3x10       Clamshell Green  3x10       90/90                  Quad alt UE/LE        Bicep curls Seated  Disc  4# 10x ea  R/L, BL, alt  Seated  Disc  4# 10x ea  R/L, BL, alt       Tricep extn         MTP/LTP Sit/disc  Blue  3x10 ea  Sit/disc  Blue  3x10 ea     IR/ER        Leg Press        Outdoor ambulation Ambulation around clinic   Cues heel strike L LE  RW advancement  2x 5 min Ambulation 2 trials with BL AC's in solo step  1x ~35'  1x ~70'  CGA    1 trial with single AC  Solo step  ~70' CGA with stair negotiation, up/down 4 x   Step to and step over step    1 trial clinic to parking lot, uneven surface  1 AC  CG/Manuel  LOB  Cues with ambulation/AD placement Ambulation 2 trials with BL AC's in solo step  1x ~35'  1x ~70'  CGA    1 trial with single AC  Solo step  ~70' CGA with stair negotiation, up/down 4 x   Step to and step over step           Ambulation with single AC  Cues/CS/CGA for balance recovery  Neg over obsticles, grocery shopping/ADL suite, laundry, carry water, dynamic reach activites  Squat activities  35 min  2 seated rest ~ 3-4 min    Resisted walk on [address]   *trial attempted for Fwd and Bwd  0# 2 reps x 2-3 steps only each dir  ADL Suite                Dyn[employer]                Floor transfers                Stairs resume       ** Standing TE 1x per week, mat program 1x per week  Will continue with HEP  Adjustment to ply level from 1 ply to 3 ply this date  Good fit     Skin inspection    Modalities    7 30 19

## 2019-08-06 ENCOUNTER — APPOINTMENT (OUTPATIENT)
Dept: PHYSICAL THERAPY | Facility: CLINIC | Age: 84
End: 2019-08-06
Payer: COMMERCIAL

## 2019-08-06 ENCOUNTER — OFFICE VISIT (OUTPATIENT)
Dept: VASCULAR SURGERY | Facility: CLINIC | Age: 84
End: 2019-08-06
Payer: COMMERCIAL

## 2019-08-06 VITALS
TEMPERATURE: 97.1 F | BODY MASS INDEX: 18.61 KG/M2 | SYSTOLIC BLOOD PRESSURE: 110 MMHG | WEIGHT: 130 LBS | DIASTOLIC BLOOD PRESSURE: 50 MMHG | HEIGHT: 70 IN

## 2019-08-06 DIAGNOSIS — R26.2 AMBULATORY DYSFUNCTION: ICD-10-CM

## 2019-08-06 DIAGNOSIS — I73.9 SEVERE PERIPHERAL ARTERIAL DISEASE (HCC): ICD-10-CM

## 2019-08-06 DIAGNOSIS — L89.610 DECUBITUS ULCER OF RIGHT HEEL, UNSTAGEABLE (HCC): Primary | ICD-10-CM

## 2019-08-06 DIAGNOSIS — Z89.512 STATUS POST BELOW KNEE AMPUTATION OF LEFT LOWER EXTREMITY: ICD-10-CM

## 2019-08-06 PROCEDURE — 99212 OFFICE O/P EST SF 10 MIN: CPT | Performed by: SURGERY

## 2019-08-06 NOTE — ASSESSMENT & PLAN NOTE
Right lower extremity heel ulceration is slowly improving with meticulous wound care  The wound looks significantly better compared to last visit and has a dry scab overlying it    He is not a candidate for any endovascular or open revascularization due to heavily calcified diffuse tibial disease

## 2019-08-06 NOTE — PROGRESS NOTES
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 ([address] )  Is now walking on prosthetic  No pain with walking  He does have a sensitive spot on the lateral aspect  Possibly a small callus developing there due to the prosthetic  I have advised him to use padding in this area or use a foam cushion in this area to offload this area  Diagnoses and all orders for this visit:    Decubitus ulcer of right heel, unstageable ([address] )    Ambulatory dysfunction    Severe peripheral arterial disease ([address] )    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)          Subjective:      Patient ID: Jaxon Ayala is a 80 y o  male  HPI  Patient is here for follow-up visit  He has been undergoing wound care for the right heel ulceration which has been improving in size  He sees wound care every 2 weeks  He has been fitted for left below-knee amputation prostatic  He does complain of pain at lateral aspect of the stump site  This pain is not during walking but just mainly happens after he takes the prosthetic off and there is a sudden stinging pain that then subsides      The following portions of the patient's history were reviewed and updated as appropriate: allergies, current medications, past family history, past medical history, past social history, past surgical history and problem list     Review of Systems Constitutional: Negative  HENT: Negative  Eyes: Negative  Respiratory: Negative  Cardiovascular: Negative  Gastrointestinal: Negative  Endocrine: Negative  Genitourinary: Negative  Musculoskeletal: Positive for gait problem  Skin: Positive for wound  Allergic/Immunologic: Negative  Hematological: Negative  Psychiatric/Behavioral: Negative  Objective:      /50 (BP Location: Right arm, Patient Position: Sitting)   Temp (!) 97 1 °F (36 2 °C) (Tympanic)   Ht 5' 10" (1 778 m)   Wt 59 kg (130 lb)   BMI 18 65 kg/m²          Physical Exam   Constitutional: He is oriented to person, place, and time  He appears well-developed  No distress  Cardiovascular: Normal rate  Neurological: He is alert and oriented to person, place, and time  Skin: He is not diaphoretic  Nursing note and vitals reviewed  right heel skin ulceration is significantly decreased in size compared to last office visit    There is a dry scab overlying it with no surrounding erythema

## 2019-08-06 NOTE — ASSESSMENT & PLAN NOTE
Is now walking on prosthetic  No pain with walking  He does have a sensitive spot on the lateral aspect  Possibly a small callus developing there due to the prosthetic  I have advised him to use padding in this area or use a foam cushion in this area to offload this area

## 2019-08-06 NOTE — PATIENT INSTRUCTIONS
He does have a sensitive spot on the lateral aspect  Possibly a small callus developing there due to the prosthetic  I have advised him to use padding in this area or use a foam cushion in this area to offload this area

## 2019-08-06 NOTE — LETTER
August 6, 2019     [employer], [id number] [facility]  [address] [place], [place] [address]    Patient: German Sauer   YOB: 1933   Date of Visit: 8/6/2019       Dear Dr Pelayo Mail:    Thank you for referring German Sauer to me for evaluation  Below are my notes for this consultation  If you have questions, please do not hesitate to call me  I look forward to following your patient along with you  Sincerely,        Caro Guerrero MD        CC: SUNNY Lancaster MD Dodson Doctor, MD  8/6/2019 11:39 AM  Incomplete  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 ([address] )  Is now walking on prosthetic  No pain with walking  He does have a sensitive spot on the lateral aspect  Possibly a small callus developing there due to the prosthetic  I have advised him to use padding in this area or use a foam cushion in this area to offload this area  Diagnoses and all orders for this visit:    Decubitus ulcer of right heel, unstageable ([address] )    Ambulatory dysfunction    Severe peripheral arterial disease ([address] )    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)          Subjective:      Patient ID: German Sauer is a 80 y o  male  HPI  Patient is here for follow-up visit  He has been undergoing wound care for the right heel ulceration which has been improving in size    He sees wound care every 2 weeks  He has been fitted for left below-knee amputation prostatic  He does complain of pain at lateral aspect of the stump site  This pain is not during walking but just mainly happens after he takes the prosthetic off and there is a sudden stinging pain that then subsides  The following portions of the patient's history were reviewed and updated as appropriate: allergies, current medications, past family history, past medical history, past social history, past surgical history and problem list     Review of Systems   Constitutional: Negative  HENT: Negative  Eyes: Negative  Respiratory: Negative  Cardiovascular: Negative  Gastrointestinal: Negative  Endocrine: Negative  Genitourinary: Negative  Musculoskeletal: Positive for gait problem  Skin: Positive for wound  Allergic/Immunologic: Negative  Hematological: Negative  Psychiatric/Behavioral: Negative  Objective:      /50 (BP Location: Right arm, Patient Position: Sitting)   Temp (!) 97 1 °F (36 2 °C) (Tympanic)   Ht 5' 10" (1 778 m)   Wt 59 kg (130 lb)   BMI 18 65 kg/m²           Physical Exam   Constitutional: He is oriented to person, place, and time  He appears well-developed  No distress  Cardiovascular: Normal rate  Neurological: He is alert and oriented to person, place, and time  Skin: He is not diaphoretic  Nursing note and vitals reviewed  right heel skin ulceration is significantly decreased in size compared to last office visit    There is a dry scab overlying it with no surrounding erythema

## 2019-08-08 ENCOUNTER — EVALUATION (OUTPATIENT)
Dept: PHYSICAL THERAPY | Facility: CLINIC | Age: 84
End: 2019-08-08
Payer: COMMERCIAL

## 2019-08-08 DIAGNOSIS — R26.89 IMBALANCE: ICD-10-CM

## 2019-08-08 DIAGNOSIS — Z89.512 HX OF BKA, LEFT (HCC): Primary | ICD-10-CM

## 2019-08-08 PROCEDURE — 97164 PT RE-EVAL EST PLAN CARE: CPT | Performed by: PHYSICAL THERAPIST

## 2019-08-08 PROCEDURE — 97110 THERAPEUTIC EXERCISES: CPT | Performed by: PHYSICAL THERAPIST

## 2019-08-08 NOTE — PROGRESS NOTES
Daily Note     Today's date: 2019  Patient name: Felicitas Blanton  : 1933  MRN: [id number]  Referring provider: Misael Gamble MD  Dx:   Encounter Diagnosis     ICD-10-CM    1  Hx of BKA, left ([address] ) I47 985 PT plan of care cert/re-cert   2  Imbalance R26 89 PT plan of care cert/re-cert       Start Time: 1200  Stop Time: 1215  Total time in clinic (min): 15 minutes    Subjective: See RA      Objective: See treatment diary below      Assessment: Tolerated treatment fairly well  Patient demonstrated fatigue post treatment   Mod cues with ambulation with advancement/placement of BL ACs and NBQC, step through strategies Pt with 2 LOB with ModA with ambulation of NBQC in grassy uneven surface  Pt with CG/CS A with ambulation over even surface  Pt demonstrated increase unsteady gait with divided attention    Plan: Continue per plan of care          Precautions: Falls, skin integrity  Re-eval Date: 19    Date 19   Visit Count 6 7 8 9 10   FOTO   NV Completed RA completed   Pain In   Sore @ back of heel R Foot     Pain Out          Manual    7 30 19      Upcoming N/V  Begin NV NP            Prone hip flexion strech  Prone knee flexion stretch  Supine hip ABd stretch  Hamstring stretch    Exercise Diary    7 30 19     Aerobic Nustep  L3 10 min Nustep  L3 15 min Nustep  L3 5 min Nustep  L3 15 min resume           SLR x 4 Stand  Hip 3 way  WB LLE  AROM R LE  2x10  Stand  Hip 3 way  WB LLE  AROM R LE  2x10 resume        Mini Squats Stand  3x10  2 UE  Stand  3x10  2 UE resume    HS Curls Stand  2x10 w/ 1 UE  Stand  2x10 w/ 1 UE resume            Prone knee flexion        Bridge W/ ABD  Green  3x10       Hip ABd hooklying Blue  3x10       Clamshell Green  3x10       90/90                  Quad alt UE/LE        Bicep curls Seated  Disc  4# 10x ea  R/L, BL, alt  Seated  Disc  4# 10x ea  R/L, BL, alt       Tricep extn         MTP/LTP Sit/disc  Blue  3x10 ea  Sit/disc  Blue  3x10 ea     IR/ER Leg Press        Outdoor ambulation Ambulation around clinic   Cues heel strike L LE  RW advancement  2x 5 min Ambulation 2 trials with BL AC's in solo step  1x ~35'  1x ~70'  CGA    1 trial with single AC  Solo step  ~70' CGA with stair negotiation, up/down 4 x   Step to and step over step    1 trial clinic to parking lot, uneven surface  1 AC  CG/Manuel  LOB  Cues with ambulation/AD placement Ambulation 2 trials with BL AC's in solo step  1x ~35'  1x ~70'  CGA    1 trial with single AC  Solo step  ~70' CGA with stair negotiation, up/down 4 x   Step to and step over step           Ambulation with single AC  Cues/CS/CGA for balance recovery  Neg over obsticles, grocery shopping/ADL suite, laundry, carry water, dynamic reach activites  Squat activities  35 min  2 seated rest ~ 3-4 min Ambulation with BL AC and trial of NBQC  mod  Cues   Mod A for LOB/ balance recovery with ambulation over uneven/grassy area, up/down incline even/uneven, step over obstacles  40 min  1 seated rest ~ 3-4 min   Resisted walk on [address]   *trial attempted for Fwd and Bwd  0# 2 reps x 2-3 steps only each dir  ADL Suite                Dyn[employer]ision                Floor transfers                Stairs resume       Crate Training     Lift floor to waist / over head  0# 5 x ea  CS/CGA   ** Standing TE 1x per week, mat program 1x per week  Will continue with HEP  Adjustment to ply level from 1 ply to 3 ply this date  Good fit     Skin inspection    Modalities    7 30 19

## 2019-08-08 NOTE — PROGRESS NOTES
PT Re-Evaluation     Today's date: 2019  Patient name: Funmilayo Adair  : 1933  MRN: [id number]  Referring provider: Hernesto Lutz MD  Dx:   Encounter Diagnosis     ICD-10-CM    1  Hx of BKA, left ([address] ) Z89 512    2  Imbalance R26 89        Start Time: 1200  Stop Time: 1215  Total time in clinic (min): 15 minutes    Assessment  Assessment details: Funmilayo Adair is a 80 y o  male presenting to outpatient physical therapy with diagnosis of Hx of left BKA and imbalance  Patient has been making steady progress toward goals  Struggles with divided attention, velocity variation, and uneven surfaces  He wishes to improve to a unilateral device with ambulation  Remains motivated and will benefit from ongoing interventions  Cont per POC  Impairments: abnormal coordination, abnormal gait, abnormal muscle tone, abnormal or restricted ROM, activity intolerance, impaired balance, impaired physical strength, lacks appropriate home exercise program, pain with function and weight-bearing intolerance  Other impairment: Limitation to [address] on right heel  Understanding of Dx/Px/POC: good   Prognosis: good    Goals  In 4 weeks, patient will: - MET as LISTED  1  Demonstrate ability to perform 30-45 minutes of activity without requiring seated rest break  2   Demonstrate ability to maintain upright balance unsupported by UEs while reaching above shoulder height without resistance to promote stability with ADLs - Partially MET  3  Demonstrate ability to lift up to 10 lbs from  knees to waist unsupported by UEs to promote stability with ADLs - Partially MET  4  Demonstrate increased strength of bilateral UEs to allow for improved transfer quality and stability  5  Demonstrate increased strength of bilateral LEs to allow for improved transfer quality and stability    In 4-10 weeks, patient will: - ONGOING  1  Demonstrate reduced fall risk based on outcome measures  2  Demonstrate consistent carryover with HEP  3  Return to community ambulation with least restrictive AD for at least 300 feet  4  Demonstrate ability to transfer stand to/from floor without physical assist      Plan  Patient would benefit from: skilled physical therapy  Other planned modality interventions: Modalities prn for symptom management  Planned therapy interventions: manual therapy, neuromuscular re-education, therapeutic exercise, home exercise program and self care  Frequency: 2x week  Duration in weeks: 4        Subjective Evaluation    History of Present Illness  Mechanism of injury: UPDATE:  Patient presents s/p one month of OPPT  He has been progressing steadily, improved fit in the prosthetic, overall health status has been improving  He appears to be stronger and have more energy at this time  His right heel is improving  He has been progressing with ambulation at home to single crutch  Wishes to continue and progress to cane  He is independent with donning/doffing prosthetic  He is now independent with negotiation of stairs with single HR, he is now going to his shop independently  Patient s/p LADARIUS on 2/1/19  He was hospitalized in [facility] x 1 week, transferred to [facility] x 1 month  During this time, he developed a right heel ulceration requiring debridement  He was ultimately d/c home in March  He saw home therapy x 2 weeks and has been discharged > 1 month  Since that time, he has been following with a physician every 2 weeks  He had his prosthetic delivered last Thursday and is presently walking approximately 50 feet with a RW  He was told to avoid WBing on right heel at this time  Functional deficits/goals: Wishes to walk, not use w/c   his wood ship to work, not sit in w/c  Negotiate stairs  Don prosthetic and liner independently  Enter/exit vehicle independently  Quality of life: good    Pain  Location: Notes ongoing phantom pains    Social Support  Exterior steps/ramp assessed: Ramp    Interior stairs assessed: Ramp  Patient lives at: Single floor set up  Lives with: spouse    Employment status: not working  Hand dominance: right    Treatments  Previous treatment: physical therapy and occupational therapy  Current treatment: physical therapy  Patient Goals  Patient goals for therapy: increased strength, increased motion, improved balance, independence with ADLs/IADLs and return to sport/leisure activities          Objective     Active Range of Motion     Right Hip   Flexion: 100 degrees     Right Knee   Flexion: 90 (Seated  Prone knee flexion 50 degrees) degrees     Strength/Myotome Testing     Left Shoulder     Planes of Motion   Flexion: 4   Abduction: 4     Right Shoulder     Planes of Motion   Flexion: 4   Abduction: 4     Left Elbow   Flexion: 4  Extension: 4    Right Elbow   Flexion: 4  Extension: 4    Left Wrist/Hand   Wrist extension: 4  Wrist flexion: 4    Right Wrist/Hand   Wrist extension: 4  Wrist flexion: 4    Left Hip   Planes of Motion   Flexion: 4+  Extension: 3  Abduction: 4    Right Hip   Planes of Motion   Flexion: 4+  Extension: 3  Abduction: 4    Left Knee   Flexion: 4  Extension: 4    Right Knee   Flexion: 4  Extension: 4    Left Ankle/Foot   Dorsiflexion: 4  Plantar flexion: 4    Ambulation     Ambulation: Stairs   Able to perform: yeswith prosthesis: yes  Ascending: step-to  Level of assistance: supervision  Railings: 2  Descending: reciprocal  Level of assistance: supervision    Railings: 2  Neuro Exam:     Sensation   Light touch LE: left impaired and right impaired  Proprioception LE: left WNL and right WNL    Coordination   Heel to shin: left WNL and right WNL    Wheelchair mobility   WC mobility: yes  Propelling: independent with prosthesis: yes  Turning: independent with prosthesis: yes    Transfers   Sit to stand: independent with prosthesis: yes  WC to bed: independent  Wheelchair to mat: independent (Supervision) with prosthesis: yes  Mat to wheelchair: independent with prosthesis: yes  Sit to supine: independent with prosthesis: yes  Supine to sit: independent with prosthesis: yes  Roll: independent (Min-mod A to roll to prone)    Functional outcomes   5x sit to stand: 11 secs (seconds)  TU sec (seconds)  TUG cognitive: 25 sec (seconds)  Functional outcome gait comment: Progressing to unilateral device from RW  Remains unsteady and occasional min assist to complete      Amputee   Level of amputation: left   Phantom pain: phantom pain  Gait comment: Ambulation with HW x 50 feet with cl supervision  Ambulation with SBQC x 50 feet with cl supervision to intermittent min A  RW independent on level surfaces             Precautions: Falls, skin integrity  Re-eval Date: 19    Date        Visit Count 1       FOTO See IE       Pain In See IE       Pain Out See IE               Manual          Upcoming N/V               Prone hip flexion strech  Prone knee flexion stretch  Supine hip ABd stretch  Hamstring stretch    Exercise Diary         Aerobic                SLR x 4        Prone knee flexion        Bridge        Hip ABd hooklying        Clamshell        90/90                Quad alt UE/LE        Bicep curls Tall kneel       Tricep extn Tall kneel        MTP/LTP Tall kneel       IR/ER Tall kneel       Leg Press        Outdoor ambulation                ADL Suite                Dynavision                Floor transfers                Stairs            Modalities

## 2019-08-13 ENCOUNTER — OFFICE VISIT (OUTPATIENT)
Dept: PHYSICAL THERAPY | Facility: CLINIC | Age: 84
End: 2019-08-13
Payer: COMMERCIAL

## 2019-08-13 DIAGNOSIS — Z89.512 HX OF BKA, LEFT (HCC): Primary | ICD-10-CM

## 2019-08-13 DIAGNOSIS — R26.89 IMBALANCE: ICD-10-CM

## 2019-08-13 PROCEDURE — 97110 THERAPEUTIC EXERCISES: CPT | Performed by: PHYSICAL THERAPIST

## 2019-08-13 PROCEDURE — 97112 NEUROMUSCULAR REEDUCATION: CPT | Performed by: PHYSICAL THERAPIST

## 2019-08-13 NOTE — PROGRESS NOTES
Daily Note     Today's date: 2019  Patient name: Charly Sood  : 1933  MRN: [id number]  Referring provider: Armando Buckley MD  Dx:   Encounter Diagnosis     ICD-10-CM    1  Hx of BKA, left ([address] ) Z89 512    2  Imbalance R26 89                   Subjective: Patient reports he met his goal of getting out of his w/c  Now he wants to focus on walking with his SPC  Objective: See treatment diary below      Assessment: Tolerated treatment well  Patient demonstrated fatigue post treatment and would benefit from continued PT  Patient with improving tolerance to ambulation using SPC  Decreased negotiation of obstacles, intermittent cues  Patient in need of ongoing training with SPC  Wife present and training for gait provided for wife to assist at home using NBQC on level surfaces and stairs  Plan: Continue per plan of care  Precautions: Falls, skin integrity  Re-eval Date: 19    Date        Visit Count 11       FOTO        Pain In        Pain Out          Manual          NP               Prone hip flexion strech  Prone knee flexion stretch  Supine hip ABd stretch  Hamstring stretch    Exercise Diary         Aerobic Nustep  L5 10 min               SLR x 4        Mini Squats        HS Curls                Prone knee flexion        Bridge        Hip ABd hooklying        Clamshell        90/90                Quad alt UE/LE        Bicep curls        Tricep extn         MTP/LTP        IR/ER        Leg Press        Outdoor ambulation Ambulation with single point cane, min A  Raining  25 mins  Indoor obstacle course  5 mins       Resisted walk on [employer] 10 mins reaching shoulder height and above shoulder height               Dynavision                Floor transfers                Stairs 5 mins with bilateral HRs and cl S       Crate Training          Prosthetic with good fit, skin inspection      Modalities

## 2019-08-15 ENCOUNTER — OFFICE VISIT (OUTPATIENT)
Dept: PHYSICAL THERAPY | Facility: CLINIC | Age: 84
End: 2019-08-15
Payer: COMMERCIAL

## 2019-08-15 ENCOUNTER — APPOINTMENT (OUTPATIENT)
Dept: WOUND CARE | Facility: HOSPITAL | Age: 84
End: 2019-08-15
Payer: COMMERCIAL

## 2019-08-15 DIAGNOSIS — Z89.512 HX OF BKA, LEFT (HCC): Primary | ICD-10-CM

## 2019-08-15 DIAGNOSIS — R26.89 IMBALANCE: ICD-10-CM

## 2019-08-15 PROCEDURE — 97110 THERAPEUTIC EXERCISES: CPT | Performed by: PHYSICAL THERAPIST

## 2019-08-15 PROCEDURE — 97112 NEUROMUSCULAR REEDUCATION: CPT | Performed by: PHYSICAL THERAPIST

## 2019-08-15 PROCEDURE — 11042 DBRDMT SUBQ TIS 1ST 20SQCM/<: CPT

## 2019-08-15 NOTE — PROGRESS NOTES
Daily Note     Today's date: 8/15/2019  Patient name: Ana Jacques  : 1933  MRN: [id number]  Referring provider: Emily Acevedo MD  Dx:   Encounter Diagnosis     ICD-10-CM    1  Hx of BKA, left ([address] ) Z89 512    2  Imbalance R26 89                   Subjective: Patient presents s/p visit with podiatry  No end to heel off-weighting shoes in sight per his report  Objective: See treatment diary below      Assessment: Tolerated treatment well  Patient demonstrated fatigue post treatment and would benefit from continued PT  Patient has been making steady progress with PT  Hold at this time until heel has healed, then resume for further balance training  Plan: Continue per plan of care  Precautions: Falls, skin integrity  Re-eval Date: 19    Date 8/13 8/15      Visit Count 11 12      FOTO        Pain In  0      Pain Out  0        Manual          NP               Prone hip flexion strech  Prone knee flexion stretch  Supine hip ABd stretch  Hamstring stretch    Exercise Diary         Aerobic Nustep  L5 10 min Nustep  L5 10 min              SLR x 4        Mini Squats        HS Curls                Prone knee flexion        Bridge        Hip ABd hooklying        Clamshell        90/90                Quad alt UE/LE        Bicep curls        Tricep extn         MTP/LTP        IR/ER        Leg Press        Outdoor ambulation Ambulation with single point cane, min A  Raining  25 mins  Indoor obstacle course  5 mins Ambulation with single point cane, min A    25 mins    Indoor obstacle course  5 mins      Resisted walk on [employer] 10 mins reaching shoulder height and above shoulder height 5 mins reaching shoulder height and above shoulder height              Dynavision                Floor transfers                Stairs 5 mins with bilateral HRs and cl S 5 mins with bilateral HRs and cl S      Crate Training          Prosthetic with good fit, skin inspection      Modalities Yes

## 2019-08-29 ENCOUNTER — APPOINTMENT (OUTPATIENT)
Dept: WOUND CARE | Facility: HOSPITAL | Age: 84
End: 2019-08-29
Payer: COMMERCIAL

## 2019-08-29 PROCEDURE — 11042 DBRDMT SUBQ TIS 1ST 20SQCM/<: CPT

## 2019-09-12 ENCOUNTER — APPOINTMENT (OUTPATIENT)
Dept: WOUND CARE | Facility: HOSPITAL | Age: 84
End: 2019-09-12
Payer: COMMERCIAL

## 2019-09-12 PROCEDURE — 11042 DBRDMT SUBQ TIS 1ST 20SQCM/<: CPT

## 2019-09-14 NOTE — PROGRESS NOTES
Arterial Line Placement    Start time: 9/14/2019 8:08 AM  End time: 9/14/2019 8:12 AM  Performed by: Ketan Kuhn MD  Authorized by: Ketan Kuhn MD     Pre-Procedure  Indications:  Arterial pressure monitoring  Preanesthetic Checklist: patient identified, risks and benefits discussed, anesthesia consent, site marked, patient being monitored, timeout performed and patient being monitored    Timeout Time: 08:08        Procedure:   Prep:  Chlorhexidine  Seldinger Technique?: Yes    Orientation:  Right  Location:  Radial artery  Catheter size:  20 G  Number of attempts:  1  Cont Cardiac Output Sensor: No      Assessment:   Post-procedure:  Line secured and sterile dressing applied  Patient Tolerance:  Patient tolerated the procedure well with no immediate complications Progress Note - General Surgery   Cristy Jones 80 y o  male MRN: [id number]  Unit/Bed#: CW3 338-01 Encounter: [id number]    Assessment:  85M with PAD and multiple toe gangrene on the left  NAEON    Plan:  -OR today for L BKA  -post operative care  -ancef per ID  -continue ASA and stating  -PT/OT post op    Subjective/Objective   Chief Complaint: pain    Subjective: LLE with pain  No N/V/F/C  NPO since midnight  Ready for OR    Objective:   Blood pressure 125/64, pulse 89, temperature 99 9 °F (37 7 °C), resp  rate 17, height 5' 10" (1 778 m), weight 63 4 kg (139 lb 12 4 oz), SpO2 98 %  ,Body mass index is 20 06 kg/m²  Intake/Output Summary (Last 24 hours) at 02/01/19 0524  Last data filed at 02/01/19 0430   Gross per 24 hour   Intake              805 ml   Output              850 ml   Net              -45 ml       Invasive Devices     Peripheral Intravenous Line            Peripheral IV 01/29/19 Left;Upper Arm 2 days                Physical Exam:   General: No acute distress  HEENT: Normocephalic, atraumatic   Neck: Normal ROM  No tracheal deviation  Cardio: Normal rate, regular rhythm  Pulm: Normal respiratory effort  Abdomen: Soft, non-tender, non-distended  Extremities: PAL, No edema  LLE with tissue loss and gangrene of multiple toes  B/l palp fems  Neuro: Cranial nerves II-XII intact  Psych: Normal affect      Lab, Imaging and other studies:I have personally reviewed pertinent lab results      VTE Pharmacologic Prophylaxis: Sequential compression device (Venodyne)   VTE Mechanical Prophylaxis: sequential compression device

## 2019-09-16 ENCOUNTER — OFFICE VISIT (OUTPATIENT)
Dept: FAMILY MEDICINE CLINIC | Facility: CLINIC | Age: 84
End: 2019-09-16
Payer: COMMERCIAL

## 2019-09-16 ENCOUNTER — APPOINTMENT (OUTPATIENT)
Dept: RADIOLOGY | Facility: CLINIC | Age: 84
End: 2019-09-16
Payer: COMMERCIAL

## 2019-09-16 VITALS
WEIGHT: 128 LBS | TEMPERATURE: 97.6 F | HEIGHT: 70 IN | BODY MASS INDEX: 18.32 KG/M2 | DIASTOLIC BLOOD PRESSURE: 60 MMHG | SYSTOLIC BLOOD PRESSURE: 120 MMHG

## 2019-09-16 DIAGNOSIS — R05.3 CHRONIC COUGH: ICD-10-CM

## 2019-09-16 DIAGNOSIS — Z23 ENCOUNTER FOR IMMUNIZATION: ICD-10-CM

## 2019-09-16 DIAGNOSIS — R63.4 UNINTENTIONAL WEIGHT LOSS: Primary | ICD-10-CM

## 2019-09-16 PROCEDURE — 71046 X-RAY EXAM CHEST 2 VIEWS: CPT

## 2019-09-16 PROCEDURE — 99214 OFFICE O/P EST MOD 30 MIN: CPT | Performed by: FAMILY MEDICINE

## 2019-09-16 PROCEDURE — 90662 IIV NO PRSV INCREASED AG IM: CPT | Performed by: FAMILY MEDICINE

## 2019-09-16 PROCEDURE — G0008 ADMIN INFLUENZA VIRUS VAC: HCPCS | Performed by: FAMILY MEDICINE

## 2019-09-16 RX ORDER — MIRTAZAPINE 15 MG/1
15 TABLET, FILM COATED ORAL
Qty: 30 TABLET | Refills: 5 | Status: SHIPPED | OUTPATIENT
Start: 2019-09-16 | End: 2020-01-28 | Stop reason: ALTCHOICE

## 2019-09-16 NOTE — PROGRESS NOTES
Assessment/Plan:    No problem-specific Assessment & Plan notes found for this encounter  Diagnoses and all orders for this visit:    Unintentional weight loss  After discussing risks and benefits of medication along with side effects will start Remeron at this time for appetite stimulation  Blood work ordered as well to look for any underlying causes of unintentional weight loss  He was advised to supplement his diet with protein shakes between meals   -     CBC and differential; Future  -     Comprehensive metabolic panel; Future  -     TSH, 3rd generation with Free T4 reflex; Future  -     C-reactive protein; Future  -     mirtazapine (REMERON) 15 mg tablet; Take 1 tablet (15 mg total) by mouth daily at bedtime    Chronic cough  -     XR chest pa & lateral; Future  -     Complete pulmonary function test; Future    Encounter for immunization  -     influenza vaccine, [id number], high-dose, PF 0 5 mL (FLUZONE HIGH-DOSE)      Follow up in 1 month    Subjective:      Patient ID: Murphy Gould is a 80 y o  male  Patient is here to follow-up for medical complaints  He is here to follow-up for unintentional weight loss  He said that he has lost more than 20 lb over the last 6 months  He does admit to having decreased appetite he said that food just does not taste the same as before  He feels like a he does not eat as much as before  He denies any symptoms of depression related to his symptoms at this time  He has been trying to supplement his diet with energy boosters high in protein  He has also been having a cough chronic in nature for more than 2 months productive of phlegm at times  He does have a distant history of smoking 50 years ago he smoked for 20 years total   He also relates a history of been exposed to sand dust for over 30 years    No other complaints      The following portions of the patient's history were reviewed and updated as appropriate:   He  has a past medical history of Anemia, Arthritis, Atherosclerosis of artery of extremity with ulceration ([address] ) (3/27/2019), Bifascicular block, Cellulitis of chest wall, Chronic kidney disease, Dupuytren contracture, DVT femoral (deep venous thrombosis) with thrombophlebitis, right ([address] ), Elevated glucose, Hemothorax, Hyperlipidemia, Phantom pain after amputation of lower extremity ([address] ) (3/27/2019), 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 ([address] ) (2/19/2019), and Vascular disease    He   Patient Active Problem List    Diagnosis Date Noted    Unintentional weight loss 09/16/2019    Chronic cough 09/16/2019    Encounter for immunization 09/16/2019    Medicare annual wellness visit, subsequent 04/29/2019    Atherosclerosis of artery of extremity with ulceration ([facility] ) 03/27/2019    Phantom pain after amputation of lower extremity ([facility] ) 03/27/2019    Severe protein-calorie malnutrition ([facility] ) 03/05/2019    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 ([facility] ) 02/19/2019    Carpal tunnel syndrome 01/29/2019    Cough 01/29/2019    Decubitus ulcer of right heel, unstageable ([address] ) 01/28/2019    Nocturia 11/23/2018    Ambulatory dysfunction 10/29/2018    Physical deconditioning 10/29/2018    CKD (chronic kidney disease), stage III ([address] ) 03/05/2018    Severe peripheral arterial disease ([address] ) 02/13/2018    Bifascicular block 10/31/2017    Chronic fatigue 10/31/2017    Vitamin D deficiency 10/25/2017    Anemia 10/15/2017    Unstable gait 97/91/5945    Lichen simplex chronicus 07/10/2017    Seborrheic keratosis 07/10/2017    Change in multiple pigmented skin lesions 05/16/2017    Cold intolerance 05/04/2017    Impaired fasting glucose 03/07/2017    Hyperlipidemia 07/14/2016    Chronic lower back pain 01/14/2016    Generalized osteoarthritis 01/14/2016    Herniated lumbar intervertebral disc 01/14/2016    Rotator cuff tear arthropathy, right 01/14/2016     He  has a past surgical history that includes Colonoscopy; Cataract extraction; Fracture surgery; Knee arthroscopy; Amputation (1972); Carpal tunnel release (2016); pr slctv cathj 3rd+ ord slctv abdl pel/lxtr brnch (Left, 3/9/2018); pr slctv cathj 3rd+ ord slctv abdl pel/lxtr brnch (Left, 10/26/2018); IR lower extremity / intervention (10/26/2018); Eye surgery; Hand surgery; NERVE BLOCK; Thoracentesis; and pr amputation low leg thru tib/fib (Left, 2/1/2019)  His family history includes Aneurysm in his father; Brain cancer in his father and mother; Diabetes in his father and maternal grandmother; Lung cancer in his father and mother; Stroke in his father       Review of Systems   Constitutional: Positive for unexpected weight change  Negative for activity change, appetite change, fatigue and fever  HENT: Negative for congestion and ear discharge  Respiratory: Positive for cough and shortness of breath  Cardiovascular: Negative for chest pain and palpitations  Gastrointestinal: Negative for diarrhea and nausea  Musculoskeletal: Negative for arthralgias and back pain  Skin: Negative for color change and rash  Neurological: Negative for dizziness and headaches  Psychiatric/Behavioral: Negative for agitation and behavioral problems  Objective:      /60   Temp 97 6 °F (36 4 °C)   Ht 5' 10" (1 778 m)   Wt 58 1 kg (128 lb)   BMI 18 37 kg/m²          Physical Exam   Constitutional: He is oriented to person, place, and time  He appears well-developed and well-nourished  No distress  Eyes: Pupils are equal, round, and reactive to light  No scleral icterus  Cardiovascular: Normal rate, regular rhythm and normal heart sounds  No murmur heard  Pulmonary/Chest: Effort normal and breath sounds normal  No respiratory distress  He has no wheezes  Abdominal: Soft  Bowel sounds are normal  He exhibits no distension  There is no tenderness  Neurological: He is alert and oriented to person, place, and time  Skin: Skin is warm and dry   No rash noted  He is not diaphoretic  Psychiatric: He has a normal mood and affect

## 2019-09-16 NOTE — PROGRESS NOTES
BMI Counseling: Body mass index is 18 37 kg/m²  The BMI is below normal  Patient was advised to gain weight

## 2019-09-18 ENCOUNTER — APPOINTMENT (OUTPATIENT)
Dept: LAB | Facility: CLINIC | Age: 84
End: 2019-09-18
Payer: COMMERCIAL

## 2019-09-18 DIAGNOSIS — R63.4 UNINTENTIONAL WEIGHT LOSS: ICD-10-CM

## 2019-09-18 LAB
ALBUMIN SERPL BCP-MCNC: 3.2 G/DL (ref 3.5–5)
ALP SERPL-CCNC: 60 U/L (ref 46–116)
ALT SERPL W P-5'-P-CCNC: 24 U/L (ref 12–78)
ANION GAP SERPL CALCULATED.3IONS-SCNC: 3 MMOL/L (ref 4–13)
AST SERPL W P-5'-P-CCNC: 15 U/L (ref 5–45)
BASOPHILS # BLD AUTO: 0.03 THOUSANDS/ΜL (ref 0–0.1)
BASOPHILS NFR BLD AUTO: 0 % (ref 0–1)
BILIRUB SERPL-MCNC: 0.47 MG/DL (ref 0.2–1)
BUN SERPL-MCNC: 15 MG/DL (ref 5–25)
CALCIUM SERPL-MCNC: 9.8 MG/DL (ref 8.3–10.1)
CHLORIDE SERPL-SCNC: 103 MMOL/L (ref 100–108)
CO2 SERPL-SCNC: 32 MMOL/L (ref 21–32)
CREAT SERPL-MCNC: 1.04 MG/DL (ref 0.6–1.3)
CRP SERPL QL: 13.9 MG/L
EOSINOPHIL # BLD AUTO: 0.43 THOUSAND/ΜL (ref 0–0.61)
EOSINOPHIL NFR BLD AUTO: 5 % (ref 0–6)
ERYTHROCYTE [DISTWIDTH] IN BLOOD BY AUTOMATED COUNT: 14 % (ref 11.6–15.1)
GFR SERPL CREATININE-BSD FRML MDRD: 65 ML/MIN/1.73SQ M
GLUCOSE P FAST SERPL-MCNC: 83 MG/DL (ref 65–99)
HCT VFR BLD AUTO: 35.5 % (ref 36.5–49.3)
HGB BLD-MCNC: 11 G/DL (ref 12–17)
IMM GRANULOCYTES # BLD AUTO: 0.05 THOUSAND/UL (ref 0–0.2)
IMM GRANULOCYTES NFR BLD AUTO: 1 % (ref 0–2)
LYMPHOCYTES # BLD AUTO: 2.63 THOUSANDS/ΜL (ref 0.6–4.47)
LYMPHOCYTES NFR BLD AUTO: 32 % (ref 14–44)
MCH RBC QN AUTO: 30.3 PG (ref 26.8–34.3)
MCHC RBC AUTO-ENTMCNC: 31 G/DL (ref 31.4–37.4)
MCV RBC AUTO: 98 FL (ref 82–98)
MONOCYTES # BLD AUTO: 0.63 THOUSAND/ΜL (ref 0.17–1.22)
MONOCYTES NFR BLD AUTO: 8 % (ref 4–12)
NEUTROPHILS # BLD AUTO: 4.38 THOUSANDS/ΜL (ref 1.85–7.62)
NEUTS SEG NFR BLD AUTO: 54 % (ref 43–75)
NRBC BLD AUTO-RTO: 0 /100 WBCS
PLATELET # BLD AUTO: 198 THOUSANDS/UL (ref 149–390)
PMV BLD AUTO: 10.6 FL (ref 8.9–12.7)
POTASSIUM SERPL-SCNC: 3.9 MMOL/L (ref 3.5–5.3)
PROT SERPL-MCNC: 7.5 G/DL (ref 6.4–8.2)
RBC # BLD AUTO: 3.63 MILLION/UL (ref 3.88–5.62)
SODIUM SERPL-SCNC: 138 MMOL/L (ref 136–145)
TSH SERPL DL<=0.05 MIU/L-ACNC: 0.79 UIU/ML (ref 0.36–3.74)
WBC # BLD AUTO: 8.15 THOUSAND/UL (ref 4.31–10.16)

## 2019-09-18 PROCEDURE — 85025 COMPLETE CBC W/AUTO DIFF WBC: CPT

## 2019-09-18 PROCEDURE — 86140 C-REACTIVE PROTEIN: CPT

## 2019-09-18 PROCEDURE — 36415 COLL VENOUS BLD VENIPUNCTURE: CPT

## 2019-09-18 PROCEDURE — 84443 ASSAY THYROID STIM HORMONE: CPT

## 2019-09-18 PROCEDURE — 80053 COMPREHEN METABOLIC PANEL: CPT

## 2019-09-19 ENCOUNTER — TELEPHONE (OUTPATIENT)
Dept: FAMILY MEDICINE CLINIC | Facility: CLINIC | Age: 84
End: 2019-09-19

## 2019-09-19 DIAGNOSIS — D64.9 ANEMIA, UNSPECIFIED TYPE: Primary | ICD-10-CM

## 2019-09-19 DIAGNOSIS — R05.3 CHRONIC COUGH: Primary | ICD-10-CM

## 2019-09-19 DIAGNOSIS — J84.9 INTERSTITIAL LUNG DISEASE (HCC): ICD-10-CM

## 2019-09-26 ENCOUNTER — APPOINTMENT (OUTPATIENT)
Dept: WOUND CARE | Facility: HOSPITAL | Age: 84
End: 2019-09-26
Payer: COMMERCIAL

## 2019-09-26 PROCEDURE — 11042 DBRDMT SUBQ TIS 1ST 20SQCM/<: CPT

## 2019-09-30 ENCOUNTER — HOSPITAL ENCOUNTER (OUTPATIENT)
Dept: PULMONOLOGY | Facility: HOSPITAL | Age: 84
Discharge: HOME | End: 2019-09-30
Attending: FAMILY MEDICINE
Payer: COMMERCIAL

## 2019-09-30 DIAGNOSIS — R05.3 CHRONIC COUGH: ICD-10-CM

## 2019-09-30 PROCEDURE — 94060 EVALUATION OF WHEEZING: CPT

## 2019-09-30 PROCEDURE — 94729 DIFFUSING CAPACITY: CPT | Performed by: INTERNAL MEDICINE

## 2019-09-30 PROCEDURE — 94727 GAS DIL/WSHOT DETER LNG VOL: CPT

## 2019-09-30 PROCEDURE — 94760 N-INVAS EAR/PLS OXIMETRY 1: CPT

## 2019-09-30 PROCEDURE — 94727 GAS DIL/WSHOT DETER LNG VOL: CPT | Performed by: INTERNAL MEDICINE

## 2019-09-30 PROCEDURE — 94729 DIFFUSING CAPACITY: CPT

## 2019-09-30 PROCEDURE — 94060 EVALUATION OF WHEEZING: CPT | Performed by: INTERNAL MEDICINE

## 2019-09-30 RX ORDER — ALBUTEROL SULFATE 2.5 MG/3ML
2.5 SOLUTION RESPIRATORY (INHALATION) ONCE
Status: COMPLETED | OUTPATIENT
Start: 2019-09-30 | End: 2019-09-30

## 2019-09-30 RX ADMIN — ALBUTEROL SULFATE 2.5 MG: 2.5 SOLUTION RESPIRATORY (INHALATION) at 09:06

## 2019-10-01 ENCOUNTER — APPOINTMENT (OUTPATIENT)
Dept: LAB | Facility: CLINIC | Age: 84
End: 2019-10-01
Payer: COMMERCIAL

## 2019-10-01 DIAGNOSIS — D64.9 ANEMIA, UNSPECIFIED TYPE: ICD-10-CM

## 2019-10-01 DIAGNOSIS — R05.3 CHRONIC COUGH: Primary | ICD-10-CM

## 2019-10-01 LAB
BACTERIA UR QL AUTO: NORMAL /HPF
BILIRUB UR QL STRIP: NEGATIVE
CLARITY UR: CLEAR
COLOR UR: YELLOW
FOLATE SERPL-MCNC: 18.3 NG/ML (ref 3.1–17.5)
GLUCOSE UR STRIP-MCNC: NEGATIVE MG/DL
HGB UR QL STRIP.AUTO: NEGATIVE
HYALINE CASTS #/AREA URNS LPF: NORMAL /LPF
IRON SERPL-MCNC: 60 UG/DL (ref 65–175)
KETONES UR STRIP-MCNC: NEGATIVE MG/DL
LEUKOCYTE ESTERASE UR QL STRIP: NEGATIVE
NITRITE UR QL STRIP: NEGATIVE
NON-SQ EPI CELLS URNS QL MICRO: NORMAL /HPF
PH UR STRIP.AUTO: 7 [PH]
PROT UR STRIP-MCNC: NEGATIVE MG/DL
RBC #/AREA URNS AUTO: NORMAL /HPF
SP GR UR STRIP.AUTO: 1.01 (ref 1–1.03)
UROBILINOGEN UR QL STRIP.AUTO: 1 E.U./DL
VIT B12 SERPL-MCNC: 781 PG/ML (ref 100–900)
WBC #/AREA URNS AUTO: NORMAL /HPF

## 2019-10-01 PROCEDURE — 81001 URINALYSIS AUTO W/SCOPE: CPT | Performed by: FAMILY MEDICINE

## 2019-10-01 PROCEDURE — 82607 VITAMIN B-12: CPT

## 2019-10-01 PROCEDURE — 82746 ASSAY OF FOLIC ACID SERUM: CPT

## 2019-10-01 PROCEDURE — 83540 ASSAY OF IRON: CPT

## 2019-10-01 PROCEDURE — 36415 COLL VENOUS BLD VENIPUNCTURE: CPT

## 2019-10-04 ENCOUNTER — PATIENT OUTREACH (OUTPATIENT)
Dept: FAMILY MEDICINE CLINIC | Facility: CLINIC | Age: 84
End: 2019-10-04

## 2019-10-05 ENCOUNTER — APPOINTMENT (OUTPATIENT)
Dept: LAB | Facility: CLINIC | Age: 84
End: 2019-10-05
Payer: COMMERCIAL

## 2019-10-05 ENCOUNTER — HOSPITAL ENCOUNTER (OUTPATIENT)
Dept: CT IMAGING | Facility: HOSPITAL | Age: 84
Discharge: HOME | End: 2019-10-05
Attending: FAMILY MEDICINE
Payer: COMMERCIAL

## 2019-10-05 DIAGNOSIS — D64.9 ANEMIA, UNSPECIFIED TYPE: ICD-10-CM

## 2019-10-05 DIAGNOSIS — J84.9 INTERSTITIAL LUNG DISEASE (HCC): ICD-10-CM

## 2019-10-05 DIAGNOSIS — R05.3 CHRONIC COUGH: ICD-10-CM

## 2019-10-05 LAB
HEMOCCULT STL QL: NEGATIVE

## 2019-10-05 PROCEDURE — 82272 OCCULT BLD FECES 1-3 TESTS: CPT

## 2019-10-05 PROCEDURE — 71250 CT THORAX DX C-: CPT

## 2019-10-07 ENCOUNTER — CONSULT (OUTPATIENT)
Dept: PULMONOLOGY | Facility: CLINIC | Age: 84
End: 2019-10-07
Payer: COMMERCIAL

## 2019-10-07 ENCOUNTER — APPOINTMENT (OUTPATIENT)
Dept: LAB | Facility: CLINIC | Age: 84
End: 2019-10-07
Payer: COMMERCIAL

## 2019-10-07 VITALS
HEART RATE: 74 BPM | WEIGHT: 133 LBS | HEIGHT: 70 IN | SYSTOLIC BLOOD PRESSURE: 124 MMHG | OXYGEN SATURATION: 93 % | DIASTOLIC BLOOD PRESSURE: 74 MMHG | BODY MASS INDEX: 19.04 KG/M2

## 2019-10-07 DIAGNOSIS — J84.9 ILD (INTERSTITIAL LUNG DISEASE) (HCC): ICD-10-CM

## 2019-10-07 DIAGNOSIS — R05.3 CHRONIC COUGH: ICD-10-CM

## 2019-10-07 DIAGNOSIS — R06.02 SOBOE (SHORTNESS OF BREATH ON EXERTION): ICD-10-CM

## 2019-10-07 DIAGNOSIS — R05.3 CHRONIC COUGH: Primary | ICD-10-CM

## 2019-10-07 PROCEDURE — 36415 COLL VENOUS BLD VENIPUNCTURE: CPT

## 2019-10-07 PROCEDURE — 86331 IMMUNODIFFUSION OUCHTERLONY: CPT

## 2019-10-07 PROCEDURE — 99204 OFFICE O/P NEW MOD 45 MIN: CPT | Performed by: INTERNAL MEDICINE

## 2019-10-07 PROCEDURE — 86606 ASPERGILLUS ANTIBODY: CPT

## 2019-10-07 PROCEDURE — 82785 ASSAY OF IGE: CPT

## 2019-10-07 PROCEDURE — 86430 RHEUMATOID FACTOR TEST QUAL: CPT

## 2019-10-07 PROCEDURE — 86602 ANTINOMYCES ANTIBODY: CPT

## 2019-10-07 PROCEDURE — 86003 ALLG SPEC IGE CRUDE XTRC EA: CPT

## 2019-10-07 PROCEDURE — 86671 FUNGUS NES ANTIBODY: CPT

## 2019-10-07 PROCEDURE — 86038 ANTINUCLEAR ANTIBODIES: CPT

## 2019-10-07 PROCEDURE — 86431 RHEUMATOID FACTOR QUANT: CPT

## 2019-10-07 NOTE — PROGRESS NOTES
Assessment/Plan:     Diagnoses and all orders for this visit:    Chronic cough  -     [facility] Allergy Panel, Adult; Future    SOBOE (shortness of breath on exertion)  -     [facility] Allergy Panel, Adult; Future  -     Hypersensitivity pnuemonitis profile; Future  -     DONTE Screen w/ Reflex to Titer/Pattern; Future  -     RF Screen w/ Reflex to Titer; Future    ILD (interstitial lung disease) (HCC)        Chronic cough likely secondary to his underlying interstitial lung disease  Reviewed his recent chest x-ray that was done on 09/16/2019, with chronic interstitial changes, he was ordered for a CT of the chest without contrast by his PCP which he had on 10/05/2019, report is pending I have reviewed the images with the patient as well as the patient's wife has accompanied this office visit, with evidence of interstitial changes and subpleural traction bronchiectasis and honeycombing more in the lower bases, it is not an high-resolution CT of the chest to see the pattern for UIP  But these interstitial changes probably all secondary to his extensive exposure to sawdust versus rule out other causes  Will get serology with respiratory allergy panel and hypersensitivity panel, does have a parakeet at home for greater than 20 years  Will get serology and follow-up  He would benefit from a low-dose prednisone, given his chronic cough, and inhaled corticosteroids will follow up after the serology  PFTs recently done demonstrating spirometry demonstrating decreased FVC and FEV1, but the lung volumes are increased consistent with air trapping and the DLCO was significantly reduced  If it is IPF, he would benefit from medications like pirfenidone, which he states does not want to start at this age but will discuss again after the serology  Return in about 6 weeks (around 11/18/2019)  All questions are answered to the patient's satisfaction and understanding  He verbalizes understanding    He is encouraged to call with any further questions or concerns  Portions of the record may have been created with voice recognition software  Occasional wrong word or "sound a like" substitutions may have occurred due to the inherent limitations of voice recognition software  Read the chart carefully and recognize, using context, where substitutions have occurred  a    Electronically Signed by Karen Davis MD    ______________________________________________________________________    Chief Complaint:   Chief Complaint   Patient presents with    Cough    Shortness of Breath        Patient ID: Trinidad Iyer is a 80 y o  y o  male has a past medical history of Anemia, Arthritis, Atherosclerosis of artery of extremity with ulceration ([address] ) (3/27/2019), Bifascicular block, Cellulitis of chest wall, Chronic kidney disease, Dupuytren contracture, DVT femoral (deep venous thrombosis) with thrombophlebitis, right ([address] ), Elevated glucose, Hemothorax, Hyperlipidemia, Phantom pain after amputation of lower extremity ([address] ) (3/27/2019), 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 (2/19/2019), and Vascular disease  10/7/2019  Patient presents today for initial visit    Patient is a very pleasant 60-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, former smoker with less than 10 pack-year smoking history who quit greater than 50 years ago, has been a new all his life, states he was exposed to a lot of sawdust all his life, and still does work a few days a week, and home still exposed to the sawdust   Recently up to the vascular surgery, he states he was discharged to a rehab when he started to have cough, since then the cough has been lingering on, it has been a few months he states, has been dry nonproductive also has some shortness of breath and dyspnea on exertion especially on an incline, not much limitation of his daily current activities though, given his recent surgery vascular surgery and amputation he has not been as much ambulatory as he was before he states, he still goes for a walk, for less than half a mi and he states he has been doing good without stopping, walks at his own pace  Cough   This is a chronic problem  The current episode started more than 1 month ago  The problem has been unchanged  The problem occurs every few hours  The cough is non-productive  Associated symptoms include shortness of breath  Pertinent negatives include no chest pain, chills, ear pain, fever, postnasal drip, rash, rhinorrhea, sore throat or wheezing  There is no history of environmental allergies  Occupational/Exposure history:  Pets/Enviroment:  Parakeets and cats at home  Travel history:  Review of Systems   Constitutional: Positive for fatigue  Negative for appetite change, chills, diaphoresis, fever and unexpected weight change  HENT: Negative for congestion, ear discharge, ear pain, nosebleeds, postnasal drip, rhinorrhea, sinus pain, sore throat and voice change  Eyes: Negative for pain, discharge and visual disturbance  Respiratory: Positive for cough and shortness of breath  Negative for apnea, choking, chest tightness, wheezing and stridor  Cardiovascular: Negative for chest pain, palpitations and leg swelling  Gastrointestinal: Negative for abdominal pain, blood in stool, constipation, diarrhea and vomiting  Endocrine: Negative for cold intolerance, heat intolerance, polydipsia, polyphagia and polyuria  Genitourinary: Negative for difficulty urinating and dysuria  Musculoskeletal: Negative for arthralgias and neck pain  Skin: Negative for pallor and rash  Allergic/Immunologic: Negative for environmental allergies and food allergies  Neurological: Negative for dizziness, speech difficulty, weakness and light-headedness  Hematological: Negative for adenopathy  Does not bruise/bleed easily  Psychiatric/Behavioral: Negative for agitation, confusion and sleep disturbance  The patient is not nervous/anxious          Social history: He reports that he has quit smoking  He has never used smokeless tobacco  He reports that he does not drink alcohol or use drugs  Past surgical history:   Past Surgical History:   Procedure Laterality Date    AMPUTATION  1972    L thumb    CARPAL TUNNEL RELEASE  2016    L hand    CATARACT EXTRACTION      COLONOSCOPY      EYE SURGERY      FRACTURE SURGERY      HAND SURGERY      IR LOWER EXTREMITY / INTERVENTION  10/26/2018    KNEE ARTHROSCOPY      NERVE BLOCK      MA AMPUTATION LOW LEG THRU TIB/FIB Left 2/1/2019    Procedure: AMPUTATION BELOW KNEE (BKA);   Surgeon: Lewis Spence MD;  [facility]: [facility];  Service: Vascular    MA SLCTV CATHJ 3RD+ ORD SLCTV ABDL PEL/[facility] Left 3/9/2018    Procedure: LEFT LOWER EXTREMITY ARTERIOGRAM WITH PTA OF LEFT POPLITEAL ARTERY;  Surgeon: Martha Ramirez MD;  [facility]: [facility];  Service: Vascular    MA Charles Urbina 3RD+ ORD SLCTV ABDL PEL/[facility] Left 10/26/2018    Procedure: ARTERIOGRAM, angioplasty stent and atherectomy;  Surgeon: Martha Ramirez MD;  [facility]: [facility];  Service: Vascular    THORACENTESIS       Family history:   Family History   Problem Relation Age of Onset    Lung cancer Mother     Brain cancer Mother     Diabetes Father     Aneurysm Father     Stroke Father     Lung cancer Father     Brain cancer Father     Diabetes Maternal Grandmother        Immunization History   Administered Date(s) Administered    INFLUENZA 10/01/2015, 09/26/2016, 09/29/2017, 09/07/2018    Influenza Split High Dose Preservative Free IM 10/01/2015, 09/26/2016    Influenza, high dose seasonal 0 5 mL 09/07/2018, 09/16/2019    Pneumococcal Conjugate 13-Valent 01/14/2016    Pneumococcal Polysaccharide PPV23 05/04/2017    Tdap 10/01/2013     Current Outpatient Medications   Medication Sig Dispense Refill    acetaminophen (TYLENOL) 325 mg tablet Take 2 tablets (650 mg total) by mouth every 6 (six) hours as needed for mild pain 30 tablet 0    aspirin 81 MG tablet Take by mouth      Cholecalciferol (VITAMIN D) 2000 units CAPS Take by mouth      CYANOCOBALAMIN PO cyanocobalamin (vitamin B-12)      ferrous sulfate 325 (65 Fe) mg tablet Take 1 tablet (325 mg total) by mouth 2 (two) times a day 90 tablet 2    mirtazapine (REMERON) 15 mg tablet Take 1 tablet (15 mg total) by mouth daily at bedtime 30 tablet 5    Omega-3 Fatty Acids (FISH OIL) 645 MG CAPS Take by mouth      SANTYL ointment   0    simvastatin (ZOCOR) 20 mg tablet Take 1 tablet (20 mg total) by mouth daily 90 tablet 3     No current facility-administered medications for this visit  Allergies: Patient has no known allergies  Objective:  Vitals:    10/07/19 1334   BP: 124/74   Pulse: 74   SpO2: 93%   Weight: 60 3 kg (133 lb)   Height: 5' 10" (1 778 m)   Oxygen Therapy  SpO2: 93 %    Wt Readings from Last 3 Encounters:   10/07/19 60 3 kg (133 lb)   09/16/19 58 1 kg (128 lb)   08/06/19 59 kg (130 lb)     Body mass index is 19 08 kg/m²  Physical Exam   Constitutional: He is oriented to person, place, and time  He appears well-developed and well-nourished  HENT:   Head: Normocephalic and atraumatic  Eyes: Pupils are equal, round, and reactive to light  Conjunctivae are normal    Neck: Normal range of motion  Neck supple  No JVD present  No thyromegaly present  Cardiovascular: Normal rate, regular rhythm and normal heart sounds  Exam reveals no gallop and no friction rub  No murmur heard  Pulmonary/Chest: Effort normal  No respiratory distress  He has no wheezes  He has rales in the right lower field and the left lower field  He exhibits no tenderness  Musculoskeletal: Normal range of motion  He exhibits no edema, tenderness or deformity  Lymphadenopathy:     He has no cervical adenopathy  Neurological: He is alert and oriented to person, place, and time  Skin: Skin is warm and dry  Psychiatric: He has a normal mood and affect  Nursing note and vitals reviewed        Xr Chest Pa & Lateral    Result Date: 9/19/2019  Narrative: CHEST INDICATION:   R05: Cough  COMPARISON:  X-ray chest dated April 29, 2019  EXAM PERFORMED/VIEWS:  XR CHEST PA & LATERAL FINDINGS: Cardiomediastinal silhouette appears unremarkable  Redemonstrated is diffuse interstitial thickening with basal and peripheral predominance, particularly involving the subpleural interstitium  There are probable areas of honeycombing  The findings are in keeping with chronic interstitial lung disease, and further evaluation with high-resolution chest CT is recommended  There are linear calcified pleural plaques at the lung apices, similar to the prior exam  No focal consolidation, pleural effusion or pneumothorax  Osseous structures appear within normal limits for patient age  Impression: Findings in keeping with chronic interstitial lung disease with probable areas of honeycombing, similar to the prior exam   Further evaluation with dedicated high-resolution chest CT is recommended  The study was marked in EPIC for significant notification   Workstation performed: [id number]

## 2019-10-08 LAB
A ALTERNATA IGE QN: <0.1 KUA/I
A FUMIGATUS IGE QN: <0.1 KUA/I
ALLERGEN COMMENT: NORMAL
BERMUDA GRASS IGE QN: <0.1 KUA/I
BOXELDER IGE QN: <0.1 KUA/I
C HERBARUM IGE QN: <0.1 KUA/I
CAT DANDER IGE QN: <0.1 KUA/I
CMN PIGWEED IGE QN: <0.1 KUA/I
COMMON RAGWEED IGE QN: <0.1 KUA/I
COTTONWOOD IGE QN: <0.1 KUA/I
CRYOGLOB RF SER-ACNC: ABNORMAL [IU]/ML
D FARINAE IGE QN: <0.1 KUA/I
D PTERONYSS IGE QN: <0.1 KUA/I
DOG DANDER IGE QN: <0.1 KUA/I
LONDON PLANE IGE QN: <0.1 KUA/I
MOUSE URINE PROT IGE QN: <0.1 KUA/I
MT JUNIPER IGE QN: <0.1 KUA/I
MUGWORT IGE QN: <0.1 KUA/I
P NOTATUM IGE QN: <0.1 KUA/I
RHEUMATOID FACT SER QL LA: POSITIVE
ROACH IGE QN: <0.1 KUA/I
SHEEP SORREL IGE QN: <0.1 KUA/I
SILVER BIRCH IGE QN: <0.1 KUA/I
TIMOTHY IGE QN: <0.1 KUA/I
TOTAL IGE SMQN RAST: 8.3 KU/L (ref 0–113)
WALNUT IGE QN: <0.1 KUA/I
WHITE ASH IGE QN: <0.1 KUA/I
WHITE ELM IGE QN: <0.1 KUA/I
WHITE MULBERRY IGE QN: <0.1 KUA/I
WHITE OAK IGE QN: <0.1 KUA/I

## 2019-10-09 LAB — RYE IGE QN: NEGATIVE

## 2019-10-10 ENCOUNTER — APPOINTMENT (OUTPATIENT)
Dept: WOUND CARE | Facility: HOSPITAL | Age: 84
End: 2019-10-10
Payer: COMMERCIAL

## 2019-10-10 PROCEDURE — 99212 OFFICE O/P EST SF 10 MIN: CPT

## 2019-10-11 LAB
A FUMIGATUS1 AB SER QL ID: NEGATIVE
A PULLULANS AB SER QL: NEGATIVE
LACEYELLA SACCHARI AB SER QL: NEGATIVE
PIGEON SERUM AB QL ID: NEGATIVE
S RECTIVIRGULA AB SER QL ID: NEGATIVE
T VULGARIS AB SER QL ID: NEGATIVE

## 2019-10-17 ENCOUNTER — TELEPHONE (OUTPATIENT)
Dept: PULMONOLOGY | Facility: CLINIC | Age: 84
End: 2019-10-17

## 2019-10-17 DIAGNOSIS — R05.9 COUGH: Primary | ICD-10-CM

## 2019-10-17 NOTE — TELEPHONE ENCOUNTER
His preliminary testing for rheumatoid arthritis came back positive , but we need a confirmatory testing which I ordered,

## 2019-10-21 ENCOUNTER — APPOINTMENT (OUTPATIENT)
Dept: LAB | Facility: CLINIC | Age: 84
End: 2019-10-21
Payer: COMMERCIAL

## 2019-10-21 DIAGNOSIS — R05.9 COUGH: ICD-10-CM

## 2019-10-21 PROCEDURE — 36415 COLL VENOUS BLD VENIPUNCTURE: CPT

## 2019-10-21 PROCEDURE — 86200 CCP ANTIBODY: CPT

## 2019-10-23 LAB — CCP IGA+IGG SERPL IA-ACNC: 11 UNITS (ref 0–19)

## 2019-11-05 ENCOUNTER — EVALUATION (OUTPATIENT)
Dept: PHYSICAL THERAPY | Facility: CLINIC | Age: 84
End: 2019-11-05
Payer: COMMERCIAL

## 2019-11-05 DIAGNOSIS — Z89.512 STATUS POST BELOW-KNEE AMPUTATION OF LEFT LOWER EXTREMITY (HCC): ICD-10-CM

## 2019-11-05 PROCEDURE — 97162 PT EVAL MOD COMPLEX 30 MIN: CPT | Performed by: PHYSICAL THERAPIST

## 2019-11-05 PROCEDURE — 97140 MANUAL THERAPY 1/> REGIONS: CPT | Performed by: PHYSICAL THERAPIST

## 2019-11-05 PROCEDURE — 97110 THERAPEUTIC EXERCISES: CPT | Performed by: PHYSICAL THERAPIST

## 2019-11-05 NOTE — PROGRESS NOTES
PT Evaluation     Today's date: 2019  Patient name: Georgia Wills  : 1933  MRN: [id number]  Referring provider: Kwadwo Adler MD  Dx:   Encounter Diagnosis     ICD-10-CM    1  Status post below-knee amputation of left lower extremity [facility]) Z89 [id number] Ambulatory referral to Physical Therapy                  Assessment  Assessment details: Georgia Wills is a 80 y o  male presenting to outpatient physical therapy with diagnosis of Hx of left BKA and imbalance  Patient's current impairments include phantom pain, impaired soft tissue mobility, reduced range of motion, reduced strength, reduced postural awareness, and reduced activity tolerance  Patient's present functional limitations include difficulty with ADLs with increased need for assistance, reliance on medication and/or modalities for pain relief, poor tolerance for functional mobility and activity, and difficulty completing householdl responsibilities  Patient to benefit from skilled outpatient physical therapy 2x/week for 4 weeks in order to reduce pain, maximize pain free range of motion, increase strength and stability, and improve functional mobility/functional activity in order to maximize return to prior level of function with reduced limitations  Thank you for your referral     Impairments: abnormal coordination, abnormal gait, abnormal muscle tone, abnormal or restricted ROM, activity intolerance, impaired balance, impaired physical strength, lacks appropriate home exercise program, pain with function and weight-bearing intolerance  Other impairment: WBing restriction lifted right foot  Barriers to therapy: Recent adjustment to socket with increased discomfort along anterior shin from increased pressure  Skin inspection reveals increased redness in this area, does not resolve within 10 mins  Patient and spouse encouraged to call clinic for adjustment    Understanding of Dx/Px/POC: good   Prognosis: good    Goals  In 2-4 weeks, patient will:  1  Demonstrate ability to perform 30-45 minutes of activity without requiring seated rest break  2  Demonstrate ability to maintain upright balance unsupported by UEs while reaching above shoulder height without resistance to promote stability with ADLs  3  Demonstrate ability to lift up to 10 lbs from  knees to waist unsupported by UEs to promote stability with ADLs  4   Demonstrate increased strength of bilateral UEs to allow for improved transfer quality and stability  5  Demonstrate increased strength of bilateral LEs to allow for improved transfer quality and stability    In 4-6 weeks, patient will:  1  Demonstrate reduced fall risk based on outcome measures  2  Demonstrate consistent carryover with HEP  3  Return to community ambulation with least restrictive AD for at least 300 feet  4  Demonstrate ability to transfer stand to/from floor without physical assist      Plan  Patient would benefit from: skilled physical therapy  Other planned modality interventions: Modalities prn for symptom management  Planned therapy interventions: manual therapy, neuromuscular re-education, therapeutic exercise and home exercise program  Frequency: 2x week  Duration in visits: 8  Duration in weeks: 4  Plan of Care beginning date: 11/5/2019  Plan of Care expiration date: 12/5/2019  Treatment plan discussed with: patient        Subjective Evaluation    History of Present Illness  Mechanism of injury: UPDATE:  Patient reports he is no longer using a w/c, he is no longer using a ramp  He reports he has had no issues with his prosthetic  He does not right foot is now healed  Only able to wear shoes starting last week  He did have a major fall when trying to walk with his RW without his prosthetic  Came away bruised, skin tears  Reports this happened approximately one month ago  Presents today ready to begin gait training with a SPC  Reports an adjustment to his prosthetic last week, padding posteriorly  Now reports pain anteriorly along his shin  Patient s/p LADARIUS on 2/1/19  He was hospitalized in [facility] x 1 week, transferred to [facility] x 1 month  During this time, he developed a right heel ulceration requiring debridement  He was ultimately d/c home in March  He saw home therapy x 2 weeks and has been discharged > 1 month  Since that time, he has been following with a physician every 2 weeks  He had his prosthetic delivered last Thursday and is presently walking approximately 50 feet with a RW  He was told to avoid WBing on right heel at this time  Functional deficits/goals:  Negotiate stairs  Walk with a cane  Walk on grass and uneven surfaces with a cane  Quality of life: good    Pain  Location: Pain in distal    Social Support  Exterior steps/ramp assessed: Ramp  Interior stairs assessed: Ramp  Patient lives at: Single floor set up    Lives with: spouse    Employment status: not working  Hand dominance: right    Treatments  Previous treatment: physical therapy  Current treatment: physical therapy  Patient Goals  Patient goals for therapy: increased strength, increased motion, improved balance, independence with ADLs/IADLs and return to sport/leisure activities          Objective     Active Range of Motion   Left Hip   Flexion: 100 degrees     Right Hip   Flexion: 100 degrees   Left Knee   Flexion: 113 (Posterior padding in prosthesis) degrees     Right Knee   Flexion: 130 degrees     Strength/Myotome Testing     Left Shoulder     Planes of Motion   Flexion: 4   Abduction: 4     Right Shoulder     Planes of Motion   Flexion: 4   Abduction: 4     Left Elbow   Flexion: 4  Extension: 4    Right Elbow   Flexion: 4  Extension: 4    Left Wrist/Hand   Wrist extension: 4  Wrist flexion: 4    Right Wrist/Hand   Wrist extension: 4  Wrist flexion: 4    Left Hip   Planes of Motion   Flexion: 4  Extension: 3  Abduction: 4    Right Hip   Planes of Motion   Flexion: 4  Extension: 3  Abduction: 4    Left Knee   Flexion: 4  Extension: 4    Right Knee   Flexion: 4  Extension: 4    Left Ankle/Foot   Dorsiflexion: 4  Plantar flexion: 4    Ambulation   Weight-Bearing Status   Assistive device used: wheeled walker    Ambulation: Stairs   Able to perform: nowith prosthesis: yes  Ascending: reciprocal  Ascend stairs: independent  Railings: 1 (SBQC)  Descending: step-to  Railings: 1 (SBQC)  Neuro Exam:     Sensation   Light touch LE: left WNL and right WNL  Proprioception LE: left WNL and right WNL    Coordination   Heel to shin: left dysmetric and right dysmetric    Transfers   Sit to stand: independent with prosthesis: yes  Wheelchair to mat: independent with prosthesis: yes  Mat to wheelchair: independent with prosthesis: yes  Sit to supine: independent with prosthesis: no  Supine to sit: independent with prosthesis: no  Roll: independent (Min-mod A to roll to prone)    Amputee   Level of amputation: left   Phantom pain: phantom pain  Gait comment: Knee extn in supine  L: -19 degrees  R: -15 degrees    HS restriction:  L: -25 degrees  R: -30 degrees             Precautions: Falls, skin integrity  Re-eval Date: 12/4/19    Date 11/5       Visit Count 1       FOTO        Pain In        Pain Out          Manual          15 mins               Prone hip flexion strech  Prone knee flexion stretch  Supine hip ABd stretch  Hamstring stretch    Exercise Diary         Aerobic                SLR x 4        Mini Squats        HS Curls                Prone knee flexion        Bridge        Hip ABd hooklying        Clamshell        90/90                Quad alt UE/LE        Bicep curls        Tricep extn         MTP/LTP        IR/ER        Leg Press        Outdoor ambulation        Resisted walk on [facility]        ADL Suite                Dynavision                Floor transfers        Gait training 15 mins       Stairs        Crate Training          Ambulates with RW upon entering  Gait training with SPC, mod A for 45 feet    Gait training with QC and supervision for 50 feet x 3  Stairs step over step ascending, descending step to step; 4 x 3 with right HR and QC      Modalities

## 2019-11-12 ENCOUNTER — OFFICE VISIT (OUTPATIENT)
Dept: PHYSICAL THERAPY | Facility: CLINIC | Age: 84
End: 2019-11-12
Payer: COMMERCIAL

## 2019-11-12 DIAGNOSIS — Z89.512 STATUS POST BELOW-KNEE AMPUTATION OF LEFT LOWER EXTREMITY (HCC): Primary | ICD-10-CM

## 2019-11-12 PROCEDURE — 97116 GAIT TRAINING THERAPY: CPT

## 2019-11-12 PROCEDURE — 97110 THERAPEUTIC EXERCISES: CPT

## 2019-11-12 NOTE — PROGRESS NOTES
Daily Note     Today's date: 2019  Patient name: Markc Royal  : 1933  MRN: [id number]  Referring provider: Juanis Banda MD  Dx:   Encounter Diagnosis     ICD-10-CM    1  Status post below-knee amputation of left lower extremity ([facility] [address] ) [id number]                   Subjective: Patient reports he has been ambulating using his small based quad cane at home but brought rolling walker to clinic today due to inclement weather  Objective: See treatment diary below      Assessment: Tolerated treatment well  Patient demonstrated fatigue post treatment and would benefit from continued PT intervention to facilitate more efficient and safer gait pattern using quad cane  Plan: Continue per plan of care  Patient treated by ZANDER Rizo under direct PT supervision  Precautions: Falls, skin integrity  Re-eval Date: 19    Date       Visit Count 1 2      FOTO        Pain In  0      Pain Out  0        Manual          15 mins               Prone hip flexion strech  Prone knee flexion stretch  Supine hip ABd stretch  Hamstring stretch    Exercise Diary         Aerobic  Nustep  L3 10 mins               SLR x 4        Mini Squats        HS Curls                Prone knee flexion        Bridge        Hip ABd hooklying  3 x 10   Yellow TB      Clamshell        90/90        Therapeutic exercise  3x10 ea  Adduction w/ red ball, 5" hold   SAQ w/ 2 5lb ankle weight  SLR      Quad alt UE/LE        Bicep curls        Tricep extn         MTP/LTP        IR/ER        Leg Press        Outdoor ambulation        Resisted walk on [employer]        ADL Suite                Dynavision                Floor transfers        Gait training 15 mins Small based quad cane  Uneven surfaces, forward, backward sidestepping, head turns, fast/slow  10 mins       Stairs        Crate Training          Ambulates with RW upon entering  Gait training with SPC, mod A for 45 feet    Gait training with QC and supervision for 50 feet x 3  Stairs step over step ascending, descending step to step; 4 x 3 with right HR and QC      Modalities

## 2019-11-13 ENCOUNTER — OFFICE VISIT (OUTPATIENT)
Dept: PULMONOLOGY | Facility: CLINIC | Age: 84
End: 2019-11-13
Payer: COMMERCIAL

## 2019-11-13 VITALS
HEART RATE: 88 BPM | BODY MASS INDEX: 19.18 KG/M2 | SYSTOLIC BLOOD PRESSURE: 120 MMHG | OXYGEN SATURATION: 90 % | WEIGHT: 134 LBS | DIASTOLIC BLOOD PRESSURE: 80 MMHG | HEIGHT: 70 IN

## 2019-11-13 DIAGNOSIS — Z77.090 ASBESTOS EXPOSURE: ICD-10-CM

## 2019-11-13 DIAGNOSIS — R05.9 COUGH: ICD-10-CM

## 2019-11-13 DIAGNOSIS — J84.9 ILD (INTERSTITIAL LUNG DISEASE) (HCC): Primary | ICD-10-CM

## 2019-11-13 PROCEDURE — 99214 OFFICE O/P EST MOD 30 MIN: CPT | Performed by: PHYSICIAN ASSISTANT

## 2019-11-13 NOTE — PROGRESS NOTES
Assessment:    1  ILD (interstitial lung disease) ([facility] [address] )  CT chest high resolution   2  Asbestos exposure  CT chest high resolution   3  Cough           Plan:   Patient presenting today for follow-up  Discussed with him given the negative serology (RF +, CCP -) and CT of the chest demonstrating pattern consistent with UIP, he would likely benefit from medication such as Esbriet  Patient not currently interested in any medication at this time, including Esbriet, prednisone, or inhaled corticosteroids  Denies any shortness of breath limiting his daily activities  Does not feel bothered by the cough  Does not want a rescue inhaler either  Discussed with him the risks and benefits of various medication options and the concern for potential progression of the ILD without any sort of maintenance therapy  He verbalized understanding but politely declining at this time  He is agreeable however to a follow-up CT  Patient will give us a call if he changes his mind, otherwise will follow-up in 3 months    All of the patient's and his wife's questions were answered to their satisfaction and understanding, which was verbalized  Patient was advised to call the office prior to next appointment should they have any questions or concerns prior  Patient made aware of worrisome symptoms that should prompt a visit to the ER or call to 911 such as chest pain, severe shortness of breath, cyanosis, throat closing, syncope, etc     Subjective:     Patient ID: Lina Coon is a 80 y o  male  Chief Complaint:  Joshua Lopez is a very pleasant 27-year-old male nonsmoker with past medical history including but not limited to interstitial lung disease, chronic kidney disease, anemia, left BKA, vascular disease who presents today for follow-up  Patient recently completed serology to assess for any potential underlying cause of his interstitial lung disease  Results all unrevealing and based on CT findings, patient likely has IPF    Today, patient states that he feels quite well  He does admit to a chronic, dry cough which he states he has had for months to years  He does not feel that it is too bothersome and does not want any medication at all for the cough/ILD, this includes supportive care and maintenance therapy  He denies any shortness of breath  Does not feel that he is limited at all with his daily activities other than mobility issues related to the BKA  He understands and acknowledges the risk of progression of the disease however feels that at his age he is doing quite well and does not want to change anything  He has a very minimal smoking history, less than 10 pack years and quit over 50 years ago  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 [employer]ots[employer], still working in his shop a few times a week  The following portions of the patient's history were reviewed in this encounter and updated as appropriate:   Review of Systems   Constitutional: Negative for activity change, fatigue, fever and unexpected weight change  Respiratory: Positive for cough  Negative for choking and shortness of breath  Cardiovascular: Negative for chest pain and leg swelling  Neurological: Negative for dizziness, syncope, weakness and light-headedness  All other systems reviewed and are negative  Objective:    Physical Exam   Constitutional: He is oriented to person, place, and time  He appears well-developed and well-nourished  No distress  HENT:   Head: Normocephalic and atraumatic  Right Ear: External ear normal    Left Ear: External ear normal    Nose: Nose normal    Mouth/Throat: Oropharynx is clear and moist  No oropharyngeal exudate  Eyes: Conjunctivae and EOM are normal  Right eye exhibits no discharge  Left eye exhibits no discharge  No scleral icterus  Neck: Normal range of motion  Neck supple  No tracheal deviation present  Cardiovascular: Normal rate, regular rhythm and normal heart sounds   Exam reveals no gallop and no friction rub  No murmur heard  Pulmonary/Chest: Effort normal and breath sounds normal  No stridor  No respiratory distress  He has no wheezes  Abdominal: He exhibits no distension  There is no guarding  Musculoskeletal: Normal range of motion  He exhibits deformity (Left BKA)  He exhibits no edema or tenderness  Neurological: He is alert and oriented to person, place, and time  No cranial nerve deficit  He exhibits normal muscle tone  Skin: Skin is warm and dry  No rash noted  He is not diaphoretic  No erythema  No pallor  Psychiatric: He has a normal mood and affect  His behavior is normal  Judgment and thought content normal    Nursing note and vitals reviewed  Lab Review:   Appointment on 10/21/2019   Component Date Value    Cyclic Citrullin Peptide* 10/21/2019 11    Appointment on 10/07/2019   Component Date Value    A  ALTERNATA 10/07/2019 <0 10     A  FUMIGATUS 10/07/2019 <0 10     B[place] Grass 10/07/2019 <0 10     Box Elder  10/07/2019 <0 10     Cat Epithellium-Dander 10/07/2019 <0 10     C  HERBARUM 10/07/2019 <0 10     Cockroach 10/07/2019 <0 10     Common Silver Chryl Slight 10/07/2019 <0 10     [place] 10/07/2019 <0 10     D  farinae 10/07/2019 <0 10     D  pteronyssinus 10/07/2019 <0 10     Dog Dander 10/07/2019 <0 10     Elm IgE 10/07/2019 <0 10    [address] [facility] [address] Tree 10/07/2019 <0 10     Mugwort 10/07/2019 <0 10     [place] Tree 10/07/2019 <0 10     Oak 10/07/2019 <0 10     P CHRYSOGENUM 10/07/2019 <0 10     Rough Pigweed  IgE 10/07/2019 <0 10     Common Ragweed 10/07/2019 <0 10     Sheep [place] IgE 10/07/2019 <0 10     [place] Tree 10/07/2019 <0 10     [place] Grass 10/07/2019 <0 10     [place] Tree 10/07/2019 <0 10     [place] Tree 10/07/2019 <0 10     IgE 10/07/2019 8 30     Allergen Comment 10/07/2019 See Below     MOUSE URINE 10/07/2019 <0 10     Thermoact   Saccharii 10/07/2019 Negative     [place] Serum Abs 10/07/2019 Negative     A  Pullulans Abs 10/07/2019 Negative     A  Fumigatus #1 Abs 10/07/2019 Negative     M  FAENI ABS 10/07/2019 Negative     T VULGARIS#1 10/07/2019 Negative     DONTE 10/07/2019 Negative     Rheumatoid Factor 10/07/2019 Positive*    RF Quantitation 10/07/2019 40 IU/mL*   Appointment on 10/05/2019   Component Date Value    Fecal Occult Blood Diagn* 10/05/2019 Negative     Fecal Occult Blood Diagn* 10/05/2019 Negative     Fecal Occult Blood Diagn* 10/05/2019 Negative    Appointment on 10/01/2019   Component Date Value    Vitamin B-12 10/01/2019 781     Iron 10/01/2019 60*    Folate 10/01/2019 18 3*   Orders Only on 09/19/2019   Component Date Value    Clarity, UA 10/01/2019 Clear     Color, UA 10/01/2019 Yellow     Specific Gravity, UA 10/01/2019 1 013     pH, UA 10/01/2019 7 0     Glucose, UA 10/01/2019 Negative     Ketones, UA 10/01/2019 Negative     Blood, UA 10/01/2019 Negative     Protein, UA 10/01/2019 Negative     Nitrite, UA 10/01/2019 Negative     Bilirubin, UA 10/01/2019 Negative     Urobilinogen, UA 10/01/2019 1 0     Leukocytes, UA 10/01/2019 Negative     WBC, UA 10/01/2019 None Seen     RBC, UA 10/01/2019 None Seen     Hyaline Casts, UA 10/01/2019 None Seen     Bacteria, UA 10/01/2019 None Seen     Epithelial Cells 10/01/2019 None Seen    Appointment on 09/18/2019   Component Date Value    WBC 09/18/2019 8 15     RBC 09/18/2019 3 63*    Hemoglobin 09/18/2019 11 0*    Hematocrit 09/18/2019 35 5*    MCV 09/18/2019 98     MCH 09/18/2019 30 3     MCHC 09/18/2019 31 0*    RDW 09/18/2019 14 0     MPV 09/18/2019 10 6     Platelets 46/28/1416 198     nRBC 09/18/2019 0     Neutrophils Relative 09/18/2019 54     Immat GRANS % 09/18/2019 1     Lymphocytes Relative 09/18/2019 32     Monocytes Relative 09/18/2019 8     Eosinophils Relative 09/18/2019 5     Basophils Relative 09/18/2019 0     Neutrophils Absolute 09/18/2019 4 38     Immature Grans Absolute 09/18/2019 0 05     Lymphocytes Absolute 09/18/2019 2 63     Monocytes Absolute 09/18/2019 0 63     Eosinophils Absolute 09/18/2019 0 43     Basophils Absolute 09/18/2019 0 03     Sodium 09/18/2019 138     Potassium 09/18/2019 3 9     Chloride 09/18/2019 103     CO2 09/18/2019 32     ANION GAP 09/18/2019 3*    BUN 09/18/2019 15     Creatinine 09/18/2019 1 04     Glucose, Fasting 09/18/2019 83     Calcium 09/18/2019 9 8     AST 09/18/2019 15     ALT 09/18/2019 24     Alkaline Phosphatase 09/18/2019 60     Total Protein 09/18/2019 7 5     Albumin 09/18/2019 3 2*    Total Bilirubin 09/18/2019 0 47     eGFR 09/18/2019 65     TSH 3RD GENERATON 09/18/2019 0 793     CRP 09/18/2019 13 9*

## 2019-11-14 ENCOUNTER — OFFICE VISIT (OUTPATIENT)
Dept: PHYSICAL THERAPY | Facility: CLINIC | Age: 84
End: 2019-11-14
Payer: COMMERCIAL

## 2019-11-14 DIAGNOSIS — Z89.512 STATUS POST BELOW-KNEE AMPUTATION OF LEFT LOWER EXTREMITY (HCC): Primary | ICD-10-CM

## 2019-11-14 PROCEDURE — 97110 THERAPEUTIC EXERCISES: CPT | Performed by: PHYSICAL THERAPIST

## 2019-11-14 NOTE — PROGRESS NOTES
Daily Note     Today's date: 2019  Patient name: Sharan Crawford  : 1933  MRN: [id number]  Referring provider: Argentina Scott MD  Dx:   Encounter Diagnosis     ICD-10-CM    1  Status post below-knee amputation of left lower extremity ([facility] ) Z89 [id number]                   Subjective: Patient reports improved socket fit now that he had it adjusted, much less pain  Objective: See treatment diary below      Assessment: Tolerated treatment well  Patient demonstrated fatigue post treatment and would benefit from continued PT  Improving balance and confidence with ambulation  Struggles with decreasing NARESH with gait for improved gait quality to allow for forward translation of tibia over foot  Plan: Continue per plan of care  Precautions: Falls, skin integrity  Re-eval Date: 19    Date      Visit Count 1 2 3     FOTO        Pain In  0 0     Pain Out  0 0       Manual          15 mins               Prone hip flexion strech  Prone knee flexion stretch  Supine hip ABd stretch  Hamstring stretch    Exercise Diary         Aerobic  Nustep  L3 10 mins  Nustep  L3 14 mins                 SLR x 4        Mini Squats        HS Curls                Prone knee flexion        Bridge        Hip ABd hooklying  3 x 10   Yellow TB      Clamshell        90/90        Therapeutic exercise  3x10 ea    Adduction w/ red ball, 5" hold   SAQ w/ 2 5lb ankle weight  SLR      Quad alt UE/LE        Bicep curls        Tricep extn         MTP/LTP        IR/ER        Leg Press        Outdoor ambulation        Resisted walk on [employer]        ADL Suite                Dynavision                Floor transfers        Gait training 15 mins Small based quad cane  Uneven surfaces, forward, backward sidestepping, head turns, fast/slow  10 mins  SBQC -> no AD with close supervision to min A on level and uneven surfaces, foam surfaces, stepping over obstacles, retrieval of items from floor, sit to stand without arm rests  35 mins     Stairs        Crate Training   5 mins without resistance       Ambulates with RW upon entering  Gait training with SPC, mod A for 45 feet  Gait training with QC and supervision for 50 feet x 3  Stairs step over step ascending, descending step to step; 4 x 3 with right HR and QC      Modalities

## 2019-11-15 ENCOUNTER — TELEPHONE (OUTPATIENT)
Dept: FAMILY MEDICINE CLINIC | Facility: CLINIC | Age: 84
End: 2019-11-15

## 2019-11-15 DIAGNOSIS — Z89.512 STATUS POST BELOW-KNEE AMPUTATION OF LEFT LOWER EXTREMITY (HCC): Primary | ICD-10-CM

## 2019-11-15 NOTE — TELEPHONE ENCOUNTER
Patient left a message and he needs a referral to Dr Ailyn Leonard- to get a new prosthetic left foot  Please contact patient when complete so he can schedule appt

## 2019-11-19 ENCOUNTER — OFFICE VISIT (OUTPATIENT)
Dept: PHYSICAL THERAPY | Facility: CLINIC | Age: 84
End: 2019-11-19
Payer: COMMERCIAL

## 2019-11-19 DIAGNOSIS — Z89.512 STATUS POST BELOW-KNEE AMPUTATION OF LEFT LOWER EXTREMITY (HCC): Primary | ICD-10-CM

## 2019-11-19 PROCEDURE — 97116 GAIT TRAINING THERAPY: CPT

## 2019-11-19 PROCEDURE — 97110 THERAPEUTIC EXERCISES: CPT

## 2019-11-19 NOTE — PROGRESS NOTES
Daily Note     Today's date: 2019  Patient name: Ruben Farmer  : 1933  MRN: [id number]  Referring provider: José Antonio Bro MD  Dx:   Encounter Diagnosis     ICD-10-CM    1  Status post below-knee amputation of left lower extremity ([facility] [address] ) Z89 [id number]                   Subjective: "my goal is to walk without anything to help me "      Objective: See treatment diary below      Assessment: Added treadmill to program with fair tolerance  Patient fatigued after 3 min and need cues to maintain step length  Overall tolerated treatment well  Good dynamic balance with reaching to floor with cones  No LOB      Plan: patient to be place on hold until f/u with physiatrist      Precautions: Falls, skin integrity  Re-eval Date: 19    Date     Visit Count 1 2 3 4    FOTO        Pain In  0 0 0    Pain Out  0 0 0      Manual          15 mins               Prone hip flexion strech  Prone knee flexion stretch  Supine hip ABd stretch  Hamstring stretch    Exercise Diary         Aerobic  Nustep  L3 10 mins  Nustep  L3 14 mins     Nustep  L5 7 min    Treadmill    0 9 mph  3 min    SLR x 4        Mini Squats        HS Curls                Prone knee flexion        Bridge        Hip ABd hooklying  3 x 10   Yellow TB  3x10  Yellow TB    Clamshell        90/90        Therapeutic exercise  3x10 ea    Adduction w/ red ball, 5" hold   SAQ w/ 2 5lb ankle weight  SLR  3x10 ea  Adduction w/red ball 5" hold     SAQ 2 5# R ankle  SLR 2 5# R ankle     Quad alt UE/LE        Bicep curls        Tricep extn         MTP/LTP        IR/ER        Leg Press        Outdoor ambulation        Resisted walk on [employer])        ADL Suite                Dynavision                 Floor transfers        Gait training 15 mins Small based quad cane  Uneven surfaces, forward, backward sidestepping, head turns, fast/slow  10 mins  SBQC -> no AD with close supervision to min A on level and uneven surfaces, foam surfaces, stepping over obstacles, retrieval of items from floor, sit to stand without arm rests  35 mins Amb and bending performing cone  from floor  Amb with SBQC in clinic changing directions    Stairs        Crate Training   5 mins without resistance       Ambulates with RW upon entering  Gait training with SPC, mod A for 45 feet  Gait training with QC and supervision for 50 feet x 3  Stairs step over step ascending, descending step to step; 4 x 3 with right HR and QC      Modalities

## 2019-12-26 NOTE — PROGRESS NOTES
Darrell Santos will be discharged from outpatient physical therapy care due to expiration of plan of care  No objective measures updated, Darrell Santos is not present at time of discharge

## 2020-01-17 DIAGNOSIS — E78.5 HYPERLIPIDEMIA, UNSPECIFIED HYPERLIPIDEMIA TYPE: ICD-10-CM

## 2020-01-17 RX ORDER — SIMVASTATIN 20 MG
TABLET ORAL
Qty: 90 TABLET | Refills: 0 | Status: SHIPPED | OUTPATIENT
Start: 2020-01-17 | End: 2020-04-03

## 2020-01-21 ENCOUNTER — TELEPHONE (OUTPATIENT)
Dept: VASCULAR SURGERY | Facility: CLINIC | Age: 85
End: 2020-01-21

## 2020-01-21 NOTE — TELEPHONE ENCOUNTER
Pt's wife called to report that for the last month pt has what appears to be "part of his bone" sticking out of the skin on his stump  She said it is a very small area, but she has been "clipping it" and it keeps coming back  She said it is a little red, does not feel warm to touch, no drainage/fever/chills  She said the man who helps him w/ his prosthesis instructed to get it checked out to make sure he does not get an infection  Transferred call to scheduling

## 2020-01-28 ENCOUNTER — OFFICE VISIT (OUTPATIENT)
Dept: VASCULAR SURGERY | Facility: CLINIC | Age: 85
End: 2020-01-28
Payer: COMMERCIAL

## 2020-01-28 VITALS
SYSTOLIC BLOOD PRESSURE: 144 MMHG | TEMPERATURE: 97.6 F | HEART RATE: 64 BPM | DIASTOLIC BLOOD PRESSURE: 70 MMHG | HEIGHT: 70 IN | WEIGHT: 135 LBS | BODY MASS INDEX: 19.33 KG/M2

## 2020-01-28 DIAGNOSIS — Z89.512 STATUS POST BELOW-KNEE AMPUTATION OF LEFT LOWER EXTREMITY (HCC): Primary | ICD-10-CM

## 2020-01-28 PROCEDURE — 99213 OFFICE O/P EST LOW 20 MIN: CPT | Performed by: NURSE PRACTITIONER

## 2020-01-28 NOTE — ASSESSMENT & PLAN NOTE
Severe peripheral arterial disease s/p multiple endovascular interventions for treatment of left foot wounds and rest pain, ultimately underwent L BKA on 2/1/19 by Dr Jessica Castaneda  Patient has prosthetic and is ambulatory  He presents to office today for stump check  His flap site is completely healed  He has a hard pearlescent protrusion from his stump  I attempted to remove this, however it is solid and unable to be removed, began bleeding when attempted removal   No signs infection  Will arrange further evaluation with vascular surgeon    Patient to see Dr Sudeep Ya tomorrow at 2:30pm

## 2020-01-28 NOTE — PATIENT INSTRUCTIONS
S/p L BKA 2/1/19  -unclear what it protruding from the base of your stump  Fortunately there are no sign of infection  -followup with Dr Carolyn Rankin tomorrow at the [facility] for further evaluation    Take Nataliia Keller to the [facility] exit, make a right off the exit and it's a left into the hospital

## 2020-01-28 NOTE — PROGRESS NOTES
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  Severe peripheral arterial disease s/p multiple endovascular interventions for treatment of left foot wounds and rest pain, ultimately underwent L BKA on 2/1/19 by Dr Devon Mendoza  Patient has prosthetic and is ambulatory  He presents to office today for stump check  His flap site is completely healed  He has a hard pearlescent protrusion from his stump  I attempted to remove this, however it is solid and unable to be removed, began bleeding when attempted removal   No signs infection  Will arrange further evaluation with vascular surgeon  Patient to see Dr Gisele Case tomorrow at 2:30pm            Diagnoses and all orders for this visit:    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          Subjective:      Patient ID: Shashi Frederick is a 80 y o  male  Patient with severe peripheral arterial disease to bilateral lower extremities  He has a history of multiple endovascular interventions to the left lower extremity, ultimately underwent a left BKA on 2/1/2019 by Dr Devon Mendoza  He has since completely healed his left stump  He is ambulatory with a prosthetic  He is seen in office today at his wife's request for evaluation of stump  There is a hard pearlescent protrusion from the stump site  Patient's wife states that she clipped this area on multiple occasions over the past month, but it continues to grow back  Unclear what this is  Unable to be removed  Patient denies pain  No fever/chills or signs of infection  He has significant peripheral arterial disease to the right leg  He denies any claudication symptoms with ambulation  Patient is here today due to area of concern on stump site, s/p L BKA 2/1/19 by Dr Valene Homans  Patient reports callused area at stump site x 1 month  No pain, redness, drainage  Wearing prosthesis daily         The following portions of the patient's history were reviewed and updated as appropriate: allergies, current medications, past family history, past medical history, past social history, past surgical history and problem list     Review of Systems   Constitutional: Negative  HENT: Negative  Eyes: Negative  Respiratory: Negative  Cardiovascular: Negative  Gastrointestinal: Negative  Endocrine: Negative  Genitourinary: Negative  Musculoskeletal: Positive for gait problem  Skin: Negative  Allergic/Immunologic: Negative  Hematological: Negative  Psychiatric/Behavioral: Negative  Objective:     Physical Exam   Constitutional: No distress  Cardiovascular: Normal rate  Pulmonary/Chest: Effort normal and breath sounds normal    Musculoskeletal: He exhibits no edema  Ambulatory with prosthesis   Neurological: He is alert  Skin:   Left BKA flap/stump completely healed  There is a small hard pearlescent protrusion from the stump site  Removal was attempted, but unable to be removed, bleeding/oozing with attempted removal           I have reviewed and made appropriate changes to the review of systems input by the medical assistant      Vitals:    01/28/20 1400   BP: 144/70   BP Location: Left arm   Patient Position: Sitting   Pulse: 64   Temp: 97 6 °F (36 4 °C)   TempSrc: Tympanic   Weight: 61 2 kg (135 lb)   Height: 5' 10" (1 778 m)       Patient Active Problem List   Diagnosis    Severe peripheral arterial disease (HCC)    Hyperlipidemia    Vitamin D deficiency    Bifascicular block    Anemia    CKD (chronic kidney disease), stage III (HCC)    Ambulatory dysfunction    Physical deconditioning    Nocturia    Decubitus ulcer of right heel, unstageable (HCC)    Carpal tunnel syndrome    Change in multiple pigmented skin lesions    Chronic lower back pain    Chronic fatigue    Cold intolerance    Generalized osteoarthritis    Herniated lumbar intervertebral disc    Impaired fasting glucose    Lichen simplex chronicus    Rotator cuff tear arthropathy, right    Seborrheic keratosis    Unstable gait    Cough    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    Severe protein-calorie malnutrition (HCC)    Atherosclerosis of artery of extremity with ulceration (HCC)    Phantom pain after amputation of lower extremity ([address] )    Medicare annual wellness visit, subsequent    Unintentional weight loss    Chronic cough    Encounter for immunization       Past Surgical History:   Procedure Laterality Date    AMPUTATION  1972    L thumb    CARPAL TUNNEL RELEASE  2016    L hand    CATARACT EXTRACTION      COLONOSCOPY      EYE SURGERY      FRACTURE SURGERY      HAND SURGERY      IR LOWER EXTREMITY / INTERVENTION  10/26/2018    KNEE ARTHROSCOPY      NERVE BLOCK      TN AMPUTATION LOW LEG THRU TIB/FIB Left 2/1/2019    Procedure: AMPUTATION BELOW KNEE (BKA);   Surgeon: Rina Finn MD;  [facility];  Service: Vascular    TN [facility] 3RD+ ORD [facility] ABDL P[facility] Left 3/9/2018    Procedure: LEFT LOWER EXTREMITY ARTERIOGRAM WITH PTA OF LEFT POPLITEAL ARTERY;  Surgeon: Iman Troy MD;  [facility];  Service: Vascular    TN [facility] 3RD[facility] ORD SLCTV ABDL PEL/[facility] Left 10/26/2018    Procedure: ARTERIOGRAM, angioplasty stent and atherectomy;  Surgeon: Iman Troy MD;  [facility];  Service: Vascular    THORACENTESIS         Family History   Problem Relation Age of Onset    Lung cancer Mother     Brain cancer Mother     Diabetes Father     Aneurysm Father     Stroke Father     Lung cancer Father     Brain cancer Father     Diabetes Maternal Grandmother        Social History     Socioeconomic History    Marital status: /Civil Union     Spouse name: Not on file    Number of children: 3    Years of education: Not on file    Highest education level: Not on file   Occupational History    Occupation: retired   Social Needs    Financial resource strain: Not on file    Food insecurity:     Worry: Not on file     Inability: Not on file    Transportation needs:     Medical: Not on file     Non-medical: Not on file   Tobacco Use    Smoking status: Former Smoker     Packs/day: 2 00     Years: 15 00     Pack years: 30 00     Types: Cigarettes    Smokeless tobacco: Never Used    Tobacco comment: quit 57 years ago   Substance and Sexual Activity    Alcohol use: No    Drug use: No    Sexual activity: Never   Lifestyle    Physical activity:     Days per week: Not on file     Minutes per session: Not on file    Stress: Not on file   Relationships    Social connections:     Talks on phone: Not on file     Gets together: Not on file     Attends [employer] service: Not on file     Active member of club or organization: Not on file     Attends meetings of clubs or organizations: Not on file     Relationship status: Not on file    Intimate partner violence:     Fear of current or ex partner: Not on file     Emotionally abused: Not on file     Physically abused: Not on file     Forced sexual activity: Not on file   Other Topics Concern    Not on file   Social History Narrative    Previous        No Known Allergies      Current Outpatient Medications:     acetaminophen (TYLENOL) 325 mg tablet, Take 2 tablets (650 mg total) by mouth every 6 (six) hours as needed for mild pain, Disp: 30 tablet, Rfl: 0    aspirin 81 MG tablet, Take by mouth, Disp: , Rfl:     Cholecalciferol (VITAMIN D) 2000 units CAPS, Take by mouth, Disp: , Rfl:     CYANOCOBALAMIN PO, cyanocobalamin (vitamin B-12), Disp: , Rfl:     ferrous sulfate 325 (65 Fe) mg tablet, Take 1 tablet (325 mg total) by mouth 2 (two) times a day, Disp: 90 tablet, Rfl: 2    Omega-3 Fatty Acids (FISH OIL) 645 MG CAPS, Take by mouth, Disp: , Rfl:     simvastatin (ZOCOR) 20 mg tablet, take 1 tablet by mouth once daily, Disp: 90 tablet, Rfl: 0

## 2020-01-29 ENCOUNTER — OFFICE VISIT (OUTPATIENT)
Dept: VASCULAR SURGERY | Facility: CLINIC | Age: 85
End: 2020-01-29
Payer: COMMERCIAL

## 2020-01-29 VITALS
SYSTOLIC BLOOD PRESSURE: 134 MMHG | HEIGHT: 70 IN | BODY MASS INDEX: 19.33 KG/M2 | HEART RATE: 47 BPM | DIASTOLIC BLOOD PRESSURE: 62 MMHG | WEIGHT: 135 LBS | TEMPERATURE: 97.8 F

## 2020-01-29 DIAGNOSIS — Z89.512 STATUS POST BELOW-KNEE AMPUTATION OF LEFT LOWER EXTREMITY (HCC): Primary | ICD-10-CM

## 2020-01-29 PROCEDURE — 99214 OFFICE O/P EST MOD 30 MIN: CPT | Performed by: SURGERY

## 2020-01-29 NOTE — ASSESSMENT & PLAN NOTE
Severe peripheral arterial disease s/p multiple endovascular interventions for treatment of left foot wounds and rest pain, ultimately underwent L BKA on 2/1/19 by Dr Bartolo Gamble  Patient has prosthetic and is ambulatory  He presents to office today for stump check  His flap site is completely healed  He has a hard pearlescent protrusion from his stump which was seen yesterday in clinic by one of the practice PA's  Attempt was made to remove the spicule of bone but unsuccessful  He presents today for follow-up  The area was cleansed with betadine and alcohol  The area was anesthetized and using scissors a small fragment of bone ~ [id number] was removed  There was healthy bleeding, no signs of infection  Pressure was held for hemostasis  The wound was dressed with steri strips and padded gauze dressing       1 week follow-up for wound check

## 2020-01-29 NOTE — PROGRESS NOTES
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  Severe peripheral arterial disease s/p multiple endovascular interventions for treatment of left foot wounds and rest pain, ultimately underwent L BKA on 2/1/19 by Dr Rocio Urena  Patient has prosthetic and is ambulatory  He presents to office today for stump check  His flap site is completely healed  He has a hard pearlescent protrusion from his stump which was seen yesterday in clinic by one of the practice PA's  Attempt was made to remove the spicule of bone but unsuccessful  He presents today for follow-up  The area was cleansed with betadine and alcohol  The area was anesthetized and using scissors a small fragment of bone ~ [id number] was removed  There was healthy bleeding, no signs of infection  Pressure was held for hemostasis  The wound was dressed with steri strips and padded gauze dressing  1 week follow-up for wound check          Problem List Items Addressed This Visit        Other    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 - Primary     Severe peripheral arterial disease s/p multiple endovascular interventions for treatment of left foot wounds and rest pain, ultimately underwent L BKA on 2/1/19 by Dr Rocio Urena  Patient has prosthetic and is ambulatory  He presents to office today for stump check  His flap site is completely healed  He has a hard pearlescent protrusion from his stump which was seen yesterday in clinic by one of the practice PA's  Attempt was made to remove the spicule of bone but unsuccessful  He presents today for follow-up  The area was cleansed with betadine and alcohol  The area was anesthetized and using scissors a small fragment of bone ~ [id number] was removed  There was healthy bleeding, no signs of infection  Pressure was held for hemostasis  The wound was dressed with steri strips and padded gauze dressing       1 week follow-up for wound check                  Subjective: Patient ID: Ruben Farmer is a 80 y o  male  Patient presents in office for L stump bone like item sticking out x 1 month  Patient had BKA on 2/01/2019 by Dr Venessa Daniels  Patient denies any pain or discomfort at the stump area  Patient is taking aspirin and simvastatin  The following portions of the patient's history were reviewed and updated as appropriate: allergies, current medications, past family history, past medical history, past social history, past surgical history and problem list     Review of Systems   Constitutional: Negative  HENT: Positive for hearing loss  Eyes: Negative  Respiratory: Negative  Cardiovascular: Negative  Gastrointestinal: Negative  Endocrine: Negative  Genitourinary: Negative  Musculoskeletal: Negative  Skin: Negative  Allergic/Immunologic: Negative  Neurological: Negative  Hematological: Negative  Psychiatric/Behavioral: Negative  I have reviewed and updated the ROS as appropriate  Objective:      /62 (BP Location: Right arm, Patient Position: Sitting, Cuff Size: Adult)   Pulse (!) 47   Temp 97 8 °F (36 6 °C) (Tympanic)   Ht 5' 10" (1 778 m)   Wt 61 2 kg (135 lb)   BMI 19 37 kg/m²          Physical Exam   Constitutional: He appears well-developed and well-nourished  HENT:   Head: Normocephalic and atraumatic  Eyes: Pupils are equal, round, and reactive to light  EOM are normal    Musculoskeletal: Normal range of motion  He exhibits deformity  Left BKA stump, well healed  Posterior to the incision within the stump pad there was a small wound with a piece of bone fragment protruding through the skin  The area was dissected in clinic and a small piece of bone was removed  Dressing was applied  Neurological: He is alert  Skin: Skin is warm and dry  Capillary refill takes less than 2 seconds  No erythema  Psychiatric: He has a normal mood and affect   His behavior is normal  Judgment and thought content normal

## 2020-02-12 ENCOUNTER — EVALUATION (OUTPATIENT)
Dept: PHYSICAL THERAPY | Facility: CLINIC | Age: 85
End: 2020-02-12
Payer: COMMERCIAL

## 2020-02-12 DIAGNOSIS — R26.9 ABNORMALITY OF GAIT: ICD-10-CM

## 2020-02-12 DIAGNOSIS — R53.1 WEAKNESS GENERALIZED: Primary | ICD-10-CM

## 2020-02-12 PROCEDURE — 97110 THERAPEUTIC EXERCISES: CPT | Performed by: PHYSICAL THERAPIST

## 2020-02-12 PROCEDURE — 97112 NEUROMUSCULAR REEDUCATION: CPT | Performed by: PHYSICAL THERAPIST

## 2020-02-12 PROCEDURE — 97162 PT EVAL MOD COMPLEX 30 MIN: CPT | Performed by: PHYSICAL THERAPIST

## 2020-02-12 NOTE — PROGRESS NOTES
PT Evaluation     Today's date: 2020  Patient name: Sav Soto  : 1933  MRN: [id number]  Referring provider: Slim Case MD  Dx:   Encounter Diagnosis     ICD-10-CM    1  Weakness generalized R53 1    2  Abnormality of gait R26 9                   Assessment  Assessment details: Pt is 80 y o  male seen for PT evaluation on 2020 with gait abnormality and LE weakness  Comorbidities affecting pt's physical performance at time of assessment include: decreased po intake and oral hydration as reported by spouse, increased falls, new prosthetic, LE weakness with increased fear of falling 2/2 recent falls  Most recent fall occurred this week  PLOF was independent with AD, SPC/SBQC for outdoor ambulation  Personal factors affecting pt at time of IE include: LE weakness, hypotension with BP 90/48 at evaluation, decreased gait speed, inadequate sock ply for proper socket fit  Please find objective findings from PT assessment regarding body systems outlined above impairments and limitations including weakness, impaired balance, decreased endurance, decreased activity tolerance and fall risk, as well as mobility assessment  Pt to benefit from continued PT interventions to address deficits as defined above and maximize level of functional independent mobility and consistency  PT to continue 2 times per week for 4 weeks with anticipated return to independence using AD  Impairments: abnormal coordination, abnormal gait, abnormal or restricted ROM, activity intolerance, impaired balance, impaired physical strength, lacks appropriate home exercise program, safety issue and poor posture   Other impairment: Hypotensive at time of evaluation  Understanding of Dx/Px/POC: good   Prognosis: good    Goals  In 4 weeks, patient will:  1  Demonstrate ability to perform 30 minutes of activity without requiring seated rest break    2   Demonstrate ability to maintain upright balance unsupported by UEs while reaching above shoulder height without resistance to promote stability with ADLs  3  Demonstrate ability to lift up to 10 lbs from  knees to waist unsupported by UEs to promote stability with ADLs  4   Demonstrate increased strength of bilateral LEs to allow for improved transfer quality and stability    In 4-10 weeks, patient will:  1  Demonstrate reduced fall risk based on outcome measures  2  Demonstrate consistent carryover with HEP  3  Return to community ambulation with least restrictive AD for at least 300+ feet      Plan  Patient would benefit from: skilled physical therapy  Other planned modality interventions: Modalities prn for symptom management  Planned therapy interventions: manual therapy, neuromuscular re-education, therapeutic exercise, therapeutic activities, home exercise program and gait training  Frequency: 2x week  Duration in visits: 8  Duration in weeks: 4  Plan of Care beginning date: 2/12/2020  Plan of Care expiration date: 3/13/2020  Treatment plan discussed with: patient        Subjective Evaluation    History of Present Illness  Mechanism of injury: Patient reports he had a "piece of bone" coming out of his leg, had it removed  Additionally has had several falls since receiving his "new leg"  Patient presents today with spouse with concerns over ongoing fall risk and unsteadiness with walking using AD      PMHx:  Left BKA  Quality of life: fair    Pain  No pain reported  Progression: worsening    Social Support  Lives with: spouse    Treatments  Previous treatment: physical therapy  Current treatment: physical therapy  Patient Goals  Patient goals for therapy: increased strength, independence with ADLs/IADLs, return to sport/leisure activities and improved balance          Objective     Strength/Myotome Testing     Left Shoulder     Planes of Motion   Flexion: 4   Abduction: 4     Right Shoulder     Planes of Motion   Flexion: 4   Abduction: 4     Left Elbow   Flexion: 4  Extension: 4    Right Elbow   Flexion: 4  Extension: 4    Left Wrist/Hand   Wrist extension: 4  Wrist flexion: 4    Right Wrist/Hand   Wrist extension: 4  Wrist flexion: 4    Left Hip   Planes of Motion   Flexion: 3  Extension: 3-  Abduction: 3    Right Hip   Planes of Motion   Flexion: 3  Extension: 3-  Abduction: 3    Left Knee   Flexion: 3+  Extension: 3+    Right Knee   Flexion: 3+  Extension: 3+    Right Ankle/Foot   Dorsiflexion: 3+  Plantar flexion: 3+    Ambulation   Weight-Bearing Status   Assistive device used: cane    Ambulation: Stairs   Able to perform: yeswith prosthesis: yes  Ascending: reciprocal  Ascend stairs: independent  Railings: 2  Descending: reciprocal  Descend stairs: independent  Railings: 2  Neuro Exam:     Positional testing   Positional testing comment: BP initially 90/48, 50  /55, 66, 93%    Sensation   Light touch LE: right impaired  Light touch LE: left WNL  Proprioception LE: left WNL and right WNL    Coordination   Finger to nose: left WNL and right WNL    Transfers Sit to stand: independent   WC to bed: independentWheelchair to mat: independent Mat to wheelchair: independent Sit to supine: independent Supine to sit: independent   Roll: independent    Functional outcomes   Gait speed: Fast= 0 54; Self=0 47 - SBQC   2 minute walk test: 168 feet with SBQC  TU seconds (seconds)  Functional outcome comment: AmPPro=33/47 (K3)     Amputee   Level of amputation: left   Amputation comments: Deneen Scale of prosthetic use in people with LE amputations => Total score = 9    H/o right heel debridement requiring NWBing right LE    Phantom pain: phantom pain  Gait comment: Step to pattern initially  Patient with increased movement in socket, requires extra ply, however, does not have one with him, did not wear to eval     Increased toe out with heel strike to foot flat, increased dependence on SBQC  Decreased hip extension        Balance assessments   MCTSIB   Eyes open level surface: 30  Eyes open foam surface: 8  Eyes closed level surface: 0  Eyes closed foam surface: 0  Single leg stance   Left eyes open firm surface: 0  Left eyes closed firm surface: 0  Right eyes open firm surface: 0  Right eyes closed firm surface: 0             Precautions: Falls, h/o right heel ulceration  Re-eval Date: 3/11/2020    Date 2/12       Visit Count 1       FOTO See IE       Pain In See IE       Pain Out See IE           Manual         LE streth upcoming                   Exercise Diary  Vitals 90/48, 50   /55, HR 66, 93%        Aerobic Nustep L4  10 mins       SLR x 3        Mini squats        [facility]        90/90                Gait training                Stair training                Floor transfers                Dynamic balance  Foam surface          Modalities

## 2020-02-18 ENCOUNTER — OFFICE VISIT (OUTPATIENT)
Dept: PHYSICAL THERAPY | Facility: CLINIC | Age: 85
End: 2020-02-18
Payer: COMMERCIAL

## 2020-02-18 DIAGNOSIS — R53.1 WEAKNESS GENERALIZED: Primary | ICD-10-CM

## 2020-02-18 DIAGNOSIS — R26.9 ABNORMALITY OF GAIT: ICD-10-CM

## 2020-02-18 PROCEDURE — 97110 THERAPEUTIC EXERCISES: CPT | Performed by: PHYSICAL THERAPIST

## 2020-02-18 PROCEDURE — 97116 GAIT TRAINING THERAPY: CPT | Performed by: PHYSICAL THERAPIST

## 2020-02-18 PROCEDURE — 97530 THERAPEUTIC ACTIVITIES: CPT | Performed by: PHYSICAL THERAPIST

## 2020-02-18 NOTE — PROGRESS NOTES
Daily Note     Today's date: 2020  Patient name: Mario Kerr  : 1933  MRN: [id number]  Referring provider: Alisia Favre, MD  Dx:   Encounter Diagnosis     ICD-10-CM    1  Weakness generalized R53 1    2  Abnormality of gait R26 9                   Subjective: Patient reports he fell again  Wife reports she "doesn't know what he was doing"  Patient reports he's not sure what he did to fall, maybe the cat had something to do with it  Objective: See treatment diary below      Assessment: Tolerated treatment well  Patient demonstrated fatigue post treatment and would benefit from continued PT  Patient presents with single ply in socket, increased to 3 ply for improved stability  Requires cues and demonstration with positional/transitional changes from prone to quad  Plan: Continue per plan of care  Precautions: Falls, h/o right heel ulceration  Re-eval Date: 3/11/2020    Date       Visit Count 1 2      FOTO See IE       Pain In See IE 0      Pain Out See IE 0      Does note soreness in shoulders, no pain  Manual         LE streth upcoming                   Exercise Diary  Vitals 90/48, 50  /55, HR 66, 93%  110/56, HR 70        Aerobic Nustep L4  10 mins Nustep L4  10 mins      SLR x 3        Mini squats        Clamshells        90/90                Gait training  15 mins  3-ply  SBQC  Step through part to whole  VCs              Stair training                Floor transfers  30 mins chair->floor on red mat, initially mod A with VCs and last attempt cl S              Dynamic balance  Foam surface          Modalities

## 2020-02-19 ENCOUNTER — OFFICE VISIT (OUTPATIENT)
Dept: PHYSICAL THERAPY | Facility: CLINIC | Age: 85
End: 2020-02-19
Payer: COMMERCIAL

## 2020-02-19 DIAGNOSIS — R53.1 WEAKNESS GENERALIZED: Primary | ICD-10-CM

## 2020-02-19 PROCEDURE — 97110 THERAPEUTIC EXERCISES: CPT

## 2020-02-19 PROCEDURE — 97112 NEUROMUSCULAR REEDUCATION: CPT

## 2020-02-19 NOTE — PROGRESS NOTES
Daily Note     Today's date: 2020  Patient name: Giorgio Mcclendon  : 1933  MRN: [id number]  Referring provider: Mara Hickman MD  Dx:   Encounter Diagnosis     ICD-10-CM    1  Weakness generalized R53 1                   Subjective: BL shoulder soreness L>R   The floor transfers last PT session seemed to have set them off      Objective: See treatment diary below      Assessment: Tolerated treatment well  Denied any increase pain/discomfort BL shoulder with 1* focus BL LE strengthening Decrease step stride with RLE during advance phase of gait with NBQC   Patient would benefit from continued PT to improve BL LE strength/stability/mobility, gait quality/safety and endurance to max overall QOL         Plan: Continue per plan of care  Precautions: Falls, h/o right heel ulceration  Re-eval Date: 3/11/2020    Date      Visit Count 1 2 3     FOTO See IE       Pain In See IE 0 0     Pain Out See IE 0 0     Does note soreness in shoulders, no pain  Manual         LE streth upcoming                   Exercise Diary  Vitals 90/48, 50  /55, HR 66, 93%  110/56, HR 70   BP  100-120/54-60     Aerobic Nustep L4  10 mins Nustep L4  10 mins Nustep L4  10 mins     SLR x 3   Firm  1 HHA  2x10 ea dir   BL     Mini squats   Firm  With functional reach low/high  2x10       Clamshells   Green  2x10 BL     90/90   2x10 BL             Gait training  15 mins  3-ply  SBQC  Step through part to whole  VCs              Stair training   3x 1 HR w/ NBQC  Step over step CS/CGA  cues             Floor transfers  30 mins chair->floor on red mat, initially mod A with VCs and last attempt cl S Held increase BL shoulder soreness             Dynamic balance  Foam surface          Modalities

## 2020-02-24 ENCOUNTER — OFFICE VISIT (OUTPATIENT)
Dept: PHYSICAL THERAPY | Facility: CLINIC | Age: 85
End: 2020-02-24
Payer: COMMERCIAL

## 2020-02-24 DIAGNOSIS — R53.1 WEAKNESS GENERALIZED: Primary | ICD-10-CM

## 2020-02-24 PROCEDURE — 97110 THERAPEUTIC EXERCISES: CPT

## 2020-02-24 PROCEDURE — 97112 NEUROMUSCULAR REEDUCATION: CPT

## 2020-02-24 NOTE — PROGRESS NOTES
Daily Note     Today's date: 2020  Patient name: Brandin Barajas  : 1933  MRN: [id number]  Referring provider: Kieran Simpson MD  Dx:   Encounter Diagnosis     ICD-10-CM    1  Weakness generalized R53 1                   Subjective: shoulders are feeling better since last PT session  Denies any recent falls      Objective: See treatment diary below      Assessment: Tolerated treatment fairly well  Denied any increase pain but demonstrated increase SOB  Requested occasional seated rest 2* SOB  Increase reps/resistance to pt tolerance  Patient provided verbal/tactile cues prn for proper form and technique with gait with focus on heel strike, and stride length with RLE during advance phase  CS/CGA for balance recovery with LOB  Patient would benefit from continued PT to improve BL LE strength/stability/mobility, gait quality/safety and endurance to max overall QOL     Plan: Continue per plan of care  Precautions: Falls, h/o right heel ulceration  Re-eval Date: 3/11/2020    Date     Visit Count 1 2 3 4    FOTO See IE       Pain In See IE 0 0  0     Pain Out See IE 0 0 0    Does note soreness in shoulders, no pain  Manual         LE streth upcoming                   Exercise Diary  Vitals 90/48, 50  /55, HR 66, 93%  110/56, HR 70   BP  100-120/54-60 /74    Aerobic Nustep L4  10 mins Nustep L4  10 mins Nustep L4  10 mins Nustep L4  10 mins    SLR x 3   Firm  1 HHA  2x10 ea dir   BL Foam  1 HHA  2x10 ea dir   BL    Mini squats   Firm  With functional reach low/high  2x10   Foam  2x15 w/ 1 seated rest  2 HHA  Cues    Clamshells   Green  2x10 BL Green  3x10 BL    90/90   2x10 BL 2x10 BL            Gait training  15 mins  3-ply  SBQC  Step through part to whole  VCs      Ambulation with obstacles    Fwd   3 obstacles  4x10 feet    Side step   3 obticles  4x 10 feet    Fwd with dynamic reach floor/overhead  2x 10 feet    CS/CGA  Cues                    Stair training   3x 1 HR w/ NBQC  Step over step CS/CGA  cues 3x   2 HR w/ cues             Floor transfers  30 mins chair->floor on red mat, initially mod A with VCs and last attempt cl S Held increase BL shoulder soreness             Dynamic balance  Foam surface          Modalities

## 2020-02-26 ENCOUNTER — OFFICE VISIT (OUTPATIENT)
Dept: PHYSICAL THERAPY | Facility: CLINIC | Age: 85
End: 2020-02-26
Payer: COMMERCIAL

## 2020-02-26 DIAGNOSIS — R53.1 WEAKNESS GENERALIZED: Primary | ICD-10-CM

## 2020-02-26 DIAGNOSIS — R26.9 ABNORMALITY OF GAIT: ICD-10-CM

## 2020-02-26 PROCEDURE — 97112 NEUROMUSCULAR REEDUCATION: CPT | Performed by: PHYSICAL THERAPIST

## 2020-02-26 PROCEDURE — 97116 GAIT TRAINING THERAPY: CPT | Performed by: PHYSICAL THERAPIST

## 2020-02-26 PROCEDURE — 97110 THERAPEUTIC EXERCISES: CPT | Performed by: PHYSICAL THERAPIST

## 2020-02-26 PROCEDURE — 97530 THERAPEUTIC ACTIVITIES: CPT | Performed by: PHYSICAL THERAPIST

## 2020-02-26 NOTE — PROGRESS NOTES
PT Discharge    Today's date: 2020  Patient name: Mario Kerr  : 1933  MRN: [id number]  Referring provider: Alisia Favre, MD  Dx:   Encounter Diagnosis     ICD-10-CM    1  Weakness generalized R53 1    2  Abnormality of gait R26 9                   Assessment  Assessment details: Patient presents to OPPT, patient subjective goals met  He reports no further falls  Notes he is more confident with rising from floor  Patient requests discharge to HEP at this point  WIll call clinic with further questions or concerns  At time of discharge, patient returned to ambulation independently, no falls  Feels comfortable and requests discharge to HEP  Of note:  Discussed s/s of depression with patient and wife  Encouraged patient to discuss with PCP  Denies any immediate signs  Impairments: abnormal coordination, abnormal gait, abnormal or restricted ROM, activity intolerance, impaired balance, impaired physical strength, lacks appropriate home exercise program, safety issue and poor posture   Other impairment: Hypotensive at time of evaluation - resolved  Understanding of Dx/Px/POC: good   Prognosis: good    Goals  In 4 weeks, patient will:  1  Demonstrate ability to perform 30 minutes of activity without requiring seated rest break- MET on "good days'  2  Demonstrate ability to maintain upright balance unsupported by UEs while reaching above shoulder height without resistance to promote stability with ADLs - Met  3  Demonstrate ability to lift up to 10 lbs from  knees to waist unsupported by UEs to promote stability with ADLs - MET to 10 5 lbs  4  Demonstrate increased strength of bilateral LEs to allow for improved transfer quality and stability - MET    In 4-10 weeks, patient will:  1  Demonstrate reduced fall risk based on outcome measures - MET  2  Demonstrate consistent carryover with HEP - MET  3    Return to community ambulation with least restrictive AD for at least 300+ feet - MET      Plan  Duration in visits: 8  Duration in weeks: 4  Plan of Care beginning date: 2/12/2020  Plan of Care expiration date: 3/13/2020  Treatment plan discussed with: patient        Subjective Evaluation    History of Present Illness  Mechanism of injury: UPDATE:  Patient reports no falls recently  Feels like he has a good handle on thing  Practiced rising from floor, no issues noted at home  Wife reports he is doing much better  Back to normal     Patient reports he had a "piece of bone" coming out of his leg, had it removed  Additionally has had several falls since receiving his "new leg"  Patient presents today with spouse with concerns over ongoing fall risk and unsteadiness with walking using AD      PMHx:  Left BKA  Quality of life: fair    Pain  No pain reported  Progression: worsening    Social Support  Lives with: spouse    Treatments  Previous treatment: physical therapy  Current treatment: physical therapy  Patient Goals  Patient goals for therapy: increased strength, independence with ADLs/IADLs, return to sport/leisure activities and improved balance          Objective     Strength/Myotome Testing     Left Shoulder     Planes of Motion   Flexion: 4   Abduction: 4     Right Shoulder     Planes of Motion   Flexion: 4   Abduction: 4     Left Elbow   Flexion: 4  Extension: 4    Right Elbow   Flexion: 4  Extension: 4    Left Wrist/Hand   Wrist extension: 4  Wrist flexion: 4    Right Wrist/Hand   Wrist extension: 4  Wrist flexion: 4    Left Hip   Planes of Motion   Flexion: 4-  Extension: 3  Abduction: 4-    Right Hip   Planes of Motion   Flexion: 4  Extension: 3  Abduction: 4    Left Knee   Flexion: 3+  Extension: 3+    Right Knee   Flexion: 4  Extension: 4    Right Ankle/Foot   Dorsiflexion: 4  Plantar flexion: 4    Ambulation   Weight-Bearing Status   Assistive device used: cane    Ambulation: Stairs   Able to perform: yeswith prosthesis: yes  Ascending: reciprocal  Ascend stairs: independent  Railings: 1  Descending: reciprocal  Descend stairs: independent  Railings: 1  Neuro Exam:     Positional testing   Positional testing comment: BP initially 90/48, 50  /55, 66, 93%    Sensation   Light touch LE: right impaired  Light touch LE: left WNL  Proprioception LE: left WNL and right WNL    Coordination   Finger to nose: left WNL and right WNL    Transfers Sit to stand: independent   WC to bed: independentWheelchair to mat: independent Mat to wheelchair: independent Sit to supine: independent Supine to sit: independent   Roll: independent    Functional outcomes   2 minute walk test: >500 feet without SBQC  TU seconds (seconds)  Functional outcome comment: AmPPro=33/47 (K3) - not repeated at discharge    Amputee   Level of amputation: left   Amputation comments: Madison Scale of prosthetic use in people with LE amputations => Total score = 9    H/o right heel debridement requiring NWBing right LE    Phantom pain: phantom pain  Gait comment: Step to pattern initially  Patient with increased movement in socket, requires extra ply, however, does not have one with him, did not wear to eval     Increased toe out with heel strike to foot flat, increased dependence on SBQC  Decreased hip extension  At re-eval  Patient ambulates with [employer] [place], step through gait  Ambulates with cl supervision/CGA with ambulation, step through gait without AD        Balance assessments   MCTSIB   Eyes open level surface: 30  Eyes open foam surface: 30  Eyes closed level surface: 0  Eyes closed foam surface: 0  Single leg stance   Left eyes open firm surface: 0  Left eyes closed firm surface: 0  Right eyes open firm surface: 0  Right eyes closed firm surface: 0      Flowsheet Rows      Most Recent Value   PT/OT G-Codes   Current Score  79   Projected Score  54             Precautions: Falls, h/o right heel ulceration  Re-eval Date: 3/11/2020    Date    Visit Count 1 2 3 4 5   FOTO See IE completed   Pain In See IE 0 0  0  0   Pain Out See IE 0 0 0 0   Does note soreness in shoulders, no pain  Manual         LE streth upcoming                   Exercise Diary  Vitals 90/48, 50  /55, HR 66, 93%  110/56, HR 70  BP  100-120/54-60 /74 /70   Aerobic Nustep L4  10 mins Nustep L4  10 mins Nustep L4  10 mins Nustep L4  10 mins Nustep L6  5 mins      SLR x 3   Firm  1 HHA  2x10 ea dir   BL Foam  1 HHA  2x10 ea dir   BL    Mini squats   Firm  With functional reach low/high  2x10   Foam  2x15 w/ 1 seated rest  2 HHA  Cues    Clamshells   Green  2x10 BL Green  3x10 BL    90/90   2x10 BL 2x10 BL            Gait training  15 mins  3-ply  SBQC  Step through part to whole  VCs   40 mins with and without AD, negotiation of stairs, obstacle course, inclines, declines, foam surfaces  Squat, lift, carry 10 lbs with bilateral UEs  CGA/cl supervision with squatting to floor for crate lifts      Ambulation with obstacles    Fwd   3 obstacles  4x10 feet    Side step   3 obticles  4x 10 feet    Fwd with dynamic reach floor/overhead  2x 10 feet    CS/CGA  Cues                    Stair training   3x 1 HR w/ NBQC  Step over step CS/CGA  cues 3x   2 HR w/ cues             Floor transfers  30 mins chair->floor on red mat, initially mod A with VCs and last attempt cl S Held increase BL shoulder soreness             Dynamic balance  Foam surface          Modalities

## 2020-04-03 DIAGNOSIS — E78.5 HYPERLIPIDEMIA, UNSPECIFIED HYPERLIPIDEMIA TYPE: ICD-10-CM

## 2020-04-03 RX ORDER — SIMVASTATIN 20 MG
TABLET ORAL
Qty: 90 TABLET | Refills: 0 | Status: SHIPPED | OUTPATIENT
Start: 2020-04-03 | End: 2020-07-15

## 2020-05-07 ENCOUNTER — TELEMEDICINE (OUTPATIENT)
Dept: FAMILY MEDICINE CLINIC | Facility: CLINIC | Age: 85
End: 2020-05-07
Payer: COMMERCIAL

## 2020-05-07 VITALS
WEIGHT: 135 LBS | HEIGHT: 70 IN | SYSTOLIC BLOOD PRESSURE: 127 MMHG | BODY MASS INDEX: 19.33 KG/M2 | DIASTOLIC BLOOD PRESSURE: 74 MMHG
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
## 2020-05-07 DIAGNOSIS — Z00.00 MEDICARE ANNUAL WELLNESS VISIT, SUBSEQUENT: Primary | ICD-10-CM
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
## 2020-05-07 PROCEDURE — G0439 PPPS, SUBSEQ VISIT: HCPCS | Performed by: FAMILY MEDICINE

## 2020-05-07 PROCEDURE — 99214 OFFICE O/P EST MOD 30 MIN: CPT | Performed by: FAMILY MEDICINE

## 2020-07-15 DIAGNOSIS — E78.5 HYPERLIPIDEMIA, UNSPECIFIED HYPERLIPIDEMIA TYPE: ICD-10-CM

## 2020-07-15 RX ORDER — SIMVASTATIN 20 MG
TABLET ORAL
Qty: 90 TABLET | Refills: 0 | Status: SHIPPED | OUTPATIENT
Start: 2020-07-15 | End: 2020-10-16

## 2020-07-27 ENCOUNTER — APPOINTMENT (OUTPATIENT)
Dept: RADIOLOGY | Facility: CLINIC | Age: 85
End: 2020-07-27
Payer: COMMERCIAL

## 2020-07-27 ENCOUNTER — APPOINTMENT (OUTPATIENT)
Dept: LAB | Facility: CLINIC | Age: 85
End: 2020-07-27
Payer: COMMERCIAL

## 2020-07-27 ENCOUNTER — OFFICE VISIT (OUTPATIENT)
Dept: FAMILY MEDICINE CLINIC | Facility: CLINIC | Age: 85
End: 2020-07-27
Payer: COMMERCIAL

## 2020-07-27 VITALS
OXYGEN SATURATION: 96 % | DIASTOLIC BLOOD PRESSURE: 60 MMHG | TEMPERATURE: 97.2 F | SYSTOLIC BLOOD PRESSURE: 108 MMHG | WEIGHT: 133 LBS | BODY MASS INDEX: 19.04 KG/M2 | HEIGHT: 70 IN | HEART RATE: 92 BPM

## 2020-07-27 DIAGNOSIS — R26.2 AMBULATORY DYSFUNCTION: ICD-10-CM

## 2020-07-27 DIAGNOSIS — E55.9 VITAMIN D DEFICIENCY: ICD-10-CM

## 2020-07-27 DIAGNOSIS — R53.81 PHYSICAL DECONDITIONING: ICD-10-CM

## 2020-07-27 DIAGNOSIS — E78.5 HYPERLIPIDEMIA, UNSPECIFIED HYPERLIPIDEMIA TYPE: ICD-10-CM

## 2020-07-27 DIAGNOSIS — R06.02 SHORTNESS OF BREATH: ICD-10-CM

## 2020-07-27 DIAGNOSIS — D64.9 ANEMIA, UNSPECIFIED TYPE: ICD-10-CM

## 2020-07-27 DIAGNOSIS — Z12.5 SCREENING FOR PROSTATE CANCER: ICD-10-CM

## 2020-07-27 DIAGNOSIS — R73.01 IMPAIRED FASTING GLUCOSE: ICD-10-CM

## 2020-07-27 DIAGNOSIS — R11.2 NAUSEA AND VOMITING, INTRACTABILITY OF VOMITING NOT SPECIFIED, UNSPECIFIED VOMITING TYPE: ICD-10-CM

## 2020-07-27 DIAGNOSIS — R63.4 UNINTENTIONAL WEIGHT LOSS: ICD-10-CM

## 2020-07-27 DIAGNOSIS — R53.83 FATIGUE, UNSPECIFIED TYPE: Primary | ICD-10-CM

## 2020-07-27 DIAGNOSIS — R26.81 UNSTABLE GAIT: ICD-10-CM

## 2020-07-27 PROBLEM — R35.1 NOCTURIA: Status: RESOLVED | Noted: 2018-11-23 | Resolved: 2020-07-27

## 2020-07-27 PROBLEM — R05.9 COUGH: Status: RESOLVED | Noted: 2019-01-29 | Resolved: 2020-07-27

## 2020-07-27 PROBLEM — R05.3 CHRONIC COUGH: Status: RESOLVED | Noted: 2019-09-16 | Resolved: 2020-07-27

## 2020-07-27 LAB
BASOPHILS # BLD AUTO: 0.03 THOUSANDS/ΜL (ref 0–0.1)
BASOPHILS NFR BLD AUTO: 0 % (ref 0–1)
EOSINOPHIL # BLD AUTO: 0.04 THOUSAND/ΜL (ref 0–0.61)
EOSINOPHIL NFR BLD AUTO: 0 % (ref 0–6)
ERYTHROCYTE [DISTWIDTH] IN BLOOD BY AUTOMATED COUNT: 14 % (ref 11.6–15.1)
HCT VFR BLD AUTO: 40.6 % (ref 36.5–49.3)
HGB BLD-MCNC: 12.6 G/DL (ref 12–17)
IMM GRANULOCYTES # BLD AUTO: 0.08 THOUSAND/UL (ref 0–0.2)
IMM GRANULOCYTES NFR BLD AUTO: 0 % (ref 0–2)
LYMPHOCYTES # BLD AUTO: 3.3 THOUSANDS/ΜL (ref 0.6–4.47)
LYMPHOCYTES NFR BLD AUTO: 17 % (ref 14–44)
MCH RBC QN AUTO: 30.6 PG (ref 26.8–34.3)
MCHC RBC AUTO-ENTMCNC: 31 G/DL (ref 31.4–37.4)
MCV RBC AUTO: 99 FL (ref 82–98)
MONOCYTES # BLD AUTO: 0.97 THOUSAND/ΜL (ref 0.17–1.22)
MONOCYTES NFR BLD AUTO: 5 % (ref 4–12)
NEUTROPHILS # BLD AUTO: 14.62 THOUSANDS/ΜL (ref 1.85–7.62)
NEUTS SEG NFR BLD AUTO: 78 % (ref 43–75)
NRBC BLD AUTO-RTO: 0 /100 WBCS
PLATELET # BLD AUTO: 231 THOUSANDS/UL (ref 149–390)
PMV BLD AUTO: 11.2 FL (ref 8.9–12.7)
RBC # BLD AUTO: 4.12 MILLION/UL (ref 3.88–5.62)
WBC # BLD AUTO: 19.04 THOUSAND/UL (ref 4.31–10.16)

## 2020-07-27 PROCEDURE — 3008F BODY MASS INDEX DOCD: CPT | Performed by: FAMILY MEDICINE

## 2020-07-27 PROCEDURE — 83880 ASSAY OF NATRIURETIC PEPTIDE: CPT

## 2020-07-27 PROCEDURE — 93000 ELECTROCARDIOGRAM COMPLETE: CPT | Performed by: FAMILY MEDICINE

## 2020-07-27 PROCEDURE — 85025 COMPLETE CBC W/AUTO DIFF WBC: CPT

## 2020-07-27 PROCEDURE — 83690 ASSAY OF LIPASE: CPT

## 2020-07-27 PROCEDURE — 4040F PNEUMOC VAC/ADMIN/RCVD: CPT | Performed by: FAMILY MEDICINE

## 2020-07-27 PROCEDURE — 71046 X-RAY EXAM CHEST 2 VIEWS: CPT

## 2020-07-27 PROCEDURE — 1160F RVW MEDS BY RX/DR IN RCRD: CPT | Performed by: FAMILY MEDICINE

## 2020-07-27 PROCEDURE — 36415 COLL VENOUS BLD VENIPUNCTURE: CPT

## 2020-07-27 PROCEDURE — G0103 PSA SCREENING: HCPCS

## 2020-07-27 PROCEDURE — 80061 LIPID PANEL: CPT

## 2020-07-27 PROCEDURE — 82607 VITAMIN B-12: CPT

## 2020-07-27 PROCEDURE — 84443 ASSAY THYROID STIM HORMONE: CPT

## 2020-07-27 PROCEDURE — 80053 COMPREHEN METABOLIC PANEL: CPT

## 2020-07-27 PROCEDURE — 81001 URINALYSIS AUTO W/SCOPE: CPT | Performed by: FAMILY MEDICINE

## 2020-07-27 PROCEDURE — 83540 ASSAY OF IRON: CPT

## 2020-07-27 PROCEDURE — 1036F TOBACCO NON-USER: CPT | Performed by: FAMILY MEDICINE

## 2020-07-27 PROCEDURE — 99214 OFFICE O/P EST MOD 30 MIN: CPT | Performed by: FAMILY MEDICINE

## 2020-07-27 NOTE — PROGRESS NOTES
Assessment/Plan:    No problem-specific Assessment & Plan notes found for this encounter  Diagnoses and all orders for this visit:    Fatigue, unspecified type  -     Urinalysis with microscopic  -     POCT ECG, sinus rhytm, normal heart rate    Hyperlipidemia, unspecified hyperlipidemia type  -     Lipid panel; Future    Vitamin D deficiency    Physical deconditioning    Ambulatory dysfunction  -     TSH, 3rd generation; Future  -     POCT ECG    Impaired fasting glucose  -     Comprehensive metabolic panel; Future    Anemia, unspecified type  -     CBC and differential; Future  -     TSH, 3rd generation; Future  -     Vitamin B12; Future  -     Iron; Future    Shortness of breath  -     XR chest pa & lateral; Future  -     NT-BNP PRO; Future  -     POCT ECG    Nausea and vomiting, intractability of vomiting not specified, unspecified vomiting type  -     Lipase; Future    Screening for prostate cancer  -     PSA, Total Screen; Future    Unintentional weight loss    Unstable gait     Follow up in 2-3 weeks     Subjective:      Patient ID: Stella Hopson is a 80 y o  male  Fatigue  Patient complains of fatigue  Symptoms began several weeks ago  The patient feels the fatigue began with: a significant change in weight and feeling tired all the time unstable on his feet and also nauseaiys  Symptoms of his fatigue have been change in appetite, fatigue with paradoxical insomnia and general malaise  Patient describes the following psychological symptoms: none  Patient denies change in hair texture, cold intolerance, constipation, fever and GI blood loss  Symptoms have gradually worsened  Symptom severity: struggles to carry out day to day responsibilities    Previous visits for this problem: none           The following portions of the patient's history were reviewed and updated as appropriate:   He  has a past medical history of Anemia, Arthritis, Atherosclerosis of artery of extremity with ulceration ([address] ) (3/27/2019), Bifascicular block, Cellulitis of chest wall, Chronic kidney disease, Cough, Dupuytren contracture, DVT femoral (deep venous thrombosis) with thrombophlebitis, right (HCC), Elevated glucose, Hemothorax, Hyperlipidemia, Interstitial lung disease ([address] ), Phantom pain after amputation of lower extremity ([address] ) (3/27/2019), SOB (shortness of breath), 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 (2/19/2019), and Vascular disease  He   Patient Active Problem List    Diagnosis Date Noted    Fatigue 07/27/2020    Fall at home 05/07/2020    Unintentional weight loss 09/16/2019    Encounter for immunization 09/16/2019    Medicare annual wellness visit, subsequent 04/29/2019    Atherosclerosis of artery of extremity with ulceration ([facility] ) 03/27/2019    Phantom pain after amputation of lower extremity ([facility] ) 03/27/2019    Severe protein-calorie malnutrition ([address] ) 03/05/2019    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 02/19/2019    Carpal tunnel syndrome 01/29/2019    Decubitus ulcer of right heel, unstageable ([address] ) 01/28/2019    Ambulatory dysfunction 10/29/2018    Physical deconditioning 10/29/2018    CKD (chronic kidney disease), stage III ([address] ) 03/05/2018    Severe peripheral arterial disease ([address] ) 02/13/2018    Bifascicular block 10/31/2017    Chronic fatigue 10/31/2017    Vitamin D deficiency 10/25/2017    Anemia 10/15/2017    Unstable gait 62/86/0223    Lichen simplex chronicus 07/10/2017    Seborrheic keratosis 07/10/2017    Change in multiple pigmented skin lesions 05/16/2017    Cold intolerance 05/04/2017    Impaired fasting glucose 03/07/2017    Hyperlipidemia 07/14/2016    Chronic lower back pain 01/14/2016    Generalized osteoarthritis 01/14/2016    Herniated lumbar intervertebral disc 01/14/2016    Rotator cuff tear arthropathy, right 01/14/2016     He  has a past surgical history that includes Colonoscopy;  Cataract extraction; Fracture surgery; Knee arthroscopy; Amputation (1972); Carpal tunnel release (2016); pr slctv cathj 3rd+ ord slctv abdl pel/lxtr brnch (Left, 3/9/2018); pr slctv cathj 3rd+ ord slctv abdl pel/lxtr brnch (Left, 10/26/2018); IR lower extremity / intervention (10/26/2018); Eye surgery; Hand surgery; NERVE BLOCK; Thoracentesis; and pr amputation low leg thru tib/fib (Left, 2/1/2019)  His family history includes Aneurysm in his father; Brain cancer in his father and mother; Diabetes in his father and maternal grandmother; Lung cancer in his father and mother; Stroke in his father  He  reports that he has quit smoking  His smoking use included cigarettes  He has a 30 00 pack-year smoking history  He has never used smokeless tobacco  He reports that he does not drink alcohol or use drugs  Current Outpatient Medications   Medication Sig Dispense Refill    aspirin 81 MG tablet Take by mouth      Cholecalciferol (VITAMIN D) 2000 units CAPS Take by mouth      CYANOCOBALAMIN PO cyanocobalamin (vitamin B-12)      ferrous sulfate 325 (65 Fe) mg tablet Take 1 tablet (325 mg total) by mouth 2 (two) times a day 90 tablet 2    Omega-3 Fatty Acids (FISH OIL) 645 MG CAPS Take by mouth      simvastatin (ZOCOR) 20 mg tablet take 1 tablet by mouth once daily 90 tablet 0    acetaminophen (TYLENOL) 325 mg tablet Take 2 tablets (650 mg total) by mouth every 6 (six) hours as needed for mild pain (Patient not taking: Reported on 5/7/2020) 30 tablet 0     No current facility-administered medications for this visit        Current Outpatient Medications on File Prior to Visit   Medication Sig    aspirin 81 MG tablet Take by mouth    Cholecalciferol (VITAMIN D) 2000 units CAPS Take by mouth    CYANOCOBALAMIN PO cyanocobalamin (vitamin B-12)    ferrous sulfate 325 (65 Fe) mg tablet Take 1 tablet (325 mg total) by mouth 2 (two) times a day    Omega-3 Fatty Acids (FISH OIL) 645 MG CAPS Take by mouth    simvastatin (ZOCOR) 20 mg tablet take 1 tablet by mouth once daily    acetaminophen (TYLENOL) 325 mg tablet Take 2 tablets (650 mg total) by mouth every 6 (six) hours as needed for mild pain (Patient not taking: Reported on 5/7/2020)     No current facility-administered medications on file prior to visit  He has No Known Allergies       Review of Systems   Constitutional: Positive for fatigue  Negative for activity change, appetite change and fever  HENT: Negative for congestion and ear discharge  Respiratory: Positive for shortness of breath  Negative for cough  Cardiovascular: Negative for chest pain and palpitations  Gastrointestinal: Positive for nausea and vomiting  Negative for diarrhea  Musculoskeletal: Negative for arthralgias and back pain  Skin: Negative for color change and rash  Neurological: Negative for dizziness and headaches  Psychiatric/Behavioral: Negative for agitation and behavioral problems  Objective:      /60   Pulse 92   Temp (!) 97 2 °F (36 2 °C)   Ht 5' 10" (1 778 m)   Wt 60 3 kg (133 lb)   SpO2 96%   BMI 19 08 kg/m²          Physical Exam   Constitutional: He is oriented to person, place, and time  He appears well-developed and well-nourished  No distress  Eyes: Pupils are equal, round, and reactive to light  No scleral icterus  Cardiovascular: Normal rate, regular rhythm and normal heart sounds  No murmur heard  Pulmonary/Chest: Effort normal and breath sounds normal  No respiratory distress  He has no wheezes  Abdominal: Soft  Bowel sounds are normal  He exhibits no distension  There is no tenderness  Musculoskeletal:   Unstable gait   Neurological: He is alert and oriented to person, place, and time  Skin: Skin is warm and dry  No rash noted  He is not diaphoretic  Psychiatric: He has a normal mood and affect

## 2020-07-28 DIAGNOSIS — R80.9 PROTEINURIA, UNSPECIFIED TYPE: ICD-10-CM

## 2020-07-28 DIAGNOSIS — R06.02 SHORTNESS OF BREATH: ICD-10-CM

## 2020-07-28 DIAGNOSIS — R79.89 ELEVATED BRAIN NATRIURETIC PEPTIDE (BNP) LEVEL: ICD-10-CM

## 2020-07-28 DIAGNOSIS — D72.829 LEUKOCYTOSIS, UNSPECIFIED TYPE: Primary | ICD-10-CM

## 2020-07-28 LAB
ALBUMIN SERPL BCP-MCNC: 3.6 G/DL (ref 3.5–5)
ALP SERPL-CCNC: 53 U/L (ref 46–116)
ALT SERPL W P-5'-P-CCNC: 17 U/L (ref 12–78)
ANION GAP SERPL CALCULATED.3IONS-SCNC: 12 MMOL/L (ref 4–13)
AST SERPL W P-5'-P-CCNC: 15 U/L (ref 5–45)
BACTERIA UR QL AUTO: ABNORMAL /HPF
BILIRUB SERPL-MCNC: 1.22 MG/DL (ref 0.2–1)
BILIRUB UR QL STRIP: ABNORMAL
BUN SERPL-MCNC: 21 MG/DL (ref 5–25)
CALCIUM SERPL-MCNC: 9.5 MG/DL (ref 8.3–10.1)
CHLORIDE SERPL-SCNC: 102 MMOL/L (ref 100–108)
CHOLEST SERPL-MCNC: 180 MG/DL (ref 50–200)
CLARITY UR: CLEAR
CO2 SERPL-SCNC: 26 MMOL/L (ref 21–32)
COLOR UR: ABNORMAL
CREAT SERPL-MCNC: 1.31 MG/DL (ref 0.6–1.3)
GFR SERPL CREATININE-BSD FRML MDRD: 49 ML/MIN/1.73SQ M
GLUCOSE SERPL-MCNC: 96 MG/DL (ref 65–140)
GLUCOSE UR STRIP-MCNC: NEGATIVE MG/DL
HDLC SERPL-MCNC: 96 MG/DL
HGB UR QL STRIP.AUTO: NEGATIVE
HYALINE CASTS #/AREA URNS LPF: ABNORMAL /LPF
IRON SERPL-MCNC: 20 UG/DL (ref 65–175)
KETONES UR STRIP-MCNC: NEGATIVE MG/DL
LDLC SERPL CALC-MCNC: 71 MG/DL (ref 0–100)
LEUKOCYTE ESTERASE UR QL STRIP: NEGATIVE
LIPASE SERPL-CCNC: 74 U/L (ref 73–393)
NITRITE UR QL STRIP: NEGATIVE
NON-SQ EPI CELLS URNS QL MICRO: ABNORMAL /HPF
NONHDLC SERPL-MCNC: 84 MG/DL
NT-PROBNP SERPL-MCNC: 990 PG/ML
PH UR STRIP.AUTO: 6 [PH]
POTASSIUM SERPL-SCNC: 4 MMOL/L (ref 3.5–5.3)
PROT SERPL-MCNC: 8.9 G/DL (ref 6.4–8.2)
PROT UR STRIP-MCNC: ABNORMAL MG/DL
PSA SERPL-MCNC: 0.4 NG/ML (ref 0–4)
RBC #/AREA URNS AUTO: ABNORMAL /HPF
SODIUM SERPL-SCNC: 140 MMOL/L (ref 136–145)
SP GR UR STRIP.AUTO: 1.02 (ref 1–1.03)
TRIGL SERPL-MCNC: 65 MG/DL
TSH SERPL DL<=0.05 MIU/L-ACNC: 0.96 UIU/ML (ref 0.36–3.74)
UROBILINOGEN UR QL STRIP.AUTO: 1 E.U./DL
VIT B12 SERPL-MCNC: 653 PG/ML (ref 100–900)
WBC #/AREA URNS AUTO: ABNORMAL /HPF

## 2020-07-30 ENCOUNTER — TELEPHONE (OUTPATIENT)
Dept: FAMILY MEDICINE CLINIC | Facility: CLINIC | Age: 85
End: 2020-07-30

## 2020-07-30 DIAGNOSIS — R06.02 SHORTNESS OF BREATH: Primary | ICD-10-CM

## 2020-07-30 DIAGNOSIS — R91.8 PULMONARY INFILTRATES: Primary | ICD-10-CM

## 2020-07-30 RX ORDER — LEVOFLOXACIN 500 MG/1
500 TABLET, FILM COATED ORAL EVERY 24 HOURS
Qty: 5 TABLET | Refills: 0 | Status: SHIPPED | OUTPATIENT
Start: 2020-07-30 | End: 2020-08-04

## 2020-07-30 NOTE — TELEPHONE ENCOUNTER
Wife called and they cannot get him in for the ct scan til august 23 rd  She is concerned about having an infection in his chest and wonders why he is not on anything

## 2020-08-05 ENCOUNTER — CONSULT (OUTPATIENT)
Dept: CARDIOLOGY CLINIC | Facility: CLINIC | Age: 85
End: 2020-08-05
Payer: COMMERCIAL

## 2020-08-05 VITALS
DIASTOLIC BLOOD PRESSURE: 60 MMHG | BODY MASS INDEX: 18.61 KG/M2 | WEIGHT: 130 LBS | SYSTOLIC BLOOD PRESSURE: 122 MMHG | HEIGHT: 70 IN | HEART RATE: 62 BPM

## 2020-08-05 DIAGNOSIS — E78.5 HYPERLIPIDEMIA, UNSPECIFIED HYPERLIPIDEMIA TYPE: ICD-10-CM

## 2020-08-05 DIAGNOSIS — I73.9 PERIPHERAL VASCULAR DISEASE (HCC): ICD-10-CM

## 2020-08-05 DIAGNOSIS — J84.9 INTERSTITIAL LUNG DISEASE (HCC): ICD-10-CM

## 2020-08-05 DIAGNOSIS — I44.0 1ST DEGREE AV BLOCK: ICD-10-CM

## 2020-08-05 DIAGNOSIS — R06.02 SHORTNESS OF BREATH: Primary | ICD-10-CM

## 2020-08-05 DIAGNOSIS — I45.2 BIFASCICULAR BLOCK: ICD-10-CM

## 2020-08-05 DIAGNOSIS — R79.89 ELEVATED BRAIN NATRIURETIC PEPTIDE (BNP) LEVEL: ICD-10-CM

## 2020-08-05 DIAGNOSIS — R53.83 FATIGUE: ICD-10-CM

## 2020-08-05 PROCEDURE — 3008F BODY MASS INDEX DOCD: CPT | Performed by: INTERNAL MEDICINE

## 2020-08-05 PROCEDURE — 1036F TOBACCO NON-USER: CPT | Performed by: INTERNAL MEDICINE

## 2020-08-05 PROCEDURE — 4040F PNEUMOC VAC/ADMIN/RCVD: CPT | Performed by: INTERNAL MEDICINE

## 2020-08-05 PROCEDURE — 99214 OFFICE O/P EST MOD 30 MIN: CPT | Performed by: INTERNAL MEDICINE

## 2020-08-05 PROCEDURE — 1160F RVW MEDS BY RX/DR IN RCRD: CPT | Performed by: INTERNAL MEDICINE

## 2020-08-05 NOTE — PROGRESS NOTES
[facility] [id number] [phone]                                              Cardiology Office Follow Up  Julia Lombardo, 80 y o  male  YOB: 1933  MRN: [id number] Encounter: [id number]      PCP - Benito Larry MD    Assessment  1  Shortness of breath  · Echo - 11/2017 - LVEF 55-65%, mild asymmetric septal hypertrophy, grade 2 diastolic dysfunction, mild MR, mild AI, trace TR  · Nuclear stress test - 11/2017 - LVEF 62%, no significant perfusion defects  2  Fatigue  3  Bifascicular block  4  Interstitial lung disease  1  PFT - 9/2019 - Severely reduced DLCO at 29%  2  CT chest October 2019 showed UIP pattern of interstitial lung disease, overall consistent with asbestosis  5  Peripheral vascular disease  6  Hyperlipidemia  7  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)  · S/p prosthesis        Plan  Shortness of breath, Fatigue  · 2/2 to interstitial lung disease v heart failure v deconditioning  · NT-ProBNP recently elevated at 990 on 7/27/2020, but no evidence of volume overload on clinical exam  · Overall, symptoms appear to be predominantly related to interstitial lung disease/COPD  · Prior CT chest from October 2019 suggestive of UIP pattern of interstitial lung disease and has severely reduced DLCO on PFT    He was recommended therapy by pulmonologist, but but he did not feel than need for the same, and never actually took it  · Encouraged him to follow up Pulmonary for the same  · He does have multiple risk factors for CAD and heart failure  · Check echo, nuclear Lexiscan stress test to rule out any cardiac contribution to same  · No reported tick bites or rashes to suggest     Bifascicular block, 1st degree AV  · Personally reviewed recent ECG from 07/27/2020 - Normal sinus rhythm with first-degree AV block, bifascicular block  · Compared ECG with prior ECGs from February 2019, which showed similar bifascicular block and borderline first-degree AV block  His IN interval has increased over the last year, but is otherwise without any significant change  · No h/o syncope or dizziness  · No reported history of tick bite, rashes  · Possibly degenerative conduction disease  · Continue to monitor  · If has symptoms of dizziness, can consider ambulatory cardiac monitoring for further evaluation    Interstitial lung disease  · Has the CT chest pending  · Previously was prescribed inhalers, but he is not compliant with the same  · Recommend follow-up with pulmonary medicine    Peripheral vascular disease, hyperlipidemia  · Is status post left BK amputation  · On aspirin, atorvastatin    Orders Placed This Encounter   Procedures    NM myocardial perfusion spect (rx stress and/or rest)    Echo complete with contrast if indicated       No follow-ups on file  History of Present Illness   80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, who is a wood worker, comes in for evaluation of ongoing symptoms of shortness of breath and fatigue  He previously saw Dr Enmanuel Shields in October 2017, but has not followed up with anybody since then  Over the last year, he has had problems with peripheral vascular disease with his left lower extremity including leg pain, and has undergone atherectomy and angioplasty to multiple vessels in 2018  He subsequently eventually needed a below knee amputation of the left leg in 2/2019  Post this, he continued to have more problems with the try lower extremity, and underwent a right SFA angioplasty in March 2019  He has been recovering in rehab subsequently until a few months ago  Ever since then, he has continued to notice shortness of breath and fatigue  He has a left leg prosthesis placed, but he continues to limp and has some difficulty with ambulating long distances, particularly on uneven ground around his home  He does not report any complains of chest pain, palpitations, dizziness or lightheadedness    He was previously diagnosed with interstitial lung disease, and was recommended inhaler therapy, which he did not use, as he felt there was would be no benefit from the same  Recently, he had worsening of this fatigue and shortness of breath, and he saw his primary care physician, who repeated some blood tests, and was found to have a home mildly elevated NT proBNP level  As a result, he was referred back to see Cardiology for further evaluation      Historical Information   Past Medical History:   Diagnosis Date    Anemia     Arthritis     Atherosclerosis of artery of extremity with ulceration ([address] ) 3/27/2019    Bifascicular block     Cellulitis of chest wall     Chronic kidney disease     Cough     Dupuytren contracture     DVT femoral (deep venous thrombosis) with thrombophlebitis, right (HCC)     Elevated glucose     Hemothorax     Left    Hyperlipidemia     Interstitial lung disease [facility] [facility] )     Phantom pain after amputation of lower extremity ([facility] [address] [facility] ) 3/27/2019    SOB (shortness of breath)     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 2/19/2019    Vascular disease      Past Surgical History:   Procedure Laterality Date    AMPUTATION  1972    L thumb    CARPAL TUNNEL RELEASE  2016    L hand    CATARACT EXTRACTION      COLONOSCOPY      EYE SURGERY      FRACTURE SURGERY      HAND SURGERY      IR LOWER EXTREMITY / INTERVENTION  10/26/2018    KNEE ARTHROSCOPY      NERVE BLOCK      HI AMPUTATION LOW LEG THRU TIB/FIB Left 2/1/2019    Procedure: AMPUTATION BELOW KNEE (BKA);   Surgeon: Samara Feliz MD;  [facility]: [facility];  Service: Vascular    HI Tiffanie Barboza 3RD+ ORD SLCTV ABDL PEL/LXTR [address] [facility] Left 3/9/2018    Procedure: LEFT LOWER EXTREMITY ARTERIOGRAM WITH PTA OF LEFT POPLITEAL ARTERY;  Surgeon: Yvon Preston MD;  [facility];  Service: Vascular    HI Tiffanie Barboza 3RD+ ORD [address] PEL/LXTR [address] [facility] Left 10/26/2018    Procedure: ARTERIOGRAM, angioplasty stent and atherectomy;  Surgeon: Caio Meraz MD;  [facility]: [facility];  Service: Vascular    THORACENTESIS       Family History   Problem Relation Age of Onset    Lung cancer Mother     Brain cancer Mother     Diabetes Father     Aneurysm Father     Stroke Father     Lung cancer Father     Brain cancer Father     Diabetes Maternal Grandmother      Current Outpatient Medications on File Prior to Visit   Medication Sig Dispense Refill    aspirin 81 MG tablet Take by mouth      Cholecalciferol (VITAMIN D) 2000 units CAPS Take by mouth      ferrous sulfate 325 (65 Fe) mg tablet Take 1 tablet (325 mg total) by mouth 2 (two) times a day 90 tablet 2    Omega-3 Fatty Acids (FISH OIL) 645 MG CAPS Take by mouth      simvastatin (ZOCOR) 20 mg tablet take 1 tablet by mouth once daily 90 tablet 0    VITAMIN E COMPLEX PO Take by mouth      acetaminophen (TYLENOL) 325 mg tablet Take 2 tablets (650 mg total) by mouth every 6 (six) hours as needed for mild pain (Patient not taking: Reported on 2020) 30 tablet 0    CYANOCOBALAMIN PO cyanocobalamin (vitamin B-12)      [] levofloxacin (LEVAQUIN) 500 mg tablet Take 1 tablet (500 mg total) by mouth every 24 hours for 5 days (Patient not taking: Reported on 2020) 5 tablet 0     No current facility-administered medications on file prior to visit        No Known Allergies  Social History     Socioeconomic History    Marital status: /Civil Union     Spouse name: None    Number of children: 3    Years of education: None    Highest education level: None   Occupational History    Occupation: retired   Social Needs    Financial resource strain: None    Food insecurity     Worry: None     Inability: None    Transportation needs     Medical: None     Non-medical: None   Tobacco Use    Smoking status: Former Smoker     Packs/day: 2 00     Years: 15 00     Pack years: 30 00     Types: Cigarettes    Smokeless tobacco: Never Used    Tobacco comment: quit 57 years ago   Substance and Sexual Activity    Alcohol use: No    Drug use: No    Sexual activity: Never   Lifestyle    Physical activity     Days per week: None     Minutes per session: None    Stress: None   Relationships    Social connections     Talks on phone: None     Gets together: None     Attends [employer] service: None     Active member of club or organization: None     Attends meetings of clubs or organizations: None     Relationship status: None    Intimate partner violence     Fear of current or ex partner: None     Emotionally abused: None     Physically abused: None     Forced sexual activity: None   Other Topics Concern    None   Social History Narrative    Previous         Review of Systems   All other systems reviewed and are negative  Vitals:  Vitals:    08/05/20 1242   BP: 122/60   Pulse: 62   Weight: 59 kg (130 lb)   Height: 5' 10" (1 778 m)     BMI - Body mass index is 18 65 kg/m²  Wt Readings from Last 7 Encounters:   08/05/20 59 kg (130 lb)   07/27/20 60 3 kg (133 lb)   05/07/20 61 2 kg (135 lb)   01/29/20 61 2 kg (135 lb)   01/28/20 61 2 kg (135 lb)   11/13/19 60 8 kg (134 lb)   10/07/19 60 3 kg (133 lb)       Physical Exam   Constitutional: He is oriented to person, place, and time  He appears well-developed  No distress  HENT:   Head: Normocephalic and atraumatic  Eyes: No scleral icterus  Neck: No JVD present  No muscular tenderness present  Cardiovascular: Normal rate, regular rhythm and normal heart sounds  Exam reveals no gallop and no friction rub  No murmur heard  Pulmonary/Chest: Effort normal and breath sounds normal  No respiratory distress  He has no wheezes  He has no rales  He exhibits no tenderness  Abdominal: Soft  He exhibits no distension  There is no abdominal tenderness  Musculoskeletal:         General: Deformity present  Right lower leg: No edema  Left lower leg: He exhibits deformity  No edema        Comments: Left BKA amputation noted with prosthesis in place   Neurological: He is alert and oriented to person, place, and time  Skin: Skin is warm  Psychiatric: His behavior is normal  Mood normal    Nursing note and vitals reviewed  Labs:  CBC:   Lab Results   Component Value Date    WBC 19 04 (H) 07/27/2020    RBC 4 12 07/27/2020    HGB 12 6 07/27/2020    HCT 40 6 07/27/2020    MCV 99 (H) 07/27/2020     07/27/2020    RDW 14 0 07/27/2020       CMP:   Lab Results   Component Value Date    K 4 0 07/27/2020     07/27/2020    CO2 26 07/27/2020    BUN 21 07/27/2020    CREATININE 1 31 (H) 07/27/2020    EGFR 49 07/27/2020    GLUCOSE 108 02/01/2019    CALCIUM 9 5 07/27/2020    AST 15 07/27/2020    ALT 17 07/27/2020    ALKPHOS 53 07/27/2020       Magnesium:  Lab Results   Component Value Date    MG 1 7 01/30/2019       Lipid Profile:   Lab Results   Component Value Date    HDL 96 07/27/2020    TRIG 65 07/27/2020    LDLCALC 71 07/27/2020       Thyroid Studies:   Lab Results   Component Value Date    [id number] 0 960 07/27/2020       No components found for: [place]    Lab Results   Component Value Date    INR 1 10 03/12/2019    INR 1 15 01/28/2019    INR 2 13 (H) 10/29/2018   5    Imaging: Xr Chest Pa & Lateral    Result Date: 7/30/2020  Narrative: CHEST INDICATION:   R06 02: Shortness of breath  COMPARISON:  September 16, 2019 EXAM PERFORMED/VIEWS:  XR CHEST PA & LATERAL Images: 2 FINDINGS: Heart and mediastinum remain unchanged Coarse reticular markings seen within the lung related to chronic interstitial lung disease Pleural calcification seen in the both apices mild increasing patchy density left lower lung as compared to previous study from September 16, 2019 may be due to progression of the interstitial lung disease Osseous structures appear within normal limits for patient age       Impression: Chronic interstitial lung disease Patchy density seen left lower lung, more pronounced from the previous study of September 16, 2019 may be due to progression of interstitial lung disease however superimposed acute infiltrates are difficult to exclude Correlate clinically  Follow-up with chest CT in 2-3 months can be considered The study was marked in EPIC for significant notification  Workstation performed: [id number]       Cardiac testing:   Results for orders placed during the hospital encounter of 17   Echo complete with contrast if indicated    Narrative [address] [facility]  [phone]    Transthoracic Echocardiogram  2D, M-mode, Doppler, and Color Doppler    Study date:  2017    Patient: Yolis Rogers  MR number: [id number]  Account number: [de-identified]  : 1933  Age: 80 years  Gender: Male  Status: Outpatient  [facility]: [facility]  Height: 69 in  Weight: 154 lb  BP: 144/ 60 mmHg    Indications: Dyspnea on Excertion  Diagnoses: R06 09 - Other forms of dyspnea    Sonographer:  Roper RCS  Interpreting Physician:  Jaleel Pickens MD  Primary Physician:  Deliah Dancer, MD  Referring Physician:  Jaleel Pickens MD  Group:  [facility]    SUMMARY    LEFT VENTRICLE:  Systolic function was normal  Ejection fraction was estimated in the range of 55 % to 65 %  There were no regional wall motion abnormalities  There was mild assymetrical hypertrophy  Features were consistent with a pseudonormal left ventricular filling pattern, with concomitant abnormal relaxation and increased filling pressure (grade 2 diastolic dysfunction)  MITRAL VALVE:  There was mild regurgitation  AORTIC VALVE:  The valve was trileaflet  Leaflets exhibited mild calcification and normal cuspal separation  There was mild regurgitation  TRICUSPID VALVE:  There was trace regurgitation  PULMONIC VALVE:  There was mild regurgitation  HISTORY: PRIOR HISTORY: Risk factors: medication-treated hypercholesterolemia      PROCEDURE: The study was performed in the [address] [place] [address]  This was a routine study  The transthoracic approach was used  The study included complete 2D imaging, M-mode, complete spectral Doppler, and color Doppler  The  heart rate was 56 bpm, at the start of the study  Image quality was adequate  LEFT VENTRICLE: Size was normal  Systolic function was normal  Ejection fraction was estimated in the range of 55 % to 65 %  There were no regional wall motion abnormalities  There was mild assymetrical hypertrophy  DOPPLER: Features were  consistent with a pseudonormal left ventricular filling pattern, with concomitant abnormal relaxation and increased filling pressure (grade 2 diastolic dysfunction)  RIGHT VENTRICLE: The size was normal  Systolic function was normal  Wall thickness was normal     LEFT ATRIUM: Size was normal     RIGHT ATRIUM: Size was normal     MITRAL VALVE: Valve structure was normal  There was normal leaflet separation  DOPPLER: The transmitral velocity was within the normal range  There was no evidence for stenosis  There was mild regurgitation  AORTIC VALVE: The valve was trileaflet  Leaflets exhibited mild calcification and normal cuspal separation  DOPPLER: Transaortic velocity was within the normal range  There was no evidence for stenosis  There was mild regurgitation  TRICUSPID VALVE: The valve structure was normal  There was normal leaflet separation  DOPPLER: The transtricuspid velocity was within the normal range  There was no evidence for stenosis  There was trace regurgitation  PULMONIC VALVE: Leaflets exhibited normal thickness, no calcification, and normal cuspal separation  DOPPLER: The transpulmonic velocity was within the normal range  There was mild regurgitation  PERICARDIUM: There was no pericardial effusion  The pericardium was normal in appearance  AORTA: The root exhibited normal size  SYSTEMIC VEINS: IVC: The inferior vena cava was normal in size and course  Respirophasic changes were normal     SYSTEM MEASUREMENT TABLES    Apical four chamber  4 chamber Left Atrium Volume Index; Planimetry; End Systole; Apical four chamber;: 21 73 cm2  Left Ventricular End Diastolic Volume; Method of Disks, Single Plane; Apical four chamber;: 86 4 ml  Left Ventricular End Systolic Volume; Method of Disks, Single Plane; Apical four chamber;: 58 8 ml  Right Atrium Systolic Area; Planimetry; End Systole; Apical four chamber;: 15 37 cm2  Right Ventricular Internal Diastolic Dimension; End Diastole; Apical four chamber;: 33 2 mm  TAPSE: 19 mm    Unspecified Scan Mode  Aortic Root Diameter; End Systole;: 35 9 mm  Left atrial diameter; End Diastole;: 36 2 mm  Cardiac Output; Teichholz; Systole;: 4 14 L/min  Interventricular Septum Diastolic Thickness; Teichholz; End Diastole;: 12 1 mm  Left Ventricle Internal End Diastolic Dimension; Teichholz;: 46 6 mm  Left Ventricle Internal Systolic Dimension; Teichholz; End Systole;: 26 2 mm  Left Ventricle Mass; Mass AVCube with Teichholz; End Diastole;: 178 g  Left Ventricle Posterior Wall Diastolic Thickness; Teichholz; End Diastole;: 9 3 mm  Left Ventricular Ejection Fraction; Teichholz;: 75 %  Left Ventricular End Diastolic Volume; Teichholz;: 100 3 ml  Left Ventricular End Systolic Volume; Teichholz;: 25 1 ml  Stroke volume; Osie Silence;: 75 2 ml  Mitral Valve Area; Area by Pressure Half-Time; Systole;: 2 44 cm2  Mitral Valve E to A Ratio; Systole;: 1 1    [facility] Accredited [facility]    Prepared and electronically signed by    Odalys Gonzalez MD  Signed [id number] 17:48:00       No results found for this or any previous visit  No results found for this or any previous visit  No results found for this or any previous visit

## 2020-08-07 ENCOUNTER — OFFICE VISIT (OUTPATIENT)
Dept: FAMILY MEDICINE CLINIC | Facility: CLINIC | Age: 85
End: 2020-08-07
Payer: COMMERCIAL

## 2020-08-07 VITALS
DIASTOLIC BLOOD PRESSURE: 68 MMHG | SYSTOLIC BLOOD PRESSURE: 134 MMHG | BODY MASS INDEX: 19.33 KG/M2 | OXYGEN SATURATION: 97 % | TEMPERATURE: 97.9 F | HEIGHT: 70 IN | HEART RATE: 60 BPM | WEIGHT: 135 LBS

## 2020-08-07 DIAGNOSIS — J84.9 INTERSTITIAL LUNG DISEASE (HCC): Primary | ICD-10-CM

## 2020-08-07 PROCEDURE — 4040F PNEUMOC VAC/ADMIN/RCVD: CPT | Performed by: FAMILY MEDICINE

## 2020-08-07 PROCEDURE — 1036F TOBACCO NON-USER: CPT | Performed by: FAMILY MEDICINE

## 2020-08-07 PROCEDURE — 3008F BODY MASS INDEX DOCD: CPT | Performed by: FAMILY MEDICINE

## 2020-08-07 PROCEDURE — 1160F RVW MEDS BY RX/DR IN RCRD: CPT | Performed by: FAMILY MEDICINE

## 2020-08-07 PROCEDURE — 99214 OFFICE O/P EST MOD 30 MIN: CPT | Performed by: FAMILY MEDICINE

## 2020-08-07 RX ORDER — FLUTICASONE FUROATE AND VILANTEROL 100; 25 UG/1; UG/1
1 POWDER RESPIRATORY (INHALATION) DAILY
Qty: 1 INHALER | Refills: 1 | Status: SHIPPED | OUTPATIENT
Start: 2020-08-07 | End: 2020-11-05 | Stop reason: ALTCHOICE

## 2020-08-07 RX ORDER — PREDNISONE 50 MG/1
50 TABLET ORAL DAILY
Qty: 5 TABLET | Refills: 0 | Status: SHIPPED | OUTPATIENT
Start: 2020-08-07 | End: 2020-08-12

## 2020-08-07 RX ORDER — ALBUTEROL SULFATE 90 UG/1
2 AEROSOL, METERED RESPIRATORY (INHALATION) EVERY 6 HOURS PRN
Qty: 1 INHALER | Refills: 5 | Status: SHIPPED | OUTPATIENT
Start: 2020-08-07 | End: 2021-01-01 | Stop reason: SDUPTHER

## 2020-08-07 NOTE — PROGRESS NOTES
Assessment/Plan:    No problem-specific Assessment & Plan notes found for this encounter  Diagnoses and all orders for this visit:    Interstitial lung disease ([address] )  After discussing risks and benefits of medication along with side effects will start the following:  -     albuterol (Ventolin HFA) 90 mcg/act inhaler; Inhale 2 puffs every 6 (six) hours as needed for wheezing  -     predniSONE 50 mg tablet; Take 1 tablet (50 mg total) by mouth daily for 5 days  -     fluticasone-vilanterol (BREO ELLIPTA) 100-25 mcg/inh inhaler; Inhale 1 puff daily Rinse mouth after use  He is not interested in doing a CT scan of the chest   He does not want to see a Pulmonologist either  He states he is tired of seeing some many doctors and doing some many tests  Follow up in 1 month or as needed        Subjective:      Patient ID: Chris Thakur is a 80 y o  male  Patient is here to follow up for fatigue and shortness of breath symptoms  He recently went to see cardiologist who explained to patient that his symptoms were most likely related to his lungs  He saw Dr Leonid Ragsdale pulmonologist on 11/2019 and 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  He wad offered prednisone, inhaled corticosteroids and rescue inhalers at that time but he refused  Also he was offered a medications for ILD called [facility] but he refused as well    He is tired to running around and seeing various specialist       The following portions of the patient's history were reviewed and updated as appropriate:   He  has a past medical history of Anemia, Arthritis, Atherosclerosis of artery of extremity with ulceration ([address] ) (3/27/2019), Bifascicular block, Cellulitis of chest wall, Chronic kidney disease, Cough, Dupuytren contracture, DVT femoral (deep venous thrombosis) with thrombophlebitis, right ([address] ), Elevated glucose, Hemothorax, Hyperlipidemia, Interstitial lung disease ([address] ), Phantom pain after amputation of lower extremity ([address] ) (3/27/2019), SOB (shortness of breath), 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 (2/19/2019), and Vascular disease  He   Patient Active Problem List    Diagnosis Date Noted    Interstitial lung disease ([address] ) 08/07/2020    Fatigue 07/27/2020    Fall at home 05/07/2020    Unintentional weight loss 09/16/2019    Encounter for immunization 09/16/2019    Medicare annual wellness visit, subsequent 04/29/2019    Atherosclerosis of artery of extremity with ulceration ([address] ) 03/27/2019    Phantom pain after amputation of lower extremity ([address] ) 03/27/2019    Severe protein-calorie malnutrition ([address] ) 03/05/2019    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 02/19/2019    Carpal tunnel syndrome 01/29/2019    Decubitus ulcer of right heel, unstageable ([address] ) 01/28/2019    Ambulatory dysfunction 10/29/2018    Physical deconditioning 10/29/2018    CKD (chronic kidney disease), stage III ([address] ) 03/05/2018    Severe peripheral arterial disease ([address] ) 02/13/2018    Bifascicular block 10/31/2017    Chronic fatigue 10/31/2017    Vitamin D deficiency 10/25/2017    Anemia 10/15/2017    Unstable gait 97/97/1421    Lichen simplex chronicus 07/10/2017    Seborrheic keratosis 07/10/2017    Change in multiple pigmented skin lesions 05/16/2017    Cold intolerance 05/04/2017    Impaired fasting glucose 03/07/2017    Hyperlipidemia 07/14/2016    Chronic lower back pain 01/14/2016    Generalized osteoarthritis 01/14/2016    Herniated lumbar intervertebral disc 01/14/2016    Rotator cuff tear arthropathy, right 01/14/2016     He  has a past surgical history that includes Colonoscopy; Cataract extraction; Fracture surgery; Knee arthroscopy; Amputation (1972); Carpal tunnel release (2016); pr slctv cathj 3rd+ ord slctv abdl pel/lxtr brnch (Left, 3/9/2018); pr slctv cathj 3rd+ ord slctv abdl pel/lxtr brnch (Left, 10/26/2018); IR lower extremity / intervention (10/26/2018);  Eye surgery; Hand surgery; NERVE BLOCK; Thoracentesis; and pr amputation low leg thru tib/fib (Left, 2/1/2019)  His family history includes Aneurysm in his father; Brain cancer in his father and mother; Diabetes in his father and maternal grandmother; Lung cancer in his father and mother; Stroke in his father  He  reports that he has quit smoking  His smoking use included cigarettes  He has a 30 00 pack-year smoking history  He has never used smokeless tobacco  He reports that he does not drink alcohol or use drugs  Current Outpatient Medications   Medication Sig Dispense Refill    aspirin 81 MG tablet Take by mouth      Cholecalciferol (VITAMIN D) 2000 units CAPS Take by mouth      CYANOCOBALAMIN PO cyanocobalamin (vitamin B-12)      ferrous sulfate 325 (65 Fe) mg tablet Take 1 tablet (325 mg total) by mouth 2 (two) times a day 90 tablet 2    Omega-3 Fatty Acids (FISH OIL) 645 MG CAPS Take by mouth      simvastatin (ZOCOR) 20 mg tablet take 1 tablet by mouth once daily 90 tablet 0    VITAMIN E COMPLEX PO Take by mouth      acetaminophen (TYLENOL) 325 mg tablet Take 2 tablets (650 mg total) by mouth every 6 (six) hours as needed for mild pain (Patient not taking: Reported on 5/7/2020) 30 tablet 0    albuterol (Ventolin HFA) 90 mcg/act inhaler Inhale 2 puffs every 6 (six) hours as needed for wheezing 1 Inhaler 5    fluticasone-vilanterol (BREO ELLIPTA) 100-25 mcg/inh inhaler Inhale 1 puff daily Rinse mouth after use  1 Inhaler 1    predniSONE 50 mg tablet Take 1 tablet (50 mg total) by mouth daily for 5 days 5 tablet 0     No current facility-administered medications for this visit        Current Outpatient Medications on File Prior to Visit   Medication Sig    aspirin 81 MG tablet Take by mouth    Cholecalciferol (VITAMIN D) 2000 units CAPS Take by mouth    CYANOCOBALAMIN PO cyanocobalamin (vitamin B-12)    ferrous sulfate 325 (65 Fe) mg tablet Take 1 tablet (325 mg total) by mouth 2 (two) times a day  Omega-3 Fatty Acids (FISH OIL) 645 MG CAPS Take by mouth    simvastatin (ZOCOR) 20 mg tablet take 1 tablet by mouth once daily    VITAMIN E COMPLEX PO Take by mouth    acetaminophen (TYLENOL) 325 mg tablet Take 2 tablets (650 mg total) by mouth every 6 (six) hours as needed for mild pain (Patient not taking: Reported on 5/7/2020)     No current facility-administered medications on file prior to visit  He has No Known Allergies       Review of Systems   Constitutional: Positive for fatigue  Negative for activity change, appetite change and fever  HENT: Negative for congestion and ear discharge  Respiratory: Positive for shortness of breath  Negative for cough  Cardiovascular: Negative for chest pain and palpitations  Gastrointestinal: Negative for diarrhea and nausea  Musculoskeletal: Negative for arthralgias and back pain  Skin: Negative for color change and rash  Neurological: Negative for dizziness and headaches  Psychiatric/Behavioral: Negative for agitation and behavioral problems  Objective:      /68   Pulse 60   Temp 97 9 °F (36 6 °C)   Ht 5' 10" (1 778 m)   Wt 61 2 kg (135 lb)   SpO2 97%   BMI 19 37 kg/m²          Physical Exam  Constitutional:       General: He is not in acute distress  Appearance: He is well-developed  He is not diaphoretic  Eyes:      General: No scleral icterus  Pupils: Pupils are equal, round, and reactive to light  Cardiovascular:      Rate and Rhythm: Normal rate and regular rhythm  Heart sounds: Normal heart sounds  No murmur  Pulmonary:      Effort: Pulmonary effort is normal  No respiratory distress  Breath sounds: Normal breath sounds  No wheezing  Abdominal:      General: Bowel sounds are normal  There is no distension  Palpations: Abdomen is soft  Tenderness: There is no abdominal tenderness  Skin:     General: Skin is warm and dry  Findings: No rash     Neurological:      Mental Status: He is alert and oriented to person, place, and time

## 2020-10-16 DIAGNOSIS — E78.5 HYPERLIPIDEMIA, UNSPECIFIED HYPERLIPIDEMIA TYPE: ICD-10-CM

## 2020-10-16 RX ORDER — SIMVASTATIN 20 MG
TABLET ORAL
Qty: 90 TABLET | Refills: 0 | Status: SHIPPED | OUTPATIENT
Start: 2020-10-16 | End: 2021-01-15

## 2020-11-05 ENCOUNTER — OFFICE VISIT (OUTPATIENT)
Dept: FAMILY MEDICINE CLINIC | Facility: CLINIC | Age: 85
End: 2020-11-05
Payer: COMMERCIAL

## 2020-11-05 VITALS
RESPIRATION RATE: 16 BRPM | HEIGHT: 70 IN | HEART RATE: 62 BPM | BODY MASS INDEX: 18.84 KG/M2 | SYSTOLIC BLOOD PRESSURE: 140 MMHG | OXYGEN SATURATION: 96 % | DIASTOLIC BLOOD PRESSURE: 62 MMHG | WEIGHT: 131.6 LBS | TEMPERATURE: 96.7 F

## 2020-11-05 DIAGNOSIS — J84.9 INTERSTITIAL LUNG DISEASE (HCC): ICD-10-CM

## 2020-11-05 DIAGNOSIS — D72.829 LEUKOCYTOSIS, UNSPECIFIED TYPE: ICD-10-CM

## 2020-11-05 DIAGNOSIS — R06.02 SHORTNESS OF BREATH: ICD-10-CM

## 2020-11-05 DIAGNOSIS — R80.9 PROTEINURIA, UNSPECIFIED TYPE: ICD-10-CM

## 2020-11-05 DIAGNOSIS — R91.8 PULMONARY INFILTRATES: ICD-10-CM

## 2020-11-05 DIAGNOSIS — R63.4 UNINTENTIONAL WEIGHT LOSS: Primary | ICD-10-CM

## 2020-11-05 PROCEDURE — 99214 OFFICE O/P EST MOD 30 MIN: CPT | Performed by: FAMILY MEDICINE

## 2020-11-05 RX ORDER — METHYLPREDNISOLONE 4 MG/1
TABLET ORAL
Qty: 21 EACH | Refills: 0 | Status: SHIPPED | OUTPATIENT
Start: 2020-11-05 | End: 2022-01-01 | Stop reason: ALTCHOICE

## 2020-11-05 RX ORDER — MIRTAZAPINE 7.5 MG/1
7.5 TABLET, FILM COATED ORAL
Qty: 30 TABLET | Refills: 5 | Status: SHIPPED | OUTPATIENT
Start: 2020-11-05 | End: 2020-11-13 | Stop reason: SDUPTHER

## 2020-11-05 RX ORDER — FLUTICASONE FUROATE AND VILANTEROL 200; 25 UG/1; UG/1
1 POWDER RESPIRATORY (INHALATION) DAILY
Qty: 1 INHALER | Refills: 2 | Status: SHIPPED | OUTPATIENT
Start: 2020-11-05 | End: 2022-01-01

## 2020-11-06 ENCOUNTER — APPOINTMENT (OUTPATIENT)
Dept: LAB | Facility: CLINIC | Age: 85
End: 2020-11-06
Payer: COMMERCIAL

## 2020-11-06 ENCOUNTER — APPOINTMENT (OUTPATIENT)
Dept: RADIOLOGY | Facility: CLINIC | Age: 85
End: 2020-11-06
Payer: COMMERCIAL

## 2020-11-06 DIAGNOSIS — R91.8 PULMONARY INFILTRATES: ICD-10-CM

## 2020-11-06 DIAGNOSIS — R06.02 SHORTNESS OF BREATH: ICD-10-CM

## 2020-11-06 DIAGNOSIS — R80.9 PROTEINURIA, UNSPECIFIED TYPE: ICD-10-CM

## 2020-11-06 DIAGNOSIS — R63.4 UNINTENTIONAL WEIGHT LOSS: ICD-10-CM

## 2020-11-06 DIAGNOSIS — D72.829 LEUKOCYTOSIS, UNSPECIFIED TYPE: ICD-10-CM

## 2020-11-06 LAB
ALBUMIN SERPL BCP-MCNC: 3.4 G/DL (ref 3.5–5)
ALP SERPL-CCNC: 59 U/L (ref 46–116)
ALT SERPL W P-5'-P-CCNC: 19 U/L (ref 12–78)
ANION GAP SERPL CALCULATED.3IONS-SCNC: 5 MMOL/L (ref 4–13)
AST SERPL W P-5'-P-CCNC: 20 U/L (ref 5–45)
BACTERIA UR QL AUTO: NORMAL /HPF
BASOPHILS # BLD AUTO: 0.06 THOUSANDS/ΜL (ref 0–0.1)
BASOPHILS NFR BLD AUTO: 1 % (ref 0–1)
BILIRUB SERPL-MCNC: 0.74 MG/DL (ref 0.2–1)
BILIRUB UR QL STRIP: NEGATIVE
BUN SERPL-MCNC: 11 MG/DL (ref 5–25)
CALCIUM ALBUM COR SERPL-MCNC: 10.2 MG/DL (ref 8.3–10.1)
CALCIUM SERPL-MCNC: 9.7 MG/DL (ref 8.3–10.1)
CHLORIDE SERPL-SCNC: 100 MMOL/L (ref 100–108)
CLARITY UR: CLEAR
CO2 SERPL-SCNC: 29 MMOL/L (ref 21–32)
COLOR UR: YELLOW
CREAT SERPL-MCNC: 1.39 MG/DL (ref 0.6–1.3)
CRP SERPL QL: 5.5 MG/L
EOSINOPHIL # BLD AUTO: 0.39 THOUSAND/ΜL (ref 0–0.61)
EOSINOPHIL NFR BLD AUTO: 4 % (ref 0–6)
ERYTHROCYTE [DISTWIDTH] IN BLOOD BY AUTOMATED COUNT: 13.8 % (ref 11.6–15.1)
GFR SERPL CREATININE-BSD FRML MDRD: 46 ML/MIN/1.73SQ M
GLUCOSE SERPL-MCNC: 76 MG/DL (ref 65–140)
GLUCOSE UR STRIP-MCNC: NEGATIVE MG/DL
HCT VFR BLD AUTO: 33.4 % (ref 36.5–49.3)
HGB BLD-MCNC: 10.7 G/DL (ref 12–17)
HGB UR QL STRIP.AUTO: NEGATIVE
HYALINE CASTS #/AREA URNS LPF: NORMAL /LPF
IMM GRANULOCYTES # BLD AUTO: 0.06 THOUSAND/UL (ref 0–0.2)
IMM GRANULOCYTES NFR BLD AUTO: 1 % (ref 0–2)
KETONES UR STRIP-MCNC: NEGATIVE MG/DL
LEUKOCYTE ESTERASE UR QL STRIP: NEGATIVE
LYMPHOCYTES # BLD AUTO: 2.81 THOUSANDS/ΜL (ref 0.6–4.47)
LYMPHOCYTES NFR BLD AUTO: 31 % (ref 14–44)
MCH RBC QN AUTO: 31.4 PG (ref 26.8–34.3)
MCHC RBC AUTO-ENTMCNC: 32 G/DL (ref 31.4–37.4)
MCV RBC AUTO: 98 FL (ref 82–98)
MONOCYTES # BLD AUTO: 0.84 THOUSAND/ΜL (ref 0.17–1.22)
MONOCYTES NFR BLD AUTO: 9 % (ref 4–12)
NEUTROPHILS # BLD AUTO: 4.85 THOUSANDS/ΜL (ref 1.85–7.62)
NEUTS SEG NFR BLD AUTO: 54 % (ref 43–75)
NITRITE UR QL STRIP: NEGATIVE
NON-SQ EPI CELLS URNS QL MICRO: NORMAL /HPF
NRBC BLD AUTO-RTO: 0 /100 WBCS
NT-PROBNP SERPL-MCNC: 390 PG/ML
PH UR STRIP.AUTO: 6.5 [PH]
PLATELET # BLD AUTO: 228 THOUSANDS/UL (ref 149–390)
PMV BLD AUTO: 10.4 FL (ref 8.9–12.7)
POTASSIUM SERPL-SCNC: 4.4 MMOL/L (ref 3.5–5.3)
PROT SERPL-MCNC: 7.5 G/DL (ref 6.4–8.2)
PROT UR STRIP-MCNC: NEGATIVE MG/DL
RBC # BLD AUTO: 3.41 MILLION/UL (ref 3.88–5.62)
RBC #/AREA URNS AUTO: NORMAL /HPF
SODIUM SERPL-SCNC: 134 MMOL/L (ref 136–145)
SP GR UR STRIP.AUTO: 1.01 (ref 1–1.03)
UROBILINOGEN UR QL STRIP.AUTO: 0.2 E.U./DL
WBC # BLD AUTO: 9.01 THOUSAND/UL (ref 4.31–10.16)
WBC #/AREA URNS AUTO: NORMAL /HPF

## 2020-11-06 PROCEDURE — 80053 COMPREHEN METABOLIC PANEL: CPT

## 2020-11-06 PROCEDURE — 83880 ASSAY OF NATRIURETIC PEPTIDE: CPT

## 2020-11-06 PROCEDURE — 81001 URINALYSIS AUTO W/SCOPE: CPT

## 2020-11-06 PROCEDURE — 85025 COMPLETE CBC W/AUTO DIFF WBC: CPT

## 2020-11-06 PROCEDURE — 86140 C-REACTIVE PROTEIN: CPT

## 2020-11-06 PROCEDURE — 73552 X-RAY EXAM OF FEMUR 2/>: CPT

## 2020-11-06 PROCEDURE — 36415 COLL VENOUS BLD VENIPUNCTURE: CPT

## 2020-11-13 ENCOUNTER — OFFICE VISIT (OUTPATIENT)
Dept: FAMILY MEDICINE CLINIC | Facility: CLINIC | Age: 85
End: 2020-11-13
Payer: COMMERCIAL

## 2020-11-13 VITALS
HEIGHT: 70 IN | BODY MASS INDEX: 19.33 KG/M2 | HEART RATE: 94 BPM | WEIGHT: 135 LBS | TEMPERATURE: 96.6 F | SYSTOLIC BLOOD PRESSURE: 136 MMHG | OXYGEN SATURATION: 94 % | DIASTOLIC BLOOD PRESSURE: 60 MMHG

## 2020-11-13 DIAGNOSIS — D64.9 ANEMIA, UNSPECIFIED TYPE: ICD-10-CM

## 2020-11-13 DIAGNOSIS — R63.4 UNINTENTIONAL WEIGHT LOSS: Primary | ICD-10-CM

## 2020-11-13 DIAGNOSIS — K59.00 CONSTIPATION, UNSPECIFIED CONSTIPATION TYPE: ICD-10-CM

## 2020-11-13 DIAGNOSIS — R13.10 DYSPHAGIA, UNSPECIFIED TYPE: ICD-10-CM

## 2020-11-13 PROCEDURE — 99214 OFFICE O/P EST MOD 30 MIN: CPT | Performed by: FAMILY MEDICINE

## 2020-11-13 RX ORDER — MIRTAZAPINE 15 MG/1
15 TABLET, FILM COATED ORAL
Qty: 30 TABLET | Refills: 2 | Status: SHIPPED | OUTPATIENT
Start: 2020-11-13 | End: 2021-01-01 | Stop reason: SDUPTHER

## 2020-11-13 RX ORDER — WHEAT DEXTRIN 3 G/3.8 G
POWDER (GRAM) ORAL 2 TIMES DAILY
Qty: 1 CAN | Refills: 2 | Status: SHIPPED | OUTPATIENT
Start: 2020-11-13 | End: 2022-01-01

## 2021-01-01 ENCOUNTER — TELEPHONE (OUTPATIENT)
Dept: GASTROENTEROLOGY | Facility: CLINIC | Age: 86
End: 2021-01-01

## 2021-01-01 ENCOUNTER — IMMUNIZATIONS (OUTPATIENT)
Dept: FAMILY MEDICINE CLINIC | Facility: HOSPITAL | Age: 86
End: 2021-01-01

## 2021-01-01 ENCOUNTER — APPOINTMENT (OUTPATIENT)
Dept: LAB | Facility: CLINIC | Age: 86
End: 2021-01-01
Payer: COMMERCIAL

## 2021-01-01 ENCOUNTER — OFFICE VISIT (OUTPATIENT)
Dept: FAMILY MEDICINE CLINIC | Facility: CLINIC | Age: 86
End: 2021-01-01
Payer: COMMERCIAL

## 2021-01-01 ENCOUNTER — OFFICE VISIT (OUTPATIENT)
Dept: GASTROENTEROLOGY | Facility: CLINIC | Age: 86
End: 2021-01-01
Payer: COMMERCIAL

## 2021-01-01 VITALS
WEIGHT: 140 LBS | TEMPERATURE: 96.8 F | DIASTOLIC BLOOD PRESSURE: 76 MMHG | OXYGEN SATURATION: 99 % | HEART RATE: 88 BPM | SYSTOLIC BLOOD PRESSURE: 142 MMHG | BODY MASS INDEX: 20.04 KG/M2 | HEIGHT: 70 IN

## 2021-01-01 VITALS
SYSTOLIC BLOOD PRESSURE: 138 MMHG | HEIGHT: 70 IN | WEIGHT: 135.4 LBS | BODY MASS INDEX: 19.39 KG/M2 | DIASTOLIC BLOOD PRESSURE: 64 MMHG | HEART RATE: 74 BPM

## 2021-01-01 DIAGNOSIS — R13.10 DYSPHAGIA, UNSPECIFIED TYPE: ICD-10-CM

## 2021-01-01 DIAGNOSIS — R63.4 UNINTENTIONAL WEIGHT LOSS: Primary | ICD-10-CM

## 2021-01-01 DIAGNOSIS — Z23 ENCOUNTER FOR IMMUNIZATION: Primary | ICD-10-CM

## 2021-01-01 DIAGNOSIS — D72.829 LEUKOCYTOSIS, UNSPECIFIED TYPE: ICD-10-CM

## 2021-01-01 DIAGNOSIS — D50.9 IRON DEFICIENCY ANEMIA, UNSPECIFIED IRON DEFICIENCY ANEMIA TYPE: ICD-10-CM

## 2021-01-01 DIAGNOSIS — R53.83 FATIGUE, UNSPECIFIED TYPE: ICD-10-CM

## 2021-01-01 DIAGNOSIS — R13.10 DYSPHAGIA, UNSPECIFIED TYPE: Primary | ICD-10-CM

## 2021-01-01 DIAGNOSIS — E78.5 HYPERLIPIDEMIA, UNSPECIFIED HYPERLIPIDEMIA TYPE: ICD-10-CM

## 2021-01-01 DIAGNOSIS — E55.9 VITAMIN D DEFICIENCY: ICD-10-CM

## 2021-01-01 DIAGNOSIS — J84.9 INTERSTITIAL LUNG DISEASE (HCC): ICD-10-CM

## 2021-01-01 DIAGNOSIS — R11.11 VOMITING WITHOUT NAUSEA, INTRACTABILITY OF VOMITING NOT SPECIFIED, UNSPECIFIED VOMITING TYPE: ICD-10-CM

## 2021-01-01 DIAGNOSIS — R73.01 IMPAIRED FASTING GLUCOSE: ICD-10-CM

## 2021-01-01 DIAGNOSIS — K21.9 GASTROESOPHAGEAL REFLUX DISEASE WITHOUT ESOPHAGITIS: ICD-10-CM

## 2021-01-01 DIAGNOSIS — R63.4 UNINTENTIONAL WEIGHT LOSS: ICD-10-CM

## 2021-01-01 LAB
25(OH)D3 SERPL-MCNC: 43 NG/ML (ref 30–100)
ALBUMIN SERPL BCP-MCNC: 3.2 G/DL (ref 3.5–5)
ALP SERPL-CCNC: 59 U/L (ref 46–116)
ALT SERPL W P-5'-P-CCNC: 11 U/L (ref 12–78)
ANION GAP SERPL CALCULATED.3IONS-SCNC: 6 MMOL/L (ref 4–13)
AST SERPL W P-5'-P-CCNC: 9 U/L (ref 5–45)
BASOPHILS # BLD AUTO: 0.03 THOUSANDS/ΜL (ref 0–0.1)
BASOPHILS NFR BLD AUTO: 0 % (ref 0–1)
BILIRUB SERPL-MCNC: 0.61 MG/DL (ref 0.2–1)
BUN SERPL-MCNC: 17 MG/DL (ref 5–25)
CALCIUM ALBUM COR SERPL-MCNC: 9.7 MG/DL (ref 8.3–10.1)
CALCIUM SERPL-MCNC: 9.1 MG/DL (ref 8.3–10.1)
CHLORIDE SERPL-SCNC: 102 MMOL/L (ref 100–108)
CO2 SERPL-SCNC: 28 MMOL/L (ref 21–32)
CREAT SERPL-MCNC: 1.32 MG/DL (ref 0.6–1.3)
EOSINOPHIL # BLD AUTO: 0.38 THOUSAND/ΜL (ref 0–0.61)
EOSINOPHIL NFR BLD AUTO: 5 % (ref 0–6)
ERYTHROCYTE [DISTWIDTH] IN BLOOD BY AUTOMATED COUNT: 13.7 % (ref 11.6–15.1)
EST. AVERAGE GLUCOSE BLD GHB EST-MCNC: 105 MG/DL
GFR SERPL CREATININE-BSD FRML MDRD: 48 ML/MIN/1.73SQ M
GLUCOSE P FAST SERPL-MCNC: 95 MG/DL (ref 65–99)
HBA1C MFR BLD: 5.3 %
HCT VFR BLD AUTO: 36.3 % (ref 36.5–49.3)
HGB BLD-MCNC: 11.8 G/DL (ref 12–17)
IMM GRANULOCYTES # BLD AUTO: 0.06 THOUSAND/UL (ref 0–0.2)
IMM GRANULOCYTES NFR BLD AUTO: 1 % (ref 0–2)
LYMPHOCYTES # BLD AUTO: 2.12 THOUSANDS/ΜL (ref 0.6–4.47)
LYMPHOCYTES NFR BLD AUTO: 25 % (ref 14–44)
MCH RBC QN AUTO: 30.7 PG (ref 26.8–34.3)
MCHC RBC AUTO-ENTMCNC: 32.5 G/DL (ref 31.4–37.4)
MCV RBC AUTO: 95 FL (ref 82–98)
MONOCYTES # BLD AUTO: 0.7 THOUSAND/ΜL (ref 0.17–1.22)
MONOCYTES NFR BLD AUTO: 8 % (ref 4–12)
NEUTROPHILS # BLD AUTO: 5.09 THOUSANDS/ΜL (ref 1.85–7.62)
NEUTS SEG NFR BLD AUTO: 61 % (ref 43–75)
NRBC BLD AUTO-RTO: 0 /100 WBCS
PLATELET # BLD AUTO: 188 THOUSANDS/UL (ref 149–390)
PMV BLD AUTO: 10.8 FL (ref 8.9–12.7)
POTASSIUM SERPL-SCNC: 4 MMOL/L (ref 3.5–5.3)
PROT SERPL-MCNC: 7.8 G/DL (ref 6.4–8.2)
RBC # BLD AUTO: 3.84 MILLION/UL (ref 3.88–5.62)
SODIUM SERPL-SCNC: 136 MMOL/L (ref 136–145)
TSH SERPL DL<=0.05 MIU/L-ACNC: 1.77 UIU/ML (ref 0.36–3.74)
WBC # BLD AUTO: 8.38 THOUSAND/UL (ref 4.31–10.16)

## 2021-01-01 PROCEDURE — 84443 ASSAY THYROID STIM HORMONE: CPT

## 2021-01-01 PROCEDURE — 3725F SCREEN DEPRESSION PERFORMED: CPT | Performed by: FAMILY MEDICINE

## 2021-01-01 PROCEDURE — 99213 OFFICE O/P EST LOW 20 MIN: CPT | Performed by: FAMILY MEDICINE

## 2021-01-01 PROCEDURE — 82306 VITAMIN D 25 HYDROXY: CPT

## 2021-01-01 PROCEDURE — 1160F RVW MEDS BY RX/DR IN RCRD: CPT | Performed by: FAMILY MEDICINE

## 2021-01-01 PROCEDURE — 83036 HEMOGLOBIN GLYCOSYLATED A1C: CPT

## 2021-01-01 PROCEDURE — 80053 COMPREHEN METABOLIC PANEL: CPT

## 2021-01-01 PROCEDURE — 85025 COMPLETE CBC W/AUTO DIFF WBC: CPT

## 2021-01-01 PROCEDURE — 99203 OFFICE O/P NEW LOW 30 MIN: CPT | Performed by: PHYSICIAN ASSISTANT

## 2021-01-01 PROCEDURE — 36415 COLL VENOUS BLD VENIPUNCTURE: CPT

## 2021-01-01 RX ORDER — PANTOPRAZOLE SODIUM 40 MG/1
40 TABLET, DELAYED RELEASE ORAL DAILY
Qty: 30 TABLET | Refills: 3 | Status: SHIPPED | OUTPATIENT
Start: 2021-01-01 | End: 2021-01-01 | Stop reason: SDUPTHER

## 2021-01-01 RX ORDER — FERROUS SULFATE 325(65) MG
1 TABLET ORAL 2 TIMES DAILY
Qty: 180 TABLET | Refills: 1 | Status: SHIPPED | OUTPATIENT
Start: 2021-01-01

## 2021-01-01 RX ORDER — ALBUTEROL SULFATE 90 UG/1
2 AEROSOL, METERED RESPIRATORY (INHALATION) EVERY 6 HOURS PRN
Qty: 54 G | Refills: 1 | Status: SHIPPED | OUTPATIENT
Start: 2021-01-01 | End: 2022-01-01

## 2021-01-01 RX ORDER — NEOMYCIN SULFATE, POLYMYXIN B SULFATE AND DEXAMETHASONE 3.5; 10000; 1 MG/ML; [USP'U]/ML; MG/ML
SUSPENSION/ DROPS OPHTHALMIC
COMMUNITY
Start: 2021-06-02 | End: 2022-01-01 | Stop reason: ALTCHOICE

## 2021-01-01 RX ORDER — ALBUTEROL SULFATE 90 UG/1
2 AEROSOL, METERED RESPIRATORY (INHALATION) EVERY 6 HOURS PRN
Qty: 54 G | Refills: 1 | Status: SHIPPED | OUTPATIENT
Start: 2021-01-01 | End: 2021-01-01 | Stop reason: SDUPTHER

## 2021-01-01 RX ORDER — MIRTAZAPINE 15 MG/1
15 TABLET, FILM COATED ORAL
Qty: 90 TABLET | Refills: 2 | Status: SHIPPED | OUTPATIENT
Start: 2021-01-01 | End: 2022-01-01 | Stop reason: ALTCHOICE

## 2021-01-01 RX ORDER — SIMVASTATIN 20 MG
TABLET ORAL
Qty: 90 TABLET | Refills: 1 | Status: SHIPPED | OUTPATIENT
Start: 2021-01-01 | End: 2022-01-01 | Stop reason: SDUPTHER

## 2021-01-01 RX ORDER — FERROUS SULFATE 325(65) MG
1 TABLET ORAL 2 TIMES DAILY
Qty: 90 TABLET | Refills: 2 | Status: SHIPPED | OUTPATIENT
Start: 2021-01-01 | End: 2021-01-01 | Stop reason: SDUPTHER

## 2021-01-01 RX ORDER — SIMVASTATIN 20 MG
20 TABLET ORAL DAILY
Qty: 90 TABLET | Refills: 1 | Status: SHIPPED | OUTPATIENT
Start: 2021-01-01 | End: 2021-01-01

## 2021-01-01 RX ORDER — PANTOPRAZOLE SODIUM 40 MG/1
40 TABLET, DELAYED RELEASE ORAL DAILY
Qty: 90 TABLET | Refills: 3 | Status: SHIPPED | OUTPATIENT
Start: 2021-01-01

## 2021-01-15 DIAGNOSIS — E78.5 HYPERLIPIDEMIA, UNSPECIFIED HYPERLIPIDEMIA TYPE: ICD-10-CM

## 2021-01-15 RX ORDER — SIMVASTATIN 20 MG
TABLET ORAL
Qty: 90 TABLET | Refills: 0 | Status: SHIPPED | OUTPATIENT
Start: 2021-01-15 | End: 2021-01-18 | Stop reason: SDUPTHER

## 2021-01-18 DIAGNOSIS — E78.5 HYPERLIPIDEMIA, UNSPECIFIED HYPERLIPIDEMIA TYPE: ICD-10-CM

## 2021-01-18 RX ORDER — SIMVASTATIN 20 MG
20 TABLET ORAL DAILY
Qty: 90 TABLET | Refills: 0 | Status: SHIPPED | OUTPATIENT
Start: 2021-01-18 | End: 2021-04-16 | Stop reason: SDUPTHER

## 2021-01-27 ENCOUNTER — IMMUNIZATIONS (OUTPATIENT)
Dept: FAMILY MEDICINE CLINIC | Facility: HOSPITAL | Age: 86
End: 2021-01-27

## 2021-01-27 DIAGNOSIS — Z23 ENCOUNTER FOR IMMUNIZATION: Primary | ICD-10-CM

## 2021-01-27 PROCEDURE — 91301 SARS-COV-2 / COVID-19 MRNA VACCINE (MODERNA) 100 MCG: CPT

## 2021-01-27 PROCEDURE — 0011A SARS-COV-2 / COVID-19 MRNA VACCINE (MODERNA) 100 MCG: CPT

## 2021-02-23 ENCOUNTER — IMMUNIZATIONS (OUTPATIENT)
Dept: FAMILY MEDICINE CLINIC | Facility: HOSPITAL | Age: 86
End: 2021-02-23

## 2021-02-23 DIAGNOSIS — Z23 ENCOUNTER FOR IMMUNIZATION: Primary | ICD-10-CM

## 2021-02-23 PROCEDURE — 0012A SARS-COV-2 / COVID-19 MRNA VACCINE (MODERNA) 100 MCG: CPT

## 2021-02-23 PROCEDURE — 91301 SARS-COV-2 / COVID-19 MRNA VACCINE (MODERNA) 100 MCG: CPT

## 2021-04-16 DIAGNOSIS — E78.5 HYPERLIPIDEMIA, UNSPECIFIED HYPERLIPIDEMIA TYPE: ICD-10-CM

## 2021-04-16 RX ORDER — SIMVASTATIN 20 MG
20 TABLET ORAL DAILY
Qty: 90 TABLET | Refills: 1 | Status: SHIPPED | OUTPATIENT
Start: 2021-04-16 | End: 2021-05-07

## 2021-05-07 DIAGNOSIS — E78.5 HYPERLIPIDEMIA, UNSPECIFIED HYPERLIPIDEMIA TYPE: ICD-10-CM

## 2021-05-07 RX ORDER — SIMVASTATIN 20 MG
TABLET ORAL
Qty: 90 TABLET | Refills: 1 | Status: SHIPPED | OUTPATIENT
Start: 2021-05-07 | End: 2021-01-01 | Stop reason: SDUPTHER

## 2021-06-23 NOTE — TELEPHONE ENCOUNTER
Called pharmacy and they have the medication ready for the pt and pharmacist doesn't know where the confusion is

## 2021-06-23 NOTE — PROGRESS NOTES
[facility] - Outpatient Consultation  Madhav Walton 80 y o  male MRN: [id number]  Encounter: [id number]          ASSESSMENT AND PLAN:      1  Dysphagia, unspecified type  2  Gastroesophageal reflux disease without esophagitis  3  Vomiting without nausea, intractability of vomiting not specified, unspecified vomiting type  -Today in the office his wife accompanies him     -We have agreed to start PPI therapy for the next 2-3 weeks  -If still symptomatic patient has agreed to undergo EGD with biopsy and dilation   -Patient will call the office with an update next week  -The differential diagnosis of dysphagia was reviewed with patient and his wife in detail  -All treatment modalities reviewed  ______________________________________________________________________    HPI:    80-year-old male who presents for consultation to the GI clinic today with a chief complaint of dysphagia, GERD, vomiting  Patient reports that for 2 years he has been suffering 1 to 2 times a week with vomiting as well as regurgitation of old food  He reports sometimes this does happen with liquids  Patient does report dysphagia in the upper esophagus to solids and liquids as well  Patient reports a 20 lb weight loss in the last 3 years  Patient is not medicated with any PPI therapy or H2 blocker  Patient reports last upper endoscopy was about 20 years ago  Patient does report occasional heartburn  Patient denies any melena or rectal bleeding  Patient reports the only reason he is here in the office today is because his wife made him come  Patient does not view this as a problem  Patient's wife reports that patient refused last year to come for evaluation  REVIEW OF SYSTEMS:    CONSTITUTIONAL: Denies any fever, chills, rigors, and weight loss  HEENT: No earache or tinnitus  Denies hearing loss or visual disturbances  CARDIOVASCULAR: No chest pain or palpitations     RESPIRATORY: Denies any cough, hemoptysis, shortness of breath or dyspnea on exertion  GASTROINTESTINAL: As noted in the History of Present Illness  GENITOURINARY: No problems with urination  Denies any hematuria or dysuria  NEUROLOGIC: No dizziness or vertigo, denies headaches  MUSCULOSKELETAL: Denies any muscle or joint pain  SKIN: Denies skin rashes or itching  ENDOCRINE: Denies excessive thirst  Denies intolerance to heat or cold  PSYCHOSOCIAL: Denies depression or anxiety  Denies any recent memory loss  Historical Information   Past Medical History:   Diagnosis Date    Anemia     Arthritis     Atherosclerosis of artery of extremity with ulceration ([address] ) 3/27/2019    Bifascicular block     Cellulitis of chest wall     Chronic kidney disease     Cough     Dupuytren contracture     DVT femoral (deep venous thrombosis) with thrombophlebitis, right (HCC)     Elevated glucose     Hemothorax     Left    Hyperlipidemia     Interstitial lung disease (HCC)     Phantom pain after amputation of lower extremity ([facility] ) 3/27/2019    SOB (shortness of breath)     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 2/19/2019    Vascular disease      Past Surgical History:   Procedure Laterality Date    AMPUTATION  1972    L thumb    CARPAL TUNNEL RELEASE  2016    L hand    CATARACT EXTRACTION      COLONOSCOPY      EYE SURGERY      FRACTURE SURGERY      HAND SURGERY      IR LOWER EXTREMITY / INTERVENTION  10/26/2018    KNEE ARTHROSCOPY      NERVE BLOCK      MO AMPUTATION LOW LEG THRU TIB/FIB Left 2/1/2019    Procedure: AMPUTATION BELOW KNEE (BKA);   Surgeon: Lina Charles MD;  [facility];  Service: Vascular    STEVEN Fernandez 3RD+ ORD [facility] Left 3/9/2018    Procedure: LEFT LOWER EXTREMITY ARTERIOGRAM WITH PTA OF LEFT POPLITEAL ARTERY;  Surgeon: Ward Quintero MD;  [facility];  Service: Vascular    STEVEN Fernandez 3RD+ ORD [facility] Left 10/26/2018 Procedure: ARTERIOGRAM, angioplasty stent and atherectomy;  Surgeon: Leisa Burden MD;  [facility];  Service: Vascular    THORACENTESIS       Social History   Social History     Substance and Sexual Activity   Alcohol Use No     Social History     Substance and Sexual Activity   Drug Use No     Social History     Tobacco Use   Smoking Status Former Smoker    Packs/day: 2 00    Years: 15 00    Pack years: 30 00    Types: Cigarettes   Smokeless Tobacco Never Used   Tobacco Comment    quit 62 years ago     Family History   Problem Relation Age of Onset    Lung cancer Mother     Brain cancer Mother     Diabetes Father     Aneurysm Father     Stroke Father     Lung cancer Father     Brain cancer Father     Diabetes Maternal Grandmother        Meds/Allergies       Current Outpatient Medications:     albuterol (Ventolin HFA) 90 mcg/act inhaler    aspirin 81 MG tablet    Cholecalciferol (VITAMIN D) 2000 units CAPS    CYANOCOBALAMIN PO    ferrous sulfate 325 (65 Fe) mg tablet    fluticasone-vilanterol (BREO ELLIPTA) 200-25 MCG/INH inhaler    methylPREDNISolone 4 MG tablet therapy pack    mirtazapine (REMERON) 15 mg tablet    neomycin-polymyxin-dexamethasone (MAXITROL) ophthalmic suspension    Omega-3 Fatty Acids (FISH OIL) 645 MG CAPS    simvastatin (ZOCOR) 20 mg tablet    VITAMIN E COMPLEX PO    Wheat Dextrin (Benefiber) POWD    acetaminophen (TYLENOL) 325 mg tablet    pantoprazole (PROTONIX) 40 mg tablet    No Known Allergies        Objective     Blood pressure 138/64, pulse 74, height 5' 10" (1 778 m), weight 61 4 kg (135 lb 6 4 oz)  Body mass index is 19 43 kg/m²          PHYSICAL EXAM:      General Appearance:   Alert, cooperative, no distress   HEENT:   Normocephalic, atraumatic, anicteric      Neck:  Supple, symmetrical, trachea midline   Lungs:   Clear to auscultation bilaterally; no rales, rhonchi or wheezing; respirations unlabored    Heart[de-identified]   Regular rate and rhythm; no murmur, rub, or gallop  Abdomen:   Soft, non-tender, non-distended; normal bowel sounds; no masses, no organomegaly    Genitalia:   Deferred    Rectal:   Deferred    Extremities:  No cyanosis, clubbing or edema    Pulses:  2+ and symmetric    Skin:  No jaundice, rashes, or lesions    Lymph nodes:  No palpable cervical lymphadenopathy        Lab Results:   No visits with results within 1 Day(s) from this visit     Latest known visit with results is:   Appointment on 11/06/2020   Component Date Value    Sodium 11/06/2020 134*    Potassium 11/06/2020 4 4     Chloride 11/06/2020 100     CO2 11/06/2020 29     ANION GAP 11/06/2020 5     BUN 11/06/2020 11     Creatinine 11/06/2020 1 39*    Glucose 11/06/2020 76     Calcium 11/06/2020 9 7     Corrected Calcium 11/06/2020 10 2*    AST 11/06/2020 20     ALT 11/06/2020 19     Alkaline Phosphatase 11/06/2020 59     Total Protein 11/06/2020 7 5     Albumin 11/06/2020 3 4*    Total Bilirubin 11/06/2020 0 74     eGFR 11/06/2020 46     CRP 11/06/2020 5 5*    NT-proBNP 11/06/2020 390     WBC 11/06/2020 9 01     RBC 11/06/2020 3 41*    Hemoglobin 11/06/2020 10 7*    Hematocrit 11/06/2020 33 4*    MCV 11/06/2020 98     MCH 11/06/2020 31 4     MCHC 11/06/2020 32 0     RDW 11/06/2020 13 8     MPV 11/06/2020 10 4     Platelets 12/51/9016 228     nRBC 11/06/2020 0     Neutrophils Relative 11/06/2020 54     Immat GRANS % 11/06/2020 1     Lymphocytes Relative 11/06/2020 31     Monocytes Relative 11/06/2020 9     Eosinophils Relative 11/06/2020 4     Basophils Relative 11/06/2020 1     Neutrophils Absolute 11/06/2020 4 85     Immature Grans Absolute 11/06/2020 0 06     Lymphocytes Absolute 11/06/2020 2 81     Monocytes Absolute 11/06/2020 0 84     Eosinophils Absolute 11/06/2020 0 39     Basophils Absolute 11/06/2020 0 06     Clarity, UA 11/06/2020 Clear     Color, UA 11/06/2020 Yellow     Specific Gravity, UA 11/06/2020 1 010     pH, UA 11/06/2020 6 5     Glucose, UA 11/06/2020 Negative     Ketones, UA 11/06/2020 Negative     Blood, UA 11/06/2020 Negative     Protein, UA 11/06/2020 Negative     Nitrite, UA 11/06/2020 Negative     Bilirubin, UA 11/06/2020 Negative     Urobilinogen, UA 11/06/2020 0 2     Leukocytes, UA 11/06/2020 Negative     WBC, UA 11/06/2020 None Seen     RBC, UA 11/06/2020 None Seen     Hyaline Casts, UA 11/06/2020 None Seen     Bacteria, UA 11/06/2020 None Seen     Epithelial Cells 11/06/2020 None Seen          Radiology Results:   No results found

## 2021-06-23 NOTE — TELEPHONE ENCOUNTER
Donna patient - Patient's wife called for the script supposedly sent today for pantoprazole (PROTONIX) 40 mg tablet [[id number]] to the [facility] in [place] @ [phone] as they still do not have the order for the meds  Patient was just calling to see why    Thx

## 2021-06-23 NOTE — PATIENT INSTRUCTIONS
Dysphagia   WHAT YOU NEED TO KNOW:   Dysphagia is trouble swallowing  It occurs when you have trouble moving food or liquid from your mouth to your esophagus or down to your stomach  It may occur when you eat, drink, or any time you try to swallow  DISCHARGE INSTRUCTIONS:   Return to the emergency department if:   · You choke on your own saliva  · You have chest pain  · You have shortness of breath  · You cannot eat or drink liquids at all  Contact your healthcare provider if:   · You lose weight without trying  · Your signs and symptoms get worse, or you have new signs or symptoms  · You have signs or symptoms of dehydration, such as increased thirst, dark yellow urine, or little or no urine  · You get colds often  · You have questions or concerns about your condition or care  Nutrition:  You may need to change the texture of the foods you eat to help reduce choking problems  Your healthcare provider may show you how to thicken liquids or soften foods to make them easier to swallow  Follow up with your healthcare provider as directed:  Write down your questions so you remember to ask them during your visits  © Copyright [address] Information is for End User's use only and may not be sold, redistributed or otherwise used for commercial purposes  All illustrations and images included in CareNotes® are the copyrighted property of [employer]  or [address] [place], [place] [address]cathie   The above information is an  only  It is not intended as medical advice for individual conditions or treatments  Talk to your doctor, nurse or pharmacist before following any medical regimen to see if it is safe and effective for you

## 2021-06-23 NOTE — LETTER
June 23, 2021     [place], [address]  [address] [facility]  [address]    Patient: Chris Thakur   YOB: 1933   Date of Visit: 6/23/2021       Dear Dr Thu Bowers:    Thank you for referring Chris Thakur to me for evaluation  Below are my notes for this consultation  If you have questions, please do not hesitate to call me  I look forward to following your patient along with you  Sincerely,        Charley Dick PA-C        CC: No Recipients  Yesica Nash  6/23/2021  9:51 AM  Sign when Signing Visit  [facility] - Outpatient Consultation  Chris Thakur 80 y o  male MRN: [id number]  Encounter: [id number]          ASSESSMENT AND PLAN:      1  Dysphagia, unspecified type  2  Gastroesophageal reflux disease without esophagitis  3  Vomiting without nausea, intractability of vomiting not specified, unspecified vomiting type  -Today in the office his wife accompanies him     -We have agreed to start PPI therapy for the next 2-3 weeks  -If still symptomatic patient has agreed to undergo EGD with biopsy and dilation   -Patient will call the office with an update next week  -The differential diagnosis of dysphagia was reviewed with patient and his wife in detail  -All treatment modalities reviewed  ______________________________________________________________________    HPI:    66-year-old male who presents for consultation to the GI clinic today with a chief complaint of dysphagia, GERD, vomiting  Patient reports that for 2 years he has been suffering 1 to 2 times a week with vomiting as well as regurgitation of old food  He reports sometimes this does happen with liquids  Patient does report dysphagia in the upper esophagus to solids and liquids as well  Patient reports a 20 lb weight loss in the last 3 years  Patient is not medicated with any PPI therapy or H2 blocker  Patient reports last upper endoscopy was about 20 years ago    Patient does report occasional heartburn  Patient denies any melena or rectal bleeding  Patient reports the only reason he is here in the office today is because his wife made him come  Patient does not view this as a problem  Patient's wife reports that patient refused last year to come for evaluation  REVIEW OF SYSTEMS:    CONSTITUTIONAL: Denies any fever, chills, rigors, and weight loss  HEENT: No earache or tinnitus  Denies hearing loss or visual disturbances  CARDIOVASCULAR: No chest pain or palpitations  RESPIRATORY: Denies any cough, hemoptysis, shortness of breath or dyspnea on exertion  GASTROINTESTINAL: As noted in the History of Present Illness  GENITOURINARY: No problems with urination  Denies any hematuria or dysuria  NEUROLOGIC: No dizziness or vertigo, denies headaches  MUSCULOSKELETAL: Denies any muscle or joint pain  SKIN: Denies skin rashes or itching  ENDOCRINE: Denies excessive thirst  Denies intolerance to heat or cold  PSYCHOSOCIAL: Denies depression or anxiety  Denies any recent memory loss         Historical Information   Past Medical History:   Diagnosis Date    Anemia     Arthritis     Atherosclerosis of artery of extremity with ulceration ([facility] [address] ) 3/27/2019    Bifascicular block     Cellulitis of chest wall     Chronic kidney disease     Cough     Dupuytren contracture     DVT femoral (deep venous thrombosis) with thrombophlebitis, right (HCC)     Elevated glucose     Hemothorax     Left    Hyperlipidemia     Interstitial lung disease (HCC)     Phantom pain after amputation of lower extremity ([address] ) 3/27/2019    SOB (shortness of breath)     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 2/19/2019    Vascular disease      Past Surgical History:   Procedure Laterality Date    AMPUTATION  1972    L thumb    CARPAL TUNNEL RELEASE  2016    L hand    CATARACT EXTRACTION      COLONOSCOPY      EYE SURGERY      FRACTURE SURGERY      HAND SURGERY      IR LOWER EXTREMITY / INTERVENTION  10/26/2018    KNEE ARTHROSCOPY      NERVE BLOCK      ID AMPUTATION LOW LEG THRU TIB/FIB Left 2/1/2019    Procedure: AMPUTATION BELOW KNEE (BKA);   Surgeon: Aaron Mednez MD;  [facility] MAIN OR;  Service: Vascular    ID [facility]V CATHJ 3RD+ ORD SLCTV ABDL PEL/TR [facility] Left 3/9/2018    Procedure: LEFT LOWER EXTREMITY ARTERIOGRAM WITH PTA OF LEFT POPLITEAL ARTERY;  Surgeon: Deneen Nino MD;  Location: [facility] MAIN OR;  Service: Vascular    ID Monica Perez 3RD+ ORD SLCTV ABDL PEL/TR [facility] Left 10/26/2018    Procedure: ARTERIOGRAM, angioplasty stent and atherectomy;  Surgeon: Deneen Nino MD;  Location: [facility] MAIN OR;  Service: Vascular    THORACENTESIS       Social History   Social History     Substance and Sexual Activity   Alcohol Use No     Social History     Substance and Sexual Activity   Drug Use No     Social History     Tobacco Use   Smoking Status Former Smoker    Packs/day: 2 00    Years: 15 00    Pack years: 30 00    Types: Cigarettes   Smokeless Tobacco Never Used   Tobacco Comment    quit 62 years ago     Family History   Problem Relation Age of Onset    Lung cancer Mother     Brain cancer Mother     Diabetes Father     Aneurysm Father     Stroke Father     Lung cancer Father     Brain cancer Father     Diabetes Maternal Grandmother        Meds/Allergies       Current Outpatient Medications:     albuterol (Ventolin HFA) 90 mcg/act inhaler    aspirin 81 MG tablet    Cholecalciferol (VITAMIN D) 2000 units CAPS    CYANOCOBALAMIN PO    ferrous sulfate 325 (65 Fe) mg tablet    fluticasone-vilanterol (BREO ELLIPTA) 200-25 MCG/INH inhaler    methylPREDNISolone 4 MG tablet therapy pack    mirtazapine (REMERON) 15 mg tablet    neomycin-polymyxin-dexamethasone (MAXITROL) ophthalmic suspension    Omega-3 Fatty Acids (FISH OIL) 645 MG CAPS    simvastatin (ZOCOR) 20 mg tablet    VITAMIN E COMPLEX PO    Wheat Dextrin (Benefiber) POWD    acetaminophen (TYLENOL) 325 mg tablet    pantoprazole (PROTONIX) 40 mg tablet    No Known Allergies        Objective     Blood pressure 138/64, pulse 74, height 5' 10" (1 778 m), weight 61 4 kg (135 lb 6 4 oz)  Body mass index is 19 43 kg/m²  PHYSICAL EXAM:      General Appearance:   Alert, cooperative, no distress   HEENT:   Normocephalic, atraumatic, anicteric      Neck:  Supple, symmetrical, trachea midline   Lungs:   Clear to auscultation bilaterally; no rales, rhonchi or wheezing; respirations unlabored    Heart[de-identified]   Regular rate and rhythm; no murmur, rub, or gallop  Abdomen:   Soft, non-tender, non-distended; normal bowel sounds; no masses, no organomegaly    Genitalia:   Deferred    Rectal:   Deferred    Extremities:  No cyanosis, clubbing or edema    Pulses:  2+ and symmetric    Skin:  No jaundice, rashes, or lesions    Lymph nodes:  No palpable cervical lymphadenopathy        Lab Results:   No visits with results within 1 Day(s) from this visit     Latest known visit with results is:   Appointment on 11/06/2020   Component Date Value    Sodium 11/06/2020 134*    Potassium 11/06/2020 4 4     Chloride 11/06/2020 100     CO2 11/06/2020 29     ANION GAP 11/06/2020 5     BUN 11/06/2020 11     Creatinine 11/06/2020 1 39*    Glucose 11/06/2020 76     Calcium 11/06/2020 9 7     Corrected Calcium 11/06/2020 10 2*    AST 11/06/2020 20     ALT 11/06/2020 19     Alkaline Phosphatase 11/06/2020 59     Total Protein 11/06/2020 7 5     Albumin 11/06/2020 3 4*    Total Bilirubin 11/06/2020 0 74     eGFR 11/06/2020 46     CRP 11/06/2020 5 5*    NT-proBNP 11/06/2020 390     WBC 11/06/2020 9 01     RBC 11/06/2020 3 41*    Hemoglobin 11/06/2020 10 7*    Hematocrit 11/06/2020 33 4*    MCV 11/06/2020 98     MCH 11/06/2020 31 4     MCHC 11/06/2020 32 0     RDW 11/06/2020 13 8     MPV 11/06/2020 10 4     Platelets 02/51/3303 228     nRBC 11/06/2020 0     Neutrophils Relative 11/06/2020 54     Immat GRANS % 11/06/2020 1     Lymphocytes Relative 11/06/2020 31     Monocytes Relative 11/06/2020 9     Eosinophils Relative 11/06/2020 4     Basophils Relative 11/06/2020 1     Neutrophils Absolute 11/06/2020 4 85     Immature Grans Absolute 11/06/2020 0 06     Lymphocytes Absolute 11/06/2020 2 81     Monocytes Absolute 11/06/2020 0 84     Eosinophils Absolute 11/06/2020 0 39     Basophils Absolute 11/06/2020 0 06     Clarity, UA 11/06/2020 Clear     Color, UA 11/06/2020 Yellow     Specific Gravity, UA 11/06/2020 1 010     pH, UA 11/06/2020 6 5     Glucose, UA 11/06/2020 Negative     Ketones, UA 11/06/2020 Negative     Blood, UA 11/06/2020 Negative     Protein, UA 11/06/2020 Negative     Nitrite, UA 11/06/2020 Negative     Bilirubin, UA 11/06/2020 Negative     Urobilinogen, UA 11/06/2020 0 2     Leukocytes, UA 11/06/2020 Negative     WBC, UA 11/06/2020 None Seen     RBC, UA 11/06/2020 None Seen     Hyaline Casts, UA 11/06/2020 None Seen     Bacteria, UA 11/06/2020 None Seen     Epithelial Cells 11/06/2020 None Seen          Radiology Results:   No results found

## 2021-06-28 NOTE — TELEPHONE ENCOUNTER
Tia Bitter - patient's wife, Kym Hays called  Wanted to give update - patient is doing well as of today, 06/28/21   Any questions please call Kym Hays at [phone] ty

## 2021-07-06 NOTE — TELEPHONE ENCOUNTER
[facility] to a future date  Thank you

## 2021-07-06 NOTE — TELEPHONE ENCOUNTER
[facility] to patient's wife, and they would like a refill of the pantoprazole 40 mg 1 tablet daily  Please call "[employer] Mail order  Patient has 20 pills left   Any questions, please call Mitesh Martinez at [phone] ty

## 2021-07-06 NOTE — TELEPHONE ENCOUNTER
Gagan Pérez - patient's wife called  Patient is doing very well on the medication  Patient's wife wanted to know if patient still needs to have the EGD on Thursday 07/08/21   Please call Jair Gaffney at [phone] ty

## 2021-07-13 NOTE — TELEPHONE ENCOUNTER
Donna patient - [facility] called and said the script was only for a 30 day supply for pantoprazole (PROTONIX) 40 mg tablet [[id number]]     And they will need a new script faxed ([phone]) for a 90 day supply for this patient  Any questions you can RTRN call to [phone]   Thx

## 2021-11-08 PROBLEM — R68.89 COLD INTOLERANCE: Status: RESOLVED | Noted: 2017-05-04 | Resolved: 2021-01-01

## 2022-01-01 ENCOUNTER — APPOINTMENT (INPATIENT)
Dept: NON INVASIVE DIAGNOSTICS | Facility: HOSPITAL | Age: 87
DRG: 189 | End: 2022-01-01
Payer: COMMERCIAL

## 2022-01-01 ENCOUNTER — OFFICE VISIT (OUTPATIENT)
Dept: URGENT CARE | Facility: CLINIC | Age: 87
End: 2022-01-01
Payer: COMMERCIAL

## 2022-01-01 ENCOUNTER — TELEPHONE (OUTPATIENT)
Dept: PHYSICAL THERAPY | Facility: OTHER | Age: 87
End: 2022-01-01

## 2022-01-01 ENCOUNTER — DOCUMENTATION (OUTPATIENT)
Dept: FAMILY MEDICINE CLINIC | Facility: CLINIC | Age: 87
End: 2022-01-01

## 2022-01-01 ENCOUNTER — TELEPHONE (OUTPATIENT)
Dept: FAMILY MEDICINE CLINIC | Facility: CLINIC | Age: 87
End: 2022-01-01

## 2022-01-01 ENCOUNTER — APPOINTMENT (INPATIENT)
Dept: RADIOLOGY | Facility: HOSPITAL | Age: 87
DRG: 189 | End: 2022-01-01
Payer: COMMERCIAL

## 2022-01-01 ENCOUNTER — TELEPHONE (OUTPATIENT)
Dept: PULMONOLOGY | Facility: CLINIC | Age: 87
End: 2022-01-01

## 2022-01-01 ENCOUNTER — TELEPHONE (OUTPATIENT)
Dept: URGENT CARE | Facility: CLINIC | Age: 87
End: 2022-01-01

## 2022-01-01 ENCOUNTER — HOSPICE ADMISSION (OUTPATIENT)
Dept: HOME HOSPICE | Facility: HOSPICE | Age: 87
End: 2022-01-01
Payer: MEDICARE

## 2022-01-01 ENCOUNTER — APPOINTMENT (INPATIENT)
Dept: CT IMAGING | Facility: HOSPITAL | Age: 87
DRG: 189 | End: 2022-01-01
Payer: COMMERCIAL

## 2022-01-01 ENCOUNTER — APPOINTMENT (OUTPATIENT)
Dept: RADIOLOGY | Facility: CLINIC | Age: 87
End: 2022-01-01
Payer: COMMERCIAL

## 2022-01-01 ENCOUNTER — HOSPITAL ENCOUNTER (INPATIENT)
Facility: HOSPITAL | Age: 87
LOS: 1 days | Discharge: HOME HEALTH | DRG: 871 | End: 2022-06-02
Attending: EMERGENCY MEDICINE | Admitting: FAMILY MEDICINE
Payer: COMMERCIAL

## 2022-01-01 ENCOUNTER — TRANSITIONAL CARE MANAGEMENT (OUTPATIENT)
Dept: FAMILY MEDICINE CLINIC | Facility: CLINIC | Age: 87
End: 2022-01-01

## 2022-01-01 ENCOUNTER — HOME CARE VISIT (OUTPATIENT)
Dept: HOME HOSPICE | Facility: HOSPICE | Age: 87
End: 2022-01-01
Payer: MEDICARE

## 2022-01-01 ENCOUNTER — APPOINTMENT (EMERGENCY)
Dept: CT IMAGING | Facility: HOSPITAL | Age: 87
End: 2022-01-01
Payer: COMMERCIAL

## 2022-01-01 ENCOUNTER — APPOINTMENT (EMERGENCY)
Dept: RADIOLOGY | Facility: HOSPITAL | Age: 87
DRG: 871 | End: 2022-01-01
Payer: COMMERCIAL

## 2022-01-01 ENCOUNTER — APPOINTMENT (EMERGENCY)
Dept: CT IMAGING | Facility: HOSPITAL | Age: 87
DRG: 871 | End: 2022-01-01
Payer: COMMERCIAL

## 2022-01-01 ENCOUNTER — HOME CARE VISIT (OUTPATIENT)
Dept: HOME HEALTH SERVICES | Facility: HOME HEALTHCARE | Age: 87
End: 2022-01-01
Payer: MEDICARE

## 2022-01-01 ENCOUNTER — HOSPITAL ENCOUNTER (INPATIENT)
Facility: HOSPITAL | Age: 87
LOS: 7 days | Discharge: HOME HEALTH | DRG: 189 | End: 2022-05-23
Attending: EMERGENCY MEDICINE | Admitting: INTERNAL MEDICINE
Payer: COMMERCIAL

## 2022-01-01 ENCOUNTER — OFFICE VISIT (OUTPATIENT)
Dept: PULMONOLOGY | Facility: CLINIC | Age: 87
End: 2022-01-01
Payer: COMMERCIAL

## 2022-01-01 ENCOUNTER — HOSPITAL ENCOUNTER (INPATIENT)
Facility: HOSPITAL | Age: 87
LOS: 6 days | Discharge: HOSPICE | DRG: 871 | End: 2022-06-14
Attending: INTERNAL MEDICINE | Admitting: INTERNAL MEDICINE
Payer: COMMERCIAL

## 2022-01-01 ENCOUNTER — TELEPHONE (OUTPATIENT)
Dept: OBGYN CLINIC | Facility: HOSPITAL | Age: 87
End: 2022-01-01

## 2022-01-01 ENCOUNTER — APPOINTMENT (EMERGENCY)
Dept: RADIOLOGY | Facility: HOSPITAL | Age: 87
DRG: 189 | End: 2022-01-01
Payer: COMMERCIAL

## 2022-01-01 ENCOUNTER — RA CDI HCC (OUTPATIENT)
Dept: OTHER | Facility: HOSPITAL | Age: 87
End: 2022-01-01

## 2022-01-01 ENCOUNTER — HOSPITAL ENCOUNTER (EMERGENCY)
Facility: HOSPITAL | Age: 87
Discharge: HOME | End: 2022-02-28
Attending: EMERGENCY MEDICINE | Admitting: EMERGENCY MEDICINE
Payer: COMMERCIAL

## 2022-01-01 ENCOUNTER — OFFICE VISIT (OUTPATIENT)
Dept: OBGYN CLINIC | Facility: CLINIC | Age: 87
End: 2022-01-01
Payer: COMMERCIAL

## 2022-01-01 ENCOUNTER — APPOINTMENT (EMERGENCY)
Dept: CT IMAGING | Facility: HOSPITAL | Age: 87
DRG: 189 | End: 2022-01-01
Payer: COMMERCIAL

## 2022-01-01 ENCOUNTER — APPOINTMENT (OUTPATIENT)
Dept: LAB | Facility: CLINIC | Age: 87
End: 2022-01-01
Payer: COMMERCIAL

## 2022-01-01 VITALS
HEIGHT: 70 IN | OXYGEN SATURATION: 95 % | BODY MASS INDEX: 19.33 KG/M2 | TEMPERATURE: 97.5 F | RESPIRATION RATE: 18 BRPM | WEIGHT: 135 LBS | HEART RATE: 70 BPM

## 2022-01-01 VITALS
SYSTOLIC BLOOD PRESSURE: 135 MMHG | HEIGHT: 70 IN | WEIGHT: 119 LBS | OXYGEN SATURATION: 98 % | HEART RATE: 61 BPM | DIASTOLIC BLOOD PRESSURE: 56 MMHG | BODY MASS INDEX: 17.04 KG/M2 | RESPIRATION RATE: 18 BRPM | TEMPERATURE: 98.1 F

## 2022-01-01 VITALS
WEIGHT: 119.93 LBS | SYSTOLIC BLOOD PRESSURE: 121 MMHG | BODY MASS INDEX: 17.17 KG/M2 | RESPIRATION RATE: 18 BRPM | HEART RATE: 58 BPM | TEMPERATURE: 97.7 F | HEIGHT: 70 IN | DIASTOLIC BLOOD PRESSURE: 58 MMHG | OXYGEN SATURATION: 99 %

## 2022-01-01 VITALS
TEMPERATURE: 98 F | HEART RATE: 63 BPM | SYSTOLIC BLOOD PRESSURE: 131 MMHG | DIASTOLIC BLOOD PRESSURE: 63 MMHG | OXYGEN SATURATION: 97 % | RESPIRATION RATE: 18 BRPM

## 2022-01-01 VITALS
BODY MASS INDEX: 17.04 KG/M2 | HEART RATE: 64 BPM | HEIGHT: 70 IN | WEIGHT: 119 LBS | SYSTOLIC BLOOD PRESSURE: 92 MMHG | TEMPERATURE: 98.4 F | RESPIRATION RATE: 18 BRPM | DIASTOLIC BLOOD PRESSURE: 54 MMHG

## 2022-01-01 VITALS
BODY MASS INDEX: 17.04 KG/M2 | DIASTOLIC BLOOD PRESSURE: 45 MMHG | WEIGHT: 119 LBS | OXYGEN SATURATION: 91 % | SYSTOLIC BLOOD PRESSURE: 115 MMHG | HEART RATE: 64 BPM | HEIGHT: 70 IN | TEMPERATURE: 97.6 F | RESPIRATION RATE: 20 BRPM

## 2022-01-01 VITALS
DIASTOLIC BLOOD PRESSURE: 58 MMHG | RESPIRATION RATE: 19 BRPM | OXYGEN SATURATION: 90 % | HEART RATE: 72 BPM | TEMPERATURE: 97.3 F | BODY MASS INDEX: 19.37 KG/M2 | HEIGHT: 70 IN | SYSTOLIC BLOOD PRESSURE: 100 MMHG

## 2022-01-01 VITALS — SYSTOLIC BLOOD PRESSURE: 113 MMHG | HEART RATE: 97 BPM | DIASTOLIC BLOOD PRESSURE: 62 MMHG

## 2022-01-01 DIAGNOSIS — E78.5 HYPERLIPIDEMIA, UNSPECIFIED HYPERLIPIDEMIA TYPE: ICD-10-CM

## 2022-01-01 DIAGNOSIS — M50.30 DDD (DEGENERATIVE DISC DISEASE), CERVICAL: ICD-10-CM

## 2022-01-01 DIAGNOSIS — R79.89 ELEVATED BRAIN NATRIURETIC PEPTIDE (BNP) LEVEL: ICD-10-CM

## 2022-01-01 DIAGNOSIS — J96.01 ACUTE RESPIRATORY FAILURE WITH HYPOXIA (HCC): Primary | ICD-10-CM

## 2022-01-01 DIAGNOSIS — J84.9 INTERSTITIAL LUNG DISEASE (HCC): ICD-10-CM

## 2022-01-01 DIAGNOSIS — I73.9 SEVERE PERIPHERAL ARTERIAL DISEASE (HCC): ICD-10-CM

## 2022-01-01 DIAGNOSIS — R06.00 DYSPNEA: Primary | ICD-10-CM

## 2022-01-01 DIAGNOSIS — J18.9 PNEUMONIA: ICD-10-CM

## 2022-01-01 DIAGNOSIS — J84.10 PULMONARY FIBROSIS (HCC): ICD-10-CM

## 2022-01-01 DIAGNOSIS — N18.30 STAGE 3 CHRONIC KIDNEY DISEASE, UNSPECIFIED WHETHER STAGE 3A OR 3B CKD (HCC): ICD-10-CM

## 2022-01-01 DIAGNOSIS — J96.01 ACUTE HYPOXEMIC RESPIRATORY FAILURE (HCC): Primary | ICD-10-CM

## 2022-01-01 DIAGNOSIS — S22.000A COMPRESSION FRACTURE OF BODY OF THORACIC VERTEBRA (HCC): Primary | ICD-10-CM

## 2022-01-01 DIAGNOSIS — U07.1 PNEUMONIA DUE TO COVID-19 VIRUS: ICD-10-CM

## 2022-01-01 DIAGNOSIS — M54.2 NECK PAIN: ICD-10-CM

## 2022-01-01 DIAGNOSIS — Z91.81 PERSONAL HISTORY OF FALL: ICD-10-CM

## 2022-01-01 DIAGNOSIS — M25.511 ACUTE PAIN OF RIGHT SHOULDER: ICD-10-CM

## 2022-01-01 DIAGNOSIS — D64.9 ANEMIA, UNSPECIFIED TYPE: ICD-10-CM

## 2022-01-01 DIAGNOSIS — S42.124A CLOSED NONDISPLACED FRACTURE OF ACROMIAL PROCESS OF RIGHT SCAPULA, INITIAL ENCOUNTER: Primary | ICD-10-CM

## 2022-01-01 DIAGNOSIS — I50.32 CHRONIC HEART FAILURE WITH PRESERVED EJECTION FRACTION (HCC): ICD-10-CM

## 2022-01-01 DIAGNOSIS — J84.9 ILD (INTERSTITIAL LUNG DISEASE) (HCC): ICD-10-CM

## 2022-01-01 DIAGNOSIS — U07.1 COVID-19: Primary | ICD-10-CM

## 2022-01-01 DIAGNOSIS — R26.2 AMBULATORY DYSFUNCTION: ICD-10-CM

## 2022-01-01 DIAGNOSIS — S22.070A COMPRESSION FRACTURE OF T9 VERTEBRA, INITIAL ENCOUNTER (HCC): ICD-10-CM

## 2022-01-01 DIAGNOSIS — I50.9 CHF EXACERBATION (HCC): ICD-10-CM
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
## 2022-01-01 DIAGNOSIS — N17.9 AKI (ACUTE KIDNEY INJURY) (HCC): ICD-10-CM

## 2022-01-01 DIAGNOSIS — J96.01 ACUTE RESPIRATORY FAILURE WITH HYPOXIA (HCC): ICD-10-CM

## 2022-01-01 DIAGNOSIS — J12.82 PNEUMONIA DUE TO COVID-19 VIRUS: ICD-10-CM

## 2022-01-01 DIAGNOSIS — S32.000A COMPRESSION FRACTURE OF LUMBAR VERTEBRA, UNSPECIFIED LUMBAR VERTEBRAL LEVEL, INITIAL ENCOUNTER (HCC): ICD-10-CM

## 2022-01-01 DIAGNOSIS — I50.33 ACUTE ON CHRONIC HEART FAILURE WITH PRESERVED EJECTION FRACTION (HCC): ICD-10-CM

## 2022-01-01 DIAGNOSIS — Z89.512 STATUS POST BELOW-KNEE AMPUTATION OF LEFT LOWER EXTREMITY (HCC): ICD-10-CM

## 2022-01-01 DIAGNOSIS — I50.9 CHF (CONGESTIVE HEART FAILURE) (HCC): ICD-10-CM

## 2022-01-01 DIAGNOSIS — R42 DIZZINESS: ICD-10-CM

## 2022-01-01 DIAGNOSIS — S22.080A COMPRESSION FRACTURE OF T12 VERTEBRA, INITIAL ENCOUNTER (HCC): ICD-10-CM

## 2022-01-01 DIAGNOSIS — U07.1 COVID: ICD-10-CM

## 2022-01-01 LAB
2HR DELTA HS TROPONIN: -1 NG/L
2HR DELTA HS TROPONIN: 0 NG/L
2HR DELTA HS TROPONIN: 0 NG/L
2HR DELTA HS TROPONIN: 7 NG/L
4HR DELTA HS TROPONIN: -1 NG/L
4HR DELTA HS TROPONIN: -3 NG/L
4HR DELTA HS TROPONIN: 18 NG/L
4HR DELTA HS TROPONIN: 8 NG/L
ABO GROUP BLD: NORMAL
ALBUMIN SERPL BCP-MCNC: 2.3 G/DL (ref 3.5–5)
ALBUMIN SERPL BCP-MCNC: 2.4 G/DL (ref 3.5–5)
ALBUMIN SERPL BCP-MCNC: 2.6 G/DL (ref 3.5–5)
ALBUMIN SERPL BCP-MCNC: 2.6 G/DL (ref 3.5–5)
ALBUMIN SERPL BCP-MCNC: 2.8 G/DL (ref 3.5–5)
ALBUMIN SERPL BCP-MCNC: 3 G/DL (ref 3.5–5)
ALP SERPL-CCNC: 49 U/L (ref 46–116)
ALP SERPL-CCNC: 53 U/L (ref 46–116)
ALP SERPL-CCNC: 55 U/L (ref 46–116)
ALP SERPL-CCNC: 56 U/L (ref 46–116)
ALP SERPL-CCNC: 57 U/L (ref 46–116)
ALP SERPL-CCNC: 87 U/L (ref 46–116)
ALT SERPL W P-5'-P-CCNC: 20 U/L (ref 12–78)
ALT SERPL W P-5'-P-CCNC: 27 U/L (ref 12–78)
ALT SERPL W P-5'-P-CCNC: 35 U/L (ref 12–78)
ALT SERPL W P-5'-P-CCNC: 36 U/L (ref 12–78)
ALT SERPL W P-5'-P-CCNC: 40 U/L (ref 12–78)
ALT SERPL W P-5'-P-CCNC: 44 U/L (ref 12–78)
ANION GAP SERPL CALCULATED.3IONS-SCNC: 10 MMOL/L (ref 4–13)
ANION GAP SERPL CALCULATED.3IONS-SCNC: 5 MMOL/L (ref 4–13)
ANION GAP SERPL CALCULATED.3IONS-SCNC: 5 MMOL/L (ref 4–13)
ANION GAP SERPL CALCULATED.3IONS-SCNC: 6 MMOL/L (ref 4–13)
ANION GAP SERPL CALCULATED.3IONS-SCNC: 6 MMOL/L (ref 4–13)
ANION GAP SERPL CALCULATED.3IONS-SCNC: 7 MMOL/L (ref 4–13)
ANION GAP SERPL CALCULATED.3IONS-SCNC: 8 MMOL/L (ref 4–13)
AORTIC ROOT: 3.5 CM
APICAL FOUR CHAMBER EJECTION FRACTION: 61 %
APTT PPP: 40 SECONDS (ref 23–37)
APTT PPP: 40 SECONDS (ref 23–37)
APTT PPP: 43 SECONDS (ref 23–37)
APTT PPP: 49 SECONDS (ref 23–37)
APTT PPP: 52 SECONDS (ref 23–37)
APTT PPP: 59 SECONDS (ref 23–37)
APTT PPP: 61 SECONDS (ref 23–37)
APTT PPP: 62 SECONDS (ref 23–37)
APTT PPP: 64 SECONDS (ref 23–37)
APTT PPP: 64 SECONDS (ref 23–37)
APTT PPP: 66 SECONDS (ref 23–37)
APTT PPP: 88 SECONDS (ref 23–37)
APTT PPP: 94 SECONDS (ref 23–37)
APTT PPP: >210 SECONDS (ref 23–37)
ASCENDING AORTA: 3.8 CM
AST SERPL W P-5'-P-CCNC: 14 U/L (ref 5–45)
AST SERPL W P-5'-P-CCNC: 16 U/L (ref 5–45)
AST SERPL W P-5'-P-CCNC: 18 U/L (ref 5–45)
AST SERPL W P-5'-P-CCNC: 24 U/L (ref 5–45)
AST SERPL W P-5'-P-CCNC: 28 U/L (ref 5–45)
AST SERPL W P-5'-P-CCNC: 31 U/L (ref 5–45)
ATRIAL RATE: 119 BPM
ATRIAL RATE: 59 BPM
ATRIAL RATE: 68 BPM
ATRIAL RATE: 69 BPM
ATRIAL RATE: 70 BPM
ATRIAL RATE: 73 BPM
ATRIAL RATE: 93 BPM
BACTERIA BLD CULT: NORMAL
BACTERIA UR QL AUTO: NORMAL /HPF
BASOPHILS # BLD AUTO: 0 THOUSANDS/ΜL (ref 0–0.1)
BASOPHILS # BLD AUTO: 0 THOUSANDS/ΜL (ref 0–0.1)
BASOPHILS # BLD AUTO: 0.01 THOUSANDS/ΜL (ref 0–0.1)
BASOPHILS # BLD AUTO: 0.05 THOUSANDS/ΜL (ref 0–0.1)
BASOPHILS # BLD AUTO: 0.05 THOUSANDS/ΜL (ref 0–0.1)
BASOPHILS # BLD MANUAL: 0 THOUSAND/UL (ref 0–0.1)
BASOPHILS # BLD MANUAL: 0 THOUSAND/UL (ref 0–0.1)
BASOPHILS NFR BLD AUTO: 0 % (ref 0–1)
BASOPHILS NFR BLD AUTO: 1 % (ref 0–1)
BASOPHILS NFR MAR MANUAL: 0 % (ref 0–1)
BASOPHILS NFR MAR MANUAL: 0 % (ref 0–1)
BILIRUB DIRECT SERPL-MCNC: 0.24 MG/DL (ref 0–0.2)
BILIRUB DIRECT SERPL-MCNC: 0.27 MG/DL (ref 0–0.2)
BILIRUB SERPL-MCNC: 0.55 MG/DL (ref 0.2–1)
BILIRUB SERPL-MCNC: 0.59 MG/DL (ref 0.2–1)
BILIRUB SERPL-MCNC: 0.59 MG/DL (ref 0.2–1)
BILIRUB SERPL-MCNC: 0.79 MG/DL (ref 0.2–1)
BILIRUB SERPL-MCNC: 0.81 MG/DL (ref 0.2–1)
BILIRUB SERPL-MCNC: 1.18 MG/DL (ref 0.2–1)
BILIRUB UR QL STRIP: NEGATIVE
BUN SERPL-MCNC: 21 MG/DL (ref 5–25)
BUN SERPL-MCNC: 25 MG/DL (ref 5–25)
BUN SERPL-MCNC: 26 MG/DL (ref 5–25)
BUN SERPL-MCNC: 31 MG/DL (ref 5–25)
BUN SERPL-MCNC: 32 MG/DL (ref 5–25)
BUN SERPL-MCNC: 34 MG/DL (ref 5–25)
BUN SERPL-MCNC: 36 MG/DL (ref 5–25)
BUN SERPL-MCNC: 43 MG/DL (ref 5–25)
BUN SERPL-MCNC: 44 MG/DL (ref 5–25)
BUN SERPL-MCNC: 46 MG/DL (ref 5–25)
BUN SERPL-MCNC: 48 MG/DL (ref 5–25)
BUN SERPL-MCNC: 56 MG/DL (ref 5–25)
BURR CELLS BLD QL SMEAR: PRESENT
BURR CELLS BLD QL SMEAR: PRESENT
CALCIUM ALBUM COR SERPL-MCNC: 9.7 MG/DL (ref 8.3–10.1)
CALCIUM ALBUM COR SERPL-MCNC: 9.7 MG/DL (ref 8.3–10.1)
CALCIUM ALBUM COR SERPL-MCNC: 9.9 MG/DL (ref 8.3–10.1)
CALCIUM ALBUM COR SERPL-MCNC: 9.9 MG/DL (ref 8.3–10.1)
CALCIUM SERPL-MCNC: 8.3 MG/DL (ref 8.3–10.1)
CALCIUM SERPL-MCNC: 8.4 MG/DL (ref 8.3–10.1)
CALCIUM SERPL-MCNC: 8.5 MG/DL (ref 8.3–10.1)
CALCIUM SERPL-MCNC: 8.6 MG/DL (ref 8.3–10.1)
CALCIUM SERPL-MCNC: 8.7 MG/DL (ref 8.3–10.1)
CALCIUM SERPL-MCNC: 8.8 MG/DL (ref 8.3–10.1)
CALCIUM SERPL-MCNC: 8.9 MG/DL (ref 8.3–10.1)
CALCIUM SERPL-MCNC: 9.2 MG/DL (ref 8.3–10.1)
CALCIUM SERPL-MCNC: 9.3 MG/DL (ref 8.3–10.1)
CALCIUM SERPL-MCNC: 9.9 MG/DL (ref 8.3–10.1)
CARDIAC TROPONIN I PNL SERPL HS: 35 NG/L
CARDIAC TROPONIN I PNL SERPL HS: 36 NG/L
CARDIAC TROPONIN I PNL SERPL HS: 38 NG/L
CARDIAC TROPONIN I PNL SERPL HS: 38 NG/L
CARDIAC TROPONIN I PNL SERPL HS: 39 NG/L
CARDIAC TROPONIN I PNL SERPL HS: 39 NG/L
CARDIAC TROPONIN I PNL SERPL HS: 40 NG/L
CARDIAC TROPONIN I PNL SERPL HS: 41 NG/L
CARDIAC TROPONIN I PNL SERPL HS: 41 NG/L
CARDIAC TROPONIN I PNL SERPL HS: 47 NG/L
CARDIAC TROPONIN I PNL SERPL HS: 48 NG/L
CARDIAC TROPONIN I PNL SERPL HS: 54 NG/L
CHLORIDE SERPL-SCNC: 100 MMOL/L (ref 100–108)
CHLORIDE SERPL-SCNC: 100 MMOL/L (ref 100–108)
CHLORIDE SERPL-SCNC: 101 MMOL/L (ref 100–108)
CHLORIDE SERPL-SCNC: 102 MMOL/L (ref 100–108)
CHLORIDE SERPL-SCNC: 103 MMOL/L (ref 100–108)
CHLORIDE SERPL-SCNC: 98 MMOL/L (ref 100–108)
CHOLEST SERPL-MCNC: 155 MG/DL
CK SERPL-CCNC: 18 U/L (ref 39–308)
CLARITY UR: CLEAR
CO2 SERPL-SCNC: 25 MMOL/L (ref 21–32)
CO2 SERPL-SCNC: 27 MMOL/L (ref 21–32)
CO2 SERPL-SCNC: 28 MMOL/L (ref 21–32)
CO2 SERPL-SCNC: 28 MMOL/L (ref 21–32)
CO2 SERPL-SCNC: 29 MMOL/L (ref 21–32)
CO2 SERPL-SCNC: 30 MMOL/L (ref 21–32)
CO2 SERPL-SCNC: 32 MMOL/L (ref 21–32)
CO2 SERPL-SCNC: 32 MMOL/L (ref 21–32)
COLOR UR: YELLOW
CREAT SERPL-MCNC: 1.16 MG/DL (ref 0.6–1.3)
CREAT SERPL-MCNC: 1.17 MG/DL (ref 0.6–1.3)
CREAT SERPL-MCNC: 1.23 MG/DL (ref 0.6–1.3)
CREAT SERPL-MCNC: 1.24 MG/DL (ref 0.6–1.3)
CREAT SERPL-MCNC: 1.25 MG/DL (ref 0.6–1.3)
CREAT SERPL-MCNC: 1.26 MG/DL (ref 0.6–1.3)
CREAT SERPL-MCNC: 1.3 MG/DL (ref 0.6–1.3)
CREAT SERPL-MCNC: 1.31 MG/DL (ref 0.6–1.3)
CREAT SERPL-MCNC: 1.37 MG/DL (ref 0.6–1.3)
CREAT SERPL-MCNC: 1.39 MG/DL (ref 0.6–1.3)
CREAT SERPL-MCNC: 1.39 MG/DL (ref 0.6–1.3)
CREAT SERPL-MCNC: 1.48 MG/DL (ref 0.6–1.3)
CREAT SERPL-MCNC: 1.51 MG/DL (ref 0.6–1.3)
CREAT SERPL-MCNC: 1.61 MG/DL (ref 0.6–1.3)
CRP SERPL QL: 145.4 MG/L
CRP SERPL QL: 30.2 MG/L
CRP SERPL QL: 48.2 MG/L
CRP SERPL QL: 53.1 MG/L
D DIMER PPP FEU-MCNC: 1.41 UG/ML FEU
D DIMER PPP FEU-MCNC: 1.57 UG/ML FEU
DME PARACHUTE DELIVERY DATE ACTUAL: NORMAL
DME PARACHUTE DELIVERY DATE EXPECTED: NORMAL
DME PARACHUTE DELIVERY DATE REQUESTED: NORMAL
DME PARACHUTE DELIVERY DATE REQUESTED: NORMAL
DME PARACHUTE ITEM DESCRIPTION: NORMAL
DME PARACHUTE ORDER STATUS: NORMAL
DME PARACHUTE ORDER STATUS: NORMAL
DME PARACHUTE SUPPLIER NAME: NORMAL
DME PARACHUTE SUPPLIER NAME: NORMAL
DME PARACHUTE SUPPLIER PHONE: NORMAL
DME PARACHUTE SUPPLIER PHONE: NORMAL
E WAVE DECELERATION TIME: 299 MS
EOSINOPHIL # BLD AUTO: 0 THOUSAND/ΜL (ref 0–0.61)
EOSINOPHIL # BLD AUTO: 0.01 THOUSAND/ΜL (ref 0–0.61)
EOSINOPHIL # BLD AUTO: 0.02 THOUSAND/ΜL (ref 0–0.61)
EOSINOPHIL # BLD AUTO: 0.05 THOUSAND/ΜL (ref 0–0.61)
EOSINOPHIL # BLD AUTO: 0.14 THOUSAND/ΜL (ref 0–0.61)
EOSINOPHIL # BLD AUTO: 0.33 THOUSAND/ΜL (ref 0–0.61)
EOSINOPHIL # BLD AUTO: 0.58 THOUSAND/ΜL (ref 0–0.61)
EOSINOPHIL # BLD MANUAL: 0 THOUSAND/UL (ref 0–0.4)
EOSINOPHIL # BLD MANUAL: 0.34 THOUSAND/UL (ref 0–0.4)
EOSINOPHIL NFR BLD AUTO: 0 % (ref 0–6)
EOSINOPHIL NFR BLD AUTO: 1 % (ref 0–6)
EOSINOPHIL NFR BLD AUTO: 4 % (ref 0–6)
EOSINOPHIL NFR BLD AUTO: 5 % (ref 0–6)
EOSINOPHIL NFR BLD MANUAL: 0 % (ref 0–6)
EOSINOPHIL NFR BLD MANUAL: 2 % (ref 0–6)
ERYTHROCYTE [DISTWIDTH] IN BLOOD BY AUTOMATED COUNT: 13.6 % (ref 11.6–15.1)
ERYTHROCYTE [DISTWIDTH] IN BLOOD BY AUTOMATED COUNT: 13.7 % (ref 11.6–15.1)
ERYTHROCYTE [DISTWIDTH] IN BLOOD BY AUTOMATED COUNT: 13.7 % (ref 11.6–15.1)
ERYTHROCYTE [DISTWIDTH] IN BLOOD BY AUTOMATED COUNT: 13.8 % (ref 11.6–15.1)
ERYTHROCYTE [DISTWIDTH] IN BLOOD BY AUTOMATED COUNT: 13.8 % (ref 11.6–15.1)
ERYTHROCYTE [DISTWIDTH] IN BLOOD BY AUTOMATED COUNT: 15.6 % (ref 11.6–15.1)
ERYTHROCYTE [DISTWIDTH] IN BLOOD BY AUTOMATED COUNT: 15.6 % (ref 11.6–15.1)
ERYTHROCYTE [DISTWIDTH] IN BLOOD BY AUTOMATED COUNT: 15.7 % (ref 11.6–15.1)
ERYTHROCYTE [DISTWIDTH] IN BLOOD BY AUTOMATED COUNT: 15.8 % (ref 11.6–15.1)
ERYTHROCYTE [DISTWIDTH] IN BLOOD BY AUTOMATED COUNT: 15.9 % (ref 11.6–15.1)
ERYTHROCYTE [DISTWIDTH] IN BLOOD BY AUTOMATED COUNT: 16.3 % (ref 11.6–15.1)
FERRITIN SERPL-MCNC: 603 NG/ML (ref 8–388)
FLUAV RNA RESP QL NAA+PROBE: NEGATIVE
FLUAV RNA RESP QL NAA+PROBE: NEGATIVE
FLUBV RNA RESP QL NAA+PROBE: NEGATIVE
FLUBV RNA RESP QL NAA+PROBE: NEGATIVE
FOLATE SERPL-MCNC: 4.7 NG/ML (ref 3.1–17.5)
FRACTIONAL SHORTENING: 49 % (ref 28–44)
GFR SERPL CREATININE-BSD FRML MDRD: 37 ML/MIN/1.73SQ M
GFR SERPL CREATININE-BSD FRML MDRD: 40 ML/MIN/1.73SQ M
GFR SERPL CREATININE-BSD FRML MDRD: 41 ML/MIN/1.73SQ M
GFR SERPL CREATININE-BSD FRML MDRD: 44 ML/MIN/1.73SQ M
GFR SERPL CREATININE-BSD FRML MDRD: 44 ML/MIN/1.73SQ M
GFR SERPL CREATININE-BSD FRML MDRD: 45 ML/MIN/1.73SQ M
GFR SERPL CREATININE-BSD FRML MDRD: 48 ML/MIN/1.73SQ M
GFR SERPL CREATININE-BSD FRML MDRD: 48 ML/MIN/1.73SQ M
GFR SERPL CREATININE-BSD FRML MDRD: 50 ML/MIN/1.73SQ M
GFR SERPL CREATININE-BSD FRML MDRD: 51 ML/MIN/1.73SQ M
GFR SERPL CREATININE-BSD FRML MDRD: 51 ML/MIN/1.73SQ M
GFR SERPL CREATININE-BSD FRML MDRD: 52 ML/MIN/1.73SQ M
GFR SERPL CREATININE-BSD FRML MDRD: 55 ML/MIN/1.73SQ M
GFR SERPL CREATININE-BSD FRML MDRD: 55 ML/MIN/1.73SQ M
GLUCOSE P FAST SERPL-MCNC: 107 MG/DL (ref 65–99)
GLUCOSE P FAST SERPL-MCNC: 132 MG/DL (ref 65–99)
GLUCOSE SERPL-MCNC: 111 MG/DL (ref 65–140)
GLUCOSE SERPL-MCNC: 114 MG/DL (ref 65–140)
GLUCOSE SERPL-MCNC: 117 MG/DL (ref 65–140)
GLUCOSE SERPL-MCNC: 118 MG/DL (ref 65–140)
GLUCOSE SERPL-MCNC: 119 MG/DL (ref 65–140)
GLUCOSE SERPL-MCNC: 132 MG/DL (ref 65–140)
GLUCOSE SERPL-MCNC: 139 MG/DL (ref 65–140)
GLUCOSE SERPL-MCNC: 140 MG/DL (ref 65–140)
GLUCOSE SERPL-MCNC: 142 MG/DL (ref 65–140)
GLUCOSE SERPL-MCNC: 146 MG/DL (ref 65–140)
GLUCOSE SERPL-MCNC: 159 MG/DL (ref 65–140)
GLUCOSE SERPL-MCNC: 163 MG/DL (ref 65–140)
GLUCOSE SERPL-MCNC: 84 MG/DL (ref 65–140)
GLUCOSE UR STRIP-MCNC: NEGATIVE MG/DL
HCT VFR BLD AUTO: 24.5 % (ref 36.5–49.3)
HCT VFR BLD AUTO: 25.2 % (ref 36.5–49.3)
HCT VFR BLD AUTO: 25.9 % (ref 36.5–49.3)
HCT VFR BLD AUTO: 26.4 % (ref 36.5–49.3)
HCT VFR BLD AUTO: 27.5 % (ref 36.5–49.3)
HCT VFR BLD AUTO: 27.7 % (ref 36.5–49.3)
HCT VFR BLD AUTO: 28 % (ref 36.5–49.3)
HCT VFR BLD AUTO: 28.1 % (ref 36.5–49.3)
HCT VFR BLD AUTO: 28.2 % (ref 36.5–49.3)
HCT VFR BLD AUTO: 28.3 % (ref 36.5–49.3)
HCT VFR BLD AUTO: 28.7 % (ref 36.5–49.3)
HCT VFR BLD AUTO: 28.8 % (ref 36.5–49.3)
HCT VFR BLD AUTO: 29 % (ref 36.5–49.3)
HCT VFR BLD AUTO: 29.1 % (ref 36.5–49.3)
HCT VFR BLD AUTO: 29.2 % (ref 36.5–49.3)
HCT VFR BLD AUTO: 29.3 % (ref 36.5–49.3)
HCT VFR BLD AUTO: 29.3 % (ref 36.5–49.3)
HCT VFR BLD AUTO: 30.1 % (ref 36.5–49.3)
HCT VFR BLD AUTO: 30.9 % (ref 36.5–49.3)
HCT VFR BLD AUTO: 32.6 % (ref 36.5–49.3)
HCT VFR BLD AUTO: 33.4 % (ref 36.5–49.3)
HCT VFR BLD AUTO: 33.8 % (ref 36.5–49.3)
HCT VFR BLD AUTO: 34.6 % (ref 36.5–49.3)
HCT VFR BLD AUTO: 38.3 % (ref 36.5–49.3)
HDLC SERPL-MCNC: 74 MG/DL
HEMOCCULT STL QL: NEGATIVE
HEMOCCULT STL QL: NORMAL
HEMOCCULT STL QL: NORMAL
HGB BLD-MCNC: 10.6 G/DL (ref 12–17)
HGB BLD-MCNC: 10.9 G/DL (ref 12–17)
HGB BLD-MCNC: 11 G/DL (ref 12–17)
HGB BLD-MCNC: 11.3 G/DL (ref 12–17)
HGB BLD-MCNC: 12.2 G/DL (ref 12–17)
HGB BLD-MCNC: 7.9 G/DL (ref 12–17)
HGB BLD-MCNC: 8.3 G/DL (ref 12–17)
HGB BLD-MCNC: 8.3 G/DL (ref 12–17)
HGB BLD-MCNC: 8.5 G/DL (ref 12–17)
HGB BLD-MCNC: 8.8 G/DL (ref 12–17)
HGB BLD-MCNC: 8.9 G/DL (ref 12–17)
HGB BLD-MCNC: 8.9 G/DL (ref 12–17)
HGB BLD-MCNC: 9 G/DL (ref 12–17)
HGB BLD-MCNC: 9.1 G/DL (ref 12–17)
HGB BLD-MCNC: 9.1 G/DL (ref 12–17)
HGB BLD-MCNC: 9.2 G/DL (ref 12–17)
HGB BLD-MCNC: 9.5 G/DL (ref 12–17)
HGB BLD-MCNC: 9.7 G/DL (ref 12–17)
HGB BLD-MCNC: 9.8 G/DL (ref 12–17)
HGB UR QL STRIP.AUTO: NEGATIVE
HYPERCHROMIA BLD QL SMEAR: PRESENT
IMM GRANULOCYTES # BLD AUTO: 0.05 THOUSAND/UL (ref 0–0.2)
IMM GRANULOCYTES # BLD AUTO: 0.06 THOUSAND/UL (ref 0–0.2)
IMM GRANULOCYTES # BLD AUTO: 0.06 THOUSAND/UL (ref 0–0.2)
IMM GRANULOCYTES # BLD AUTO: 0.09 THOUSAND/UL (ref 0–0.2)
IMM GRANULOCYTES # BLD AUTO: 0.09 THOUSAND/UL (ref 0–0.2)
IMM GRANULOCYTES # BLD AUTO: 0.12 THOUSAND/UL (ref 0–0.2)
IMM GRANULOCYTES # BLD AUTO: 0.21 THOUSAND/UL (ref 0–0.2)
IMM GRANULOCYTES NFR BLD AUTO: 1 % (ref 0–2)
IMM GRANULOCYTES NFR BLD AUTO: 2 % (ref 0–2)
INR PPP: 1.12 (ref 0.84–1.19)
INR PPP: 1.15 (ref 0.84–1.19)
INR PPP: 1.24 (ref 0.84–1.19)
INTERVENTRICULAR SEPTUM IN DIASTOLE (PARASTERNAL SHORT AXIS VIEW): 0.9 CM
INTERVENTRICULAR SEPTUM: 0.9 CM (ref 0.6–1.1)
IRON SATN MFR SERPL: 56 % (ref 20–50)
IRON SERPL-MCNC: 62 UG/DL (ref 65–175)
KETONES UR STRIP-MCNC: NEGATIVE MG/DL
LA/AORTA RATIO 2D: 1
LAAS-AP2: 15 CM2
LAAS-AP4: 10.6 CM2
LACTATE SERPL-SCNC: 1.6 MMOL/L (ref 0.5–2)
LACTATE SERPL-SCNC: 1.8 MMOL/L (ref 0.5–2)
LDLC SERPL CALC-MCNC: 69 MG/DL (ref 0–100)
LEFT ATRIUM SIZE: 3.5 CM
LEFT INTERNAL DIMENSION IN SYSTOLE: 2.1 CM (ref 2.1–4)
LEFT VENTRICULAR INTERNAL DIMENSION IN DIASTOLE: 4.1 CM (ref 3.5–6)
LEFT VENTRICULAR POSTERIOR WALL IN END DIASTOLE: 0.9 CM
LEFT VENTRICULAR STROKE VOLUME: 61 ML
LEUKOCYTE ESTERASE UR QL STRIP: NEGATIVE
LVSV (TEICH): 61 ML
LYMPHOCYTES # BLD AUTO: 0.39 THOUSANDS/ΜL (ref 0.6–4.47)
LYMPHOCYTES # BLD AUTO: 0.6 THOUSANDS/ΜL (ref 0.6–4.47)
LYMPHOCYTES # BLD AUTO: 0.62 THOUSANDS/ΜL (ref 0.6–4.47)
LYMPHOCYTES # BLD AUTO: 0.72 THOUSAND/UL (ref 0.6–4.47)
LYMPHOCYTES # BLD AUTO: 0.75 THOUSANDS/ΜL (ref 0.6–4.47)
LYMPHOCYTES # BLD AUTO: 0.94 THOUSANDS/ΜL (ref 0.6–4.47)
LYMPHOCYTES # BLD AUTO: 1.02 THOUSANDS/ΜL (ref 0.6–4.47)
LYMPHOCYTES # BLD AUTO: 1.53 THOUSAND/UL (ref 0.6–4.47)
LYMPHOCYTES # BLD AUTO: 2.38 THOUSANDS/ΜL (ref 0.6–4.47)
LYMPHOCYTES # BLD AUTO: 7 % (ref 14–44)
LYMPHOCYTES # BLD AUTO: 9 % (ref 14–44)
LYMPHOCYTES NFR BLD AUTO: 26 % (ref 14–44)
LYMPHOCYTES NFR BLD AUTO: 5 % (ref 14–44)
LYMPHOCYTES NFR BLD AUTO: 5 % (ref 14–44)
LYMPHOCYTES NFR BLD AUTO: 8 % (ref 14–44)
LYMPHOCYTES NFR BLD AUTO: 8 % (ref 14–44)
LYMPHOCYTES NFR BLD AUTO: 9 % (ref 14–44)
LYMPHOCYTES NFR BLD AUTO: 9 % (ref 14–44)
MAGNESIUM SERPL-MCNC: 1.6 MG/DL (ref 1.6–2.6)
MAGNESIUM SERPL-MCNC: 1.8 MG/DL (ref 1.6–2.6)
MCH RBC QN AUTO: 29.5 PG (ref 26.8–34.3)
MCH RBC QN AUTO: 29.6 PG (ref 26.8–34.3)
MCH RBC QN AUTO: 30 PG (ref 26.8–34.3)
MCH RBC QN AUTO: 30.1 PG (ref 26.8–34.3)
MCH RBC QN AUTO: 30.1 PG (ref 26.8–34.3)
MCH RBC QN AUTO: 30.3 PG (ref 26.8–34.3)
MCH RBC QN AUTO: 30.3 PG (ref 26.8–34.3)
MCH RBC QN AUTO: 30.4 PG (ref 26.8–34.3)
MCH RBC QN AUTO: 30.5 PG (ref 26.8–34.3)
MCH RBC QN AUTO: 30.6 PG (ref 26.8–34.3)
MCH RBC QN AUTO: 30.8 PG (ref 26.8–34.3)
MCHC RBC AUTO-ENTMCNC: 31.3 G/DL (ref 31.4–37.4)
MCHC RBC AUTO-ENTMCNC: 31.6 G/DL (ref 31.4–37.4)
MCHC RBC AUTO-ENTMCNC: 31.7 G/DL (ref 31.4–37.4)
MCHC RBC AUTO-ENTMCNC: 31.9 G/DL (ref 31.4–37.4)
MCHC RBC AUTO-ENTMCNC: 32 G/DL (ref 31.4–37.4)
MCHC RBC AUTO-ENTMCNC: 32.2 G/DL (ref 31.4–37.4)
MCHC RBC AUTO-ENTMCNC: 32.4 G/DL (ref 31.4–37.4)
MCHC RBC AUTO-ENTMCNC: 32.5 G/DL (ref 31.4–37.4)
MCHC RBC AUTO-ENTMCNC: 32.5 G/DL (ref 31.4–37.4)
MCHC RBC AUTO-ENTMCNC: 32.6 G/DL (ref 31.4–37.4)
MCHC RBC AUTO-ENTMCNC: 32.6 G/DL (ref 31.4–37.4)
MCHC RBC AUTO-ENTMCNC: 32.7 G/DL (ref 31.4–37.4)
MCHC RBC AUTO-ENTMCNC: 32.8 G/DL (ref 31.4–37.4)
MCHC RBC AUTO-ENTMCNC: 33.1 G/DL (ref 31.4–37.4)
MCHC RBC AUTO-ENTMCNC: 33.1 G/DL (ref 31.4–37.4)
MCV RBC AUTO: 92 FL (ref 82–98)
MCV RBC AUTO: 92 FL (ref 82–98)
MCV RBC AUTO: 93 FL (ref 82–98)
MCV RBC AUTO: 94 FL (ref 82–98)
MCV RBC AUTO: 95 FL (ref 82–98)
MONOCYTES # BLD AUTO: 0 THOUSAND/UL (ref 0–1.22)
MONOCYTES # BLD AUTO: 0 THOUSAND/UL (ref 0–1.22)
MONOCYTES # BLD AUTO: 0.09 THOUSAND/ΜL (ref 0.17–1.22)
MONOCYTES # BLD AUTO: 0.22 THOUSAND/ΜL (ref 0.17–1.22)
MONOCYTES # BLD AUTO: 0.3 THOUSAND/ΜL (ref 0.17–1.22)
MONOCYTES # BLD AUTO: 0.49 THOUSAND/ΜL (ref 0.17–1.22)
MONOCYTES # BLD AUTO: 0.52 THOUSAND/ΜL (ref 0.17–1.22)
MONOCYTES # BLD AUTO: 0.74 THOUSAND/ΜL (ref 0.17–1.22)
MONOCYTES # BLD AUTO: 0.92 THOUSAND/ΜL (ref 0.17–1.22)
MONOCYTES NFR BLD AUTO: 1 % (ref 4–12)
MONOCYTES NFR BLD AUTO: 3 % (ref 4–12)
MONOCYTES NFR BLD AUTO: 3 % (ref 4–12)
MONOCYTES NFR BLD AUTO: 4 % (ref 4–12)
MONOCYTES NFR BLD AUTO: 5 % (ref 4–12)
MONOCYTES NFR BLD AUTO: 8 % (ref 4–12)
MONOCYTES NFR BLD AUTO: 8 % (ref 4–12)
MONOCYTES NFR BLD: 0 % (ref 4–12)
MONOCYTES NFR BLD: 0 % (ref 4–12)
MV E'TISSUE VEL-SEP: 5 CM/S
MV PEAK A VEL: 0.84 M/S
MV PEAK E VEL: 51 CM/S
MV STENOSIS PRESSURE HALF TIME: 88 MS
MV VALVE AREA P 1/2 METHOD: 2.5 CM2
MYELOCYTES NFR BLD MANUAL: 1 % (ref 0–1)
NEUTROPHILS # BLD AUTO: 10.02 THOUSANDS/ΜL (ref 1.85–7.62)
NEUTROPHILS # BLD AUTO: 5.49 THOUSANDS/ΜL (ref 1.85–7.62)
NEUTROPHILS # BLD AUTO: 7.35 THOUSANDS/ΜL (ref 1.85–7.62)
NEUTROPHILS # BLD AUTO: 7.75 THOUSANDS/ΜL (ref 1.85–7.62)
NEUTROPHILS # BLD AUTO: 8.31 THOUSANDS/ΜL (ref 1.85–7.62)
NEUTROPHILS # BLD AUTO: 8.96 THOUSANDS/ΜL (ref 1.85–7.62)
NEUTROPHILS # BLD AUTO: 9.62 THOUSANDS/ΜL (ref 1.85–7.62)
NEUTROPHILS # BLD MANUAL: 15.09 THOUSAND/UL (ref 1.85–7.62)
NEUTROPHILS # BLD MANUAL: 9.42 THOUSAND/UL (ref 1.85–7.62)
NEUTS SEG NFR BLD AUTO: 60 % (ref 43–75)
NEUTS SEG NFR BLD AUTO: 81 % (ref 43–75)
NEUTS SEG NFR BLD AUTO: 81 % (ref 43–75)
NEUTS SEG NFR BLD AUTO: 88 % (ref 43–75)
NEUTS SEG NFR BLD AUTO: 89 % (ref 43–75)
NEUTS SEG NFR BLD AUTO: 92 % (ref 43–75)
NEUTS SEG NFR BLD AUTO: 93 % (ref 43–75)
NITRITE UR QL STRIP: NEGATIVE
NON-SQ EPI CELLS URNS QL MICRO: NORMAL /HPF
NONHDLC SERPL-MCNC: 81 MG/DL
NRBC BLD AUTO-RTO: 0 /100 WBCS
NT-PROBNP SERPL-MCNC: 1674 PG/ML
NT-PROBNP SERPL-MCNC: 1690 PG/ML
NT-PROBNP SERPL-MCNC: 1705 PG/ML
P AXIS: 28 DEGREES
P AXIS: 47 DEGREES
P AXIS: 48 DEGREES
P AXIS: 54 DEGREES
P AXIS: 70 DEGREES
P AXIS: 80 DEGREES
PH UR STRIP.AUTO: 6 [PH]
PHOSPHATE SERPL-MCNC: 5.3 MG/DL (ref 2.3–4.1)
PLATELET # BLD AUTO: 129 THOUSANDS/UL (ref 149–390)
PLATELET # BLD AUTO: 137 THOUSANDS/UL (ref 149–390)
PLATELET # BLD AUTO: 137 THOUSANDS/UL (ref 149–390)
PLATELET # BLD AUTO: 170 THOUSANDS/UL (ref 149–390)
PLATELET # BLD AUTO: 191 THOUSANDS/UL (ref 149–390)
PLATELET # BLD AUTO: 191 THOUSANDS/UL (ref 149–390)
PLATELET # BLD AUTO: 194 THOUSANDS/UL (ref 149–390)
PLATELET # BLD AUTO: 218 THOUSANDS/UL (ref 149–390)
PLATELET # BLD AUTO: 244 THOUSANDS/UL (ref 149–390)
PLATELET # BLD AUTO: 258 THOUSANDS/UL (ref 149–390)
PLATELET # BLD AUTO: 262 THOUSANDS/UL (ref 149–390)
PLATELET # BLD AUTO: 288 THOUSANDS/UL (ref 149–390)
PLATELET # BLD AUTO: 303 THOUSANDS/UL (ref 149–390)
PLATELET # BLD AUTO: 333 THOUSANDS/UL (ref 149–390)
PLATELET # BLD AUTO: 349 THOUSANDS/UL (ref 149–390)
PLATELET # BLD AUTO: 354 THOUSANDS/UL (ref 149–390)
PLATELET BLD QL SMEAR: ADEQUATE
PLATELET BLD QL SMEAR: ADEQUATE
PMV BLD AUTO: 10.2 FL (ref 8.9–12.7)
PMV BLD AUTO: 10.6 FL (ref 8.9–12.7)
PMV BLD AUTO: 10.8 FL (ref 8.9–12.7)
PMV BLD AUTO: 10.8 FL (ref 8.9–12.7)
PMV BLD AUTO: 10.9 FL (ref 8.9–12.7)
PMV BLD AUTO: 11 FL (ref 8.9–12.7)
PMV BLD AUTO: 11 FL (ref 8.9–12.7)
PMV BLD AUTO: 11.1 FL (ref 8.9–12.7)
PMV BLD AUTO: 11.2 FL (ref 8.9–12.7)
PMV BLD AUTO: 11.2 FL (ref 8.9–12.7)
PMV BLD AUTO: 11.4 FL (ref 8.9–12.7)
PMV BLD AUTO: 9.4 FL (ref 8.9–12.7)
PMV BLD AUTO: 9.7 FL (ref 8.9–12.7)
PMV BLD AUTO: 9.7 FL (ref 8.9–12.7)
PMV BLD AUTO: 9.8 FL (ref 8.9–12.7)
PMV BLD AUTO: 9.8 FL (ref 8.9–12.7)
POTASSIUM SERPL-SCNC: 3.7 MMOL/L (ref 3.5–5.3)
POTASSIUM SERPL-SCNC: 3.9 MMOL/L (ref 3.5–5.3)
POTASSIUM SERPL-SCNC: 3.9 MMOL/L (ref 3.5–5.3)
POTASSIUM SERPL-SCNC: 4.1 MMOL/L (ref 3.5–5.3)
POTASSIUM SERPL-SCNC: 4.2 MMOL/L (ref 3.5–5.3)
POTASSIUM SERPL-SCNC: 4.4 MMOL/L (ref 3.5–5.3)
POTASSIUM SERPL-SCNC: 4.5 MMOL/L (ref 3.5–5.3)
POTASSIUM SERPL-SCNC: 4.6 MMOL/L (ref 3.5–5.3)
POTASSIUM SERPL-SCNC: 4.7 MMOL/L (ref 3.5–5.3)
POTASSIUM SERPL-SCNC: 4.9 MMOL/L (ref 3.5–5.3)
POTASSIUM SERPL-SCNC: 5.3 MMOL/L (ref 3.5–5.3)
PR INTERVAL: 142 MS
PR INTERVAL: 148 MS
PR INTERVAL: 192 MS
PR INTERVAL: 206 MS
PR INTERVAL: 210 MS
PR INTERVAL: 216 MS
PR INTERVAL: 222 MS
PROCALCITONIN SERPL-MCNC: 0.15 NG/ML
PROCALCITONIN SERPL-MCNC: 0.18 NG/ML
PROCALCITONIN SERPL-MCNC: 0.19 NG/ML
PROCALCITONIN SERPL-MCNC: 0.2 NG/ML
PROCALCITONIN SERPL-MCNC: 0.23 NG/ML
PROCALCITONIN SERPL-MCNC: 0.23 NG/ML
PROCALCITONIN SERPL-MCNC: 0.77 NG/ML
PROT SERPL-MCNC: 5.9 G/DL (ref 6.4–8.2)
PROT SERPL-MCNC: 5.9 G/DL (ref 6.4–8.2)
PROT SERPL-MCNC: 6.1 G/DL (ref 6.4–8.2)
PROT SERPL-MCNC: 6.6 G/DL (ref 6.4–8.2)
PROT SERPL-MCNC: 6.8 G/DL (ref 6.4–8.2)
PROT SERPL-MCNC: 7.2 G/DL (ref 6.4–8.2)
PROT UR STRIP-MCNC: NEGATIVE MG/DL
PROTHROMBIN TIME: 13.9 SECONDS (ref 11.6–14.5)
PROTHROMBIN TIME: 14.2 SECONDS (ref 11.6–14.5)
PROTHROMBIN TIME: 15.1 SECONDS (ref 11.6–14.5)
QRS AXIS: -42 DEGREES
QRS AXIS: -44 DEGREES
QRS AXIS: -45 DEGREES
QRS AXIS: -46 DEGREES
QRS AXIS: -46 DEGREES
QRS AXIS: -53 DEGREES
QRS AXIS: -62 DEGREES
QRSD INTERVAL: 108 MS
QRSD INTERVAL: 122 MS
QRSD INTERVAL: 130 MS
QRSD INTERVAL: 132 MS
QRSD INTERVAL: 132 MS
QRSD INTERVAL: 136 MS
QRSD INTERVAL: 140 MS
QT INTERVAL: 286 MS
QT INTERVAL: 350 MS
QT INTERVAL: 390 MS
QT INTERVAL: 420 MS
QT INTERVAL: 422 MS
QT INTERVAL: 426 MS
QT INTERVAL: 434 MS
QTC INTERVAL: 402 MS
QTC INTERVAL: 421 MS
QTC INTERVAL: 429 MS
QTC INTERVAL: 435 MS
QTC INTERVAL: 446 MS
QTC INTERVAL: 456 MS
QTC INTERVAL: 464 MS
RBC # BLD AUTO: 2.63 MILLION/UL (ref 3.88–5.62)
RBC # BLD AUTO: 2.76 MILLION/UL (ref 3.88–5.62)
RBC # BLD AUTO: 2.99 MILLION/UL (ref 3.88–5.62)
RBC # BLD AUTO: 3.02 MILLION/UL (ref 3.88–5.62)
RBC # BLD AUTO: 3.05 MILLION/UL (ref 3.88–5.62)
RBC # BLD AUTO: 3.08 MILLION/UL (ref 3.88–5.62)
RBC # BLD AUTO: 3.12 MILLION/UL (ref 3.88–5.62)
RBC # BLD AUTO: 3.19 MILLION/UL (ref 3.88–5.62)
RBC # BLD AUTO: 3.21 MILLION/UL (ref 3.88–5.62)
RBC # BLD AUTO: 3.26 MILLION/UL (ref 3.88–5.62)
RBC # BLD AUTO: 3.44 MILLION/UL (ref 3.88–5.62)
RBC # BLD AUTO: 3.54 MILLION/UL (ref 3.88–5.62)
RBC # BLD AUTO: 3.63 MILLION/UL (ref 3.88–5.62)
RBC # BLD AUTO: 3.69 MILLION/UL (ref 3.88–5.62)
RBC # BLD AUTO: 4.02 MILLION/UL (ref 3.88–5.62)
RBC #/AREA URNS AUTO: NORMAL /HPF
RH BLD: POSITIVE
RSV RNA RESP QL NAA+PROBE: NEGATIVE
RSV RNA RESP QL NAA+PROBE: NEGATIVE
SARS-COV-2 RNA RESP QL NAA+PROBE: NEGATIVE
SARS-COV-2 RNA RESP QL NAA+PROBE: POSITIVE
SCHISTOCYTES BLD QL SMEAR: PRESENT
SL CV LV EF: 60
SL CV PED ECHO LEFT VENTRICLE DIASTOLIC VOLUME (MOD BIPLANE) 2D: 75 ML
SL CV PED ECHO LEFT VENTRICLE SYSTOLIC VOLUME (MOD BIPLANE) 2D: 14 ML
SODIUM SERPL-SCNC: 133 MMOL/L (ref 136–145)
SODIUM SERPL-SCNC: 136 MMOL/L (ref 136–145)
SODIUM SERPL-SCNC: 137 MMOL/L (ref 136–145)
SODIUM SERPL-SCNC: 138 MMOL/L (ref 136–145)
SODIUM SERPL-SCNC: 138 MMOL/L (ref 136–145)
SODIUM SERPL-SCNC: 139 MMOL/L (ref 136–145)
SODIUM SERPL-SCNC: 140 MMOL/L (ref 136–145)
SP GR UR STRIP.AUTO: 1.02 (ref 1–1.03)
T WAVE AXIS: -11 DEGREES
T WAVE AXIS: -16 DEGREES
T WAVE AXIS: -7 DEGREES
T WAVE AXIS: -9 DEGREES
T WAVE AXIS: 23 DEGREES
T WAVE AXIS: 23 DEGREES
T WAVE AXIS: 45 DEGREES
TIBC SERPL-MCNC: 110 UG/DL (ref 250–450)
TR MAX PG: 45 MMHG
TR PEAK VELOCITY: 3.4 M/S
TRICUSPID ANNULAR PLANE SYSTOLIC EXCURSION: 1.4 CM
TRICUSPID VALVE PEAK REGURGITATION VELOCITY: 3.37 M/S
TRIGL SERPL-MCNC: 62 MG/DL
UROBILINOGEN UR QL STRIP.AUTO: 0.2 E.U./DL
VENTRICULAR RATE: 119 BPM
VENTRICULAR RATE: 59 BPM
VENTRICULAR RATE: 68 BPM
VENTRICULAR RATE: 69 BPM
VENTRICULAR RATE: 70 BPM
VENTRICULAR RATE: 73 BPM
VENTRICULAR RATE: 93 BPM
VIT B12 SERPL-MCNC: 2278 PG/ML (ref 100–900)
WBC # BLD AUTO: 10.24 THOUSAND/UL (ref 4.31–10.16)
WBC # BLD AUTO: 11.07 THOUSAND/UL (ref 4.31–10.16)
WBC # BLD AUTO: 11.41 THOUSAND/UL (ref 4.31–10.16)
WBC # BLD AUTO: 11.87 THOUSAND/UL (ref 4.31–10.16)
WBC # BLD AUTO: 12.16 THOUSAND/UL (ref 4.31–10.16)
WBC # BLD AUTO: 13.69 THOUSAND/UL (ref 4.31–10.16)
WBC # BLD AUTO: 13.71 THOUSAND/UL (ref 4.31–10.16)
WBC # BLD AUTO: 14.59 THOUSAND/UL (ref 4.31–10.16)
WBC # BLD AUTO: 16.95 THOUSAND/UL (ref 4.31–10.16)
WBC # BLD AUTO: 17.31 THOUSAND/UL (ref 4.31–10.16)
WBC # BLD AUTO: 8.27 THOUSAND/UL (ref 4.31–10.16)
WBC # BLD AUTO: 8.34 THOUSAND/UL (ref 4.31–10.16)
WBC # BLD AUTO: 9.04 THOUSAND/UL (ref 4.31–10.16)
WBC # BLD AUTO: 9.42 THOUSAND/UL (ref 4.31–10.16)
WBC # BLD AUTO: 9.93 THOUSAND/UL (ref 4.31–10.16)
WBC #/AREA URNS AUTO: NORMAL /HPF

## 2022-01-01 PROCEDURE — 85027 COMPLETE CBC AUTOMATED: CPT | Performed by: INTERNAL MEDICINE

## 2022-01-01 PROCEDURE — 85610 PROTHROMBIN TIME: CPT | Performed by: INTERNAL MEDICINE

## 2022-01-01 PROCEDURE — G0299 HHS/HOSPICE OF RN EA 15 MIN: HCPCS

## 2022-01-01 PROCEDURE — 85014 HEMATOCRIT: CPT | Performed by: NURSE PRACTITIONER

## 2022-01-01 PROCEDURE — 99285 EMERGENCY DEPT VISIT HI MDM: CPT | Performed by: PHYSICIAN ASSISTANT

## 2022-01-01 PROCEDURE — 82607 VITAMIN B-12: CPT | Performed by: PHYSICIAN ASSISTANT

## 2022-01-01 PROCEDURE — 99285 EMERGENCY DEPT VISIT HI MDM: CPT | Performed by: EMERGENCY MEDICINE

## 2022-01-01 PROCEDURE — 99233 SBSQ HOSP IP/OBS HIGH 50: CPT | Performed by: NURSE PRACTITIONER

## 2022-01-01 PROCEDURE — 94640 AIRWAY INHALATION TREATMENT: CPT

## 2022-01-01 PROCEDURE — 80048 BASIC METABOLIC PNL TOTAL CA: CPT | Performed by: FAMILY MEDICINE

## 2022-01-01 PROCEDURE — 85014 HEMATOCRIT: CPT | Performed by: PHYSICIAN ASSISTANT

## 2022-01-01 PROCEDURE — 93010 ELECTROCARDIOGRAM REPORT: CPT | Performed by: INTERNAL MEDICINE

## 2022-01-01 PROCEDURE — 80053 COMPREHEN METABOLIC PANEL: CPT | Performed by: INTERNAL MEDICINE

## 2022-01-01 PROCEDURE — T2042 HOSPICE ROUTINE HOME CARE: HCPCS

## 2022-01-01 PROCEDURE — 80076 HEPATIC FUNCTION PANEL: CPT | Performed by: EMERGENCY MEDICINE

## 2022-01-01 PROCEDURE — 94760 N-INVAS EAR/PLS OXIMETRY 1: CPT

## 2022-01-01 PROCEDURE — 85018 HEMOGLOBIN: CPT | Performed by: NURSE PRACTITIONER

## 2022-01-01 PROCEDURE — 99497 ADVNCD CARE PLAN 30 MIN: CPT | Performed by: INTERNAL MEDICINE

## 2022-01-01 PROCEDURE — 80048 BASIC METABOLIC PNL TOTAL CA: CPT | Performed by: PHYSICIAN ASSISTANT

## 2022-01-01 PROCEDURE — 85379 FIBRIN DEGRADATION QUANT: CPT | Performed by: PHYSICIAN ASSISTANT

## 2022-01-01 PROCEDURE — 99232 SBSQ HOSP IP/OBS MODERATE 35: CPT | Performed by: PHYSICIAN ASSISTANT

## 2022-01-01 PROCEDURE — 1160F RVW MEDS BY RX/DR IN RCRD: CPT | Performed by: ORTHOPAEDIC SURGERY

## 2022-01-01 PROCEDURE — 85025 COMPLETE CBC W/AUTO DIFF WBC: CPT | Performed by: PHYSICIAN ASSISTANT

## 2022-01-01 PROCEDURE — 94618 PULMONARY STRESS TESTING: CPT | Performed by: INTERNAL MEDICINE

## 2022-01-01 PROCEDURE — 82728 ASSAY OF FERRITIN: CPT | Performed by: PHYSICIAN ASSISTANT

## 2022-01-01 PROCEDURE — 84484 ASSAY OF TROPONIN QUANT: CPT | Performed by: EMERGENCY MEDICINE

## 2022-01-01 PROCEDURE — 99285 EMERGENCY DEPT VISIT HI MDM: CPT

## 2022-01-01 PROCEDURE — 1160F RVW MEDS BY RX/DR IN RCRD: CPT | Performed by: INTERNAL MEDICINE

## 2022-01-01 PROCEDURE — 36415 COLL VENOUS BLD VENIPUNCTURE: CPT | Performed by: PHYSICIAN ASSISTANT

## 2022-01-01 PROCEDURE — 70450 CT HEAD/BRAIN W/O DYE: CPT

## 2022-01-01 PROCEDURE — 87040 BLOOD CULTURE FOR BACTERIA: CPT | Performed by: EMERGENCY MEDICINE

## 2022-01-01 PROCEDURE — 96365 THER/PROPH/DIAG IV INF INIT: CPT

## 2022-01-01 PROCEDURE — 96361 HYDRATE IV INFUSION ADD-ON: CPT

## 2022-01-01 PROCEDURE — 85610 PROTHROMBIN TIME: CPT | Performed by: PHYSICIAN ASSISTANT

## 2022-01-01 PROCEDURE — 85027 COMPLETE CBC AUTOMATED: CPT | Performed by: PHYSICIAN ASSISTANT

## 2022-01-01 PROCEDURE — 93005 ELECTROCARDIOGRAM TRACING: CPT

## 2022-01-01 PROCEDURE — 84145 PROCALCITONIN (PCT): CPT | Performed by: PHYSICIAN ASSISTANT

## 2022-01-01 PROCEDURE — 85018 HEMOGLOBIN: CPT | Performed by: PHYSICIAN ASSISTANT

## 2022-01-01 PROCEDURE — 85730 THROMBOPLASTIN TIME PARTIAL: CPT | Performed by: FAMILY MEDICINE

## 2022-01-01 PROCEDURE — 80048 BASIC METABOLIC PNL TOTAL CA: CPT | Performed by: INTERNAL MEDICINE

## 2022-01-01 PROCEDURE — 84100 ASSAY OF PHOSPHORUS: CPT | Performed by: INTERNAL MEDICINE

## 2022-01-01 PROCEDURE — 85025 COMPLETE CBC W/AUTO DIFF WBC: CPT | Performed by: EMERGENCY MEDICINE

## 2022-01-01 PROCEDURE — 83550 IRON BINDING TEST: CPT | Performed by: PHYSICIAN ASSISTANT

## 2022-01-01 PROCEDURE — 71250 CT THORAX DX C-: CPT

## 2022-01-01 PROCEDURE — 0241U HB NFCT DS VIR RESP RNA 4 TRGT: CPT | Performed by: EMERGENCY MEDICINE

## 2022-01-01 PROCEDURE — 97530 THERAPEUTIC ACTIVITIES: CPT

## 2022-01-01 PROCEDURE — 94664 DEMO&/EVAL PT USE INHALER: CPT

## 2022-01-01 PROCEDURE — 99222 1ST HOSP IP/OBS MODERATE 55: CPT | Performed by: INTERNAL MEDICINE

## 2022-01-01 PROCEDURE — XW033H5 INTRODUCTION OF TOCILIZUMAB INTO PERIPHERAL VEIN, PERCUTANEOUS APPROACH, NEW TECHNOLOGY GROUP 5: ICD-10-PCS | Performed by: FAMILY MEDICINE

## 2022-01-01 PROCEDURE — 85027 COMPLETE CBC AUTOMATED: CPT | Performed by: EMERGENCY MEDICINE

## 2022-01-01 PROCEDURE — 73030 X-RAY EXAM OF SHOULDER: CPT

## 2022-01-01 PROCEDURE — 83880 ASSAY OF NATRIURETIC PEPTIDE: CPT | Performed by: EMERGENCY MEDICINE

## 2022-01-01 PROCEDURE — 85025 COMPLETE CBC W/AUTO DIFF WBC: CPT | Performed by: FAMILY MEDICINE

## 2022-01-01 PROCEDURE — S9083 URGENT CARE CENTER GLOBAL: HCPCS | Performed by: PHYSICIAN ASSISTANT

## 2022-01-01 PROCEDURE — 84145 PROCALCITONIN (PCT): CPT | Performed by: EMERGENCY MEDICINE

## 2022-01-01 PROCEDURE — 99239 HOSP IP/OBS DSCHRG MGMT >30: CPT | Performed by: FAMILY MEDICINE

## 2022-01-01 PROCEDURE — 99284 EMERGENCY DEPT VISIT MOD MDM: CPT

## 2022-01-01 PROCEDURE — 99220 PR INITIAL OBSERVATION CARE/DAY 70 MINUTES: CPT | Performed by: INTERNAL MEDICINE

## 2022-01-01 PROCEDURE — 85007 BL SMEAR W/DIFF WBC COUNT: CPT | Performed by: INTERNAL MEDICINE

## 2022-01-01 PROCEDURE — 99223 1ST HOSP IP/OBS HIGH 75: CPT | Performed by: INTERNAL MEDICINE

## 2022-01-01 PROCEDURE — 86140 C-REACTIVE PROTEIN: CPT | Performed by: PHYSICIAN ASSISTANT

## 2022-01-01 PROCEDURE — 80053 COMPREHEN METABOLIC PANEL: CPT | Performed by: PHYSICIAN ASSISTANT

## 2022-01-01 PROCEDURE — 85730 THROMBOPLASTIN TIME PARTIAL: CPT | Performed by: INTERNAL MEDICINE

## 2022-01-01 PROCEDURE — 72125 CT NECK SPINE W/O DYE: CPT

## 2022-01-01 PROCEDURE — 94668 MNPJ CHEST WALL SBSQ: CPT

## 2022-01-01 PROCEDURE — 85730 THROMBOPLASTIN TIME PARTIAL: CPT | Performed by: EMERGENCY MEDICINE

## 2022-01-01 PROCEDURE — 80048 BASIC METABOLIC PNL TOTAL CA: CPT

## 2022-01-01 PROCEDURE — 97163 PT EVAL HIGH COMPLEX 45 MIN: CPT

## 2022-01-01 PROCEDURE — 36415 COLL VENOUS BLD VENIPUNCTURE: CPT

## 2022-01-01 PROCEDURE — 36415 COLL VENOUS BLD VENIPUNCTURE: CPT | Performed by: EMERGENCY MEDICINE

## 2022-01-01 PROCEDURE — 99233 SBSQ HOSP IP/OBS HIGH 50: CPT | Performed by: INTERNAL MEDICINE

## 2022-01-01 PROCEDURE — 93306 TTE W/DOPPLER COMPLETE: CPT | Performed by: INTERNAL MEDICINE

## 2022-01-01 PROCEDURE — 82272 OCCULT BLD FECES 1-3 TESTS: CPT | Performed by: PHYSICIAN ASSISTANT

## 2022-01-01 PROCEDURE — 83605 ASSAY OF LACTIC ACID: CPT | Performed by: EMERGENCY MEDICINE

## 2022-01-01 PROCEDURE — 86140 C-REACTIVE PROTEIN: CPT | Performed by: FAMILY MEDICINE

## 2022-01-01 PROCEDURE — 71045 X-RAY EXAM CHEST 1 VIEW: CPT

## 2022-01-01 PROCEDURE — 10330087 HSPC SERVICE INTENSITY ADD-ON

## 2022-01-01 PROCEDURE — 85610 PROTHROMBIN TIME: CPT | Performed by: EMERGENCY MEDICINE

## 2022-01-01 PROCEDURE — 84145 PROCALCITONIN (PCT): CPT | Performed by: FAMILY MEDICINE

## 2022-01-01 PROCEDURE — 99284 EMERGENCY DEPT VISIT MOD MDM: CPT | Performed by: EMERGENCY MEDICINE

## 2022-01-01 PROCEDURE — 85049 AUTOMATED PLATELET COUNT: CPT | Performed by: INTERNAL MEDICINE

## 2022-01-01 PROCEDURE — 99232 SBSQ HOSP IP/OBS MODERATE 35: CPT | Performed by: INTERNAL MEDICINE

## 2022-01-01 PROCEDURE — 97110 THERAPEUTIC EXERCISES: CPT

## 2022-01-01 PROCEDURE — 97167 OT EVAL HIGH COMPLEX 60 MIN: CPT

## 2022-01-01 PROCEDURE — 99232 SBSQ HOSP IP/OBS MODERATE 35: CPT | Performed by: FAMILY MEDICINE

## 2022-01-01 PROCEDURE — 84484 ASSAY OF TROPONIN QUANT: CPT | Performed by: PHYSICIAN ASSISTANT

## 2022-01-01 PROCEDURE — 86140 C-REACTIVE PROTEIN: CPT | Performed by: INTERNAL MEDICINE

## 2022-01-01 PROCEDURE — 82746 ASSAY OF FOLIC ACID SERUM: CPT | Performed by: PHYSICIAN ASSISTANT

## 2022-01-01 PROCEDURE — G0156 HHCP-SVS OF AIDE,EA 15 MIN: HCPCS

## 2022-01-01 PROCEDURE — 83735 ASSAY OF MAGNESIUM: CPT | Performed by: INTERNAL MEDICINE

## 2022-01-01 PROCEDURE — G1004 CDSM NDSC: HCPCS

## 2022-01-01 PROCEDURE — 84484 ASSAY OF TROPONIN QUANT: CPT | Performed by: INTERNAL MEDICINE

## 2022-01-01 PROCEDURE — 99215 OFFICE O/P EST HI 40 MIN: CPT | Performed by: INTERNAL MEDICINE

## 2022-01-01 PROCEDURE — 86900 BLOOD TYPING SEROLOGIC ABO: CPT | Performed by: INTERNAL MEDICINE

## 2022-01-01 PROCEDURE — 85025 COMPLETE CBC W/AUTO DIFF WBC: CPT | Performed by: INTERNAL MEDICINE

## 2022-01-01 PROCEDURE — 85007 BL SMEAR W/DIFF WBC COUNT: CPT | Performed by: EMERGENCY MEDICINE

## 2022-01-01 PROCEDURE — 80048 BASIC METABOLIC PNL TOTAL CA: CPT | Performed by: NURSE PRACTITIONER

## 2022-01-01 PROCEDURE — 93306 TTE W/DOPPLER COMPLETE: CPT

## 2022-01-01 PROCEDURE — 99222 1ST HOSP IP/OBS MODERATE 55: CPT | Performed by: PHYSICIAN ASSISTANT

## 2022-01-01 PROCEDURE — 83880 ASSAY OF NATRIURETIC PEPTIDE: CPT | Performed by: PHYSICIAN ASSISTANT

## 2022-01-01 PROCEDURE — 85025 COMPLETE CBC W/AUTO DIFF WBC: CPT

## 2022-01-01 PROCEDURE — 80048 BASIC METABOLIC PNL TOTAL CA: CPT | Performed by: EMERGENCY MEDICINE

## 2022-01-01 PROCEDURE — 96366 THER/PROPH/DIAG IV INF ADDON: CPT

## 2022-01-01 PROCEDURE — 96367 TX/PROPH/DG ADDL SEQ IV INF: CPT

## 2022-01-01 PROCEDURE — 85027 COMPLETE CBC AUTOMATED: CPT | Performed by: NURSE PRACTITIONER

## 2022-01-01 PROCEDURE — 84145 PROCALCITONIN (PCT): CPT | Performed by: INTERNAL MEDICINE

## 2022-01-01 PROCEDURE — 80061 LIPID PANEL: CPT

## 2022-01-01 PROCEDURE — 83605 ASSAY OF LACTIC ACID: CPT | Performed by: PHYSICIAN ASSISTANT

## 2022-01-01 PROCEDURE — 71046 X-RAY EXAM CHEST 2 VIEWS: CPT

## 2022-01-01 PROCEDURE — 87040 BLOOD CULTURE FOR BACTERIA: CPT | Performed by: PHYSICIAN ASSISTANT

## 2022-01-01 PROCEDURE — 99239 HOSP IP/OBS DSCHRG MGMT >30: CPT | Performed by: PHYSICIAN ASSISTANT

## 2022-01-01 PROCEDURE — 94761 N-INVAS EAR/PLS OXIMETRY MLT: CPT

## 2022-01-01 PROCEDURE — 99203 OFFICE O/P NEW LOW 30 MIN: CPT | Performed by: PHYSICIAN ASSISTANT

## 2022-01-01 PROCEDURE — 82550 ASSAY OF CK (CPK): CPT | Performed by: INTERNAL MEDICINE

## 2022-01-01 PROCEDURE — 99204 OFFICE O/P NEW MOD 45 MIN: CPT | Performed by: ORTHOPAEDIC SURGERY

## 2022-01-01 PROCEDURE — 97116 GAIT TRAINING THERAPY: CPT

## 2022-01-01 PROCEDURE — 83540 ASSAY OF IRON: CPT | Performed by: PHYSICIAN ASSISTANT

## 2022-01-01 PROCEDURE — 86901 BLOOD TYPING SEROLOGIC RH(D): CPT | Performed by: INTERNAL MEDICINE

## 2022-01-01 PROCEDURE — XW033E5 INTRODUCTION OF REMDESIVIR ANTI-INFECTIVE INTO PERIPHERAL VEIN, PERCUTANEOUS APPROACH, NEW TECHNOLOGY GROUP 5: ICD-10-PCS | Performed by: FAMILY MEDICINE

## 2022-01-01 PROCEDURE — 97166 OT EVAL MOD COMPLEX 45 MIN: CPT

## 2022-01-01 PROCEDURE — 81001 URINALYSIS AUTO W/SCOPE: CPT | Performed by: NURSE PRACTITIONER

## 2022-01-01 PROCEDURE — 85730 THROMBOPLASTIN TIME PARTIAL: CPT | Performed by: PHYSICIAN ASSISTANT

## 2022-01-01 PROCEDURE — 85379 FIBRIN DEGRADATION QUANT: CPT | Performed by: INTERNAL MEDICINE

## 2022-01-01 RX ORDER — ALBUTEROL SULFATE 90 UG/1
2 AEROSOL, METERED RESPIRATORY (INHALATION) EVERY 6 HOURS PRN
Status: CANCELLED | OUTPATIENT
Start: 2022-01-01

## 2022-01-01 RX ORDER — FUROSEMIDE 10 MG/ML
40 INJECTION INTRAMUSCULAR; INTRAVENOUS ONCE
Status: COMPLETED | OUTPATIENT
Start: 2022-01-01 | End: 2022-01-01

## 2022-01-01 RX ORDER — SIMVASTATIN 20 MG
20 TABLET ORAL DAILY
Qty: 90 TABLET | Refills: 1 | Status: SHIPPED | OUTPATIENT
Start: 2022-01-01

## 2022-01-01 RX ORDER — FAMOTIDINE 20 MG/1
20 TABLET, FILM COATED ORAL DAILY
Status: DISCONTINUED | OUTPATIENT
Start: 2022-01-01 | End: 2022-01-01 | Stop reason: HOSPADM

## 2022-01-01 RX ORDER — ALBUTEROL SULFATE 90 UG/1
2 AEROSOL, METERED RESPIRATORY (INHALATION) EVERY 6 HOURS PRN
Qty: 54 G | Refills: 1 | Status: SHIPPED | OUTPATIENT
Start: 2022-01-01

## 2022-01-01 RX ORDER — FAMOTIDINE 20 MG/1
20 TABLET, FILM COATED ORAL DAILY
Status: DISCONTINUED | OUTPATIENT
Start: 2022-01-01 | End: 2022-01-01

## 2022-01-01 RX ORDER — SENNOSIDES 8.6 MG
17.2 TABLET ORAL
Qty: 30 TABLET | Refills: 0 | Status: SHIPPED | OUTPATIENT
Start: 2022-01-01

## 2022-01-01 RX ORDER — ASPIRIN 81 MG/1
81 TABLET, CHEWABLE ORAL DAILY
Status: DISCONTINUED | OUTPATIENT
Start: 2022-01-01 | End: 2022-01-01 | Stop reason: HOSPADM

## 2022-01-01 RX ORDER — ONDANSETRON 2 MG/ML
4 INJECTION INTRAMUSCULAR; INTRAVENOUS ONCE
Status: COMPLETED | OUTPATIENT
Start: 2022-01-01 | End: 2022-01-01

## 2022-01-01 RX ORDER — PREDNISONE 20 MG/1
TABLET ORAL
Qty: 30 TABLET | Refills: 0 | Status: SHIPPED | OUTPATIENT
Start: 2022-01-01 | End: 2022-01-01

## 2022-01-01 RX ORDER — IPRATROPIUM BROMIDE AND ALBUTEROL SULFATE 2.5; .5 MG/3ML; MG/3ML
3 SOLUTION RESPIRATORY (INHALATION) EVERY 6 HOURS PRN
Status: DISCONTINUED | OUTPATIENT
Start: 2022-01-01 | End: 2022-01-01

## 2022-01-01 RX ORDER — LIDOCAINE 50 MG/G
1 PATCH TOPICAL ONCE
Status: DISCONTINUED | OUTPATIENT
Start: 2022-01-01 | End: 2022-01-01 | Stop reason: HOSPADM

## 2022-01-01 RX ORDER — ACETAMINOPHEN 325 MG/1
650 TABLET ORAL EVERY 6 HOURS PRN
Status: DISCONTINUED | OUTPATIENT
Start: 2022-01-01 | End: 2022-01-01 | Stop reason: HOSPADM

## 2022-01-01 RX ORDER — IPRATROPIUM BROMIDE AND ALBUTEROL SULFATE 2.5; .5 MG/3ML; MG/3ML
3 SOLUTION RESPIRATORY (INHALATION)
Status: DISCONTINUED | OUTPATIENT
Start: 2022-01-01 | End: 2022-01-01

## 2022-01-01 RX ORDER — FERROUS SULFATE 325(65) MG
325 TABLET ORAL 2 TIMES DAILY
Status: CANCELLED | OUTPATIENT
Start: 2022-01-01

## 2022-01-01 RX ORDER — HEPARIN SODIUM 10000 [USP'U]/100ML
3-20 INJECTION, SOLUTION INTRAVENOUS
Status: DISCONTINUED | OUTPATIENT
Start: 2022-01-01 | End: 2022-01-01 | Stop reason: HOSPADM

## 2022-01-01 RX ORDER — OXYCODONE HYDROCHLORIDE 10 MG/1
10 TABLET ORAL EVERY 6 HOURS PRN
Qty: 20 TABLET | Refills: 0 | Status: SHIPPED | OUTPATIENT
Start: 2022-01-01

## 2022-01-01 RX ORDER — ASCORBIC ACID 500 MG
500 TABLET ORAL DAILY
Status: DISCONTINUED | OUTPATIENT
Start: 2022-01-01 | End: 2022-01-01 | Stop reason: HOSPADM

## 2022-01-01 RX ORDER — HEPARIN SODIUM 5000 [USP'U]/ML
5000 INJECTION, SOLUTION INTRAVENOUS; SUBCUTANEOUS EVERY 12 HOURS SCHEDULED
Status: DISCONTINUED | OUTPATIENT
Start: 2022-01-01 | End: 2022-01-01

## 2022-01-01 RX ORDER — ALBUTEROL SULFATE 90 UG/1
2 AEROSOL, METERED RESPIRATORY (INHALATION) 4 TIMES DAILY
Status: DISCONTINUED | OUTPATIENT
Start: 2022-01-01 | End: 2022-01-01 | Stop reason: HOSPADM

## 2022-01-01 RX ORDER — PREDNISONE 20 MG/1
TABLET ORAL
Qty: 15 TABLET | Refills: 0 | Status: SHIPPED | OUTPATIENT
Start: 2022-01-01 | End: 2022-01-01

## 2022-01-01 RX ORDER — LEVALBUTEROL 1.25 MG/.5ML
1.25 SOLUTION, CONCENTRATE RESPIRATORY (INHALATION) EVERY 6 HOURS PRN
Status: DISCONTINUED | OUTPATIENT
Start: 2022-01-01 | End: 2022-01-01 | Stop reason: HOSPADM

## 2022-01-01 RX ORDER — PANTOPRAZOLE SODIUM 40 MG/1
40 TABLET, DELAYED RELEASE ORAL DAILY
Status: DISCONTINUED | OUTPATIENT
Start: 2022-01-01 | End: 2022-01-01 | Stop reason: HOSPADM

## 2022-01-01 RX ORDER — MECLIZINE HYDROCHLORIDE 25 MG/1
25 TABLET ORAL EVERY 8 HOURS PRN
Qty: 30 TABLET | Refills: 0 | Status: SHIPPED | OUTPATIENT
Start: 2022-01-01 | End: 2022-01-01 | Stop reason: SDUPTHER

## 2022-01-01 RX ORDER — ASPIRIN 81 MG/1
81 TABLET, CHEWABLE ORAL DAILY
Status: CANCELLED | OUTPATIENT
Start: 2022-01-01

## 2022-01-01 RX ORDER — FAMOTIDINE 20 MG/1
20 TABLET, FILM COATED ORAL 2 TIMES DAILY
Status: DISCONTINUED | OUTPATIENT
Start: 2022-01-01 | End: 2022-01-01

## 2022-01-01 RX ORDER — ACETAMINOPHEN 325 MG/1
650 TABLET ORAL ONCE
Status: COMPLETED | OUTPATIENT
Start: 2022-01-01 | End: 2022-01-01

## 2022-01-01 RX ORDER — MECLIZINE HYDROCHLORIDE 25 MG/1
25 TABLET ORAL EVERY 8 HOURS PRN
Qty: 30 TABLET | Refills: 0 | Status: SHIPPED | OUTPATIENT
Start: 2022-01-01 | End: 2022-01-01

## 2022-01-01 RX ORDER — ENOXAPARIN SODIUM 100 MG/ML
1 INJECTION SUBCUTANEOUS EVERY 12 HOURS SCHEDULED
Status: DISCONTINUED | OUTPATIENT
Start: 2022-01-01 | End: 2022-01-01 | Stop reason: HOSPADM

## 2022-01-01 RX ORDER — BENZONATATE 100 MG/1
100 CAPSULE ORAL 3 TIMES DAILY PRN
Status: DISCONTINUED | OUTPATIENT
Start: 2022-01-01 | End: 2022-01-01 | Stop reason: HOSPADM

## 2022-01-01 RX ORDER — IPRATROPIUM BROMIDE AND ALBUTEROL SULFATE 2.5; .5 MG/3ML; MG/3ML
3 SOLUTION RESPIRATORY (INHALATION) EVERY 8 HOURS PRN
Qty: 180 ML | Refills: 0 | Status: SHIPPED | OUTPATIENT
Start: 2022-01-01

## 2022-01-01 RX ORDER — ENOXAPARIN SODIUM 100 MG/ML
40 INJECTION SUBCUTANEOUS DAILY
Status: DISCONTINUED | OUTPATIENT
Start: 2022-01-01 | End: 2022-01-01

## 2022-01-01 RX ORDER — MELATONIN
1000 DAILY
Status: DISCONTINUED | OUTPATIENT
Start: 2022-01-01 | End: 2022-01-01 | Stop reason: HOSPADM

## 2022-01-01 RX ORDER — HEPARIN SODIUM 10000 [USP'U]/100ML
3-20 INJECTION, SOLUTION INTRAVENOUS
Status: DISCONTINUED | OUTPATIENT
Start: 2022-01-01 | End: 2022-01-01

## 2022-01-01 RX ORDER — OXYCODONE HYDROCHLORIDE 5 MG/1
5 TABLET ORAL EVERY 6 HOURS PRN
Qty: 12 TABLET | Refills: 0 | Status: SHIPPED | OUTPATIENT
Start: 2022-01-01 | End: 2022-01-01

## 2022-01-01 RX ORDER — FERROUS SULFATE 325(65) MG
325 TABLET ORAL 2 TIMES DAILY
Status: DISCONTINUED | OUTPATIENT
Start: 2022-01-01 | End: 2022-01-01 | Stop reason: HOSPADM

## 2022-01-01 RX ORDER — PRAVASTATIN SODIUM 40 MG
40 TABLET ORAL
Refills: 1 | Status: DISCONTINUED | OUTPATIENT
Start: 2022-01-01 | End: 2022-01-01 | Stop reason: HOSPADM

## 2022-01-01 RX ORDER — SUCRALFATE 1 G/1
1 TABLET ORAL
Status: DISCONTINUED | OUTPATIENT
Start: 2022-01-01 | End: 2022-01-01 | Stop reason: HOSPADM

## 2022-01-01 RX ORDER — ALBUTEROL SULFATE 90 UG/1
AEROSOL, METERED RESPIRATORY (INHALATION)
Qty: 54 G | Refills: 1 | Status: SHIPPED | OUTPATIENT
Start: 2022-01-01 | End: 2022-01-01 | Stop reason: SDUPTHER

## 2022-01-01 RX ORDER — PRAVASTATIN SODIUM 40 MG
40 TABLET ORAL
Refills: 1 | Status: CANCELLED | OUTPATIENT
Start: 2022-01-01

## 2022-01-01 RX ORDER — LORAZEPAM 0.5 MG/1
0.5 TABLET ORAL EVERY 8 HOURS PRN
Qty: 20 TABLET | Refills: 0 | Status: SHIPPED | OUTPATIENT
Start: 2022-01-01

## 2022-01-01 RX ORDER — FERROUS SULFATE 325(65) MG
325 TABLET ORAL 2 TIMES DAILY
Status: DISCONTINUED | OUTPATIENT
Start: 2022-01-01 | End: 2022-01-01

## 2022-01-01 RX ORDER — ALBUTEROL SULFATE 2.5 MG/3ML
5 SOLUTION RESPIRATORY (INHALATION) ONCE
Status: COMPLETED | OUTPATIENT
Start: 2022-01-01 | End: 2022-01-01

## 2022-01-01 RX ORDER — HEPARIN SODIUM 1000 [USP'U]/ML
3000 INJECTION, SOLUTION INTRAVENOUS; SUBCUTANEOUS ONCE
Status: COMPLETED | OUTPATIENT
Start: 2022-01-01 | End: 2022-01-01

## 2022-01-01 RX ORDER — PREDNISONE 20 MG/1
TABLET ORAL
Qty: 30 TABLET | Refills: 0 | Status: SHIPPED | OUTPATIENT
Start: 2022-01-01 | End: 2022-01-01 | Stop reason: SDUPTHER

## 2022-01-01 RX ORDER — ZINC SULFATE 50(220)MG
220 CAPSULE ORAL DAILY
Status: DISCONTINUED | OUTPATIENT
Start: 2022-01-01 | End: 2022-01-01 | Stop reason: HOSPADM

## 2022-01-01 RX ORDER — OXYCODONE HYDROCHLORIDE 5 MG/1
5 TABLET ORAL ONCE
Status: COMPLETED | OUTPATIENT
Start: 2022-01-01 | End: 2022-01-01

## 2022-01-01 RX ORDER — SENNOSIDES 8.6 MG
2 TABLET ORAL
Status: DISCONTINUED | OUTPATIENT
Start: 2022-01-01 | End: 2022-01-01 | Stop reason: HOSPADM

## 2022-01-01 RX ORDER — ALBUMIN (HUMAN) 12.5 G/50ML
25 SOLUTION INTRAVENOUS EVERY 6 HOURS
Status: COMPLETED | OUTPATIENT
Start: 2022-01-01 | End: 2022-01-01

## 2022-01-01 RX ORDER — ALBUTEROL SULFATE 2.5 MG/3ML
2.5 SOLUTION RESPIRATORY (INHALATION) ONCE
Status: COMPLETED | OUTPATIENT
Start: 2022-01-01 | End: 2022-01-01

## 2022-01-01 RX ORDER — MECLIZINE HYDROCHLORIDE 25 MG/1
25 TABLET ORAL EVERY 8 HOURS SCHEDULED
Status: DISCONTINUED | OUTPATIENT
Start: 2022-01-01 | End: 2022-01-01 | Stop reason: HOSPADM

## 2022-01-01 RX ORDER — DEXAMETHASONE SODIUM PHOSPHATE 4 MG/ML
6 INJECTION, SOLUTION INTRA-ARTICULAR; INTRALESIONAL; INTRAMUSCULAR; INTRAVENOUS; SOFT TISSUE EVERY 24 HOURS
Status: CANCELLED | OUTPATIENT
Start: 2022-01-01 | End: 2022-06-18

## 2022-01-01 RX ORDER — DOXYCYCLINE HYCLATE 100 MG/1
100 CAPSULE ORAL EVERY 12 HOURS
Status: DISCONTINUED | OUTPATIENT
Start: 2022-01-01 | End: 2022-01-01

## 2022-01-01 RX ORDER — ALBUTEROL SULFATE 2.5 MG/3ML
2.5 SOLUTION RESPIRATORY (INHALATION) EVERY 6 HOURS PRN
Qty: 180 ML | Refills: 0 | Status: SHIPPED | OUTPATIENT
Start: 2022-01-01

## 2022-01-01 RX ORDER — ATORVASTATIN CALCIUM 20 MG/1
20 TABLET, FILM COATED ORAL
Status: DISCONTINUED | OUTPATIENT
Start: 2022-01-01 | End: 2022-01-01 | Stop reason: HOSPADM

## 2022-01-01 RX ORDER — LEVALBUTEROL 1.25 MG/.5ML
1.25 SOLUTION, CONCENTRATE RESPIRATORY (INHALATION)
Status: DISCONTINUED | OUTPATIENT
Start: 2022-01-01 | End: 2022-01-01

## 2022-01-01 RX ORDER — PREDNISONE 20 MG/1
TABLET ORAL
Qty: 15 TABLET | Refills: 0 | Status: SHIPPED | OUTPATIENT
Start: 2022-01-01 | End: 2022-01-01 | Stop reason: SDUPTHER

## 2022-01-01 RX ORDER — DOCUSATE SODIUM 100 MG/1
100 CAPSULE, LIQUID FILLED ORAL 2 TIMES DAILY
Status: DISCONTINUED | OUTPATIENT
Start: 2022-01-01 | End: 2022-01-01 | Stop reason: HOSPADM

## 2022-01-01 RX ORDER — PANTOPRAZOLE SODIUM 40 MG/1
40 TABLET, DELAYED RELEASE ORAL DAILY
Status: CANCELLED | OUTPATIENT
Start: 2022-01-01

## 2022-01-01 RX ORDER — IPRATROPIUM BROMIDE AND ALBUTEROL SULFATE 2.5; .5 MG/3ML; MG/3ML
3 SOLUTION RESPIRATORY (INHALATION) 4 TIMES DAILY
Status: DISCONTINUED | OUTPATIENT
Start: 2022-01-01 | End: 2022-01-01

## 2022-01-01 RX ORDER — IPRATROPIUM BROMIDE AND ALBUTEROL SULFATE 2.5; .5 MG/3ML; MG/3ML
3 SOLUTION RESPIRATORY (INHALATION) 4 TIMES DAILY
Qty: 360 ML | Refills: 11 | Status: SHIPPED | OUTPATIENT
Start: 2022-01-01 | End: 2022-08-14

## 2022-01-01 RX ORDER — DEXAMETHASONE SODIUM PHOSPHATE 4 MG/ML
6 INJECTION, SOLUTION INTRA-ARTICULAR; INTRALESIONAL; INTRAMUSCULAR; INTRAVENOUS; SOFT TISSUE EVERY 24 HOURS
Status: DISCONTINUED | OUTPATIENT
Start: 2022-01-01 | End: 2022-01-01 | Stop reason: HOSPADM

## 2022-01-01 RX ORDER — PREDNISONE 20 MG/1
60 TABLET ORAL DAILY
Status: DISCONTINUED | OUTPATIENT
Start: 2022-01-01 | End: 2022-01-01

## 2022-01-01 RX ORDER — DEXAMETHASONE SODIUM PHOSPHATE 4 MG/ML
0.1 INJECTION, SOLUTION INTRA-ARTICULAR; INTRALESIONAL; INTRAMUSCULAR; INTRAVENOUS; SOFT TISSUE EVERY 12 HOURS
Status: DISCONTINUED | OUTPATIENT
Start: 2022-01-01 | End: 2022-01-01 | Stop reason: HOSPADM

## 2022-01-01 RX ORDER — ALBUTEROL SULFATE 90 UG/1
2 AEROSOL, METERED RESPIRATORY (INHALATION) EVERY 6 HOURS PRN
Status: DISCONTINUED | OUTPATIENT
Start: 2022-01-01 | End: 2022-01-01 | Stop reason: HOSPADM

## 2022-01-01 RX ORDER — PANTOPRAZOLE SODIUM 40 MG/1
40 TABLET, DELAYED RELEASE ORAL
Status: DISCONTINUED | OUTPATIENT
Start: 2022-01-01 | End: 2022-01-01 | Stop reason: HOSPADM

## 2022-01-01 RX ORDER — LIDOCAINE 50 MG/G
1 PATCH TOPICAL DAILY
Qty: 30 PATCH | Refills: 0 | Status: SHIPPED | OUTPATIENT
Start: 2022-01-01 | End: 2022-01-01 | Stop reason: ALTCHOICE

## 2022-01-01 RX ORDER — MAGNESIUM SULFATE HEPTAHYDRATE 40 MG/ML
4 INJECTION, SOLUTION INTRAVENOUS ONCE
Status: COMPLETED | OUTPATIENT
Start: 2022-01-01 | End: 2022-01-01

## 2022-01-01 RX ADMIN — HEPARIN SODIUM 5000 UNITS: 5000 INJECTION INTRAVENOUS; SUBCUTANEOUS at 20:38

## 2022-01-01 RX ADMIN — ASPIRIN 81 MG: 81 TABLET, CHEWABLE ORAL at 08:52

## 2022-01-01 RX ADMIN — ASPIRIN 81 MG: 81 TABLET, CHEWABLE ORAL at 09:59

## 2022-01-01 RX ADMIN — ALBUTEROL SULFATE 2.5 MG: 2.5 SOLUTION RESPIRATORY (INHALATION) at 14:57

## 2022-01-01 RX ADMIN — PRAVASTATIN SODIUM 40 MG: 40 TABLET ORAL at 17:53

## 2022-01-01 RX ADMIN — DEXAMETHASONE SODIUM PHOSPHATE 5.4 MG: 4 INJECTION INTRA-ARTICULAR; INTRALESIONAL; INTRAMUSCULAR; INTRAVENOUS; SOFT TISSUE at 08:40

## 2022-01-01 RX ADMIN — OXYCODONE HYDROCHLORIDE 5 MG: 5 TABLET ORAL at 10:59

## 2022-01-01 RX ADMIN — LEVALBUTEROL 1.25 MG: 1.25 SOLUTION, CONCENTRATE RESPIRATORY (INHALATION) at 14:00

## 2022-01-01 RX ADMIN — FAMOTIDINE 20 MG: 20 TABLET ORAL at 08:39

## 2022-01-01 RX ADMIN — ZINC SULFATE 220 MG (50 MG) CAPSULE 220 MG: CAPSULE at 10:48

## 2022-01-01 RX ADMIN — IPRATROPIUM BROMIDE 0.5 MG: 0.5 SOLUTION RESPIRATORY (INHALATION) at 15:18

## 2022-01-01 RX ADMIN — Medication 1000 UNITS: at 08:55

## 2022-01-01 RX ADMIN — FAMOTIDINE 20 MG: 20 TABLET ORAL at 09:17

## 2022-01-01 RX ADMIN — HEPARIN SODIUM 5000 UNITS: 5000 INJECTION INTRAVENOUS; SUBCUTANEOUS at 20:47

## 2022-01-01 RX ADMIN — FAMOTIDINE 20 MG: 20 TABLET ORAL at 10:25

## 2022-01-01 RX ADMIN — ENOXAPARIN SODIUM 50 MG: 60 INJECTION SUBCUTANEOUS at 22:06

## 2022-01-01 RX ADMIN — FERROUS SULFATE TAB 325 MG (65 MG ELEMENTAL FE) 325 MG: 325 (65 FE) TAB at 19:12

## 2022-01-01 RX ADMIN — SODIUM CHLORIDE 500 ML: 0.9 INJECTION, SOLUTION INTRAVENOUS at 12:00

## 2022-01-01 RX ADMIN — DEXAMETHASONE SODIUM PHOSPHATE 5.4 MG: 4 INJECTION INTRA-ARTICULAR; INTRALESIONAL; INTRAMUSCULAR; INTRAVENOUS; SOFT TISSUE at 09:23

## 2022-01-01 RX ADMIN — PREDNISONE 60 MG: 20 TABLET ORAL at 09:11

## 2022-01-01 RX ADMIN — MECLIZINE HYDROCHLORIDE 25 MG: 25 TABLET ORAL at 05:03

## 2022-01-01 RX ADMIN — LEVALBUTEROL 1.25 MG: 1.25 SOLUTION, CONCENTRATE RESPIRATORY (INHALATION) at 13:59

## 2022-01-01 RX ADMIN — Medication 1000 UNITS: at 10:48

## 2022-01-01 RX ADMIN — ALBUTEROL SULFATE 5 MG: 2.5 SOLUTION RESPIRATORY (INHALATION) at 15:18

## 2022-01-01 RX ADMIN — SUCRALFATE 1 G: 1 TABLET ORAL at 13:47

## 2022-01-01 RX ADMIN — SENNOSIDES 17.2 MG: 8.6 TABLET, FILM COATED ORAL at 14:51

## 2022-01-01 RX ADMIN — ALBUTEROL SULFATE 2 PUFF: 90 AEROSOL, METERED RESPIRATORY (INHALATION) at 14:12

## 2022-01-01 RX ADMIN — ASPIRIN 81 MG: 81 TABLET, CHEWABLE ORAL at 09:11

## 2022-01-01 RX ADMIN — ZINC SULFATE 220 MG (50 MG) CAPSULE 220 MG: CAPSULE at 09:15

## 2022-01-01 RX ADMIN — OXYCODONE HYDROCHLORIDE AND ACETAMINOPHEN 500 MG: 500 TABLET ORAL at 09:15

## 2022-01-01 RX ADMIN — HEPARIN SODIUM AND DEXTROSE 12 UNITS/KG/HR: 10000; 5 INJECTION INTRAVENOUS at 22:07

## 2022-01-01 RX ADMIN — DEXAMETHASONE SODIUM PHOSPHATE 5.4 MG: 4 INJECTION INTRA-ARTICULAR; INTRALESIONAL; INTRAMUSCULAR; INTRAVENOUS; SOFT TISSUE at 21:43

## 2022-01-01 RX ADMIN — IPRATROPIUM BROMIDE 0.5 MG: 0.5 SOLUTION RESPIRATORY (INHALATION) at 14:00

## 2022-01-01 RX ADMIN — LEVALBUTEROL 1.25 MG: 1.25 SOLUTION, CONCENTRATE RESPIRATORY (INHALATION) at 14:30

## 2022-01-01 RX ADMIN — ATORVASTATIN CALCIUM 20 MG: 20 TABLET, FILM COATED ORAL at 18:55

## 2022-01-01 RX ADMIN — HEPARIN SODIUM AND DEXTROSE 10 UNITS/KG/HR: 10000; 5 INJECTION INTRAVENOUS at 10:43

## 2022-01-01 RX ADMIN — DOXYCYCLINE 100 MG: 100 CAPSULE ORAL at 01:25

## 2022-01-01 RX ADMIN — ONDANSETRON 4 MG: 2 INJECTION INTRAMUSCULAR; INTRAVENOUS at 17:51

## 2022-01-01 RX ADMIN — FUROSEMIDE 40 MG: 10 INJECTION, SOLUTION INTRAMUSCULAR; INTRAVENOUS at 16:13

## 2022-01-01 RX ADMIN — ACETAMINOPHEN 650 MG: 325 TABLET, FILM COATED ORAL at 13:40

## 2022-01-01 RX ADMIN — DEXAMETHASONE SODIUM PHOSPHATE 5.4 MG: 4 INJECTION INTRA-ARTICULAR; INTRALESIONAL; INTRAMUSCULAR; INTRAVENOUS; SOFT TISSUE at 09:20

## 2022-01-01 RX ADMIN — CEFEPIME HYDROCHLORIDE 2000 MG: 2 INJECTION, POWDER, FOR SOLUTION INTRAVENOUS at 21:48

## 2022-01-01 RX ADMIN — MECLIZINE HYDROCHLORIDE 25 MG: 25 TABLET ORAL at 15:38

## 2022-01-01 RX ADMIN — SUCRALFATE 1 G: 1 TABLET ORAL at 11:41

## 2022-01-01 RX ADMIN — ALBUTEROL SULFATE 2 PUFF: 90 AEROSOL, METERED RESPIRATORY (INHALATION) at 09:59

## 2022-01-01 RX ADMIN — REMDESIVIR 200 MG: 100 INJECTION, POWDER, LYOPHILIZED, FOR SOLUTION INTRAVENOUS at 22:08

## 2022-01-01 RX ADMIN — ALBUTEROL SULFATE 2 PUFF: 90 AEROSOL, METERED RESPIRATORY (INHALATION) at 22:06

## 2022-01-01 RX ADMIN — ASPIRIN 81 MG: 81 TABLET, CHEWABLE ORAL at 08:46

## 2022-01-01 RX ADMIN — FERROUS SULFATE TAB 325 MG (65 MG ELEMENTAL FE) 325 MG: 325 (65 FE) TAB at 17:42

## 2022-01-01 RX ADMIN — FERROUS SULFATE TAB 325 MG (65 MG ELEMENTAL FE) 325 MG: 325 (65 FE) TAB at 18:55

## 2022-01-01 RX ADMIN — ENOXAPARIN SODIUM 50 MG: 60 INJECTION SUBCUTANEOUS at 20:19

## 2022-01-01 RX ADMIN — FERROUS SULFATE TAB 325 MG (65 MG ELEMENTAL FE) 325 MG: 325 (65 FE) TAB at 09:02

## 2022-01-01 RX ADMIN — PANTOPRAZOLE SODIUM 40 MG: 40 TABLET, DELAYED RELEASE ORAL at 05:54

## 2022-01-01 RX ADMIN — OXYCODONE HYDROCHLORIDE AND ACETAMINOPHEN 500 MG: 500 TABLET ORAL at 08:39

## 2022-01-01 RX ADMIN — ASPIRIN 81 MG: 81 TABLET, CHEWABLE ORAL at 08:53

## 2022-01-01 RX ADMIN — PREDNISONE 50 MG: 20 TABLET ORAL at 08:51

## 2022-01-01 RX ADMIN — DOXYCYCLINE 100 MG: 100 CAPSULE ORAL at 13:10

## 2022-01-01 RX ADMIN — ENOXAPARIN SODIUM 50 MG: 60 INJECTION SUBCUTANEOUS at 09:15

## 2022-01-01 RX ADMIN — MECLIZINE HYDROCHLORIDE 25 MG: 25 TABLET ORAL at 22:14

## 2022-01-01 RX ADMIN — PANTOPRAZOLE SODIUM 40 MG: 40 TABLET, DELAYED RELEASE ORAL at 06:00

## 2022-01-01 RX ADMIN — CEFEPIME HYDROCHLORIDE 2000 MG: 2 INJECTION, POWDER, FOR SOLUTION INTRAVENOUS at 09:15

## 2022-01-01 RX ADMIN — FERROUS SULFATE TAB 325 MG (65 MG ELEMENTAL FE) 325 MG: 325 (65 FE) TAB at 09:51

## 2022-01-01 RX ADMIN — ZINC SULFATE 220 MG (50 MG) CAPSULE 220 MG: CAPSULE at 09:17

## 2022-01-01 RX ADMIN — CEFEPIME HYDROCHLORIDE 2000 MG: 2 INJECTION, POWDER, FOR SOLUTION INTRAVENOUS at 15:18

## 2022-01-01 RX ADMIN — FERROUS SULFATE TAB 325 MG (65 MG ELEMENTAL FE) 325 MG: 325 (65 FE) TAB at 08:52

## 2022-01-01 RX ADMIN — OXYCODONE HYDROCHLORIDE AND ACETAMINOPHEN 500 MG: 500 TABLET ORAL at 10:39

## 2022-01-01 RX ADMIN — LIDOCAINE 5% 1 PATCH: 700 PATCH TOPICAL at 10:59

## 2022-01-01 RX ADMIN — ATORVASTATIN CALCIUM 20 MG: 20 TABLET, FILM COATED ORAL at 15:39

## 2022-01-01 RX ADMIN — FAMOTIDINE 20 MG: 20 TABLET ORAL at 08:55

## 2022-01-01 RX ADMIN — DOXYCYCLINE 100 MG: 100 CAPSULE ORAL at 16:54

## 2022-01-01 RX ADMIN — OXYCODONE HYDROCHLORIDE AND ACETAMINOPHEN 500 MG: 500 TABLET ORAL at 10:48

## 2022-01-01 RX ADMIN — ZINC SULFATE 220 MG (50 MG) CAPSULE 220 MG: CAPSULE at 10:25

## 2022-01-01 RX ADMIN — DEXAMETHASONE SODIUM PHOSPHATE 5.4 MG: 4 INJECTION INTRA-ARTICULAR; INTRALESIONAL; INTRAMUSCULAR; INTRAVENOUS; SOFT TISSUE at 20:19

## 2022-01-01 RX ADMIN — ALBUMIN (HUMAN) 25 G: 0.25 INJECTION, SOLUTION INTRAVENOUS at 15:44

## 2022-01-01 RX ADMIN — IPRATROPIUM BROMIDE 0.5 MG: 0.5 SOLUTION RESPIRATORY (INHALATION) at 19:20

## 2022-01-01 RX ADMIN — PREDNISONE 60 MG: 20 TABLET ORAL at 08:53

## 2022-01-01 RX ADMIN — DOXYCYCLINE 100 MG: 100 CAPSULE ORAL at 01:45

## 2022-01-01 RX ADMIN — PREDNISONE 50 MG: 20 TABLET ORAL at 08:46

## 2022-01-01 RX ADMIN — ALBUTEROL SULFATE 2 PUFF: 90 AEROSOL, METERED RESPIRATORY (INHALATION) at 09:52

## 2022-01-01 RX ADMIN — ENOXAPARIN SODIUM 50 MG: 60 INJECTION SUBCUTANEOUS at 10:25

## 2022-01-01 RX ADMIN — ALBUTEROL SULFATE 2 PUFF: 90 AEROSOL, METERED RESPIRATORY (INHALATION) at 21:21

## 2022-01-01 RX ADMIN — DEXAMETHASONE SODIUM PHOSPHATE 6 MG: 4 INJECTION INTRA-ARTICULAR; INTRALESIONAL; INTRAMUSCULAR; INTRAVENOUS; SOFT TISSUE at 20:38

## 2022-01-01 RX ADMIN — CEFTRIAXONE SODIUM 1000 MG: 10 INJECTION, POWDER, FOR SOLUTION INTRAVENOUS at 13:15

## 2022-01-01 RX ADMIN — PREDNISONE 60 MG: 20 TABLET ORAL at 20:57

## 2022-01-01 RX ADMIN — ASPIRIN 81 MG: 81 TABLET, CHEWABLE ORAL at 09:16

## 2022-01-01 RX ADMIN — VANCOMYCIN HYDROCHLORIDE 1250 MG: 5 INJECTION, POWDER, LYOPHILIZED, FOR SOLUTION INTRAVENOUS at 22:01

## 2022-01-01 RX ADMIN — HEPARIN SODIUM 5000 UNITS: 5000 INJECTION INTRAVENOUS; SUBCUTANEOUS at 09:11

## 2022-01-01 RX ADMIN — ATORVASTATIN CALCIUM 20 MG: 20 TABLET, FILM COATED ORAL at 17:16

## 2022-01-01 RX ADMIN — DOXYCYCLINE 100 MG: 100 CAPSULE ORAL at 13:14

## 2022-01-01 RX ADMIN — LEVALBUTEROL 1.25 MG: 1.25 SOLUTION, CONCENTRATE RESPIRATORY (INHALATION) at 19:20

## 2022-01-01 RX ADMIN — FERROUS SULFATE TAB 325 MG (65 MG ELEMENTAL FE) 325 MG: 325 (65 FE) TAB at 09:59

## 2022-01-01 RX ADMIN — CEFEPIME HYDROCHLORIDE 2000 MG: 2 INJECTION, POWDER, FOR SOLUTION INTRAVENOUS at 05:11

## 2022-01-01 RX ADMIN — CEFTRIAXONE SODIUM 1000 MG: 10 INJECTION, POWDER, FOR SOLUTION INTRAVENOUS at 11:53

## 2022-01-01 RX ADMIN — MECLIZINE HYDROCHLORIDE 25 MG: 25 TABLET ORAL at 15:39

## 2022-01-01 RX ADMIN — FERROUS SULFATE TAB 325 MG (65 MG ELEMENTAL FE) 325 MG: 325 (65 FE) TAB at 20:38

## 2022-01-01 RX ADMIN — SUCRALFATE 1 G: 1 TABLET ORAL at 15:38

## 2022-01-01 RX ADMIN — CEFEPIME HYDROCHLORIDE 2000 MG: 2 INJECTION, POWDER, FOR SOLUTION INTRAVENOUS at 08:36

## 2022-01-01 RX ADMIN — DOCUSATE SODIUM 100 MG: 100 CAPSULE, LIQUID FILLED ORAL at 09:17

## 2022-01-01 RX ADMIN — LEVALBUTEROL 1.25 MG: 1.25 SOLUTION, CONCENTRATE RESPIRATORY (INHALATION) at 19:42

## 2022-01-01 RX ADMIN — REMDESIVIR 100 MG: 100 INJECTION, POWDER, LYOPHILIZED, FOR SOLUTION INTRAVENOUS at 21:21

## 2022-01-01 RX ADMIN — TOCILIZUMAB 400 MG: 20 INJECTION, SOLUTION, CONCENTRATE INTRAVENOUS at 01:46

## 2022-01-01 RX ADMIN — SODIUM CHLORIDE 1000 ML: 0.9 INJECTION, SOLUTION INTRAVENOUS at 15:19

## 2022-01-01 RX ADMIN — FERROUS SULFATE TAB 325 MG (65 MG ELEMENTAL FE) 325 MG: 325 (65 FE) TAB at 17:53

## 2022-01-01 RX ADMIN — ATORVASTATIN CALCIUM 20 MG: 20 TABLET, FILM COATED ORAL at 15:38

## 2022-01-01 RX ADMIN — SUCRALFATE 1 G: 1 TABLET ORAL at 21:06

## 2022-01-01 RX ADMIN — DOXYCYCLINE 100 MG: 100 CAPSULE ORAL at 01:33

## 2022-01-01 RX ADMIN — ACETAMINOPHEN 650 MG: 325 TABLET, FILM COATED ORAL at 15:27

## 2022-01-01 RX ADMIN — IPRATROPIUM BROMIDE 0.5 MG: 0.5 SOLUTION RESPIRATORY (INHALATION) at 19:42

## 2022-01-01 RX ADMIN — HEPARIN SODIUM AND DEXTROSE 12 UNITS/KG/HR: 10000; 5 INJECTION INTRAVENOUS at 21:51

## 2022-01-01 RX ADMIN — DOXYCYCLINE 100 MG: 100 CAPSULE ORAL at 03:28

## 2022-01-01 RX ADMIN — Medication 1000 UNITS: at 09:17

## 2022-01-01 RX ADMIN — MECLIZINE HYDROCHLORIDE 25 MG: 25 TABLET ORAL at 05:08

## 2022-01-01 RX ADMIN — ATORVASTATIN CALCIUM 20 MG: 20 TABLET, FILM COATED ORAL at 17:51

## 2022-01-01 RX ADMIN — ALBUMIN (HUMAN) 25 G: 0.25 INJECTION, SOLUTION INTRAVENOUS at 09:02

## 2022-01-01 RX ADMIN — SODIUM CHLORIDE 500 ML: 0.9 INJECTION, SOLUTION INTRAVENOUS at 11:23

## 2022-01-01 RX ADMIN — LEVALBUTEROL 1.25 MG: 1.25 SOLUTION, CONCENTRATE RESPIRATORY (INHALATION) at 08:37

## 2022-01-01 RX ADMIN — HEPARIN SODIUM 5000 UNITS: 5000 INJECTION INTRAVENOUS; SUBCUTANEOUS at 08:24

## 2022-01-01 RX ADMIN — DEXAMETHASONE SODIUM PHOSPHATE 5.4 MG: 4 INJECTION INTRA-ARTICULAR; INTRALESIONAL; INTRAMUSCULAR; INTRAVENOUS; SOFT TISSUE at 10:25

## 2022-01-01 RX ADMIN — DEXAMETHASONE SODIUM PHOSPHATE 5.4 MG: 4 INJECTION INTRA-ARTICULAR; INTRALESIONAL; INTRAMUSCULAR; INTRAVENOUS; SOFT TISSUE at 21:28

## 2022-01-01 RX ADMIN — ZINC SULFATE 220 MG (50 MG) CAPSULE 220 MG: CAPSULE at 08:39

## 2022-01-01 RX ADMIN — PANTOPRAZOLE SODIUM 40 MG: 40 TABLET, DELAYED RELEASE ORAL at 05:57

## 2022-01-01 RX ADMIN — HEPARIN SODIUM 3000 UNITS: 1000 INJECTION INTRAVENOUS; SUBCUTANEOUS at 22:08

## 2022-01-01 RX ADMIN — Medication 1000 UNITS: at 08:39

## 2022-01-01 RX ADMIN — DOXYCYCLINE 100 MG: 100 CAPSULE ORAL at 15:27

## 2022-01-01 RX ADMIN — DOXYCYCLINE 100 MG: 100 CAPSULE ORAL at 13:42

## 2022-01-01 RX ADMIN — Medication 1000 UNITS: at 09:15

## 2022-01-01 RX ADMIN — HEPARIN SODIUM AND DEXTROSE 12 UNITS/KG/HR: 10000; 5 INJECTION INTRAVENOUS at 03:25

## 2022-01-01 RX ADMIN — MAGNESIUM SULFATE HEPTAHYDRATE 4 G: 40 INJECTION, SOLUTION INTRAVENOUS at 11:48

## 2022-01-01 RX ADMIN — CEFEPIME HYDROCHLORIDE 2000 MG: 2 INJECTION, POWDER, FOR SOLUTION INTRAVENOUS at 06:00

## 2022-01-01 RX ADMIN — SUCRALFATE 1 G: 1 TABLET ORAL at 08:49

## 2022-01-01 RX ADMIN — ASPIRIN 81 MG: 81 TABLET, CHEWABLE ORAL at 08:24

## 2022-01-01 RX ADMIN — CEFEPIME HYDROCHLORIDE 2000 MG: 2 INJECTION, POWDER, FOR SOLUTION INTRAVENOUS at 17:43

## 2022-01-01 RX ADMIN — ENOXAPARIN SODIUM 50 MG: 60 INJECTION SUBCUTANEOUS at 12:01

## 2022-01-01 RX ADMIN — ALBUMIN (HUMAN) 25 G: 0.25 INJECTION, SOLUTION INTRAVENOUS at 20:38

## 2022-01-01 RX ADMIN — SUCRALFATE 1 G: 1 TABLET ORAL at 15:39

## 2022-01-01 RX ADMIN — DEXAMETHASONE SODIUM PHOSPHATE 5.4 MG: 4 INJECTION INTRA-ARTICULAR; INTRALESIONAL; INTRAMUSCULAR; INTRAVENOUS; SOFT TISSUE at 09:01

## 2022-01-01 RX ADMIN — PREDNISONE 50 MG: 20 TABLET ORAL at 09:16

## 2022-01-01 RX ADMIN — OXYCODONE HYDROCHLORIDE AND ACETAMINOPHEN 500 MG: 500 TABLET ORAL at 08:55

## 2022-01-01 RX ADMIN — IPRATROPIUM BROMIDE 0.5 MG: 0.5 SOLUTION RESPIRATORY (INHALATION) at 07:41

## 2022-01-01 RX ADMIN — Medication 1000 UNITS: at 10:25

## 2022-01-01 RX ADMIN — ASPIRIN 81 MG: 81 TABLET, CHEWABLE ORAL at 18:55

## 2022-01-01 RX ADMIN — FAMOTIDINE 20 MG: 20 TABLET ORAL at 09:15

## 2022-01-01 RX ADMIN — FERROUS SULFATE TAB 325 MG (65 MG ELEMENTAL FE) 325 MG: 325 (65 FE) TAB at 17:16

## 2022-01-01 RX ADMIN — MECLIZINE HYDROCHLORIDE 25 MG: 25 TABLET ORAL at 13:47

## 2022-01-01 RX ADMIN — ASPIRIN 81 MG: 81 TABLET, CHEWABLE ORAL at 09:02

## 2022-01-01 RX ADMIN — ENOXAPARIN SODIUM 50 MG: 60 INJECTION SUBCUTANEOUS at 09:17

## 2022-01-01 RX ADMIN — DEXAMETHASONE SODIUM PHOSPHATE 5.4 MG: 4 INJECTION INTRA-ARTICULAR; INTRALESIONAL; INTRAMUSCULAR; INTRAVENOUS; SOFT TISSUE at 11:30

## 2022-01-01 RX ADMIN — PREDNISONE 60 MG: 20 TABLET ORAL at 09:02

## 2022-01-01 RX ADMIN — VANCOMYCIN HYDROCHLORIDE 1250 MG: 5 INJECTION, POWDER, LYOPHILIZED, FOR SOLUTION INTRAVENOUS at 22:36

## 2022-01-01 RX ADMIN — DEXAMETHASONE SODIUM PHOSPHATE 6 MG: 4 INJECTION INTRA-ARTICULAR; INTRALESIONAL; INTRAMUSCULAR; INTRAVENOUS; SOFT TISSUE at 21:22

## 2022-01-01 RX ADMIN — ATORVASTATIN CALCIUM 20 MG: 20 TABLET, FILM COATED ORAL at 17:42

## 2022-01-01 RX ADMIN — IPRATROPIUM BROMIDE 0.5 MG: 0.5 SOLUTION RESPIRATORY (INHALATION) at 08:37

## 2022-01-01 RX ADMIN — CEFEPIME HYDROCHLORIDE 2000 MG: 2 INJECTION, POWDER, FOR SOLUTION INTRAVENOUS at 21:23

## 2022-01-01 RX ADMIN — FERROUS SULFATE TAB 325 MG (65 MG ELEMENTAL FE) 325 MG: 325 (65 FE) TAB at 17:51

## 2022-01-01 RX ADMIN — HEPARIN SODIUM 5000 UNITS: 5000 INJECTION INTRAVENOUS; SUBCUTANEOUS at 21:23

## 2022-01-01 RX ADMIN — VANCOMYCIN HYDROCHLORIDE 750 MG: 5 INJECTION, POWDER, LYOPHILIZED, FOR SOLUTION INTRAVENOUS at 16:59

## 2022-01-01 RX ADMIN — PANTOPRAZOLE SODIUM 40 MG: 40 TABLET, DELAYED RELEASE ORAL at 05:03

## 2022-01-01 RX ADMIN — OXYCODONE HYDROCHLORIDE AND ACETAMINOPHEN 500 MG: 500 TABLET ORAL at 09:17

## 2022-01-01 RX ADMIN — SUCRALFATE 1 G: 1 TABLET ORAL at 22:14

## 2022-01-01 RX ADMIN — PANTOPRAZOLE SODIUM 40 MG: 40 TABLET, DELAYED RELEASE ORAL at 05:08

## 2022-01-01 RX ADMIN — LEVALBUTEROL 1.25 MG: 1.25 SOLUTION, CONCENTRATE RESPIRATORY (INHALATION) at 07:41

## 2022-01-01 RX ADMIN — FERROUS SULFATE TAB 325 MG (65 MG ELEMENTAL FE) 325 MG: 325 (65 FE) TAB at 09:11

## 2022-01-01 RX ADMIN — IPRATROPIUM BROMIDE 0.5 MG: 0.5 SOLUTION RESPIRATORY (INHALATION) at 13:59

## 2022-01-01 RX ADMIN — CEFEPIME HYDROCHLORIDE 2000 MG: 2 INJECTION, POWDER, FOR SOLUTION INTRAVENOUS at 17:54

## 2022-01-01 RX ADMIN — ALBUTEROL SULFATE 2 PUFF: 90 AEROSOL, METERED RESPIRATORY (INHALATION) at 18:00

## 2022-01-01 RX ADMIN — CEFTRIAXONE SODIUM 1000 MG: 10 INJECTION, POWDER, FOR SOLUTION INTRAVENOUS at 13:16

## 2022-01-01 RX ADMIN — MECLIZINE HYDROCHLORIDE 25 MG: 25 TABLET ORAL at 21:06

## 2022-01-01 RX ADMIN — FERROUS SULFATE TAB 325 MG (65 MG ELEMENTAL FE) 325 MG: 325 (65 FE) TAB at 08:53

## 2022-01-01 RX ADMIN — ASPIRIN 81 MG: 81 TABLET, CHEWABLE ORAL at 09:51

## 2022-01-01 RX ADMIN — DOXYCYCLINE 100 MG: 100 CAPSULE ORAL at 03:18

## 2022-01-01 RX ADMIN — DEXAMETHASONE SODIUM PHOSPHATE 5.4 MG: 4 INJECTION INTRA-ARTICULAR; INTRALESIONAL; INTRAMUSCULAR; INTRAVENOUS; SOFT TISSUE at 22:06

## 2022-01-01 RX ADMIN — SUCRALFATE 1 G: 1 TABLET ORAL at 12:00

## 2022-01-01 RX ADMIN — PREDNISONE 60 MG: 20 TABLET ORAL at 08:24

## 2022-01-01 RX ADMIN — CEFTRIAXONE SODIUM 1000 MG: 10 INJECTION, POWDER, FOR SOLUTION INTRAVENOUS at 11:31

## 2022-01-01 RX ADMIN — ZINC SULFATE 220 MG (50 MG) CAPSULE 220 MG: CAPSULE at 08:55

## 2022-01-01 RX ADMIN — SENNOSIDES 17.2 MG: 8.6 TABLET, FILM COATED ORAL at 21:43

## 2022-01-01 RX ADMIN — ALBUMIN (HUMAN) 25 G: 0.25 INJECTION, SOLUTION INTRAVENOUS at 02:09

## 2022-01-01 RX ADMIN — DOCUSATE SODIUM 100 MG: 100 CAPSULE, LIQUID FILLED ORAL at 14:51

## 2022-01-01 RX ADMIN — FERROUS SULFATE TAB 325 MG (65 MG ELEMENTAL FE) 325 MG: 325 (65 FE) TAB at 08:24

## 2022-02-28 NOTE — ED PROVIDER NOTES
Pt Name: Marlon Wolf  MRN: [id number]  [place] 1933  Age/Sex: 80 y o  male  Date of evaluation: 2/28/2022  PCP: Guerline Back MD    [address] [place] [address]    Chief Complaint   Patient presents with    Fall     Pt reports tripping and falling at home two days ago  Unsure of headstrike  No LOC  Aspirin daily  C/o back pain  HPI and MDM    80 y o  male presenting with fall  Patient fell 2 days ago, lost his balance, has left BKA with prosthetic limb  Denies hitting his head  No loss of consciousness  Needed help to get up  Down time around 15 minutes  Has been walking since then  Complains of left upper to mid back pain  States a few years ago he had similar pain any broke left-sided ribs  It feels similar, it is worse with inspiration  Pain did not start till this morning  No numbness or tingling, no fevers or chills, no shortness of breath  No pain elsewhere  He does take aspirin  A: clear  B" ctab  C: pulses in tact, BP wnl  D: GCS 15  E: done    No focal neurological deficits  CT scan findings show multiple compression fractures  Patient states his pain has significantly improved after being given pain medication  Does not want ats a low brace  He was [facility]  Provided prescription for pain medication  Standard narcotic precautions discussed  Patient and family members were both updated with results and plan and follow-up, they verbalized understanding and are in agreement                Medications   oxyCODONE (ROXICODONE) IR tablet 5 mg (5 mg Oral Given 2/28/22 1059)         Past Medical and Surgical History    Past Medical History:   Diagnosis Date    Anemia     Arthritis     Atherosclerosis of artery of extremity with ulceration ([facility] [address] ) 3/27/2019    Bifascicular block     Cellulitis of chest wall     Chronic kidney disease     Cough     Dupuytren contracture     DVT femoral (deep venous thrombosis) with thrombophlebitis, right ([facility] [address] )     Elevated glucose     Hemothorax     Left    Hyperlipidemia     Interstitial lung disease (HCC)     Phantom pain after amputation of lower extremity ([facility] [address] ) 3/27/2019    SOB (shortness of breath)     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 2/19/2019    Vascular disease        Past Surgical History:   Procedure Laterality Date    AMPUTATION  1972    L thumb    CARPAL TUNNEL RELEASE  2016    L hand    CATARACT EXTRACTION      COLONOSCOPY      EYE SURGERY      FRACTURE SURGERY      HAND SURGERY      IR LOWER EXTREMITY / INTERVENTION  10/26/2018    KNEE ARTHROSCOPY      NERVE BLOCK      AR AMPUTATION LOW LEG THRU TIB/FIB Left 2/1/2019    Procedure: AMPUTATION BELOW KNEE (BKA);   Surgeon: Nissa Cartagena MD;  Location: [facility];  Service: Vascular    AR [facility] 3RD+ ORD SLCTV ABDL PEL/[facility] Left 3/9/2018    Procedure: LEFT LOWER EXTREMITY ARTERIOGRAM WITH PTA OF LEFT POPLITEAL ARTERY;  Surgeon: Deepthi Phelps MD;  Location: [facility] MAIN OR;  Service: Vascular    AR [facility] 3RD+ ORD SLCTV ABDL PEL/[facility] Left 10/26/2018    Procedure: ARTERIOGRAM, angioplasty stent and atherectomy;  Surgeon: Deepthi Phelps MD;  Location: [facility] OR;  Service: Vascular    THORACENTESIS         Family History   Problem Relation Age of Onset    Lung cancer Mother     Brain cancer Mother     Diabetes Father     Aneurysm Father     Stroke Father     Lung cancer Father     Brain cancer Father     Diabetes Maternal Grandmother        Social History     Tobacco Use    Smoking status: Former Smoker     Packs/day: 2 00     Years: 15 00     Pack years: 30 00     Types: Cigarettes    Smokeless tobacco: Never Used    Tobacco comment: quit 62 years ago   Vaping Use    Vaping Use: Never used   Substance Use Topics    Alcohol use: No    Drug use: No           Allergies    No Known Allergies    Home Medications    Prior to Admission medications    Medication Sig Start Date End Date Taking? Authorizing Provider   acetaminophen (TYLENOL) 325 mg tablet Take 2 tablets (650 mg total) by mouth every 6 (six) hours as needed for mild pain  Patient not taking: Reported on 11/8/2021 2/5/19   Dago Abrams PA-C   albuterol (Ventolin HFA) 90 mcg/act inhaler Inhale 2 puffs every 6 (six) hours as needed for wheezing 11/8/21   Seda Pool MD   aspirin 81 MG tablet Take by mouth    Historical Provider, MD   Cholecalciferol (VITAMIN D) 2000 units CAPS Take by mouth    Historical Provider, MD   CYANOCOBALAMIN PO cyanocobalamin (vitamin B-12)    Historical Provider, MD   ferrous sulfate 325 (65 Fe) mg tablet Take 1 tablet (325 mg total) by mouth 2 (two) times a day 6/23/21   Seda Pool MD   fluticasone-vilanterol (BREO ELLIPTA) 200-25 MCG/INH inhaler Inhale 1 puff daily Rinse mouth after use  11/5/20   Seda Pool MD   methylPREDNISolone 4 MG tablet therapy pack Use as directed on package 11/5/20   Seda Pool MD   mirtazapine (REMERON) 15 mg tablet Take 1 tablet (15 mg total) by mouth daily at bedtime 6/21/21   Seda Pool MD   neomycin-polymyxin-dexamethasone (MAXITROL) ophthalmic suspension  6/2/21   Historical Provider, MD   Omega-3 Fatty Acids ([address]) 645 MG CAPS Take by mouth    Historical Provider, MD   pantoprazole (PROTONIX) 40 mg tablet Take 1 tablet (40 mg total) by mouth daily 7/14/21   Billie Moe PA-C   simvastatin (ZOCOR) 20 mg tablet TAKE ONE TABLET BY MOUTH DAILY 11/11/21   Seda Pool MD   VITAMIN E COMPLEX PO Take by mouth    Historical Provider, MD   Wheat Dextrin (Benefiber) POWD Take by mouth 2 (two) times a day 11/13/20   Seda Pool MD           Review of Systems    Review of Systems   Constitutional: Negative for chills and fever  HENT: Negative for rhinorrhea and sore throat  Eyes: Negative for pain and visual disturbance  Respiratory: Negative for cough and shortness of breath  Cardiovascular: Negative for chest pain and leg swelling  Gastrointestinal: Negative for abdominal pain, nausea and vomiting  Genitourinary: Negative for dysuria and hematuria  Musculoskeletal: Positive for back pain  Negative for myalgias  Skin: Negative for rash and wound  Neurological: Negative for syncope and headaches  Physical Exam      ED Triage Vitals   Temperature Pulse Respirations Blood Pressure SpO2   02/28/22 1130 02/28/22 1007 02/28/22 1007 02/28/22 1007 02/28/22 1007   98 °F (36 7 °C) 89 18 110/61 94 %      Temp Source Heart Rate Source Patient Position - Orthostatic VS BP Location FiO2 (%)   02/28/22 1130 02/28/22 1007 02/28/22 1007 02/28/22 [facility] --   Oral Monitor Sitting Left arm       Pain Score       --                      Physical Exam  Constitutional:       General: He is not in acute distress  Appearance: He is not ill-appearing  HENT:      Head: Normocephalic and atraumatic  Nose: Nose normal    Eyes:      Extraocular Movements: Extraocular movements intact  Pupils: Pupils are equal, round, and reactive to light  Cardiovascular:      Rate and Rhythm: Normal rate and regular rhythm  Pulmonary:      Effort: No respiratory distress  Breath sounds: Normal breath sounds  No wheezing  Chest:      Chest wall: No tenderness  Abdominal:      General: There is no distension  Palpations: Abdomen is soft  Tenderness: There is no abdominal tenderness  Musculoskeletal:         General: No swelling or deformity  Normal range of motion  Cervical back: Normal range of motion and neck supple  Comments: No midline spinal tenderness or step-offs  Patient has left upper to mid thoracic paraspinal musculature tenderness to palpation   Skin:     General: Skin is warm  Findings: No erythema  Neurological:      Mental Status: He is alert and oriented to person, place, and time  Mental status is at baseline  Sensory: No sensory deficit  Motor: No weakness     Psychiatric:         Mood and Affect: Mood normal               Diagnostic Results      Labs:    Results Reviewed     None          All labs reviewed and utilized in the medical decision making process    Radiology:    CT cervical spine without contrast   Final Result      No cervical spine fracture or traumatic malalignment  Multilevel cervical degenerative changes  Workstation performed: [id number]         CT head without contrast   Final Result      No acute intracranial process  No skull fracture  Chronic microangiopathy  Workstation performed: [id number]         CT chest without contrast   Final Result      No evidence of acute thoracic process  No acute fracture  Compression fracture of T9 and T12 presumably subacute to chronic new since July 2020  Compression fracture of L3 and partially visualized compression fracture of L4 new since June 2017  No significant retropulsion  This could be followed up with    nonemergent MRI if clinically warranted  No other significant interval change  Workstation performed: [id number]         CT recon only thoracic spine (no charge)   Final Result      No acute fracture or posttraumatic malalignment  Mild compression fracture T9 and moderate compression fracture of T12 may represent subacute to chronic fractures and are new since July 2020  No significant retropulsion  These could be followed up with nonemergent MRI if clinically warranted  This study demonstrates a significant  finding and was documented as such in [facility] for liaison and referring practitioner notification         Workstation performed: [id number]             All radiology studies independently viewed by me and interpreted by the radiologist     Procedure    Procedures        FINAL IMPRESSION    Final diagnoses:   Fall, initial encounter   Compression fracture of T9 vertebra, initial encounter ([address] )   Compression fracture of T12 vertebra, initial encounter [facility])         DISPOSITION    Time reflects when diagnosis was documented in both MDM as applicable and the Disposition within this note     Time User Action Codes Description Comment    2/28/2022 12:38 PM Dargiannia  Add [I70  JBNF] Fall, initial encounter     2/28/2022 12:38 PM Darletta  Add [J00 521E] Compression fracture of T9 vertebra, initial encounter ([facility] [address] )     2/28/2022 12:38 PM Darletta  Add [C69 503A] Compression fracture of T12 vertebra, initial encounter [facility])       ED Disposition     ED Disposition Condition Date/Time Comment    Discharge Stable Mon Feb 28, 2022 12:38 PM Сергей Jacinto discharge to home/self care              Follow-up Information     Follow up With Specialties Details Why Contact Info    Feli Perez MD Orthopedic Surgery Schedule an appointment as soon as possible for a visit in 1 week  [address] [place] [address] [address]  [place] [address]  [phone]              PATIENT REFERRED TO:    Feli Perez MD  [address] [place] [address]  [facility] [address] [facility] [address] [facility]    Schedule an appointment as soon as possible for a visit in 1 week        DISCHARGE MEDICATIONS:    Discharge Medication List as of 2/28/2022 12:42 PM      START taking these medications    Details   lidocaine (Lidoderm) 5 % Apply 1 patch topically daily Remove & Discard patch within 12 hours or as directed by MD, Starting Mon 2/28/2022, Normal      oxyCODONE (Roxicodone) 5 immediate release tablet Take 1 tablet (5 mg total) by mouth every 6 (six) hours as needed for severe pain for up to 10 days Max Daily Amount: 20 mg, Starting Mon 2/28/2022, Until Thu 3/10/2022 at 2359, Normal         CONTINUE these medications which have NOT CHANGED    Details   acetaminophen (TYLENOL) 325 mg tablet Take 2 tablets (650 mg total) by mouth every 6 (six) hours as needed for mild pain, Starting Tue 2/5/2019, No Print      albuterol (Ventolin HFA) 90 mcg/act inhaler Inhale 2 puffs every 6 (six) hours as needed for wheezing, Starting Mon 11/8/2021, Normal      aspirin 81 MG tablet Take by mouth, Historical Med      Cholecalciferol (VITAMIN D) 2000 units CAPS Take by mouth, Historical Med      CYANOCOBALAMIN PO cyanocobalamin (vitamin B-12), Historical Med      ferrous sulfate 325 (65 Fe) mg tablet Take 1 tablet (325 mg total) by mouth 2 (two) times a day, Starting Wed 6/23/2021, Normal      fluticasone-vilanterol (BREO ELLIPTA) 200-25 MCG/INH inhaler Inhale 1 puff daily Rinse mouth after use , Starting Thu 11/5/2020, Normal      methylPREDNISolone 4 MG tablet therapy pack Use as directed on package, Normal      mirtazapine (REMERON) 15 mg tablet Take 1 tablet (15 mg total) by mouth daily at bedtime, Starting Mon 6/21/2021, Normal      neomycin-polymyxin-dexamethasone (MAXITROL) ophthalmic suspension Starting Wed 6/2/2021, Historical Med      Omega-3 Fatty Acids (FISH OIL) 645 MG CAPS Take by mouth, Historical Med      pantoprazole (PROTONIX) 40 mg tablet Take 1 tablet (40 mg total) by mouth daily, Starting Wed 7/14/2021, Normal      simvastatin (ZOCOR) 20 mg tablet TAKE ONE TABLET BY MOUTH DAILY, Normal      VITAMIN E COMPLEX PO Take by mouth, Historical Med      Wheat Dextrin (Benefiber) POWD Take by mouth 2 (two) times a day, Starting Fri 11/13/2020, Normal                      Edisonal RAZ Chanel DO        This note was partially completed using voice recognition technology, and was scanned for gross errors; however some errors may still exist  Please contact the author with any questions or requests for clarification        Ilsa Pugh DO  02/28/22 5690

## 2022-02-28 NOTE — DISCHARGE INSTRUCTIONS
Take pain medication as needed  Do not operate any heavy machinery while taking oxycodone  Return to the emergency department for any new or worsening symptoms

## 2022-02-28 NOTE — ED NOTES
Patient transported to [facility], [address] [place] [address]  02/28/22 4154

## 2022-03-01 NOTE — TELEPHONE ENCOUNTER
Call placed to the patient per [facility] referral     Phone rang multiple times with no answer or option to LVM  This is the 1st attempt to reach the patient  Will defer per protocol

## 2022-03-03 NOTE — TELEPHONE ENCOUNTER
Hello,  Please advise if the following patient can be forced onto the schedule:    Patient: Yvonnie Kayser     : 1933    MRN: [id number]    Call back #: [phone]    Insurance: [employer]    Reason for appointment: Compression fracture of T9 vertebra and Compression fracture of T12 vertebra  Seen in ED on 22  Wife upset when I did not have anything available to offer her  She states she doesn't want to wait for patient to be seen  He is 88 and in a lot of pain  She doesn't know if he can last until next week to be seen  I advised I could consult with nurse to see if anyone else would be appropriate  Nurse could not be reached at the time and wife wants Dr Marlon Fried since he specializes in the spine  She does not want to "mess with anyone else "    Requested doctor/location: [facility]      Thank you  E-mail sent to [facility]

## 2022-03-04 NOTE — TELEPHONE ENCOUNTER
Call placed to the patient per [facility] referral     Call appeared to be answered and when I  spoke the call was disconnected  This the 2nd attempt to reach the patient  Will defer per protocol

## 2022-03-07 NOTE — PROGRESS NOTES
[address] Naveen Pardo MD  [address] [facility] [address]  [phone]    HISTORY OF PRESENT ILLNESS:    Patient is a 80-year-old male who presents for initial evaluation of low back pain  Patient was seen in the ED on 2/28/2022 for a fall that occurred two days prior  Patient had a CT in the ED that showed multilevel degenerative changes, T9 and T12 compression fractures, and L3 and L4 compression fractures  Patient has mid-back pain with radiation into his neck  Patient states neck pain is worse than back pain  Pain radiates into left upper extremity ending at the elbow  Patient is currently not taking any pain medication  Patient denies any new or worsening numbness or tingling in bilateral upper extremities  Patient is ambulating with use of walker since the fall       ALLERGIES: No Known Allergies    MEDICATIONS:    Current Outpatient Medications:     acetaminophen (TYLENOL) 325 mg tablet, Take 2 tablets (650 mg total) by mouth every 6 (six) hours as needed for mild pain, Disp: 30 tablet, Rfl: 0    albuterol (Ventolin HFA) 90 mcg/act inhaler, Inhale 2 puffs every 6 (six) hours as needed for wheezing, Disp: 54 g, Rfl: 1    aspirin 81 MG tablet, Take by mouth, Disp: , Rfl:     Cholecalciferol (VITAMIN D) 2000 units CAPS, Take by mouth, Disp: , Rfl:     CYANOCOBALAMIN PO, cyanocobalamin (vitamin B-12), Disp: , Rfl:     ferrous sulfate 325 (65 Fe) mg tablet, Take 1 tablet (325 mg total) by mouth 2 (two) times a day, Disp: 180 tablet, Rfl: 1    fluticasone-vilanterol (BREO ELLIPTA) 200-25 MCG/INH inhaler, Inhale 1 puff daily Rinse mouth after use , Disp: 1 Inhaler, Rfl: 2    lidocaine (Lidoderm) 5 %, Apply 1 patch topically daily Remove & Discard patch within 12 hours or as directed by MD, Disp: 30 patch, Rfl: 0    methylPREDNISolone 4 MG tablet therapy pack, Use as directed on package, Disp: 21 each, Rfl: 0    mirtazapine (REMERON) 15 mg tablet, Take 1 tablet (15 mg total) by mouth daily at bedtime, Disp: 90 tablet, Rfl: 2    neomycin-polymyxin-dexamethasone (MAXITROL) ophthalmic suspension, , Disp: , Rfl:     Omega-3 Fatty Acids (FISH OIL) 645 MG CAPS, Take by mouth, Disp: , Rfl:     oxyCODONE (Roxicodone) 5 immediate release tablet, Take 1 tablet (5 mg total) by mouth every 6 (six) hours as needed for severe pain for up to 10 days Max Daily Amount: 20 mg, Disp: 12 tablet, Rfl: 0    pantoprazole (PROTONIX) 40 mg tablet, Take 1 tablet (40 mg total) by mouth daily, Disp: 90 tablet, Rfl: 3    simvastatin (ZOCOR) 20 mg tablet, TAKE ONE TABLET BY MOUTH DAILY, Disp: 90 tablet, Rfl: 1    VITAMIN E COMPLEX PO, Take by mouth, Disp: , Rfl:     Wheat Dextrin (Benefiber) POWD, Take by mouth 2 (two) times a day, Disp: 1 Can, Rfl: 2     PAST MEDICAL HISTORY:   Past Medical History:   Diagnosis Date    Anemia     Arthritis     Atherosclerosis of artery of extremity with ulceration (HCC) 3/27/2019    Bifascicular block     Cellulitis of chest wall     Chronic kidney disease     Cough     Dupuytren contracture     DVT femoral (deep venous thrombosis) with thrombophlebitis, right (HCC)     Elevated glucose     Hemothorax     Left    Hyperlipidemia     Interstitial lung disease (HCC)     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) 3/27/2019    SOB (shortness of breath)     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 2/19/2019    Vascular disease        PAST SURGICAL HISTORY:  Past Surgical History:   Procedure Laterality Date    AMPUTATION  1972    L thumb    CARPAL TUNNEL RELEASE  2016    L hand    CATARACT EXTRACTION      COLONOSCOPY      EYE SURGERY      FRACTURE SURGERY      HAND SURGERY      IR LOWER EXTREMITY / INTERVENTION  10/26/2018    KNEE ARTHROSCOPY      NERVE BLOCK      [place] AMPUTATION LOW LEG THRU TIB/FIB Left 2/1/2019    Procedure: AMPUTATION BELOW KNEE (BKA);   Surgeon: Zara Asif MD;  [facility]; Service: Vascular    [place] SLCTV CATHJ 3RD+ ORD SLCTV ABDL PEL/LXTR [facility] Left 3/9/2018    Procedure: LEFT LOWER EXTREMITY ARTERIOGRAM WITH PTA OF LEFT POPLITEAL ARTERY;  Surgeon: Dilma Hart MD;  [facility];  Service: Vascular    [place] Mallorie Martinez 3RD+ ORD SLCTV ABDL PEL/LXTR [facility] Left 10/26/2018    Procedure: ARTERIOGRAM, angioplasty stent and atherectomy;  Surgeon: Dilma Hart MD;  [facility];  Service: Vascular    THORACENTESIS         SOCIAL HISTORY:  Social History     Tobacco Use    Smoking status: Former Smoker     Packs/day: 2 00     Years: 15 00     Pack years: 30 00     Types: Cigarettes    Smokeless tobacco: Never Used    Tobacco comment: quit 57 years ago   Vaping Use    Vaping Use: Never used   Substance Use Topics    Alcohol use: No    Drug use: No       ROS:  Review of Systems   Constitutional: Negative for chills, fatigue and fever  HENT: Negative for drooling, ear discharge, facial swelling, hearing loss, nosebleeds, rhinorrhea, sneezing and trouble swallowing  Eyes: Negative for pain, discharge, redness and itching  Respiratory: Negative for cough, choking, shortness of breath and wheezing  Cardiovascular: Negative for chest pain and palpitations  Gastrointestinal: Negative for abdominal pain, constipation and diarrhea  Endocrine: Negative for cold intolerance and heat intolerance  Genitourinary: Negative for dysuria and flank pain  Musculoskeletal: Positive for back pain and neck pain  See HPI and PE  Skin: Negative for rash  Neurological: Negative for dizziness, light-headedness and headaches  Psychiatric/Behavioral: Negative for agitation, self-injury and suicidal ideas  PHYSICAL EXAM:  Patient is a 27-year-old male sitting comfortably on exam chair in no acute distress  Patient ambulates with antalgic gait and use of walker  No TTP along cervical, thoracic, or lumbar spine    5/5 motor strength with normal sensation in bilateral upper extremities  Negative Hall sign bilaterally  RADIOGRAPHIC STUDIES:  1  CT, cervical spine, 2/28/2022:  Severe multilevel cervical degenerative disc and facet arthrosis  At C6-7 there is a hint of spondylolisthesis and vac information  The remainder of the cervical spine appear to be stable  No evidence of fracture  2  CT, thoracic spine, 2/28/2022:  Severe multilevel thoracic degenerative disc disease and facet arthrosis  Large osteophyte formation  Hyperkyphosis  Chronic fractures  No evidence of acute injury  ASSESSMENT:  Problem List Items Addressed This Visit        Other    Ambulatory dysfunction    Relevant Orders    Ambulatory Referral to Physical Therapy    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      Other Visit Diagnoses     Compression fracture of body of thoracic vertebra ([facility] [address] )    -  Primary    Relevant Orders    DXA bone density spine hip and pelvis    Ambulatory Referral to Endocrinology    Compression fracture of lumbar vertebra, unspecified lumbar vertebral level, initial encounter ([facility] [address] )        Relevant Orders    DXA bone density spine hip and pelvis    Ambulatory Referral to Endocrinology    Neck pain        Relevant Orders    Ambulatory Referral to Physical Therapy    Personal history of fall        Relevant Orders    Ambulatory Referral to Physical Therapy            PLAN:  Patient is a 40-year-old male with multiple compression fractures, ambulatory dysfunction, and neck pain  CT scans obtained on 2/28/2022 were reviewed in the office today showing multiple compression fractures and cervical multilevel degenerative disc disease  Patient is advised to attend physical therapy for neck pain, ambulatory dysfunction, and history of multiple falls  Due to history of multiple compression fractures, DXA scan ordered to evaluate for osteoporosis  Patient also referred to endocrinology for evaluation of osteoporosis       Patient instructed to follow-up in 2 months for re-evaluation  If patient continues to have neck pain at that time, cervical MRI may be ordered  The family very concerned about driving  I did explain to him that the request home physical therapy is not going to be effective with either the neck issue or the ambulatory dysfunction  It is important for him to actually go to the facility where there are modalities available  We are going to try to find the therapist very close to her house so they do not have to drive far  His wife had a number of questions regarding his radiographic studies  I did personally review the CT scans of his neck chest and thoracic spine  Pr lumbar spine was also visualized  He does have significant multilevel degenerative disc disease and sagittal imbalance  There is also on occasion of multiple compression fractures       Scribe Attestation    I,:  Suraj Sood PA-C am acting as a scribe while in the presence of the attending physician :       I,:  Dasia Nelson MD personally performed the services described in this documentation    as scribed in my presence :

## 2022-03-11 NOTE — TELEPHONE ENCOUNTER
Call placed to the patient per [facility] referral     Phone rang multiple times with no answer or option to LVM  This is the 3rd attempt to reach the patient  Referral closed

## 2022-03-21 NOTE — TELEPHONE ENCOUNTER
Pt and wife called stating starting yesterday he has shooting pain in left foot when trying to ambulate  He also notes area at ball of foot and between 1st and 2nd toes swollen  No discoloration, foot is warm  S/w Dr Judy Olsen - he would like to eval pt in office  Pt will arrive at 1pm at [facility]  Staff/sched notified to add pt on to sched 
#1:

## 2022-05-04 NOTE — PROGRESS NOTES
[address] [facility]        NAME: Leidy Lott is a 80 y o  male  : 1933    MRN: [id number]  DATE: May 4, 2022  TIME: 2:21 PM    Assessment and Plan   Closed nondisplaced fracture of acromial process of right scapula, initial encounter [S42 124A]  1  Closed nondisplaced fracture of acromial process of right scapula, initial encounter     2  Acute pain of right shoulder  XR shoulder 2+ vw right    Ambulatory Referral to Orthopedic Surgery     Xray revealed what appears to be a fracture of the acromion, will follow up on official radiology read  Patient given shoulder immobilizer and ortho referral placed  Ice and ibuprofen  Patient Instructions     Apply ice to the area and take ibuprofen  Follow up with orthopedic doctor in 3-5 days  Proceed to  ER if symptoms worsen  Chief Complaint     Chief Complaint   Patient presents with    Shoulder Injury     pt states he fell against a wall 1 week ago, slipped off a toilet right shoulder injury          History of Present Illness       Patient is an 81 yo female who presents with one week of right shoulder pain following an injury  He reports he was sitting on the toilet and fell to the side and hit his shoulder against the wall  The pain is located in the superior and anterior portion of the shoulder and radiates into the upper arm  He denies weakness, reduced ROM, and any numbness or tingling  Review of Systems   Review of Systems   Respiratory: Negative for shortness of breath  Cardiovascular: Negative for chest pain  Musculoskeletal: Positive for arthralgias  Negative for back pain, gait problem and joint swelling  Skin: Negative for wound           Current Medications       Current Outpatient Medications:     albuterol (PROVENTIL HFA,VENTOLIN HFA) 90 mcg/act inhaler, Inhale 2 puffs every 6 (six) hours as needed for wheezing, Disp: 54 g, Rfl: 1    aspirin 81 MG tablet, Take by mouth, Disp: , Rfl:     Cholecalciferol (VITAMIN D) 2000 units CAPS, Take by mouth, Disp: , Rfl:     CYANOCOBALAMIN PO, cyanocobalamin (vitamin B-12), Disp: , Rfl:     ferrous sulfate 325 (65 Fe) mg tablet, Take 1 tablet (325 mg total) by mouth 2 (two) times a day, Disp: 180 tablet, Rfl: 1    fluticasone-vilanterol (BREO ELLIPTA) 200-25 MCG/INH inhaler, Inhale 1 puff daily Rinse mouth after use , Disp: 1 Inhaler, Rfl: 2    pantoprazole (PROTONIX) 40 mg tablet, Take 1 tablet (40 mg total) by mouth daily, Disp: 90 tablet, Rfl: 3    simvastatin (ZOCOR) 20 mg tablet, Take 1 tablet (20 mg total) by mouth daily, Disp: 90 tablet, Rfl: 1    VITAMIN E COMPLEX PO, Take by mouth, Disp: , Rfl:     Wheat Dextrin (Benefiber) POWD, Take by mouth 2 (two) times a day, Disp: 1 Can, Rfl: 2    Current Allergies     Allergies as of 05/04/2022    (No Known Allergies)            The following portions of the patient's history were reviewed and updated as appropriate: allergies, current medications, past family history, past medical history, past social history, past surgical history and problem list      Past Medical History:   Diagnosis Date    Anemia     Arthritis     Atherosclerosis of artery of extremity with ulceration (HCC) 3/27/2019    Bifascicular block     Cellulitis of chest wall     Chronic kidney disease     Cough     Dupuytren contracture     DVT femoral (deep venous thrombosis) with thrombophlebitis, right (HCC)     Elevated glucose     Hemothorax     Left    Hyperlipidemia     Interstitial lung disease (HCC)     Phantom pain after amputation of lower extremity ([address] ) 3/27/2019    SOB (shortness of breath)     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 2/19/2019    Vascular disease        Past Surgical History:   Procedure Laterality Date    AMPUTATION  1972    L thumb    CARPAL TUNNEL RELEASE  2016    L hand    CATARACT EXTRACTION      COLONOSCOPY      EYE SURGERY      FRACTURE SURGERY      HAND SURGERY      IR LOWER EXTREMITY / INTERVENTION  10/26/2018    KNEE ARTHROSCOPY      NERVE BLOCK      [place] AMPUTATION LOW LEG THRU TIB/FIB Left 2/1/2019    Procedure: AMPUTATION BELOW KNEE (BKA); Surgeon: Darby Howell MD;  [facility];  Service: Vascular    [place] Gurpreet Ruiz 3RD+ ORD SLCTV ABDL PEL/[facility] Left 3/9/2018    Procedure: LEFT LOWER EXTREMITY ARTERIOGRAM WITH PTA OF LEFT POPLITEAL ARTERY;  Surgeon: Manisha Ramirez MD;  [facility];  Service: Vascular    [place] Gurpreet Ruiz 3RD+ ORD SLCTV ABDL PEL/[facility] Left 10/26/2018    Procedure: ARTERIOGRAM, angioplasty stent and atherectomy;  Surgeon: Manisha Ramirez MD;  [facility];  Service: Vascular    THORACENTESIS         Family History   Problem Relation Age of Onset    Lung cancer Mother     Brain cancer Mother     Diabetes Father     Aneurysm Father     Stroke Father     Lung cancer Father     Brain cancer Father     Diabetes Maternal Grandmother          Medications have been verified  Objective   Pulse 70   Temp 97 5 °F (36 4 °C) (Temporal)   Resp 18   Ht 5' 10" (1 778 m)   Wt 61 2 kg (135 lb)   SpO2 95%   BMI 19 37 kg/m²        Physical Exam     Physical Exam  Constitutional:       Appearance: Normal appearance  HENT:      Head: Atraumatic  Cardiovascular:      Rate and Rhythm: Normal rate  Heart sounds: Normal heart sounds  Pulmonary:      Effort: Pulmonary effort is normal       Breath sounds: Normal breath sounds  Musculoskeletal:      Right shoulder: Tenderness and bony tenderness present  No swelling or deformity  Normal strength  Normal pulse  Left shoulder: Normal         Arms:       Cervical back: Normal range of motion  Comments: Tenderness to palpation over AC joint  Pain with forward flexion  Negative Empty Can, Awa Sparks, and Crossover Arm tests  Neurovascularly intact  No edema  Skin:     General: Skin is warm and dry     Psychiatric:         Mood and Affect: Mood normal          Behavior: Behavior normal

## 2022-05-15 NOTE — PROGRESS NOTES
Pulmonary Follow Up Note   Steve Islas 80 y o  male MRN: [id number]  5/16/2022    Assessment:  Hypoxic respiratory failure  · Suspect acute on chronic  · New CHF/ACS, progression IPF/ILD  · On doing 6 minute walk test in the office today noted significant oxygen desaturation in the 70s after walking 10 ft  · Needed 6 LPM for borderline oxygenation to 88%   · At rest with talking, oxygen drops to the 70s on room air    Plan:   · Needs to be hospitalized for further workup, treatment of hypoxic respiratory failure   · HRCT, stat TTE, labs including troponin a, an EKG  · Needs pulmonary consult/evaluation for need of steroids if had signs of IPF flare  · Patient will go by ambulance, provide supplemental oxygen for SpO2 above 88%  · D/W the son, Maria Teresa Diego slightly having a poor prognosis, may not be appropriate to go back home and there is signs of progression of his ILD  · Will follow-up with Pulmonary after discharge    General care for ILD:    PPI/Continue omeprazole   Reinforced the anti-reflux measures   Negative ADE[employer] screen, not interested in sleep apnea testing    Up-to-date on immunization for pneumococcal/COVID-19        Return in about 3 months (around 8/16/2022)  History of Present Illness     Follow up for:  ILD/IPF    Background:  80 y o  male with a h/o DVT, PVD, osteoarthritis, CKD, BKA/left, dyslipidemia, interstitial lung disease, asbestos exposure  Maria Teresa Diego she is to follow with Pulmonary for interstitial lung disease, last seen in 11/2019  Prior Rheumatology serology was negative, except RF for post positive/CCP was negative  He was considered to be an IPF pattern  Symptoms used to be a dry cough, chronic but was not bothering  No limitation of exercise capacity/independent in ADLs  Noted a very minimal tobacco abuse history, smoked for 10 pack year quit in the 1970s  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      11/13/2019 visit-not interested in pirfenidone, prednisone or inhalers  Agree to follow-up CT chest     Labs 10/07/2019:  Rheumatoid factor positive, 40 IU  Negative CCP  , negative DONTE  Negative HP panel  NT proBNP 990 in 07/2020, 770 in 10/2018, normal 390 in 11/2020    Interval History  Patient presented today accompanied by his son who was involved in his care  Noted it worsening dyspnea on exertion over the past year, more progressive for the past few months  Before that, at baseline was 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, and do some work  Now has a limited exercise capacity to 10-20 feet  He had a fall few months ago/mechanical fall for which he had a pan scan, noted chronic findings of his lung  Reports exertional cough, chest congestion  P r n  albuterol does not help much, previously was on Advair that also did not help significantly  No orthopnea, lower extremity edema, exertional angina, diaphoresis      Review of Systems  As per hpi, +, anorexia, dyspnea on minimal exertion, mild heartburn/reflux    Studies:    Imaging and other studies: I have personally reviewed pertinent films in PACS  CT chest 02/28/2022-UIP pattern, honeycombing, traction bronchiectasis no pulmonary infiltrates/pulmonary contusion  Pulmonary function testing:   PFT 09/30/2019-ratio 80%, FEV1 1 9 L/70%, FVC 2 38 L/64%  TLC 86%, %, DLCO 29%    6 minute walk test 05/16/2022-baseline SpO2 89% on room air, heart rate 79  Only able to walk 5-6 feet with SpO2 dropped to 83%  Test was aborted due to difficult oxygenation, improved at rest after 4-5 minutes with 6 LPM    EKG, Pathology, and Other Studies: I have personally reviewed pertinent reports  Past medical, surgical, social and family histories reviewed  Medications/Allergies: Reviewed      Vitals: Blood pressure 100/58, pulse 72, temperature (!) 97 3 °F (36 3 °C), resp  rate 19, height 5' 10" (1 778 m), SpO2 90 %  Body mass index is 19 37 kg/m²   Oxygen Therapy  SpO2: 90 %      Physical Exam  Body mass index is 19 37 kg/m²  Gen: not in acute distress, ill-appearing, thin  Neck/Eyes: supple, no adenopathy, PERRL  Ear: normal appearance, mild but not significant hearing impairment  Nose:  normal nasal mucosa, no drainage  Mouth:  unremarkable/normal appearance of lips, teeth and gums  Oropharynx: mucosa is moist, no focal lesions or erythema  Salivary glands: soft nontender  Chest: normal respiratory efforts, diffuse posterior crackles  CV: RRR, + fixed split of 2nd heart sound no edema  Abdomen: soft, non tender  Extremities:  No observed deformity,+ digital clubbing   Skin: unremarkable  Neuro: AAO X3, no focal motor deficit        Labs:  Lab Results   Component Value Date    WBC 9 04 03/24/2022    HGB 12 2 03/24/2022    HCT 38 3 03/24/2022    MCV 95 03/24/2022     03/24/2022     Lab Results   Component Value Date    GLUCOSE 108 02/01/2019    CALCIUM 9 9 03/24/2022    K 4 4 03/24/2022    CO2 29 03/24/2022     03/24/2022    BUN 25 03/24/2022    CREATININE 1 48 (H) 03/24/2022     Lab Results   Component Value Date    IGE 8 30 10/07/2019     Lab Results   Component Value Date    ALT 11 (L) 11/11/2021    AST 9 11/11/2021    ALKPHOS 59 11/11/2021           Portions of the record may have been created with voice recognition software  Occasional wrong word or "sound a like" substitutions may have occurred due to the inherent limitations of voice recognition software  Read the chart carefully and recognize, using context, where substitutions have occurred    ANT Kearney    [facility]

## 2022-05-16 PROBLEM — J96.01 ACUTE RESPIRATORY FAILURE WITH HYPOXIA (HCC): Status: ACTIVE | Noted: 2022-01-01

## 2022-05-16 PROBLEM — J84.10 PULMONARY FIBROSIS (HCC): Status: ACTIVE | Noted: 2022-01-01

## 2022-05-16 PROBLEM — I50.9 CHF EXACERBATION (HCC): Status: ACTIVE | Noted: 2022-01-01

## 2022-05-16 NOTE — H&P
[address] [facility]  H&P- Wilda Speed 1933, 80 y o  male MRN: [id number]  Unit/Bed#: -01 Encounter: [id number]  Primary Care Provider: Mamadou Montoya MD   Date and time admitted to hospital: 5/16/2022  1:51 PM    * Acute respiratory failure with hypoxia [facility])  Assessment & Plan  -He presents from pulmonologist's office with oxygen desaturation in 70s  -likely due to progression of IPF/ILD and decompensated CHF  -continue supplemental oxygen, keep pulse ox greater than 90%  -wean oxygen as tolerated  -will need ambulatory pulse ox to assess home oxygen need before discharge  -pulmonary consult  -martir Jasso  -IV Abx    [facility]  Assessment & Plan   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, IPF/ILD presents with progressive shortness of breath presented with hypoxic respiratory failure, CT chest pulmonary fibrosis in a UIP pattern with associated traction bronchiectasis and diffuse groundglass opacities  -give Duoneb  -add prednisone 60mg  -start empiric IV cefepime and vancomycin  -continue supplemental oxygen  -check HRCT  -pulmonary consult    CHF exacerbation ([facility] )  Assessment & Plan  Wt Readings from Last 3 Encounters:   05/16/22 58 kg (127 lb 13 9 oz)   05/04/22 61 2 kg (135 lb)   11/08/21 63 5 kg (140 lb)   Patient h/o diastolicHF presents with progressive shortness of breath found with elevated proBNP 1990, troponin 41>41>38, EKG nonischemic, CT chest  diffuse groundglass opacities consistent with either pulmonary edema or multifocal pneumonitis  -(11/17/2017) Nuclear stress negative, echo normal systolic function, grade 2 diastolic dysfunction, EF 61-56%  -Continue IV lasix   -Strict I &Os  -Daily weight  -Check echo assess EF  -cardiology consult          Leukocytosis  Assessment & Plan  -Mild leukocytosis  -started on empiric cefepime and vancomycin  -CT chest diffuse ground-glass opacity, no infiltrate or consolidation 2/2 IPF/ILD  -Monitor WBC    Severe protein-calorie malnutrition ([facility] )  Assessment & Plan  Malnutrition Findings:     albumin 2 6  BMI 18  Will give Ensure b i d  Encourage p o  Intake  Consider nutrition consult                               BMI Findings: Body mass index is 18 35 kg/m²  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  Assessment & Plan  Patient wears left lower extremity prosthesis    Anemia  Assessment & Plan  Continue ferrous sulfate  Stable Hgb    Bifascicular block  Assessment & Plan  Chronic on previous EKG  Troponin 41>41>38  Continue to monitor  Cardiology consult    Hyperlipidemia  Assessment & Plan  Will give atorvastatin, simvastatin not available    Severe peripheral arterial disease (HCC)  Assessment & Plan  -S/p BLE angiogram,  Left SFA PTA, DCB, arthrectomy, Viabahn stent and L BK popliteal PTA  -continue ASA and statin    VTE Pharmacologic Prophylaxis: VTE Score: 8 High Risk (Score >/= 5) - Pharmacological DVT Prophylaxis Ordered: heparin  Sequential Compression Devices Ordered  Code Status: Level 1 - Full Code   Discussion with family: Updated  (daughter) at bedside  Anticipated Length of Stay: Patient will be admitted on an inpatient basis with an anticipated length of stay of greater than 2 midnights secondary to CHF exacerbation  Total Time for Visit, including Counseling / Coordination of Care: 60 minutes Greater than 50% of this total time spent on direct patient counseling and coordination of care  Chief Complaint: SOB    History of Present Illness:  Te Perez is a 80 y o  male with a PMH of anemia, DVT, PAD s/p left BKA, Bifascicular block, CHF, 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 who presents with progressive shortness of breath worse with exertion and dry nonproductive cough x1 month  Patient had his appointment with Pulmonary Dr Reda Sicard this morning with 6 minute walk test oxygen desaturation in 70s sent to ED for evaluation    Denies lower extremity edema, nausea, vomiting, fever, chills, diarrhea, constipation, chest pain, palpitation, orthopnea or PND  Review of Systems:  Review of Systems  Comprehensive review of systems is negative except in the above HPI  Past Medical and Surgical History:   Past Medical History:   Diagnosis Date    Anemia     Arthritis     Atherosclerosis of artery of extremity with ulceration ([email or web address] 3/27/2019    Bifascicular block     Cellulitis of chest wall     Chronic kidney disease     Cough     Dupuytren contracture     DVT femoral (deep venous thrombosis) with thrombophlebitis, right (HCC)     Elevated glucose     Hemothorax     Left    Hyperlipidemia     Interstitial lung disease (HCC)     Phantom pain after amputation of lower extremity ([facility] ) 3/27/2019    SOB (shortness of breath)     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 2/19/2019    Vascular disease        Past Surgical History:   Procedure Laterality Date    AMPUTATION  1972    L thumb    CARPAL TUNNEL RELEASE  2016    L hand    CATARACT EXTRACTION      COLONOSCOPY      EYE SURGERY      FRACTURE SURGERY      HAND SURGERY      IR LOWER EXTREMITY / INTERVENTION  10/26/2018    KNEE ARTHROSCOPY      NERVE BLOCK      MI AMPUTATION LOW LEG THRU TIB/FIB Left 2/1/2019    Procedure: AMPUTATION BELOW KNEE (BKA); Surgeon: Marybeth Peña MD;  Location: [facility];  Service: Vascular    MI Nicole Francisco 3RD+ ORD SLCTV ABDL PEL/LXTR [facility] Left 3/9/2018    Procedure: LEFT LOWER EXTREMITY ARTERIOGRAM WITH PTA OF LEFT POPLITEAL ARTERY;  Surgeon: Park Valentino MD;  [facility]: [facility];  Service: Vascular    MI Nicole Francisco 3RD+ ORD SLCTV ABDL PEL/LXTR [address] [facility] Left 10/26/2018    Procedure: ARTERIOGRAM, angioplasty stent and atherectomy;  Surgeon: Park Valentino MD;  Location: [facility];  Service: Vascular    THORACENTESIS         Meds/Allergies:  Prior to Admission medications    Medication Sig Start Date End Date Taking?  Authorizing Provider albuterol (PROVENTIL HFA,VENTOLIN HFA) 90 mcg/act inhaler Inhale 2 puffs every 6 (six) hours as needed for wheezing 5/3/22   Warren Chaves MD   aspirin 81 MG tablet Take by mouth    Historical Provider, MD   Cholecalciferol (VITAMIN D) 2000 units CAPS Take by mouth    Historical Provider, MD   CYANOCOBALAMIN PO cyanocobalamin (vitamin B-12)    Historical Provider, MD   ferrous sulfate 325 (65 Fe) mg tablet Take 1 tablet (325 mg total) by mouth 2 (two) times a day 6/23/21   Warren Chaves MD   ipratropium-albuterol (DUO-NEB) 0 5-2 5 mg/3 mL nebulizer solution Take 3 mL by nebulization in the morning and 3 mL at noon and 3 mL in the evening and 3 mL before bedtime  5/16/22 8/14/22  [place] BudMD sudeep   pantoprazole (PROTONIX) 40 mg tablet Take 1 tablet (40 mg total) by mouth daily 7/14/21   Marleni Nguyen PA-C   simvastatin (ZOCOR) 20 mg tablet Take 1 tablet (20 mg total) by mouth daily 4/5/22   Warren Chaves MD   VITAMIN E COMPLEX PO Take by mouth    Historical Provider, MD   fluticasone-vilanterol (BREO ELLIPTA) 200-25 MCG/INH inhaler Inhale 1 puff daily Rinse mouth after use  Patient not taking: Reported on 5/16/2022 11/5/20 5/16/22  Warren Chaves MD   Wheat Adenike Kasper) POWD Take by mouth 2 (two) times a day  Patient not taking: Reported on 5/16/2022 11/13/20 5/16/22  Warren Chaves MD     I have reviewed home medications with patient personally      Allergies: No Known Allergies    Social History:  Marital Status: /Civil Union   Occupation:   Patient Pre-hospital Living Situation: With spouse  Patient Pre-hospital Level of Mobility: walks with walker  Patient Pre-hospital Diet Restrictions: none  Substance Use History:   Social History     Substance and Sexual Activity   Alcohol Use No    Comment: n/a     Social History     Tobacco Use   Smoking Status Former Smoker    Packs/day: 2 00    Years: 15 00    Pack years: 30 00    Types: Cigarettes   Smokeless Tobacco Never Used   Tobacco Comment    quit 57 years ago     Social History     Substance and Sexual Activity   Drug Use No       Family History:  Family History   Problem Relation Age of Onset    Lung cancer Mother     Brain cancer Mother     Diabetes Father     Aneurysm Father     Stroke Father     Lung cancer Father     Brain cancer Father     Diabetes Maternal Grandmother        Physical Exam:     Vitals:   Blood Pressure: 114/64 (05/16/22 1902)  Pulse: 72 (05/16/22 1902)  Temperature: 98 1 °F (36 7 °C) (05/16/22 1902)  Temp Source: Oral (05/16/22 1357)  Respirations: 19 (05/16/22 1600)  Height: 5' 10" (177 8 cm) (05/16/22 1745)  Weight - Scale: 58 kg (127 lb 13 9 oz) (05/16/22 1357)  SpO2: (!) 84 % (05/16/22 1902)    Physical Exam  Vitals and nursing note reviewed  Constitutional:       Appearance: Normal appearance  He is well-developed  He is not toxic-appearing  Comments: Daughter and son-in-law at the bedside   HENT:      Head: Normocephalic and atraumatic  Eyes:      Conjunctiva/sclera: Conjunctivae normal    Cardiovascular:      Rate and Rhythm: Normal rate and regular rhythm  Pulses: Normal pulses  Heart sounds: Normal heart sounds  No murmur heard  Pulmonary:      Effort: Pulmonary effort is normal  No respiratory distress  Breath sounds: Rales (Bibasilar rales) present  Abdominal:      General: Abdomen is flat  Bowel sounds are normal  There is no distension  Palpations: Abdomen is soft  Tenderness: There is no abdominal tenderness  Musculoskeletal:      Cervical back: Neck supple  Right lower leg: Edema present  Comments: Left BKA   Skin:     General: Skin is warm and dry  Neurological:      General: No focal deficit present  Mental Status: He is alert and oriented to person, place, and time     Psychiatric:         Mood and Affect: Mood normal          Behavior: Behavior normal           Additional Data:     Lab Results:  Results from last 7 days   Lab Units 05/16/22  1706 05/16/22  1428   WBC Thousand/uL  --  11 87*   HEMOGLOBIN g/dL  --  11 3*   HEMATOCRIT %  --  34 6*   PLATELETS Thousands/uL 333 349   NEUTROS PCT %  --  81*   LYMPHS PCT %  --  9*   MONOS PCT %  --  8   EOS PCT %  --  1     Results from last 7 days   Lab Units 05/16/22  1428   SODIUM mmol/L 139   POTASSIUM mmol/L 3 7   CHLORIDE mmol/L 103   CO2 mmol/L 28   BUN mg/dL 21   CREATININE mg/dL 1 16   ANION GAP mmol/L 8   CALCIUM mg/dL 8 5   ALBUMIN g/dL 2 6*   TOTAL BILIRUBIN mg/dL 0 79   ALK PHOS U/L 87   ALT U/L 20   AST U/L 18   GLUCOSE RANDOM mg/dL 114                 Results from last 7 days   Lab Units 05/16/22  1428   PROCALCITONIN ng/ml 0 15       Imaging: Reviewed radiology reports from this admission including: chest CT scan  CT chest without contrast   Final Result by Janae Howell MD (05/16 1531)      1  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  2   Diffuse groundglass opacities, developing since a CT from 2/28/2022  This is most consistent with either pulmonary edema or multifocal pneumonitis  Workstation performed: [id number]         XR chest 1 view portable    (Results Pending)   CT chest high resolution    (Results Pending)       EKG and Other Studies Reviewed on Admission:   · EKG: NSR  HR 73bpm 1AVB, RBBB, LAFB    ** Please Note: This note has been constructed using a voice recognition system   **

## 2022-05-16 NOTE — ASSESSMENT & PLAN NOTE
Wt Readings from Last 3 Encounters:   05/16/22 58 kg (127 lb 13 9 oz)   05/04/22 61 2 kg (135 lb)   11/08/21 63 5 kg (140 lb)   Patient h/o diastolicHF presents with progressive shortness of breath found with elevated proBNP 1990, troponin 41>41>38, EKG nonischemic, CT chest  diffuse groundglass opacities consistent with either pulmonary edema or multifocal pneumonitis  -(11/17/2017) Nuclear stress negative, echo normal systolic function, grade 2 diastolic dysfunction, EF 80-61%  -Continue IV lasix   -Strict I &Os  -Daily weight  -Check echo assess EF  -cardiology consult

## 2022-05-16 NOTE — ED PROVIDER NOTES
History  Chief Complaint   Patient presents with    Shortness of Breath     Pt sent from PCP office for SOB for 2 weeks, on room air pulse ox was 77%     HPI    Prior to Admission Medications   Prescriptions Last Dose Informant Patient Reported? Taking? CYANOCOBALAMIN PO  Spouse/Significant Other Yes No   Sig: cyanocobalamin (vitamin B-12)   Cholecalciferol (VITAMIN D) 2000 units CAPS  Spouse/Significant Other Yes No   Sig: Take by mouth   VITAMIN E COMPLEX PO  Spouse/Significant Other Yes No   Sig: Take by mouth   albuterol (PROVENTIL HFA,VENTOLIN HFA) 90 mcg/act inhaler   No No   Sig: Inhale 2 puffs every 6 (six) hours as needed for wheezing   aspirin 81 MG tablet  Spouse/Significant Other Yes No   Sig: Take by mouth   ferrous sulfate 325 (65 Fe) mg tablet   No No   Sig: Take 1 tablet (325 mg total) by mouth 2 (two) times a day   ipratropium-albuterol (DUO-NEB) 0 5-2 5 mg/3 mL nebulizer solution   No No   Sig: Take 3 mL by nebulization in the morning and 3 mL at noon and 3 mL in the evening and 3 mL before bedtime     pantoprazole (PROTONIX) 40 mg tablet   No No   Sig: Take 1 tablet (40 mg total) by mouth daily   simvastatin (ZOCOR) 20 mg tablet   No No   Sig: Take 1 tablet (20 mg total) by mouth daily      Facility-Administered Medications: None       Past Medical History:   Diagnosis Date    Anemia     Arthritis     [facility] artery of extremity [facility] 3/27/2019    Bifascicular block     Cellulitis of chest wall     Chronic kidney disease     Cough     Dupuytren contracture     DVT femoral (deep venous thrombosis) with thrombophlebitis, right (HCC)     Elevated glucose     Hemothorax     Left    Hyperlipidemia     Interstitial lung disease (HCC)     Phantom pain after amputation of lower extremity ([facility] ) 3/27/2019    SOB (shortness of breath)     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 2/19/2019    Vascular disease        Past Surgical History: Procedure Laterality Date    AMPUTATION  1972    L thumb    CARPAL TUNNEL RELEASE  2016    L hand    CATARACT EXTRACTION      COLONOSCOPY      EYE SURGERY      FRACTURE SURGERY      HAND SURGERY      IR LOWER EXTREMITY / INTERVENTION  10/26/2018    KNEE ARTHROSCOPY      NERVE BLOCK      UT AMPUTATION LOW LEG THRU TIB/FIB Left 2/1/2019    Procedure: AMPUTATION BELOW KNEE (BKA); Surgeon: Marleta Jeans, MD;  [facility];  Service: Vascular    UT Kellee Burrows 3RD+ ORD SLCTV ABDL PEL/LXTR [facility] Left 3/9/2018    Procedure: LEFT LOWER EXTREMITY ARTERIOGRAM WITH PTA OF LEFT POPLITEAL ARTERY;  Surgeon: Gavi Osborn MD;  [facility];  Service: Vascular    UT Kellee Burrows 3RD+ ORD SLCTV ABDL PEL/LXTR [facility] Left 10/26/2018    Procedure: ARTERIOGRAM, angioplasty stent and atherectomy;  Surgeon: Gavi Osborn MD;  [facility];  Service: Vascular    THORACENTESIS         Family History   Problem Relation Age of Onset    Lung cancer Mother     Brain cancer Mother     Diabetes Father     Aneurysm Father     Stroke Father     Lung cancer Father     Brain cancer Father     Diabetes Maternal Grandmother      I have reviewed and agree with the history as documented  E-Cigarette/Vaping    E-Cigarette Use Never User      E-Cigarette/Vaping Substances    Nicotine No     THC No     CBD No     Flavoring No     Other No     Unknown No      Social History     Tobacco Use    Smoking status: Former Smoker     Packs/day: 2 00     Years: 15 00     Pack years: 30 00     Types: Cigarettes    Smokeless tobacco: Never Used    Tobacco comment: quit 57 years ago   Vaping Use    Vaping Use: Never used   Substance Use Topics    Alcohol use: No    Drug use: No       Review of Systems    Physical Exam  Physical Exam  Vitals and nursing note reviewed  Constitutional:       General: He is not in acute distress  Appearance: He is well-developed and underweight        Comments: Frail appearing   HENT:      Head: Normocephalic and atraumatic  Eyes:      Conjunctiva/sclera: Conjunctivae normal       Pupils: Pupils are equal, round, and reactive to light  Neck:      Trachea: No tracheal deviation  Cardiovascular:      Rate and Rhythm: Normal rate and regular rhythm  Heart sounds: Normal heart sounds  Pulmonary:      Effort: Pulmonary effort is normal  No tachypnea or respiratory distress  Breath sounds: Wheezing (mild, diffuse) present  Comments: Sats high 90s on 6L  Abdominal:      General: There is no distension  Palpations: Abdomen is soft  Tenderness: There is no abdominal tenderness  Musculoskeletal:        Hands:       Cervical back: Normal range of motion  Right lower leg: No edema  Left Lower Extremity: Left leg is amputated below knee  Skin:     General: Skin is warm and dry  Neurological:      Mental Status: He is alert and oriented to person, place, and time  GCS: GCS eye subscore is 4  GCS verbal subscore is 5  GCS motor subscore is 6     Psychiatric:         Behavior: Behavior normal          Vital Signs  ED Triage Vitals [05/16/22 1357]   Temperature Pulse Respirations Blood Pressure SpO2   97 8 °F (36 6 °C) 71 20 127/67 100 %      Temp Source Heart Rate Source Patient Position - Orthostatic VS BP Location FiO2 (%)   Oral Monitor Lying Right arm --      Pain Score       No Pain           Vitals:    05/16/22 1430 05/16/22 1515 05/16/22 1530 05/16/22 1600   BP: 119/62  125/58 111/58   Pulse: 72 75 71 72   Patient Position - Orthostatic VS:             Visual Acuity      ED Medications  Medications   heparin (porcine) subcutaneous injection 5,000 Units (has no administration in time range)   albuterol inhalation solution 2 5 mg (2 5 mg Nebulization Given 5/16/22 1457)   furosemide (LASIX) injection 40 mg (40 mg Intravenous Given 5/16/22 1613)       Diagnostic Studies  Results Reviewed     Procedure Component Value Units Date/Time    HS Troponin I 2hr [[id number]] Collected: 05/16/22 1706    Lab Status: In process Specimen: Blood from Arm, Left Updated: 05/16/22 1710    Platelet count [[id number]] Collected: 05/16/22 1706    Lab Status: In process Specimen: Blood from Arm, Left Updated: 05/16/22 1710    HS Troponin I 4hr [[id number]]     Lab Status: No result Specimen: Blood     COVID/FLU/RSV - 2 hour TAT [[id number]]  (Normal) Collected: 05/16/22 1428    Lab Status: Final result Specimen: Nares from Nose Updated: 05/16/22 1513     SARS-CoV-2 Negative     INFLUENZA A PCR Negative     INFLUENZA B PCR Negative     RSV PCR Negative    Narrative:      FOR PEDIATRIC PATIENTS - copy/paste COVID Guidelines URL to browser: [email or web address]    SARS-CoV-2 assay is a Nucleic Acid Amplification assay intended for the  qualitative detection of nucleic acid from SARS-CoV-2 in nasopharyngeal  swabs  Results are for the presumptive identification of SARS-CoV-2 RNA  Positive results are indicative of infection with SARS-CoV-2, the virus  causing COVID-19, but do not rule out bacterial infection or co-infection  with other viruses  Laboratories within the [place] and its  territories are required to report all positive results to the appropriate  public health authorities  Negative results do not preclude SARS-CoV-2  infection and should not be used as the sole basis for treatment or other  patient management decisions  Negative results must be combined with  clinical observations, patient history, and epidemiological information  This test has not been FDA cleared or approved  This test has been authorized by FDA under an Emergency Use Authorization  (EUA)   This test is only authorized for the duration of time the  declaration that circumstances exist justifying the authorization of the  emergency use of an in vitro diagnostic tests for detection of SARS-CoV-2  virus and/or diagnosis of COVID-19 infection under section 564(b)(1) of  the Act, 21 U  S C  711LXS-5(P)(7), unless the authorization is terminated  or revoked sooner  The test has been validated but independent review by FDA  and CLIA is pending  Test performed using [employer] GeneXpert: This RT-PCR assay targets N2,  a region unique to SARS-CoV-2  A conserved region in the E-gene was chosen  for pan-Sarbecovirus detection which includes SARS-CoV-2      Procalcitonin [[id number]]  (Normal) Collected: 05/16/22 1428    Lab Status: Final result Specimen: Blood from Arm, Left Updated: 05/16/22 1511     Procalcitonin 0 15 ng/ml     HS Troponin 0hr (reflex protocol) [[id number]]  (Normal) Collected: 05/16/22 1428    Lab Status: Final result Specimen: Blood from Arm, Left Updated: 05/16/22 1508     hs TnI 0hr 41 ng/L     Hepatic function panel [[id number]]  (Abnormal) Collected: 05/16/22 1428    Lab Status: Final result Specimen: Blood from Arm, Left Updated: 05/16/22 1507     Total Bilirubin 0 79 mg/dL      Bilirubin, Direct 0 27 mg/dL      Alkaline Phosphatase 87 U/L      AST 18 U/L      ALT 20 U/L      Total Protein 7 2 g/dL      Albumin 2 6 g/dL     NT-BNP PRO [[id number]]  (Abnormal) Collected: 05/16/22 1428    Lab Status: Final result Specimen: Blood from Arm, Left Updated: 05/16/22 1507     NT-proBNP 1,690 pg/mL     Basic metabolic panel [[id number]] Collected: 05/16/22 1428    Lab Status: Final result Specimen: Blood from Arm, Left Updated: 05/16/22 1458     Sodium 139 mmol/L      Potassium 3 7 mmol/L      Chloride 103 mmol/L      CO2 28 mmol/L      ANION GAP 8 mmol/L      BUN 21 mg/dL      Creatinine 1 16 mg/dL      Glucose 114 mg/dL      Calcium 8 5 mg/dL      eGFR 55 ml/min/1 73sq m     Narrative:      [place] guidelines for Chronic Kidney Disease (CKD):     Stage 1 with normal or high GFR (GFR > 90 mL/min/1 73 square meters)    Stage 2 Mild CKD (GFR = 60-89 mL/min/1 73 square meters)    Stage 3A Moderate CKD (GFR = 45-59 mL/min/1 73 square meters)    Stage 3B Moderate CKD (GFR = 30-44 mL/min/1 73 square meters)    Stage 4 Severe CKD (GFR = 15-29 mL/min/1 73 square meters)    Stage 5 End Stage CKD (GFR <15 mL/min/1 73 square meters)  Note: GFR calculation is accurate only with a steady state creatinine    CBC and differential [[id number]]  (Abnormal) Collected: 05/16/22 1428    Lab Status: Final result Specimen: Blood from Arm, Left Updated: 05/16/22 1436     WBC 11 87 Thousand/uL      RBC 3 69 Million/uL      Hemoglobin 11 3 g/dL      Hematocrit 34 6 %      MCV 94 fL      MCH 30 6 pg      MCHC 32 7 g/dL      RDW 13 8 %      MPV 9 7 fL      Platelets 967 Thousands/uL      nRBC 0 /100 WBCs      Neutrophils Relative 81 %      Immat GRANS % 1 %      Lymphocytes Relative 9 %      Monocytes Relative 8 %      Eosinophils Relative 1 %      Basophils Relative 0 %      Neutrophils Absolute 9 62 Thousands/µL      Immature Grans Absolute 0 12 Thousand/uL      Lymphocytes Absolute 1 02 Thousands/µL      Monocytes Absolute 0 92 Thousand/µL      Eosinophils Absolute 0 14 Thousand/µL      Basophils Absolute 0 05 Thousands/µL                  CT chest without contrast   Final Result by Gil Hunter MD (05/16 1531)      1  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  2   Diffuse groundglass opacities, developing since a CT from 2/28/2022  This is most consistent with either pulmonary edema or multifocal pneumonitis                 Workstation performed: [id number]         XR chest 1 view portable    (Results Pending)              Procedures  ECG 12 Lead Documentation Only    Date/Time: 5/16/2022 2:38 PM  Performed by: Sarah Benites MD  Authorized by: Sarah Benites MD     Indications / Diagnosis:  RUIZ  ECG reviewed by me, the ED Provider: yes    Patient location:  ED  Previous ECG:     Previous ECG:  Compared to current    Comparison ECG info:  02/01/19 Similarity:  Changes noted (TWI anterior)  Interpretation:     Interpretation: abnormal    Rate:     ECG rate:  73    ECG rate assessment: normal    Rhythm:     Rhythm: sinus rhythm and A-V block      Rhythm comment:  Sinus arrhythmia  Ectopy:     Ectopy: none    QRS:     QRS axis:  Left    QRS intervals: Wide  Conduction:     Conduction: abnormal      Abnormal conduction: complete RBBB, 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    ST segments:     ST segments:  Normal  T waves:     T waves: flattening and inverted      Flattening:  II, III, aVF and V5    Inverted:  V2, V3, V4 and V1             ED Course                                             MDM  Number of Diagnoses or Management Options  Acute hypoxemic respiratory failure ([facility] ): new and requires workup  CHF exacerbation ([facility] ): new and requires workup  Elevated brain natriuretic peptide (BNP) level: new and requires workup  ILD (interstitial lung disease) ([facility] ): new and requires workup  Diagnosis management comments: This is an 49-year-old male who presents here today with dyspnea on exertion  He says for the past couple of weeks he has been feeling short of breath with exertion, significantly worsening the past couple of days  He says he feels fine at rest but developed significant symptoms with trying to walk  He denies chest pain or palpitations  He denies fevers or URI symptoms  He does endorse a nonproductive cough  He denies lower extremity edema of his right leg  He is uncertain of weight gain but does not weigh himself at home  He does have a history of "lung problems" and says he has been using his albuterol intermittently without significant improvement  He does not think he has any problems with his heart  He says he saw his pulmonologist today because of his symptoms, and was sent here for evaluation  According to EMS he was hypoxic down into the 70s in the office, on 6 liters only came up to 90%    According to the pulmonology note, he was hypoxic to 88% at rest, decided to the 70s with walking 10 feet or with talking  He was started on 6 liters and sent to the ER for further evaluation  He did have an echocardiogram performed 11/17/17 that showed normal EF, and grade 2 diastolic dysfunction  He did have a fall two weeks ago, saying he was on the toilet and fell to the right, and did break his acromion  He denies any trauma to his chest with that fall  Review of systems:  Otherwise negative unless stated as above    He is chronically ill-appearing, but in no acute distress  He has minimal diffuse wheezing no overall distant breath sounds  Sats were in the high 90s on 6 liters of oxygen  Exam is otherwise unremarkable  Differential includes worsening of underlying lung disease, CHF exacerbation, infection, less likely trauma from the fall given lack of chest trauma, however if he does have a pneumothorax from indirect injury this may be contributing to his hypoxia  We will get a chest x-ray and lab work to evaluate  Chest x-ray was reviewed by myself and shows significant abnormalities, relatively unchanged from prior but difficult to tell based on underlying lung disease  He has a minimal leukocytosis of uncertain etiology  Anemia is 11 3, similar to prior values  BNP is elevated to 16 90, which is higher than prior values  CT scan shows fibrosis with associated traction bronchiectasis and new diffuse ground-glass opacities consistent with either edema or multifocal pneumonitis  With elevated BNP, there is concern greater for edema the pneumonitis  We will treat him with lasix, and admit him for further evaluation and management  I updated the patient and family on findings and plan of care, and they expressed understanding         Amount and/or Complexity of Data Reviewed  Clinical lab tests: ordered and reviewed  Tests in the radiology section of CPT®: ordered and reviewed  Decide to obtain previous medical records or to obtain history from someone other than the patient: yes  Review and summarize past medical records: yes  Independent visualization of images, tracings, or specimens: yes        Disposition  Final diagnoses:   Acute hypoxemic respiratory failure (HCC)   CHF exacerbation (HCC)   Elevated brain natriuretic peptide (BNP) level   ILD (interstitial lung disease) ([facility] )     Time reflects when diagnosis was documented in both MDM as applicable and the Disposition within this note     Time User Action Codes Description Comment    5/16/2022  4:08 PM Charles-TesGil murray Add [J96 01] Acute hypoxemic respiratory failure ([facility] )     5/16/2022  4:08 PM Charles-TesGil murray Roughen Add [I50 9] CHF exacerbation ([facility] )     5/16/2022  4:08 PM Gil Mason Add [R79 89] Elevated brain natriuretic peptide (BNP) level     5/16/2022  4:08 PM Charles-TesGil murray Add [J84 9] ILD (interstitial lung disease) ([place] [address] )     5/16/2022  4:14 PM Bozena Johnson Add [J96 01] Acute respiratory failure with hypoxia [facility])       ED Disposition     ED Disposition   Admit    Condition   Stable    Date/Time   Mon May 16, 2022  3:38 PM    Comment   Case was discussed with Dr Brittney Kerns and the patient's admission status was agreed to be Admission Status: inpatient status to the service of Dr Brittney Kerns  Follow-up Information    None         Patient's Medications   Discharge Prescriptions    No medications on file       No discharge procedures on file      PDMP Review     None          ED Provider  Electronically Signed by           Ashok Jacob MD  05/16/22 7152

## 2022-05-17 PROBLEM — I50.33 ACUTE ON CHRONIC HEART FAILURE WITH PRESERVED EJECTION FRACTION (HCC): Status: ACTIVE | Noted: 2022-01-01

## 2022-05-17 NOTE — ASSESSMENT & PLAN NOTE
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, IPF/ILD presents with progressive shortness of breath presented with hypoxic respiratory failure, CT chest pulmonary fibrosis in a UIP pattern with associated traction bronchiectasis and diffuse groundglass opacities  -give Duoneb  -add prednisone 60mg  -start empiric IV cefepime and vancomycin  -continue supplemental oxygen  -check HRCT  -pulmonary consult

## 2022-05-17 NOTE — PROGRESS NOTES
Vancomycin IV Pharmacy-to-Dose Consultation    Erik Mccabe is a 80 y o  male who is currently receiving Vancomycin IV with management by the Pharmacy Consult service  Assessment/Plan:  The patient was reviewed  Renal function is stable and no signs or symptoms of nephrotoxicity and/or infusion reactions were documented in the chart  Based on todays assessment, continue current vancomycin (day # 2) dosing of 1250mg IV q24h, with a plan for trough to be drawn at 2030 on 5/19  We will continue to follow the patients culture results and clinical progress daily      Tray Soto, Pharmacist

## 2022-05-17 NOTE — ASSESSMENT & PLAN NOTE
· Mild leukocytosis, 11 87 on presentation  · Consider reactive versus infectious  · First procalcitonin normal 0 15  · Continue cefepime and vancomycin as above given findings of possible pneumonitis

## 2022-05-17 NOTE — ASSESSMENT & PLAN NOTE
· S/p BLE angiogram,  Left SFA PTA, DCB, arthrectomy, Viabahn stent and L BK popliteal PTA  · Continue ASA and statin

## 2022-05-17 NOTE — PROGRESS NOTES
[address] [facility] Progress Note Shahab Graham 1933, 80 y o  male MRN: [id number]  Unit/Bed#: -01 Encounter: [id number]  Primary Care Provider: Josef Fairbanks MD   Date and time admitted to hospital: 5/16/2022  1:51 PM    * Acute respiratory failure with hypoxia [facility])  Assessment & Plan  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  Diffuse groundglass opacities, developing since a CT from 2/28/2022   This is most consistent with either pulmonary edema or multifocal pneumonitis  · High-resolution CT chest 5/16:  Read pending  · Afebrile, with mild leukocytosis on presentation and elevated BNP, suspect combination possible progression of interstitial lung disease along with acute decompensated heart failure  · Pulmonology and cardiology consults to be appreciated  · Respiratory protocol, incentive spirometer  · Continue O2 supplementation, goal > 88%  · Currently on cefepime and vancomycin, day #2    Will repeat procalcitonin, 1st normal 0 15    [facility]  Assessment & Plan  · 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, see above    Acute on chronic heart failure with preserved ejection fraction ([facility] )  Assessment & Plan  Wt Readings from Last 3 Encounters:   05/17/22 57 3 kg (126 lb 5 2 oz)   05/04/22 61 2 kg (135 lb)   11/08/21 63 5 kg (140 lb)     · Present on admission as evidence by CT findings and elevated BNP  · Delta troponin (-), EKG with bifascicular block  · Last echo 2017, repeat pending  · Continue IV Lasix, strict I/Os, daily standing weights, sodium restricted diet  · Daily BMP to monitor electrolytes  · Cardiology consult pending    Leukocytosis  Assessment & Plan  · Mild leukocytosis, 11 87 on presentation  · Consider reactive versus infectious  · First procalcitonin normal 0 15  · Continue cefepime and vancomycin as above given findings of possible pneumonitis    Severe peripheral arterial disease (HCC)  Assessment & Plan  · S/p BLE angiogram,  Left SFA PTA, DCB, arthrectomy, Viabahn stent and L BK popliteal PTA  · Continue ASA and statin    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  Assessment & Plan  · Patient wears left lower extremity prosthesis    Bifascicular block  Assessment & Plan  · Chronic on previous EKG  · Troponin 41>41>38    Anemia  Assessment & Plan  · Continue ferrous sulfate  · Stable Hgb    Severe protein-calorie malnutrition (HCC)  Assessment & Plan  Malnutrition Findings:     albumin 2 6  BMI 18  Will give Ensure b i d  Encourage p o  Intake  Nutrition consult         BMI Findings: Body mass index is 18 13 kg/m²  Hyperlipidemia  Assessment & Plan  · Atorvastatin substitute for home dose simvastatin      VTE Pharmacologic Prophylaxis: VTE Score: 8 High Risk (Score >/= 5) - Pharmacological DVT Prophylaxis Ordered: heparin  Sequential Compression Devices Ordered  Patient Centered Rounds: I performed bedside rounds with nursing staff today  Discussions with Specialists or Other Care Team Provider:  Cardiology, pulmonology, case management    Education and Discussions with Family / Patient: Updated  (wife) who is currently admitted to the same hospital      Time Spent for Care: 20 minutes  More than 50% of total time spent on counseling and coordination of care as described above  Current Length of Stay: 1 day(s)  Current Patient Status: Inpatient   Certification Statement: The patient will continue to require additional inpatient hospital stay due to continued IV medication and consultations  Discharge Plan: Anticipate discharge in 48-72 hrs to discharge location to be determined pending rehab evaluations      Code Status: Level 1 - Full Code    Subjective:   Patient seen this morning, feeling okay  Not much better but also not much worse  Denies chest pain at present time  Short of breath in general   Overall tired  Objective:     Vitals:   Temp (24hrs), Av 6 °F (36 4 °C), Min:97 3 °F (36 3 °C), Max:98 1 °F (36 7 °C)    Temp:  [97 3 °F (36 3 °C)-98 1 °F (36 7 °C)] 97 4 °F (36 3 °C)  HR:  [56-75] 56  Resp:  [16-22] 18  BP: ()/(58-72) 102/61  SpO2:  [84 %-100 %] 96 %  Body mass index is 18 13 kg/m²  Input and Output Summary (last 24 hours): Intake/Output Summary (Last 24 hours) at 2022 0751  Last data filed at 2022 0517  Gross per 24 hour   Intake 80 ml   Output 600 ml   Net -520 ml       Physical Exam:   Physical Exam  Vitals and nursing note reviewed  Constitutional:       General: He is not in acute distress  Appearance: He is ill-appearing (chronically)  He is not toxic-appearing  Cardiovascular:      Rate and Rhythm: Normal rate and regular rhythm  Pulmonary:      Effort: Pulmonary effort is normal  No respiratory distress  Breath sounds: Wheezing and rales present  Abdominal:      General: Bowel sounds are normal  There is no distension  Palpations: Abdomen is soft  Musculoskeletal:      Right lower leg: No edema  Left lower leg: No edema  Neurological:      Mental Status: He is alert and oriented to person, place, and time  Psychiatric:         Mood and Affect: Mood normal          Behavior: Behavior normal            Additional Data:     Labs:  Results from last 7 days   Lab Units 22  0517 22  1706 22  1428   WBC Thousand/uL 9 93  --  11 87*   HEMOGLOBIN g/dL 11 0*  --  11 3*   HEMATOCRIT % 33 8*  --  34 6*   PLATELETS Thousands/uL 354   < > 349   NEUTROS PCT %  --   --  81*   LYMPHS PCT %  --   --  9*   MONOS PCT %  --   --  8   EOS PCT %  --   --  1    < > = values in this interval not displayed       Results from last 7 days   Lab Units 05/17/22  0517 05/16/22  1428   SODIUM mmol/L 139 139   POTASSIUM mmol/L 4 4 3 7   CHLORIDE mmol/L 102 103   CO2 mmol/L 27 28   BUN mg/dL 26* 21   CREATININE mg/dL 1 39* 1 16   ANION GAP mmol/L 10 8   CALCIUM mg/dL 8 7 8 5   ALBUMIN g/dL  --  2 6*   TOTAL BILIRUBIN mg/dL  --  0 79   ALK PHOS U/L  --  87   ALT U/L  --  20   AST U/L  --  18   GLUCOSE RANDOM mg/dL 139 114                 Results from last 7 days   Lab Units 05/16/22  1428   PROCALCITONIN ng/ml 0 15       Lines/Drains:  Invasive Devices  Report    Peripheral Intravenous Line  Duration           Peripheral IV 05/16/22 Distal;Left;Upper;Ventral (anterior) Arm 1 day                  Telemetry:  Telemetry Orders (From admission, onward)             48 Hour Telemetry Monitoring  Continuous x 48 hours        References:    Telemetry Guidelines   Question:  Reason for 48 Hour Telemetry  Answer:  Acute Decompensated CHF (continuous diuretic infusion or total diuretic dose > 200 mg daily, associated electrolyte derangement, ionotropic drip, history of ventricular arrhythmia, or new EF <35%)                 Telemetry Reviewed: Normal Sinus Rhythm  Indication for Continued Telemetry Use: No indication for continued use  Will discontinue                Imaging: Reviewed radiology reports from this admission including: chest CT scan    Recent Cultures (last 7 days):         Last 24 Hours Medication List:   Current Facility-Administered Medications   Medication Dose Route Frequency Provider Last Rate    aspirin  81 mg Oral Daily Miles Blanco MD      atorvastatin  20 mg Oral Daily With David Paez MD      cefepime  2,000 mg Intravenous Q12H Miles Blanco MD 2,000 mg (05/16/22 2148)    ferrous sulfate  325 mg Oral BID Miles Blanco MD      heparin (porcine)  5,000 Units Subcutaneous Q12H [facility] Miles Blanco MD      ipratropium  0 5 mg Nebulization TID Miles Blanco MD      levalbuterol  1 25 mg Nebulization TID Miles Blanco MD      predniSONE  60 mg Oral Daily Annemarie Stephens MD      vancomycin  20 mg/kg Intravenous Q24H Annemarie Stephens MD Stopped (05/17/22 0006)        Today, Patient Was Seen By: Terrence Luna PA-C    **Please Note: This note may have been constructed using a voice recognition system  **

## 2022-05-17 NOTE — ASSESSMENT & PLAN NOTE
Malnutrition Findings:     albumin 2 6  BMI 18  Will give Ensure b i d  Encourage p o  Intake  Nutrition consult                               BMI Findings: Body mass index is 18 13 kg/m²

## 2022-05-17 NOTE — ASSESSMENT & PLAN NOTE
-S/p BLE angiogram,  Left SFA PTA, DCB, arthrectomy, Viabahn stent and L BK popliteal PTA  -continue ASA and statin

## 2022-05-17 NOTE — PROGRESS NOTES
Vancomycin Assessment    Gricel Hayden is a 80 y o  male who is currently receiving vancomycin 1250 mg IV q 24 h for Pneumonia     Relevant clinical data and objective history reviewed:  Creatinine   Date Value Ref Range Status   05/16/2022 1 16 0 60 - 1 30 mg/dL Final     Comment:     Standardized to IDMS reference method   03/24/2022 1 48 (H) 0 60 - 1 30 mg/dL Final     Comment:     Standardized to IDMS reference method   11/11/2021 1 32 (H) 0 60 - 1 30 mg/dL Final     Comment:     Standardized to IDMS reference method     /64   Pulse 72   Temp 98 1 °F (36 7 °C)   Resp 19   Ht 5' 10" (1 778 m)   Wt 58 kg (127 lb 13 9 oz)   SpO2 (!) 84%   BMI 18 35 kg/m²   I/O last 3 completed shifts:  In: -   Out: 200 [Urine:200]  Lab Results   Component Value Date/Time    BUN 21 05/16/2022 02:28 PM    WBC 11 87 (H) 05/16/2022 02:28 PM    WBC 10 6 10/12/2017 12:23 PM    HGB 11 3 (L) 05/16/2022 02:28 PM    HGB 10 7 (L) 10/12/2017 12:23 PM    HCT 34 6 (L) 05/16/2022 02:28 PM    HCT 32 3 (L) 10/12/2017 12:23 PM    MCV 94 05/16/2022 02:28 PM    MCV 90 5 10/12/2017 12:23 PM     05/16/2022 05:06 PM     10/12/2017 12:23 PM     Temp Readings from Last 3 Encounters:   05/16/22 98 1 °F (36 7 °C)   05/16/22 (!) 97 3 °F (36 3 °C)   05/04/22 97 5 °F (36 4 °C) (Temporal)     Vancomycin Days of Therapy: 1    Assessment/Plan  The patient is currently on vancomycin utilizing scheduled dosing based on actual body weight  Baseline risks associated with therapy include: pre-existing renal impairment, concomitant nephrotoxic medications, and advanced age  The patient is currently receiving 1250 mg IV q 24 h and is clinically appropriate and dose will be continued  Pharmacy will also follow closely for s/sx of nephrotoxicity, infusion reactions, and appropriateness of therapy  BMP and CBC will be ordered per protocol    Plan for trough as patient approaches steady state, prior to the 4th  dose at approximately 2100 on 5/19/22  Due to infection severity, will target a trough of 15-20 (appropriate for most indications)   Pharmacy will continue to follow the patients culture results and clinical progress daily      Ochoa Blandon, Pharmacist

## 2022-05-17 NOTE — ASSESSMENT & PLAN NOTE
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  Diffuse groundglass opacities, developing since a CT from 2/28/2022   This is most consistent with either pulmonary edema or multifocal pneumonitis  · High-resolution CT chest 5/16:  Read pending  · Afebrile, with mild leukocytosis on presentation and elevated BNP, suspect combination possible progression of interstitial lung disease along with acute decompensated heart failure  · Pulmonology and cardiology consults to be appreciated  · Respiratory protocol, incentive spirometer  · Continue O2 supplementation, goal > 88%  · Currently on cefepime and vancomycin, day #2    Will repeat procalcitonin, 1st normal 0 15

## 2022-05-17 NOTE — ASSESSMENT & PLAN NOTE
Malnutrition Findings:     albumin 2 6  BMI 18  Will give Ensure b i d  Encourage p o  Intake  Consider nutrition consult                               BMI Findings: Body mass index is 18 35 kg/m²

## 2022-05-17 NOTE — CONSULTS
Consultation - Pulmonary Medicine   Wilda Espinoza 80 y o  male MRN: [id number]  Unit/Bed#: -01 Encounter: [id number]      Assessment:  1  Acute hypoxemic respiratory failure  2  Abnormal chest CT with bilateral ground-glass opacities  3  IPF/ILD with possible exacerbation  4  Elevated BNP       Plan:   Acute hypoxemic respiratory failure likely secondary to IPF/ILD flare  HRCT was done today which shows redemonstration of moderate UIP pattern of pulmonary fibrosis, shows patchy bilateral ground-glass opacity greater on the right  He is afebrile, did have a mild leukocytosis which has resolved, procalcitonin is normal  Does have elevated BNP, does not appear fluid overloaded, Cardiology does not feel this is CHF related  Echocardiogram has been ordered  He is currently requiring 2 L nasal cannula with sats in the mid 90s, no O2 requirement at baseline  He is currently on cefepime and vancomycin, if repeat procalcitonin is normal would discontinue antibiotics  Continue Prednisone 60 mg for now  Will continue to follow  History of Present Illness   Physician Requesting Consult: Heather Coleman MD  Reason for Consult / Principal Problem:  Acute hypoxemic respiratory failure  Hx and PE limited by:  None  HPI: Wilda Espinoza is a 80y o  year old male with remote minimal smoking history with past medical history of ILD/IPF, history of DVT, hyperlipidemia, peripheral vascular disease status post BKA on the left who presents from his pulmonologist's office with hypoxia  He has been noticing worsening shortness of breath over the past few weeks with dyspnea on exertion and difficulty doing minimal activity  He does have a mild cough at baseline without any change  No fever or chills  He follows outpatient for his interstitial lung disease  Had last been seen in 2019  Had been doing okay up until more recently  Workup for his ILD was done in the past, serology was negative except for rheumatoid factor positive  Consider to be an IPF pattern at that time  He was not on oxygen prior to this admission  Inpatient consult to Pulmonology  Consult performed by: Brina Carlson PA-C  Consult ordered by: Anju Brown MD          Review of Systems   Constitutional: Negative  HENT: Negative  Respiratory: Positive for shortness of breath  Cardiovascular: Negative  Gastrointestinal: Negative  Genitourinary: Negative  Musculoskeletal: Negative  Skin: Negative  Allergic/Immunologic: Negative  Neurological: Negative  Psychiatric/Behavioral: Negative  Historical Information   Past Medical History:   Diagnosis Date    Anemia     Arthritis     Atherosclerosis of artery of extremity with ulceration ([facility] ) 3/27/2019    Bifascicular block     Cellulitis of chest wall     Chronic kidney disease     Cough     Dupuytren contracture     DVT femoral (deep venous thrombosis) with thrombophlebitis, right (HCC)     Elevated glucose     Hemothorax     Left    Hyperlipidemia     Interstitial lung disease (HCC)     Phantom pain after amputation of lower extremity ([facility] ) 3/27/2019    SOB (shortness of breath)     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 2/19/2019    Vascular disease      Past Surgical History:   Procedure Laterality Date    AMPUTATION  1972    L thumb    CARPAL TUNNEL RELEASE  2016    L hand    CATARACT EXTRACTION      COLONOSCOPY      EYE SURGERY      FRACTURE SURGERY      HAND SURGERY      IR LOWER EXTREMITY / INTERVENTION  10/26/2018    KNEE ARTHROSCOPY      NERVE BLOCK      [place] AMPUTATION LOW LEG THRU TIB/FIB Left 2/1/2019    Procedure: AMPUTATION BELOW KNEE (BKA);   Surgeon: Marleta Jeans, MD;  Location: [facility];  Service: Vascular    [place] Kellee Burrows 3RD+ ORD SLCTV ABDL [facility]/TR [facility] Left 3/9/2018    Procedure: LEFT LOWER EXTREMITY ARTERIOGRAM WITH PTA OF LEFT POPLITEAL ARTERY;  Surgeon: Gavi Osborn MD;  Location: [facility];  Service: Vascular    MI SLCTV CATHJ 3RD+ ORD SLCTV ABDL PEL/LXTR [facility] Left 10/26/2018    Procedure: ARTERIOGRAM, angioplasty stent and atherectomy;  Surgeon: Jennifer Mustafa MD;  Location: [facility];  Service: Vascular    THORACENTESIS       Social History   Social History     Substance and Sexual Activity   Alcohol Use Not Currently    Comment: n/a     Social History     Substance and Sexual Activity   Drug Use No     E-Cigarette/Vaping    E-Cigarette Use Never User      E-Cigarette/Vaping Substances    Nicotine No     THC No     CBD No     Flavoring No     Other No     Unknown No      Social History     Tobacco Use   Smoking Status Former Smoker    Packs/day: 2 00    Years: 15 00    Pack years: 30 00    Types: Cigarettes   Smokeless Tobacco Never Used   Tobacco Comment    quit 62 years ago     Occupational History:     Family History:   Family History   Problem Relation Age of Onset    Lung cancer Mother     Brain cancer Mother     Diabetes Father     Aneurysm Father     Stroke Father     Lung cancer Father     Brain cancer Father     Diabetes Maternal Grandmother        Meds/Allergies   all current active meds have been reviewed, pertinent pulmonary meds have been reviewed and current meds:   Current Facility-Administered Medications   Medication Dose Route Frequency    aspirin chewable tablet 81 mg  81 mg Oral Daily    atorvastatin (LIPITOR) tablet 20 mg  20 mg Oral Daily With Dinner    cefepime (MAXIPIME) 2,000 mg in dextrose 5 % 50 mL IVPB  2,000 mg Intravenous Q12H    ferrous sulfate tablet 325 mg  325 mg Oral BID    heparin (porcine) subcutaneous injection 5,000 Units  5,000 Units Subcutaneous Q12H [address]    ipratropium (ATROVENT) 0 02 % inhalation solution 0 5 mg  0 5 mg Nebulization TID    levalbuterol (XOPENEX) inhalation solution 1 25 mg  1 25 mg Nebulization TID    predniSONE tablet 60 mg  60 mg Oral Daily    vancomycin (VANCOCIN) 1,250 mg in sodium chloride 0 9 % 250 mL IVPB  20 mg/kg Intravenous Q24H       No Known Allergies    Objective   Vitals: Blood pressure 99/54, pulse 76, temperature 97 8 °F (36 6 °C), temperature source Oral, resp  rate 18, height 5' 10" (1 778 m), weight 57 3 kg (126 lb 5 2 oz), SpO2 94 %  ,Body mass index is 18 13 kg/m²  Intake/Output Summary (Last 24 hours) at 5/17/2022 1144  Last data filed at 5/17/2022 0900  Gross per 24 hour   Intake 380 ml   Output 800 ml   Net -420 ml     Invasive Devices  Report    Peripheral Intravenous Line  Duration           Peripheral IV 05/16/22 Distal;Left;Upper;Ventral (anterior) Arm 1 day                Physical Exam  Vitals reviewed  Constitutional:       General: He is not in acute distress  Appearance: Normal appearance  He is not ill-appearing  HENT:      Head: Normocephalic and atraumatic  Mouth/Throat:      Pharynx: Oropharynx is clear  Eyes:      Conjunctiva/sclera: Conjunctivae normal    Cardiovascular:      Rate and Rhythm: Normal rate and regular rhythm  Pulmonary:      Effort: Pulmonary effort is normal  No respiratory distress  Breath sounds: Decreased breath sounds present  No wheezing, rhonchi or rales  Abdominal:      General: Abdomen is flat  There is no distension  Musculoskeletal:         General: Normal range of motion  Cervical back: Normal range of motion  Right lower leg: No edema  Left lower leg: No edema  Comments: Left BKA   Skin:     General: Skin is warm and dry  Neurological:      Mental Status: He is alert and oriented to person, place, and time  Psychiatric:         Mood and Affect: Mood normal          Behavior: Behavior normal          Lab Results:   I have personally reviewed pertinent lab results  , CBC:   Lab Results   Component Value Date    WBC 9 93 05/17/2022    HGB 11 0 (L) 05/17/2022    HCT 33 8 (L) 05/17/2022    MCV 93 05/17/2022     05/17/2022    MCH 30 3 05/17/2022    MCHC 32 5 05/17/2022    RDW 13 7 05/17/2022    MPV 9 7 05/17/2022    NRBC 0 05/16/2022   , CMP:   Lab Results   Component Value Date    SODIUM 139 05/17/2022    K 4 4 05/17/2022     05/17/2022    CO2 27 05/17/2022    BUN 26 (H) 05/17/2022    CREATININE 1 39 (H) 05/17/2022    CALCIUM 8 7 05/17/2022    AST 18 05/16/2022    ALT 20 05/16/2022    ALKPHOS 87 05/16/2022    EGFR 44 05/17/2022     Imaging Studies: I have personally reviewed pertinent reports  and I have personally reviewed pertinent films in PACS  EKG, Pathology, and Other Studies: I have personally reviewed pertinent reports      VTE Prophylaxis: Sequential compression device (Venodyne)  and Heparin    Code Status: Level 1 - Full Code  Advance Directive and Living Will:      Power of :    POLST:

## 2022-05-17 NOTE — UTILIZATION REVIEW
Initial Clinical Review    Admission: Date/Time/Statement:   Admission Orders (From admission, onward)     Ordered        05/16/22 1612  Inpatient Admission  Once                      Orders Placed This Encounter   Procedures    Inpatient Admission     Standing Status:   Standing     Number of Occurrences:   1     Order Specific Question:   Level of Care     Answer:   Med Surg [16]     Order Specific Question:   Estimated length of stay     Answer:   More than 2 Midnights     Order Specific Question:   Certification     Answer:   I certify that inpatient services are medically necessary for this patient for a duration of greater than two midnights  See H&P and MD Progress Notes for additional information about the patient's course of treatment  ED Arrival Information     Expected   -    Arrival   5/16/2022 13:51    Acuity   Emergent            Means of arrival   Ambulance    Escorted by   -    Service   Hospitalist    Admission type   Emergency            Arrival complaint   SOB             Chief Complaint   Patient presents with    Shortness of Breath     Pt sent from PCP office for SOB for 2 weeks, on room air pulse ox was 77%       Initial Presentation: 80 y o  male to the ED from Pulmonary office via EMS with complaints of shortness of breath for 2 weeks, hypoxic during walk test  Admitted to inpatient for acute respiratory failure with hypoxia, pulmonary fibrosis, CHF  NOt on home O2  H/O anemia, DVT, PAD s/p left BKA, CHF, 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  Arrives with diffuse wheezing, pulse ox 90s on 6LNC  Bibasilar rales heard  Right lower extremity edema  CT chest shows: pulmonary fibrosis, diffuse groundglass opacities consistent with pulmonary edema or multifocal pneumonitis  Pro bnp elevated  Given IV lasix in the ED  Started on IV steroids, nebulizers, IV abx  PUlmonary consult  Continue with IV Lasix  WBCs elevated  Bibasilar rales heard     Date: 5/17   Day 2:Suspect a combination of progression of interstitial lung disease with acute decompensated heart failure  Wheezing and rales heard  He is overall tired  WBcs improved  Pulmonary consult:   Currently with pulse ox on 90s on 2LNc  Continue with IV abx  REcheck PRocal   COntinue prednisone  Decreased breath sounds       ED Triage Vitals [05/16/22 1357]   Temperature Pulse Respirations Blood Pressure SpO2   97 8 °F (36 6 °C) 71 20 127/67 100 %      Temp Source Heart Rate Source Patient Position - Orthostatic VS BP Location FiO2 (%)   Oral Monitor Lying Right arm --      Pain Score       No Pain          Wt Readings from Last 1 Encounters:   05/17/22 57 3 kg (126 lb 5 2 oz)     Additional Vital Signs:   Time Temp Pulse Resp BP MAP (mmHg) SpO2 Calculated FIO2 (%) - Nasal Cannula Nasal Cannula O2 Flow Rate (L/min) O2 Device Patient Position - Orthostatic VS   05/17/22 0754 -- -- -- -- -- 96 % -- -- Nasal cannula --   05/17/22 0742 -- -- -- -- -- 96 % 28 2 L/min Nasal cannula --   05/17/22 07:18:48 97 4 °F (36 3 °C) Abnormal  56 18 102/61 75 96 % -- -- -- --   05/17/22 02:49:13 97 4 °F (36 3 °C) Abnormal  63 16 101/60 74 96 % -- -- -- --   05/17/22 0153 -- -- -- -- -- 95 % 32 3 L/min Nasal cannula --   05/16/22 23:53:14 97 6 °F (36 4 °C) 67 16 99/61 74 95 % -- -- -- --   05/16/22 2023 -- -- -- -- -- -- 32 3 L/min Nasal cannula --   05/16/22 19:02:10 98 1 °F (36 7 °C) 72 -- 114/64 81 84 % Abnormal  -- -- -- --   05/16/22 17:31:48 97 3 °F (36 3 °C) Abnormal  74 -- 132/72 92 98 % -- -- -- --   05/16/22 1600 -- 72 19 111/58 79 92 % 32 3 L/min Nasal cannula --   05/16/22 1530 -- 71 22 125/58 84 92 % -- -- -- --   05/16/22 1515 -- 75 22 -- -- 90 % 36 4 L/min Nasal cannula --   05/16/22 1437 -- -- -- -- -- -- -- -- Nasal cannula --   05/16/22 1430 -- 72 20 119/62 85 93 % 44 6 L/min Nasal cannula --   05/16/22 1357 97 8 °F (36 6 °C) 71 20 127/67 -- 100 % 44 6 L/min Nasal cannula Lying       Pertinent Labs/Diagnostic Test Results:   5/16 EKG:Sinus rhythm with sinus arrhythmia with 1st degree A-V block  Right bundle branch block  Left anterior fascicular block   Bifascicular block   Minimal voltage criteria for LVH, may be normal variant  CT chest without contrast   Final Result by Sharmila Yuan MD (05/16 1531)      1  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  2   Diffuse groundglass opacities, developing since a CT from 2/28/2022  This is most consistent with either pulmonary edema or multifocal pneumonitis  Workstation performed: [id number]         XR chest 1 view portable   Final Result by Arsalan Carvalho MD (05/17 0530)      Pulmonary fibrosis with superimposed groundglass opacities  Findings are compatible with pulmonary fibrosis  Superimposed pneumonia versus heart failure should be considered                    Workstation performed: [id number]         CT chest high resolution    (Results Pending)     Results from last 7 days   Lab Units 05/16/22  1428   SARS-COV-2  Negative     Results from last 7 days   Lab Units 05/17/22  0517 05/16/22  1706 05/16/22  1428   WBC Thousand/uL 9 93  --  11 87*   HEMOGLOBIN g/dL 11 0*  --  11 3*   HEMATOCRIT % 33 8*  --  34 6*   PLATELETS Thousands/uL 354 333 349   NEUTROS ABS Thousands/µL  --   --  9 62*         Results from last 7 days   Lab Units 05/17/22  0517 05/16/22  1428   SODIUM mmol/L 139 139   POTASSIUM mmol/L 4 4 3 7   CHLORIDE mmol/L 102 103   CO2 mmol/L 27 28   ANION GAP mmol/L 10 8   BUN mg/dL 26* 21   CREATININE mg/dL 1 39* 1 16   EGFR ml/min/1 73sq m 44 55   CALCIUM mg/dL 8 7 8 5   MAGNESIUM mg/dL 1 8  --    PHOSPHORUS mg/dL 5 3*  --      Results from last 7 days   Lab Units 05/16/22  1428   AST U/L 18   ALT U/L 20   ALK PHOS U/L 87   TOTAL PROTEIN g/dL 7 2   ALBUMIN g/dL 2 6*   TOTAL BILIRUBIN mg/dL 0 79   BILIRUBIN DIRECT mg/dL 0 27*         Results from last 7 days   Lab Units 05/17/22  0517 05/16/22  1428   GLUCOSE RANDOM mg/dL 139 114       Results from last 7 days   Lab Units 05/16/22  1840 05/16/22  1706 05/16/22  1428   HS TNI 0HR ng/L  --   --  41   HS TNI 2HR ng/L  --  41  --    HSTNI D2 ng/L  --  0  --    HS TNI 4HR ng/L 38  --   --    HSTNI D4 ng/L -3  --   --          Results from last 7 days   Lab Units 05/16/22  1428   PROCALCITONIN ng/ml 0 15       Results from last 7 days   Lab Units 05/16/22  1428   NT-PRO BNP pg/mL 1,690*       Results from last 7 days   Lab Units 05/16/22  1428   INFLUENZA A PCR  Negative   INFLUENZA B PCR  Negative   RSV PCR  Negative       ED Treatment:   Medication Administration from 05/16/2022 1351 to 05/16/2022 1721       Date/Time Order Dose Route Action     05/16/2022 1457 albuterol inhalation solution 2 5 mg 2 5 mg Nebulization Given     05/16/2022 1613 furosemide (LASIX) injection 40 mg 40 mg Intravenous Given        Past Medical History:   Diagnosis Date    Anemia     Arthritis     Atherosclerosis of artery of extremity with ulceration ([place] [address] ) 3/27/2019    Bifascicular block     Cellulitis of chest wall     Chronic kidney disease     Cough     Dupuytren contracture     DVT femoral (deep venous thrombosis) with thrombophlebitis, right ([facility])     Elevated glucose     Hemothorax     Left    Hyperlipidemia     Interstitial lung disease ([facility])     Phantom pain after amputation of lower extremity ([place] [address] ) 3/27/2019    SOB (shortness of breath)     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 2/19/2019    Vascular disease        Admitting Diagnosis: SOB (shortness of breath) [R06 02]  ILD (interstitial lung disease) ([place] [address] ) [J84 9]  CHF exacerbation ([facility]) [I50 9]  Elevated brain natriuretic peptide (BNP) level [R79 89]  Acute respiratory failure with hypoxia ([facility]) [J96 01]  Acute hypoxemic respiratory failure ([place] [address] ) [J96 01]  Age/Sex: 80 y o  male  Admission Orders:  SCDS   I/o  Tele    Scheduled Medications:  aspirin, 81 mg, Oral, Daily  atorvastatin, 20 mg, Oral, Daily With Dinner  cefepime, 2,000 mg, Intravenous, Q12H  ferrous sulfate, 325 mg, Oral, BID  heparin (porcine), 5,000 Units, Subcutaneous, Q12H ESTELLE  ipratropium, 0 5 mg, Nebulization, TID  levalbuterol, 1 25 mg, Nebulization, TID  predniSONE, 60 mg, Oral, Daily  vancomycin, 20 mg/kg, Intravenous, Q24H      Continuous IV Infusions:     PRN Meds:       IP CONSULT TO PULMONOLOGY  IP CONSULT TO CARDIOLOGY  IP CONSULT TO PHARMACY  IP CONSULT TO NUTRITION SERVICES    Network Utilization Review Department  ATTENTION: Please call with any questions or concerns to [phone] and carefully listen to the prompts so that you are directed to the right person  All voicemails are confidential   Mary Oneill all requests for admission clinical reviews, approved or denied determinations and any other requests to dedicated fax number below belonging to the campus where the patient is receiving treatment   List of dedicated fax numbers for the Facilities:  [address] DENIALS (Administrative/Medical Necessity) [phone]   [address] (Maternity/NICU/Pediatrics) [phone]   [address]  [address] [address] [place] [address] [address] [facility] [place] [address] [phone]

## 2022-05-17 NOTE — CONSULTS
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  Continue with antibiotics, steroids, nebulizer treatments  Management per [facility] Internal Medicine Hospitalist and pulmonary  4  Anemia, mild H&H today 11 0/33  8  Continue to monitor    5  Peripheral vascular disease with history of left BKA  Appears stable  Continue medications  6  Acute kidney injury, creatinine today 1 3 previously 1 1  Continue to follow  Patient received 1 dose of diuretics  History of Present Illness   Physician Requesting Consult: Jean Pierre Schneider MD    Reason for Consult / Principal Problem: chf    HPI: Mario Kerr is a 80y o  year old male who presents with progressive shortness of breath with exertion and dry cough for the last 1 month  Patient was seen by Pulmonary the morning of admission, had a 6 minute walk test with oxygen desaturation into the 70s after walking 10 ft and therefore sent to the emergency room for further evaluation and treatment  Patient denies any chest pain, chest pressure, chest heaviness  Denies any palpitations, trouble lying flat, PND       Past medical history:  Anemia, DVT, PVD with history of left BKA, bifascicular block, idiopathic pulmonary fibrosis/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, hyperlipidemia    Consults    EKG:  Sinus rhythm with sinus arrhythmia, first-degree AV block, right bundle-branch block, left anterior fascicular block (bifascicular block), minimal voltage criteria for LVH, maybe normal variant      Review of Systems   Constitutional: Negative  Respiratory: Positive for cough and shortness of breath  Cardiovascular: Negative  Neurological: Negative  Hematological: Negative  Psychiatric/Behavioral: Negative  All other systems reviewed and are negative  Historical Information   Past Medical History:   Diagnosis Date    Anemia     Arthritis     Atherosclerosis of artery of extremity with ulceration ([facility] ) 3/27/2019    Bifascicular block     Cellulitis of chest wall     Chronic kidney disease     Cough     Dupuytren contracture     DVT femoral (deep venous thrombosis) with thrombophlebitis, right (HCC)     Elevated glucose     Hemothorax     Left    Hyperlipidemia     Interstitial lung disease (HCC)     Phantom pain after amputation of lower extremity ([facility] ) 3/27/2019    SOB (shortness of breath)     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 2/19/2019    Vascular disease      Past Surgical History:   Procedure Laterality Date    AMPUTATION  1972    L thumb    CARPAL TUNNEL RELEASE  2016    L hand    CATARACT EXTRACTION      COLONOSCOPY      EYE SURGERY      FRACTURE SURGERY      HAND SURGERY      IR LOWER EXTREMITY / INTERVENTION  10/26/2018    KNEE ARTHROSCOPY      NERVE BLOCK      OH AMPUTATION LOW LEG THRU TIB/FIB Left 2/1/2019    Procedure: AMPUTATION BELOW KNEE (BKA);   Surgeon: Nora Motta MD;  [facility];  Service: Vascular    OH Bertrand Miramontes 3RD+ ORD [facility]V [facility] Left 3/9/2018    Procedure: LEFT LOWER EXTREMITY ARTERIOGRAM WITH PTA OF LEFT POPLITEAL ARTERY;  Surgeon: Govind Patel MD;  [facility];  Service: Vascular    OH Bertrand Miramontes 3RD+ ORD [address] [facility] Left 10/26/2018    Procedure: ARTERIOGRAM, angioplasty stent and atherectomy;  Surgeon: Park Valentino MD;  Location: [facility];  Service: Vascular    THORACENTESIS       Social History     Substance and Sexual Activity   Alcohol Use Not Currently    Comment: n/a     Social History     Substance and Sexual Activity   Drug Use No     Social History     Tobacco Use   Smoking Status Former Smoker    Packs/day: 2 00    Years: 15 00    Pack years: 30 00    Types: Cigarettes   Smokeless Tobacco Never Used   Tobacco Comment    quit 57 years ago       Family History:   Family History   Problem Relation Age of Onset    Lung cancer Mother     Brain cancer Mother     Diabetes Father     Aneurysm Father     Stroke Father     Lung cancer Father     Brain cancer Father     Diabetes Maternal Grandmother        Meds/Allergies   all current active meds have been reviewed and current meds:   Current Facility-Administered Medications   Medication Dose Route Frequency    aspirin chewable tablet 81 mg  81 mg Oral Daily    atorvastatin (LIPITOR) tablet 20 mg  20 mg Oral Daily With Dinner    cefepime (MAXIPIME) 2,000 mg in dextrose 5 % 50 mL IVPB  2,000 mg Intravenous Q12H    ferrous sulfate tablet 325 mg  325 mg Oral BID    heparin (porcine) subcutaneous injection 5,000 Units  5,000 Units Subcutaneous Q12H [address]    ipratropium (ATROVENT) 0 02 % inhalation solution 0 5 mg  0 5 mg Nebulization TID    levalbuterol (XOPENEX) inhalation solution 1 25 mg  1 25 mg Nebulization TID    predniSONE tablet 60 mg  60 mg Oral Daily    vancomycin (VANCOCIN) 1,250 mg in sodium chloride 0 9 % 250 mL IVPB  20 mg/kg Intravenous Q24H     No Known Allergies    Objective   Vitals: Blood pressure 99/54, pulse 76, temperature 97 8 °F (36 6 °C), temperature source Oral, resp  rate 18, height 5' 10" (1 778 m), weight 57 3 kg (126 lb 5 2 oz), SpO2 94 %  , Body mass index is 18 13 kg/m² ,   Orthostatic Blood Pressures    Flowsheet Row Most Recent Value   Blood Pressure 99/54 filed at 05/17/2022 1053   Patient Position - Orthostatic VS Lying filed at 05/17/2022 6968          Systolic (56QYU), DHY:694 , Min:99 , PAJ:041     Diastolic (77CHF), PYB:91, Min:54, Max:72        Intake/Output Summary (Last 24 hours) at 5/17/2022 1226  Last data filed at 5/17/2022 0900  Gross per 24 hour   Intake 380 ml   Output 800 ml   Net -420 ml       Invasive Devices  Report    Peripheral Intravenous Line  Duration           Peripheral IV 05/16/22 Distal;Left;Upper;Ventral (anterior) Arm 1 day                    Physical Exam:  GEN: Alert and oriented x 3, in no acute distress  Ill appearing and well nourished  HEENT: Sclera anicteric, conjunctivae pink, mucous membranes moist  Oropharynx clear  NECK: Supple, no carotid bruits, no significant JVD  Trachea midline, no thyromegaly  HEART: Regular rhythm, normal S1 and S2, no murmurs, clicks, gallops or rubs  PMI nondisplaced, no thrills  LUNGS: Decreased breath sounds bilaterally; no wheezes, rales, or rhonchi  No increased work of breathing or signs of respiratory distress  ABDOMEN: Soft, nontender, nondistended, normoactive bowel sounds  EXTREMITIES: + Left BKA, Skin warm and well perfused, no clubbing, cyanosis, or edema  NEURO: No focal findings  Normal speech  Mood and affect normal    SKIN: Normal without suspicious lesions on exposed skin        Lab Results:     Troponins:       CBC with diff:   Results from last 7 days   Lab Units 05/17/22  0517 05/16/22  1706 05/16/22  1428   WBC Thousand/uL 9 93  --  11 87*   HEMOGLOBIN g/dL 11 0*  --  11 3*   HEMATOCRIT % 33 8*  --  34 6*   MCV fL 93  --  94   PLATELETS Thousands/uL 354 333 349   MCH pg 30 3  --  30 6   MCHC g/dL 32 5  --  32 7   RDW % 13 7  --  13 8   MPV fL 9 7 9 4 9 7   NRBC AUTO /100 WBCs  --   --  0         CMP:   Results from last 7 days   Lab Units 05/17/22  0517 05/16/22  1428   POTASSIUM mmol/L 4 4 3 7   CHLORIDE mmol/L 102 103   CO2 mmol/L 27 28   BUN mg/dL 26* 21   CREATININE mg/dL 1 39* 1 16   CALCIUM mg/dL 8 7 8 5   AST U/L  --  18   ALT U/L  --  20   ALK PHOS U/L  --  87   EGFR ml/min/1 73sq m 44 55

## 2022-05-17 NOTE — ASSESSMENT & PLAN NOTE
Wt Readings from Last 3 Encounters:   05/17/22 57 3 kg (126 lb 5 2 oz)   05/04/22 61 2 kg (135 lb)   11/08/21 63 5 kg (140 lb)     · Present on admission as evidence by CT findings and elevated BNP  · Delta troponin (-), EKG with bifascicular block  · Last echo 2017, repeat pending  · Continue IV Lasix, strict I/Os, daily standing weights, sodium restricted diet  · Daily BMP to monitor electrolytes  · Cardiology consult pending

## 2022-05-17 NOTE — RESPIRATORY THERAPY NOTE
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    Severe protein-calorie malnutrition (HCC)    Leukocytosis    CHF exacerbation (HCC)    Pulmonary fibrosis (HCC)      Home Pulmonary Medications:  Duo TID  Albuterol PRN       Past Medical History:   Diagnosis Date    Anemia     Arthritis     Atherosclerosis of artery of extremity with ulceration ([facility] ) 3/27/2019    Bifascicular block     Cellulitis of chest wall     Chronic kidney disease     Cough     Dupuytren contracture     DVT femoral (deep venous thrombosis) with thrombophlebitis, right (HCC)     Elevated glucose     Hemothorax     Left    Hyperlipidemia     [facility]     Phantom pain after amputation of lower extremity ([facility] ) 3/27/2019    SOB (shortness of breath)     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 2/19/2019    Vascular disease      Social History     Socioeconomic History    Marital status: /Civil Union     Spouse name: None    Number of children: 3    Years of education: None    Highest education level: None   Occupational History    Occupation: retired   Tobacco Use    Smoking status: Former Smoker     Packs/day: 2 00     Years: 15 00     Pack years: 30 00     Types: Cigarettes    Smokeless tobacco: Never Used    Tobacco comment: quit 62 years ago   Vaping Use    Vaping Use: Never used   Substance and Sexual Activity    Alcohol use: Not Currently     Comment: n/a    Drug use: No    Sexual activity: Never   Other Topics Concern    None   Social History Narrative    Previous      Social Determinants of Health     Financial Resource Strain: Not on [employer] Insecurity: Not on file   Transportation Needs: Not on file   Physical Activity: Not on file   Stress: Not on file   Social Connections: Not on file   Intimate Partner Violence: Not on file   Housing Stability: Not on file       Subjective         Objective    Physical Exam:   Assessment Type: Assess only  General Appearance: Awake  Respiratory Pattern: Dyspnea at rest  Chest Assessment: Chest expansion symmetrical  Bilateral Breath Sounds: Diminished  O2 Device: 3LNC    Vitals:  Blood pressure 99/61, pulse 67, temperature 97 6 °F (36 4 °C), resp  rate 16, height 5' 10" (1 778 m), weight 58 kg (127 lb 13 9 oz), SpO2 95 %  Imaging and other studies: I have personally reviewed pertinent reports  O2 Device: 3LNC     Plan     home bronchodilator        Resp Comments: patient with history of ILD and pulmonary fibrosis admitted with acute resp failure and hypoxia  Pt was transfered from his pulm office for SPO2 in the 70's on RA  Pt ordered Duo TID today as new home med, will continue TID while inpatient  Patient did state that his PRN albuterol at home did not seem to be helping with his SOB  Patient does have elevated BNP and history of CHF  Breath sound are diminished with crackles  Will require walk before discharge to assess need for home O2

## 2022-05-17 NOTE — ASSESSMENT & PLAN NOTE
-Mild leukocytosis  -started on empiric cefepime and vancomycin  -CT chest diffuse ground-glass opacity, no infiltrate or consolidation 2/2 IPF/ILD  -Monitor WBC

## 2022-05-17 NOTE — CASE MANAGEMENT
Case Management Assessment & Discharge Planning Note    Patient name Prudence Canter  Location [address] 203/-85 MRN [id number]  : 1933 Date 2022       Current Admission Date: 2022  Current Admission Diagnosis:Acute respiratory failure with hypoxia [facility])   Patient Active Problem List    Diagnosis Date Noted    Acute respiratory failure with hypoxia ([facility] ) 2022    Acute on chronic heart failure with preserved ejection fraction ([address] ) 2022    Pulmonary fibrosis ([facility] ) 2022    Dysphagia 2020    Leukocytosis 2020    Proteinuria 2020    Shortness of breath 2020    Pulmonary infiltrates 2020    Interstitial lung disease ([facility] ) 2020    Fatigue 2020    Fall at home 2020    Unintentional weight loss 2019    Encounter for immunization 2019    Medicare annual wellness visit, subsequent 2019    Atherosclerosis of artery of extremity with ulceration ([facility] ) 2019    Phantom pain after amputation of lower extremity ([facility] ) 2019    Severe protein-calorie malnutrition ([facility] ) 2019    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 2019    Carpal tunnel syndrome 2019    Decubitus ulcer of right heel, unstageable ([facility] ) 2019    Ambulatory dysfunction 10/29/2018    Physical deconditioning 10/29/2018    CKD (chronic kidney disease), stage III ([facility] ) 2018    Severe peripheral arterial disease ([facility] ) 2018    Bifascicular block 10/31/2017    Chronic fatigue 10/31/2017    Vitamin D deficiency 10/25/2017    Anemia 10/15/2017    Unstable gait     Lichen simplex chronicus 07/10/2017    Seborrheic keratosis 07/10/2017    Change in multiple pigmented skin lesions 2017    Impaired fasting glucose 2017    Hyperlipidemia 2016    Chronic lower back pain 2016    Generalized osteoarthritis 2016    Herniated lumbar intervertebral disc 01/14/2016    Rotator cuff tear arthropathy, right 01/14/2016      LOS (days): 1  Geometric Mean LOS (GMLOS) (days):   Days to GMLOS:     OBJECTIVE:    Risk of Unplanned Readmission Score: 16 88         Current admission status: Inpatient       Preferred Pharmacy:   [facility] [address]  Phone: [phone] Fax: [phone]    Primary Care Provider: Irene Carbajal MD    Primary Insurance: Roy Aschoff [facility]  Secondary Insurance:     ASSESSMENT:  Mckay Hathaway 41 Representative - Spouse   Primary Phone: [phone] (Home)               Advance Directives  Does patient have a [address]?: Yes  Was patient offered paperwork?: No  Does patient currently have a Health Care decision maker?: Yes, please see Health Care Proxy section  Does patient have Advance Directives?: Yes  Advance Directives: Living will, Power of  for finance  Primary Contact: wife         Readmission Root Cause  30 Day Readmission: No    Patient Information  Admitted from[de-identified] Other (comment) (Pulmonary Consult)  Mental Status: Alert  During Assessment patient was accompanied by: Son, Spouse  Assessment information provided by[de-identified] Patient, Spouse  Primary Caregiver: Self  Support Systems: Spouse/significant other  [place] of Residence: [address] [place] [address] Avenue do you live in?: [address] entry access options  Select all that apply : Stairs  Number of steps to enter home  : 1  Do the steps have railings?: Yes  Type of Current Residence: [facility]  In the last 12 months, was there a time when you were not able to pay the mortgage or rent on time?: No  In the last 12 months, how many places have you lived?: 1  In the last 12 months, was there a time when you did not have a steady place to sleep or slept in a shelter (including now)?: No  Homeless/housing insecurity resource given?: N/A  Living Arrangements: Lives w/ Spouse/significant other  Is patient a ?: Yes  [facility]: No    Activities of Daily Living Prior to Admission  Functional Status: Assistance  Completes ADLs independently?: No  Level of ADL dependence: Assistance (Wife assists minimally)  Ambulates independently?: Yes  Does patient use assisted devices?: Yes (walker)  Assisted Devices (DME) used: Marlou Quintero  Does patient currently own DME?: Yes  What DME does the patient currently own?: Other (Comment) (prothesis left leg)  Does the patient have a history of Short-Term Rehab?: Yes  Does patient have a history of HHC?: Yes ([facility])  Does patient currently have [address]?: No         Patient Information Continued  Income Source: Unemployed  Within the past 12 months, you worried that your food would run out before you got the money to buy more : Never true  Within the past 12 months, the food you bought just didn't last and you didn't have money to get more : Never true  Food insecurity resource given?: N/A  Does patient receive dialysis treatments?: No  Does patient have a history of substance abuse?: No  Does patient have a history of Mental Health Diagnosis?: No         Means of Transportation  Means of Transport to Appts[de-identified] Family transport  In the past 12 months, has lack of transportation kept you from medical appointments or from getting medications?: No  In the past 12 months, has lack of transportation kept you from meetings, work, or from getting things needed for daily living?: No  Was application for public transport provided?: N/A        DISCHARGE DETAILS:    Discharge planning discussed with[de-identified] patient, wife and son  Freedom of Choice: Yes  Comments - Freedom of Choice: pt will communicate with his wife, but was in agreement for his family to be present during interveiw, refuses St[facility] but will agree to [address]  CM contacted family/caregiver?: Yes  Were Treatment Team discharge recommendations reviewed with patient/caregiver?: Yes  Did patient/caregiver verbalize understanding of patient care needs?: Yes  Were patient/caregiver advised of the risks associated with not following Treatment Team discharge recommendations?: Yes    Contacts  Patient Contacts: wife Ángel Valentine  Relationship to Patient[de-identified] Family  Contact Method:  In Person  Reason/Outcome: Continuity of [facility]         Is the patient interested in Edgardo Car at discharge?: Yes  Via Michaela Mena requested[de-identified] Nursing, Occupational Therapy, Physical [address] Name[de-identified] [address] [place] [address] Provider[de-identified] PCP  Home Health Services Needed[de-identified] Evaluate Functional Status and Safety, Gait/ADL Training, Strengthening/Theraputic Exercises to Improve Function  Homebound Criteria Met[de-identified] Uses an Assist Device (i e  cane, walker, etc)  Supporting Clincal Findings[de-identified] Dyspnea with Exertion, Fatigues Easliy in [employer], Limited Endurance         Other Referral/Resources/Interventions Provided:  Interventions: [facility]         [facility]  Discharge Destination Plan[de-identified] Home with [place] at Discharge : Automobile, Family

## 2022-05-17 NOTE — ASSESSMENT & PLAN NOTE
-He presents from pulmonologist's office with oxygen desaturation in 70s  -likely due to progression of IPF/ILD and decompensated CHF  -continue supplemental oxygen, keep pulse ox greater than 90%  -wean oxygen as tolerated  -will need ambulatory pulse ox to assess home oxygen need before discharge  -pulmonary consult  -martir Jasso  -IV Abx

## 2022-05-18 PROBLEM — I50.32 CHRONIC HEART FAILURE WITH PRESERVED EJECTION FRACTION (HCC): Status: ACTIVE | Noted: 2022-01-01

## 2022-05-18 NOTE — ASSESSMENT & PLAN NOTE
Wt Readings from Last 3 Encounters:   05/17/22 57 3 kg (126 lb 5 2 oz)   05/04/22 61 2 kg (135 lb)   11/08/21 63 5 kg (140 lb)     · Present on admission as evidence by CT findings and elevated BNP, cardiology input appreciated  · Suspect elevated BNP secondary to interstitial disease   · Delta troponin (-), EKG with bifascicular block  · Last echo 2017, repeat pending  · HOLD further V Lasix given rise in BUN/Cr  · Strict I/Os, daily standing weights, sodium restricted diet  · Daily BMP to monitor electrolytes

## 2022-05-18 NOTE — CONSULTS
The patient's vancomycin therapy has been completed/discontinued   Thank you for this consult; pharmacy will sign-off now Improved

## 2022-05-18 NOTE — CASE MANAGEMENT
Case Management Discharge Planning Note    Patient name Oksana Lowery  Location [address] 203/-46 MRN [id number]  : 1933 Date 2022       Current Admission Date: 2022  Current Admission Diagnosis:Acute respiratory failure with hypoxia [facility])   Patient Active Problem List    Diagnosis Date Noted    Acute respiratory failure with hypoxia ([address] ) 2022    Chronic heart failure with preserved ejection fraction ([address] ) 2022    Pulmonary fibrosis ([address] ) 2022    Dysphagia 2020    Leukocytosis 2020    Proteinuria 2020    Shortness of breath 2020    Pulmonary infiltrates 2020    Interstitial lung disease ([address] ) 2020    Fatigue 2020    Fall at home 2020    Unintentional weight loss 2019    Encounter for immunization 2019    Medicare annual wellness visit, subsequent 2019    Atherosclerosis of artery of extremity with ulceration ([facility] ) 2019    Phantom pain after amputation of lower extremity ([facility] ) 2019    Severe protein-calorie malnutrition ([facility] ) 2019    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 2019    Carpal tunnel syndrome 2019    Decubitus ulcer of right heel, unstageable ([address] ) 2019    Ambulatory dysfunction 10/29/2018    Physical deconditioning 10/29/2018    CKD (chronic kidney disease), stage III ([address] ) 2018    Severe peripheral arterial disease ([facility] ) 2018    Bifascicular block 10/31/2017    Chronic fatigue 10/31/2017    Vitamin D deficiency 10/25/2017    Anemia 10/15/2017    Unstable gait     Lichen simplex chronicus 07/10/2017    Seborrheic keratosis 07/10/2017    Change in multiple pigmented skin lesions 2017    Impaired fasting glucose 2017    Hyperlipidemia 2016    Chronic lower back pain 2016    Generalized osteoarthritis 2016    Herniated lumbar intervertebral disc 2016    Rotator cuff tear arthropathy, right 01/14/2016      LOS (days): 2  Geometric Mean LOS (GMLOS) (days): 3 60  Days to GMLOS:1 7     OBJECTIVE:  Risk of Unplanned Readmission Score: 16 77         Current admission status: Inpatient   Preferred Pharmacy:   [facility], [address] [place] [address] Street  Phone: [phone] Fax: [phone]    Primary Care Provider: Renetta Jorgensen MD    Primary Insurance: [facility]  Secondary Insurance:     DISCHARGE DETAILS:                                          Other Referral/Resources/Interventions Provided:  Interventions: [facility]  Referral Comments: Pt is a tentative d/c in 48hrs per [facility]  [facility] cannot accept-additional referrals placed for [facility] services    Pt remains hypoxic-will need Home O2 Eval prior to d/c

## 2022-05-18 NOTE — PROGRESS NOTES
[facility] Progress Note Stanton Carvalho 1933, 80 y o  male MRN: [id number]  Unit/Bed#: -01 Encounter: [id number]  Primary Care Provider: Raulito Degroot MD   Date and time admitted to hospital: 5/16/2022  1:51 PM    * Acute respiratory failure with hypoxia [facility])  Assessment & Plan  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  Diffuse groundglass opacities, developing since a CT from 2/28/2022   This is most consistent with either pulmonary edema or multifocal pneumonitis    · Afebrile, with mild leukocytosis on presentation and elevated BNP, suspect combination possible progression of interstitial lung disease along with mild decompensated heart failure  · Pulmonology and cardiology consults appreciated  · Respiratory protocol, incentive spirometer  · Continue O2 supplementation, goal > 88%  · Currently on cefepime and vancomycin, day #3  · Procal trending up, repeat pending    Pulmonary fibrosis ([facility] )  Assessment & Plan  · 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  · Continue prednisone 60 mg daily  · Pulmonology input appreciated     Chronic heart failure with preserved ejection fraction ([facility] )  Assessment & Plan  Wt Readings from Last 3 Encounters:   05/17/22 57 3 kg (126 lb 5 2 oz)   05/04/22 61 2 kg (135 lb)   11/08/21 63 5 kg (140 lb)     · Present on admission as evidence by CT findings and elevated BNP, cardiology input appreciated  · Suspect elevated BNP secondary to interstitial disease   · Delta troponin (-), EKG with bifascicular block  · Last echo 2017, repeat pending  · HOLD further V Lasix given rise in BUN/Cr  · Strict I/Os, daily standing weights, sodium restricted diet  · Daily BMP to monitor electrolytes    Leukocytosis  Assessment & Plan  · Mild leukocytosis, 11 87 on presentation  · Consider reactive versus infectious  · First procalcitonin normal 0 15, second increasing 0 23  · Continue cefepime and vancomycin for now  · Trend daily CBC        VTE Pharmacologic Prophylaxis: VTE Score: 8 High Risk (Score >/= 5) - Pharmacological DVT Prophylaxis Ordered: heparin  Sequential Compression Devices Ordered  Patient Centered Rounds: I performed bedside rounds with nursing staff today  Discussions with Specialists or Other Care Team Provider: pulmonology, cardiology     Education and Discussions with Family / Patient: Updated  (wife) at bedside  Time Spent for Care: 20 minutes  More than 50% of total time spent on counseling and coordination of care as described above  Current Length of Stay: 2 day(s)  Current Patient Status: Inpatient   Certification Statement: The patient will continue to require additional inpatient hospital stay due to pending stability in respiratory status  Discharge Plan: Anticipate discharge in 48-72 hrs to rehab facility  current recommendations noted by therapy team for rehab    Code Status: Level 3 - DNAR and DNI    Subjective:   Patient seen this morning, working with physical therapy team   He is short of breath  They note that he decided treated on room air into the 60s  Patient feels tired, reports he was not quite as tired before getting up and trying to move  Denies any chest pain or tightness  We reviewed his current treatment plan and his response was whatever you think, it's foreign to me"  Objective:     Vitals:   Temp (24hrs), Av 7 °F (36 5 °C), Min:97 5 °F (36 4 °C), Max:97 8 °F (36 6 °C)    Temp:  [97 5 °F (36 4 °C)-97 8 °F (36 6 °C)] 97 7 °F (36 5 °C)  HR:  [62-86] 62  Resp:  [16-19] 16  BP: ()/(50-61) 116/61  SpO2:  [92 %-97 %] 97 %  Body mass index is 18 13 kg/m²       Input and Output Summary (last 24 hours): Intake/Output Summary (Last 24 hours) at 5/18/2022 0833  Last data filed at 5/18/2022 0704  Gross per 24 hour   Intake 420 ml   Output 600 ml   Net -180 ml       Physical Exam:   Physical Exam  Vitals and nursing note reviewed  Constitutional:       General: He is not in acute distress  Appearance: He is ill-appearing (chronically)  He is not toxic-appearing  Cardiovascular:      Rate and Rhythm: Normal rate and regular rhythm  Pulmonary:      Effort: Pulmonary effort is normal  Tachypnea present  No respiratory distress  Breath sounds: Decreased breath sounds present  No wheezing  Abdominal:      General: Bowel sounds are normal       Palpations: Abdomen is soft  Musculoskeletal:      Right lower leg: No edema  Left lower leg: No edema  Neurological:      Mental Status: He is alert and oriented to person, place, and time  Additional Data:     Labs:  Results from last 7 days   Lab Units 05/18/22  0502 05/16/22  1706 05/16/22  1428   WBC Thousand/uL 17 31*   < > 11 87*   HEMOGLOBIN g/dL 9 7*   < > 11 3*   HEMATOCRIT % 29 3*   < > 34 6*   PLATELETS Thousands/uL 303   < > 349   NEUTROS PCT %  --   --  81*   LYMPHS PCT %  --   --  9*   MONOS PCT %  --   --  8   EOS PCT %  --   --  1    < > = values in this interval not displayed  Results from last 7 days   Lab Units 05/18/22  0502 05/17/22  0517 05/16/22  1428   SODIUM mmol/L 136   < > 139   POTASSIUM mmol/L 3 9   < > 3 7   CHLORIDE mmol/L 101   < > 103   CO2 mmol/L 27   < > 28   BUN mg/dL 48*   < > 21   CREATININE mg/dL 1 61*   < > 1 16   ANION GAP mmol/L 8   < > 8   CALCIUM mg/dL 8 8   < > 8 5   ALBUMIN g/dL  --   --  2 6*   TOTAL BILIRUBIN mg/dL  --   --  0 79   ALK PHOS U/L  --   --  87   ALT U/L  --   --  20   AST U/L  --   --  18   GLUCOSE RANDOM mg/dL 146*   < > 114    < > = values in this interval not displayed                   Results from last 7 days   Lab Units 05/17/22  1216 05/16/22  1428   PROCALCITONIN ng/ml 0  23 0 15       Lines/Drains:  Invasive Devices  Report    Peripheral Intravenous Line  Duration           Peripheral IV 05/16/22 Distal;Left;Upper;Ventral (anterior) Arm 2 days                      Imaging: No pertinent imaging reviewed  Recent Cultures (last 7 days):         Last 24 Hours Medication List:   Current Facility-Administered Medications   Medication Dose Route Frequency Provider Last Rate    aspirin  81 mg Oral Daily Rafia Pelayo MD      atorvastatin  20 mg Oral Daily With Jovi Jansen MD      cefepime  2,000 mg Intravenous Q12H Rafia Pelayo MD 2,000 mg (05/17/22 2123)    ferrous sulfate  325 mg Oral BID Rafia Pelayo MD      heparin (porcine)  5,000 Units Subcutaneous Q12H [address] Rafia Pelayo MD      ipratropium  0 5 mg Nebulization TID Rafia Pelayo MD      levalbuterol  1 25 mg Nebulization TID Rafia Pelayo MD      predniSONE  60 mg Oral Daily Rafia Pelayo MD      vancomycin  20 mg/kg Intravenous Q24H Rafia Pelayo MD 1,250 mg (05/17/22 2201)        Today, Patient Was Seen By: Rhiannon Soares PA-C    **Please Note: This note may have been constructed using a voice recognition system  **

## 2022-05-18 NOTE — ASSESSMENT & PLAN NOTE
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

## 2022-05-18 NOTE — PLAN OF CARE
Problem: Potential for Falls  Goal: Patient will remain free of falls  Description: INTERVENTIONS:  - Educate patient/family on patient safety including physical limitations  - Instruct patient to call for assistance with activity   - Consult OT/PT to assist with strengthening/mobility   - Keep Call bell within reach  - Keep bed low and locked with side rails adjusted as appropriate  - Keep care items and personal belongings within reach  - Initiate and maintain comfort rounds  - Make Fall Risk Sign visible to staff  - Offer Toileting every  Hours, in advance of need  - Initiate/Maintain alarm  - Obtain necessary fall risk management equipment:   - Apply yellow socks and bracelet for high fall risk patients  - Consider moving patient to room near nurses station  Outcome: Progressing     Problem: MOBILITY - ADULT  Goal: Maintain or return to baseline ADL function  Description: INTERVENTIONS:  -  Assess patient's ability to carry out ADLs; assess patient's baseline for ADL function and identify physical deficits which impact ability to perform ADLs (bathing, care of mouth/teeth, toileting, grooming, dressing, etc )  - Assess/evaluate cause of self-care deficits   - Assess range of motion  - Assess patient's mobility; develop plan if impaired  - Assess patient's need for assistive devices and provide as appropriate  - Encourage maximum independence but intervene and supervise when necessary  - Involve family in performance of ADLs  - Assess for home care needs following discharge   - Consider OT consult to assist with ADL evaluation and planning for discharge  - Provide patient education as appropriate  Outcome: Progressing  Goal: Maintains/Returns to pre admission functional level  Description: INTERVENTIONS:  - Perform BMAT or MOVE assessment daily    - Set and communicate daily mobility goal to care team and patient/family/caregiver     - Collaborate with rehabilitation services on mobility goals if consulted  - Perform Range of Motion  times a day  - Reposition patient every  hours    - Dangle patient  times a day  - Stand patient  times a day  - Ambulate patient  times a day  - Out of bed to chair  times a day   - Out of bed for meals  times a day  - Out of bed for toileting  - Record patient progress and toleration of activity level   Outcome: Progressing     Problem: PAIN - ADULT  Goal: Verbalizes/displays adequate comfort level or baseline comfort level  Description: Interventions:  - Encourage patient to monitor pain and request assistance  - Assess pain using appropriate pain scale  - Administer analgesics based on type and severity of pain and evaluate response  - Implement non-pharmacological measures as appropriate and evaluate response  - Consider cultural and social influences on pain and pain management  - Notify physician/advanced practitioner if interventions unsuccessful or patient reports new pain  Outcome: Progressing     Problem: INFECTION - ADULT  Goal: Absence or prevention of progression during hospitalization  Description: INTERVENTIONS:  - Assess and monitor for signs and symptoms of infection  - Monitor lab/diagnostic results  - Monitor all insertion sites, i e  indwelling lines, tubes, and drains  - Monitor endotracheal if appropriate and nasal secretions for changes in amount and color  - [place] appropriate cooling/warming therapies per order  - Administer medications as ordered  - Instruct and encourage patient and family to use good hand hygiene technique  - Identify and instruct in appropriate isolation precautions for identified infection/condition  Outcome: Progressing  Goal: Absence of fever/infection during neutropenic period  Description: INTERVENTIONS:  - Monitor WBC    Outcome: Progressing     Problem: SAFETY ADULT  Goal: Patient will remain free of falls  Description: INTERVENTIONS:  - Educate patient/family on patient safety including physical limitations  - Instruct patient to call for assistance with activity   - Consult OT/PT to assist with strengthening/mobility   - Keep Call bell within reach  - Keep bed low and locked with side rails adjusted as appropriate  - Keep care items and personal belongings within reach  - Initiate and maintain comfort rounds  - Make Fall Risk Sign visible to staff  - Offer Toileting every  Hours, in advance of need  - Initiate/Maintain alarm  - Obtain necessary fall risk management equipment:   - Apply yellow socks and bracelet for high fall risk patients  - Consider moving patient to room near nurses station  Outcome: Progressing  Goal: Maintain or return to baseline ADL function  Description: INTERVENTIONS:  -  Assess patient's ability to carry out ADLs; assess patient's baseline for ADL function and identify physical deficits which impact ability to perform ADLs (bathing, care of mouth/teeth, toileting, grooming, dressing, etc )  - Assess/evaluate cause of self-care deficits   - Assess range of motion  - Assess patient's mobility; develop plan if impaired  - Assess patient's need for assistive devices and provide as appropriate  - Encourage maximum independence but intervene and supervise when necessary  - Involve family in performance of ADLs  - Assess for home care needs following discharge   - Consider OT consult to assist with ADL evaluation and planning for discharge  - Provide patient education as appropriate  Outcome: Progressing  Goal: Maintains/Returns to pre admission functional level  Description: INTERVENTIONS:  - Perform BMAT or MOVE assessment daily    - Set and communicate daily mobility goal to care team and patient/family/caregiver  - Collaborate with rehabilitation services on mobility goals if consulted  - Perform Range of Motion  times a day  - Reposition patient every  hours    - Dangle patient  times a day  - Stand patient  times a day  - Ambulate patient  times a day  - Out of bed to chair  times a day   - Out of bed for meals times a day  - Out of bed for toileting  - Record patient progress and toleration of activity level   Outcome: Progressing     Problem: DISCHARGE PLANNING  Goal: Discharge to home or other facility with appropriate resources  Description: INTERVENTIONS:  - Identify barriers to discharge w/patient and caregiver  - Arrange for needed discharge resources and transportation as appropriate  - Identify discharge learning needs (meds, wound care, etc )  - Arrange for interpretive services to assist at discharge as needed  - Refer to Case Management Department for coordinating discharge planning if the patient needs post-hospital services based on physician/advanced practitioner order or complex needs related to functional status, cognitive ability, or social support system  Outcome: Progressing     Problem: Knowledge Deficit  Goal: Patient/family/caregiver demonstrates understanding of disease process, treatment plan, medications, and discharge instructions  Description: Complete learning assessment and assess knowledge base    Interventions:  - Provide teaching at level of understanding  - Provide teaching via preferred learning methods  Outcome: Progressing     Problem: Prexisting or High Potential for Compromised Skin Integrity  Goal: Skin integrity is maintained or improved  Description: INTERVENTIONS:  - Identify patients at risk for skin breakdown  - Assess and monitor skin integrity  - Assess and monitor nutrition and hydration status  - Monitor labs   - Assess for incontinence   - Turn and reposition patient  - Assist with mobility/ambulation  - Relieve pressure over bony prominences  - Avoid friction and shearing  - Provide appropriate hygiene as needed including keeping skin clean and dry  - Evaluate need for skin moisturizer/barrier cream  - Collaborate with interdisciplinary team   - Patient/family teaching  - Consider wound care consult   Outcome: Progressing     Problem: Nutrition/Hydration-ADULT  Goal: Nutrient/Hydration intake appropriate for improving, restoring or maintaining nutritional needs  Description: Monitor and assess patient's nutrition/hydration status for malnutrition  Collaborate with interdisciplinary team and initiate plan and interventions as ordered  Monitor patient's weight and dietary intake as ordered or per policy  Utilize nutrition screening tool and intervene as necessary  Determine patient's food preferences and provide high-protein, high-caloric foods as appropriate       INTERVENTIONS:  - Monitor oral intake, urinary output, labs, and treatment plans  - Assess nutrition and hydration status and recommend course of action  - Evaluate amount of meals eaten  - Assist patient with eating if necessary   - Allow adequate time for meals  - Recommend/ encourage appropriate diets, oral nutritional supplements, and vitamin/mineral supplements  - Order, calculate, and assess calorie counts as needed  - Recommend, monitor, and adjust tube feedings and TPN/PPN based on assessed needs  - Assess need for intravenous fluids  - Provide specific nutrition/hydration education as appropriate  - Include patient/family/caregiver in decisions related to nutrition  Outcome: Progressing

## 2022-05-18 NOTE — MALNUTRITION/BMI
This medical record reflects one or more clinical indicators suggestive of malnutrition  Malnutrition Findings:   Adult Malnutrition type: Chronic illness  Adult Degree of Malnutrition: Other severe protein calorie malnutrition  Malnutrition Characteristics: Fat loss, Muscle loss, Inadequate energy, Weight loss                  360 Statement: related to inadequate energy intake due to poor appetite as evidenced by hollow supraclavicular space, wasted interosseous, sunken orbital, visible ribs, weight trending down last 6 mos, 9 8%  Intervention GENNA diet, ensure enlive 3 times a day  BMI Findings:           BMI = 19     See Nutrition note dated 5/18/2022 for additional details  Completed nutrition assessment is viewable in the nutrition documentation

## 2022-05-18 NOTE — ASSESSMENT & PLAN NOTE
· Mild leukocytosis, 11 87 on presentation  · Consider reactive versus infectious  · First procalcitonin normal 0 15, second increasing 0 23  · Continue cefepime and vancomycin for now  · Trend daily CBC

## 2022-05-18 NOTE — ASSESSMENT & PLAN NOTE
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  Diffuse groundglass opacities, developing since a CT from 2/28/2022   This is most consistent with either pulmonary edema or multifocal pneumonitis    · Afebrile, with mild leukocytosis on presentation and elevated BNP, suspect combination possible progression of interstitial lung disease along with mild decompensated heart failure  · Pulmonology and cardiology consults appreciated  · Respiratory protocol, incentive spirometer  · Continue O2 supplementation, goal > 88%  · Currently on cefepime and vancomycin, day #3  · Procal trending up, repeat pending

## 2022-05-18 NOTE — PROGRESS NOTES
Progress Note - Pulmonary   Marielle Yang 80 y o  male MRN: [id number]  Unit/Bed#: -01 Encounter: [id number]    Assessment:  1  Acute hypoxemic respiratory failure  2  Abnormal chest CT with bilateral ground-glass opacities  3  IPF/ILD with possible exacerbation  4  Elevated BNP     Plan:  Acute hypoxemic respiratory failure likely secondary to IPF/ILD flare/progression  This morning he was on 1 L at rest with sats in the mid 90s, taken off O2 and at rest remained in the 90s, upon ambulation with physical therapy his sats did drop  Will need home O2 eval prior to discharge  He was given additional Lasix yesterday, creatinine did go up today  Will continue prednisone 60 mg for now  Procalcitonin has been normal, will discontinue antibiotics  Continue with IS and flutter valve to aid in clearing secretions  Continue Xopenex and Atrovent  Will continue to follow  Subjective:   Patient resting in bed  Reports improvement in his breathing today at rest     Objective:     Vitals: Blood pressure 116/61, pulse 64, temperature 97 7 °F (36 5 °C), resp  rate 16, height 5' 10" (1 778 m), weight 57 3 kg (126 lb 5 2 oz), SpO2 94 %  ,Body mass index is 18 13 kg/m²        Intake/Output Summary (Last 24 hours) at 5/18/2022 1037  Last data filed at 5/18/2022 0704  Gross per 24 hour   Intake 120 ml   Output 400 ml   Net -280 ml       Invasive Devices  Report    None                 Physical Exam: /61   Pulse 64   Temp 97 7 °F (36 5 °C)   Resp 16   Ht 5' 10" (1 778 m)   Wt 57 3 kg (126 lb 5 2 oz) Comment: patient unable to stand unassisted for standing weight  SpO2 94%   BMI 18 13 kg/m²   General appearance: alert and oriented, in no acute distress  Head: Normocephalic, without obvious abnormality, atraumatic  Eyes: negative findings: conjunctivae and sclerae normal  Lungs: diminished breath sounds  Heart: regular rate and rhythm  Abdomen: normal findings: soft, non-tender  Extremities: No edema, left BKA  Skin: Warm and dry  Neurologic: Mental status: Alert, oriented, thought content appropriate     Labs: I have personally reviewed pertinent lab results  , CBC:   Lab Results   Component Value Date    WBC 17 31 (H) 05/18/2022    HGB 9 7 (L) 05/18/2022    HCT 29 3 (L) 05/18/2022    MCV 92 05/18/2022     05/18/2022    MCH 30 4 05/18/2022    MCHC 33 1 05/18/2022    RDW 13 6 05/18/2022    MPV 9 8 05/18/2022   , CMP:   Lab Results   Component Value Date    SODIUM 136 05/18/2022    K 3 9 05/18/2022     05/18/2022    CO2 27 05/18/2022    BUN 48 (H) 05/18/2022    CREATININE 1 61 (H) 05/18/2022    CALCIUM 8 8 05/18/2022    EGFR 37 05/18/2022     Imaging and other studies: I have personally reviewed pertinent reports     and I have personally reviewed pertinent films in PACS

## 2022-05-18 NOTE — PROGRESS NOTES
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  Continue with abx, steroids, nebs, Mgmt per SLIM/Pulmonary  4  Anemia, decreased compared to yesterday, H & H today 9 7/29 3, continue to monitor  5  PVD with hx of Left BKA, appears stable  continue all meds  6  Acute kidney injury, Cr bumped to 1 6 from 1 3  Continue to monitor       Subjective:   Patient seen and examined  No significant events overnight  Im feeling ok  Denies chest pain  Objective:     Vitals: Blood pressure 99/52, pulse 76, temperature 97 5 °F (36 4 °C), resp  rate 16, height 5' 10" (1 778 m), weight 57 3 kg (126 lb 5 2 oz), SpO2 100 %  , Body mass index is 18 13 kg/m² ,   Orthostatic Blood Pressures    Flowsheet Row Most Recent Value   Blood Pressure 99/52 filed at 05/18/2022 1105   Patient Position - Orthostatic VS Lying filed at 05/17/2022 2209            Intake/Output Summary (Last 24 hours) at 5/18/2022 1337  Last data filed at 5/18/2022 0540  Gross per 24 hour   Intake 120 ml   Output 400 ml   Net -280 ml         Physical Exam:    GEN: Adan Martin appears ill, alert and oriented x 3, pleasant and cooperative   HEENT: pupils equal, round, and reactive to light; extraocular muscles intact  NECK: supple, no carotid bruits   HEART: regular rhythm, normal S1 and S2, no murmurs, clicks, gallops or rubs   LUNGS: decreased breath sounds bilaterally; no wheezes, rales, or rhonchi   ABDOMEN: normal bowel sounds, soft, no tenderness, no distention  EXTREMITIES: + Left BKA, peripheral pulses normal; no clubbing, cyanosis, or edema      Medications:      Current Facility-Administered Medications:     albumin human (FLEXBUMIN) 25 % injection 25 g, 25 g, Intravenous, Q6H, Jeremias Davila MD    aspirin chewable tablet 81 mg, 81 mg, Oral, Daily, Anju Brown MD, 81 mg at 05/18/22 0911    atorvastatin (LIPITOR) tablet 20 mg, 20 mg, Oral, Daily With Karlie Mathews MD, 20 mg at 05/17/22 1716    ferrous sulfate tablet 325 mg, 325 mg, Oral, BID, Anju Brown MD, 325 mg at 05/18/22 0911    heparin (porcine) subcutaneous injection 5,000 Units, 5,000 Units, Subcutaneous, Q12H [facility] halfway, 5,000 Units at 05/18/22 0911 **AND** [COMPLETED] Platelet count, , , Once, Anju Brown MD    levalbuterol Merrilyn Getting) inhalation solution 1 25 mg, 1 25 mg, Nebulization, TID, Anju Brown MD, 1 25 mg at 05/18/22 0837    predniSONE tablet 60 mg, 60 mg, Oral, Daily, Anju Brown MD, 60 mg at 05/18/22 0911    sodium chloride 0 9 % bolus 500 mL, 500 mL, Intravenous, Once, [place] CLAUDY Rojas, Last Rate: 125 mL/hr at 05/18/22 1123, 500 mL at 05/18/22 1123     Labs & Results:        Results from last 7 days   Lab Units 05/18/22  0502 05/17/22  0517 05/16/22  1706 05/16/22  1428   WBC Thousand/uL 17 31* 9 93  --  11 87*   HEMOGLOBIN g/dL 9 7* 11 0*  --  11 3*   HEMATOCRIT % 29 3* 33 8*  --  34 6*   PLATELETS Thousands/uL 303 354 333 349         Results from last 7 days   Lab Units 05/18/22  0502 05/17/22  0517 05/16/22  1428   POTASSIUM mmol/L 3 9 4 4 3 7   CHLORIDE mmol/L 101 102 103   CO2 mmol/L 27 27 28   BUN mg/dL 48* 26* 21   CREATININE mg/dL 1 61* 1 39* 1 16   CALCIUM mg/dL 8 8 8 7 8 5   ALK PHOS U/L  --   --  87   ALT U/L  --   --  20   AST U/L  --   --  18         Results from last 7 days   Lab Units 05/17/22  0517   MAGNESIUM mg/dL 1 8

## 2022-05-18 NOTE — RESPIRATORY THERAPY NOTE
RT Protocol Note  Shaka Light 80 y o  male MRN: [id number]  Unit/Bed#: -01 Encounter: [id number]    Assessment    Principal Problem:    Acute respiratory failure with hypoxia ([facility] [address] )  Active Problems:    Severe peripheral arterial disease (HCC)    Hyperlipidemia    Bifascicular block    Anemia    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    Severe protein-calorie malnutrition (HCC)    Leukocytosis    Chronic heart failure with preserved ejection fraction (HCC)    Pulmonary fibrosis (HCC)      Home Pulmonary Medications:  Nebs/inhaler       Past Medical History:   Diagnosis Date    Anemia     Arthritis     Atheros[facility] of artery of extremity with ulceration [facility] 3/27/2019    Bifascicular block     Cellulitis of chest wall     Chronic kidney disease     Cough     Dupuytren contracture     DVT femoral (deep venous thrombosis) with thrombophlebitis, right (HCC)     Elevated glucose     Hemothorax     Left    Hyperlipidemia     Interstitial lung disease ([facility])     Phantom pain after amputation of lower extremity ([facility] ) 3/27/2019    SOB (shortness of breath)     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 2/19/2019    Vascular disease      Social History     Socioeconomic History    Marital status: /Civil Union     Spouse name: None    Number of children: 3    Years of education: None    Highest education level: None   Occupational History    Occupation: retired   Tobacco Use    Smoking status: Former Smoker     Packs/day: 2 00     Years: 15 00     Pack years: 30 00     Types: Cigarettes    Smokeless tobacco: Never Used    Tobacco comment: quit 57 years ago   Vaping Use    Vaping Use: Never used   Substance and Sexual Activity    Alcohol use: Not Currently     Comment: n/a    Drug use: No    Sexual activity: Never   Other Topics Concern    None   Social History Narrative    Previous      Social Determinants of Health     Financial Resource Strain: Not on file   Food Insecurity: No Food Insecurity    Worried About Running Out of Food in the Last Year: Never true    Ran Out of Food in the Last Year: Never true   Transportation Needs: No Transportation Needs    Lack of Transportation (Medical): No    Lack of Transportation (Non-Medical): No   Physical Activity: Not on file   Stress: Not on file   Social Connections: Not on file   Intimate Partner Violence: Not on file   Housing Stability: Low Risk     Unable to Pay for Housing in the Last Year: No    Number of Places Lived in the Last Year: 1    Unstable Housing in the Last Year: No       Subjective         Objective    Physical Exam:   Assessment Type: Pre-treatment  General Appearance: Alert, Awake  Respiratory Pattern: Normal  Chest Assessment: Chest expansion symmetrical  Bilateral Breath Sounds: Diminished  R Breath Sounds: Crackles (fine crackles in the base)  Cough: Non-productive  O2 Device: NC    Vitals:  Blood pressure 116/61, pulse 64, temperature 97 7 °F (36 5 °C), resp  rate 16, height 5' 10" (1 778 m), weight 57 3 kg (126 lb 5 2 oz), SpO2 94 %  Imaging and other studies: I have personally reviewed pertinent reports  O2 Device: NC     Plan    Respiratory Plan: Home Bronchodilator Patient pathway        Resp Comments: Pt resting in bed in no apparent distress  Tx given

## 2022-05-18 NOTE — PHYSICAL THERAPY NOTE
Physical Therapy Evaluation   Time in: 949  Time out: 1013  Total evaluation time: 24 minutes    Patient's Name: Matthew Jansen    Admitting Diagnosis  SOB (shortness of breath) [R06 02]  ILD (interstitial lung disease) ([address] ) [J84 9]  CHF exacerbation ([address] ) [I50 9]  Elevated brain natriuretic peptide (BNP) level [R79 89]  Acute respiratory failure with hypoxia ([address] ) [J96 01]  Acute hypoxemic respiratory failure ([address] ) [J96 01]    Problem List  Patient Active Problem List   Diagnosis    Severe peripheral arterial disease ([address] )    Hyperlipidemia    Vitamin D deficiency    Bifascicular block    Anemia    CKD (chronic kidney disease), stage III ([address] )    Ambulatory dysfunction    Physical deconditioning    Decubitus ulcer of right heel, unstageable (HCC)    Carpal tunnel syndrome    Change in multiple pigmented skin lesions    Chronic lower back pain    Chronic fatigue    Generalized osteoarthritis    Herniated lumbar intervertebral disc    Impaired fasting glucose    Lichen simplex chronicus    Rotator cuff tear arthropathy, right    Seborrheic keratosis    Unstable gait    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    Severe protein-calorie malnutrition (HCC)    Atherosclerosis of artery of extremity with ulceration (HCC)    Phantom pain after amputation of lower extremity ([address] )    Medicare annual wellness visit, subsequent    Unintentional weight loss    Encounter for immunization    Fall at home    Fatigue    Interstitial lung disease ([address] )    Leukocytosis    Proteinuria    Shortness of breath    Pulmonary infiltrates    Dysphagia    Acute respiratory failure with hypoxia (HCC)    Chronic heart failure with preserved ejection fraction (HCC)    Pulmonary fibrosis (HCC)       Past Medical History  Past Medical History:   Diagnosis Date    Anemia     Arthritis     Atherosclerosis of artery of extremity with ulceration ([address] ) 3/27/2019    Bifascicular block     Cellulitis of chest wall     Chronic kidney disease     Cough     Dupuytren contracture     DVT femoral (deep venous thrombosis) with thrombophlebitis, right (HCC)     Elevated glucose     Hemothorax     Left    Hyperlipidemia     Interstitial lung disease (HCC)     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) 3/27/2019    SOB (shortness of breath)     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 2/19/2019    Vascular disease        Past Surgical History  Past Surgical History:   Procedure Laterality Date    AMPUTATION  1972    L thumb    CARPAL TUNNEL RELEASE  2016    L hand    CATARACT EXTRACTION      COLONOSCOPY      EYE SURGERY      FRACTURE SURGERY      HAND SURGERY      IR LOWER EXTREMITY / INTERVENTION  10/26/2018    KNEE ARTHROSCOPY      NERVE BLOCK      ME AMPUTATION LOW LEG THRU TIB/FIB Left 2/1/2019    Procedure: AMPUTATION BELOW KNEE (BKA); Surgeon: Darby Howell MD;  [facility];  Service: Vascular    ME SLCTV CATHJ 3RD+ ORD SLCTV ABDL PEL/LXTR [facility] Left 3/9/2018    Procedure: LEFT LOWER EXTREMITY ARTERIOGRAM WITH PTA OF LEFT POPLITEAL ARTERY;  Surgeon: Manisha Ramirez MD;  [facility];  Service: Vascular    ME Gurpreet Joseph 3RD+ ORD [address] PEL/LXTR [facility] Left 10/26/2018    Procedure: ARTERIOGRAM, angioplasty stent and atherectomy;  Surgeon: Manisha Ramirez MD;  [facility]: [facility];  Service: Vascular    THORACENTESIS         PT performed at least 2 patient identifiers during session: Name and wristband  05/18/22 0949   PT Last Visit   PT Visit Date 05/18/22   Note Type   Note type Evaluation   Pain Assessment   Pain Assessment Tool 0-10   Pain Score No Pain   Restrictions/Precautions   Weight Bearing Precautions Per Order No   Braces or Orthoses Prosthesis  (h/o BKA, prosthesis)   Other Precautions Chair Alarm; Bed Alarm;O2;Fall Risk  (1L O2 NC)   Home Living   Type of [address] [place] [address] One level;Stairs to enter without rails  (3 + 1 ARMIN)   Bathroom Shower/Tub Tub/shower unit   [place] Toilet Raised   Bathroom Equipment Tub transfer bench   [facility]; Wheelchair-manual  (RW uses at baseline)   Prior Function   Level of [place] Independent with ADLs and functional mobility   Lives With Dc-Morgan Help From Family  (son and granddtr live locally)   ADL Assistance Independent   IADLs Needs assistance   Falls in the last 6 months 1 to 4  (2 falls)   Vocational Retired  ()   Comments pt enjoys to do woodworking  (-) , wife assists with transportation   General   Family/Caregiver Present No   Cognition   Overall Cognitive Status WFL   Arousal/Participation Alert   Orientation Level Oriented X4   Memory Within functional limits   Following Commands Follows all commands and directions without difficulty   Comments pt agreeable to PT eval   RLE Assessment   RLE Assessment   (grossly 3+/5)   LLE Assessment   LLE Assessment   (h/o L BKA, proximal: 3+/5)   Coordination   Movements are Fluid and Coordinated 1   Sensation WFL   Light Touch   RLE Light Touch Grossly intact   LLE Light Touch Grossly intact   Bed Mobility   Supine to Sit 4  Minimal assistance   Additional items HOB elevated; Increased time required;Verbal cues;LE management;Assist x 1   Transfers   Sit to Stand 4  Minimal assistance   Additional items Assist x 1; Armrests; Increased time required;Verbal cues   Stand to Sit 4  Minimal assistance   Additional items Assist x 1; Armrests; Increased time required;Verbal cues   Additional Comments pt requirin 3L o2 NC, for SPO2 > 88%, pt requiring frequent rest breaks d/t SOB/RUIZ   Ambulation/Elevation   Gait pattern Decreased foot clearance;Shuffling; Short stride; Step to  (degraded posture)   Gait Assistance 4  Minimal assist   Additional items Assist x 1;Verbal cues   Assistive Device Rolling walker   Distance 5'   Balance   Static Sitting Fair   Dynamic Sitting Fair -   Static Standing Fair -   Dynamic Standing Poor +   Ambulatory Poor +   Endurance Deficit   Endurance Deficit Yes   Activity Tolerance   Activity Tolerance Patient limited by fatigue   Medical Staff Made Aware CLAUDY Shelton and Anil    Nurse Made Aware RN Eulalio Cooper present to assist   Assessment   Prognosis Good   Problem List Decreased strength;Decreased endurance; Impaired balance;Decreased mobility   Assessment Pt is 80 y o  male seen for high-complexity PT evaluation on 5/18/2022 s/p admit to [facility] on 5/16/2022 w/ Acute respiratory failure with hypoxia ([address] )  PT was consulted to assess pt's functional mobility and d/c needs  Order placed for PT eval and tx, w/ up as tolerated order  PTA, pt resides with wife (who is also hospitalized) in [facility] with 3+1 ARMIN; ambulates with RW at baseline, I with ADLs, requires A for IADLs, +fall history  At time of eval, pt requiring min A x1 for bed mobility, transfers, short gait trial from EOB to recliner with use of RW  Upon evaluation, pt presenting with impaired functional mobility d/t decreased strength, decreased endurance, impaired balance, decreased mobility and activity intolerance  Pertinent PMHx and current co-morbidities affecting pt's physical performance at time of assessment include: acute respiratory failure with hypoxia, severe PAD, HLD, bifascicular block, anemia, s/p L BKA, leukocytosis, CHF, pulmonary fibrosis, vitamin D deficiency, ambulatory dysfunction, physical deconditioning, chronic LBP, generalized OA, unstable gait, atherosclerosis, fatigue, fall at home, interstitial lung disease, dysphagia  Personal factors affecting pt at time of eval include: inaccessible home environment, ambulating w/ assistive device, stairs to enter home, inability to ambulate household distances, inability to navigate level surfaces w/o external assistance, limited home support and positive fall history   The following objective measures performed on IE also reveal limitations: Barthel Index: 45/100, Modified Early: 4 (moderate/severe disability) and AM-PAC 6-Clicks: 99/70  Pt's clinical presentation is currently unstable/unpredictable seen in pt's presentation of advanced age, abnormal lab value(s), need for input for task focus and mobility technique, ongoing medical assessment and need for supplemental O2  Overall, pt's rehab potential and prognosis to return to PLOF is good as impacted by objective findings, warranting pt to receive further skilled PT interventions to address identified impairments, activity limitation(s), and participation restriction(s)  Pt to benefit from continued PT tx to address deficits as defined above and maximize level of functional independent mobility  From PT/mobility standpoint, recommendation at time of d/c would be post acute rehabilitation services pending progress in order to facilitate return to PLOF  Barriers to Discharge Inaccessible home environment;Decreased caregiver support   Goals   Patient Goals to go home   STG Expiration Date 05/28/22   Short Term Goal #1 In 7-10 days: Increase bilateral LE strength 1/2 grade to facilitate independent mobility, Perform all bed mobility tasks modified independent to decrease caregiver burden, Perform all transfers with distant S to improve independence, Ambulate > 50 ft  with least restrictive assistive device with distant S w/o LOB and w/ normalized gait pattern 100% of the time, Navigate 3 stairs with close S without handrail to facilitate return to previous living environment, Increase all balance 1/2 grade to decrease risk for falls, Tolerate 4 hr OOB to faciliate upright tolerance, Improve Barthel Index score to 60 or greater to facilitate independence and PT provider will perform functional balance assessment to determine fall risk   PT Treatment Day 0   Plan   Treatment/Interventions Functional transfer training;LE strengthening/ROM; Therapeutic exercise; Endurance training;Patient/family training;Equipment eval/education; Bed mobility;Gait training;Spoke to nursing;Elevations   PT Frequency 3-5x/wk   Recommendation   PT Discharge Recommendation Post acute rehabilitation services   Equipment Recommended   (continue with RW)   AM-PAC Basic Mobility Inpatient   Turning in Bed Without Bedrails 3   Lying on Back to Sitting on Edge of Flat Bed 3   Moving Bed to Chair 3   Standing Up From Chair 3   Walk in Room 2   Climb 3-5 Stairs 1   Basic Mobility Inpatient Raw Score 15   Basic Mobility Standardized Score 36 97   Highest Level Of Mobility   -[facility] Goal 4: Move to chair/commode   JH-HLM Achieved 4: Move to chair/commode   Modified Nichole Scale   Modified Nichole Scale 4   Barthel Index   Feeding 10   Bathing 0   Grooming Score 0   Dressing Score 5   Bladder Score 10   Bowels Score 10   Toilet Use Score 5   Transfers (Bed/Chair) Score 5   Mobility (Level Surface) Score 0   Stairs Score 0   Barthel Index Score 45   Additional Treatment Session   Start Time 1013   End Time 1028   Treatment Assessment Pt seen for PT treatment session this date s/p PT eval, consisting of ther act focused on static sitting balance/posture, and sitting tolerance at EOB x12min duration, pt requiring frequent trunk support d/t fatigue  PT providing external tactile cueing to facilitate upright trunk  Pt donned prosthesis while seated at EOB  Pt requiring frequent rest periods c vc for PLB/breathing technique d/t O2 de sat  Current goals and POC remain appropriate, pt continues to have rehab potential and is making progress towards STGs  Pt prognosis for achieving goals is good, pending pt progress with hospitalization/medical status improvements, and indicated by Stimulability, ability to follow directions, motivation, recent history of independence with functional skills/High PLOF, supportive family/caregivers and learning potential  Pt limited d/t fear of falling  PT recommends post acute rehabilitation services upon discharge   Pt continues to be functioning below baseline level, and remains limited 2* factors listed above  PT will continue to see pt during current hospitalization in order to address the deficits listed above and provide interventions consistent w/ POC in effort to achieve STGs         Randi , PT, DPT

## 2022-05-18 NOTE — PLAN OF CARE
Problem: PHYSICAL THERAPY ADULT  Goal: Performs mobility at highest level of function for planned discharge setting  See evaluation for individualized goals  Description: Treatment/Interventions: Functional transfer training, LE strengthening/ROM, Therapeutic exercise, Endurance training, Patient/family training, Equipment eval/education, Bed mobility, Gait training, Spoke to nursing, Elevations  Equipment Recommended:  (continue with RW)       See flowsheet documentation for full assessment, interventions and recommendations  5/18/2022 1229 by Lucretia Ford, PT  Note: Prognosis: Good  Problem List: Decreased strength, Decreased endurance, Impaired balance, Decreased mobility  Assessment: Pt is 80 y o  male seen for high-complexity PT evaluation on 5/18/2022 s/p admit to [facility] on 5/16/2022 w/ Acute respiratory failure with hypoxia ([address] )  PT was consulted to assess pt's functional mobility and d/c needs  Order placed for PT eval and tx, w/ up as tolerated order  PTA, pt resides with wife (who is also hospitalized) in [facility] with 3+1 ARMIN; ambulates with RW at baseline, I with ADLs, requires A for IADLs, +fall history  At time of eval, pt requiring min A x1 for bed mobility, transfers, short gait trial from EOB to recliner with use of RW  Upon evaluation, pt presenting with impaired functional mobility d/t decreased strength, decreased endurance, impaired balance, decreased mobility and activity intolerance  Pertinent PMHx and current co-morbidities affecting pt's physical performance at time of assessment include: acute respiratory failure with hypoxia, severe PAD, HLD, bifascicular block, anemia, s/p L BKA, leukocytosis, CHF, pulmonary fibrosis, vitamin D deficiency, ambulatory dysfunction, physical deconditioning, chronic LBP, generalized OA, unstable gait, atherosclerosis, fatigue, fall at home, interstitial lung disease, dysphagia   Personal factors affecting pt at time of eval include: inaccessible home environment, ambulating w/ assistive device, stairs to enter home, inability to ambulate household distances, inability to navigate level surfaces w/o external assistance, limited home support and positive fall history  The following objective measures performed on IE also reveal limitations: Barthel Index: 45/100, Modified Friona: 4 (moderate/severe disability) and AM-PAC 6-Clicks: 29/55  Pt's clinical presentation is currently unstable/unpredictable seen in pt's presentation of advanced age, abnormal lab value(s), need for input for task focus and mobility technique, ongoing medical assessment and need for supplemental O2  Overall, pt's rehab potential and prognosis to return to PLOF is good as impacted by objective findings, warranting pt to receive further skilled PT interventions to address identified impairments, activity limitation(s), and participation restriction(s)  Pt to benefit from continued PT tx to address deficits as defined above and maximize level of functional independent mobility  From PT/mobility standpoint, recommendation at time of d/c would be post acute rehabilitation services pending progress in order to facilitate return to PLOF  Barriers to Discharge: Inaccessible home environment, Decreased caregiver support        PT Discharge Recommendation: Post acute rehabilitation services          See flowsheet documentation for full assessment  5/18/2022 1229 by Severo Salk, PT  Note: Prognosis: Good  Problem List: Decreased strength, Decreased endurance, Impaired balance, Decreased mobility  Assessment: Pt is 80 y o  male seen for high-complexity PT evaluation on 5/18/2022 s/p admit to [facility] on 5/16/2022 w/ Acute respiratory failure with hypoxia ([address] )  PT was consulted to assess pt's functional mobility and d/c needs  Order placed for PT eval and tx, w/ up as tolerated order   PTA, pt resides with wife (who is also hospitalized) in [facility] with 3+1 ARMIN; ambulates with RW at baseline, I with ADLs, requires A for IADLs, +fall history  At time of eval, pt requiring min A x1 for bed mobility, transfers, short gait trial from EOB to recliner with use of RW  Upon evaluation, pt presenting with impaired functional mobility d/t decreased strength, decreased endurance, impaired balance, decreased mobility and activity intolerance  Pertinent PMHx and current co-morbidities affecting pt's physical performance at time of assessment include: acute respiratory failure with hypoxia, severe PAD, HLD, bifascicular block, anemia, s/p L BKA, leukocytosis, CHF, pulmonary fibrosis, vitamin D deficiency, ambulatory dysfunction, physical deconditioning, chronic LBP, generalized OA, unstable gait, atherosclerosis, fatigue, fall at home, interstitial lung disease, dysphagia  Personal factors affecting pt at time of eval include: inaccessible home environment, ambulating w/ assistive device, stairs to enter home, inability to ambulate household distances, inability to navigate level surfaces w/o external assistance, limited home support and positive fall history  The following objective measures performed on IE also reveal limitations: Barthel Index: 45/100, Modified Exchange: 4 (moderate/severe disability) and AM-PAC 6-Clicks: 37/98  Pt's clinical presentation is currently unstable/unpredictable seen in pt's presentation of advanced age, abnormal lab value(s), need for input for task focus and mobility technique, ongoing medical assessment and need for supplemental O2  Overall, pt's rehab potential and prognosis to return to PLOF is good as impacted by objective findings, warranting pt to receive further skilled PT interventions to address identified impairments, activity limitation(s), and participation restriction(s)  Pt to benefit from continued PT tx to address deficits as defined above and maximize level of functional independent mobility   From PT/mobility standpoint, recommendation at time of d/c would be post acute rehabilitation services pending progress in order to facilitate return to PLOF  Barriers to Discharge: Inaccessible home environment, Decreased caregiver support        PT Discharge Recommendation: Post acute rehabilitation services          See flowsheet documentation for full assessment

## 2022-05-19 NOTE — PHYSICAL THERAPY NOTE
Physical Therapy Treatment Note       05/19/22 1346   PT Last Visit   PT Visit Date 05/19/22   Note Type   Note Type Treatment   Pain Assessment   Pain Assessment Tool 0-10   Pain Score   (no numeric provided)   Restrictions/Precautions   Weight Bearing Precautions Per Order No  (see below)   Braces or Orthoses Prosthesis  (h/o BKA, prosthesis)   Other Precautions Chair Alarm; Bed Alarm;O2;Fall Risk  (2L O2 NC)   General   Chart Reviewed Yes   Additional Pertinent History discussed with CLAUDY Cook, who reports she reviewed XR imaging and she spoke to ortho, who recommend no weight bearing restrictions re R UE   Response to Previous Treatment Patient with no complaints from previous session  Family/Caregiver Present Yes   Cognition   Overall Cognitive Status WFL   Arousal/Participation Alert; Cooperative   Attention Within functional limits   Orientation Level Oriented to person;Oriented to place;Oriented to situation  ("April")   Memory Within functional limits   Following Commands Follows all commands and directions without difficulty   Comments pt agreeable to PT session   Subjective   Subjective "I want to walk"   Bed Mobility   Supine to Sit 5  Supervision   Additional items Assist x 1;HOB elevated; Increased time required;Verbal cues   Transfers   Sit to Stand 4  Minimal assistance  (CGA)   Additional items Assist x 1; Armrests; Verbal cues; Increased time required   Stand to Sit 4  Minimal assistance  (CGA)   Additional items Assist x 1; Armrests; Verbal cues; Increased time required   Additional Comments 2-3 L O2 NC   Ambulation/Elevation   Gait pattern Decreased foot clearance;Shuffling; Short stride; Foward flexed; Step to  (degraded posture)   Gait Assistance 4  Minimal assist  (CGA)   Additional items Assist x 1;Verbal cues   Assistive Device Rolling walker   Distance 30' x 2; close chair follow   several min seated rest break required d/t fatigue, SOB/RUIZ   Balance   Static Sitting Fair +   Dynamic Sitting Fair Static Standing Fair -   Dynamic Standing Fair -   Ambulatory Poor +   Endurance Deficit   Endurance Deficit Yes   Endurance Deficit Description pt with noted episode of vomit post mobility, RN made aware   Activity Tolerance   Activity Tolerance Patient limited by fatigue   Medical Staff Made Aware Charlee Lino   Nurse Made Aware LILLY Demetrice   Assessment   Prognosis Good   Problem List Decreased strength;Decreased endurance; Impaired balance;Decreased mobility   Assessment Pt seen for PT treatment session this date, consisting of ther act focused on bed mobility, transfers, standing balance/posture and gt training on level surfaces to improve pt safety in household environment  Since previous session, pt has made good progress in terms of decreased A for all phases of mobility, improved balance scores, increased level surface gait tolerance without overt LOB observed; seated rest break required between gait trials d/t fatigue, SOB/RUIZ  Pertinent barriers during this session include fatigue  Current goals and POC remain appropriate, pt continues to have rehab potential and is making progress towards STGs  Pt prognosis for achieving goals is good, pending pt progress with hospitalization/medical status improvements, and indicated by Stimulability, ability to follow directions, motivation, recent history of independence with functional skills/High PLOF, supportive family/caregivers, recent onset and previous response to intervention  Pt limited d/t fear of falling  PT recommends post acute rehabilitation services upon discharge  Pt continues to be functioning below baseline level, and remains limited 2* factors listed above  PT will continue to see pt during current hospitalization in order to address the deficits listed above and provide interventions consistent w/ POC in effort to achieve STGs     Barriers to Discharge Inaccessible home environment;Decreased caregiver support   Goals   Patient Goals to go home tomorrow STG Expiration Date 05/28/22   PT Treatment Day 1   Plan   Treatment/Interventions Functional transfer training;LE strengthening/ROM; Therapeutic exercise; Endurance training;Patient/family training;Equipment eval/education;Gait training;Bed mobility;Elevations; Spoke to nursing;Family;Spoke to advanced practitioner   Progress Progressing toward goals   PT Frequency 3-5x/wk   Recommendation   PT Discharge Recommendation Post acute rehabilitation services   Equipment Recommended Walker  (RW)   [address]   Turning in Bed Without Bedrails 3   Lying on Back to Sitting on Edge of Flat Bed 3   Moving Bed to Chair 3   Standing Up From Chair 3   Walk in Room 3   Climb 3-5 Stairs 1   Basic Mobility Inpatient Raw Score 16   Basic Mobility Standardized Score 38 32   Highest Level Of Mobility   JH-HLM Goal 5: Stand one or more mins   JH-HLM Achieved 7: Walk 25 feet or more   Education   Education Provided Mobility training;Assistive device   Patient Demonstrates acceptance/verbal understanding             Ronni Barton, PT, DPT

## 2022-05-19 NOTE — PHYSICAL THERAPY NOTE
Chart review performed  R shoulder XR from 5/4/22 revealed "Acute mildly displaced fracture of the right acromion " Per Care Everywhere, pt saw Dr Siobhan Ho thru [facility] on 5/6/22, recommendation made: "He fell 8 days ago and he was seen at Urgent care  He was told that he has a fracture of the acromion and given a sling  He is struggling to use the sling because he ambulates with a walker  Xrays today reviewed with the patient showing that there is an avulsion fracture of the acromion  He does have some trouble with motion due to pain  At this time we will leave it along and let it heal  Over time the pain should improve  He can use the sling if he wants, but it is not absolutely necessary to use " PT discussed same with CLAUDY Dudley to order repeat R shoulder XR to assess  PT approached pt this AM to discuss plan for this date, pt c/o mid chest pain, PT notified RN Willi Santos and Stephanie Pineda of same  Pt denying shoulder pain  PT will cancel session at this time  PT will follow and work with pt when appropriate      Carol Rogers, PT, DPT

## 2022-05-19 NOTE — PLAN OF CARE
Problem: PHYSICAL THERAPY ADULT  Goal: Performs mobility at highest level of function for planned discharge setting  See evaluation for individualized goals  Description: Treatment/Interventions: Functional transfer training, LE strengthening/ROM, Therapeutic exercise, Endurance training, Patient/family training, Equipment eval/education, Bed mobility, Gait training, Spoke to nursing, Elevations  Equipment Recommended:  (continue with RW)       See flowsheet documentation for full assessment, interventions and recommendations  Outcome: Progressing  Note: Prognosis: Good  Problem List: Decreased strength, Decreased endurance, Impaired balance, Decreased mobility  Assessment: Pt seen for PT treatment session this date, consisting of ther act focused on bed mobility, transfers, standing balance/posture and gt training on level surfaces to improve pt safety in household environment  Since previous session, pt has made good progress in terms of decreased A for all phases of mobility, improved balance scores, increased level surface gait tolerance without overt LOB observed; seated rest break required between gait trials d/t fatigue, SOB/RUIZ  Pertinent barriers during this session include fatigue  Current goals and POC remain appropriate, pt continues to have rehab potential and is making progress towards STGs  Pt prognosis for achieving goals is good, pending pt progress with hospitalization/medical status improvements, and indicated by Stimulability, ability to follow directions, motivation, recent history of independence with functional skills/High PLOF, supportive family/caregivers, recent onset and previous response to intervention  Pt limited d/t fear of falling  PT recommends post acute rehabilitation services upon discharge  Pt continues to be functioning below baseline level, and remains limited 2* factors listed above   PT will continue to see pt during current hospitalization in order to address the deficits listed above and provide interventions consistent w/ POC in effort to achieve STGs  Barriers to Discharge: Inaccessible home environment, Decreased caregiver support        PT Discharge Recommendation: Post acute rehabilitation services          See flowsheet documentation for full assessment

## 2022-05-19 NOTE — PLAN OF CARE
Problem: Potential for Falls  Goal: Patient will remain free of falls  Description: INTERVENTIONS:  - Educate patient/family on patient safety including physical limitations  - Instruct patient to call for assistance with activity   - Consult OT/PT to assist with strengthening/mobility   - Keep Call bell within reach  - Keep bed low and locked with side rails adjusted as appropriate  - Keep care items and personal belongings within reach  - Initiate and maintain comfort rounds  - Make Fall Risk Sign visible to staff  - Offer Toileting every  Hours, in advance of need  - Initiate/Maintain alarm  - Obtain necessary fall risk management equipment:   - Apply yellow socks and bracelet for high fall risk patients  - Consider moving patient to room near nurses station  Outcome: Progressing     Problem: MOBILITY - ADULT  Goal: Maintain or return to baseline ADL function  Description: INTERVENTIONS:  -  Assess patient's ability to carry out ADLs; assess patient's baseline for ADL function and identify physical deficits which impact ability to perform ADLs (bathing, care of mouth/teeth, toileting, grooming, dressing, etc )  - Assess/evaluate cause of self-care deficits   - Assess range of motion  - Assess patient's mobility; develop plan if impaired  - Assess patient's need for assistive devices and provide as appropriate  - Encourage maximum independence but intervene and supervise when necessary  - Involve family in performance of ADLs  - Assess for home care needs following discharge   - Consider OT consult to assist with ADL evaluation and planning for discharge  - Provide patient education as appropriate  Outcome: Progressing  Goal: Maintains/Returns to pre admission functional level  Description: INTERVENTIONS:  - Perform BMAT or MOVE assessment daily    - Set and communicate daily mobility goal to care team and patient/family/caregiver     - Collaborate with rehabilitation services on mobility goals if consulted  - Perform Range of Motion  times a day  - Reposition patient every  hours    - Dangle patient  times a day  - Stand patient  times a day  - Ambulate patient  times a day  - Out of bed to chair  times a day   - Out of bed for meals  times a day  - Out of bed for toileting  - Record patient progress and toleration of activity level   Outcome: Progressing     Problem: PAIN - ADULT  Goal: Verbalizes/displays adequate comfort level or baseline comfort level  Description: Interventions:  - Encourage patient to monitor pain and request assistance  - Assess pain using appropriate pain scale  - Administer analgesics based on type and severity of pain and evaluate response  - Implement non-pharmacological measures as appropriate and evaluate response  - Consider cultural and social influences on pain and pain management  - Notify physician/advanced practitioner if interventions unsuccessful or patient reports new pain  Outcome: Progressing     Problem: INFECTION - ADULT  Goal: Absence or prevention of progression during hospitalization  Description: INTERVENTIONS:  - Assess and monitor for signs and symptoms of infection  - Monitor lab/diagnostic results  - Monitor all insertion sites, i e  indwelling lines, tubes, and drains  - Monitor endotracheal if appropriate and nasal secretions for changes in amount and color  - [place] appropriate cooling/warming therapies per order  - Administer medications as ordered  - Instruct and encourage patient and family to use good hand hygiene technique  - Identify and instruct in appropriate isolation precautions for identified infection/condition  Outcome: Progressing  Goal: Absence of fever/infection during neutropenic period  Description: INTERVENTIONS:  - Monitor WBC    Outcome: Progressing     Problem: SAFETY ADULT  Goal: Patient will remain free of falls  Description: INTERVENTIONS:  - Educate patient/family on patient safety including physical limitations  - Instruct patient to call for assistance with activity   - Consult OT/PT to assist with strengthening/mobility   - Keep Call bell within reach  - Keep bed low and locked with side rails adjusted as appropriate  - Keep care items and personal belongings within reach  - Initiate and maintain comfort rounds  - Make Fall Risk Sign visible to staff  - Offer Toileting every  Hours, in advance of need  - Initiate/Maintain alarm  - Obtain necessary fall risk management equipment:   - Apply yellow socks and bracelet for high fall risk patients  - Consider moving patient to room near nurses station  Outcome: Progressing  Goal: Maintain or return to baseline ADL function  Description: INTERVENTIONS:  -  Assess patient's ability to carry out ADLs; assess patient's baseline for ADL function and identify physical deficits which impact ability to perform ADLs (bathing, care of mouth/teeth, toileting, grooming, dressing, etc )  - Assess/evaluate cause of self-care deficits   - Assess range of motion  - Assess patient's mobility; develop plan if impaired  - Assess patient's need for assistive devices and provide as appropriate  - Encourage maximum independence but intervene and supervise when necessary  - Involve family in performance of ADLs  - Assess for home care needs following discharge   - Consider OT consult to assist with ADL evaluation and planning for discharge  - Provide patient education as appropriate  Outcome: Progressing  Goal: Maintains/Returns to pre admission functional level  Description: INTERVENTIONS:  - Perform BMAT or MOVE assessment daily    - Set and communicate daily mobility goal to care team and patient/family/caregiver  - Collaborate with rehabilitation services on mobility goals if consulted  - Perform Range of Motion  times a day  - Reposition patient every  hours    - Dangle patient  times a day  - Stand patient  times a day  - Ambulate patient  times a day  - Out of bed to chair  times a day   - Out of bed for meals times a day  - Out of bed for toileting  - Record patient progress and toleration of activity level   Outcome: Progressing     Problem: DISCHARGE PLANNING  Goal: Discharge to home or other facility with appropriate resources  Description: INTERVENTIONS:  - Identify barriers to discharge w/patient and caregiver  - Arrange for needed discharge resources and transportation as appropriate  - Identify discharge learning needs (meds, wound care, etc )  - Arrange for interpretive services to assist at discharge as needed  - Refer to Case Management Department for coordinating discharge planning if the patient needs post-hospital services based on physician/advanced practitioner order or complex needs related to functional status, cognitive ability, or social support system  Outcome: Progressing     Problem: Knowledge Deficit  Goal: Patient/family/caregiver demonstrates understanding of disease process, treatment plan, medications, and discharge instructions  Description: Complete learning assessment and assess knowledge base    Interventions:  - Provide teaching at level of understanding  - Provide teaching via preferred learning methods  Outcome: Progressing     Problem: Prexisting or High Potential for Compromised Skin Integrity  Goal: Skin integrity is maintained or improved  Description: INTERVENTIONS:  - Identify patients at risk for skin breakdown  - Assess and monitor skin integrity  - Assess and monitor nutrition and hydration status  - Monitor labs   - Assess for incontinence   - Turn and reposition patient  - Assist with mobility/ambulation  - Relieve pressure over bony prominences  - Avoid friction and shearing  - Provide appropriate hygiene as needed including keeping skin clean and dry  - Evaluate need for skin moisturizer/barrier cream  - Collaborate with interdisciplinary team   - Patient/family teaching  - Consider wound care consult   Outcome: Progressing     Problem: Nutrition/Hydration-ADULT  Goal: Nutrient/Hydration intake appropriate for improving, restoring or maintaining nutritional needs  Description: Monitor and assess patient's nutrition/hydration status for malnutrition  Collaborate with interdisciplinary team and initiate plan and interventions as ordered  Monitor patient's weight and dietary intake as ordered or per policy  Utilize nutrition screening tool and intervene as necessary  Determine patient's food preferences and provide high-protein, high-caloric foods as appropriate       INTERVENTIONS:  - Monitor oral intake, urinary output, labs, and treatment plans  - Assess nutrition and hydration status and recommend course of action  - Evaluate amount of meals eaten  - Assist patient with eating if necessary   - Allow adequate time for meals  - Recommend/ encourage appropriate diets, oral nutritional supplements, and vitamin/mineral supplements  - Order, calculate, and assess calorie counts as needed  - Assess need for intravenous fluids  - Provide nutrition/hydration education as appropriate  - Include patient/family/caregiver in decisions related to nutrition  Outcome: Progressing

## 2022-05-19 NOTE — ASSESSMENT & PLAN NOTE
Wt Readings from Last 3 Encounters:   05/19/22 62 1 kg (136 lb 14 5 oz)   05/04/22 61 2 kg (135 lb)   11/08/21 63 5 kg (140 lb)     · Present on admission as evidence by CT findings and elevated BNP, cardiology input appreciated  · Suspect elevated BNP secondary to interstitial disease   · Delta troponin (-), EKG with bifascicular block  · Last echo 2017, repeat pending  · HOLD further Lasix given rise in BUN/Cr  · Strict I/Os, daily standing weights, sodium restricted diet  · Daily BMP to monitor electrolytes

## 2022-05-19 NOTE — ASSESSMENT & PLAN NOTE
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  Diffuse groundglass opacities, developing since a CT from 2/28/2022   This is most consistent with either pulmonary edema or multifocal pneumonitis    · Afebrile, with mild leukocytosis on presentation and elevated BNP, suspect interstitial lung disease; lower suspicion for acute CHF   · Pulmonology and cardiology consults appreciated  · Respiratory protocol, incentive spirometer  · Continue O2 supplementation, goal > 88%  · Completed 3 days cefepime and vancomycin, d/c'd 5/18  · Procal flat, monitoring OFF further antibiotics

## 2022-05-19 NOTE — PROGRESS NOTES
[facility] Progress Note Luli Car 1933, 80 y o  male MRN: [id number]  Unit/Bed#: -01 Encounter: [id number]  Primary Care Provider: Yani Fernandes MD   Date and time admitted to hospital: 5/16/2022  1:51 PM    * Acute respiratory failure with hypoxia 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  Diffuse groundglass opacities, developing since a CT from 2/28/2022   This is most consistent with either pulmonary edema or multifocal pneumonitis    · Afebrile, with mild leukocytosis on presentation and elevated BNP, suspect interstitial lung disease; lower suspicion for acute CHF   · Pulmonology and cardiology consults appreciated  · Respiratory protocol, incentive spirometer  · Continue O2 supplementation, goal > 88%  · Completed 3 days cefepime and vancomycin, d/c'd 5/18  · Procal flat, monitoring OFF further antibiotics     Pulmonary fibrosis ([facility] )  Assessment & Plan  · 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  · Continue prednisone 60 mg daily  · Pulmonology input appreciated     Chronic [facility] failure with preserved [facility] fraction [facility] )  Assessment & Plan  Wt Readings from Last 3 Encounters:   05/19/22 62 1 kg (136 lb 14 5 oz)   05/04/22 61 2 kg (135 lb)   11/08/21 63 5 kg (140 lb)     · Present on admission as evidence by CT findings and elevated BNP, cardiology input appreciated  · Suspect elevated BNP secondary to interstitial disease   · Delta troponin (-), EKG with bifascicular block  · Last echo 2017, repeat pending  · HOLD further Lasix given rise in BUN/Cr  · Strict I/Os, daily standing weights, sodium restricted diet  · Daily BMP to monitor electrolytes    Leukocytosis  Assessment & Plan  · Mild leukocytosis, 11 87 on presentation  · Consider reactive, less likely infectious   · Trend daily CBC  · Given drop in hgb, repeat hgb and stop heparin prophylaxis     Severe peripheral arterial disease (HCC)  Assessment & Plan  · S/p BLE angiogram,  Left SFA PTA, DCB, arthrectomy, Viabahn stent and L BK popliteal PTA  · Continue ASA and statin    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  Assessment & Plan  · Patient wears left lower extremity prosthesis    Bifascicular block  Assessment & Plan  · Chronic on previous EKG  · Troponin 41>41>38    Anemia  Assessment & Plan  · Continue ferrous sulfate  · Baseline hgb 10-11  · Update iron panel, B12, folate, repeat hgb due to drop overnight     Severe protein-calorie malnutrition (HCC)  Assessment & Plan  Malnutrition Findings:   Adult Malnutrition type: Chronic illness albumin 2 6  BMI 18  Will give Ensure b i d  Encourage p o  Intake  Nutrition consult    Adult Degree of Malnutrition: Other severe protein calorie malnutrition  Malnutrition Characteristics: Fat loss, Muscle loss, Inadequate energy, Weight loss                  360 Statement: related to inadequate energy intake due to poor appetite as evidenced by hollow supraclavicular space, wasted interosseous, sunken orbital, visible ribs, weight trending down last 6 mos, 9 8%  Intervention GENNA diet, ensure enlive 3 times a day  BMI Findings: Body mass index is 19 64 kg/m²  Hyperlipidemia  Assessment & Plan  · Atorvastatin substitute for home dose simvastatin          VTE Pharmacologic Prophylaxis: VTE Score: 8 High Risk (Score >/= 5) - Pharmacological DVT Prophylaxis Contraindicated  Sequential Compression Devices Ordered  Patient Centered Rounds: I performed bedside rounds with nursing staff today    Discussions with Specialists or Other Care Team Provider: pulm, CM     Education and Discussions with Family / Patient: Updated  (wife) at bedside  Time Spent for Care: 30 minutes  More than 50% of total time spent on counseling and coordination of care as described above  Current Length of Stay: 3 day(s)  Current Patient Status: Inpatient   Certification Statement: The patient will continue to require additional inpatient hospital stay due to declining hgb, further inpatient workup  Discharge Plan: Anticipate discharge in 24-48 hrs to rehab facility  current recommendation is rehab    Code Status: Level 3 - DNAR and DNI    Subjective:   Patient seen this morning, last evaluated by Physical therapy secondary to complaints of chest tightness  Reports it started sometime after he woke up and persists  Is not strong, but rather annoying  Reports it is more on the right side than the left but also feels that he has symptoms below the chest   No nausea or vomiting  Does not feel short of breath this morning  No palpitations  Generally feels weak, but no worse today compared to yesterday  Has not had a bowel movement yet today  Compliant with his iron  Objective:     Vitals:   Temp (24hrs), Av 8 °F (36 6 °C), Min:97 3 °F (36 3 °C), Max:98 3 °F (36 8 °C)    Temp:  [97 3 °F (36 3 °C)-98 3 °F (36 8 °C)] 98 2 °F (36 8 °C)  HR:  [64-76] 66  Resp:  [16] 16  BP: ()/(51-52) 109/51  SpO2:  [92 %-100 %] 96 %  Body mass index is 19 64 kg/m²  Input and Output Summary (last 24 hours): Intake/Output Summary (Last 24 hours) at 2022 0744  Last data filed at 2022 0501  Gross per 24 hour   Intake 120 ml   Output 700 ml   Net -580 ml       Physical Exam:   Physical Exam  Vitals and nursing note reviewed  Constitutional:       General: He is not in acute distress  Appearance: He is ill-appearing (chronically)  He is not toxic-appearing  Cardiovascular:      Rate and Rhythm: Normal rate and regular rhythm  Pulmonary:      Effort: Pulmonary effort is normal  No respiratory distress  Breath sounds: Normal breath sounds  No rales  Skin:     Coloration: Skin is not pale  Neurological:      Mental Status: He is alert and oriented to person, place, and time  Additional Data:     Labs:  Results from last 7 days   Lab Units 05/19/22  0443 05/16/22  1706 05/16/22  1428   WBC Thousand/uL 12 16*   < > 11 87*   HEMOGLOBIN g/dL 7 9*   < > 11 3*   HEMATOCRIT % 24 5*   < > 34 6*   PLATELETS Thousands/uL 244   < > 349   NEUTROS PCT %  --   --  81*   LYMPHS PCT %  --   --  9*   MONOS PCT %  --   --  8   EOS PCT %  --   --  1    < > = values in this interval not displayed  Results from last 7 days   Lab Units 05/19/22  0443 05/17/22  0517 05/16/22  1428   SODIUM mmol/L 139   < > 139   POTASSIUM mmol/L 3 9   < > 3 7   CHLORIDE mmol/L 103   < > 103   CO2 mmol/L 29   < > 28   BUN mg/dL 46*   < > 21   CREATININE mg/dL 1 51*   < > 1 16   ANION GAP mmol/L 7   < > 8   CALCIUM mg/dL 8 8   < > 8 5   ALBUMIN g/dL  --   --  2 6*   TOTAL BILIRUBIN mg/dL  --   --  0 79   ALK PHOS U/L  --   --  87   ALT U/L  --   --  20   AST U/L  --   --  18   GLUCOSE RANDOM mg/dL 142*   < > 114    < > = values in this interval not displayed  Results from last 7 days   Lab Units 05/18/22  0855 05/17/22  1216 05/16/22  1428   PROCALCITONIN ng/ml 0 23 0 23 0 15       Lines/Drains:  Invasive Devices  Report    Peripheral Intravenous Line  Duration           Peripheral IV Left;Upper;Ventral (anterior) Arm -- days                      Imaging: No pertinent imaging reviewed      Recent Cultures (last 7 days):         Last 24 Hours Medication List:   Current Facility-Administered Medications   Medication Dose Route Frequency Provider Last Rate    albumin human  25 g Intravenous Q6H Magalie Patel MD      aspirin  81 mg Oral Daily Caridad Grier MD      atorvastatin  20 mg Oral Daily With Beckey Leventhal, MD      ferrous sulfate  325 mg Oral BID Caridad Grier MD      ipratropium  0 5 mg Nebulization Q6H PRN Rudy Pitts MD      levalbuterol  1 25 mg Nebulization Q6H PRN Rudy Pitts MD      predniSONE  60 mg Oral Daily Elzbieta Vazquez MD          Today, Patient Was Seen By: Ifrah Suh PA-C    **Please Note: This note may have been constructed using a voice recognition system  **

## 2022-05-19 NOTE — PROGRESS NOTES
Progress Note - Pulmonary   Nicole Shape 80 y o  male MRN: [id number]  Unit/Bed#: -01 Encounter: [id number]    Assessment:  1  Acute hypoxemic respiratory failure  2  Abnormal chest CT with bilateral ground-glass opacities  3  IPF/ILD with possible exacerbation  4  Elevated BNP     Plan:  Acute hypoxemic respiratory failure secondary to IPF/ILD flare/progression  He remains on 1 L nasal cannula at rest, does desaturate upon ambulation  Will need official home O2 eval prior to discharge  Creatinine improving today, received 4 doses of albumin yesterday  Continue prednisone 60 mg, today is day 4 of this dose  Can likely start to taper by 10 mg tomorrow  Can consider antifibrotic agents as outpatient  Continue to monitor off antibiotics  Continue Xopenex, incentive spirometer, flutter valve, out of bed as able  Anticipate discharge in next 24-48 hours, will need home O2 arranged  Will need follow-up with Dr Hussain Norwood at our [address] [place] [address] upon discharge  Subjective:   Patient resting in bed  Reports overall improvement in his breathing  Has some chest tightness today  Objective:     Vitals: Blood pressure 109/51, pulse 66, temperature 98 2 °F (36 8 °C), resp  rate 16, height 5' 10" (1 778 m), weight 62 1 kg (136 lb 14 5 oz), SpO2 96 %  ,Body mass index is 19 64 kg/m²  Intake/Output Summary (Last 24 hours) at 5/19/2022 1028  Last data filed at 5/19/2022 0501  Gross per 24 hour   Intake 120 ml   Output 700 ml   Net -580 ml       Invasive Devices  Report    Peripheral Intravenous Line  Duration           Peripheral IV Left;Upper;Ventral (anterior) Arm -- days                Physical Exam: /56   Pulse 73   Temp 97 9 °F (36 6 °C)   Resp 16   Ht 5' 10" (1 778 m)   Wt 62 1 kg (136 lb 14 5 oz) Comment: Pt has LBKA and did not feel comfortable standing    SpO2 98%   BMI 19 64 kg/m²   General appearance: alert and oriented, in no acute distress  Head: Normocephalic, without obvious abnormality, atraumatic  Eyes: negative findings: conjunctivae and sclerae normal  Lungs: diminished breath sounds  Heart: regular rate and rhythm  Abdomen: normal findings: soft, non-tender  Extremities: no edema, left BKA  Skin: Warm and dry  Neurologic: Mental status: Alert, oriented, thought content appropriate     Labs: I have personally reviewed pertinent lab results  , CBC:   Lab Results   Component Value Date    WBC 12 16 (H) 05/19/2022    HGB 8 3 (L) 05/19/2022    HCT 25 2 (L) 05/19/2022    MCV 93 05/19/2022     05/19/2022    MCH 30 0 05/19/2022    MCHC 32 2 05/19/2022    RDW 13 6 05/19/2022    MPV 9 8 05/19/2022   , CMP:   Lab Results   Component Value Date    SODIUM 139 05/19/2022    K 3 9 05/19/2022     05/19/2022    CO2 29 05/19/2022    BUN 46 (H) 05/19/2022    CREATININE 1 51 (H) 05/19/2022    CALCIUM 8 8 05/19/2022    EGFR 40 05/19/2022     Imaging and other studies: I have personally reviewed pertinent reports     and I have personally reviewed pertinent films in PACS

## 2022-05-19 NOTE — ASSESSMENT & PLAN NOTE
· Mild leukocytosis, 11 87 on presentation  · Consider reactive, less likely infectious   · Trend daily CBC  · Given drop in hgb, repeat hgb and stop heparin prophylaxis

## 2022-05-19 NOTE — ASSESSMENT & PLAN NOTE
· Continue ferrous sulfate  · Baseline hgb 10-11  · Update iron panel, B12, folate, repeat hgb due to drop overnight

## 2022-05-19 NOTE — ASSESSMENT & PLAN NOTE
Malnutrition Findings:   Adult Malnutrition type: Chronic illness albumin 2 6  BMI 18  Will give Ensure b i d  Encourage p o  Intake  Nutrition consult    Adult Degree of Malnutrition: Other severe protein calorie malnutrition  Malnutrition Characteristics: Fat loss, Muscle loss, Inadequate energy, Weight loss                  360 Statement: related to inadequate energy intake due to poor appetite as evidenced by hollow supraclavicular space, wasted interosseous, sunken orbital, visible ribs, weight trending down last 6 mos, 9 8%  Intervention GENNA diet, ensure enlive 3 times a day  BMI Findings: Body mass index is 19 64 kg/m²

## 2022-05-20 NOTE — ASSESSMENT & PLAN NOTE
· Continue ferrous sulfate  · Baseline hgb 10-11  · Hemoglobin currently between 8 3 to 8 9  · Continue q8h hemoglobin monitoring  · Iron panel not showing deficiency, B12 high and folate normal  · Continue hold heparin   · No evidence of active bleeding

## 2022-05-20 NOTE — CASE MANAGEMENT
Case Management Discharge Planning Note    Patient name Mario Kerr  Location [address] 203/-[id number] MRN [id number]  : 1933 Date 2022       Current Admission Date: 2022  Current Admission Diagnosis:Acute respiratory failure with hypoxia [facility])   Patient Active Problem List    Diagnosis Date Noted    Acute respiratory failure with hypoxia ([facility] ) 2022    Chronic heart failure with preserved ejection fraction ([address] ) 2022    Pulmonary fibrosis ([address] ) 2022    Dysphagia 2020    Leukocytosis 2020    Proteinuria 2020    Shortness of breath 2020    Pulmonary infiltrates 2020    Interstitial lung disease ([address] ) 2020    Fatigue 2020    Fall at home 2020    Unintentional weight loss 2019    Encounter for immunization 2019    Medicare annual wellness visit, subsequent 2019    Atherosclerosis of artery of extremity with ulceration ([facility] ) 2019    Phantom pain after amputation of lower extremity ([facility] ) 2019    Severe protein-calorie malnutrition ([facility] ) 2019    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 2019    Carpal tunnel syndrome 2019    Decubitus ulcer of right heel, unstageable ([address] ) 2019    Ambulatory dysfunction 10/29/2018    Physical deconditioning 10/29/2018    CKD (chronic kidney disease), stage III ([address] ) 2018    Severe peripheral arterial disease ([facility] ) 2018    Bifascicular block 10/31/2017    Chronic fatigue 10/31/2017    Vitamin D deficiency 10/25/2017    Anemia 10/15/2017    Unstable gait     Lichen simplex chronicus 07/10/2017    Seborrheic keratosis 07/10/2017    Change in multiple pigmented skin lesions 2017    Impaired fasting glucose 2017    Hyperlipidemia 2016    Chronic lower back pain 2016    Generalized osteoarthritis 2016    Herniated lumbar intervertebral disc 2016    Rotator cuff tear arthropathy, right 01/14/2016      LOS (days): 4  Geometric Mean LOS (GMLOS) (days): 3 60  Days to GMLOS:-0 1     OBJECTIVE:  Risk of Unplanned Readmission Score: 14 55         Current admission status: Inpatient   Preferred Pharmacy:   [facility] [address]  Phone: [phone] Fax: [phone]    Primary Care Provider: Marquis Patel MD    Primary Insurance: [facility]  Secondary Insurance:     DISCHARGE DETAILS:    Discharge planning discussed with[de-identified] Patient's Son  Freedom of Choice: Yes  Comments - Freedom of Choice: CM discussed STR recommendation with patient's son  Son confirmed that patient and family are in agreement and do not want STR at this time  CM spoke to patient's son regarding accepting [address] agencies  Son reported that he prefers [facility] and asked that CM calls him back later today to discuss when patient will be discharged, so they can arrange care for him at home this weekend    CM contacted family/caregiver?: Yes  Were Treatment Team discharge recommendations reviewed with patient/caregiver?: Yes  Did patient/caregiver verbalize understanding of patient care needs?: Yes  Were patient/caregiver advised of the risks associated with not following Treatment Team discharge recommendations?: Yes    Contacts  Patient Contacts: Aurora Shows  Relationship to Patient[de-identified] Family  Contact Method: Phone  Phone Number: [phone]  Reason/Outcome: Continuity of Care, Discharge [address]         Is the patient interested in [address] at discharge?: Yes  [address] requested[de-identified] Physical Therapy, Occupational Therapy, Nursing, [address] Name[de-identified] Other ([address])  [facility] Provider[de-identified] PCP  [address] Needed[de-identified] Evaluate Functional Status and Safety, Strengthening/Theraputic Exercises to Improve Function, Gait/ADL Training, Heart Failure Management, Oxygen Via Nasal Cannula  Homebound Criteria Met[de-identified] Uses an Assist Device (i e  cane, walker, etc)  Supporting Clincal Findings[de-identified] Dyspnea with Exertion, Fatigues Easli[place] in [employer], Limited Endurance    Other Referral/Resources/Interventions Provided:  Interventions: [facility]  Referral Comments: CM reviewed ecin and found that [facility] and [address] [place] [address] have accepted  Patient's son prefers [address] [place] [address]  CM updated AVS to include their contact information  Treatment Team Recommendation: Short Term Rehab  Discharge Destination Plan[de-identified] Home with [place] at Discharge : Family    IMM Given (Date):: 05/20/22  IMM Given to[de-identified] Family (IMM reviewed via phone with patient's son  Son reported understanding of these rights and denied any questions or concerns at this time   IMM placed in medical records )

## 2022-05-20 NOTE — PHYSICAL THERAPY NOTE
Physical Therapy Treatment Note       05/20/22 1335   PT Last Visit   PT Visit Date 05/20/22   Note Type   Note Type Treatment   Pain Assessment   Pain Assessment Tool 0-10   Pain Score No Pain   Restrictions/Precautions   Weight Bearing Precautions Per Order No   Braces or Orthoses Prosthesis  (h/o L BKA, wears prosthesis)   Other Precautions Chair Alarm; Bed Alarm; Fall Risk;O2  (2L O2 NC)   General   Chart Reviewed Yes   Response to Previous Treatment Patient reporting fatigue but able to participate  Family/Caregiver Present Yes   Cognition   Overall Cognitive Status WFL   Arousal/Participation Alert; Cooperative   Attention Within functional limits   Orientation Level Oriented X4   Memory Within functional limits   Following Commands Follows all commands and directions without difficulty   Subjective   Subjective "I can try"   Bed Mobility   Additional Comments pt received OOB to recliner upon arrival   Transfers   Sit to Stand 4  Minimal assistance   Additional items Assist x 1; Armrests; Increased time required;Verbal cues   Stand to Sit 4  Minimal assistance   Additional items Assist x 1; Armrests; Increased time required;Verbal cues   Additional Comments pt requiring increased time for STS trials, 2 attempts performed  Pt able to tolerate static standing x15-45 sec duration each; pt limited d/t increased lightheadedness, and requiring seated rest break  lightheadedness improved upon return to seated position  RN Nadeen present and aware   see vitals taken below t/o transitional changes    Vitals:    05/20/22 1347 05/20/22 1350 05/20/22 1352 05/20/22 1359   BP: 120/54 112/52 122/56 121/54   Pulse: 57 73 63 56   Patient Position & Symptoms Seated, dizziness/lightheadedness reported prior to standing attempts Standing, worsened dizziness/lightheadedness reported Seated, post standing trials, mildly improved lightheadedness/dizziness Supine/reclined in chair        Ambulation/Elevation   Gait pattern Not tested Balance   Static Sitting Fair +   Dynamic Sitting Fair   Static Standing Fair -   Endurance Deficit   Endurance Deficit Yes   Activity Tolerance   Activity Tolerance Patient limited by fatigue  (dizziness/lightheadedness)   Exercises   Heelslides Sitting;10 reps;AROM; Bilateral   Hip Abduction Sitting;10 reps;AROM; Bilateral   Hip Adduction Sitting;10 reps;AROM; Bilateral   Knee AROM Long Arc Quad Sitting;10 reps;AROM; Bilateral   Marching Sitting;10 reps;AROM; Bilateral   Assessment   Prognosis Good   Problem List Decreased strength;Decreased endurance; Impaired balance;Decreased mobility   Assessment Pt seen for PT treatment session this date, consisting of ther act focused on STS, sitting/standing balance/posture and ther ex focused on strengthening  pt requiring increased time for STS trials, 2 attempts performed  Pt able to tolerate static standing x15-45 sec duration each; pt limited d/t increased lightheadedness, and requiring seated rest break  lightheadedness improved upon return to seated position  Pt also c/o increased work of breathing, RT arrived to give breathing treatment  see vitals taken above t/o transitional changes Since previous session, pt has made fair progress in terms of initiation of B/L LEs; pt unable to demonstrate ambulation d/t dizziness  Current goals and POC remain appropriate, pt continues to have rehab potential and is making progress towards STGs  Pt prognosis for achieving goals is good, pending pt progress with hospitalization/medical status improvements, and indicated by [facility], ability to follow directions, learning potential, recent onset and previous response to intervention  Pt limited d/t fear of falling and medical instability  PT recommends post acute rehabilitation services upon discharge  Pt continues to be functioning below baseline level, and remains limited 2* factors listed above   PT will continue to see pt during current hospitalization in order to address the deficits listed above and provide interventions consistent w/ POC in effort to achieve STGs  Barriers to Discharge Inaccessible home environment;Decreased caregiver support   Goals   Patient Goals to go home   STG Expiration Date 05/28/22   PT Treatment Day 2   Plan   Treatment/Interventions Functional transfer training;LE strengthening/ROM; Elevations; Therapeutic exercise; Endurance training;Patient/family training;Equipment eval/education; Bed mobility;Gait training;Spoke to nursing;Family   Progress Progressing toward goals   PT Frequency 3-5x/wk   Recommendation   PT Discharge Recommendation Post acute rehabilitation services   Equipment Recommended Walker  (RW)   AM-PAC Basic Mobility Inpatient   Turning in Bed Without Bedrails 3   Lying on Back to Sitting on Edge of Flat Bed 3   Moving Bed to Chair 3   Standing Up From Chair 3   Walk in Room 2   Climb 3-5 Stairs 1   Basic Mobility Inpatient Raw Score 15   Basic Mobility Standardized Score 36 97   Highest Level Of Mobility   JH-HLM Goal 4: Move to chair/commode   JH-HLM Achieved 4: Move to chair/commode   Education   Education Provided Mobility training;Assistive device;Home exercise program   Patient Reinforcement needed   End of Consult   Patient Position at End of Consult Bedside chair;Bed/Chair alarm activated; All needs within reach             Mundo Carrasco, PT, DPT

## 2022-05-20 NOTE — ASSESSMENT & PLAN NOTE
· 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  · Continue prednisone 60 mg daily  · Pulmonology recommending taper by 10 mg every 5 days then stop  · Day 5/5 of 60 mg will decrease to 50 mg tomorrow 5/21   · Pulmonology input appreciated

## 2022-05-20 NOTE — ASSESSMENT & PLAN NOTE
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  Diffuse groundglass opacities, developing since a CT from 2/28/2022   This is most consistent with either pulmonary edema or multifocal pneumonitis    · Afebrile, with mild leukocytosis on presentation and elevated BNP, suspect interstitial lung disease; lower suspicion for acute CHF   · Pulmonology following  · Respiratory protocol, incentive spirometer  · Continue O2 supplementation, goal > 88%  · Completed 3 days cefepime and vancomycin, d/c'd 5/18  · Procal flat, monitoring OFF further antibiotics     · Cardiology consulted and evaluated   · Echo with normal EF   · Recommend holding diuretics as patient appears euvolemic  · Signed off call with questions

## 2022-05-20 NOTE — ASSESSMENT & PLAN NOTE
Malnutrition Findings:   Adult Malnutrition type: Chronic illness albumin 2 6  BMI 18  Will give Ensure b i d  Encourage p o  Intake  Nutrition consult    Adult Degree of Malnutrition: Other severe protein calorie malnutrition  Malnutrition Characteristics: Fat loss, Muscle loss, Inadequate energy, Weight loss                  360 Statement: related to inadequate energy intake due to poor appetite as evidenced by hollow supraclavicular space, wasted interosseous, sunken orbital, visible ribs, weight trending down last 6 mos, 9 8%  Intervention GENNA diet, ensure enlive 3 times a day  BMI Findings: Body mass index is 17 05 kg/m²

## 2022-05-20 NOTE — ASSESSMENT & PLAN NOTE
Wt Readings from Last 3 Encounters:   05/20/22 53 9 kg (118 lb 13 3 oz)   05/04/22 61 2 kg (135 lb)   11/08/21 63 5 kg (140 lb)     · Present on admission as evidence by CT findings and elevated BNP, cardiology input appreciated  · Suspect elevated BNP secondary to interstitial disease   · Delta troponin (-), EKG with bifascicular block  · Last echo 2017, LVEF 60% no evidence of valvular stenosis  Noted moderate right ventricular pressure elevation    · HOLD further Lasix given rise in BUN/Cr--improved today 5/20   · Strict I/Os, daily standing weights, sodium restricted diet  · Daily BMP to monitor electrolytes

## 2022-05-20 NOTE — PLAN OF CARE
Problem: Potential for Falls  Goal: Patient will remain free of falls  Description: INTERVENTIONS:  - Educate patient/family on patient safety including physical limitations  - Instruct patient to call for assistance with activity   - Consult OT/PT to assist with strengthening/mobility   - Keep Call bell within reach  - Keep bed low and locked with side rails adjusted as appropriate  - Keep care items and personal belongings within reach  - Initiate and maintain comfort rounds  - Make Fall Risk Sign visible to staff  - Offer Toileting every  Hours, in advance of need  - Initiate/Maintain alarm  - Obtain necessary fall risk management equipment:   - Apply yellow socks and bracelet for high fall risk patients  - Consider moving patient to room near nurses station  Outcome: Progressing     Problem: MOBILITY - ADULT  Goal: Maintain or return to baseline ADL function  Description: INTERVENTIONS:  -  Assess patient's ability to carry out ADLs; assess patient's baseline for ADL function and identify physical deficits which impact ability to perform ADLs (bathing, care of mouth/teeth, toileting, grooming, dressing, etc )  - Assess/evaluate cause of self-care deficits   - Assess range of motion  - Assess patient's mobility; develop plan if impaired  - Assess patient's need for assistive devices and provide as appropriate  - Encourage maximum independence but intervene and supervise when necessary  - Involve family in performance of ADLs  - Assess for home care needs following discharge   - Consider OT consult to assist with ADL evaluation and planning for discharge  - Provide patient education as appropriate  Outcome: Progressing  Goal: Maintains/Returns to pre admission functional level  Description: INTERVENTIONS:  - Perform BMAT or MOVE assessment daily    - Set and communicate daily mobility goal to care team and patient/family/caregiver     - Collaborate with rehabilitation services on mobility goals if consulted  - Perform Range of Motion  times a day  - Reposition patient every  hours    - Dangle patient  times a day  - Stand patient  times a day  - Ambulate patient  times a day  - Out of bed to chair  times a day   - Out of bed for meal times a day  - Out of bed for toileting  - Record patient progress and toleration of activity level   Outcome: Progressing     Problem: PAIN - ADULT  Goal: Verbalizes/displays adequate comfort level or baseline comfort level  Description: Interventions:  - Encourage patient to monitor pain and request assistance  - Assess pain using appropriate pain scale  - Administer analgesics based on type and severity of pain and evaluate response  - Implement non-pharmacological measures as appropriate and evaluate response  - Consider cultural and social influences on pain and pain management  - Notify physician/advanced practitioner if interventions unsuccessful or patient reports new pain  Outcome: Progressing     Problem: INFECTION - ADULT  Goal: Absence or prevention of progression during hospitalization  Description: INTERVENTIONS:  - Assess and monitor for signs and symptoms of infection  - Monitor lab/diagnostic results  - Monitor all insertion sites, i e  indwelling lines, tubes, and drains  - Monitor endotracheal if appropriate and nasal secretions for changes in amount and color  - [place] appropriate cooling/warming therapies per order  - Administer medications as ordered  - Instruct and encourage patient and family to use good hand hygiene technique  - Identify and instruct in appropriate isolation precautions for identified infection/condition  Outcome: Progressing  Goal: Absence of fever/infection during neutropenic period  Description: INTERVENTIONS:  - Monitor WBC    Outcome: Progressing     Problem: SAFETY ADULT  Goal: Patient will remain free of falls  Description: INTERVENTIONS:  - Educate patient/family on patient safety including physical limitations  - Instruct patient to call for assistance with activity   - Consult OT/PT to assist with strengthening/mobility   - Keep Call bell within reach  - Keep bed low and locked with side rails adjusted as appropriate  - Keep care items and personal belongings within reach  - Initiate and maintain comfort rounds  - Make Fall Risk Sign visible to staff  - Offer Toileting every  Hours, in advance of need  - Initiate/Maintainalarm  - Obtain necessary fall risk management equipment:   - Apply yellow socks and bracelet for high fall risk patients  - Consider moving patient to room near nurses station  Outcome: Progressing  Goal: Maintain or return to baseline ADL function  Description: INTERVENTIONS:  -  Assess patient's ability to carry out ADLs; assess patient's baseline for ADL function and identify physical deficits which impact ability to perform ADLs (bathing, care of mouth/teeth, toileting, grooming, dressing, etc )  - Assess/evaluate cause of self-care deficits   - Assess range of motion  - Assess patient's mobility; develop plan if impaired  - Assess patient's need for assistive devices and provide as appropriate  - Encourage maximum independence but intervene and supervise when necessary  - Involve family in performance of ADLs  - Assess for home care needs following discharge   - Consider OT consult to assist with ADL evaluation and planning for discharge  - Provide patient education as appropriate  Outcome: Progressing  Goal: Maintains/Returns to pre admission functional level  Description: INTERVENTIONS:  - Perform BMAT or MOVE assessment daily    - Set and communicate daily mobility goal to care team and patient/family/caregiver  - Collaborate with rehabilitation services on mobility goals if consulted  - Perform Range of Motion  times a day  - Reposition patient every  hours    - Dangle patient  times a day  - Stand patient  times a day  - Ambulate patient  times a day  - Out of bed to chair  times a day   - Out of bed for meals  times a day  - Out of bed for toileting  - Record patient progress and toleration of activity level   Outcome: Progressing     Problem: DISCHARGE PLANNING  Goal: Discharge to home or other facility with appropriate resources  Description: INTERVENTIONS:  - Identify barriers to discharge w/patient and caregiver  - Arrange for needed discharge resources and transportation as appropriate  - Identify discharge learning needs (meds, wound care, etc )  - Arrange for interpretive services to assist at discharge as needed  - Refer to Case Management Department for coordinating discharge planning if the patient needs post-hospital services based on physician/advanced practitioner order or complex needs related to functional status, cognitive ability, or social support system  Outcome: Progressing     Problem: Knowledge Deficit  Goal: Patient/family/caregiver demonstrates understanding of disease process, treatment plan, medications, and discharge instructions  Description: Complete learning assessment and assess knowledge base    Interventions:  - Provide teaching at level of understanding  - Provide teaching via preferred learning methods  Outcome: Progressing     Problem: Prexisting or High Potential for Compromised Skin Integrity  Goal: Skin integrity is maintained or improved  Description: INTERVENTIONS:  - Identify patients at risk for skin breakdown  - Assess and monitor skin integrity  - Assess and monitor nutrition and hydration status  - Monitor labs   - Assess for incontinence   - Turn and reposition patient  - Assist with mobility/ambulation  - Relieve pressure over bony prominences  - Avoid friction and shearing  - Provide appropriate hygiene as needed including keeping skin clean and dry  - Evaluate need for skin moisturizer/barrier cream  - Collaborate with interdisciplinary team   - Patient/family teaching  - Consider wound care consult   Outcome: Progressing     Problem: Nutrition/Hydration-ADULT  Goal: Nutrient/Hydration intake appropriate for improving, restoring or maintaining nutritional needs  Description: Monitor and assess patient's nutrition/hydration status for malnutrition  Collaborate with interdisciplinary team and initiate plan and interventions as ordered  Monitor patient's weight and dietary intake as ordered or per policy  Utilize nutrition screening tool and intervene as necessary  Determine patient's food preferences and provide high-protein, high-caloric foods as appropriate       INTERVENTIONS:  - Monitor oral intake, urinary output, labs, and treatment plans  - Assess nutrition and hydration status and recommend course of action  - Evaluate amount of meals eaten  - Assist patient with eating if necessary   - Allow adequate time for meals  - Recommend/ encourage appropriate diets, oral nutritional supplements, and vitamin/mineral supplements  - Order, calculate, and assess calorie counts as needed  - Assess need for intravenous fluids  - Provide nutrition/hydration education as appropriate  - Include patient/family/caregiver in decisions related to nutrition  Outcome: Progressing

## 2022-05-20 NOTE — PLAN OF CARE
Problem: Potential for Falls  Goal: Patient will remain free of falls  Description: INTERVENTIONS:  - Educate patient/family on patient safety including physical limitations  - Instruct patient to call for assistance with activity   - Consult OT/PT to assist with strengthening/mobility   - Keep Call bell within reach  - Keep bed low and locked with side rails adjusted as appropriate  - Keep care items and personal belongings within reach  - Initiate and maintain comfort rounds  - Make Fall Risk Sign visible to staff  - Offer Toileting every  Hours, in advance of need  - Initiate/Maintain alarm  - Obtain necessary fall risk management equipment:   - Apply yellow socks and bracelet for high fall risk patients  - Consider moving patient to room near nurses station  Outcome: Progressing     Problem: MOBILITY - ADULT  Goal: Maintain or return to baseline ADL function  Description: INTERVENTIONS:  -  Assess patient's ability to carry out ADLs; assess patient's baseline for ADL function and identify physical deficits which impact ability to perform ADLs (bathing, care of mouth/teeth, toileting, grooming, dressing, etc )  - Assess/evaluate cause of self-care deficits   - Assess range of motion  - Assess patient's mobility; develop plan if impaired  - Assess patient's need for assistive devices and provide as appropriate  - Encourage maximum independence but intervene and supervise when necessary  - Involve family in performance of ADLs  - Assess for home care needs following discharge   - Consider OT consult to assist with ADL evaluation and planning for discharge  - Provide patient education as appropriate  Outcome: Progressing  Goal: Maintains/Returns to pre admission functional level  Description: INTERVENTIONS:  - Perform BMAT or MOVE assessment daily    - Set and communicate daily mobility goal to care team and patient/family/caregiver     - Collaborate with rehabilitation services on mobility goals if consulted  - Perform Range of Motion  times a day  - Reposition patient every  hours    - Dangle patient  times a day  - Stand patient  times a day  - Ambulate patient  times a day  - Out of bed to chair  times a day   - Out of bed for meals  times a day  - Out of bed for toileting  - Record patient progress and toleration of activity level   Outcome: Progressing     Problem: PAIN - ADULT  Goal: Verbalizes/displays adequate comfort level or baseline comfort level  Description: Interventions:  - Encourage patient to monitor pain and request assistance  - Assess pain using appropriate pain scale  - Administer analgesics based on type and severity of pain and evaluate response  - Implement non-pharmacological measures as appropriate and evaluate response  - Consider cultural and social influences on pain and pain management  - Notify physician/advanced practitioner if interventions unsuccessful or patient reports new pain  Outcome: Progressing     Problem: INFECTION - ADULT  Goal: Absence or prevention of progression during hospitalization  Description: INTERVENTIONS:  - Assess and monitor for signs and symptoms of infection  - Monitor lab/diagnostic results  - Monitor all insertion sites, i e  indwelling lines, tubes, and drains  - Monitor endotracheal if appropriate and nasal secretions for changes in amount and color  - [place] appropriate cooling/warming therapies per order  - Administer medications as ordered  - Instruct and encourage patient and family to use good hand hygiene technique  - Identify and instruct in appropriate isolation precautions for identified infection/condition  Outcome: Progressing  Goal: Absence of fever/infection during neutropenic period  Description: INTERVENTIONS:  - Monitor WBC    Outcome: Progressing     Problem: SAFETY ADULT  Goal: Patient will remain free of falls  Description: INTERVENTIONS:  - Educate patient/family on patient safety including physical limitations  - Instruct patient to call for assistance with activity   - Consult OT/PT to assist with strengthening/mobility   - Keep Call bell within reach  - Keep bed low and locked with side rails adjusted as appropriate  - Keep care items and personal belongings within reach  - Initiate and maintain comfort rounds  - Make Fall Risk Sign visible to staff  - Offer Toileting every  Hours, in advance of need  - Initiate/Maintain alarm  - Obtain necessary fall risk management equipment:   - Apply yellow socks and bracelet for high fall risk patients  - Consider moving patient to room near nurses station  Outcome: Progressing  Goal: Maintain or return to baseline ADL function  Description: INTERVENTIONS:  -  Assess patient's ability to carry out ADLs; assess patient's baseline for ADL function and identify physical deficits which impact ability to perform ADLs (bathing, care of mouth/teeth, toileting, grooming, dressing, etc )  - Assess/evaluate cause of self-care deficits   - Assess range of motion  - Assess patient's mobility; develop plan if impaired  - Assess patient's need for assistive devices and provide as appropriate  - Encourage maximum independence but intervene and supervise when necessary  - Involve family in performance of ADLs  - Assess for home care needs following discharge   - Consider OT consult to assist with ADL evaluation and planning for discharge  - Provide patient education as appropriate  Outcome: Progressing  Goal: Maintains/Returns to pre admission functional level  Description: INTERVENTIONS:  - Perform BMAT or MOVE assessment daily    - Set and communicate daily mobility goal to care team and patient/family/caregiver  - Collaborate with rehabilitation services on mobility goals if consulted  - Perform Range of Motion  times a day  - Reposition patient every  hours    - Dangle patient  times a day  - Stand patient  times a day  - Ambulate patient  times a day  - Out of bed to chair  times a day   - Out of bed for meal times a day  - Out of bed for toileting  - Record patient progress and toleration of activity level   Outcome: Progressing     Problem: DISCHARGE PLANNING  Goal: Discharge to home or other facility with appropriate resources  Description: INTERVENTIONS:  - Identify barriers to discharge w/patient and caregiver  - Arrange for needed discharge resources and transportation as appropriate  - Identify discharge learning needs (meds, wound care, etc )  - Arrange for interpretive services to assist at discharge as needed  - Refer to Case Management Department for coordinating discharge planning if the patient needs post-hospital services based on physician/advanced practitioner order or complex needs related to functional status, cognitive ability, or social support system  Outcome: Progressing     Problem: Knowledge Deficit  Goal: Patient/family/caregiver demonstrates understanding of disease process, treatment plan, medications, and discharge instructions  Description: Complete learning assessment and assess knowledge base    Interventions:  - Provide teaching at level of understanding  - Provide teaching via preferred learning methods  Outcome: Progressing     Problem: Prexisting or High Potential for Compromised Skin Integrity  Goal: Skin integrity is maintained or improved  Description: INTERVENTIONS:  - Identify patients at risk for skin breakdown  - Assess and monitor skin integrity  - Assess and monitor nutrition and hydration status  - Monitor labs   - Assess for incontinence   - Turn and reposition patient  - Assist with mobility/ambulation  - Relieve pressure over bony prominences  - Avoid friction and shearing  - Provide appropriate hygiene as needed including keeping skin clean and dry  - Evaluate need for skin moisturizer/barrier cream  - Collaborate with interdisciplinary team   - Patient/family teaching  - Consider wound care consult   Outcome: Progressing     Problem: Nutrition/Hydration-ADULT  Goal: Nutrient/Hydration intake appropriate for improving, restoring or maintaining nutritional needs  Description: Monitor and assess patient's nutrition/hydration status for malnutrition  Collaborate with interdisciplinary team and initiate plan and interventions as ordered  Monitor patient's weight and dietary intake as ordered or per policy  Utilize nutrition screening tool and intervene as necessary  Determine patient's food preferences and provide high-protein, high-caloric foods as appropriate       INTERVENTIONS:  - Monitor oral intake, urinary output, labs, and treatment plans  - Assess nutrition and hydration status and recommend course of action  - Evaluate amount of meals eaten  - Assist patient with eating if necessary   - Allow adequate time for meals  - Recommend/ encourage appropriate diets, oral nutritional supplements, and vitamin/mineral supplements  - Order, calculate, and assess calorie counts as needed  - Assess need for intravenous fluids  - Provide nutrition/hydration education as appropriate  - Include patient/family/caregiver in decisions related to nutrition  Outcome: Progressing

## 2022-05-20 NOTE — OCCUPATIONAL THERAPY NOTE
Occupational Therapy Evaluation        Patient Name: Shaka PACHECO Date: 5/20/2022 05/20/22 1117   OT Last Visit   OT Visit Date 05/20/22   Note Type   Note type Evaluation   Restrictions/Precautions   Weight Bearing Precautions Per Order No   Braces or Orthoses Prosthesis  (h/o L BKA, wears prosthesis)   Other Precautions Chair Alarm; Bed Alarm;O2;Fall Risk  (1L O2)   Pain Assessment   Pain Assessment Tool 0-10   Pain Score No Pain   Home Living   Type of [address] [place] [address] One level;Stairs to enter without rails  (3 +1 ARMIN)   Bathroom Shower/Tub Tub/shower unit   Bathroom Toilet Raised   Bathroom Equipment Tub transfer bench   [facility]; Wheelchair-manual   Additional Comments Pt  uses RW at baseline   Prior Function   Level of [place] Independent with ADLs and functional mobility   Lives With Spouse   Receives Help From Family   ADL Assistance Independent   IADLs Needs assistance   Falls in the last 6 months 1 to 4  (2 falls)   Vocational Retired  (woodwork)   Comments  (-), wife assists with transportation   Lifestyle   Autonomy Patient reported independent with ADLs and functional mobility but required assistance with IADLs  Patient lives with wife in a one level home with 4 steps to enter  Patient ambulates with the use of a walker and reports 2 falls within the past 6 months  Pateint is retired and enjoys creating wood work on his freetime     Reciprocal Relationships supportive wife   Intrinsic Gratification woodwork   Psychosocial   Psychosocial (WDL) WDL   Subjective   Subjective "I feel light headed"   ADL   Eating Assistance 5  Supervision/Setup   Grooming Assistance 5  Supervision/Setup   UB Bathing Assistance 4  Minimal Assistance   LB Bathing Assistance 3  Moderate [address] 4  Minimal [address] 3  Moderate [address]  4  Minimal Assistance Functional Assistance 4  Minimal Assistance   Bed Mobility   Supine to Sit 5  Supervision   Additional items Assist x 1;HOB elevated; Increased time required;Verbal cues   Transfers   Sit to Stand 4  Minimal assistance   Additional items Assist x 1; Increased time required;Verbal cues   Stand to Sit 4  Minimal assistance   Additional items Assist x 1; Armrests; Increased time required;Verbal cues   Functional Mobility   Functional Mobility 4  Minimal assistance   Additional items Rolling walker   Balance   Static Sitting Fair +   Dynamic Sitting Fair   Static Standing Fair -   Dynamic Standing Poor +   Ambulatory Poor +   Activity Tolerance   Activity Tolerance Patient limited by fatigue  (Upon standing patient reported lightheadedness  Patient was seated and BP taken reading 88/66  Once patient reported lightheadedness resolved bp taken again while seated reading 115/49  After ambulating ~5 feet bp taken again in standing reading 127/49 )   Nurse Made Aware LILLY Verduzco made aware of patient's report of lightheadedness and blood pressure results taken during session   RUE Assessment   RUE Assessment WFL  (Grossly WFL, Pt  with healing acromion fracture, intact hand to mouth movements and no reports of any issues with AROM during functional tasks)   LUE Assessment   LUE Assessment WFL  (grossly WFL)   LUE Strength   LUE Overall Strength Deficits  (3+/5)   Hand Function   Gross Motor Coordination Impaired   Fine Motor Coordination Functional   Sensation   Light Touch No apparent deficits  (no numbness, reports slight tingling in distal fingertips but can feel therapist touch)   Proprioception   Proprioception No apparent deficits  (BUEs)   Vision-Basic Assessment   Current Vision Wears glasses all the time   Cognition   Overall Cognitive Status [facility]   Arousal/Participation Alert; Cooperative   Attention Within functional limits   Orientation Level Oriented X4  ("May 2022")   Memory Within functional limits   Following Commands Follows all commands and directions without difficulty   Assessment   Limitation Decreased ADL status; Decreased UE strength;Decreased endurance;Decreased self-care trans;Decreased high-level ADLs   Prognosis Good   Assessment Patient is a 80 yr  old male who was seen for an OT evaluation s/p admission to [place] [address] on 5/16/22 due to shortness of breath  Patient was diagnosed with acute respiratory failure with hypoxia  Other contributing factors affecting the patients occupational performance at this time include pulmonary fibrosis, chronic heart failure with preserved ejection fraction, anemia, bifascicular block, leukocytosis, and severe protein-calorie malnutrition  Orders were placed for OT evaluation and treatment  At least two patient identifiers performed during session including name and wristband  Prior to admission patient reported independent with ADLs and functional mobility but required assistance with IADLs  Patient lives with wife in a one level home with 4 steps to enter  Patient ambulates with the use of a walker and reports 2 falls within the past 6 months  Patient is retired and enjoys creating wood work on his freetime  Personal factors that are inhibiting the patients performance at time of evaluation include: steps to enter, difficulty performing ADLs, and difficulty performing IADLs  Upon evaluation patient requires Min A for functional mobility, Min A for UB ADLs, and Mod A for LB ADLs  Patients occupational performance is affected by the following deficits: limited UE strength, impaired gross motor coordination, decreased static/dynamic sitting balance, decreased static/dynamic standing balance, decreased activity tolerance, and decreased endurance  Patient to benefit from continued Occupational Therapy treatment while in the hospital to address the stated deficits and improve occupational performance and overall quality of life   From an OT standpoint at this time, d/c recommendation would be post acute rehab services  Plan   Treatment Interventions ADL retraining;Functional transfer training;UE strengthening/ROM; Endurance training;Patient/family training;Equipment evaluation/education; Compensatory technique education;Continued evaluation; Energy conservation   Goal Expiration Date 06/03/22   OT Frequency 3-5x/wk   Recommendation   OT Discharge Recommendation Post acute rehabilitation services   AM-PAC Daily Activity Inpatient   Lower Body Dressing 2   Bathing 2   Toileting 3   Upper Body Dressing 3   Grooming 3   Eating 3   Daily Activity Raw Score 16   Daily Activity Standardized Score (Calc for Raw Score >=11) 35 96   AM-PAC Applied Cognition Inpatient   Following a Speech/Presentation 4   Understanding Ordinary Conversation 4   Taking Medications 4   Remembering Where Things Are Placed or Put Away 4   Remembering List of 4-5 Errands 4   Taking Care of Complicated Tasks 4   Applied Cognition Raw Score 24   Applied Cognition Standardized Score 62 21   Barthel Index   Feeding 5   Bathing 0   Grooming Score 0   Dressing Score 5   Bladder Score 10   Bowels Score 10   Toilet Use Score 5   Transfers (Bed/Chair) Score 10   Mobility (Level Surface) Score 0   Stairs Score 0   Barthel Index Score 45   Modified [place] Scale   Modified [place] Scale 4     Patient will complete grooming tasks seated at EOB with Mod I  Patient will complete toileting hygiene with Mod I  Patient will verbalize and demonstrate the use of deep breathing/energy conservation techniques during functional activities with no verbal cues  Patient will improve standing tolerance to 10-15 min to increase activity tolerance during ADL/IADL tasks  Patient will complete UB/ LB bathing while seated in shower with Mod I and the use of adaptative techniques  Patient will complete UB/LB dressing tasks while seated EOB with Mod (I)       Patient will complete transfers from bed to chair/toilet with Mod I and AD as indicated  Patient will demonstrate OOB/ sitting tolerance to 2-4 hours per day for increased participation in self care and leisure tasks with no exertion  Patient will demonstrate fair + dynamic/static standing balance for increased participation in ADL/IADL activities  Patient will increase overall LUE strength to 4/5 for completion of ADL/IADL tasks

## 2022-05-20 NOTE — ASSESSMENT & PLAN NOTE
· Mild leukocytosis, 11 87 on presentation  · Consider reactive, less likely infectious   · Trend daily CBC

## 2022-05-20 NOTE — PROGRESS NOTES
[facility]  Progress Note - Reed Jeter 1933, 80 y o  male MRN: [id number]  Unit/Bed#: -01 Encounter: [id number]  Primary Care Provider: Daniel Carbajal MD   Date and time admitted to hospital: 5/16/2022  1:51 PM    * Acute respiratory failure with hypoxia [facility])  Assessment & Plan  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  Diffuse groundglass opacities, developing since a CT from 2/28/2022   This is most consistent with either pulmonary edema or multifocal pneumonitis    · Afebrile, with mild leukocytosis on presentation and elevated BNP, suspect interstitial lung disease; lower suspicion for acute CHF   · Pulmonology following  · Respiratory protocol, incentive spirometer  · Continue O2 supplementation, goal > 88%  · Completed 3 days cefepime and vancomycin, d/c'd 5/18  · Procal flat, monitoring OFF further antibiotics     · Cardiology consulted and evaluated   · Echo with normal EF   · Recommend holding diuretics as patient appears euvolemic  · Signed off call with questions    Pulmonary fibrosis ([facility] [address] )  Assessment & Plan  · 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  · Continue prednisone 60 mg daily  · Pulmonology recommending taper by 10 mg every 5 days then stop  · Day 5/5 of 60 mg will decrease to 50 mg tomorrow 5/21   · Pulmonology input appreciated     Chronic heart failure with preserved ejection fraction (HCC)  Assessment & Plan  Wt Readings from Last 3 Encounters:   05/20/22 53 9 kg (118 lb 13 3 oz)   05/04/22 61 2 kg (135 lb)   11/08/21 63 5 kg (140 lb)     · Present on admission as evidence by CT findings and elevated BNP, cardiology input appreciated  · Suspect elevated BNP secondary to interstitial disease   · Delta troponin (-), EKG with bifascicular block  · Last echo 2017, LVEF 60% no evidence of valvular stenosis  Noted moderate right ventricular pressure elevation  · HOLD further Lasix given rise in BUN/Cr--improved today 5/20   · Strict I/Os, daily standing weights, sodium restricted diet  · Daily BMP to monitor electrolytes    Anemia  Assessment & Plan  · Continue ferrous sulfate  · Baseline hgb 10-11  · Hemoglobin currently between 8 3 to 8 9  · Continue q8h hemoglobin monitoring  · Iron panel not showing deficiency, B12 high and folate normal  · Continue hold heparin   · No evidence of active bleeding    Bifascicular block  Assessment & Plan  · Chronic on previous EKG  · Troponin 41>41>38    Leukocytosis  Assessment & Plan  · Mild leukocytosis, 11 87 on presentation  · Consider reactive, less likely infectious   · Trend daily CBC    Severe protein-calorie malnutrition (HCC)  Assessment & Plan  Malnutrition Findings:   Adult Malnutrition type: Chronic illness albumin 2 6  BMI 18  Will give Ensure b i d  Encourage p o  Intake  Nutrition consult    Adult Degree of Malnutrition: Other severe protein calorie malnutrition  Malnutrition Characteristics: Fat loss, Muscle loss, Inadequate energy, Weight loss                  360 Statement: related to inadequate energy intake due to poor appetite as evidenced by hollow supraclavicular space, wasted interosseous, sunken orbital, visible ribs, weight trending down last 6 mos, 9 8%  Intervention GENNA diet, ensure enlive 3 times a day  BMI Findings: Body mass index is 17 05 kg/m²         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  Assessment & Plan  · Known history  · Patient wears left lower extremity prosthesis    Hyperlipidemia  Assessment & Plan  · Atorvastatin substitute for home dose simvastatin    Severe peripheral arterial disease (HCC)  Assessment & Plan  · S/p BLE angiogram,  Left SFA PTA, DCB, arthrectomy, Viabahn stent and L BK popliteal PTA  · Continue ASA and statin          VTE Pharmacologic Prophylaxis: VTE Score: 8 High Risk (Score >/= 5) - Pharmacological DVT Prophylaxis Contraindicated  Sequential Compression Devices Ordered  Patient Centered Rounds: I performed bedside rounds with nursing staff today  Discussions with Specialists or Other Care Team Provider: pulmonology     Education and Discussions with Family / Patient: Updated  (wife) at bedside  wife is a patient as well update given at her beside     Time Spent for Care: 35 minutes   More than 50% of total time spent on counseling and coordination of care as described above  Current Length of Stay: 4 day(s)  Current Patient Status: Inpatient   Certification Statement: The patient will continue to require additional inpatient hospital stay due to symptomatic anemia   Discharge Plan: Anticipate discharge in 24-48 hrs to home with home services  Code Status: Level 3 - DNAR and DNI    Subjective:   Patient reporting lightheadedness, denies SOB above baseline, patient reports chest discomfort but reports this is his normal for his disease process reports feeling with breathing  Patient was hoping to go home however understands with his lightheadedness this puts him at increased risk with his known history of BKA knows that is not a matter of if he will fall it is a matter of when  Wife updated understands patient require further inpatient stay has no other questions  Objective:     Vitals:   Temp (24hrs), Av 9 °F (36 6 °C), Min:97 2 °F (36 2 °C), Max:98 3 °F (36 8 °C)    Temp:  [97 2 °F (36 2 °C)-98 3 °F (36 8 °C)] 98 2 °F (36 8 °C)  HR:  [51-88] 51  Resp:  [16-18] 18  BP: (109-142)/(40-76) 123/76  SpO2:  [90 %-100 %] 100 %  Body mass index is 17 05 kg/m²  Input and Output Summary (last 24 hours):      Intake/Output Summary (Last 24 hours) at 2022 1060  Last data filed at 2022 2100  Gross per 24 hour   Intake --   Output 575 ml   Net -575 ml       Physical Exam:   Physical Exam  Vitals and nursing note reviewed  Constitutional:       Appearance: He is well-developed  HENT:      Head: Normocephalic and atraumatic  Eyes:      Conjunctiva/sclera: Conjunctivae normal    Cardiovascular:      Rate and Rhythm: Normal rate and regular rhythm  Heart sounds: No murmur heard  Pulmonary:      Effort: Pulmonary effort is normal  No respiratory distress  Breath sounds: Normal breath sounds  Abdominal:      Palpations: Abdomen is soft  Tenderness: There is no abdominal tenderness  Musculoskeletal:      Cervical back: Neck supple  Skin:     General: Skin is warm and dry  Neurological:      Mental Status: He is alert and oriented to person, place, and time  Mental status is at baseline  Psychiatric:         Mood and Affect: Mood normal          Behavior: Behavior normal           Additional Data:     Labs:  Results from last 7 days   Lab Units 05/20/22  0440 05/16/22  1706 05/16/22  1428   WBC Thousand/uL 13 69*   < > 11 87*   HEMOGLOBIN g/dL 8 3*   < > 11 3*   HEMATOCRIT % 25 9*   < > 34 6*   PLATELETS Thousands/uL 262   < > 349   NEUTROS PCT %  --   --  81*   LYMPHS PCT %  --   --  9*   MONOS PCT %  --   --  8   EOS PCT %  --   --  1    < > = values in this interval not displayed  Results from last 7 days   Lab Units 05/20/22  0440 05/17/22  0517 05/16/22  1428   SODIUM mmol/L 140   < > 139   POTASSIUM mmol/L 4 7   < > 3 7   CHLORIDE mmol/L 103   < > 103   CO2 mmol/L 32   < > 28   BUN mg/dL 48*   < > 21   CREATININE mg/dL 1 37*   < > 1 16   ANION GAP mmol/L 5   < > 8   CALCIUM mg/dL 9 3   < > 8 5   ALBUMIN g/dL  --   --  2 6*   TOTAL BILIRUBIN mg/dL  --   --  0 79   ALK PHOS U/L  --   --  87   ALT U/L  --   --  20   AST U/L  --   --  18   GLUCOSE RANDOM mg/dL 117   < > 114    < > = values in this interval not displayed                   Results from last 7 days   Lab Units 05/18/22  0855 05/17/22  1216 22  1428   PROCALCITONIN ng/ml 0 23 0 23 0 15       Lines/Drains:  Invasive Devices  Report    Peripheral Intravenous Line  Duration           Peripheral IV Left;Upper;Ventral (anterior) Arm -- days                  Telemetry:  Telemetry Orders (From admission, onward)             24 Hour Telemetry Monitoring  Continuous x 24 Hours (Telem)           References:    Telemetry Guidelines   Question:  Reason for 24 Hour Telemetry  Answer:  Metabolic/Electrolyte Disturbance with High Probability of Dysrhythmia (K level <3 or >6, or KCL infusion >=10mEq/hr)                 Telemetry Reviewed: Sinus Bradycardia  Indication for Continued Telemetry Use: No indication for continued use  Will discontinue  Imaging: No pertinent imaging reviewed  Recent Cultures (last 7 days):         Last 24 Hours Medication List:   Current Facility-Administered Medications   Medication Dose Route Frequency Provider Last Rate    aspirin  81 mg Oral Daily Chavez Bain MD      atorvastatin  20 mg Oral Daily With Hever Ramos MD      ferrous sulfate  325 mg Oral BID Chavez Bain MD      ipratropium  0 5 mg Nebulization Q6H PRN Riky Rothman MD      levalbuterol  1 25 mg Nebulization Q6H PRN Riky Rothman MD      predniSONE  60 mg Oral Daily Chavez Bain MD          Today, Patient Was Seen By: QASIM Sawant    **Please Note: This note may have been constructed using a voice recognition system  **

## 2022-05-20 NOTE — PLAN OF CARE
Problem: PHYSICAL THERAPY ADULT  Goal: Performs mobility at highest level of function for planned discharge setting  See evaluation for individualized goals  Description: Treatment/Interventions: Functional transfer training, LE strengthening/ROM, Therapeutic exercise, Endurance training, Patient/family training, Equipment eval/education, Bed mobility, Gait training, Spoke to nursing, Elevations  Equipment Recommended:  (continue with RW)       See flowsheet documentation for full assessment, interventions and recommendations  Outcome: Progressing  Note: Prognosis: Good  Problem List: Decreased strength, Decreased endurance, Impaired balance, Decreased mobility  Assessment: Pt seen for PT treatment session this date, consisting of ther act focused on STS, sitting/standing balance/posture and ther ex focused on strengthening  pt requiring increased time for STS trials, 2 attempts performed  Pt able to tolerate static standing x15-45 sec duration each; pt limited d/t increased lightheadedness, and requiring seated rest break  lightheadedness improved upon return to seated position  Pt also c/o increased work of breathing, RT arrived to give breathing treatment  see vitals taken above t/o transitional changes Since previous session, pt has made fair progress in terms of initiation of B/L LEs; pt unable to demonstrate ambulation d/t dizziness  Current goals and POC remain appropriate, pt continues to have rehab potential and is making progress towards STGs  Pt prognosis for achieving goals is good, pending pt progress with hospitalization/medical status improvements, and indicated by [facility], ability to follow directions, learning potential, recent onset and previous response to intervention  Pt limited d/t fear of falling and medical instability  PT recommends post acute rehabilitation services upon discharge  Pt continues to be functioning below baseline level, and remains limited 2* factors listed above  PT will continue to see pt during current hospitalization in order to address the deficits listed above and provide interventions consistent w/ POC in effort to achieve STGs  Barriers to Discharge: Inaccessible home environment, Decreased caregiver support        PT Discharge Recommendation: Post acute rehabilitation services          See flowsheet documentation for full assessment

## 2022-05-21 PROBLEM — R42 DIZZINESS: Status: ACTIVE | Noted: 2022-01-01

## 2022-05-21 PROBLEM — N17.9 AKI (ACUTE KIDNEY INJURY) (HCC): Status: ACTIVE | Noted: 2022-01-01

## 2022-05-21 NOTE — ASSESSMENT & PLAN NOTE
· Continue ferrous sulfate  · Baseline hgb 10-11  · Hemoglobin currently between 8 3 to 8 9  · Continue q12h hemoglobin monitoring given stability   · Iron panel not showing deficiency, B12 high and folate normal  · Continue to hold heparin   · No evidence of active bleeding

## 2022-05-21 NOTE — ASSESSMENT & PLAN NOTE
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  Diffuse groundglass opacities, developing since a CT from 2/28/2022   This is most consistent with either pulmonary edema or multifocal pneumonitis    · Afebrile, with mild leukocytosis on presentation and elevated BNP, suspect interstitial lung disease; lower suspicion for acute CHF   · Pulmonology following  · Respiratory protocol, incentive spirometer  · Continue O2 supplementation, goal > 88%  · Completed 3 days cefepime and vancomycin, d/c'd 5/18  · Procal flat, monitoring OFF further antibiotics   · Pulmonology to discuss fibrotic agents out patient     · Cardiology consulted and evaluated   · Echo with normal EF   · Recommend holding diuretics as patient appears euvolemic  · Signed off call with questions

## 2022-05-21 NOTE — PLAN OF CARE
Problem: Potential for Falls  Goal: Patient will remain free of falls  Description: INTERVENTIONS:  - Educate patient/family on patient safety including physical limitations  - Instruct patient to call for assistance with activity   - Consult OT/PT to assist with strengthening/mobility   - Keep Call bell within reach  - Keep bed low and locked with side rails adjusted as appropriate  - Keep care items and personal belongings within reach  - Initiate and maintain comfort rounds  - Make Fall Risk Sign visible to staff  - Offer Toileting every  Hours, in advance of need  - Initiate/Maintain alarm  - Obtain necessary fall risk management equipment  - Apply yellow socks and bracelet for high fall risk patients  - Consider moving patient to room near nurses station  Outcome: Progressing     Problem: MOBILITY - ADULT  Goal: Maintain or return to baseline ADL function  Description: INTERVENTIONS:  -  Assess patient's ability to carry out ADLs; assess patient's baseline for ADL function and identify physical deficits which impact ability to perform ADLs (bathing, care of mouth/teeth, toileting, grooming, dressing, etc )  - Assess/evaluate cause of self-care deficits   - Assess range of motion  - Assess patient's mobility; develop plan if impaired  - Assess patient's need for assistive devices and provide as appropriate  - Encourage maximum independence but intervene and supervise when necessary  - Involve family in performance of ADLs  - Assess for home care needs following discharge   - Consider OT consult to assist with ADL evaluation and planning for discharge  - Provide patient education as appropriate  Outcome: Progressing  Goal: Maintains/Returns to pre admission functional level  Description: INTERVENTIONS:  - Perform BMAT or MOVE assessment daily    - Set and communicate daily mobility goal to care team and patient/family/caregiver     - Collaborate with rehabilitation services on mobility goals if consulted  - Perform Range of Motion  times a day  - Reposition patient every  hours    - Dangle patient  times a day  - Stand patient  times a day  - Ambulate patient  times a day  - Out of bed to chair times a day   - Out of bed for meals  times a day  - Out of bed for toileting  - Record patient progress and toleration of activity level   Outcome: Progressing     Problem: PAIN - ADULT  Goal: Verbalizes/displays adequate comfort level or baseline comfort level  Description: Interventions:  - Encourage patient to monitor pain and request assistance  - Assess pain using appropriate pain scale  - Administer analgesics based on type and severity of pain and evaluate response  - Implement non-pharmacological measures as appropriate and evaluate response  - Consider cultural and social influences on pain and pain management  - Notify physician/advanced practitioner if interventions unsuccessful or patient reports new pain  Outcome: Progressing     Problem: INFECTION - ADULT  Goal: Absence or prevention of progression during hospitalization  Description: INTERVENTIONS:  - Assess and monitor for signs and symptoms of infection  - Monitor lab/diagnostic results  - Monitor all insertion sites, i e  indwelling lines, tubes, and drains  - Monitor endotracheal if appropriate and nasal secretions for changes in amount and color  - [place] appropriate cooling/warming therapies per order  - Administer medications as ordered  - Instruct and encourage patient and family to use good hand hygiene technique  - Identify and instruct in appropriate isolation precautions for identified infection/condition  Outcome: Progressing  Goal: Absence of fever/infection during neutropenic period  Description: INTERVENTIONS:  - Monitor WBC    Outcome: Progressing     Problem: SAFETY ADULT  Goal: Patient will remain free of falls  Description: INTERVENTIONS:  - Educate patient/family on patient safety including physical limitations  - Instruct patient to call for assistance with activity   - Consult OT/PT to assist with strengthening/mobility   - Keep Call bell within reach  - Keep bed low and locked with side rails adjusted as appropriate  - Keep care items and personal belongings within reach  - Initiate and maintain comfort rounds  - Make Fall Risk Sign visible to staff  - Offer Toileting every  Hours, in advance of need  - Initiate/Maintainalarm  - Obtain necessary fall risk management equipment:   - Apply yellow socks and bracelet for high fall risk patients  - Consider moving patient to room near nurses station  Outcome: Progressing  Goal: Maintain or return to baseline ADL function  Description: INTERVENTIONS:  -  Assess patient's ability to carry out ADLs; assess patient's baseline for ADL function and identify physical deficits which impact ability to perform ADLs (bathing, care of mouth/teeth, toileting, grooming, dressing, etc )  - Assess/evaluate cause of self-care deficits   - Assess range of motion  - Assess patient's mobility; develop plan if impaired  - Assess patient's need for assistive devices and provide as appropriate  - Encourage maximum independence but intervene and supervise when necessary  - Involve family in performance of ADLs  - Assess for home care needs following discharge   - Consider OT consult to assist with ADL evaluation and planning for discharge  - Provide patient education as appropriate  Outcome: Progressing  Goal: Maintains/Returns to pre admission functional level  Description: INTERVENTIONS:  - Perform BMAT or MOVE assessment daily    - Set and communicate daily mobility goal to care team and patient/family/caregiver  - Collaborate with rehabilitation services on mobility goals if consulted  - Perform Range of Motion  times a day  - Reposition patient every hours    - Dangle patient  times a day  - Stand patient  times a day  - Ambulate patient  times a day  - Out of bed to chair  times a day   - Out of bed for meals  times a day  - Out of bed for toileting  - Record patient progress and toleration of activity level   Outcome: Progressing     Problem: DISCHARGE PLANNING  Goal: Discharge to home or other facility with appropriate resources  Description: INTERVENTIONS:  - Identify barriers to discharge w/patient and caregiver  - Arrange for needed discharge resources and transportation as appropriate  - Identify discharge learning needs (meds, wound care, etc )  - Arrange for interpretive services to assist at discharge as needed  - Refer to Case Management Department for coordinating discharge planning if the patient needs post-hospital services based on physician/advanced practitioner order or complex needs related to functional status, cognitive ability, or social support system  Outcome: Progressing     Problem: Knowledge Deficit  Goal: Patient/family/caregiver demonstrates understanding of disease process, treatment plan, medications, and discharge instructions  Description: Complete learning assessment and assess knowledge base    Interventions:  - Provide teaching at level of understanding  - Provide teaching via preferred learning methods  Outcome: Progressing     Problem: Prexisting or High Potential for Compromised Skin Integrity  Goal: Skin integrity is maintained or improved  Description: INTERVENTIONS:  - Identify patients at risk for skin breakdown  - Assess and monitor skin integrity  - Assess and monitor nutrition and hydration status  - Monitor labs   - Assess for incontinence   - Turn and reposition patient  - Assist with mobility/ambulation  - Relieve pressure over bony prominences  - Avoid friction and shearing  - Provide appropriate hygiene as needed including keeping skin clean and dry  - Evaluate need for skin moisturizer/barrier cream  - Collaborate with interdisciplinary team   - Patient/family teaching  - Consider wound care consult   Outcome: Progressing     Problem: Nutrition/Hydration-ADULT  Goal: Nutrient/Hydration intake appropriate for improving, restoring or maintaining nutritional needs  Description: Monitor and assess patient's nutrition/hydration status for malnutrition  Collaborate with interdisciplinary team and initiate plan and interventions as ordered  Monitor patient's weight and dietary intake as ordered or per policy  Utilize nutrition screening tool and intervene as necessary  Determine patient's food preferences and provide high-protein, high-caoric foods as appropriate       INTERVENTIONS:  - Monitor oral intake, urinary output, labs, and treatment plans  - Assess nutrition and hydration status and recommend course of action  - Evaluate amount of meals eaten  - Assist patient with eating if necessary   - Allow adequate time for meals  - Recommend/ encourage appropriate diets, oral nutritional supplements, and vitamin/mineral supplements  - Order, calculate, and assess calorie counts as needed  - Assess need for intravenous fluids  - Provide nutrition/hydration education as appropriate  - Include patient/family/caregiver in decisions related to nutrition  Outcome: Progressing

## 2022-05-21 NOTE — ASSESSMENT & PLAN NOTE
· Patient reporting ongoing symptoms of dizziness not improving with stabilization of his hemoglobin  · Check EKG  · Will give another 1 time bolus of 500 cc normal saline  · Check orthostatic blood pressure

## 2022-05-21 NOTE — ASSESSMENT & PLAN NOTE
Please notify pt CT was normal except mild descending colon diverticulitis.  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

## 2022-05-21 NOTE — PROGRESS NOTES
[facility]  Progress Note - Norm Sees 1933, 80 y o  male MRN: [id number]  Unit/Bed#: -01 Encounter: [id number]  Primary Care Provider: Zara Mehta MD   Date and time admitted to hospital: 5/16/2022  1:51 PM    * Acute respiratory failure with hypoxia [facility])  Assessment & Plan  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  Diffuse groundglass opacities, developing since a CT from 2/28/2022   This is most consistent with either pulmonary edema or multifocal pneumonitis    · Afebrile, with mild leukocytosis on presentation and elevated BNP, suspect interstitial lung disease; lower suspicion for acute CHF   · Pulmonology following  · Respiratory protocol, incentive spirometer  · Continue O2 supplementation, goal > 88%  · Completed 3 days cefepime and vancomycin, d/c'd 5/18  · Procal flat, monitoring OFF further antibiotics   · Pulmonology to discuss fibrotic agents out patient     · Cardiology consulted and evaluated   · Echo with normal EF   · Recommend holding diuretics as patient appears euvolemic  · Signed off call with questions    Pulmonary fibrosis ([facility] [address] )  Assessment & Plan  · 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  · Continue prednisone 60 mg daily  · Pulmonology recommending taper by 10 mg every 5 days then stop  · Day 5/5 of 60 mg; Patient decreaed to 50 mg today 5/21   · Pulmonology input appreciated     Chronic heart failure with preserved ejection fraction (HCC)  Assessment & Plan  Wt Readings from Last 3 Encounters:   05/20/22 53 9 kg (118 lb 13 3 oz)   05/04/22 61 2 kg (135 lb)   11/08/21 63 5 kg (140 lb)     · Present on admission as evidence by CT findings and elevated BNP, cardiology input appreciated  · Suspect elevated BNP secondary to interstitial disease   · Delta troponin (-), EKG with bifascicular block  · Last echo 2017, LVEF 60% no evidence of valvular stenosis  Noted moderate right ventricular pressure elevation  · HOLD further Lasix given rise in BUN/Cr--improved today 5/20   · Strict I/Os, daily standing weights, sodium restricted diet  · Daily BMP to monitor electrolytes    Anemia  Assessment & Plan  · Continue ferrous sulfate  · Baseline hgb 10-11  · Hemoglobin currently between 8 3 to 8 9  · Continue q12h hemoglobin monitoring given stability   · Iron panel not showing deficiency, B12 high and folate normal  · Continue to hold heparin   · No evidence of active bleeding    Bifascicular block  Assessment & Plan  · Chronic on previous EKG  · Troponin 41>41>38    Leukocytosis  Assessment & Plan  · Mild leukocytosis, 11 87 on presentation  · Consider reactive, less likely infectious   · Trend daily CBC    Dizziness  Assessment & Plan  · Patient reporting ongoing symptoms of dizziness not improving with stabilization of his hemoglobin  · Check EKG  · Will give another 1 time bolus of 500 cc normal saline  · Check orthostatic blood pressure    LADY (acute kidney injury) ([facility] [address] )  Assessment & Plan  · Not present of admission baseline cr appears to be 1 2 to 1 4  · Possibly due to diuresis in the setting of pulmonary edema evident by increased creatinine greater than 0 3 g/dL with 48 hours 1 16>1 39>1 61  · Diuretic held, NSS bolus of 500 ml, monitoring  · Avoid nephrotoxins  · Overall improved   · Continue to monitor while inpatient     Severe protein-calorie malnutrition (HCC)  Assessment & Plan  Malnutrition Findings:   Adult Malnutrition type: Chronic illness albumin 2 6  BMI 18  Will give Ensure b i d  Encourage p o   Intake  Nutrition consult    Adult Degree of Malnutrition: Other severe protein calorie malnutrition  Malnutrition Characteristics: Fat loss, Muscle loss, Inadequate energy, Weight loss                  360 Statement: related to inadequate energy intake due to poor appetite as evidenced by hollow supraclavicular space, wasted interosseous, sunken orbital, visible ribs, weight trending down last 6 mos, 9 8%  Intervention GENNA diet, ensure enlive 3 times a day  BMI Findings: Body mass index is 17 05 kg/m²  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  Assessment & Plan  · Known history  · Patient wears left lower extremity prosthesis    Hyperlipidemia  Assessment & Plan  · Atorvastatin substitute for home dose simvastatin    Severe peripheral arterial disease (HCC)  Assessment & Plan  · S/p BLE angiogram,  Left SFA PTA, DCB, arthrectomy, Viabahn stent and L BK popliteal PTA  · Continue ASA and statin        VTE Pharmacologic Prophylaxis: VTE Score: 8 High Risk (Score >/= 5) - Pharmacological DVT Prophylaxis Contraindicated  Sequential Compression Devices Ordered  Patient Centered Rounds: I performed bedside rounds with nursing staff today  Discussions with Specialists or Other Care Team Provider:      Education and Discussions with Family / Patient: Updated wife  Time Spent for Care: 35 minutes  More than 50% of total time spent on counseling and coordination of care as described above  Current Length of Stay: 5 day(s)  Current Patient Status: Inpatient   Certification Statement: The patient will continue to require additional inpatient hospital stay due to Dizziness anemia need for further diagnostic workup  Discharge Plan: Anticipate discharge in 48 hrs to home with home services  Code Status: Level 3 - DNAR and DNI    Subjective:   Patient reporting feeling fine when sitting in bed however upon attempting getting up with nursing patient reported dizziness yet again    Discussion with wife 1 doing workup for dizziness and agrees with him not going home until his symptoms have improved    Objective:     Vitals:   Temp (24hrs), Av 6 °F (36 4 °C), Min:97 4 °F (36 3 °C), Max:98 °F (36 7 °C)    Temp:  [97 4 °F (36 3 °C)-98 °F (36 7 °C)] 97 4 °F (36 3 °C)  HR:  [56-73] 56  Resp:  [18] 18  BP: ()/(49-77) 141/77  SpO2:  [88 %-99 %] 91 %  Body mass index is 17 05 kg/m²  Input and Output Summary (last 24 hours): Intake/Output Summary (Last 24 hours) at 5/21/2022 1122  Last data filed at 5/21/2022 0933  Gross per 24 hour   Intake 180 ml   Output 925 ml   Net -745 ml       Physical Exam:   Physical Exam  Vitals and nursing note reviewed  Constitutional:       Appearance: He is cachectic  HENT:      Head: Normocephalic and atraumatic  Eyes:      Conjunctiva/sclera: Conjunctivae normal    Cardiovascular:      Rate and Rhythm: Normal rate and regular rhythm  Heart sounds: No murmur heard  Pulmonary:      Effort: Pulmonary effort is normal  No respiratory distress  Breath sounds: Normal breath sounds  Abdominal:      Palpations: Abdomen is soft  Tenderness: There is no abdominal tenderness  Musculoskeletal:      Cervical back: Neck supple  Skin:     General: Skin is warm and dry  Neurological:      Mental Status: He is alert and oriented to person, place, and time  Mental status is at baseline  Psychiatric:         Mood and Affect: Mood normal          Behavior: Behavior normal           Additional Data:     Labs:  Results from last 7 days   Lab Units 05/21/22  0856 05/20/22  1500 05/20/22  0440 05/16/22  1706 05/16/22  1428   WBC Thousand/uL  --   --  13 69*   < > 11 87*   HEMOGLOBIN g/dL 9 1*   < > 8 3*   < > 11 3*   HEMATOCRIT % 28 7*   < > 25 9*   < > 34 6*   PLATELETS Thousands/uL  --   --  262   < > 349   NEUTROS PCT %  --   --   --   --  81*   LYMPHS PCT %  --   --   --   --  9*   MONOS PCT %  --   --   --   --  8   EOS PCT %  --   --   --   --  1    < > = values in this interval not displayed       Results from last 7 days   Lab Units 05/20/22  0440 05/17/22  0517 05/16/22  1428   SODIUM mmol/L 140   < > 139 POTASSIUM mmol/L 4 7   < > 3 7   CHLORIDE mmol/L 103   < > 103   CO2 mmol/L 32   < > 28   BUN mg/dL 48*   < > 21   CREATININE mg/dL 1 37*   < > 1 16   ANION GAP mmol/L 5   < > 8   CALCIUM mg/dL 9 3   < > 8 5   ALBUMIN g/dL  --   --  2 6*   TOTAL BILIRUBIN mg/dL  --   --  0 79   ALK PHOS U/L  --   --  87   ALT U/L  --   --  20   AST U/L  --   --  18   GLUCOSE RANDOM mg/dL 117   < > 114    < > = values in this interval not displayed  Results from last 7 days   Lab Units 05/18/22  0855 05/17/22  1216 05/16/22  1428   PROCALCITONIN ng/ml 0 23 0 23 0 15       Lines/Drains:  Invasive Devices  Report    Peripheral Intravenous Line  Duration           Peripheral IV Left;Upper;Ventral (anterior) Arm -- days                      Imaging: No pertinent imaging reviewed  Recent Cultures (last 7 days):         Last 24 Hours Medication List:   Current Facility-Administered Medications   Medication Dose Route Frequency Provider Last Rate    aspirin  81 mg Oral Daily Malachi Mcdaniel MD      atorvastatin  20 mg Oral Daily With Kristin Hancock MD      ipratropium  0 5 mg Nebulization Q6H PRN Lamar Josue MD      levalbuterol  1 25 mg Nebulization Q6H PRN Lamar Josue MD      pantoprazole  40 mg Oral Early Morning Severino Blackwood MD      predniSONE  50 mg Oral Daily Melissa Styles MD      sodium chloride  500 mL Intravenous Once QASIM Garrido      sucralfate  1 g Oral 4x Daily (AC & HS) QASIM Garrido          Today, Patient Was Seen By: QASIM Garrido    **Please Note: This note may have been constructed using a voice recognition system  **

## 2022-05-21 NOTE — ASSESSMENT & PLAN NOTE
· Not present of admission baseline cr appears to be 1 2 to 1 4  · Possibly due to diuresis in the setting of pulmonary edema evident by increased creatinine greater than 0 3 g/dL with 48 hours 1 16>1 39>1 61  · Diuretic held, NSS bolus of 500 ml, monitoring  · Avoid nephrotoxins  · Overall improved   · Continue to monitor while inpatient

## 2022-05-22 NOTE — ASSESSMENT & PLAN NOTE
Malnutrition Findings:   Adult Malnutrition type: Chronic illness albumin 2 6  BMI 18  Will give Ensure b i d  Encourage p o  Intake  Nutrition consult    Adult Degree of Malnutrition: Other severe protein calorie malnutrition  Malnutrition Characteristics: Fat loss, Muscle loss, Inadequate energy, Weight loss                  360 Statement: related to inadequate energy intake due to poor appetite as evidenced by hollow supraclavicular space, wasted interosseous, sunken orbital, visible ribs, weight trending down last 6 mos, 9 8%  Intervention GENNA diet, ensure enlive 3 times a day  BMI Findings: Body mass index is 17 27 kg/m²

## 2022-05-22 NOTE — ASSESSMENT & PLAN NOTE
Wt Readings from Last 3 Encounters:   05/22/22 54 6 kg (120 lb 5 9 oz)   05/04/22 61 2 kg (135 lb)   11/08/21 63 5 kg (140 lb)     · Present on admission as evidence by CT findings and elevated BNP, cardiology input appreciated  · Suspect elevated BNP secondary to interstitial disease   · Delta troponin (-), EKG with bifascicular block  · Last echo 2017, LVEF 60% no evidence of valvular stenosis  Noted moderate right ventricular pressure elevation    · HOLD further Lasix given rise in BUN/Cr--improving   · Strict I/Os, daily standing weights, sodium restricted diet  · Daily BMP to monitor electrolytes

## 2022-05-22 NOTE — ASSESSMENT & PLAN NOTE
· Not present of admission baseline cr appears to be 1 2 to 1 4  · Possibly due to diuresis in the setting of pulmonary edema evident by increased creatinine greater than 0 3 g/dL with 48 hours 1 16>1 39>1 61>1 31  · Patient was still having significant dizziness yesterday patient received a 2nd bolus of normal saline 500 mL  · Creatinine improved to 1 31  · Patient's creatinine currently back at baseline  · Continue to monitor

## 2022-05-22 NOTE — ASSESSMENT & PLAN NOTE
· Chronic on previous EKG  · Troponin 41>41>38  · Patient with ongoing complaints of dizziness  ·  cc bolus given yesterday  · ATC meclazine added   · Hemoglobin improving  · EKG unchanged  · Telemetry monitoring overnight short period of bradycardia lasting 15 minutes likely while sleeping

## 2022-05-22 NOTE — ASSESSMENT & PLAN NOTE
· Continue ferrous sulfate  · Baseline hgb 10-11  · Hemoglobin remains stable with improvement 9 0-9 2  · Continue q12h hemoglobin monitoring given stability   · Continue to hold heparin   · No evidence of active bleeding

## 2022-05-22 NOTE — PROGRESS NOTES
[address] [facility]  Progress Note - Dano Pollard 1933, 80 y o  male MRN: [id number]  Unit/Bed#: -01 Encounter: [id number]  Primary Care Provider: Rigoberto Maharaj MD   Date and time admitted to hospital: 5/16/2022  1:51 PM    * Acute respiratory failure with hypoxia [facility])  Assessment & Plan  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  Diffuse groundglass opacities, developing since a CT from 2/28/2022   This is most consistent with either pulmonary edema or multifocal pneumonitis    · Afebrile, with mild leukocytosis on presentation and elevated BNP, suspect interstitial lung disease; lower suspicion for acute CHF   · Pulmonology following  · Respiratory protocol, incentive spirometer  · Continue O2 supplementation, goal > 88%  · Completed 3 days cefepime and vancomycin, d/c'd 5/18  · Procal flat, monitoring OFF further antibiotics   · Pulmonology to discuss fibrotic agents out patient   · Checked ambulatory pulse ox desat to 88% per nursing  · Will obtained home oxygen eval     · Cardiology consulted and evaluated   · Echo with normal EF   · Recommend holding diuretics as patient appears euvolemic  · Signed off call with questions    Pulmonary fibrosis ([facility] [address] )  Assessment & Plan  · 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  · Continue prednisone 60 mg daily  · Pulmonology recommending taper by 10 mg every 5 days then stop  · Day 5/5 of 60 mg; Patient decreaed to 50 mg 5/21 decrease to 40 mg 5/26   · Pulmonology input appreciated     Chronic heart failure with preserved ejection fraction [facility])  Assessment & Plan  Wt Readings from Last 3 Encounters:   05/22/22 54 6 kg (120 lb 5 9 oz)   05/04/22 61 2 kg (135 lb)   11/08/21 63 5 kg (140 lb)     · Present on admission as evidence by CT findings and elevated BNP, cardiology input appreciated  · Suspect elevated BNP secondary to interstitial disease   · Delta troponin (-), EKG with bifascicular block  · Last echo 2017, LVEF 60% no evidence of valvular stenosis  Noted moderate right ventricular pressure elevation    · HOLD further Lasix given rise in BUN/Cr--improving   · Strict I/Os, daily standing weights, sodium restricted diet  · Daily BMP to monitor electrolytes    Anemia  Assessment & Plan  · Continue ferrous sulfate  · Baseline hgb 10-11  · Hemoglobin remains stable with improvement 9 0-9 2  · Continue q12h hemoglobin monitoring given stability   · Continue to hold heparin   · No evidence of active bleeding    Bifascicular block  Assessment & Plan  · Chronic on previous EKG  · Troponin 41>41>38  · Patient with ongoing complaints of dizziness  ·  cc bolus given yesterday  · ATC meclazine added   · Hemoglobin improving  · EKG unchanged  · Telemetry monitoring overnight short period of bradycardia lasting 15 minutes likely while sleeping     Leukocytosis  Assessment & Plan  · Mild leukocytosis, 11 87 on presentation  · Consider reactive, less likely infectious   · Trend daily CBC    Dizziness  Assessment & Plan  · Patient reporting ongoing symptoms of dizziness not improving with stabilization of his hemoglobin  · EKG without changes  · Symptomatically patient improved after a bolus  · Overall blood dizziness is multifactorial in setting of dehydration and hypoxia  · Discontinue telemetry  · Encourage oral intake  · Would continue to hold diuretic at this time    LADY (acute kidney injury) ([facility] [address] )  Assessment & Plan  · Not present of admission baseline cr appears to be 1 2 to 1 4  · Possibly due to diuresis in the setting of pulmonary edema evident by increased creatinine greater than 0 3 g/dL with 48 hours 1 16>1 39>1 61>1 31  · Patient was still having significant dizziness yesterday patient received a 2nd bolus of normal saline 500 mL  · Creatinine improved to 1 31  · Patient's creatinine currently back at baseline  · Continue to monitor    Severe protein-calorie malnutrition ([address] )  Assessment & Plan  Malnutrition Findings:   Adult Malnutrition type: Chronic illness albumin 2 6  BMI 18  Will give Ensure b i d  Encourage p o  Intake  Nutrition consult    Adult Degree of Malnutrition: Other severe protein calorie malnutrition  Malnutrition Characteristics: Fat loss, Muscle loss, Inadequate energy, Weight loss                  360 Statement: related to inadequate energy intake due to poor appetite as evidenced by hollow supraclavicular space, wasted interosseous, sunken orbital, visible ribs, weight trending down last 6 mos, 9 8%  Intervention GENNA diet, ensure enlive 3 times a day  BMI Findings: Body mass index is 17 27 kg/m²  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  Assessment & Plan  · Known history  · Patient wears left lower extremity prosthesis    Hyperlipidemia  Assessment & Plan  · Continue statin ose simvastatin    Severe peripheral arterial disease (HCC)  Assessment & Plan  · S/p BLE angiogram,  Left SFA PTA, DCB, arthrectomy, Viabahn stent and L BK popliteal PTA  · Continue ASA and statin          VTE Pharmacologic Prophylaxis: VTE Score: 8 High Risk (Score >/= 5) - Pharmacological DVT Prophylaxis Contraindicated  Sequential Compression Devices Ordered  Anemia    Patient Centered Rounds: I performed bedside rounds with nursing staff today  Discussions with Specialists or Other Care Team Provider:      Education and Discussions with Family / Patient: Updated  (wife, daughter and daughter in law) at bedside  Time Spent for Care: 35 minutes  More than 50% of total time spent on counseling and coordination of care as described above      Current Length of Stay: 6 day(s)  Current Patient Status: Inpatient   Certification Statement: The patient will continue to require additional inpatient hospital stay due to Acute respiratory failure with hypoxia pending home oxygen evaluation and stiff dispo plan for home  Discharge Plan: Anticipate discharge tomorrow to home with home services  Code Status: Level 3 - DNAR and DNI    Subjective:   Lengthy discussion with patient regarding his dizziness symptoms which she state has improved today update given about the above plan and significant discussion regarding patient engagement and incentive spirometry/flutter valve increasing activity increasing his oral intake of water were discussed at length with the patient  At times patient has become tearful as he understands his lack of doing these things is contributing to him not symptomatically improving he agrees with getting out of bed today and continuing use with reminders of his incentive spirometry both here and at home  Lengthy discussion with his wife, daughter and, and daughter-in-law as they are concerned that he will not do this at home Na of sling and a slow debilitated state over the winter as he has become less active given his fear of falling and not wanting to be a burden to his family  Spent 25-30 minutes at bedside with both the family and patient  Objective:     Vitals:   Temp (24hrs), Av 9 °F (36 6 °C), Min:97 4 °F (36 3 °C), Max:98 6 °F (37 °C)    Temp:  [97 4 °F (36 3 °C)-98 6 °F (37 °C)] 97 7 °F (36 5 °C)  HR:  [55-61] 61  Resp:  [16-19] 18  BP: (105-151)/(47-65) 113/50  SpO2:  [95 %-100 %] 98 %  Body mass index is 17 27 kg/m²  Input and Output Summary (last 24 hours): Intake/Output Summary (Last 24 hours) at 2022 1149  Last data filed at 2022 0505  Gross per 24 hour   Intake 500 ml   Output 825 ml   Net -325 ml       Physical Exam:   Physical Exam  Vitals and nursing note reviewed  Constitutional:       Appearance: He is cachectic  HENT:      Head: Normocephalic and atraumatic     Eyes: Conjunctiva/sclera: Conjunctivae normal    Cardiovascular:      Rate and Rhythm: Normal rate and regular rhythm  Heart sounds: No murmur heard  Pulmonary:      Effort: Pulmonary effort is normal  No respiratory distress  Breath sounds: Normal breath sounds  Abdominal:      Palpations: Abdomen is soft  Tenderness: There is no abdominal tenderness  Musculoskeletal:      Cervical back: Neck supple  Skin:     General: Skin is warm and dry  Neurological:      Mental Status: He is alert and oriented to person, place, and time  Mental status is at baseline  Psychiatric:         Mood and Affect: Affect is tearful  Speech: Speech normal          Behavior: Behavior is cooperative  Comments: Patient tearful at times on discussion about health and things he needs to do moving forward patient appears accepting of education          Additional Data:     Labs:  Results from last 7 days   Lab Units 05/22/22  0418 05/16/22  1706 05/16/22  1428   WBC Thousand/uL 14 59*   < > 11 87*   HEMOGLOBIN g/dL 9 0*   < > 11 3*   HEMATOCRIT % 28 8*   < > 34 6*   PLATELETS Thousands/uL 288   < > 349   NEUTROS PCT %  --   --  81*   LYMPHS PCT %  --   --  9*   MONOS PCT %  --   --  8   EOS PCT %  --   --  1    < > = values in this interval not displayed  Results from last 7 days   Lab Units 05/22/22  0418 05/17/22  0517 05/16/22  1428   SODIUM mmol/L 137   < > 139   POTASSIUM mmol/L 5 3   < > 3 7   CHLORIDE mmol/L 100   < > 103   CO2 mmol/L 32   < > 28   BUN mg/dL 56*   < > 21   CREATININE mg/dL 1 31*   < > 1 16   ANION GAP mmol/L 5   < > 8   CALCIUM mg/dL 9 2   < > 8 5   ALBUMIN g/dL  --   --  2 6*   TOTAL BILIRUBIN mg/dL  --   --  0 79   ALK PHOS U/L  --   --  87   ALT U/L  --   --  20   AST U/L  --   --  18   GLUCOSE RANDOM mg/dL 140   < > 114    < > = values in this interval not displayed                   Results from last 7 days   Lab Units 05/18/22  0855 05/17/22  1216 05/16/22  1428   PROCALCITONIN ng/ml 0 23 0 23 0 15       Lines/Drains:  Invasive Devices  Report    Peripheral Intravenous Line  Duration           Peripheral IV Left;Upper;Ventral (anterior) Arm -- days                      Imaging: No pertinent imaging reviewed  Recent Cultures (last 7 days):         Last 24 Hours Medication List:   Current Facility-Administered Medications   Medication Dose Route Frequency Provider Last Rate    aspirin  81 mg Oral Daily Stefan Riedel, MD      atorvastatin  20 mg Oral Daily With Kenna Montalvo MD      ipratropium  0 5 mg Nebulization Q6H PRN Elva Carter MD      levalbuterol  1 25 mg Nebulization Q6H PRN Elva Carter MD      meclizine  25 mg Oral Q8H [facility] QASIM Smallwood      pantoprazole  40 mg Oral Early Morning Nesha Chua MD      predniSONE  50 mg Oral Daily Sumanth Bojorquez MD      sucralfate  1 g Oral 4x Daily (AC & HS) QASIM Smallwood          Today, Patient Was Seen By: QASIM Smallwood    **Please Note: This note may have been constructed using a voice recognition system  **

## 2022-05-22 NOTE — ASSESSMENT & PLAN NOTE
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  Diffuse groundglass opacities, developing since a CT from 2/28/2022   This is most consistent with either pulmonary edema or multifocal pneumonitis    · Afebrile, with mild leukocytosis on presentation and elevated BNP, suspect interstitial lung disease; lower suspicion for acute CHF   · Pulmonology following  · Respiratory protocol, incentive spirometer  · Continue O2 supplementation, goal > 88%  · Completed 3 days cefepime and vancomycin, d/c'd 5/18  · Procal flat, monitoring OFF further antibiotics   · Pulmonology to discuss fibrotic agents out patient   · Checked ambulatory pulse ox desat to 88% per nursing  · Will obtained home oxygen eval     · Cardiology consulted and evaluated   · Echo with normal EF   · Recommend holding diuretics as patient appears euvolemic  · Signed off call with questions

## 2022-05-22 NOTE — PLAN OF CARE
Problem: Potential for Falls  Goal: Patient will remain free of falls  Description: INTERVENTIONS:  - Educate patient/family on patient safety including physical limitations  - Instruct patient to call for assistance with activity   - Consult OT/PT to assist with strengthening/mobility   - Keep Call bell within reach  - Keep bed low and locked with side rails adjusted as appropriate  - Keep care items and personal belongings within reach  - Initiate and maintain comfort rounds  - Make Fall Risk Sign visible to staff  - Offer Toileting every Hours, in advance of need  - Initiate/Maintain alarm  - Obtain necessary fall risk management equipment:   - Apply yellow socks and bracelet for high fall risk patients  - Consider moving patient to room near nurses station  Outcome: Progressing     Problem: MOBILITY - ADULT  Goal: Maintain or return to baseline ADL function  Description: INTERVENTIONS:  -  Assess patient's ability to carry out ADLs; assess patient's baseline for ADL function and identify physical deficits which impact ability to perform ADLs (bathing, care of mouth/teeth, toileting, grooming, dressing, etc )  - Assess/evaluate cause of self-care deficits   - Assess range of motion  - Assess patient's mobility; develop plan if impaired  - Assess patient's need for assistive devices and provide as appropriate  - Encourage maximum independence but intervene and supervise when necessary  - Involve family in performance of ADLs  - Assess for home care needs following discharge   - Consider OT consult to assist with ADL evaluation and planning for discharge  - Provide patient education as appropriate  Outcome: Progressing  Goal: Maintains/Returns to pre admission functional level  Description: INTERVENTIONS:  - Perform BMAT or MOVE assessment daily    - Set and communicate daily mobility goal to care team and patient/family/caregiver     - Collaborate with rehabilitation services on mobility goals if consulted  - Perform Range of Motion  times a day  - Reposition patient every  hours    - Dangle patient  times a day  - Stand patient  times a day  - Ambulate patient  times a day  - Out of bed to chair  times a day   - Out of bed for meals  times a day  - Out of bed for toileting  - Record patient progress and toleration of activity level   Outcome: Progressing     Problem: PAIN - ADULT  Goal: Verbalizes/displays adequate comfort level or baseline comfort level  Description: Interventions:  - Encourage patient to monitor pain and request assistance  - Assess pain using appropriate pain scale  - Administer analgesics based on type and severity of pain and evaluate response  - Implement non-pharmacological measures as appropriate and evaluate response  - Consider cultural and social influences on pain and pain management  - Notify physician/advanced practitioner if interventions unsuccessful or patient reports new pain  Outcome: Progressing     Problem: INFECTION - ADULT  Goal: Absence or prevention of progression during hospitalization  Description: INTERVENTIONS:  - Assess and monitor for signs and symptoms of infection  - Monitor lab/diagnostic results  - Monitor all insertion sites, i e  indwelling lines, tubes, and drains  - Monitor endotracheal if appropriate and nasal secretions for changes in amount and color  - Leland appropriate cooling/warming therapies per order  - Administer medications as ordered  - Instruct and encourage patient and family to use good hand hygiene technique  - Identify and instruct in appropriate isolation precautions for identified infection/condition  Outcome: Progressing  Goal: Absence of fever/infection during neutropenic period  Description: INTERVENTIONS:  - Monitor WBC    Outcome: Progressing     Problem: SAFETY ADULT  Goal: Patient will remain free of falls  Description: INTERVENTIONS:  - Educate patient/family on patient safety including physical limitations  - Instruct patient to call for assistance with activity   - Consult OT/PT to assist with strengthening/mobility   - Keep Call bell within reach  - Keep bed low and locked with side rails adjusted as appropriate  - Keep care items and personal belongings within reach  - Initiate and maintain comfort rounds  - Make Fall Risk Sign visible to staff  - Offer Toileting every  Hours, in advance of need  - Initiate/Maintaialarm  - Obtain necessary fall risk management equipment:   - Apply yellow socks and bracelet for high fall risk patients  - Consider moving patient to room near nurses station  Outcome: Progressing  Goal: Maintain or return to baseline ADL function  Description: INTERVENTIONS:  -  Assess patient's ability to carry out ADLs; assess patient's baseline for ADL function and identify physical deficits which impact ability to perform ADLs (bathing, care of mouth/teeth, toileting, grooming, dressing, etc )  - Assess/evaluate cause of self-care deficits   - Assess range of motion  - Assess patient's mobility; develop plan if impaired  - Assess patient's need for assistive devices and provide as appropriate  - Encourage maximum independence but intervene and supervise when necessary  - Involve family in performance of ADLs  - Assess for home care needs following discharge   - Consider OT consult to assist with ADL evaluation and planning for discharge  - Provide patient education as appropriate  Outcome: Progressing  Goal: Maintains/Returns to pre admission functional level  Description: INTERVENTIONS:  - Perform BMAT or MOVE assessment daily    - Set and communicate daily mobility goal to care team and patient/family/caregiver  - Collaborate with rehabilitation services on mobility goals if consulted  - Perform Range of Motion  times a day  - Reposition patient every  hours    - Dangle patient  times a day  - Stand patient  times a day  - Ambulate patient  times a day  - Out of bed to chair  times a day   - Out of bed for meal times a day  - Out of bed for toileting  - Record patient progress and toleration of activity level   Outcome: Progressing     Problem: DISCHARGE PLANNING  Goal: Discharge to home or other facility with appropriate resources  Description: INTERVENTIONS:  - Identify barriers to discharge w/patient and caregiver  - Arrange for needed discharge resources and transportation as appropriate  - Identify discharge learning needs (meds, wound care, etc )  - Arrange for interpretive services to assist at discharge as needed  - Refer to Case Management Department for coordinating discharge planning if the patient needs post-hospital services based on physician/advanced practitioner order or complex needs related to functional status, cognitive ability, or social support system  Outcome: Progressing     Problem: Knowledge Deficit  Goal: Patient/family/caregiver demonstrates understanding of disease process, treatment plan, medications, and discharge instructions  Description: Complete learning assessment and assess knowledge base    Interventions:  - Provide teaching at level of understanding  - Provide teaching via preferred learning methods  Outcome: Progressing     Problem: Prexisting or High Potential for Compromised Skin Integrity  Goal: Skin integrity is maintained or improved  Description: INTERVENTIONS:  - Identify patients at risk for skin breakdown  - Assess and monitor skin integrity  - Assess and monitor nutrition and hydration status  - Monitor labs   - Assess for incontinence   - Turn and reposition patient  - Assist with mobility/ambulation  - Relieve pressure over bony prominences  - Avoid friction and shearing  - Provide appropriate hygiene as needed including keeping skin clean and dry  - Evaluate need for skin moisturizer/barrier cream  - Collaborate with interdisciplinary team   - Patient/family teaching  - Consider wound care consult   Outcome: Progressing     Problem: Nutrition/Hydration-ADULT  Goal: Nutrient/Hydration intake appropriate for improving, restoring or maintaining nutritional needs  Description: Monitor and assess patient's nutrition/hydration status for malnutrition  Collaborate with interdisciplinary team and initiate plan and interventions as ordered  Monitor patient's weight and dietary intake as ordered or per policy  Utilize nutrition screening tool and intervene as necessary  Determine patient's food preferences and provide high-protein, high-caloric foods as appropriate       INTERVENTIONS:  - Monitor oral intake, urinary output, labs, and treatment plans  - Assess nutrition and hydration status and recommend course of action  - Evaluate amount of meals eaten  - Assist patient with eating if necessary   - Allow adequate time for meals  - Recommend/ encourage appropriate diets, oral nutritional supplements, and vitamin/mineral supplements  - Order, calculate, and assess calorie counts as needed  - Assess need for intravenous fluids  - Provide nutrition/hydration education as appropriate  - Include patient/family/caregiver in decisions related to nutrition  Outcome: Progressing

## 2022-05-22 NOTE — ASSESSMENT & PLAN NOTE
· Patient reporting ongoing symptoms of dizziness not improving with stabilization of his hemoglobin  · EKG without changes  · Symptomatically patient improved after a bolus  · Overall blood dizziness is multifactorial in setting of dehydration and hypoxia  · Discontinue telemetry  · Encourage oral intake  · Would continue to hold diuretic at this time

## 2022-05-22 NOTE — RESPIRATORY THERAPY NOTE
Home Oxygen Qualifying Test     Patient name: Sav Soto        : 1933   Date of Test:  May 22, 2022  Diagnosis:    Home Oxygen Test:    **Medicare Guidelines require item(s) 1-5 on all ambulatory patients or 1 and 2 on non-ambulatory patients  1  Baseline SPO2 on Room Air at rest 90 %   a  If <= 88% on Room Air add O2 via NC to obtain SpO2 >=88%  If LPM needed, document LPM  needed to reach =>88%    2  SPO2 during exertion on Room Air 87  %  a  During exertion monitor SPO2  If SPO2 increases >=89%, do not add supplemental oxygen    3  SPO2 on Oxygen at Rest 97 % at 2 LPM    4  SPO2 during exertion on Oxygen 92 % at 2 LPM    5  Test performed during exertion activity  [x]  Supplemental Home Oxygen is indicated  []  Client does not qualify for home oxygen  Respiratory Additional Notes- pt sat dropped to 87% while ambulating on RA  Titrated to 2L to achieve a sat greater than 88%    Pt will require 2L home O2  Nel Mike, RT

## 2022-05-22 NOTE — ASSESSMENT & PLAN NOTE
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

## 2022-05-23 NOTE — ASSESSMENT & PLAN NOTE
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

## 2022-05-23 NOTE — DISCHARGE SUMMARY
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  Diffuse groundglass opacities, developing since a CT from 2/28/2022   This is most consistent with either pulmonary edema or multifocal pneumonitis  · Afebrile, with mild leukocytosis on presentation and elevated BNP, suspect interstitial lung disease; lower suspicion for acute CHF as BUN/Cr harriet with diuresis   · Pulmonology and cardiology consulted  · Received 3 days of IV antibiotics, flat procal and discontinued   Stable off further treatment   · Steroids to slowly be tapered by 10 mg q 5 days   · Patient encouraged to participate in respiratory exercises   · Will require supplemental O2 on discharge, goal > 88%  · PT/OT evals noted - 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  · Continue prednisone 60 mg daily  · Pulmonology recommending taper by 10 mg every 5 days then stop  · Decreased to 50 mg 5/21     Chronic heart failure with preserved ejection fraction (HCC)  Assessment & Plan  Wt Readings from Last 3 Encounters:   05/23/22 54 4 kg (119 lb 14 9 oz)   05/04/22 61 2 kg (135 lb)   11/08/21 63 5 kg (140 lb)     · Present on admission as evidence by CT findings and elevated BNP, cardiology input appreciated  · Suspect elevated BNP secondary to interstitial disease   · Delta troponin (-), EKG with bifascicular block  · Last echo 2017, LVEF 60% no evidence of valvular stenosis  Noted moderate right ventricular pressure elevation  · Echo 5/18/22:  LVEF 10%, grade 1 diastolic dysfunction, RV/RA dilated, RVSP elevated 50 mmHg  · Euvolemic appearing on discharge   · Will need follow up with cardiology     Leukocytosis  Assessment & Plan  · Mild leukocytosis, 11 87 on presentation  · Consider reactive, less likely infectious   · Overall, stable     Severe peripheral arterial disease ([address] )  Assessment & Plan  · S/p BLE angiogram,  Left SFA PTA, DCB, arthrectomy, Viabahn stent and L BK popliteal PTA  · Continue ASA and statin    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  Assessment & Plan  · Known history, wears left lower extremity prosthesis    Bifascicular block  Assessment & Plan  · Chronic, EKG unchanged    Anemia  Assessment & Plan  · Continue ferrous sulfate  · Baseline hgb 10-11  · Hemoglobin remains stable but below baseline   · No evidence of active bleeding    Severe protein-calorie malnutrition (HCC)  Assessment & Plan  Malnutrition Findings:   Adult Malnutrition type: Chronic illness   Albumin 2 6  BMI 18  Encourage p o  Intake  Nutrition consult  Adult Degree of Malnutrition: Other severe protein calorie malnutrition  Malnutrition Characteristics: Fat loss, Muscle loss, Inadequate energy, Weight loss                  360 Statement: related to inadequate energy intake due to poor appetite as evidenced by hollow supraclavicular space, wasted interosseous, sunken orbital, visible ribs, weight trending down last 6 mos, 9 8%  Intervention GENNA diet, ensure enlive 3 times a day  BMI Findings: Body mass index is 17 21 kg/m²         Hyperlipidemia  Assessment & Plan  · Continue statin     Dizziness  Assessment & Plan  · Patient reporting ongoing symptoms of dizziness not improving with stabilization of his hemoglobin  · EKG without changes  · Symptomatically patient improved after receiving IV fluid resuscitation   · Overall suspect dizziness is multifactorial in setting of dehydration and hypoxia  · Encourage oral intake  · Would continue to hold diuretic at this time    LADY (acute kidney injury) ([facility] [address] )  Assessment & Plan  · Not present of admission; baseline cr appears to be 1 2 to 1 4  · Possibly due to diuresis in the setting of pulmonary edema evident by increased creatinine greater than 0 3 g/dL with 48 hours 1 16>1 39>1 61>1 31  · Patient was still having dizziness over the wknd, received boluses of normal saline + meclizine   · Creatinine improved to 1 31        Medical Problems             Resolved Problems  Date Reviewed: 5/23/2022   None               Discharging Physician / Practitioner: Bre Hodges PA-C  PCP: Leni Marcus MD  Admission Date:   Admission Orders (From admission, onward)     Ordered        05/16/22 1612  Inpatient Admission  Once                      Discharge Date: 05/23/22    Consultations During Hospital Stay:  IP CONSULT TO PULMONOLOGY  IP CONSULT TO CARDIOLOGY  IP CONSULT TO PHARMACY  · IP CONSULT TO NUTRITION SERVICES     Procedures Performed:   · None     Significant Findings / Test Results:   XR shoulder 2+ vw right   Final Result by Kati Vásquez DO (05/19 3036)   Stable, minimally displaced fracture involving the posterior aspect of the acromion process  This is better seen on the prior shoulder radiographs and CT chest       No acute osseous abnormality  CT chest high resolution   Final Result by Sina Vasquez MD (05/17 9988)      No change since the chest CT 7 hours earlier with mild patchy bilateral groundglass opacity, greater on the right  Given elevated BNP, this could be due to asymmetric edema, but atypical pneumonia or acute exacerbation of interstitial lung disease    cannot be excluded  Redemonstration of moderate UIP pattern of pulmonary fibrosis        CT chest without contrast   Final Result by Sara Martino MD (05/16 2643)      1  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  2   Diffuse groundglass opacities, developing since a CT from 2/28/2022  This is most consistent with either pulmonary edema or multifocal pneumonitis  XR chest 1 view portable   Final Result by Jesu Yarbrough MD (05/17 8725)      Pulmonary fibrosis with superimposed groundglass opacities  Findings are compatible with pulmonary fibrosis  Superimposed pneumonia versus heart failure should be considered  Recent Labs     05/21/22 2109 05/22/22 0418 05/22/22 2001   BUN  --  56*  --    CREATININE  --  1 31*  --    EGFR  --  48  --    WBC  --  14 59*  --    HCT 29 1* 28 8* 28 1*       Incidental Findings:   · None      Test Results Pending at Discharge (will require follow up): · None      Outpatient Tests Requested:  · PCP follow up  · Pulmonology follow up  · Cardiology follow up    Complications:  Dizziness, hypoxia     Reason for Admission: hypoxia     Hospital Course:   Sav Soto is a 80 y o  male patient who originally presented to the hospital on 5/16/2022 due to shortness of breath  Patient's past medical history significant for anemia, peripheral arterial disease status post BKA, bifascicular block, CHF, 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  Patient was treated for pulmonary fibrosis flare with steroids, he was diuresed however became dehydrated and hypotensive  Patient received IV fluids and is now feeling much better  He did have some dizziness, has been using meclizine and reports resolution  Patient will have close outpatient follow-up, I did offer to call his wife but he declined  Reports his son will be picking him up and he will have everything else that he needs at home  Please see above list of diagnoses and related plan for additional information       Condition at Discharge: stable    Discharge Day Visit / Exam:   Subjective:  Patient reports feeling the best he has felt in quite a while, he is very anxious to get home  Denies any chest pain right now  Denies any worsening shortness of breath  He does ask if he needs to wear the oxygen on he is not moving, we reviewed his oxygen evaluation  Vitals: Blood Pressure: 121/58 (05/23/22 0656)  Pulse: 58 (05/23/22 0656)  Temperature: 97 7 °F (36 5 °C) (05/23/22 0656)  Temp Source: Oral (05/22/22 1534)  Respirations: 18 (05/23/22 0656)  Height: 5' 10" (177 8 cm) (05/18/22 1430)  Weight - Scale: 54 4 kg (119 lb 14 9 oz) (05/23/22 0600)  SpO2: 99 % (05/23/22 0656)  Exam:   Physical Exam  Vitals and nursing note reviewed  Constitutional:       General: He is not in acute distress  Appearance: He is ill-appearing  He is not toxic-appearing  Cardiovascular:      Rate and Rhythm: Normal rate and regular rhythm  Pulmonary:      Effort: Pulmonary effort is normal  No respiratory distress  Breath sounds: No wheezing or rales  Abdominal:      General: Bowel sounds are normal       Palpations: Abdomen is soft  Neurological:      Mental Status: He is alert and oriented to person, place, and time  Psychiatric:         Mood and Affect: Mood normal          Behavior: Behavior normal         Discussion with Family: Patient declined call to   Discharge instructions/Information to patient and family:   See after visit summary for information provided to patient and family  Provisions for Follow-Up Care:  See after visit summary for information related to follow-up care and any pertinent home health orders  Disposition:   Home with VNA Services (Reminder: Complete face to face encounter)    Planned Readmission: none     Discharge Statement:  I spent >50 minutes discharging the patient  This time was spent on the day of discharge  I had direct contact with the patient on the day of discharge   Greater than 50% of the total time was spent examining patient, answering all patient questions, arranging and discussing plan of care with patient as well as directly providing post-discharge instructions  Additional time then spent on discharge activities  Discharge Medications:  See after visit summary for reconciled discharge medications provided to patient and/or family        **Please Note: This note may have been constructed using a voice recognition system**

## 2022-05-23 NOTE — UTILIZATION REVIEW
Continued Stay Review    Date: 5/21                          Current Patient Class: Inpatient   Current Level of Care: Med/surg    HPI:88 y o  male initially admitted on 5/16     Assessment/Plan:   Holding Lasix due to rise in BUN/CReat  Continue to monitor  hgb stable  Continue iron and hold heparin  Trend hgb every 12 hours  Ongoing dizziness  Give 500 ml fluid bolus  PT/OT recommend post acute rehab  Vital Signs:   Time Temp Pulse Resp BP MAP (mmHg) SpO2 Calculated FIO2 (%) - Nasal Cannula Nasal Cannula O2 Flow Rate (L/min) O2 Device Patient Position - Orthostatic VS   05/21/22 2321 -- -- -- -- -- 98 % -- -- -- --   05/21/22 22:10:44 98 2 °F (36 8 °C) 59 -- 112/51 71 100 % -- -- -- --   05/21/22 18:39:55 -- 58 -- 107/51 70 99 % -- -- -- --   05/21/22 14:50:15 97 7 °F (36 5 °C) 57 19 105/47 Abnormal  66 99 % -- -- -- Sitting   05/21/22 0816 -- -- -- -- -- 91 % -- -- -- --   05/21/22 07:13:17 97 4 °F (36 3 °C) Abnormal  56 18 141/77 98 99 % -- -- -- --     Pertinent Labs/Diagnostic Results:   5/21 EKG: Sinus bradycardia with 1st degree A-V block with Premature atrial complexes in a pattern of bigeminy  Right bundle branch block  Left anterior fascicular block  Bifascicular block  Minimal voltage criteria for LVH, may be normal variant  Nonspecific ST changes  5/19 EKG: Normal sinus rhythm  Left axis deviation  Right bundle branch block  Minimal voltage criteria for LVH, may be normal variant  Nonspecific ST and T wave abnormality  5/18 ECHO: Left Ventricle: Left ventricular cavity size is normal  Wall thickness is mildly increased  There is mild concentric hypertrophy  The left ventricular ejection fraction is 60%  Systolic function is normal  Diastolic function is mildly abnormal, consistent with grade I (abnormal) relaxation    Right Ventricle: Right ventricular cavity size is moderately dilated  Systolic function is mildly reduced    Right Atrium: The atrium is mildly dilated    Mitral Valve:  There is mild annular calcification    Tricuspid Valve: There is mild regurgitation  The right ventricular systolic pressure is moderately elevated  RVSP 50 mm of Hg    Aorta: The aortic root is upper limit of normal size        Results from last 7 days   Lab Units 05/16/22  1428   SARS-COV-2  Negative     Results from last 7 days   Lab Units 05/21/22  2109 05/21/22  0856 05/20/22  1500 05/20/22  0440 05/19/22  0851 05/19/22  0443 05/16/22  1706 05/16/22  1428   WBC Thousand/uL  --   --   --  13 69*  --  12 16*   < > 11 87*   HEMOGLOBIN g/dL 9 2* 9 1*   < > 8 3*   < > 7 9*   < > 11 3*   HEMATOCRIT % 29 1* 28 7*   < > 25 9*   < > 24 5*   < > 34 6*   PLATELETS Thousands/uL  --   --   --  262  --  244   < > 349   NEUTROS ABS Thousands/µL  --   --   --   --   --   --   --  9 62*    < > = values in this interval not displayed           Results from last 7 days   Lab Units 05/20/22  0440 05/19/22  0443 05/18/22  0502 05/17/22  0517   SODIUM mmol/L 140 139 136 139   POTASSIUM mmol/L 4 7 3 9 3 9 4 4   CHLORIDE mmol/L 103 103 101 102   CO2 mmol/L 32 29 27 27   ANION GAP mmol/L 5 7 8 10   BUN mg/dL 48* 46* 48* 26*   CREATININE mg/dL 1 37* 1 51* 1 61* 1 39*   EGFR ml/min/1 73sq m 45 40 37 44   CALCIUM mg/dL 9 3 8 8 8 8 8 7   MAGNESIUM mg/dL  --   --   --  1 8   PHOSPHORUS mg/dL  --   --   --  5 3*     Results from last 7 days   Lab Units 05/16/22  1428   AST U/L 18   ALT U/L 20   ALK PHOS U/L 87   TOTAL PROTEIN g/dL 7 2   ALBUMIN g/dL 2 6*   TOTAL BILIRUBIN mg/dL 0 79   BILIRUBIN DIRECT mg/dL 0 27*         Results from last 7 days   Lab Units 05/20/22  0440 05/19/22  0443 05/18/22  0502 05/17/22  0517 05/16/22  1428   GLUCOSE RANDOM mg/dL 117 142* 146* 139 114         Results from last 7 days   Lab Units 05/19/22  1332 05/19/22  1058 05/19/22  0851 05/16/22  1840 05/16/22  1706 05/16/22  1428   HS TNI 0HR ng/L  --   --  40  --   --  41   HS TNI 2HR ng/L  --  47  --   --  41  --    HSTNI D2 ng/L  --  7  --   --  0  --    HS TNI 4HR ng/L 48  --   --  38  --   --    HSTNI D4 ng/L 8  --   --  -3  --   --        Results from last 7 days   Lab Units 05/18/22  0855 05/17/22  1216 05/16/22  1428   PROCALCITONIN ng/ml 0 23 0 23 0 15       Results from last 7 days   Lab Units 05/16/22  1428   NT-PRO BNP pg/mL 1,690*     Results from last 7 days   Lab Units 05/19/22  0443   FERRITIN ng/mL 603*     Results from last 7 days   Lab Units 05/17/22  0517   CRP mg/L 145 4*       Results from last 7 days   Lab Units 05/21/22  0845   CLARITY UA  Clear   COLOR UA  Yellow   SPEC GRAV UA  1 020   PH UA  6 0   GLUCOSE UA mg/dl Negative   KETONES UA mg/dl Negative   BLOOD UA  Negative   PROTEIN UA mg/dl Negative   NITRITE UA  Negative   BILIRUBIN UA  Negative   UROBILINOGEN UA E U /dl 0 2   LEUKOCYTES UA  Negative   WBC UA /hpf None Seen   RBC UA /hpf None Seen   BACTERIA UA /hpf None Seen   EPITHELIAL CELLS WET PREP /hpf None Seen     Results from last 7 days   Lab Units 05/16/22  1428   INFLUENZA A PCR  Negative   INFLUENZA B PCR  Negative   RSV PCR  Negative           Medications:   Scheduled Medications:  aspirin, 81 mg, Oral, Daily  atorvastatin, 20 mg, Oral, Daily With Dinner  meclizine, 25 mg, Oral, Q8H ESTELLE  pantoprazole, 40 mg, Oral, Early Morning  predniSONE, 50 mg, Oral, Daily  sucralfate, 1 g, Oral, 4x Daily (AC & HS)      Continuous IV Infusions:     PRN Meds:  ipratropium, 0 5 mg, Nebulization, Q6H PRN  levalbuterol, 1 25 mg, Nebulization, Q6H PRN        Discharge Plan: TBD    Network Utilization Review Department  ATTENTION: Please call with any questions or concerns to [phone] and carefully listen to the prompts so that you are directed to the right person  All voicemails are confidential   Adam Gonzales all requests for admission clinical reviews, approved or denied determinations and any other requests to dedicated fax number below belonging to the campus where the patient is receiving treatment   List of dedicated fax numbers for the Facilities:  FACILITY NAME UR FAX NUMBER   ADMISSION DENIALS (Administrative/Medical Necessity) [phone]   [address] (Maternity/NICU/Pediatrics) [address] [facility] [address] [place] [address]  [phone]   Tyrell Alman[address] [address] [address] [place] [address] [address] [facility] [address] [facility] [phone]

## 2022-05-23 NOTE — NURSING NOTE
PT advised to wait for oxygen to get to the home before he leaves the hospital however Pt states that he is leaving now he does not want to wait for the oxygen to be delivered    Gayle Jean RN  2:09 PM  05/23/22

## 2022-05-23 NOTE — ASSESSMENT & PLAN NOTE
· Patient reporting ongoing symptoms of dizziness not improving with stabilization of his hemoglobin  · EKG without changes  · Symptomatically patient improved after receiving IV fluid resuscitation   · Overall suspect dizziness is multifactorial in setting of dehydration and hypoxia  · Encourage oral intake  · Would continue to hold diuretic at this time

## 2022-05-23 NOTE — PLAN OF CARE
Problem: Potential for Falls  Goal: Patient will remain free of falls  Description: INTERVENTIONS:  - Educate patient/family on patient safety including physical limitations  - Instruct patient to call for assistance with activity   - Consult OT/PT to assist with strengthening/mobility   - Keep Call bell within reach  - Keep bed low and locked with side rails adjusted as appropriate  - Keep care items and personal belongings within reach  - Initiate and maintain comfort rounds  - Make Fall Risk Sign visible to staff  - Offer Toileting every  Hours, in advance of need  - Initiate/Maintain alarm  - Obtain necessary fall risk management equipment:   - Apply yellow socks and bracelet for high fall risk patients  - Consider moving patient to room near nurses station  Outcome: Progressing     Problem: MOBILITY - ADULT  Goal: Maintain or return to baseline ADL function  Description: INTERVENTIONS:  -  Assess patient's ability to carry out ADLs; assess patient's baseline for ADL function and identify physical deficits which impact ability to perform ADLs (bathing, care of mouth/teeth, toileting, grooming, dressing, etc )  - Assess/evaluate cause of self-care deficits   - Assess range of motion  - Assess patient's mobility; develop plan if impaired  - Assess patient's need for assistive devices and provide as appropriate  - Encourage maximum independence but intervene and supervise when necessary  - Involve family in performance of ADLs  - Assess for home care needs following discharge   - Consider OT consult to assist with ADL evaluation and planning for discharge  - Provide patient education as appropriate  Outcome: Progressing  Goal: Maintains/Returns to pre admission functional level  Description: INTERVENTIONS:  - Perform BMAT or MOVE assessment daily    - Set and communicate daily mobility goal to care team and patient/family/caregiver     - Collaborate with rehabilitation services on mobility goals if consulted  - Perform Range of Motion  times a day  - Reposition patient every  hours    - Dangle patient times a day  - Stand patient  times a day  - Ambulate patient  times a day  - Out of bed to chair  times a day   - Out of bed for meals  times a day  - Out of bed for toileting  - Record patient progress and toleration of activity level   Outcome: Progressing     Problem: PAIN - ADULT  Goal: Verbalizes/displays adequate comfort level or baseline comfort level  Description: Interventions:  - Encourage patient to monitor pain and request assistance  - Assess pain using appropriate pain scale  - Administer analgesics based on type and severity of pain and evaluate response  - Implement non-pharmacological measures as appropriate and evaluate response  - Consider cultural and social influences on pain and pain management  - Notify physician/advanced practitioner if interventions unsuccessful or patient reports new pain  Outcome: Progressing     Problem: INFECTION - ADULT  Goal: Absence or prevention of progression during hospitalization  Description: INTERVENTIONS:  - Assess and monitor for signs and symptoms of infection  - Monitor lab/diagnostic results  - Monitor all insertion sites, i e  indwelling lines, tubes, and drains  - Monitor endotracheal if appropriate and nasal secretions for changes in amount and color  - [place] appropriate cooling/warming therapies per order  - Administer medications as ordered  - Instruct and encourage patient and family to use good hand hygiene technique  - Identify and instruct in appropriate isolation precautions for identified infection/condition  Outcome: Progressing  Goal: Absence of fever/infection during neutropenic period  Description: INTERVENTIONS:  - Monitor WBC    Outcome: Progressing     Problem: SAFETY ADULT  Goal: Patient will remain free of falls  Description: INTERVENTIONS:  - Educate patient/family on patient safety including physical limitations  - Instruct patient to call for assistance with activity   - Consult OT/PT to assist with strengthening/mobility   - Keep Call bell within reach  - Keep bed low and locked with side rails adjusted as appropriate  - Keep care items and personal belongings within reach  - Initiate and maintain comfort rounds  - Make Fall Risk Sign visible to staff  - Offer Toileting every  Hours, in advance of need  - Initiate/Maintain alarm  - Obtain necessary fall risk management equipment:   - Apply yellow socks and bracelet for high fall risk patients  - Consider moving patient to room near nurses station  Outcome: Progressing  Goal: Maintain or return to baseline ADL function  Description: INTERVENTIONS:  -  Assess patient's ability to carry out ADLs; assess patient's baseline for ADL function and identify physical deficits which impact ability to perform ADLs (bathing, care of mouth/teeth, toileting, grooming, dressing, etc )  - Assess/evaluate cause of self-care deficits   - Assess range of motion  - Assess patient's mobility; develop plan if impaired  - Assess patient's need for assistive devices and provide as appropriate  - Encourage maximum independence but intervene and supervise when necessary  - Involve family in performance of ADLs  - Assess for home care needs following discharge   - Consider OT consult to assist with ADL evaluation and planning for discharge  - Provide patient education as appropriate  Outcome: Progressing  Goal: Maintains/Returns to pre admission functional level  Description: INTERVENTIONS:  - Perform BMAT or MOVE assessment daily    - Set and communicate daily mobility goal to care team and patient/family/caregiver  - Collaborate with rehabilitation services on mobility goals if consulted  - Perform Range of Motion  times a day  - Reposition patient every  hours    - Dangle patient  times a day  - Stand patient  times a day  - Ambulate patient  times a day  - Out of bed to chair  times a day   - Out of bed for meals  times a day  - Out of bed for toileting  - Record patient progress and toleration of activity level   Outcome: Progressing     Problem: DISCHARGE PLANNING  Goal: Discharge to home or other facility with appropriate resources  Description: INTERVENTIONS:  - Identify barriers to discharge w/patient and caregiver  - Arrange for needed discharge resources and transportation as appropriate  - Identify discharge learning needs (meds, wound care, etc )  - Arrange for interpretive services to assist at discharge as needed  - Refer to Case Management Department for coordinating discharge planning if the patient needs post-hospital services based on physician/advanced practitioner order or complex needs related to functional status, cognitive ability, or social support system  Outcome: Progressing     Problem: Knowledge Deficit  Goal: Patient/family/caregiver demonstrates understanding of disease process, treatment plan, medications, and discharge instructions  Description: Complete learning assessment and assess knowledge base    Interventions:  - Provide teaching at level of understanding  - Provide teaching via preferred learning methods  Outcome: Progressing     Problem: Prexisting or High Potential for Compromised Skin Integrity  Goal: Skin integrity is maintained or improved  Description: INTERVENTIONS:  - Identify patients at risk for skin breakdown  - Assess and monitor skin integrity  - Assess and monitor nutrition and hydration status  - Monitor labs   - Assess for incontinence   - Turn and reposition patient  - Assist with mobility/ambulation  - Relieve pressure over bony prominences  - Avoid friction and shearing  - Provide appropriate hygiene as needed including keeping skin clean and dry  - Evaluate need for skin moisturizer/barrier cream  - Collaborate with interdisciplinary team   - Patient/family teaching  - Consider wound care consult   Outcome: Progressing     Problem: Nutrition/Hydration-ADULT  Goal: Nutrient/Hydration intake appropriate for improving, restoring or maintaining nutritional needs  Description: Monitor and assess patient's nutrition/hydration status for malnutrition  Collaborate with interdisciplinary team and initiate plan and interventions as ordered  Monitor patient's weight and dietary intake as ordered or per policy  Utilize nutrition screening tool and intervene as necessary  Determine patient's food preferences and provide high-protein, high-caloric foods as appropriate       INTERVENTIONS:  - Monitor oral intake, urinary output, labs, and treatment plans  - Assess nutrition and hydration status and recommend course of action  - Evaluate amount of meals eaten  - Assist patient with eating if necessary   - Allow adequate time for meals  - Recommend/ encourage appropriate diets, oral nutritional supplements, and vitamin/mineral supplements  - Order, calculate, and assess calorie counts as needed  - Assess need for intravenous fluids  - Provide nutrition/hydration education as appropriate  - Include patient/family/caregiver in decisions related to nutrition  Outcome: Progressing

## 2022-05-23 NOTE — ASSESSMENT & PLAN NOTE
· Mild leukocytosis, 11 87 on presentation  · Consider reactive, less likely infectious   · Overall, stable

## 2022-05-23 NOTE — ASSESSMENT & PLAN NOTE
· Continue ferrous sulfate  · Baseline hgb 10-11  · Hemoglobin remains stable but below baseline   · No evidence of active bleeding

## 2022-05-23 NOTE — ASSESSMENT & PLAN NOTE
Wt Readings from Last 3 Encounters:   05/23/22 54 4 kg (119 lb 14 9 oz)   05/04/22 61 2 kg (135 lb)   11/08/21 63 5 kg (140 lb)     · Present on admission as evidence by CT findings and elevated BNP, cardiology input appreciated  · Suspect elevated BNP secondary to interstitial disease   · Delta troponin (-), EKG with bifascicular block  · Last echo 2017, LVEF 60% no evidence of valvular stenosis  Noted moderate right ventricular pressure elevation    · Echo 5/18/22:  LVEF 57%, grade 1 diastolic dysfunction, RV/RA dilated, RVSP elevated 50 mmHg  · Euvolemic appearing on discharge   · Will need follow up with cardiology

## 2022-05-23 NOTE — ASSESSMENT & PLAN NOTE
Malnutrition Findings:   Adult Malnutrition type: Chronic illness   Albumin 2 6  BMI 18  Encourage p o  Intake  Nutrition consult  Adult Degree of Malnutrition: Other severe protein calorie malnutrition  Malnutrition Characteristics: Fat loss, Muscle loss, Inadequate energy, Weight loss                  360 Statement: related to inadequate energy intake due to poor appetite as evidenced by hollow supraclavicular space, wasted interosseous, sunken orbital, visible ribs, weight trending down last 6 mos, 9 8%  Intervention GENNA diet, ensure enlive 3 times a day  BMI Findings: Body mass index is 17 21 kg/m²

## 2022-05-23 NOTE — ASSESSMENT & PLAN NOTE
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  Diffuse groundglass opacities, developing since a CT from 2/28/2022   This is most consistent with either pulmonary edema or multifocal pneumonitis  · Afebrile, with mild leukocytosis on presentation and elevated BNP, suspect interstitial lung disease; lower suspicion for acute CHF as BUN/Cr harriet with diuresis   · Pulmonology and cardiology consulted  · Received 3 days of IV antibiotics, flat procal and discontinued   Stable off further treatment   · Steroids to slowly be tapered by 10 mg q 5 days   · Patient encouraged to participate in respiratory exercises   · Will require supplemental O2 on discharge, goal > 88%  · PT/OT evals noted - C to be set up on discharge   · Will need outpatient pulmonology follow up

## 2022-05-23 NOTE — ASSESSMENT & PLAN NOTE
· Not present of admission; baseline cr appears to be 1 2 to 1 4  · Possibly due to diuresis in the setting of pulmonary edema evident by increased creatinine greater than 0 3 g/dL with 48 hours 1 16>1 39>1 61>1 31  · Patient was still having dizziness over the wknd, received boluses of normal saline + meclizine   · Creatinine improved to 1 31

## 2022-05-25 NOTE — UTILIZATION REVIEW
Notification of Discharge   This is a Notification of Discharge from our facility [address]  Please be advised that this patient has been discharge from our facility  Below you will find the admission and discharge date and time including the patients disposition  UTILIZATION REVIEW CONTACT:  Bruce Tamez  Utilization   Network Utilization Review Department  Phone: [phone] x carefully listen to the prompts  All voicemails are confidential   Email: [email or web address]     PHYSICIAN ADVISORY SERVICES:  FOR [id number] REVIEW - MEDICAL NECESSITY DENIAL  Phone: [phone]  Fax: [phone]  Email: [email or web address]     PRESENTATION DATE: 5/16/2022  1:51 PM  OBERVATION ADMISSION DATE:   INPATIENT ADMISSION DATE: 5/16/22  4:12 PM   DISCHARGE DATE: 5/23/2022  4:28 PM  DISPOSITION: Home with [place] with [address] INFORMATION:  Send all requests for admission clinical reviews, approved or denied determinations and any other requests to dedicated fax number below belonging to the campus where the patient is receiving treatment   List of dedicated fax numbers:  [address] DENIALS (Administrative/Medical Necessity) [phone]   [address] (Maternity/NICU/Pediatrics) [phone]   Leticia Comas [phone]   [address] [facility] [phone]   [address] [place] [address] [phone]   2000 [facility] [address] [place] [address],[address] [phone]   [facility]  [phone]   [address] [place] [address], [facility]  [address] [facility] [address] [facility] [phone]

## 2022-05-26 NOTE — TELEPHONE ENCOUNTER
Nurse called from [address] [place] [address] regarding patient [phone]    She wanted to let you know patient is Sat around 86-87% on 2LPM - still unstable on feet ( PT is starting today as well ) Lungs sound ok , some crackles B/L using his nebulizer's and inhalers   Patient has a hx of COPD

## 2022-05-31 PROBLEM — J96.10 CHRONIC RESPIRATORY FAILURE (HCC): Status: ACTIVE | Noted: 2022-01-01

## 2022-05-31 PROBLEM — U07.1 SEPSIS DUE TO COVID-19 (HCC): Status: ACTIVE | Noted: 2022-01-01

## 2022-05-31 PROBLEM — R79.89 ELEVATED BRAIN NATRIURETIC PEPTIDE (BNP) LEVEL: Status: ACTIVE | Noted: 2022-01-01

## 2022-05-31 PROBLEM — J96.20 ACUTE ON CHRONIC RESPIRATORY FAILURE (HCC): Status: ACTIVE | Noted: 2022-01-01

## 2022-05-31 PROBLEM — A41.89 SEPSIS DUE TO COVID-19 (HCC): Status: ACTIVE | Noted: 2022-01-01

## 2022-05-31 NOTE — PROGRESS NOTES
[facility] 75  coding opportunities       Chart reviewed, no opportunity found:   [address]        Patients Insurance     Medicare Insurance: [employer]vantage

## 2022-05-31 NOTE — ED PROVIDER NOTES
History  Chief Complaint   Patient presents with    Shortness of Breath     SOB starting this morning with chest pain "like lead " Family reports weakness, cough x1 week  On 2L NC chronically  81 yo male with CHF and interstitial lung disease on chronic oxygen at home who presents to ED c/o cough, dyspnea and sternal nonradiating nonpleuritic intermittent CP since this morning  Associated fever  No RLE pain or swelling  Distant h/o VTE  No hemoptysis  Additional history from review of recent hospital admission from approximately 2 weeks ago for acute hypoxic respiratory failure  Prior to Admission Medications   Prescriptions Last Dose Informant Patient Reported? Taking? CYANOCOBALAMIN PO 5/31/2022 at Unknown time Spouse/Significant Other Yes Yes   Sig: cyanocobalamin (vitamin B-12)   Cholecalciferol (VITAMIN D) 2000 units CAPS 5/31/2022 at Unknown time Spouse/Significant Other Yes Yes   Sig: Take by mouth   VITAMIN E COMPLEX PO 5/30/2022 at Unknown time Spouse/Significant Other Yes Yes   Sig: Take by mouth   albuterol (2 5 mg/3 mL) 0 083 % nebulizer solution   No Yes   Sig: Take 3 mL (2 5 mg total) by nebulization every 6 (six) hours as needed for shortness of breath   albuterol (PROVENTIL HFA,VENTOLIN HFA) 90 mcg/act inhaler   No Yes   Sig: Inhale 2 puffs every 6 (six) hours as needed for wheezing   aspirin 81 MG tablet 5/30/2022 at Unknown time Spouse/Significant Other Yes Yes   Sig: Take by mouth   ferrous sulfate 325 (65 Fe) mg tablet 5/30/2022 at Unknown time  No Yes   Sig: Take 1 tablet (325 mg total) by mouth 2 (two) times a day   ipratropium-albuterol (DUO-NEB) 0 5-2 5 mg/3 mL nebulizer solution 5/31/2022 at Unknown time  No Yes   Sig: Take 3 mL by nebulization in the morning and 3 mL at noon and 3 mL in the evening and 3 mL before bedtime     ipratropium-albuterol (DUO-NEB) 0 5-2 5 mg/3 mL nebulizer solution   No No   Sig: Take 3 mL by nebulization every 8 (eight) hours as needed for wheezing or shortness of breath   meclizine (ANTIVERT) 25 mg tablet Not Taking at Unknown time  No No   Sig: Take 1 tablet (25 mg total) by mouth every 8 (eight) hours as needed for dizziness   Patient not taking: Reported on 5/31/2022   pantoprazole (PROTONIX) 40 mg tablet 5/31/2022 at Unknown time  No Yes   Sig: Take 1 tablet (40 mg total) by mouth daily   predniSONE 20 mg tablet 5/31/2022 at Unknown time  No Yes   Sig: Take 2 5 tablets (50 mg total) by mouth daily for 2 days, THEN 2 tablets (40 mg total) daily for 5 days, THEN 1 5 tablets (30 mg total) daily for 5 days, THEN 1 tablet (20 mg total) daily for 5 days, THEN 0 5 tablets (10 mg total) daily for 5 days  simvastatin (ZOCOR) 20 mg tablet 5/31/2022 at Unknown time  No Yes   Sig: Take 1 tablet (20 mg total) by mouth daily      Facility-Administered Medications: None       Past Medical History:   Diagnosis Date    Anemia     Arthritis     [facility] 3/27/2019    Bifascicular block     Cellulitis of chest wall     Chronic kidney disease     Cough     Dupuytren contracture     DVT femoral (deep venous thrombosis) with thrombophlebitis, right (HCC)     Elevated glucose     Hemothorax     Left    Hyperlipidemia     Interstitial lung disease (HCC)     Phantom pain after amputation of lower extremity ([facility] ) 3/27/2019    SOB (shortness of breath)     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 2/19/2019    Vascular disease        Past Surgical History:   Procedure Laterality Date    AMPUTATION  1972    L thumb    CARPAL TUNNEL RELEASE  2016    L hand    CATARACT EXTRACTION      COLONOSCOPY      EYE SURGERY      FRACTURE SURGERY      HAND SURGERY      IR LOWER EXTREMITY / INTERVENTION  10/26/2018    KNEE ARTHROSCOPY      NERVE BLOCK      IL AMPUTATION LOW LEG THRU TIB/FIB Left 2/1/2019    Procedure: AMPUTATION BELOW KNEE ([facility]);   Surgeon: Girish Rust MD;  [facility];  Service: Vascular    TN SLCTV CATHJ 3RD+ ORD SLCTV ABDL PEL/LXTR [facility] Left 3/9/2018    Procedure: LEFT LOWER EXTREMITY ARTERIOGRAM WITH PTA OF LEFT POPLITEAL ARTERY;  Surgeon: Ray Spangler MD;  [facility];  Service: Vascular    TN Nasrin Sanchez 3RD+ ORD SLCTV ABDL PEL/LXTR [facility] Left 10/26/2018    Procedure: ARTERIOGRAM, angioplasty stent and atherectomy;  Surgeon: Ray Spangler MD;  [facility];  Service: Vascular    THORACENTESIS         Family History   Problem Relation Age of Onset    Lung cancer Mother     Brain cancer Mother     Diabetes Father     Aneurysm Father     Stroke Father     Lung cancer Father     Brain cancer Father     Diabetes Maternal Grandmother      I have reviewed and agree with the history as documented  E-Cigarette/Vaping    E-Cigarette Use Never User      E-Cigarette/Vaping Substances    Nicotine No     THC No     CBD No     Flavoring No     Other No     Unknown No      Social History     Tobacco Use    Smoking status: Former Smoker     Packs/day: 2 00     Years: 15 00     Pack years: 30 00     Types: Cigarettes    Smokeless tobacco: Never Used    Tobacco comment: quit 57 years ago   Vaping Use    Vaping Use: Never used   Substance Use Topics    Alcohol use: Not Currently     Comment: n/a    Drug use: No       Review of Systems   Constitutional: Positive for fever  Respiratory: Positive for cough, chest tightness and shortness of breath  All other systems reviewed and are negative  Physical Exam  Physical Exam  Vitals and nursing note reviewed  Constitutional:       General: He is not in acute distress  Appearance: Normal appearance  He is well-developed  He is not ill-appearing, toxic-appearing or diaphoretic  HENT:      Head: Normocephalic and atraumatic  Eyes:      Conjunctiva/sclera: Conjunctivae normal       Pupils: Pupils are equal, round, and reactive to light  Neck:      Vascular: No JVD  Cardiovascular:      Rate and Rhythm: Normal rate and regular rhythm  Heart sounds: Normal heart sounds  No murmur heard  No friction rub  No gallop  Pulmonary:      Effort: Pulmonary effort is normal  No respiratory distress  Breath sounds: No stridor  Wheezing and rhonchi present  No rales  Abdominal:      General: There is no distension  Palpations: Abdomen is soft  Tenderness: There is no abdominal tenderness  Musculoskeletal:         General: No swelling, tenderness or deformity  Normal range of motion  Cervical back: Normal range of motion and neck supple  Right lower leg: No edema  Comments: S/p LLE amputation  Skin:     General: Skin is warm and dry  Capillary Refill: Capillary refill takes less than 2 seconds  Coloration: Skin is not jaundiced or pale  Findings: No bruising, erythema, lesion or rash  Neurological:      General: No focal deficit present  Mental Status: He is alert and oriented to person, place, and time  Cranial Nerves: No cranial nerve deficit  Sensory: No sensory deficit  Motor: No weakness or abnormal muscle tone        Coordination: Coordination normal          Vital Signs  ED Triage Vitals   Temperature Pulse Respirations Blood Pressure SpO2   05/31/22 1525 05/31/22 1525 05/31/22 1525 05/31/22 1525 05/31/22 1525   (!) 100 9 °F (38 3 °C) 92 22 141/63 95 %      Temp Source Heart Rate Source Patient Position - Orthostatic VS BP Location FiO2 (%)   05/31/22 1747 05/31/22 1645 05/31/22 1645 05/31/22 1645 --   Oral Monitor Sitting Right arm       Pain Score       05/31/22 1746       No Pain           Vitals:    05/31/22 1747 05/31/22 1900 05/31/22 2004 05/31/22 2053   BP: 115/59 112/56 122/58 119/60   Pulse: 92 78 70    Patient Position - Orthostatic VS: Sitting Lying Lying          Visual Acuity      ED Medications  Medications   aspirin chewable tablet 81 mg (has no administration in time range)   ferrous sulfate tablet 325 mg (325 mg Oral Given 5/31/22 2038)   ipratropium-albuterol (DUO-NEB) 0 5-2 5 mg/3 mL inhalation solution 3 mL (has no administration in time range)   albuterol (PROVENTIL HFA,VENTOLIN HFA) inhaler 2 puff (has no administration in time range)   pantoprazole (PROTONIX) EC tablet 40 mg (has no administration in time range)   pravastatin (PRAVACHOL) tablet 40 mg (has no administration in time range)   acetaminophen (TYLENOL) tablet 650 mg (has no administration in time range)   enoxaparin (LOVENOX) subcutaneous injection 40 mg (has no administration in time range)   dexamethasone (DECADRON) injection 6 mg (6 mg Intravenous Given 5/31/22 2038)   remdesivir (Veklury) 200 mg in sodium chloride 0 9 % 290 mL IVPB (has no administration in time range)     Followed by   remdesivir Thermon Blase) 100 mg in sodium chloride 0 9 % 270 mL IVPB (has no administration in time range)   cefepime (MAXIPIME) 2 g/50 mL dextrose IVPB (has no administration in time range)   acetaminophen (TYLENOL) tablet 650 mg (650 mg Oral Given 5/31/22 1527)   cefepime (MAXIPIME) 2 g/50 mL dextrose IVPB (0 mg Intravenous Stopped 5/31/22 2023)       Diagnostic Studies  Results Reviewed     Procedure Component Value Units Date/Time    Procalcitonin [[id number]] Collected: 05/31/22 2031    Lab Status: In process Specimen: Blood from Arm, Right Updated: 05/31/22 2036    C-reactive protein [[id number]] Collected: 05/31/22 2031    Lab Status: In process Specimen: Blood from Arm, Right Updated: 05/31/22 2036    CK (with reflex to MB) [[id number]] Collected: 05/31/22 2031    Lab Status: In process Specimen: Blood from Arm, Right Updated: 05/31/22 2036    D-dimer, quantitative [[id number]] Collected: 05/31/22 2031    Lab Status:  In process Specimen: Blood from Arm, Right Updated: 05/31/22 2036    HS Troponin I 2hr [[id number]]  (Normal) Collected: 05/31/22 1953    Lab Status: Final result Specimen: Blood from Arm, Left Updated: 05/31/22 2031      TnI 2hr 39 ng/L      Delta 2hr hsTnI 0 ng/L     HS Troponin I 4hr [[id number]]     Lab Status: No result Specimen: Blood     COVID/FLU/RSV - 2 hour TAT [[id number]]  (Abnormal) Collected: 05/31/22 1736    Lab Status: Final result Specimen: Nares from Nose Updated: 05/31/22 1828     SARS-CoV-2 Positive     INFLUENZA A PCR Negative     INFLUENZA B PCR Negative     RSV PCR Negative    Narrative:      FOR PEDIATRIC PATIENTS - copy/paste COVID Guidelines URL to browser: https://[email or web address]  [employer]    SARS-CoV-2 assay is a Nucleic Acid Amplification assay intended for the  qualitative detection of nucleic acid from SARS-CoV-2 in nasopharyngeal  swabs  Results are for the presumptive identification of SARS-CoV-2 RNA  Positive results are indicative of infection with SARS-CoV-2, the virus  causing COVID-19, but do not rule out bacterial infection or co-infection  with other viruses  Laboratories within the [place] and its  territories are required to report all positive results to the appropriate  public health authorities  Negative results do not preclude SARS-CoV-2  infection and should not be used as the sole basis for treatment or other  patient management decisions  Negative results must be combined with  clinical observations, patient history, and epidemiological information  This test has not been FDA cleared or approved  This test has been authorized by FDA under an Emergency Use Authorization  (EUA)  This test is only authorized for the duration of time the  declaration that circumstances exist justifying the authorization of the  emergency use of an in vitro diagnostic tests for detection of SARS-CoV-2  virus and/or diagnosis of COVID-19 infection under section 564(b)(1) of  the Act, 21 U  S C  753RIV-2(N)(1), unless the authorization is terminated  or revoked sooner  The test has been validated but independent review by FDA  and CLIA is pending      Test performed using [employer] GeneXpert: This RT-PCR assay targets N2,  a region unique to SARS-CoV-2  A conserved region in the E-gene was chosen  for pan-Sarbecovirus detection which includes SARS-CoV-2  CBC and differential [[id number]]  (Abnormal) Collected: 05/31/22 1736    Lab Status: Final result Specimen: Blood from Arm, Right Updated: 05/31/22 1818     WBC 16 95 Thousand/uL      RBC 3 54 Million/uL      Hemoglobin 10 9 g/dL      Hematocrit 33 4 %      MCV 94 fL      MCH 30 8 pg      MCHC 32 6 g/dL      RDW 15 7 %      MPV 11 1 fL      Platelets 529 Thousands/uL     Narrative: This is an appended report  These results have been appended to a previously verified report  Manual Differential(PHLEBS Do Not Order) [[id number]]  (Abnormal) Collected: 05/31/22 1736    Lab Status: Final result Specimen: Blood from Arm, Right Updated: 05/31/22 1818     Segmented % 89 %      Lymphocytes % 9 %      Monocytes % 0 %      Eosinophils, % 2 %      Basophils % 0 %      Absolute Neutrophils 15 09 Thousand/uL      Lymphocytes Absolute 1 53 Thousand/uL      Monocytes Absolute 0 00 Thousand/uL      Eosinophils Absolute 0 34 Thousand/uL      Basophils Absolute 0 00 Thousand/uL      Total Counted --     Akbar Cells Present     Platelet Estimate Adequate    Lactic acid [[id number]]  (Normal) Collected: 05/31/22 1736    Lab Status: Final result Specimen: Blood from Arm, Right Updated: 05/31/22 1817     LACTIC ACID 1 6 mmol/L     Narrative:      Result may be elevated if tourniquet was used during collection      Hepatic function panel [[id number]]  (Abnormal) Collected: 05/31/22 1736    Lab Status: Final result Specimen: Blood from Arm, Right Updated: 05/31/22 1814     Total Bilirubin 0 81 mg/dL      Bilirubin, Direct 0 24 mg/dL      Alkaline Phosphatase 55 U/L      AST 16 U/L      ALT 40 U/L      Total Protein 6 6 g/dL      Albumin 3 0 g/dL     NT-BNP PRO [[id number]]  (Abnormal) Collected: 05/31/22 1736    Lab Status: Final result Specimen: Blood from Arm, Right Updated: 05/31/22 1814     NT-proBNP 1,674 pg/mL     HS Troponin 0hr (reflex protocol) [[id number]]  (Normal) Collected: 05/31/22 1736    Lab Status: Final result Specimen: Blood from Arm, Right Updated: 05/31/22 1812     hs TnI 0hr 39 ng/L     Basic metabolic panel [[id number]]  (Abnormal) Collected: 05/31/22 1736    Lab Status: Final result Specimen: Blood from Arm, Right Updated: 05/31/22 1807     Sodium 133 mmol/L      Potassium 4 4 mmol/L      Chloride 98 mmol/L      CO2 29 mmol/L      ANION GAP 6 mmol/L      BUN 48 mg/dL      Creatinine 1 30 mg/dL      Glucose 163 mg/dL      Calcium 8 8 mg/dL      eGFR 48 ml/min/1 73sq m     Narrative:      [facility] guidelines for Chronic Kidney Disease (CKD):     Stage 1 with normal or high GFR (GFR > 90 mL/min/1 73 square meters)    Stage 2 Mild CKD (GFR = 60-89 mL/min/1 73 square meters)    Stage 3A Moderate CKD (GFR = 45-59 mL/min/1 73 square meters)    Stage 3B Moderate CKD (GFR = 30-44 mL/min/1 73 square meters)    Stage 4 Severe CKD (GFR = 15-29 mL/min/1 73 square meters)    Stage 5 End Stage CKD (GFR <15 mL/min/1 73 square meters)  Note: GFR calculation is accurate only with a steady state creatinine    Protime-INR [[id number]]  (Normal) Collected: 05/31/22 1736    Lab Status: Final result Specimen: Blood from Arm, Right Updated: 05/31/22 1804     Protime 13 9 seconds      INR 1 12    APTT [[id number]]  (Abnormal) Collected: 05/31/22 1736    Lab Status: Final result Specimen: Blood from Arm, Right Updated: 05/31/22 1804     PTT 40 seconds     Blood culture #2 [[id number]] Collected: 05/31/22 1736    Lab Status: In process Specimen: Blood from Arm, Right Updated: 05/31/22 1745    Blood culture #1 [[id number]] Collected: 05/31/22 1736    Lab Status:  In process Specimen: Blood from Hand, Right Updated: 05/31/22 1745                 XR chest 2 views    (Results Pending)              Procedures  ECG 12 Lead Documentation Only    Date/Time: 5/31/2022 5:35 PM  Performed by: Talon Craft MD  Authorized by: Talon Craft MD     Indications / Diagnosis:  Cp  ECG reviewed by me, the ED Provider: yes    Patient location:  ED  Previous ECG:     Previous ECG:  Compared to current    Comparison ECG info:  21 may 2020    Similarity:  No change  Interpretation:     Interpretation: non-specific    Rate:     ECG rate:  93    ECG rate assessment: normal    Rhythm:     Rhythm: sinus rhythm    Comments:      SR  RBBB  nonspecific st changes  Not significantly changed as compared to prior ekg  ED Course  ED Course as of 05/31/22 2103   Tue May 31, 2022   1826 The 30ml/kg fluid bolus was not given to the patient despite hypotension and/or significantly elevated lactate of = 4 and/or presence of septic shock due to: Heart Failure  The patient will be administered 1L of crystalloid fluid instead  Orders for this have been placed in Epic  The patient may receive additional colloid or crystalloid fluids thereafter based on clinical condition  Talon Craft MD                 Identification of Seniors at [address] [place] [address] Most Recent Value   (ISAR) Identification of Seniors at Risk    Before the illness or injury that brought you to the Emergency, did you need someone to help you on a regular basis? 0 Filed at: 05/31/2022 1524   In the last 24 hours, have you needed more help than usual? 1 Filed at: 05/31/2022 1524   Have you been hospitalized for one or more nights during the past 6 months? 1 Filed at: 05/31/2022 1524   In general, do you see well? 0 Filed at: 05/31/2022 1524   In general, do you have serious problems with your memory? 0 Filed at: 05/31/2022 1524   Do you take more than three different medications every day?  1 Filed at: 05/31/2022 1524   ISAR Score 3 Filed at: 05/31/2022 1524                                    [facility]    Disposition  Final diagnoses:   Dyspnea   CHF (congestive heart failure) ([facility] ) Pneumonia   COVID     Time reflects when diagnosis was documented in both MDM as applicable and the Disposition within this note     Time User Action Codes Description Comment    5/31/2022  7:16 PM Mayra Avendaño Add [R06 00] Dyspnea     5/31/2022  7:16 PM Gary Car Add [I50 9] CHF (congestive heart failure) ([facility] )     5/31/2022  7:16 PM Mayra Acre Add [J18 9] Pneumonia     5/31/2022  7:17 PM Mayra Wisee Add [U07 1] COVID       ED Disposition     ED Disposition   Admit    Condition   Stable    Date/Time   Tue May 31, 2022  7:16 PM    Comment   Case was discussed with Jaydon Stern and the patient's admission status was agreed to be Admission Status: observation status to the service of Dr Valentino Hutching   Follow-up Information    None         Current Discharge Medication List      CONTINUE these medications which have NOT CHANGED    Details   albuterol (2 5 mg/3 mL) 0 083 % nebulizer solution Take 3 mL (2 5 mg total) by nebulization every 6 (six) hours as needed for shortness of breath  Qty: 180 mL, Refills: 0    Associated Diagnoses: ILD (interstitial lung disease) ([facility] ); Acute respiratory failure with hypoxia ([place] [address] ); Pulmonary fibrosis (HCC)      albuterol (PROVENTIL HFA,VENTOLIN HFA) 90 mcg/act inhaler Inhale 2 puffs every 6 (six) hours as needed for wheezing  Qty: 54 g, Refills: 1    Comments: Substitution to a formulary equivalent within the same pharmaceutical class is authorized    Associated Diagnoses: Interstitial lung disease ([facility] )      aspirin 81 MG tablet Take by mouth      Cholecalciferol (VITAMIN D) 2000 units CAPS Take by mouth      CYANOCOBALAMIN PO cyanocobalamin (vitamin B-12)      ferrous sulfate 325 (65 Fe) mg tablet Take 1 tablet (325 mg total) by mouth 2 (two) times a day  Qty: 180 tablet, Refills: 1    Associated Diagnoses: Iron deficiency anemia, unspecified iron deficiency anemia type      !! ipratropium-albuterol (DUO-NEB) 0 5-2 5 mg/3 mL nebulizer solution Take 3 mL by nebulization in the morning and 3 mL at noon and 3 mL in the evening and 3 mL before bedtime  Qty: 360 mL, Refills: 11    Associated Diagnoses: Interstitial lung disease ([facility] )      pantoprazole (PROTONIX) 40 mg tablet Take 1 tablet (40 mg total) by mouth daily  Qty: 90 tablet, Refills: 3    Associated Diagnoses: Dysphagia, unspecified type      predniSONE 20 mg tablet Take 2 5 tablets (50 mg total) by mouth daily for 2 days, THEN 2 tablets (40 mg total) daily for 5 days, THEN 1 5 tablets (30 mg total) daily for 5 days, THEN 1 tablet (20 mg total) daily for 5 days, THEN 0 5 tablets (10 mg total) daily for 5 days  Qty: 30 tablet, Refills: 0    Associated Diagnoses: ILD (interstitial lung disease) ([facility] )      simvastatin (ZOCOR) 20 mg tablet Take 1 tablet (20 mg total) by mouth daily  Qty: 90 tablet, Refills: 1    Associated Diagnoses: Hyperlipidemia, unspecified hyperlipidemia type      VITAMIN E COMPLEX PO Take by mouth      !! ipratropium-albuterol (DUO-NEB) 0 5-2 5 mg/3 mL nebulizer solution Take 3 mL by nebulization every 8 (eight) hours as needed for wheezing or shortness of breath  Qty: 180 mL, Refills: 0    Associated Diagnoses: ILD (interstitial lung disease) ([facility] ); Acute respiratory failure with hypoxia ([facility] ); Pulmonary fibrosis (HCC)      meclizine (ANTIVERT) 25 mg tablet Take 1 tablet (25 mg total) by mouth every 8 (eight) hours as needed for dizziness  Qty: 30 tablet, Refills: 0    Associated Diagnoses: Dizziness       !! - Potential duplicate medications found  Please discuss with provider  No discharge procedures on file      PDMP Review     None          ED Provider  Electronically Signed by           Rainer Santoro MD  05/31/22 [id number]

## 2022-06-01 PROBLEM — U07.1 PNEUMONIA DUE TO COVID-19 VIRUS: Status: ACTIVE | Noted: 2022-01-01

## 2022-06-01 PROBLEM — J12.82 PNEUMONIA DUE TO COVID-19 VIRUS: Status: ACTIVE | Noted: 2022-01-01

## 2022-06-01 NOTE — OCCUPATIONAL THERAPY NOTE
Occupational Therapy Evaluation Note        Patient Name: Giorgio Mcclendon  RCGIN'A Date: 6/1/2022 06/01/22 1010   OT Last Visit   OT Visit Date 06/01/22   Note Type   Note type Evaluation   Restrictions/Precautions   Weight Bearing Precautions Per Order No   Braces or Orthoses Prosthesis  (h/o L BKA, prosthesis)   Other Precautions Contact/isolation; Airborne/isolation; Chair Alarm; Bed Alarm; Fall Risk;O2;Multiple lines;Telemetry  (COVID-19 confirmed)   Pain Assessment   Pain Assessment Tool 0-10   Pain Score No Pain   Home Living   Type of [address] [place] [address] One level;Performs ADLs on one level;Stairs to enter with rails  (3+1 ARMIN)   Bathroom Shower/Tub Tub/shower unit   Bathroom Toilet Raised   Bathroom Equipment Tub transfer bench   [facility]; Wheelchair-manual   Additional Comments Patient ambulatory with RW at baseline; 2L O2 NC at baseline   Prior Function   Level of [place] Independent with ADLs and functional mobility   Lives With Spouse   Receives Help From Family  (son and granddaughter live locally)   ADL Assistance Independent   IADLs Needs assistance   Falls in the last 6 months 1 to 4   Vocational Retired   Comments (-) drives, wife assists with transportation  Home setup and PLOF obtained via chart review from previous OT evaluation performed on 5/20/22 and information confirmed by pt at time of IE  Lifestyle   Autonomy Per chart review and patient report, he lives with his spouse in a one-story home with 3+1 ARMIN  At baseline, patient reports that he is independent in ADLs and receives assistance with IADLs  Patient is ambulatory with a RW at baseline     Reciprocal Relationships Supportive family   Service to Others Retired-    Psychosocial   Psychosocial (WDL) [address]  6  Modified [facility]   [address] 3  Moderate Assistance   UB Dressing Assistance 5  Supervision/Setup   LB Dressing Assistance 4  Minimal Assistance   LB Dressing Deficit Setup;Verbal cueing;Supervision/safety; Increased time to complete  (Donned R shoe and L prosthesis)   Toileting Assistance  4  Minimal Assistance   Functional Assistance 4  Minimal Assistance   Additional Comments ADL assist levels based on pt's functional performance during OT evaluation   Bed Mobility   Supine to Sit 4  Minimal assistance   Additional items Assist x 1;HOB elevated; Increased time required;Verbal cues   Additional Comments Patient received lying supine in bed upon OT arrival; at end of session: pt seated OOB to recliner chair left in care of PT   Transfers   Sit to Stand 4  Minimal assistance   Additional items Assist x 1; Increased time required;Verbal cues   Stand to Sit 4  Minimal assistance   Additional items Assist x 1; Increased time required;Verbal cues   Additional Comments Performed functional transfers using RW  Functional Mobility   Functional Mobility 4  Minimal assistance   Additional Comments x1; 3-4 steps from EOB to recliner chair w/ noted signs/symptoms of RUIZ/SOB post mobility trial   Additional items Rolling walker   Balance   Static Sitting Fair +   Dynamic Sitting Fair   Static Standing Fair   Dynamic Standing Fair -   Ambulatory Poor +   Activity Tolerance   Activity Tolerance Patient limited by fatigue   Medical Staff Made Aware PT Araceli Ramos; Dr Melida Saenz made aware of recommendations/outcomes   Nurse Made Aware LILLY Laura   RUE Assessment   RUE Assessment X  (Proximal AROM not formally tested, per x-ray of R shoulder on 5/19/22, "Stable, minimally displaced fracture involving the posterior aspect of the acromion process ", no weight bearing restrictions per chart review   Distal ROM WFL )   LUE Assessment   LUE Assessment X  (AROM grossly WFL; strength grossly 4-/5 distally)   Hand Function   Gross Motor Coordination Impaired  (Impaired finger to nose test)   Fine Motor Coordination Functional   Sensation   Light Touch Partial deficits in the RUE;Partial deficits in the LUE   Additional Comments Patient reports numbness/diminished sensation in fingertips on bilateral hands   Vision-Basic Assessment   Current Vision Wears glasses all the time   Cognition   Overall Cognitive Status [facility]   Arousal/Participation Alert; Responsive; Cooperative   Attention Within functional limits   Orientation Level Oriented X4   Memory Within functional limits   Following Commands Follows all commands and directions without difficulty   Comments Patient agreeable to OT evaluation   Assessment   Limitation Decreased ADL status; Decreased UE strength;Decreased endurance;Decreased self-care trans;Decreased high-level ADLs   Prognosis Good   Assessment Patient is a 80 y o  male seen for OT evaluation s/p admit to [address] [facility] on 5/31/2022 w/Sepsis due to COVID-19 [facility])  Commorbidities affecting patient's functional performance at time of assessment include: elevated BNP level, chronic respiratory failure, pulmonary fibrosis, severe protein-calorie malnutrition, CKD stage III, severe peripheral arterial disease, and s/p BKA of LLE  Patient  has a past medical history of Anemia, Arthritis, Atherosclerosis of artery of extremity with ulceration ([address] ) (3/27/2019), Bifascicular block, Cellulitis of chest wall, Chronic kidney disease, Cough, Dupuytren contracture, DVT femoral (deep venous thrombosis) with thrombophlebitis, right (HCC), Elevated glucose, Hemothorax, Hyperlipidemia, Interstitial lung disease ([address] ), Phantom pain after amputation of lower extremity ([address] ) (3/27/2019), SOB (shortness of breath), 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 (2/19/2019), and Vascular disease  Orders placed for OT evaluation and treatment  Performed at least two patient identifiers during session including name and wristband    Prior to admission, patient was living with his spouse in a one-story home with 3+1 steps to enter  At baseline, patient is independent in ADLs and receives assistance with IADLs  Personal factors affecting patient at time of initial evaluation include: steps to enter, difficulty performing ADLs and difficulty performing IADLs  Upon evaluation, patient requires supervision, set up and minimal assist for UB ADLs, minimal  and moderate assist for LB ADLs, transfers and functional ambulation in room and bathroom with minimal assist, with the use of Rolling Walker  Patient is alert and oriented x 4  Occupational performance is affected by the following deficits: decreased muscle strength, impaired gross motor coordination, dynamic sit/ stand balance deficit with poor standing tolerance time for self care and functional mobility and decreased activity tolerance, and requiring external assistance to complete transitional movements  Patient to benefit from continued Occupational Therapy treatment while in the hospital to address deficits as defined above and maximize level of functional independence with ADLs and functional mobility  Occupational Performance areas to address include: grooming , bathing/ shower, dressing, toilet hygiene, transfer to all surfaces, functional mobility, IADLs: safety procedures and Leisure Participation  From OT standpoint, recommendation at time of d/c would be post-acute rehabilitation services  Goals   Patient Goals to return home   Plan   Treatment Interventions ADL retraining;Functional transfer training;UE strengthening/ROM; Endurance training;Patient/family training;Equipment evaluation/education; Compensatory technique education;Continued evaluation; Energy conservation; Activityengagement   Goal Expiration Date 06/15/22   OT Treatment Day 0   OT Frequency 3-5x/wk   Recommendation   OT Discharge Recommendation Post acute rehabilitation services   Additional Comments  The patient's raw score on the AM-PAC Daily Activity inpatient short form is 18, standardized score is 38 66, less than 39 4  Patients at this level are likely to benefit from discharge to post-acute rehabilitation services  Please refer to the recommendation of the Occupational Therapist for safe discharge planning  AM-PAC Daily Activity Inpatient   Lower Body Dressing 3   Bathing 2   Toileting 3   Upper Body Dressing 3   Grooming 3   Eating 4   Daily Activity Raw Score 18   Daily Activity Standardized Score (Calc for Raw Score >=11) 38 66   AM-PAC Applied Cognition Inpatient   Following a Speech/Presentation 4   Understanding Ordinary Conversation 4   Taking Medications 4   Remembering Where Things Are Placed or Put Away 4   Remembering List of 4-5 Errands 4   Taking Care of Complicated Tasks 4   Applied Cognition Raw Score 24   Applied Cognition Standardized Score 62 21   Barthel Index   Feeding 10   Bathing 0   Grooming Score 5   Dressing Score 5   Bladder Score 5   Bowels Score 10   Toilet Use Score 5   Transfers (Bed/Chair) Score 10   Mobility (Level Surface) Score 0   Stairs Score 0   Barthel Index Score 50   Modified [place] Scale   Modified [place]ole Scale 4     Occupational Therapy Goals to be completed in 7-14 Days:    1 - Patient will verbalize and demonstrate use of energy conservation/ deep breathing technique and work simplification skills during functional activity with no verbal cues  2 - Patient will verbalize and demonstrate good body mechanics and joint protection techniques during  ADLs/ IADLs with no verbal cues  3 - Patient will increase OOB/ sitting tolerance to 2-4 hours per day for increased participation in self care and leisure tasks with no s/s of exertion  4 - Patient will increase standing tolerance time to 5 minutes with unilateral UE support to complete sink level ADLs@ mod I level  5 - Patient will increase sitting tolerance at edge of bed to 30 minutes to complete UB ADLs @ set up assist level      6 - Patient will transfer bed to Chair / toilet at Set up assist level with least restrictive AD  7 - Patient will complete UB ADLs with set up assist      8 - Patient will complete LB ADLs with supervision/setup assist with the use of adaptive equipment as indicated  9 - Patient will complete toileting hygiene with set up assist/ supervision for thoroughness      10 - Patient/ Family will demonstrate competency with [address], OTR/L

## 2022-06-01 NOTE — ASSESSMENT & PLAN NOTE
Patient presenting to the ED for increased shortness of breath and generalized weakness starting earlier this morning  Patient was recently admitted on 05/16/2022 for acute respiratory failure with hypoxia and treated with steroids and antibiotics secondary to pulmonary fibrosis  During this hospitalization he was seen by both pulmonology and Cardiology  Given recurrence and worsening symptoms this morning patient reported back to the ED  Denies any associated symptoms such as dizziness, headaches, chest pain, abdominal pain, nausea/vomiting/diarrhea, urinary complaints at this time  · Meets sepsis criteria:  Leukocytosis (WBC 16 95), tachycardia (HR 92), fever (100 9F  · COVID 19 positive  · CXR pending  · Currently requiring 2 L NC, which is what he uses at home   No increased oxygen requirements at this time  · Given high risk patient, will start on IV steroids and remdesivir  · Start on IV cefepime for possible underlying pneumonia  · Monitor respiratory status  · Follow-up infectious labs  · Isolation precautions

## 2022-06-01 NOTE — ASSESSMENT & PLAN NOTE
Malnutrition Findings:     · Due to chronic illness  · Malnutrition characteristics:  Fat loss, muscle loss, and adequate energy, weight loss    BMI Findings: Body mass index is 17 07 kg/m²       Continue Ensure protein supplementation

## 2022-06-01 NOTE — ASSESSMENT & PLAN NOTE
Lab Results   Component Value Date    EGFR 44 06/01/2022    EGFR 48 05/31/2022    EGFR 48 05/22/2022    CREATININE 1 39 (H) 06/01/2022    CREATININE 1 30 05/31/2022    CREATININE 1 31 (H) 05/22/2022     · Creatinine stable at baseline  · Avoid hypotension and nephrotoxic agents  · Monitor daily BMP

## 2022-06-01 NOTE — ASSESSMENT & PLAN NOTE
· Chronically on 2 L NC starting last discharge on 05/23/22 secondary to chronic pulmonary fibrosis  · Not needing any additional oxygen requirements at this time  · Monitor respiratory status

## 2022-06-01 NOTE — PROGRESS NOTES
[facility]  Progress Note - Wilda Speed 1933, 80 y o  male MRN: [id number]  Unit/Bed#: -Genaro Encounter: [id number]  Primary Care Provider: Mamadou Montoya MD   Date and time admitted to hospital: 5/31/2022  4:28 PM    * Sepsis due to COVID-19 [facility])  Assessment & Plan  + chief complaint increased shortness of breath/weakness  Recently admitted on 05/16/2022 secondary to acute hypoxic respiratory failure secondary to underlying pulmonary fibrosis treated with steroids/antibiotics  He was seen by both pulmonology/Cardiology during hospitalization  · Meeting sepsis criteria upon arrival secondary to tachycardia/fever/leukocytosis with COVID-19 positive PCR status/chest x-ray confirming pneumonia  · On IV cefepime (will discontinue if procalcitonin levels negative  · Follow-up on blood cultures  Pneumonia due to COVID-19 virus  Assessment & Plan  · COVID-19 PCR positive 05/31/2022 (patient vaccinated with booster)  · Chest x-ray consistent with history of pulmonary fibrosis  Bilateral ground-glass opacities consistent with pneumonia  · Patient on chronic 2 L O2 via nasal cannula for history of pulmonary fibrosis (at baseline)  · Given underlying comorbidity, patient started on IV Decadron/IV remdesivir (day 2)  · On heparin drip per COVID protocol  · On IV cefepime (if negative to procalcitonin levels, consider discontinuation)  · Monitor inflammatory markers  · Monitor O2 levels  Chronic respiratory failure (HCC)  Assessment & Plan  · Chronically on 2 L NC secondary to chronic pulmonary fibrosis  · Currently at baseline O2 needs      Pulmonary fibrosis ([address] )  Assessment & Plan  · Chronically on 2 L NC at home  · Continue albuterol inhaler p r n   · outpt fu with pulmonology      CKD (chronic kidney disease), stage III 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  Assessment & Plan  · Known history, wears left lower extremity prosthesis    Severe protein-calorie malnutrition (HCC)  Assessment & Plan  Malnutrition Findings:     · Due to chronic illness  · Malnutrition characteristics:  Fat loss, muscle loss, and adequate energy, weight loss    BMI Findings: Body mass index is 17 07 kg/m²  Continue Ensure protein supplementation      VTE Pharmacologic Prophylaxis:   Pharmacologic: Heparin Drip  Mechanical VTE Prophylaxis in Place: No    Patient Centered Rounds: I have performed bedside rounds with nursing staff today  Discussions with Specialists or Other Care Team Provider: jillian    Education and Discussions with Family / Patient: angeli patient    Time Spent for Care: 30 minutes  More than 50% of total time spent on counseling and coordination of care as described above  Current Length of Stay: 0 day(s)    Current Patient Status: Inpatient   Certification Statement: The patient will continue to require additional inpatient hospital stay due to needs treatment for covid    Discharge Plan: 24-48hrs    Code Status: Level 1 - Full Code      Subjective:   Reports shortness of breath slightly improved however not at baseline  Occasional cough  Occasional wheezing  Also complaining of significant fatigue and weakness  Objective:     Vitals:   Temp (24hrs), Av 3 °F (36 8 °C), Min:97 4 °F (36 3 °C), Max:100 9 °F (38 3 °C)    Temp:  [97 4 °F (36 3 °C)-100 9 °F (38 3 °C)] 97 4 °F (36 3 °C)  HR:  [52-92] 52  Resp:  [18-22] 19  BP: (112-141)/(47-63) 127/51  SpO2:  [95 %-99 %] 99 %  Body mass index is 17 07 kg/m²       Input and Output Summary (last 24 hours): Intake/Output Summary (Last 24 hours) at 6/1/2022 1219  Last data filed at 6/1/2022 0900  Gross per 24 hour   Intake 240 ml   Output 325 ml   Net -85 ml       Physical Exam:     Physical Exam  Constitutional:       General: He is not in acute distress  Appearance: He is not toxic-appearing  HENT:      Mouth/Throat:      Mouth: Mucous membranes are moist       Pharynx: Oropharynx is clear  Cardiovascular:      Rate and Rhythm: Normal rate and regular rhythm  Pulses: Normal pulses  Pulmonary:      Effort: Pulmonary effort is normal       Breath sounds: Normal breath sounds  Comments: Basal crepitations heard  Abdominal:      General: Abdomen is flat  Bowel sounds are normal       Palpations: Abdomen is soft  Neurological:      General: No focal deficit present  Mental Status: He is alert and oriented to person, place, and time  Additional Data:     Labs:    Results from last 7 days   Lab Units 06/01/22  0459   WBC Thousand/uL 10 24*   HEMOGLOBIN g/dL 9 5*   HEMATOCRIT % 30 1*   PLATELETS Thousands/uL 194   LYMPHO PCT % 7*   MONO PCT % 0*   EOS PCT % 0     Results from last 7 days   Lab Units 06/01/22  0459   SODIUM mmol/L 136   POTASSIUM mmol/L 4 4   CHLORIDE mmol/L 100   CO2 mmol/L 28   BUN mg/dL 43*   CREATININE mg/dL 1 39*   ANION GAP mmol/L 8   CALCIUM mg/dL 8 6   ALBUMIN g/dL 2 6*   TOTAL BILIRUBIN mg/dL 0 55   ALK PHOS U/L 49   ALT U/L 36   AST U/L 14   GLUCOSE RANDOM mg/dL 132     Results from last 7 days   Lab Units 05/31/22  2139   INR  1 24*             Results from last 7 days   Lab Units 05/31/22 2031 05/31/22  1736   LACTIC ACID mmol/L  --  1 6   PROCALCITONIN ng/ml 0 18  --            * I Have Reviewed All Lab Data Listed Above  * Additional Pertinent Lab Tests Reviewed: All Labs Within Last 24 Hours Reviewed    Recent Cultures (last 7 days):     Results from last 7 days   Lab Units 05/31/22  1736   BLOOD CULTURE  Received in Microbiology Lab  Culture in Progress  Received in Microbiology Lab  Culture in Progress  Last 24 Hours Medication List:   Current Facility-Administered Medications   Medication Dose Route Frequency Provider Last Rate    acetaminophen  650 mg Oral Q6H PRN Nunu Aguilar PA-C      albuterol  2 puff Inhalation Q6H PRN Nunu Aguilar PA-C      albuterol  2 puff Inhalation 4x Daily Malina Chasten, DO      aspirin  81 mg Oral Daily Aurora Alvarenga PA-C      cefepime  2,000 mg Intravenous Q12H Aurora Alvarenga PA-C 2,000 mg (06/01/22 0511)    dexamethasone  6 mg Intravenous Q24H Aurora Alvarenga PA-C      ferrous sulfate  325 mg Oral BID Nunu Aguilar PA-C      heparin (porcine)  3-20 Units/kg/hr (Order-Specific) Intravenous Titrated Nunu Aguilar PA-C 9 Units/kg/hr (06/01/22 0749)    pantoprazole  40 mg Oral Daily Aurora Alvarenga PA-C      pravastatin  40 mg Oral Daily With [employer]      remdesivir  100 mg Intravenous Q24H Nunu Aguilar PA-C          Today, Patient Was Seen By: ANT Romero      ** Please Note: Dictation voice to text software may have been used in the creation of this document   **

## 2022-06-01 NOTE — WOUND OSTOMY CARE
Progress Note - Wound   Thao Davila 80 y o  male MRN: [id number]  Unit/Bed#: -01 Encounter: [id number]      Assessment:   Patient admitted due to sepsis due to COVID-19  History of anemia, arthritis, CKD, HLD, L BKA  Wound care consulted for right heel  Patient agreeable to assessment, alert and oriented x4, continent of bowel and bladder, is OOB to chair with EHOB waffle cushion in place, is an assist with care, prosthetic in place to L BKA, right heel elevated  Primary RN made aware of assessment findings  1  Right heel- Skin is dry, intact, blanchable pink and blanchable red skin with thick calloused area of skin, no open aspects  2  Pressure injury sacrum, unstageable-POA- Wound is oval in shape, true depth unknown, is approx  20% non-blanchable pink tissue and 80% yellow adhered fibrinous tissue, no drainage noted  Julianna-wound is dry, intact, blanchable pink skin, blanchable red skin  3  Bilateral buttock are dry, intact, blanchable pink skin  Educated patient on importance of frequent offloading of pressure via turning, repositioning, and weight-shifting  Verbalized understanding of plan of care  No induration, fluctuance, odor, warmth, redness, or purulence noted to the above noted wound  New dressing applied  Patient tolerated well, reports mild pain to the wound  Primary nurse aware of plan of care  See flow sheets for more detailed assessment findings  Will follow along  Skin care Plan:  1-Cleanse sacro-buttocks with soap and water  Apply Allevyn foam over sacral wound  Iván with T for treatment  Change every three days and PRN  Peel back and check skin q-shift  2-Turn/reposition q2h for pressure re-distribution on skin   3-Elevate right heel to offload pressure  4-Moisturize skin daily with skin nourishing cream  5-Ehob cushion in chair when out of bed  6-Hydraguard to right heel BID and PRN             Wound 06/01/22 Pressure Injury Sacrum (Active)   Wound Image   06/01/22 1114   Wound Description Non-blanchable erythema;Pink;Yellow 06/01/22 1114   Pressure Injury Stage U 06/01/22 1114   Julianna-wound Assessment Dry; Intact; Pink 06/01/22 1114   Wound Length (cm) 0 3 cm 06/01/22 1114   Wound Width (cm) 1 cm 06/01/22 1114   Wound Depth (cm) 0 1 cm 06/01/22 1114   Wound Surface Area (cm^2) 0 3 cm^2 06/01/22 1114   Wound Volume (cm^3) 0 03 cm^3 06/01/22 1114   Calculated Wound Volume (cm^3) 0 03 cm^3 06/01/22 1114   Tunneling 0 cm 06/01/22 1114   Undermining 0 06/01/22 1114   Drainage Amount None 06/01/22 1114   Non-staged Wound Description Not applicable 18/14/65 5388   Treatments Cleansed;Site care 06/01/22 1114   Dressing Moisture barrier 06/01/22 1114   Wound packed?  No 06/01/22 1114   Dressing Changed New 06/01/22 1114   Patient Tolerance Tolerated well 06/01/22 1114         Call or tigertext with any questions  Wound Care will continue to follow    Zehra BRIGGSN RN [facility]  Wound care

## 2022-06-01 NOTE — RESPIRATORY THERAPY NOTE
RT Protocol Note  Mary Flores 80 y o  male MRN: [id number]  Unit/Bed#: -01 Encounter: [id number]    Assessment    Principal Problem:    Sepsis due to COVID-19 [facility])  Active Problems:    Severe peripheral arterial disease ([facility] [address] )    CKD (chronic kidney disease), stage III ([facility] )    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    Severe protein-calorie malnutrition (HCC)    Pulmonary fibrosis (HCC)    Chronic respiratory failure (HCC)    Elevated brain natriuretic peptide (BNP) level      Home Pulmonary Medications:  duoneb QID  Albuterol PRN  Home Devices/Therapy: Home O2    Past Medical History:   Diagnosis Date    Anemia     Arthritis     Atherosclerosis of artery of extremity with ulceration ([facility] ) 3/27/2019    Bifascicular block     Cellulitis of chest wall     Chronic kidney disease     Cough     Dupuytren contracture     DVT femoral (deep venous thrombosis) with thrombophlebitis, right (HCC)     Elevated glucose     Hemothorax     Left    Hyperlipidemia     Interstitial lung disease (HCC)     Phantom pain after amputation of lower extremity ([facility] ) 3/27/2019    SOB (shortness of breath)     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 2/19/2019    Vascular disease      Social History     Socioeconomic History    Marital status: /Civil Union     Spouse name: None    Number of children: 3    Years of education: None    Highest education level: None   Occupational History    Occupation: retired   Tobacco Use    Smoking status: Former Smoker     Packs/day: 2 00     Years: 15 00     Pack years: 30 00     Types: Cigarettes    Smokeless tobacco: Never Used    Tobacco comment: quit 62 years ago   Vaping Use    Vaping Use: Never used   Substance and Sexual Activity    Alcohol use: Not Currently     Comment: n/a    Drug use: No    Sexual activity: Never   Other Topics Concern    None   Social History Narrative    Previous      Social Determinants of Health     Financial Resource Strain: Not on file   Food Insecurity: No Food Insecurity    Worried About Running Out of Food in the Last Year: Never true    Ran Out of Food in the Last Year: Never true   Transportation Needs: No Transportation Needs    Lack of Transportation (Medical): No    Lack of Transportation (Non-Medical): No   Physical Activity: Not on file   Stress: Not on file   Social Connections: Not on file   Intimate Partner Violence: Not on file   Housing Stability: Low Risk     Unable to Pay for Housing in the Last Year: No    Number of Places Lived in the Last Year: 1    Unstable Housing in the Last Year: No       Subjective         Objective    Physical Exam:   Assessment Type: Assess only  General Appearance: Awake  Respiratory Pattern: Dyspnea with exertion  Chest Assessment: Chest expansion symmetrical  Bilateral Breath Sounds: Diminished  O2 Device: 2LNC    Vitals:  Blood pressure (!) 119/47, pulse 59, temperature (!) 97 4 °F (36 3 °C), resp  rate 19, SpO2 97 %  Imaging and other studies: I have personally reviewed pertinent reports  O2 Device: 2LNC     Plan    Respiratory Plan: Mild Distress pathway        Resp Comments: Covid positive patient with pulmonary fibrosis history  Patient takes QID duoneb and PRN txs at home   Due to being covid positive patient will be ordered inhalers instead of nebulizers Statement Selected

## 2022-06-01 NOTE — ASSESSMENT & PLAN NOTE
· COVID-19 PCR positive 05/31/2022 (patient vaccinated with booster)  · Chest x-ray consistent with history of pulmonary fibrosis  Bilateral ground-glass opacities consistent with pneumonia  · Patient on chronic 2 L O2 via nasal cannula for history of pulmonary fibrosis (at baseline)  · Given underlying comorbidity, patient started on IV Decadron/IV remdesivir (day 2)  · On heparin drip per COVID protocol  · On IV cefepime (if negative to procalcitonin levels, consider discontinuation)  · Monitor inflammatory markers  · Monitor O2 levels

## 2022-06-01 NOTE — ASSESSMENT & PLAN NOTE
· Elevated BMP 1,674  · Evaluated by Cardiology during last hospitalization on 05/16  · No evidence of CHF on echocardiogram  · Patient appears euvolemic on examination

## 2022-06-01 NOTE — PLAN OF CARE
Problem: OCCUPATIONAL THERAPY ADULT  Goal: Performs self-care activities at highest level of function for planned discharge setting  See evaluation for individualized goals  Description: Treatment Interventions: ADL retraining, Functional transfer training, UE strengthening/ROM, Endurance training, Patient/family training, Equipment evaluation/education, Compensatory technique education, Continued evaluation, Energy conservation, Activityengagement          See flowsheet documentation for full assessment, interventions and recommendations  Note: Limitation: Decreased ADL status, Decreased UE strength, Decreased endurance, Decreased self-care trans, Decreased high-level ADLs  Prognosis: Good  Assessment: Patient is a 80 y o  male seen for OT evaluation s/p admit to [id number] [facility] on 5/31/2022 w/Sepsis due to COVID-19 [facility])  Commorbidities affecting patient's functional performance at time of assessment include: elevated BNP level, chronic respiratory failure, pulmonary fibrosis, severe protein-calorie malnutrition, CKD stage III, severe peripheral arterial disease, and s/p BKA of LLE  Patient  has a past medical history of Anemia, Arthritis, Atherosclerosis of artery of extremity with ulceration ([address] ) (3/27/2019), Bifascicular block, Cellulitis of chest wall, Chronic kidney disease, Cough, Dupuytren contracture, DVT femoral (deep venous thrombosis) with thrombophlebitis, right (HCC), Elevated glucose, Hemothorax, Hyperlipidemia, Interstitial lung disease ([address] ), Phantom pain after amputation of lower extremity ([address] ) (3/27/2019), SOB (shortness of breath), 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 (2/19/2019), and Vascular disease  Orders placed for OT evaluation and treatment  Performed at least two patient identifiers during session including name and wristband  Prior to admission, patient was living with his spouse in a one-story home with 3+1 steps to enter   At baseline, patient is independent in ADLs and receives assistance with IADLs  Personal factors affecting patient at time of initial evaluation include: steps to enter, difficulty performing ADLs and difficulty performing IADLs  Upon evaluation, patient requires supervision, set up and minimal assist for UB ADLs, minimal  and moderate assist for LB ADLs, transfers and functional ambulation in room and bathroom with minimal assist, with the use of Rolling Walker  Patient is alert and oriented x 4  Occupational performance is affected by the following deficits: decreased muscle strength, impaired gross motor coordination, dynamic sit/ stand balance deficit with poor standing tolerance time for self care and functional mobility and decreased activity tolerance, and requiring external assistance to complete transitional movements  Patient to benefit from continued Occupational Therapy treatment while in the hospital to address deficits as defined above and maximize level of functional independence with ADLs and functional mobility  Occupational Performance areas to address include: grooming , bathing/ shower, dressing, toilet hygiene, transfer to all surfaces, functional mobility, IADLs: safety procedures and Leisure Participation  From OT standpoint, recommendation at time of d/c would be post-acute rehabilitation services         OT Discharge Recommendation: Post acute rehabilitation services

## 2022-06-01 NOTE — PLAN OF CARE
Problem: PHYSICAL THERAPY ADULT  Goal: Performs mobility at highest level of function for planned discharge setting  See evaluation for individualized goals  Description: Treatment/Interventions: Functional transfer training, LE strengthening/ROM, Elevations, Therapeutic exercise, Endurance training, Patient/family training, Equipment eval/education, Bed mobility, Gait training, Spoke to nursing, OT  Equipment Recommended: Shakir Camarena (RW)       See flowsheet documentation for full assessment, interventions and recommendations  6/1/2022 1221 by Deanne Laboy, PT  Note: Prognosis: Good  Problem List: Decreased strength, Decreased endurance, Impaired balance, Decreased mobility, Impaired sensation  Assessment: Pt is 80 y o  male seen for high-complexity PT evaluation on 6/1/2022 s/p admit to [facility] on 5/31/2022 w/ Sepsis due to COVID-19 [facility])  PT was consulted to assess pt's functional mobility and d/c needs  Order placed for PT eval and tx  PTA, pt resides with spouse in [facility] with 3+1 ARMIN, ambulates with walker at baseline, +fall history, retired, wears 2L O2 NC at baseline  At time of eval, pt requiring min A x1 for bed mobility, transfers, short gait trial with use of RW for short distance  Upon evaluation, pt presenting with impaired functional mobility d/t decreased strength, decreased endurance, impaired balance, decreased mobility, impaired sensation and activity intolerance  Pertinent PMHx and current co-morbidities affecting pt's physical performance at time of assessment include: sepsis d/t COVID19, severe PAD, CKD, s/p BKA LLE, pulmonary fibrosis, chronic respiratory failure, HLD, anemia, ambulatory dysfunction, carpal tunnel syndrome, generalized OA, unstable stable, falls, dysphagia, dizziness   Personal factors affecting pt at time of eval include: inaccessible home environment, ambulating w/ assistive device, stairs to enter home, inability to navigate community distances, inability to navigate level surfaces w/o external assistance, unable to perform dynamic tasks in community and positive fall history  The following objective measures performed on IE also reveal limitations: Barthel Index: 50/100, Modified [place]: 4 (moderate/severe disability) and AM-PAC 6-Clicks: 00/05  Pt's clinical presentation is currently unstable/unpredictable seen in pt's presentation of advanced age, abnormal lab value(s), need for input for task focus and mobility technique and ongoing medical assessment  Overall, pt's rehab potential and prognosis to return to PLOF is good as impacted by objective findings, warranting pt to receive further skilled PT interventions to address identified impairments, activity limitation(s), and participation restriction(s)  Pt to benefit from continued PT tx to address deficits as defined above and maximize level of functional independent mobility  From PT/mobility standpoint, recommendation at time of d/c would be post acute rehabilitation services pending progress in order to facilitate return to PLOF  Barriers to Discharge: Inaccessible home environment        PT Discharge Recommendation: Post acute rehabilitation services          See flowsheet documentation for full assessment  6/1/2022 1214 by Cory Holloway PT  Note: Prognosis: Good  Problem List: Decreased strength, Decreased endurance, Impaired balance, Decreased mobility, Impaired sensation  Assessment: Pt is 80 y o  male seen for high-complexity PT evaluation on 6/1/2022 [facility] w/ Sepsis due to COVID-19 [facility])  PT was consulted to assess pt's functional mobility and d/c needs  Order placed for PT eval and tx  PTA, pt resides with spouse in [facility] with 3+1 ARMIN, ambulates with walker at baseline, +fall history, retired, wears 2L O2 NC at baseline  At time of eval, pt requiring min A x1 for bed mobility, transfers, short gait trial with use of RW for short distance   Upon evaluation, pt presenting with impaired functional mobility d/t decreased strength, decreased endurance, impaired balance, decreased mobility, impaired sensation and activity intolerance  Pertinent PMHx and current co-morbidities affecting pt's physical performance at time of assessment include: sepsis d/t COVID19, severe PAD, CKD, s/p BKA LLE, pulmonary fibrosis, chronic respiratory failure, HLD, anemia, ambulatory dysfunction, carpal tunnel syndrome, generalized OA, unstable stable, falls, dysphagia, dizziness  Personal factors affecting pt at time of eval include: inaccessible home environment, ambulating w/ assistive device, stairs to enter home, inability to navigate community distances, inability to navigate level surfaces w/o external assistance, unable to perform dynamic tasks in community and positive fall history  The following objective measures performed on IE also reveal limitations: Barthel Index: 50/100, Modified [place]: 4 (moderate/severe disability) and AM-PAC 6-Clicks: 50/02  Pt's clinical presentation is currently unstable/unpredictable seen in pt's presentation of advanced age, abnormal lab value(s), need for input for task focus and mobility technique and ongoing medical assessment  Overall, pt's rehab potential and prognosis to return to PLOF is good as impacted by objective findings, warranting pt to receive further skilled PT interventions to address identified impairments, activity limitation(s), and participation restriction(s)  Pt to benefit from continued PT tx to address deficits as defined above and maximize level of functional independent mobility  From PT/mobility standpoint, recommendation at time of d/c would be post acute rehabilitation services pending progress in order to facilitate return to PLOF  Barriers to Discharge: Inaccessible home environment        PT Discharge Recommendation: Post acute rehabilitation services          See flowsheet documentation for full assessment

## 2022-06-01 NOTE — H&P
1943 [facility]  H&P- Mei Strong 1933, 80 y o  male MRN: [id number]  Unit/Bed#: -[facility] Encounter: [id number]  Primary Care Provider: Shakeel Wooten MD   Date and time admitted to hospital: 5/31/2022  4:28 PM    * Sepsis due to COVID-19 [facility])  Assessment & Plan  Patient presenting to the ED for increased shortness of breath and generalized weakness starting earlier this morning  Patient was recently admitted on 05/16/2022 for acute respiratory failure with hypoxia and treated with steroids and antibiotics secondary to pulmonary fibrosis  During this hospitalization he was seen by both pulmonology and Cardiology  Given recurrence and worsening symptoms this morning patient reported back to the ED  Denies any associated symptoms such as dizziness, headaches, chest pain, abdominal pain, nausea/vomiting/diarrhea, urinary complaints at this time  · Meets sepsis criteria:  Leukocytosis (WBC 16 95), tachycardia (HR 92), fever (100 9F  · COVID 19 positive  · CXR pending  · Currently requiring 2 L NC, which is what he uses at home  No increased oxygen requirements at this time  · Given high risk patient, will start on IV steroids and remdesivir  · Start on IV cefepime for possible underlying pneumonia  · Elevated dimer 1 41  However, lower suspicion for PE  SOB likely more infectious cause at this time  Per guidelines, will use IV heparin (ACS low) for anticoagulation  Can consider PE study if oxygen requirements increasing    · Monitor respiratory status  · Follow-up infectious labs  · Isolation precautions    Elevated brain natriuretic peptide (BNP) level  Assessment & Plan  · Elevated BMP 1,674  · Evaluated by Cardiology during last hospitalization on 05/16  · No evidence of CHF on echocardiogram  · Patient appears euvolemic on examination    Chronic respiratory failure ([address] )  Assessment & Plan  · Chronically on 2 L NC starting last discharge on 05/23/22 secondary to chronic pulmonary fibrosis  · Not needing any additional oxygen requirements at this time  · Monitor respiratory status    Pulmonary fibrosis (HCC)  Assessment & Plan  · Chronically on 2 L NC at home  · Continue pre-hospital inhalers  · Monitor respiratory status given current COVID 19 infection  · Encourage outpatient follow-up with pulmonology    Severe protein-calorie malnutrition ([facility] )  Assessment & Plan  Malnutrition Findings:     · Due to chronic illness  · Malnutrition characteristics:  Fat loss, muscle loss, and adequate energy, weight loss    BMI Findings: There is no height or weight on file to calculate BMI  Continue Ensure protein supplementation    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  Assessment & Plan  · Known history, wears left lower extremity prosthesis    CKD (chronic kidney disease), stage III [facility])  Assessment & Plan  Lab Results   Component Value Date    EGFR 48 05/31/2022    EGFR 48 05/22/2022    EGFR 45 05/20/2022    CREATININE 1 30 05/31/2022    CREATININE 1 31 (H) 05/22/2022    CREATININE 1 37 (H) 05/20/2022     · Creatinine stable at baseline  · Avoid hypotension and nephrotoxic agents  · Monitor daily BMP    Severe peripheral arterial disease ([facility] )  Assessment & Plan  · S/p BLE angiogram, left SFA PTA, DCB, arthrectomy, Viabahn stent and L BK popliteal PTA  · Continue ASA and statin    VTE Prophylaxis: Enoxaparin (Lovenox)  / sequential compression device   Code Status: Level 1 code  Discussion with family:  All patient questions answered to the best my ability    Anticipated Length of Stay:  Patient will be admitted on an Observation basis with an anticipated length of stay of  More than 2 midnights  Justification for Hospital Stay:  Sepsis due to COVID-19    Total Time for Visit, including Counseling / Coordination of Care: 60 minutes  Greater than 50% of this total time spent on direct patient counseling and coordination of care      Chief Complaint:   Shortness of breath    History of Present Illness:    Marielle Yang is a 80 y o  male who has a past medical history significant for pulmonary fibrosis, peripheral arterial disease, chronic respiratory failure, CKD status post below-the-knee amputation  Patient presenting to the ED for increased shortness of breath and generalized weakness starting earlier this morning  Patient was recently admitted on 05/16/2022 for acute respiratory failure with hypoxia and treated with steroids and antibiotics secondary to pulmonary fibrosis  During this hospitalization he was seen by both pulmonology and Cardiology  Given recurrence and worsening symptoms this morning patient reported back to the ED  Denies any associated symptoms such as dizziness, headaches, chest pain, abdominal pain, nausea/vomiting/diarrhea, urinary complaints at this time  Review of Systems:    Review of Systems   Constitutional: Positive for appetite change and fever  Negative for chills  HENT: Negative for congestion, ear pain, rhinorrhea and sore throat  Eyes: Negative for pain and visual disturbance  Respiratory: Positive for shortness of breath and wheezing  Negative for cough  Cardiovascular: Negative for chest pain and palpitations  Gastrointestinal: Negative for abdominal pain, constipation, diarrhea, nausea and vomiting  Genitourinary: Negative for dysuria and hematuria  Musculoskeletal: Positive for myalgias  Negative for arthralgias and back pain  Skin: Negative for color change and rash  Neurological: Positive for weakness  Negative for seizures and syncope  All other systems reviewed and are negative        Past Medical and Surgical History:     Past Medical History:   Diagnosis Date    Anemia     Arthritis     Atherosclerosis of artery of extremity with ulceration ([facility] [address] ) 3/27/2019    Bifascicular block     Cellulitis of chest wall     Chronic kidney disease     Cough     Dupuytren contracture     DVT femoral (deep venous thrombosis) with thrombophlebitis, right (HCC)     Elevated glucose     Hemothorax     Left    Hyperlipidemia     Interstitial lung disease (HCC)     Phantom pain after amputation of lower extremity ([address] ) 3/27/2019    SOB (shortness of breath)     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 2/19/2019    Vascular disease        Past Surgical History:   Procedure Laterality Date    AMPUTATION  1972    L thumb    CARPAL TUNNEL RELEASE  2016    L hand    CATARACT EXTRACTION      COLONOSCOPY      EYE SURGERY      FRACTURE SURGERY      HAND SURGERY      IR LOWER EXTREMITY / INTERVENTION  10/26/2018    KNEE ARTHROSCOPY      NERVE BLOCK      DC AMPUTATION LOW LEG THRU TIB/FIB Left 2/1/2019    Procedure: AMPUTATION BELOW KNEE (BKA); Surgeon: Liberty Marte MD;  Location: [facility];  Service: Vascular    DC Adriane Lazaro 3RD+ ORD SLCTV ABDL PEL[facility] Left 3/9/2018    Procedure: LEFT LOWER EXTREMITY ARTERIOGRAM WITH PTA OF LEFT POPLITEAL ARTERY;  Surgeon: Soni Sloan MD;  Location: [facility];  Service: Vascular    DC Adriane Monsivaisluke 3RD+ ORD SLCTV ABDL PEL[facility] Left 10/26/2018    Procedure: ARTERIOGRAM, angioplasty stent and atherectomy;  Surgeon: Soni Sloan MD;  Location: [facility];  Service: Vascular    THORACENTESIS         Meds/Allergies:    Prior to Admission medications    Medication Sig Start Date End Date Taking?  Authorizing Provider   albuterol (2 5 mg/3 mL) 0 083 % nebulizer solution Take 3 mL (2 5 mg total) by nebulization every 6 (six) hours as needed for shortness of breath 5/23/22  Yes Quan Hernandez PA-C   albuterol (PROVENTIL HFA,VENTOLIN HFA) 90 mcg/act inhaler Inhale 2 puffs every 6 (six) hours as needed for wheezing 5/3/22  Yes Devante Singh MD   aspirin 81 MG tablet Take by mouth   Yes Historical Provider, MD   Cholecalciferol (VITAMIN D) 2000 units CAPS Take by mouth   Yes Historical Provider, MD   CYANOCOBALAMIN PO cyanocobalamin (vitamin B-12)   Yes Historical Provider, MD   ferrous sulfate 325 (65 Fe) mg tablet Take 1 tablet (325 mg total) by mouth 2 (two) times a day 6/23/21  Yes Chuck Solis MD   ipratropium-albuterol (DUO-NEB) 0 5-2 5 mg/3 mL nebulizer solution Take 3 mL by nebulization in the morning and 3 mL at noon and 3 mL in the evening and 3 mL before bedtime  5/16/22 8/14/22 Yes Amada Olson MD   pantoprazole (PROTONIX) 40 mg tablet Take 1 tablet (40 mg total) by mouth daily 7/14/21  Yes Tonja Perry PA-C   predniSONE 20 mg tablet Take 2 5 tablets (50 mg total) by mouth daily for 2 days, THEN 2 tablets (40 mg total) daily for 5 days, THEN 1 5 tablets (30 mg total) daily for 5 days, THEN 1 tablet (20 mg total) daily for 5 days, THEN 0 5 tablets (10 mg total) daily for 5 days  5/24/22 6/15/22 Yes Ludy Hernandez PA-C   simvastatin (ZOCOR) 20 mg tablet Take 1 tablet (20 mg total) by mouth daily 4/5/22  Yes Chuck Solis MD   VITAMIN E COMPLEX PO Take by mouth   Yes Historical Provider, MD   ipratropium-albuterol (DUO-NEB) 0 5-2 5 mg/3 mL nebulizer solution Take 3 mL by nebulization every 8 (eight) hours as needed for wheezing or shortness of breath 5/23/22   Keyshawn Hernandez PA-C   meclizine (ANTIVERT) 25 mg tablet Take 1 tablet (25 mg total) by mouth every 8 (eight) hours as needed for dizziness  Patient not taking: Reported on 5/31/2022 5/23/22   Rocio Obrien PA-C     I have reviewed home medications using allscripts      Allergies: No Known Allergies    Social History:     Marital Status: /Civil Union   Occupation: NA  Patient Pre-hospital Living Situation: Home  Patient Pre-hospital Level of Mobility: Walker  Patient Pre-hospital Diet Restrictions: None  Substance Use History:   Social History     Substance and Sexual Activity   Alcohol Use Not Currently    Comment: n/a     Social History     Tobacco Use   Smoking Status Former Smoker    Packs/day: 2 00    Years: 15 00    Pack years: 30 00    Types: Cigarettes Smokeless Tobacco Never Used   Tobacco Comment    quit 57 years ago     Social History     Substance and Sexual Activity   Drug Use No       Family History:    Family History   Problem Relation Age of Onset    Lung cancer Mother     Brain cancer Mother     Diabetes Father     Aneurysm Father     Stroke Father     Lung cancer Father     Brain cancer Father     Diabetes Maternal Grandmother        Physical Exam:     Vitals:   Blood Pressure: 119/60 (05/31/22 2053)  Pulse: 70 (05/31/22 2004)  Temperature: 98 6 °F (37 °C) (05/31/22 2004)  Temp Source: Oral (05/31/22 2004)  Respirations: 18 (05/31/22 2004)  SpO2: 97 % (05/31/22 2004)    Physical Exam  Vitals and nursing note reviewed  Constitutional:       Appearance: He is well-developed  He is ill-appearing  HENT:      Head: Normocephalic and atraumatic  Nose: No congestion or rhinorrhea  Mouth/Throat:      Pharynx: Oropharynx is clear  Eyes:      General: No scleral icterus  Conjunctiva/sclera: Conjunctivae normal    Cardiovascular:      Rate and Rhythm: Regular rhythm  Tachycardia present  Pulses: Normal pulses  Heart sounds: No murmur heard  Pulmonary:      Effort: Respiratory distress present  Breath sounds: Wheezing and rales present  Comments: 2L NC  Abdominal:      General: Bowel sounds are normal  There is no distension  Palpations: Abdomen is soft  Tenderness: There is no abdominal tenderness  Musculoskeletal:         General: Normal range of motion  Cervical back: Neck supple  Right lower leg: No edema  Left lower leg: No edema  Skin:     General: Skin is warm and dry  Neurological:      Mental Status: He is alert and oriented to person, place, and time  Additional Data:     Lab Results: I have personally reviewed pertinent reports        Results from last 7 days   Lab Units 05/31/22  1736   WBC Thousand/uL 16 95*   HEMOGLOBIN g/dL 10 9*   HEMATOCRIT % 33 4*   PLATELETS Thousands/uL 218   LYMPHO PCT % 9*   MONO PCT % 0*   EOS PCT % 2     Results from last 7 days   Lab Units 05/31/22  1736   SODIUM mmol/L 133*   POTASSIUM mmol/L 4 4   CHLORIDE mmol/L 98*   CO2 mmol/L 29   BUN mg/dL 48*   CREATININE mg/dL 1 30   ANION GAP mmol/L 6   CALCIUM mg/dL 8 8   ALBUMIN g/dL 3 0*   TOTAL BILIRUBIN mg/dL 0 81   ALK PHOS U/L 55   ALT U/L 40   AST U/L 16   GLUCOSE RANDOM mg/dL 163*     Results from last 7 days   Lab Units 05/31/22  1736   INR  1 12             Results from last 7 days   Lab Units 05/31/22  1736   LACTIC ACID mmol/L 1 6       Imaging: I have personally reviewed pertinent reports  XR chest 2 views    (Results Pending)       EKG, Pathology, and Other Studies Reviewed on Admission:   · EKG: Reviewed    Allscripts / Epic Records Reviewed: Yes     ** Please Note: This note has been constructed using a voice recognition system   **

## 2022-06-01 NOTE — UTILIZATION REVIEW
Initial Clinical Review    Observation on 5/31 @ 1917 and changed to Inpatient on 6/1 @ 1205  Pt requiring continued stay for Sepsis due to COVID-19, Pneumonia/ Iv Steriod/ Iv Remdesivir and Iv antibiotics  Admission: Date/Time/Statement:   Admission Orders (From admission, onward)     Ordered        06/01/22 1205  Inpatient Admission  Once            05/31/22 1917  Place in Observation  Once                      Orders Placed This Encounter   Procedures    Inpatient Admission     Standing Status:   Standing     Number of Occurrences:   1     Order Specific Question:   Level of Care     Answer:   Med Surg [16]     Order Specific Question:   Estimated length of stay     Answer:   More than 2 Midnights     Order Specific Question:   Certification     Answer:   I certify that inpatient services are medically necessary for this patient for a duration of greater than two midnights  See H&P and MD Progress Notes for additional information about the patient's course of treatment  ED Arrival Information     Expected   -    Arrival   5/31/2022 15:17    Acuity   Emergent            Means of arrival   Wheelchair    Escorted by   Spouse    Service   Hospitalist    Admission type   Emergency            Arrival complaint   Chest Pain & SOB           Chief Complaint   Patient presents with    Shortness of Breath     SOB starting this morning with chest pain "like lead " Family reports weakness, cough x1 week  On 2L NC chronically  Initial Presentation: 80 y o  male to ED presents with increased shortness of breath and generalized weakness started this am  Recent hospitalization on 5/16 to 5/23/22 for acute respiratory failure with hypoxia and treated with steroids and antibiotics secondary to pulmonary fibrosis  During this hospitalization he was seen by both pulmonology and Cardiology  Given recurrence and worsening symptoms this morning patient reported back to the ED   PMH for Pulmonary fibrosis, peripheral arterial disease, chronic respiratory failure on O2 2L at baseline, CKD status post below-the-knee amputation  Admit Observation level of care for Sepsis due to COVID-19, Elevated BNP and Chronic respiratory failure - chronically on O2 2L NC, starting last discharge on 05/23/22 secondary to chronic pulmonary fibrosis  Start Iv Steroid and Iv Remdesivir given high risk pt  Start Iv antibiotics  Elevated D-dimer but low suspicion for PE  Iv Heparin for now  Isolation precaution  On exam; Wheezing and rales  Respiratory distress and tachycardia  6/1 Changed to Inpatient status  Progress notes; Pneumonia due to COVID-19 virus  Continue Iv Steroid, Iv Remdesivir and Iv antibiotics  F/u bld cultures  On chronic 2L O2 NC at baseline  Continue on Iv Heparin drip  Monitor inflammatory markers  Shortness of breath slightly improved however not at baseline  Occasional wheezing  Also c/o significant fatigue and weakness  Date: 6/2  Day 2: Iv steriod changed to po prednisone taper       ED Triage Vitals   Temperature Pulse Respirations Blood Pressure SpO2   05/31/22 1525 05/31/22 1525 05/31/22 1525 05/31/22 1525 05/31/22 1525   (!) 100 9 °F (38 3 °C) 92 22 141/63 95 %      Temp Source Heart Rate Source Patient Position - Orthostatic VS BP Location FiO2 (%)   05/31/22 1747 05/31/22 1645 05/31/22 1645 05/31/22 1645 --   Oral Monitor Sitting Right arm       Pain Score       05/31/22 1746       No Pain          Wt Readings from Last 1 Encounters:   05/31/22 54 kg (119 lb)     Additional Vital Signs:   06/02/22 0959 -- -- -- -- -- 98 % 28 2 L/min Nasal cannula --   06/02/22 07:44:59 98 1 °F (36 7 °C) 61 18 135/56 82 98 % -- -- -- --   06/01/22 21:10:21 97 9 °F (36 6 °C) 61 18 141/54 83 97 % -- -- --      06/01/22 08:01:15 97 4 °F (36 3 °C) Abnormal  52   Abnormal  -- 127/51 76 99 % -- -- -- --   06/01/22 08:00:50 97 4 °F (36 3 °C) Abnormal  53   Abnormal  -- 127/51 76 99 % -- -- -- --   06/01/22 08:00:40 97 4 °F (36 3 °C) Abnormal  61 -- 127/51 76 99 % -- -- -- --   05/31/22 2200 -- -- -- -- -- 97 % 28 2 L/min Nasal cannula --   05/31/22 2149 -- -- -- -- -- 97 % 120 25 L/min Nasal cannula --   05/31/22 21:41:32 97 4 °F (36 3 °C) Abnormal  59 19 119/47   Abnormal  71 97 % -- -- -- --   05/31/22 20:53:46 -- -- -- 119/60 80 -- -- -- -- --   05/31/22 2004 98 6 °F (37 °C) 70 18 122/58 84 97 % 28 2 L/min None (Room air) Lying   05/31/22 1900 98 6 °F (37 °C) 78 18 112/56 79 97 % 28 2 L/min None (Room air)      Pertinent Labs/Diagnostic Test Results:   XR chest 2 views   Final Result by Donal Ramirez MD (06/01 5190)         1  Pulmonary fibrosis  2   Added groundglass opacities, correlate for heart failure and/or pneumonia including Covid 19 infection                    Workstation performed: [id number]           Results from last 7 days   Lab Units 05/31/22  1736   SARS-COV-2  Positive*     Results from last 7 days   Lab Units 06/02/22 0606 06/01/22 0459 05/31/22 2139 05/31/22  1736   WBC Thousand/uL 11 41* 10 24* 13 71* 16 95*   HEMOGLOBIN g/dL 9 5* 9 5* 9 5* 10 9*   HEMATOCRIT % 29 0* 30 1* 29 3* 33 4*   PLATELETS Thousands/uL 170 194 191 218   NEUTROS ABS Thousands/µL 10 02*  --   --   --          Results from last 7 days   Lab Units 06/02/22  0606 06/01/22 0459 05/31/22  1736   SODIUM mmol/L 137 136 133*   POTASSIUM mmol/L 4 6 4 4 4 4   CHLORIDE mmol/L 102 100 98*   CO2 mmol/L 27 28 29   ANION GAP mmol/L 8 8 6   BUN mg/dL 44* 43* 48*   CREATININE mg/dL 1 26 1 39* 1 30   EGFR ml/min/1 73sq m 50 44 48   CALCIUM mg/dL 8 4 8 6 8 8     Results from last 7 days   Lab Units 06/01/22  0459 05/31/22  1736   AST U/L 14 16   ALT U/L 36 40   ALK PHOS U/L 49 55   TOTAL PROTEIN g/dL 6 1* 6 6   ALBUMIN g/dL 2 6* 3 0*   TOTAL BILIRUBIN mg/dL 0 55 0 81   BILIRUBIN DIRECT mg/dL  --  0 24*         Results from last 7 days   Lab Units 06/02/22  0606 06/01/22  0459 05/31/22  1736   GLUCOSE RANDOM mg/dL 111 132 163*             Results from last 7 days   Lab Units 05/31/22 2031   CK TOTAL U/L 18*     Results from last 7 days   Lab Units 05/31/22 2139 05/31/22 1953 05/31/22 1736   HS TNI 0HR ng/L  --   --  39   HS TNI 2HR ng/L  --  39  --    HSTNI D2 ng/L  --  0  --    HS TNI 4HR ng/L 38  --   --    HSTNI D4 ng/L -1  --   --      Results from last 7 days   Lab Units 05/31/22 2031   D-DIMER QUANTITATIVE ug/ml FEU 1 41*     Results from last 7 days   Lab Units 06/02/22 0606 06/01/22 2108 06/01/22 1419 06/01/22 0459 05/31/22 2139 05/31/22 1736   PROTIME seconds  --   --   --   --  15 1* 13 9   INR   --   --   --   --  1 24* 1 12   PTT seconds 62* 66* 88*   < > 43* 40*    < > = values in this interval not displayed  Results from last 7 days   Lab Units 06/02/22 0606 05/31/22 2031   PROCALCITONIN ng/ml 0 20 0 18     Results from last 7 days   Lab Units 05/31/22  1736   LACTIC ACID mmol/L 1 6             Results from last 7 days   Lab Units 05/31/22  1736   NT-PRO BNP pg/mL 1,674*                 Results from last 7 days   Lab Units 06/02/22 0606 05/31/22 2031   CRP mg/L 48 2* 30 2*                 Results from last 7 days   Lab Units 05/31/22  1736   INFLUENZA A PCR  Negative   INFLUENZA B PCR  Negative   RSV PCR  Negative         Results from last 7 days   Lab Units 05/31/22  1736   BLOOD CULTURE  No Growth at 24 hrs  No Growth at 24 hrs         ED Treatment:   Medication Administration from 05/31/2022 1517 to 05/31/2022 2048       Date/Time Order Dose Route Action     05/31/2022 1527 acetaminophen (TYLENOL) tablet 650 mg 650 mg Oral Given     05/31/2022 1743 cefepime (MAXIPIME) 2 g/50 mL dextrose IVPB 2,000 mg Intravenous New Bag     05/31/2022 2038 ferrous sulfate tablet 325 mg 325 mg Oral Given     05/31/2022 2038 dexamethasone (DECADRON) injection 6 mg 6 mg Intravenous Given        Past Medical History:   Diagnosis Date    Anemia     Arthritis     Atherosclerosis of artery of extremity with ulceration ([facility] [address] ) 3/27/2019    Bifascicular block     Cellulitis of chest wall     Chronic kidney disease     Cough     Dupuytren contracture     DVT femoral (deep venous thrombosis) with thrombophlebitis, right (HCC)     Elevated glucose     Hemothorax     Left    Hyperlipidemia     Interstitial lung disease (HCC)     Phantom pain after amputation of lower extremity ([facility] 3/27/2019    SOB (shortness of breath)     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 2/19/2019    Vascular disease      Present on Admission:   Pulmonary fibrosis ([address] )   Severe protein-calorie malnutrition (HCC)   Severe peripheral arterial disease (HCC)   CKD (chronic kidney disease), stage III ([facility])      Admitting Diagnosis: CHF (congestive heart failure) ([facility]) [I50 9]  Chest pain [R07 9]  Pneumonia [J18 9]  Dyspnea [R06 00]  SOB (shortness of breath) [R06 02]  COVID [U07 1]  Age/Sex: 80 y o  male     Admission Orders:  Scheduled Medications:  albuterol, 2 puff, Inhalation, 4x Daily  aspirin, 81 mg, Oral, Daily  dexamethasone, 6 mg, Intravenous, Q24H  ferrous sulfate, 325 mg, Oral, BID  pantoprazole, 40 mg, Oral, Daily  pravastatin, 40 mg, Oral, Daily With Dinner  remdesivir, 100 mg, Intravenous, Q24H      cefepime (MAXIPIME) 2 g/50 mL dextrose IVPB  Dose: 2,000 mg  Freq: Every 12 hours Route: IV  Last Dose: 2,000 mg (06/02/22 0600)  Start: 06/01/22 0500 End: 06/02/22 0708    Continuous IV Infusions:  heparin (porcine), 3-20 Units/kg/hr (Order-Specific), Intravenous, Titrated      PRN Meds:  acetaminophen, 650 mg, Oral, Q6H PRN  albuterol, 2 puff, Inhalation, Q6H PRN      Continuous cardiac monitoring  Wound care consult for R heel  IP CONSULT TO CASE MANAGEMENT    Network Utilization Review Department  ATTENTION: Please call with any questions or concerns to [phone] and carefully listen to the prompts so that you are directed to the right person   All voicemails are confidential   [facility] [place] all requests for admission clinical reviews, approved or denied determinations and any other requests to dedicated fax number below belonging to the campus where the patient is receiving treatment   List of dedicated fax numbers for the Facilities:  [address] DENIALS (Administrative/Medical Necessity) [phone]   [address] (Maternity/NICU/Pediatrics) [phone]   [address] [place] [address]  [address] [address] [address] [place] [address] [facility] [place] [address] [phone]

## 2022-06-01 NOTE — ASSESSMENT & PLAN NOTE
· Chronically on 2 L NC at home  · Continue pre-hospital inhalers  · Monitor respiratory status given current COVID 19 infection  · Encourage outpatient follow-up with pulmonology

## 2022-06-01 NOTE — ASSESSMENT & PLAN NOTE
Malnutrition Findings:     · Due to chronic illness  · Malnutrition characteristics:  Fat loss, muscle loss, and adequate energy, weight loss    BMI Findings: There is no height or weight on file to calculate BMI       Continue Ensure protein supplementation

## 2022-06-01 NOTE — ASSESSMENT & PLAN NOTE
+ chief complaint increased shortness of breath/weakness  Recently admitted on 05/16/2022 secondary to acute hypoxic respiratory failure secondary to underlying pulmonary fibrosis treated with steroids/antibiotics  He was seen by both pulmonology/Cardiology during hospitalization  · Meeting sepsis criteria upon arrival secondary to tachycardia/fever/leukocytosis with COVID-19 positive PCR status/chest x-ray confirming pneumonia  · On IV cefepime (will discontinue if procalcitonin levels negative  · Follow-up on blood cultures

## 2022-06-01 NOTE — ASSESSMENT & PLAN NOTE
Lab Results   Component Value Date    EGFR 48 05/31/2022    EGFR 48 05/22/2022    EGFR 45 05/20/2022    CREATININE 1 30 05/31/2022    CREATININE 1 31 (H) 05/22/2022    CREATININE 1 37 (H) 05/20/2022     · Creatinine stable at baseline  · Avoid hypotension and nephrotoxic agents  · Monitor daily BMP

## 2022-06-01 NOTE — CASE MANAGEMENT
Case Management Assessment & Discharge Planning Note    Patient name Shantel Encinas  Location [address] [id number] MRN [id number]  : 1933 Date 2022       Current Admission Date: 2022  Current Admission Diagnosis:Sepsis due to [facility] [place] [address])   Patient Active Problem List    Diagnosis Date Noted    Sepsis due to [facility] [place] [address]) 2022    Chronic respiratory failure ([address] ) 2022    Elevated brain natriuretic peptide (BNP) level 2022    LADY (acute kidney injury) ([facility] ) 2022    Dizziness 2022    Acute respiratory failure with hypoxia ([facility] ) 2022    Chronic heart failure with preserved ejection fraction ([facility] ) 2022    Pulmonary fibrosis ([facility] ) 2022    Dysphagia 2020    Leukocytosis 2020    Proteinuria 2020    Shortness of breath 2020    Pulmonary infiltrates 2020    Interstitial lung disease ([facility] ) 2020    Fatigue 2020    Fall at home 2020    Unintentional weight loss 2019    Encounter for immunization 2019    Medicare annual wellness visit, subsequent 2019    Atherosclerosis of artery of extremity with ulceration ([facility] ) 2019    Phantom pain after amputation of lower extremity ([facility] ) 2019    Severe protein-calorie malnutrition ([facility] ) 2019    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 2019    Carpal tunnel syndrome 2019    Decubitus ulcer of right heel, unstageable ([address] ) 2019    Ambulatory dysfunction 10/29/2018    Physical deconditioning 10/29/2018    CKD (chronic kidney disease), stage III ([address] ) 2018    Severe peripheral arterial disease ([address] ) 2018    Bifascicular block 10/31/2017    Chronic fatigue 10/31/2017    Vitamin D deficiency 10/25/2017    Anemia 10/15/2017    Unstable gait     Lichen simplex chronicus 07/10/2017    Seborrheic keratosis 07/10/2017    Change in multiple pigmented skin lesions 05/16/2017    Impaired fasting glucose 03/07/2017    Hyperlipidemia 07/14/2016    Chronic lower back pain 01/14/2016    Generalized osteoarthritis 01/14/2016    Herniated lumbar intervertebral disc 01/14/2016    Rotator cuff tear arthropathy, right 01/14/2016      LOS (days): 0  Geometric Mean LOS (GMLOS) (days):   Days to GMLOS:     OBJECTIVE:              Current admission status: Observation       Preferred Pharmacy:   [facility] [address] [place] [address]  Phone: [phone] Fax: [phone]    Primary Care Provider: Tacos Miranda MD    Primary Insurance: Cassie [facility]  Secondary Insurance:     ASSESSMENT:  Li Hathaway [address] Representative - Spouse   Primary Phone: [phone] (Home)               Advance Directives  Does patient have a [address]?: Yes  Does patient have Advance Directives?: Yes  Advance Directives: Living will, Power of  for health care  Primary Contact: wife Paul Christian Notice Signed:  (not on workqueue)    Readmission Root Cause  30 Day Readmission: No    Patient Information  Admitted from[de-identified] Home  Mental Status: Alert  During Assessment patient was accompanied by: Not accompanied during assessment  Assessment information provided by[de-identified] Patient  Primary Caregiver: Self  Support Systems: Spouse/significant other  [place] of Residence: [facility] [address] do you live in?: [address] [facility] entry access options  Select all that apply : Stairs  Number of steps to enter home  : 1  Do the steps have railings?: Yes  Type of Current Residence: [place]  In the last 12 months, was there a time when you were not able to pay the mortgage or rent on time?: No  In the last 12 months, how many places have you lived?: 1  In the last 12 months, was there a time when you did not have a steady place to sleep or slept in a shelter (including now)?: No  Homeless/housing insecurity resource given?: No  Living Arrangements: Lives w/ Spouse/significant other  Is patient a ?: Yes  Is patient active with [facility])?: No    Activities of Daily Living Prior to Admission  Functional Status: Assistance  Completes ADLs independently?: No  Level of ADL dependence: Assistance (wife assists as needed)  Ambulates independently?: Yes  Does patient use assisted devices?: Yes  Assisted Devices (DME) used: Diomedes Tello, Home Oxygen concentrator, Portable Oxygen tanks  DME Company Name (respiratory supplies):  [employer]  O2 Rate(s): 2  Does patient currently own DME?: Yes  What DME does the patient currently own?: Diomedes Tello, Other (Comment) (prosthetic leg)  Does patient have a history of Outpatient Therapy (PT/OT)?: No  Does the patient have a history of Short-Term Rehab?: Yes (Alex Thomas)  Does patient have a history of HHC?: Yes (SHAZIA)  Does patient currently have [address]?: Yes (Traditional)    Current Home Health Care  Type of Current Home Care Services: Home health aide, Home PT, Nurse visit  [address][de-identified] Other (please enter name in comment) (Traditional)  [facility] Provider[de-identified] PCP    Patient Information Continued  Income Source: Pension/[facility]  Does patient have prescription coverage?: Yes  Within the past 12 months, you worried that your food would run out before you got the money to buy more : Never true  Within the past 12 months, the food you bought just didn't last and you didn't have money to get more : Never true  Food insecurity resource given?: No  Does patient receive dialysis treatments?: No  Does patient have a history of substance abuse?: No  Does patient have a history of Mental Health Diagnosis?: No    PHQ 2/9 Screening   Reviewed PHQ 2/9 Depression Screening Score?: No    Means of Transportation  Means of Transport to Appts[de-identified] Family transport  In the past 12 months, has lack of transportation kept you from medical appointments or from getting medications?: No  In the past 12 months, has lack of transportation kept you from meetings, work, or from getting things needed for daily living?: No  Was application for public transport provided?: No        DISCHARGE DETAILS:    Discharge planning discussed with[de-identified] patient via phone due to Covid status  Freedom of Choice: Yes  Comments - Freedom of Choice: Adamantly refusing STR    Wants to be discharged home and resume services with Traditional  CM contacted family/caregiver?: No- see comments (pt will update wife himself)  Were Treatment Team discharge recommendations reviewed with patient/caregiver?: Yes  Did patient/caregiver verbalize understanding of patient care needs?: Yes  Were patient/caregiver advised of the risks associated with not following Treatment Team discharge recommendations?: Yes    Contacts  Patient Contacts: Lesley Alfred  Relationship to Patient[de-identified] [address]         Is the patient interested in [facility] at discharge?: Yes  [address] requested[de-identified] [address], Nursing, Physical Therapy, [address] [place] [address] Name[de-identified] Other (Traditional)  [address] Provider[de-identified] PCP  Home Health Services Needed[de-identified] Evaluate Functional Status and Safety, Gait/ADL Training, Strengthening/Theraputic Exercises to Improve Function  Homebound Criteria Met[de-identified] Requires the Assistance of Another Person for Safe Ambulation or to Leave the Home, Uses an Assist Device (i e  cane, walker, etc)  Supporting Clincal Findings[de-identified] Fatigues Easliy in Short Distances, Dyspnea with Exertion, Requires Oxygen, Limited Endurance    DME Referral Provided  Referral made for DME?: No    Other Referral/Resources/Interventions Provided:  Interventions: HHC  Referral Comments: Refusing STR-will be discharged home and resume services with Traditional [facility]    Would you like to participate in our [address] service program?  : No - Declined    Treatment Team Recommendation: Home with Home Health Care  Discharge Destination Plan[de-identified] Home with [place] at Discharge : Family

## 2022-06-01 NOTE — PHYSICAL THERAPY NOTE
Physical Therapy Evaluation   Time in: 950  Time out: 1010  Total evaluation time: 20 minutes    Patient's Name: Gricel Hayden    Admitting Diagnosis  CHF (congestive heart failure) ([facility] ) [I50 9]  Chest pain [R07 9]  Pneumonia [J18 9]  Dyspnea [R06 00]  SOB (shortness of breath) [R06 02]  COVID [U07 1]    Problem List  Patient Active Problem List   Diagnosis    Severe peripheral arterial disease ([place] [address] )    Hyperlipidemia    Vitamin D deficiency    Bifascicular block    Anemia    CKD (chronic kidney disease), stage III ([place] [address] )    Ambulatory dysfunction    Physical deconditioning    Decubitus ulcer of right heel, unstageable (HCC)    Carpal tunnel syndrome    Change in multiple pigmented skin lesions    Chronic lower back pain    Chronic fatigue    Generalized osteoarthritis    Herniated lumbar intervertebral disc    Impaired fasting glucose    Lichen simplex chronicus    Rotator cuff tear arthropathy, right    Seborrheic keratosis    Unstable gait    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    Severe protein-calorie malnutrition (HCC)    Atherosclerosis of artery of extremity with ulceration ([facility])    Phantom pain after amputation of lower extremity ([facility] )    Medicare annual wellness visit, subsequent    Unintentional weight loss    Encounter for immunization    Fall at home    Fatigue    Interstitial lung disease ([facility] )    Leukocytosis    Proteinuria    Shortness of breath    Pulmonary infiltrates    Dysphagia    Acute respiratory failure with hypoxia (HCC)    Chronic heart failure with preserved ejection fraction (HCC)    Pulmonary fibrosis (HCC)    LADY (acute kidney injury) ([facility] )    Dizziness    Sepsis due to COVID-19 ([facility] )    Chronic respiratory failure (HCC)    Elevated brain natriuretic peptide (BNP) level    Pneumonia due to COVID-19 virus       Past Medical History  Past Medical History:   Diagnosis Date    Anemia     Arthritis     Atherosclerosis of artery of extremity with ulceration ([place] [address] ) 3/27/2019    Bifascicular block     Cellulitis of chest wall     Chronic kidney disease     Cough     Dupuytren contracture     DVT femoral (deep venous thrombosis) with thrombophlebitis, right (HCC)     Elevated glucose     Hemothorax     Left    Hyperlipidemia     Interstitial lung disease (HCC)     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) 3/27/2019    SOB (shortness of breath)     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 2/19/2019    Vascular disease        Past Surgical History  Past Surgical History:   Procedure Laterality Date    AMPUTATION  1972    L thumb    CARPAL TUNNEL RELEASE  2016    L hand    CATARACT EXTRACTION      COLONOSCOPY      EYE SURGERY      FRACTURE SURGERY      HAND SURGERY      IR LOWER EXTREMITY / INTERVENTION  10/26/2018    KNEE ARTHROSCOPY      NERVE BLOCK      TN AMPUTATION LOW LEG THRU TIB/FIB Left 2/1/2019    Procedure: AMPUTATION BELOW KNEE (BKA); Surgeon: Cassie Hwang MD;  Location: [facility];  Service: Vascular    TN SLCTV CATHJ 3RD+ ORD SLCTV ABDL [facility] Left 3/9/2018    Procedure: LEFT LOWER EXTREMITY ARTERIOGRAM WITH PTA OF LEFT POPLITEAL ARTERY;  Surgeon: Gamaliel Alfaro MD;  Location: [facility];  Service: Vascular    TN [place] 3RD+ ORD [address] [facility] Left 10/26/2018    Procedure: ARTERIOGRAM, angioplasty stent and atherectomy;  Surgeon: Gamaliel Alfaro MD;  Location: [facility];  Service: Vascular    THORACENTESIS         PT performed at least 2 patient identifiers during session: Name and wristband  06/01/22 0955   PT Last Visit   PT Visit Date 06/01/22   Note Type   Note type Evaluation   Pain Assessment   Pain Assessment Tool 0-10   Pain Score No Pain   Restrictions/Precautions   Weight Bearing Precautions Per Order No   Braces or Orthoses Prosthesis  (h/o L BKA, prosthesis)   Other Precautions Contact/isolation; Airborne/isolation; Chair Alarm; Bed Alarm;O2;Multiple lines;Telemetry; Fall Risk  (+COVID19)   Home Living   Type of [address] [place] [address] One level;Stairs to enter with rails  (3 + 1 ARMIN)   Bathroom Shower/Tub Tub/shower unit   Bathroom Toilet Raised   Bathroom Equipment Tub transfer bench   [facility]; Wheelchair-manual  (RW uses at baseline)   Additional Comments 2L O2 NC at baseline   Prior Function   Level of [place] Independent with ADLs and functional mobility   Lives With Spouse   Receives Help From Family  (son and granddtr live locally)   ADL Assistance Independent   IADLs Needs assistance   Falls in the last 6 months 1 to 4   Vocational Retired  ()   Comments pt enjoys to do woodworking  (-) , wife assists with transportation   General   Family/Caregiver Present No   Cognition   Overall Cognitive Status WFL   Arousal/Participation Alert   Orientation Level Oriented X4   Memory Within functional limits   Following Commands Follows all commands and directions without difficulty   Comments pt agreeable to PT eval   RLE Assessment   RLE Assessment   (grossly 3+/5)   LLE Assessment   LLE Assessment   (grossly 3+/5)   Coordination   Movements are Fluid and Coordinated 1   Sensation X  (numbness fingertips)   Bed Mobility   Supine to Sit 4  Minimal assistance   Additional items Assist x 1;HOB elevated; Increased time required;Verbal cues   Transfers   Sit to Stand 4  Minimal assistance   Additional items Assist x 1; Armrests; Increased time required;Verbal cues   Stand to Sit 4  Minimal assistance   Additional items Assist x 1; Armrests; Increased time required;Verbal cues   Additional Comments pt donned prosthesis while seated at EOB  g functional mobility, increased to 93% post seated rest break   Ambulation/Elevation   Gait pattern Decreased foot clearance; Short stride; Step to;Decreased toe off;Decreased heel strike   Gait Assistance 4  Minimal assist   Additional items Assist x 1;Verbal cues   Assistive Device Rolling walker   Distance 5' Balance   Static Sitting Fair +   Dynamic Sitting Fair   Static Standing Fair   Dynamic Standing Fair -   Ambulatory Poor +   Endurance Deficit   Endurance Deficit Yes   Activity Tolerance   Activity Tolerance Patient limited by fatigue   Medical Staff Made Aware care coordination with OT Jeimy   Nurse Made Aware LILLY Flowers   Assessment   Prognosis Good   Problem List Decreased strength;Decreased endurance; Impaired balance;Decreased mobility; Impaired sensation   Assessment Pt is 80 y o  male seen for high-complexity PT evaluation on 6/1/2022 s/p admit to [facility] on 5/31/2022 w/ Sepsis due to COVID-19 [facility])  PT was consulted to assess pt's functional mobility and d/c needs  Order placed for PT eval and tx  PTA, pt resides with spouse in [facility] with 3+1 ARMIN, ambulates with walker at baseline, +fall history, retired, wears 2L O2 NC at baseline  At time of eval, pt requiring min A x1 for bed mobility, transfers, short gait trial with use of RW for short distance  Upon evaluation, pt presenting with impaired functional mobility d/t decreased strength, decreased endurance, impaired balance, decreased mobility, impaired sensation and activity intolerance  Pertinent PMHx and current co-morbidities affecting pt's physical performance at time of assessment include: sepsis d/t COVID19, severe PAD, CKD, s/p BKA LLE, pulmonary fibrosis, chronic respiratory failure, HLD, anemia, ambulatory dysfunction, carpal tunnel syndrome, generalized OA, unstable stable, falls, dysphagia, dizziness  Personal factors affecting pt at time of eval include: inaccessible home environment, ambulating w/ assistive device, stairs to enter home, inability to navigate community distances, inability to navigate level surfaces w/o external assistance, unable to perform dynamic tasks in community and positive fall history   The following objective measures performed on IE also reveal limitations: Barthel Index: 50/100, Modified [place]: 4 (moderate/severe disability) and AM-PAC 6-Clicks: 94/48  Pt's clinical presentation is currently unstable/unpredictable seen in pt's presentation of advanced age, abnormal lab value(s), need for input for task focus and mobility technique and ongoing medical assessment  Overall, pt's rehab potential and prognosis to return to PLOF is good as impacted by objective findings, warranting pt to receive further skilled PT interventions to address identified impairments, activity limitation(s), and participation restriction(s)  Pt to benefit from continued PT tx to address deficits as defined above and maximize level of functional independent mobility  From PT/mobility standpoint, recommendation at time of d/c would be post acute rehabilitation services pending progress in order to facilitate return to PLOF  Barriers to Discharge Inaccessible home environment   Goals   Patient Goals to go home   STG Expiration Date 06/11/22   Short Term Goal #1 In 7-10 days: Increase bilateral LE strength 1/2 grade to facilitate independent mobility, Perform all bed mobility tasks modified independent to decrease caregiver burden, Perform all transfers modified independent to improve independence, Ambulate > 50 ft  with least restrictive assistive device modified independent w/o LOB and w/ normalized gait pattern 100% of the time, Navigate 3 stairs modified independent with unilateral handrail to facilitate return to previous living environment, Increase all balance 1/2 grade to decrease risk for falls, Tolerate 4 hr OOB to faciliate upright tolerance, Improve Barthel Index score to 65 or greater to facilitate independence and PT provider will perform functional balance assessment to determine fall risk   PT Treatment Day 1   Plan   Treatment/Interventions Functional transfer training;LE strengthening/ROM; Elevations; Therapeutic exercise; Endurance training;Patient/family training;Equipment eval/education; Bed mobility;Gait training;Spoke to nursing;OT   PT Frequency 3-5x/wk   Recommendation   PT Discharge Recommendation Post acute rehabilitation services   Equipment Recommended Walker  (RW)   AM-PAC Basic Mobility Inpatient   Turning in Bed Without Bedrails 3   Lying on Back to Sitting on Edge of Flat Bed 3   Moving Bed to Chair 3   Standing Up From Chair 3   Walk in Room 2   Climb 3-5 Stairs 2   Basic Mobility Inpatient Raw Score 16   Basic Mobility Standardized Score 38 32   Highest Level Of Mobility   -[facility] Goal 5: Stand one or more mins   -HLM Achieved 4: Move to chair/commode   Modified [place] Scale   Modified [place]ole Scale 4   Barthel Index   Feeding 10   Bathing 0   Grooming Score 0   Dressing Score 10   Bladder Score 5   Bowels Score 10   Toilet Use Score 5   Transfers (Bed/Chair) Score 10   Mobility (Level Surface) Score 0   Stairs Score 0   Barthel Index Score 50   Additional Treatment Session   Start Time 1010   End Time 1022   Treatment Assessment Pt seen for PT treatment session this date s/p PT eval, consisting of ther ex focused on strengthening  Current goals and POC remain appropriate, pt continues to have rehab potential and is making progress towards STGs  Pt prognosis for achieving goals is good, pending pt progress with hospitalization/medical status improvements, and indicated by Stimulability, ability to follow directions, recent history of independence with functional skills/High PLOF, supportive family/caregivers and previous response to intervention  Pt limited d/t fear of falling  PT recommends post acute rehabilitation services upon discharge  Pt continues to be functioning below baseline level, and remains limited 2* factors listed above  PT will continue to see pt during current hospitalization in order to address the deficits listed above and provide interventions consistent w/ POC in effort to achieve STGs  Exercises   Heelslides Sitting;10 reps;AROM; Bilateral   Hip Abduction Sitting;10 reps;AROM; Bilateral   Knee AROM Long Arc Quad Sitting;10 reps;AROM; Bilateral   Marching Sitting;10 reps;AROM; Bilateral       Trent Mansura, PT, DPT

## 2022-06-01 NOTE — MALNUTRITION/BMI
This medical record reflects one or more clinical indicators suggestive of malnutrition  Malnutrition Findings:   Adult Malnutrition type: Acute illness  Adult Degree of Malnutrition: Other severe protein calorie malnutrition  Malnutrition Characteristics: Fat loss, Muscle loss, Weight loss                  360 Statement: related to inadequate energy intake as evidenced by 15% weight loss last 7 months, hollow supraclavicular space, wasted temporalis, sunken orbital, visible ribs  Int: Regular diet, ensure enlive with meals  BMI Findings:  Adult BMI Classifications: Underweight < 18 5        Body mass index is 18 2  See Nutrition note dated 6/12022 for additional details  Completed nutrition assessment is viewable in the nutrition documentation

## 2022-06-01 NOTE — PLAN OF CARE
Problem: Nutrition/Hydration-ADULT  Goal: Nutrient/Hydration intake appropriate for improving, restoring or maintaining nutritional needs  Description: Monitor and assess patient's nutrition/hydration status for malnutrition  Collaborate with interdisciplinary team and initiate plan and interventions as ordered  Monitor patient's weight and dietary intake as ordered or per policy  Utilize nutrition screening tool and intervene as necessary  Determine patient's food preferences and provide high-protein, high-caloric foods as appropriate       INTERVENTIONS:  - Monitor oral intake, urinary output, labs, and treatment plans  - Assess nutrition and hydration status and recommend course of action  - Evaluate amount of meals eaten  - Assist patient with eating if necessary   - Allow adequate time for meals  - Recommend/ encourage appropriate diets, oral nutritional supplements, and vitamin/mineral supplements  - Order, calculate, and assess calorie counts as needed  - Recommend, monitor, and adjust tube feedings and TPN/PPN based on assessed needs  - Assess need for intravenous fluids  - Provide specific nutrition/hydration education as appropriate  - Include patient/family/caregiver in decisions related to nutrition  Outcome: Progressing     Problem: MOBILITY - ADULT  Goal: Maintain or return to baseline ADL function  Description: INTERVENTIONS:  -  Assess patient's ability to carry out ADLs; assess patient's baseline for ADL function and identify physical deficits which impact ability to perform ADLs (bathing, care of mouth/teeth, toileting, grooming, dressing, etc )  - Assess/evaluate cause of self-care deficits   - Assess range of motion  - Assess patient's mobility; develop plan if impaired  - Assess patient's need for assistive devices and provide as appropriate  - Encourage maximum independence but intervene and supervise when necessary  - Involve family in performance of ADLs  - Assess for home care needs following discharge   - Consider OT consult to assist with ADL evaluation and planning for discharge  - Provide patient education as appropriate  Outcome: Progressing  Goal: Maintains/Returns to pre admission functional level  Description: INTERVENTIONS:  - Perform BMAT or MOVE assessment daily    - Set and communicate daily mobility goal to care team and patient/family/caregiver  - Collaborate with rehabilitation services on mobility goals if consulted  - Perform Range of Motion  times a day  - Reposition patient every  hours    - Dangle patient  times a day  - Stand patient  times a day  - Ambulate patient  times a day  - Out of bed to chair  times a day   - Out of bed for meals  times a day  - Out of bed for toileting  - Record patient progress and toleration of activity level   Outcome: Progressing     Problem: RESPIRATORY - ADULT  Goal: Achieves optimal ventilation and oxygenation  Description: INTERVENTIONS:  - Assess for changes in respiratory status  - Assess for changes in mentation and behavior  - Position to facilitate oxygenation and minimize respiratory effort  - Oxygen administered by appropriate delivery if ordered  - Initiate smoking cessation education as indicated  - Encourage broncho-pulmonary hygiene including cough, deep breathe, Incentive Spirometry  - Assess the need for suctioning and aspirate as needed  - Assess and instruct to report SOB or any respiratory difficulty  - Respiratory Therapy support as indicated  Outcome: Progressing     Problem: SKIN/TISSUE INTEGRITY - ADULT  Goal: Skin Integrity remains intact(Skin Breakdown Prevention)  Description: Assess:  -Perform Ralph assessment every   -Clean and moisturize skin every   -Inspect skin when repositioning, toileting, and assisting with ADLS  -Assess under medical devices such as  every   -Assess extremities for adequate circulation and sensation     Bed Management:  -Have minimal linens on bed & keep smooth, unwrinkled  -Change linens as needed when moist or perspiring  -Avoid sitting or lying in one position for more than  hours while in bed  -Keep HOB at degrees     Toileting:  -Offer bedside commode  -Assess for incontinence every  -Use incontinent care products after each incontinent episode such as     Activity:  -Mobilize patient  times a day  -Encourage activity and walks on unit  -Encourage or provide ROM exercises   -Turn and reposition patient every  Hours  -Use appropriate equipment to lift or move patient in bed  -Instruct/ Assist with weight shifting every  when out of bed in chair  -Consider limitation of chair time  hour intervals    Skin Care:  -Avoid use of baby powder, tape, friction and shearing, hot water or constrictive clothing  -Relieve pressure over bony prominences using   -Do not massage red bony areas    Next Steps:  -Teach patient strategies to minimize risks such as    -Consider consults to  interdisciplinary teams such as   Outcome: Progressing  Goal: Incision(s), wounds(s) or drain site(s) healing without S/S of infection  Description: INTERVENTIONS  - Assess and document dressing, incision, wound bed, drain sites and surrounding tissue  - Provide patient and family education  - Perform skin care/dressing changes every   Outcome: Progressing  Goal: Pressure injury heals and does not worsen  Description: Interventions:  - Implement low air loss mattress or specialty surface (Criteria met)  - Apply silicone foam dressing  - Instruct/assist with weight shifting every  minutes when in chair   - Limit chair time to  hour intervals  - Use special pressure reducing interventions such as  when in chair   - Apply fecal or urinary incontinence containment device   - Perform passive or active ROM every   - Turn and reposition patient & offload bony prominences every hours   - Utilize friction reducing device or surface for transfers   - Consider consults to  interdisciplinary teams such as   - Use incontinent care products after each incontinent episode such as   - Consider nutrition services referral as needed  Outcome: Progressing     Problem: MUSCULOSKELETAL - ADULT  Goal: Maintain or return mobility to safest level of function  Description: INTERVENTIONS:  - Assess patient's ability to carry out ADLs; assess patient's baseline for ADL function and identify physical deficits which impact ability to perform ADLs (bathing, care of mouth/teeth, toileting, grooming, dressing, etc )  - Assess/evaluate cause of self-care deficits   - Assess range of motion  - Assess patient's mobility  - Assess patient's need for assistive devices and provide as appropriate  - Encourage maximum independence but intervene and supervise when necessary  - Involve family in performance of ADLs  - Assess for home care needs following discharge   - Consider OT consult to assist with ADL evaluation and planning for discharge  - Provide patient education as appropriate  Outcome: Progressing  Goal: Maintain proper alignment of affected body part  Description: INTERVENTIONS:  - Support, maintain and protect limb and body alignment  - Provide patient/ family with appropriate education  Outcome: Progressing     Problem: PAIN - ADULT  Goal: Verbalizes/displays adequate comfort level or baseline comfort level  Description: Interventions:  - Encourage patient to monitor pain and request assistance  - Assess pain using appropriate pain scale  - Administer analgesics based on type and severity of pain and evaluate response  - Implement non-pharmacological measures as appropriate and evaluate response  - Consider cultural and social influences on pain and pain management  - Notify physician/advanced practitioner if interventions unsuccessful or patient reports new pain  Outcome: Progressing     Problem: INFECTION - ADULT  Goal: Absence or prevention of progression during hospitalization  Description: INTERVENTIONS:  - Assess and monitor for signs and symptoms of infection  - Monitor lab/diagnostic results  - Monitor all insertion sites, i e  indwelling lines, tubes, and drains  - Monitor endotracheal if appropriate and nasal secretions for changes in amount and color  - [place] appropriate cooling/warming therapies per order  - Administer medications as ordered  - Instruct and encourage patient and family to use good hand hygiene technique  - Identify and instruct in appropriate isolation precautions for identified infection/condition  Outcome: Progressing  Goal: Absence of fever/infection during neutropenic period  Description: INTERVENTIONS:  - Monitor WBC    Outcome: Progressing     Problem: SAFETY ADULT  Goal: Maintain or return to baseline ADL function  Description: INTERVENTIONS:  -  Assess patient's ability to carry out ADLs; assess patient's baseline for ADL function and identify physical deficits which impact ability to perform ADLs (bathing, care of mouth/teeth, toileting, grooming, dressing, etc )  - Assess/evaluate cause of self-care deficits   - Assess range of motion  - Assess patient's mobility; develop plan if impaired  - Assess patient's need for assistive devices and provide as appropriate  - Encourage maximum independence but intervene and supervise when necessary  - Involve family in performance of ADLs  - Assess for home care needs following discharge   - Consider OT consult to assist with ADL evaluation and planning for discharge  - Provide patient education as appropriate  Outcome: Progressing  Goal: Maintains/Returns to pre admission functional level  Description: INTERVENTIONS:  - Perform BMAT or MOVE assessment daily    - Set and communicate daily mobility goal to care team and patient/family/caregiver  - Collaborate with rehabilitation services on mobility goals if consulted  - Perform Range of Motion times a day  - Reposition patient every  hours    - Dangle patient times a day  - Stand patient  times a day  - Ambulate patient  times a day  - Out of bed to chair  times a day   - Out of bed for meals  times a day  - Out of bed for toileting  - Record patient progress and toleration of activity level   Outcome: Progressing  Goal: Patient will remain free of falls  Description: INTERVENTIONS:  - Educate patient/family on patient safety including physical limitations  - Instruct patient to call for assistance with activity   - Consult OT/PT to assist with strengthening/mobility   - Keep Call bell within reach  - Keep bed low and locked with side rails adjusted as appropriate  - Keep care items and personal belongings within reach  - Initiate and maintain comfort rounds  - Make Fall Risk Sign visible to staff  - Offer Toileting every  Hours, in advance of need  - Initiate/Maintain alarm  - Obtain necessary fall risk management equipment:  - Apply yellow socks and bracelet for high fall risk patients  - Consider moving patient to room near nurses station  Outcome: Progressing     Problem: DISCHARGE PLANNING  Goal: Discharge to home or other facility with appropriate resources  Description: INTERVENTIONS:  - Identify barriers to discharge w/patient and caregiver  - Arrange for needed discharge resources and transportation as appropriate  - Identify discharge learning needs (meds, wound care, etc )  - Arrange for interpretive services to assist at discharge as needed  - Refer to Case Management Department for coordinating discharge planning if the patient needs post-hospital services based on physician/advanced practitioner order or complex needs related to functional status, cognitive ability, or social support system  Outcome: Progressing

## 2022-06-01 NOTE — DISCHARGE INSTR - OTHER ORDERS
Skin care Plan:  1-Cleanse sacro-buttocks with soap and water  Apply a foam dressing over sacral wound  Change every other day and as needed  2-Turn/reposition every 2 hours for pressure re-distribution on skin   3-Elevate right heel to offload pressure  4-Moisturize skin daily with skin nourishing cream  5-Ehob cushion in chair when out of bed  6-Hydraguard to right heel twice a day and as needed

## 2022-06-01 NOTE — PLAN OF CARE
Problem: Nutrition/Hydration-ADULT  Goal: Nutrient/Hydration intake appropriate for improving, restoring or maintaining nutritional needs  Description: Monitor and assess patient's nutrition/hydration status for malnutrition  Collaborate with interdisciplinary team and initiate plan and interventions as ordered  Monitor patient's weight and dietary intake as ordered or per policy  Utilize nutrition screening tool and intervene as necessary  Determine patient's food preferences and provide high-protein, high-caloric foods as appropriate       INTERVENTIONS:  - Monitor oral intake, urinary output, labs, and treatment plans  - Assess nutrition and hydration status and recommend course of action  - Evaluate amount of meals eaten  - Assist patient with eating if necessary   - Allow adequate time for meals  - Recommend/ encourage appropriate diets, oral nutritional supplements, and vitamin/mineral supplements  - Order, calculate, and assess calorie counts as needed  - Assess need for intravenous fluids  - Provide nutrition/hydration education as appropriate  - Include patient/family/caregiver in decisions related to nutrition  Outcome: Progressing

## 2022-06-02 NOTE — PLAN OF CARE
Problem: Nutrition/Hydration-ADULT  Goal: Nutrient/Hydration intake appropriate for improving, restoring or maintaining nutritional needs  Description: Monitor and assess patient's nutrition/hydration status for malnutrition  Collaborate with interdisciplinary team and initiate plan and interventions as ordered  Monitor patient's weight and dietary intake as ordered or per policy  Utilize nutrition screening tool and intervene as necessary  Determine patient's food preferences and provide high-protein, high-caloric foods as appropriate       INTERVENTIONS:  - Monitor oral intake, urinary output, labs, and treatment plans  - Assess nutrition and hydration status and recommend course of action  - Evaluate amount of meals eaten  - Assist patient with eating if necessary   - Allow adequate time for meals  - Recommend/ encourage appropriate diets, oral nutritional supplements, and vitamin/mineral supplements  - Order, calculate, and assess calorie counts as needed  - Assess need for intravenous fluids  - Provide nutrition/hydration education as appropriate  - Include patient/family/caregiver in decisions related to nutrition  Outcome: Progressing     Problem: MOBILITY - ADULT  Goal: Maintain or return to baseline ADL function  Description: INTERVENTIONS:  -  Assess patient's ability to carry out ADLs; assess patient's baseline for ADL function and identify physical deficits which impact ability to perform ADLs (bathing, care of mouth/teeth, toileting, grooming, dressing, etc )  - Assess/evaluate cause of self-care deficits   - Assess range of motion  - Assess patient's mobility; develop plan if impaired  - Assess patient's need for assistive devices and provide as appropriate  - Encourage maximum independence but intervene and supervise when necessary  - Involve family in performance of ADLs  - Assess for home care needs following discharge   - Consider OT consult to assist with ADL evaluation and planning for discharge  - Provide patient education as appropriate  Outcome: Progressing  Goal: Maintains/Returns to pre admission functional level  Description: INTERVENTIONS:  - Perform BMAT or MOVE assessment daily    - Set and communicate daily mobility goal to care team and patient/family/caregiver  - Collaborate with rehabilitation services on mobility goals if consulted  - Perform Range of Motion 2 times a day  - Reposition patient every 2 hours    - Dangle patient 2 times a day  - Stand patient 2 times a day  - Ambulate patient 2 times a day  - Out of bed to chair 2 times a day   - Out of bed for meals 2 times a day  - Out of bed for toileting  - Record patient progress and toleration of activity level   Outcome: Progressing     Problem: RESPIRATORY - ADULT  Goal: Achieves optimal ventilation and oxygenation  Description: INTERVENTIONS:  - Assess for changes in respiratory status  - Assess for changes in mentation and behavior  - Position to facilitate oxygenation and minimize respiratory effort  - Oxygen administered by appropriate delivery if ordered  - Initiate smoking cessation education as indicated  - Encourage broncho-pulmonary hygiene including cough, deep breathe, Incentive Spirometry  - Assess the need for suctioning and aspirate as needed  - Assess and instruct to report SOB or any respiratory difficulty  - Respiratory Therapy support as indicated  Outcome: Progressing     Problem: SKIN/TISSUE INTEGRITY - ADULT  Goal: Skin Integrity remains intact(Skin Breakdown Prevention)  Description: Assess:  -Perform Ralph assessment every 2  -Clean and moisturize skin every 2  -Inspect skin when repositioning, toileting, and assisting with ADLS  -Assess under medical devices such as 2 every 2  -Assess extremities for adequate circulation and sensation     Bed Management:  -Have minimal linens on bed & keep smooth, unwrinkled  -Change linens as needed when moist or perspiring  -Avoid sitting or lying in one position for more than 2 hours while in bed  -Keep HOB at 2 degrees     Toileting:  -Offer bedside commode  -Assess for incontinence every 2  -Use incontinent care products after each incontinent episode such as 2    Activity:  -Mobilize patient 2 times a day  -Encourage activity and walks on unit  -Encourage or provide ROM exercises   -Turn and reposition patient every 2 Hours  -Use appropriate equipment to lift or move patient in bed  -Instruct/ Assist with weight shifting every 2 when out of bed in chair  -Consider limitation of chair time 2 hour intervals    Skin Care:  -Avoid use of baby powder, tape, friction and shearing, hot water or constrictive clothing  -Relieve pressure over bony prominences using 2  -Do not massage red bony areas    Next Steps:  -Teach patient strategies to minimize risks such as 2   -Consider consults to  interdisciplinary teams such as 2  Outcome: Progressing  Goal: Incision(s), wounds(s) or drain site(s) healing without S/S of infection  Description: INTERVENTIONS  - Assess and document dressing, incision, wound bed, drain sites and surrounding tissue  - Provide patient and family education  - Perform skin care/dressing changes every 2  Outcome: Progressing  Goal: Pressure injury heals and does not worsen  Description: Interventions:  - Implement low air loss mattress or specialty surface (Criteria met)  - Apply silicone foam dressing  - Instruct/assist with weight shifting every 2 minutes when in chair   - Limit chair time to 2 hour intervals  - Use special pressure reducing interventions such as 2 when in chair   - Apply fecal or urinary incontinence containment device   - Perform passive or active ROM every 2  - Turn and reposition patient & offload bony prominences every 2 hours   - Utilize friction reducing device or surface for transfers   - Consider consults to  interdisciplinary teams such as 2  - Use incontinent care products after each incontinent episode such as 2  - Consider nutrition services referral as needed  Outcome: Progressing     Problem: MUSCULOSKELETAL - ADULT  Goal: Maintain or return mobility to safest level of function  Description: INTERVENTIONS:  - Assess patient's ability to carry out ADLs; assess patient's baseline for ADL function and identify physical deficits which impact ability to perform ADLs (bathing, care of mouth/teeth, toileting, grooming, dressing, etc )  - Assess/evaluate cause of self-care deficits   - Assess range of motion  - Assess patient's mobility  - Assess patient's need for assistive devices and provide as appropriate  - Encourage maximum independence but intervene and supervise when necessary  - Involve family in performance of ADLs  - Assess for home care needs following discharge   - Consider OT consult to assist with ADL evaluation and planning for discharge  - Provide patient education as appropriate  Outcome: Progressing  Goal: Maintain proper alignment of affected body part  Description: INTERVENTIONS:  - Support, maintain and protect limb and body alignment  - Provide patient/ family with appropriate education  Outcome: Progressing     Problem: PAIN - ADULT  Goal: Verbalizes/displays adequate comfort level or baseline comfort level  Description: Interventions:  - Encourage patient to monitor pain and request assistance  - Assess pain using appropriate pain scale  - Administer analgesics based on type and severity of pain and evaluate response  - Implement non-pharmacological measures as appropriate and evaluate response  - Consider cultural and social influences on pain and pain management  - Notify physician/advanced practitioner if interventions unsuccessful or patient reports new pain  Outcome: Progressing     Problem: INFECTION - ADULT  Goal: Absence or prevention of progression during hospitalization  Description: INTERVENTIONS:  - Assess and monitor for signs and symptoms of infection  - Monitor lab/diagnostic results  - Monitor all insertion sites, i e  indwelling lines, tubes, and drains  - Monitor endotracheal if appropriate and nasal secretions for changes in amount and color  - Alder appropriate cooling/warming therapies per order  - Administer medications as ordered  - Instruct and encourage patient and family to use good hand hygiene technique  - Identify and instruct in appropriate isolation precautions for identified infection/condition  Outcome: Progressing  Goal: Absence of fever/infection during neutropenic period  Description: INTERVENTIONS:  - Monitor WBC    Outcome: Progressing     Problem: SAFETY ADULT  Goal: Maintain or return to baseline ADL function  Description: INTERVENTIONS:  -  Assess patient's ability to carry out ADLs; assess patient's baseline for ADL function and identify physical deficits which impact ability to perform ADLs (bathing, care of mouth/teeth, toileting, grooming, dressing, etc )  - Assess/evaluate cause of self-care deficits   - Assess range of motion  - Assess patient's mobility; develop plan if impaired  - Assess patient's need for assistive devices and provide as appropriate  - Encourage maximum independence but intervene and supervise when necessary  - Involve family in performance of ADLs  - Assess for home care needs following discharge   - Consider OT consult to assist with ADL evaluation and planning for discharge  - Provide patient education as appropriate  Outcome: Progressing  Goal: Maintains/Returns to pre admission functional level  Description: INTERVENTIONS:  - Perform BMAT or MOVE assessment daily    - Set and communicate daily mobility goal to care team and patient/family/caregiver  - Collaborate with rehabilitation services on mobility goals if consulted  - Perform Range of Motion 2 times a day  - Reposition patient every 2 hours    - Dangle patient 2 times a day  - Stand patient 2 times a day  - Ambulate patient 2 times a day  - Out of bed to chair 2 times a day   - Out of bed for meals 2 times a day  - Out of bed for toileting  - Record patient progress and toleration of activity level   Outcome: Progressing  Goal: Patient will remain free of falls  Description: INTERVENTIONS:  - Educate patient/family on patient safety including physical limitations  - Instruct patient to call for assistance with activity   - Consult OT/PT to assist with strengthening/mobility   - Keep Call bell within reach  - Keep bed low and locked with side rails adjusted as appropriate  - Keep care items and personal belongings within reach  - Initiate and maintain comfort rounds  - Make Fall Risk Sign visible to staff  - Offer Toileting every 2 Hours, in advance of need  - Initiate/Maintain 2alarm  - Obtain necessary fall risk management equipment: 2  - Apply yellow socks and bracelet for high fall risk patients  - Consider moving patient to room near nurses station  Outcome: Progressing     Problem: DISCHARGE PLANNING  Goal: Discharge to home or other facility with appropriate resources  Description: INTERVENTIONS:  - Identify barriers to discharge w/patient and caregiver  - Arrange for needed discharge resources and transportation as appropriate  - Identify discharge learning needs (meds, wound care, etc )  - Arrange for interpretive services to assist at discharge as needed  - Refer to Case Management Department for coordinating discharge planning if the patient needs post-hospital services based on physician/advanced practitioner order or complex needs related to functional status, cognitive ability, or social support system  Outcome: Progressing     Problem: Potential for Falls  Goal: Patient will remain free of falls  Description: INTERVENTIONS:  - Educate patient/family on patient safety including physical limitations  - Instruct patient to call for assistance with activity   - Consult OT/PT to assist with strengthening/mobility   - Keep Call bell within reach  - Keep bed low and locked with side rails adjusted as appropriate  - Keep care items and personal belongings within reach  - Initiate and maintain comfort rounds  - Make Fall Risk Sign visible to staff  - Offer Toileting every 2 Hours, in advance of need  - Initiate/Maintain 2alarm  - Obtain necessary fall risk management equipment: 2  - Apply yellow socks and bracelet for high fall risk patients  - Consider moving patient to room near nurses station  Outcome: Progressing

## 2022-06-02 NOTE — ASSESSMENT & PLAN NOTE
· COVID-19 PCR positive 05/31/2022 (patient vaccinated with booster)  · Chest x-ray consistent with history of pulmonary fibrosis  Bilateral ground-glass opacities consistent with pneumonia  · Patient on chronic 2 L O2 via nasal cannula for history of pulmonary fibrosis (at baseline)  · Given underlying comorbidity, patient started on IV Decadron/IV remdesivir (s/p day 3), Will be discharged on PO prednisone taper  · S/P heparin drip per COVID protocol  · s/p IV cefepime, discontinued due to 2 negative procalcitonin levels  · On baseline O2 needs

## 2022-06-02 NOTE — ASSESSMENT & PLAN NOTE
Lab Results   Component Value Date    EGFR 50 06/02/2022    EGFR 44 06/01/2022    EGFR 48 05/31/2022    CREATININE 1 26 06/02/2022    CREATININE 1 39 (H) 06/01/2022    CREATININE 1 30 05/31/2022     · Creatinine stable at baseline

## 2022-06-02 NOTE — ASSESSMENT & PLAN NOTE
+ C/C : increased shortness of breath/weakness  Recently admitted on 05/16/2022 secondary to acute hypoxic respiratory failure secondary to underlying pulmonary fibrosis treated with steroids/antibiotics  He was seen by both pulmonology/Cardiology during hospitalization      · Meeting sepsis criteria upon arrival secondary to tachycardia/fever/leukocytosis with COVID-19 positive PCR status/chest x-ray confirming pneumonia  · dc IV cefepime (2 procalcitonin levels negative)  · blood cultures negative 24 hrs  · Sepsis has resolved

## 2022-06-02 NOTE — DISCHARGE SUMMARY
[facility]  Discharge- Thao Davila 1933, 80 y o  male MRN: [id number]  Unit/Bed#: -[facility] Encounter: [id number]  Primary Care Provider: Wiliam Byrd MD   Date and time admitted to hospital: 5/31/2022  4:28 PM    * Sepsis due to COVID-19 [facility])  Assessment & Plan  + C/C : increased shortness of breath/weakness  Recently admitted on 05/16/2022 secondary to acute hypoxic respiratory failure secondary to underlying pulmonary fibrosis treated with steroids/antibiotics  He was seen by both pulmonology/Cardiology during hospitalization  · Meeting sepsis criteria upon arrival secondary to tachycardia/fever/leukocytosis with COVID-19 positive PCR status/chest x-ray confirming pneumonia  · dc IV cefepime (2 procalcitonin levels negative)  · blood cultures negative 24 hrs  · Sepsis has resolved      Pneumonia due to COVID-19 virus  Assessment & Plan  · COVID-19 PCR positive 05/31/2022 (patient vaccinated with booster)  · Chest x-ray consistent with history of pulmonary fibrosis  Bilateral ground-glass opacities consistent with pneumonia  · Patient on chronic 2 L O2 via nasal cannula for history of pulmonary fibrosis (at baseline)  · Given underlying comorbidity, patient started on IV Decadron/IV remdesivir (s/p day 3), Will be discharged on PO prednisone taper  · S/P heparin drip per COVID protocol  · s/p IV cefepime, discontinued due to 2 negative procalcitonin levels  · On baseline O2 needs  Chronic respiratory failure (HCC)  Assessment & Plan  · Chronically on 2 L NC secondary to chronic pulmonary fibrosis  · Currently at baseline O2 needs      Pulmonary fibrosis ([address] )  Assessment & Plan  · Chronically on 2 L NC at home  · Continue albuterol inhaler p r n   · outpt fu with pulmonology      CKD (chronic kidney disease), stage III [facility])  Assessment & Plan  Lab Results   Component Value Date    EGFR 50 06/02/2022    EGFR 44 06/01/2022    EGFR 48 05/31/2022    CREATININE 1 26 06/02/2022    CREATININE 1 39 (H) 06/01/2022    CREATININE 1 30 05/31/2022     · Creatinine stable at baseline      Severe peripheral arterial disease (HCC)  Assessment & Plan  · Continue ASA and statin    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  Assessment & Plan  · Known history, wears left lower extremity prosthesis    Severe protein-calorie malnutrition (HCC)  Assessment & Plan  Malnutrition Findings:     · Due to chronic illness  · Malnutrition characteristics:  Fat loss, muscle loss, and adequate energy, weight loss    BMI Findings:  Adult BMI Classifications: Underweight < 18 5        Body mass index is 17 07 kg/m²  Continue Ensure protein supplementation      Discharging Physician / Practitioner: Mata Strickland MD  PCP: Den Welch MD  Admission Date:   Admission Orders (From admission, onward)     Ordered        06/01/22 1205  Inpatient Admission  Once            05/31/22 1917  Place in Observation  Once                      Discharge Date: 06/02/22    Disposition:      Home with VNA  Reason for Admission: sob    Discharge Diagnoses:     Please see assessment and plan section above for further details regarding discharge diagnoses  Medical Problems             Resolved Problems  Date Reviewed: 5/31/2022   None                 Medication Adjustments and Discharge Medications:  · Summary of Medication Adjustments made as a result of this hospitalization: SEE med rec  · Medication Dosing Tapers - Please refer to Discharge Medication List for details on any medication dosing tapers (if applicable to patient)  · Medications being temporarily held (include recommended restart time): see med rec  · Discharge Medication List: See after visit summary for reconciled discharge medications       Diet Recommendations at Discharge:  Diet -        Diet Orders   (From admission, onward)             Start     Ordered    06/01/22 1345  Dietary nutrition supplements  Once        Question Answer Comment   Select Supplement: Ensure Enlive-Chocolate    Frequency Breakfast, Lunch, Dinner        06/01/22 1344    05/31/22 2019  Diet Regular; Regular House  Diet effective now        References:    Nutrtion Support Algorithm Enteral vs  Parenteral   Question Answer Comment   Diet Type Regular    Regular Regular House    RD to adjust diet per protocol? Yes        05/31/22 2020                   Outpatient Tests Requested:  · outpt fu with pulmonology    Hospital Course:     Te Perez is a 80 y o  male patient who originally presented to the hospital on 5/31/2022 with underlying history of chronic hypoxic respiratory failure secondary to pulmonary fibrosis/history of CKD/peripheral arterial disease status post below-knee amputation who presented to the ED with symptoms of shortness of breath/weakness  Patient with recent admission on 05/16/2022 secondary to acute hypoxic respiratory failure secondary to underlying for fibrosis treated with steroids antibiotics and seen by both pulmonology Cardiology during hospitalization  · Upon arrival patient meeting sepsis criteria secondary to tachycardia fever leukocytosis with COVID 19 positive status and chest x-ray confirming pneumonia  Blood cultures have been negative and patient's sepsis has resolved  · Pneumonia secondary to COVID-19 with PCR positive 05/31/2022  Chest x-ray was consistent with pulmonary fibrosis and bilateral ground-glass opacities consistent with pneumonia  Patient on chronic 2 L O2 via nasal cannula for history of pulmonary fibrosis and has remained at baseline during hospitalization  Given underlying comorbidity, he was started on Decadron remdesivir and heparin drip  He has completed 3 days of the same  He will be discharged on an oral prednisone taper  He did receive IV cefepime for 3 days but discontinued after 2-procalcitonin levels  Discussed with pulmonology over the phone and they recommended outpatient follow-up    They will schedule appointment for patient  · PT OT saw patient and recommended acute rehab however patient vehemently opposing  He would prefer to return home with VNA  Case management aware  Patient being discharged home with VNA services today  Condition at Discharge: fair     Discharge Day Visit / Exam:     Vitals: Blood Pressure: 135/56 (06/02/22 0744)  Pulse: 61 (06/02/22 0744)  Temperature: 98 1 °F (36 7 °C) (06/02/22 0744)  Temp Source: Oral (05/31/22 2141)  Respirations: 18 (06/02/22 0744)  Height: 5' 10" (177 8 cm) (05/31/22 2141)  Weight - Scale: 54 kg (119 lb) (05/31/22 2141)  SpO2: 98 % (06/02/22 0959)  Exam:   Physical Exam  Constitutional:       General: He is not in acute distress  Appearance: He is not toxic-appearing  HENT:      Mouth/Throat:      Mouth: Mucous membranes are moist       Pharynx: Oropharynx is clear  Cardiovascular:      Rate and Rhythm: Normal rate and regular rhythm  Pulses: Normal pulses  Pulmonary:      Effort: Pulmonary effort is normal       Breath sounds: Normal breath sounds  No wheezing  Abdominal:      General: Abdomen is flat  Bowel sounds are normal       Palpations: Abdomen is soft  Neurological:      General: No focal deficit present  Mental Status: He is alert and oriented to person, place, and time  Goals of Care Discussions:  · Code Status at Discharge: Level 1 - Full Code    Discharge instructions/Information to patient and family:   See after visit summary section titled Discharge Instructions for information provided to patient and family  Discharge Statement:  I spent 40 minutes discharging the patient  This time was spent on the day of discharge  I had direct contact with the patient on the day of discharge  Greater than 50% of the total time was spent examining patient, answering all patient questions, arranging and discussing plan of care with patient as well as directly providing post-discharge instructions    Additional time then spent on discharge activities      ** Please Note: This note has been constructed using a voice recognition system **

## 2022-06-02 NOTE — ASSESSMENT & PLAN NOTE
Malnutrition Findings:     · Due to chronic illness  · Malnutrition characteristics:  Fat loss, muscle loss, and adequate energy, weight loss    BMI Findings:  Adult BMI Classifications: Underweight < 18 5        Body mass index is 17 07 kg/m²       Continue Ensure protein supplementation

## 2022-06-02 NOTE — CASE MANAGEMENT
Case Management Discharge Planning Note    Patient name Sav Soto  Location [address] [id number] MRN [id number]  : 1933 Date 2022       Current Admission Date: 2022  Current Ad[facility] Diagnosis:[facility]   Patient Active Problem List    Diagnosis Date Noted    Pneumonia due to COVID-19 virus 2022    [facility]) 2022    Chronic respiratory failure ([address] ) 2022    Elevated brain natriuretic peptide (BNP) level 2022    LADY (acute kidney injury) ([address] ) 2022    Dizziness 2022    Acute respiratory failure with hypoxia ([address] ) 2022    Chronic heart failure with preserved ejection fraction [facility] ) 2022    Pulmonary fibrosis ([facility] ) 2022    Dysphagia 2020    Leukocytosis 2020    Proteinuria 2020    Shortness of breath 2020    Pulmonary infiltrates 2020    Interstitial lung disease ([address] ) 2020    Fatigue 2020    Fall at home 2020    Unintentional weight loss 2019    Encounter for immunization 2019    Medicare annual wellness visit, subsequent 2019    Atherosclerosis of artery of extremity with ulceration ([facility] ) 2019    Phantom pain after amputation of lower extremity ([facility] ) 2019    Severe protein-calorie malnutrition ([address] ) 2019    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 2019    Carpal tunnel syndrome 2019    Decubitus ulcer of right heel, unstageable ([address] ) 2019    Ambulatory dysfunction 10/29/2018    Physical deconditioning 10/29/2018    CKD (chronic kidney disease), stage III ([address] ) 2018    Severe peripheral arterial disease ([address] ) 2018    Bifascicular block 10/31/2017    Chronic fatigue 10/31/2017    Vitamin D deficiency 10/25/2017    Anemia 10/15/2017    Unstable gait     Lichen simplex chronicus 07/10/2017    Seborrheic keratosis 07/10/2017    Change in multiple pigmented skin lesions 05/16/2017    Impaired fasting glucose 03/07/2017    Hyperlipidemia 07/14/2016    Chronic lower back pain 01/14/2016    Generalized osteoarthritis 01/14/2016    Herniated lumbar intervertebral disc 01/14/2016    Rotator cuff tear arthropathy, right 01/14/2016      LOS (days): 1  Geometric Mean LOS (GMLOS) (days): 4 80  Days to GMLOS:3 9     OBJECTIVE:  Risk of Unplanned Readmission Score: 21 1         Current admission status: Inpatient   Preferred Pharmacy:   [facility] [address]  Phone: [phone] Fax: [phone]    Primary Care Provider: Den Welch MD    Primary Insurance: [facility]  Secondary Insurance:     DISCHARGE DETAILS:    Discharge planning discussed with[de-identified] patient by room phone due to COVID isolation  Freedom of Choice: Yes (re: rehab and home care)  Comments - Freedom of Choice: confirmed denial for STR   Requesting resumption of care with [facility]  CM contacted family/caregiver?: No- see comments (patient declined; states he willl talk to his wife on his own)                  [address]         Is the patient interested in [facility] at discharge?: Yes  [address] requested[de-identified] Nursing, Occupational Therapy, Physical [address] [place] [address] Av Name[de-identified] Other ([facility])  [address] [place] [address] Provider[de-identified] PCP  Home Health Services Needed[de-identified] Evaluate Functional Status and Safety, Gait/ADL Training, Strengthening/Theraputic Exercises to Improve Function, Heart Failure Management  Oxygen LPM Ordered (if applicable based on home health services needed):: 2 LPM  Homebound Criteria Met[de-identified] Requires the Assistance of Another Person for Safe Ambulation or to Leave the Home, Uses an Assist Device (i e  cane, walker, etc)  Supporting Clincal Findings[de-identified] Fatigues [place] in [employer], Limited Endurance, Requires Oxygen, Dyspnea with Exertion         Other Referral/Resources/Interventions Provided:  Interventions: [facility]  Referral Comments: Spoke with patient by room phone to discuss anticipated d/c for today  Patient continues to decline recommendation for STR stating he has been to a facility after his amputation and will never go back  Reports that he lives with his spouse and his son lives only a few minutes away  Confirmed plan to resume home care services through [facility]; notification sent to them in [address] to relay anticipated d/c for today  Will forward AVS once complete - home care information added to AVS for patient's records  IMM reviewed with patient by phone and patient declines any wish to appeal discharge, stating he's ready to be home  Declines offer for IMM form to be provided  States that spouse or son will likely transport and that they have portable o2 tank for transport home  Offer made to contact family, but patient declines  Discharge plan is for home with home care services as patient declining STR      Would you like to participate in our [facility] [address] service program?  : No - Declined    Treatment Team Recommendation: Short Term Rehab, SNF  Discharge Destination Plan[de-identified] Home with [address] [facility] ([facility])  Transport at Discharge : Family                             IMM Given (Date):: 06/02/22  IMM Given to[de-identified] Patient (reviewed rights by phone; patient declined copy)

## 2022-06-02 NOTE — PLAN OF CARE
Problem: Nutrition/Hydration-ADULT  Goal: Nutrient/Hydration intake appropriate for improving, restoring or maintaining nutritional needs  Description: Monitor and assess patient's nutrition/hydration status for malnutrition  Collaborate with interdisciplinary team and initiate plan and interventions as ordered  Monitor patient's weight and dietary intake as ordered or per policy  Utilize nutrition screening tool and intervene as necessary  Determine patient's food preferences and provide high-protein, high-caloric foods as appropriate       INTERVENTIONS:  - Monitor oral intake, urinary output, labs, and treatment plans  - Assess nutrition and hydration status and recommend course of action  - Evaluate amount of meals eaten  - Assist patient with eating if necessary   - Allow adequate time for meals  - Recommend/ encourage appropriate diets, oral nutritional supplements, and vitamin/mineral supplements  - Order, calculate, and assess calorie counts as needed  - Assess need for intravenous fluids  - Provide nutrition/hydration education as appropriate  - Include patient/family/caregiver in decisions related to nutrition  Outcome: Progressing     Problem: MOBILITY - ADULT  Goal: Maintain or return to baseline ADL function  Description: INTERVENTIONS:  -  Assess patient's ability to carry out ADLs; assess patient's baseline for ADL function and identify physical deficits which impact ability to perform ADLs (bathing, care of mouth/teeth, toileting, grooming, dressing, etc )  - Assess/evaluate cause of self-care deficits   - Assess range of motion  - Assess patient's mobility; develop plan if impaired  - Assess patient's need for assistive devices and provide as appropriate  - Encourage maximum independence but intervene and supervise when necessary  - Involve family in performance of ADLs  - Assess for home care needs following discharge   - Consider OT consult to assist with ADL evaluation and planning for discharge  - Provide patient education as appropriate  Outcome: Progressing  Goal: Maintains/Returns to pre admission functional level  Description: INTERVENTIONS:  - Perform BMAT or MOVE assessment daily    - Set and communicate daily mobility goal to care team and patient/family/caregiver  - Collaborate with rehabilitation services on mobility goals if consulted  - Perform Range of Motion  times a day  - Reposition patient every  hours    - Dangle patient  times a day  - Stand patient  times a day  - Ambulate patient  times a day  - Out of bed to chair  times a day   - Out of bed for meals  times a day  - Out of bed for toileting  - Record patient progress and toleration of activity level   Outcome: Progressing     Problem: RESPIRATORY - ADULT  Goal: Achieves optimal ventilation and oxygenation  Description: INTERVENTIONS:  - Assess for changes in respiratory status  - Assess for changes in mentation and behavior  - Position to facilitate oxygenation and minimize respiratory effort  - Oxygen administered by appropriate delivery if ordered  - Initiate smoking cessation education as indicated  - Encourage broncho-pulmonary hygiene including cough, deep breathe, Incentive Spirometry  - Assess the need for suctioning and aspirate as needed  - Assess and instruct to report SOB or any respiratory difficulty  - Respiratory Therapy support as indicated  Outcome: Progressing     Problem: SKIN/TISSUE INTEGRITY - ADULT  Goal: Skin Integrity remains intact(Skin Breakdown Prevention)  Description: Assess:  -Perform Ralph assessment every   -Clean and moisturize skin every   -Inspect skin when repositioning, toileting, and assisting with ADLS  -Assess under medical devices such as  every   -Assess extremities for adequate circulation and sensation     Bed Management:  -Have minimal linens on bed & keep smooth, unwrinkled  -Change linens as needed when moist or perspiring  -Avoid sitting or lying in one position for more than hours while in bed  -Keep HOB at degrees     Toileting:  -Offer bedside commode  -Assess for incontinence every   -Use incontinent care products after each incontinent episode such as     Activity:  -Mobilize patient  times a day  -Encourage activity and walks on unit  -Encourage or provide ROM exercises   -Turn and reposition patient every  Hours  -Use appropriate equipment to lift or move patient in bed  -Instruct/ Assist with weight shifting every  when out of bed in chair  -Consider limitation of chair time  hour intervals    Skin Care:  -Avoid use of baby powder, tape, friction and shearing, hot water or constrictive clothing  -Relieve pressure over bony prominences using   -Do not massage red bony areas    Next Steps:  -Teach patient strategies to minimize risks such as    -Consider consults to  interdisciplinary teams such as   Outcome: Progressing  Goal: Incision(s), wounds(s) or drain site(s) healing without S/S of infection  Description: INTERVENTIONS  - Assess and document dressing, incision, wound bed, drain sites and surrounding tissue  - Provide patient and family education  - Perform skin care/dressing changes every   Outcome: Progressing  Goal: Pressure injury heals and does not worsen  Description: Interventions:  - Implement low air loss mattress or specialty surface (Criteria met)  - Apply silicone foam dressing  - Instruct/assist with weight shifting every  minutes when in chair   - Limit chair time to  hour intervals  - Use special pressure reducing interventions such as when in chair   - Apply fecal or urinary incontinence containment device   - Perform passive or active ROM ever  - Turn and reposition patient & offload bony prominences every  hours   - Utilize friction reducing device or surface for transfers   - Consider consults to  interdisciplinary teams such as   - Use incontinent care products after each incontinent episode such as   - Consider nutrition services referral as needed  Outcome: Progressing     Problem: MUSCULOSKELETAL - ADULT  Goal: Maintain or return mobility to safest level of function  Description: INTERVENTIONS:  - Assess patient's ability to carry out ADLs; assess patient's baseline for ADL function and identify physical deficits which impact ability to perform ADLs (bathing, care of mouth/teeth, toileting, grooming, dressing, etc )  - Assess/evaluate cause of self-care deficits   - Assess range of motion  - Assess patient's mobility  - Assess patient's need for assistive devices and provide as appropriate  - Encourage maximum independence but intervene and supervise when necessary  - Involve family in performance of ADLs  - Assess for home care needs following discharge   - Consider OT consult to assist with ADL evaluation and planning for discharge  - Provide patient education as appropriate  Outcome: Progressing  Goal: Maintain proper alignment of affected body part  Description: INTERVENTIONS:  - Support, maintain and protect limb and body alignment  - Provide patient/ family with appropriate education  Outcome: Progressing     Problem: PAIN - ADULT  Goal: Verbalizes/displays adequate comfort level or baseline comfort level  Description: Interventions:  - Encourage patient to monitor pain and request assistance  - Assess pain using appropriate pain scale  - Administer analgesics based on type and severity of pain and evaluate response  - Implement non-pharmacological measures as appropriate and evaluate response  - Consider cultural and social influences on pain and pain management  - Notify physician/advanced practitioner if interventions unsuccessful or patient reports new pain  Outcome: Progressing     Problem: INFECTION - ADULT  Goal: Absence or prevention of progression during hospitalization  Description: INTERVENTIONS:  - Assess and monitor for signs and symptoms of infection  - Monitor lab/diagnostic results  - Monitor all insertion sites, i e  indwelling lines, tubes, and drains  - Monitor endotracheal if appropriate and nasal secretions for changes in amount and color  - [place] appropriate cooling/warming therapies per order  - Administer medications as ordered  - Instruct and encourage patient and family to use good hand hygiene technique  - Identify and instruct in appropriate isolation precautions for identified infection/condition  Outcome: Progressing  Goal: Absence of fever/infection during neutropenic period  Description: INTERVENTIONS:  - Monitor WBC    Outcome: Progressing     Problem: SAFETY ADULT  Goal: Maintain or return to baseline ADL function  Description: INTERVENTIONS:  -  Assess patient's ability to carry out ADLs; assess patient's baseline for ADL function and identify physical deficits which impact ability to perform ADLs (bathing, care of mouth/teeth, toileting, grooming, dressing, etc )  - Assess/evaluate cause of self-care deficits   - Assess range of motion  - Assess patient's mobility; develop plan if impaired  - Assess patient's need for assistive devices and provide as appropriate  - Encourage maximum independence but intervene and supervise when necessary  - Involve family in performance of ADLs  - Assess for home care needs following discharge   - Consider OT consult to assist with ADL evaluation and planning for discharge  - Provide patient education as appropriate  Outcome: Progressing  Goal: Maintains/Returns to pre admission functional level  Description: INTERVENTIONS:  - Perform BMAT or MOVE assessment daily    - Set and communicate daily mobility goal to care team and patient/family/caregiver  - Collaborate with rehabilitation services on mobility goals if consulted  - Perform Range of Motion  times a day  - Reposition patient every  hours    - Dangle patient  times a day  - Stand patient  times a day  - Ambulate patient  times a day  - Out of bed to chair  times a day   - Out of bed for meals  times a day  - Out of bed for toileting  - Record patient progress and toleration of activity level   Outcome: Progressing  Goal: Patient will remain free of falls  Description: INTERVENTIONS:  - Educate patient/family on patient safety including physical limitations  - Instruct patient to call for assistance with activity   - Consult OT/PT to assist with strengthening/mobility   - Keep Call bell within reach  - Keep bed low and locked with side rails adjusted as appropriate  - Keep care items and personal belongings within reach  - Initiate and maintain comfort rounds  - Make Fall Risk Sign visible to staff  - Offer Toileting every  Hours, in advance of need  - Initiate/Maintain alarm  - Obtain necessary fall risk management equipment:   - Apply yellow socks and bracelet for high fall risk patients  - Consider moving patient to room near nurses station  Outcome: Progressing     Problem: DISCHARGE PLANNING  Goal: Discharge to home or other facility with appropriate resources  Description: INTERVENTIONS:  - Identify barriers to discharge w/patient and caregiver  - Arrange for needed discharge resources and transportation as appropriate  - Identify discharge learning needs (meds, wound care, etc )  - Arrange for interpretive services to assist at discharge as needed  - Refer to Case Management Department for coordinating discharge planning if the patient needs post-hospital services based on physician/advanced practitioner order or complex needs related to functional status, cognitive ability, or social support system  Outcome: Progressing     Problem: Potential for Falls  Goal: Patient will remain free of falls  Description: INTERVENTIONS:  - Educate patient/family on patient safety including physical limitations  - Instruct patient to call for assistance with activity   - Consult OT/PT to assist with strengthening/mobility   - Keep Call bell within reach  - Keep bed low and locked with side rails adjusted as appropriate  - Keep care items and personal belongings within reach  - Initiate and maintain comfort rounds  - Make Fall Risk Sign visible to staff  - Offer Toileting every  Hours, in advance of need  - Initiate/Maintainalarm  - Obtain necessary fall risk management equipment:   - Apply yellow socks and bracelet for high fall risk patients  - Consider moving patient to room near nurses station  Outcome: Progressing

## 2022-06-03 NOTE — UTILIZATION REVIEW
dc  Notification of Discharge   This is a Notification of Discharge from our facility [address]  Please be advised that this patient has been discharge from our facility  Below you will find the admission and discharge date and time including the patients disposition  UTILIZATION REVIEW CONTACT:  Tomas Tamez  Utilization   Network Utilization Review Department  Phone: [phone] x carefully listen to the prompts  All voicemails are confidential   Email: [email or web address]     PHYSICIAN ADVISORY SERVICES:  FOR [id number] REVIEW - MEDICAL NECESSITY DENIAL  Phone: [phone]  Fax: [phone]  Email: [email or web address]     PRESENTATION DATE: 5/31/2022  4:28 PM  OBERVATION ADMISSION DATE: 05/31/22  INPATIENT ADMISSION DATE: 6/1/22 12:05 PM   DISCHARGE DATE: 6/2/2022  2:21 PM  DISPOSITION: Home with [place] with [address] INFORMATION:  Send all requests for admission clinical reviews, approved or denied determinations and any other requests to dedicated fax number below belonging to the campus where the patient is receiving treatment   List of dedicated fax numbers:  [address] DENIALS (Administrative/Medical Necessity) [phone]   [address] (Maternity/NICU/Pediatrics) [phone]   Edie Liu [phone]   [address] [place] [address] [phone]   [address] [place] [address] [phone]   [address] [place] [address] [address] [place] [address],[address] Floor [address] [phone]   [facility]  [phone]   [address] [place] [address], [facility]  [facility] [address] [facility] [phone]

## 2022-06-06 NOTE — TELEPHONE ENCOUNTER
Called patient's wife to discuss  Her and the patient both state that overall he feels improved SOB compared to when he was in the hospital but he does have desats into the high 80s while on 3L NC  I firstly recommended reevaluation in the ED suggesting that he might need to be readmitted  Patient adamantly declines at this time  He also refused traveling to other offices besides [place] but they would consider [facility] since they were in the hospital there  Can you check availability there with the [facility] crew? In the meantime I stressed the importance of going to the ED if his symptoms worsen  They verbalized understanding and agrees to the plan

## 2022-06-06 NOTE — UTILIZATION REVIEW
Notification of Discharge   This is a Notification of Discharge from our facility [address]  Please be advised that this patient has been discharge from our facility  Below you will find the admission and discharge date and time including the patients disposition  UTILIZATION REVIEW CONTACT:  Serena Esparza  Utilization   Network Utilization Review Department  Phone: [phone] x carefully listen to the prompts  All voicemails are confidential   Email: [email or web address]     PHYSICIAN ADVISORY SERVICES:  FOR [id number] REVIEW - MEDICAL NECESSITY DENIAL  Phone: [phone]  Fax: [phone]  Email: [email or web address]     PRESENTATION DATE: 5/31/2022  4:28 PM    INPATIENT ADMISSION DATE: 6/1/22 12:05 PM   DISCHARGE DATE: 6/2/2022  2:21 PM  DISPOSITION: [facility] with [address] [place] [address] Road INFORMATION:  Send all requests for admission clinical reviews, approved or denied determinations and any other requests to dedicated fax number below belonging to the campus where the patient is receiving treatment   List of dedicated fax numbers:  [address] DENIALS (Administrative/Medical Necessity) [phone]   [address]  (Maternity/NICU/Pediatrics) [phone]   Daniela Chaffee [phone]   [address] [phone]   [address] [place] [address] [phone]   [address] [phone]   [facility]  [phone]   [address] [place] [address], [address]  [facility] [address] [facility] [phone]

## 2022-06-06 NOTE — TELEPHONE ENCOUNTER
Patient's wife calling to make a HFU appt, there are no availabilities until 7/21 and his wife is asking if he can be seen sooner as his oxygen level isn't always staying above 88 and is complaining of shortness of breath  He is on 3L   Please advise

## 2022-06-07 NOTE — TELEPHONE ENCOUNTER
He should continue his prednisone taper, maintain SpO2 greater than 88%, use incentive spirometer if he has it and continue his normal meds  If things worsen before 6/14 he should go to the ED

## 2022-06-08 PROBLEM — J96.20 ACUTE ON CHRONIC RESPIRATORY FAILURE (HCC): Status: ACTIVE | Noted: 2022-01-01

## 2022-06-08 NOTE — ED PROVIDER NOTES
History  Chief Complaint   Patient presents with    Shortness of Breath     Pt was diagnosed with Covid last tuesday, wears 3 L of O2 at home, c/o increasing sob  81 yo with sob  Recently admitted for covid  Now having worsening SOB  He was feeling better over the past week but until this AM has been suddenly worsening  Felt fatigued and couldn't get out of bed  Increased his home O2 from 2L up to 3L  He has been using all prescribed meds as instructed  Difficulty walking short distances due to his dyspnea  History provided by:  Patient   used: No    Shortness of Breath  Severity:  Moderate  Onset quality:  Gradual  Duration:  1 week  Timing:  Constant  Progression:  Worsening  Chronicity:  Recurrent  Context: not activity, not animal exposure, not emotional upset, not known allergens, not occupational exposure, not pollens, not smoke exposure, not strong odors, not URI and not weather changes    Relieved by:  Nothing  Worsened by:  Exertion  Ineffective treatments:  None tried  Associated symptoms: cough and fever    Associated symptoms: no abdominal pain, no chest pain, no ear pain, no rash, no sore throat and no vomiting        Prior to Admission Medications   Prescriptions Last Dose Informant Patient Reported? Taking?    CYANOCOBALAMIN PO  Spouse/Significant Other Yes No   Sig: cyanocobalamin (vitamin B-12)   Cholecalciferol (VITAMIN D) 2000 units CAPS  Spouse/Significant Other Yes No   Sig: Take by mouth   VITAMIN E COMPLEX PO  Spouse/Significant Other Yes No   Sig: Take by mouth   albuterol (2 5 mg/3 mL) 0 083 % nebulizer solution   No No   Sig: Take 3 mL (2 5 mg total) by nebulization every 6 (six) hours as needed for shortness of breath   albuterol (PROVENTIL HFA,VENTOLIN HFA) 90 mcg/act inhaler   No No   Sig: Inhale 2 puffs every 6 (six) hours as needed for wheezing   aspirin 81 MG tablet  Spouse/Significant Other Yes No   Sig: Take by mouth   ferrous sulfate 325 (65 Fe) mg tablet   No No   Sig: Take 1 tablet (325 mg total) by mouth 2 (two) times a day   ipratropium-albuterol (DUO-NEB) 0 5-2 5 mg/3 mL nebulizer solution   No No   Sig: Take 3 mL by nebulization in the morning and 3 mL at noon and 3 mL in the evening and 3 mL before bedtime  ipratropium-albuterol (DUO-NEB) 0 5-2 5 mg/3 mL nebulizer solution   No No   Sig: Take 3 mL by nebulization every 8 (eight) hours as needed for wheezing or shortness of breath   pantoprazole (PROTONIX) 40 mg tablet   No No   Sig: Take 1 tablet (40 mg total) by mouth daily   predniSONE 20 mg tablet   No No   Sig: Take 1 5 tablets (30 mg total) by mouth daily for 5 days, THEN 1 tablet (20 mg total) daily for 5 days, THEN 0 5 tablets (10 mg total) daily for 5 days     simvastatin (ZOCOR) 20 mg tablet   No No   Sig: Take 1 tablet (20 mg total) by mouth daily      Facility-Administered Medications: None       Past Medical History:   Diagnosis Date    Anemia     Arthritis     [facility] 3/27/2019    Bifascicular block     Cellulitis of chest wall     Chronic kidney disease     Cough     Dupuytren contracture     DVT femoral (deep venous thrombosis) with thrombophlebitis, right (HCC)     Elevated glucose     Hemothorax     Left    Hyperlipidemia     Interstitial lung disease (HCC)     Phantom pain after amputation of lower extremity ([facility] ) 3/27/2019    SOB (shortness of breath)     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 2/19/2019    Vascular disease        Past Surgical History:   Procedure Laterality Date    AMPUTATION  1972    L thumb    CARPAL TUNNEL RELEASE  2016    L hand    CATARACT EXTRACTION      COLONOSCOPY      EYE SURGERY      FRACTURE SURGERY      HAND SURGERY      IR LOWER EXTREMITY / INTERVENTION  10/26/2018    KNEE ARTHROSCOPY      NERVE BLOCK      MO AMPUTATION LOW LEG THRU TIB/FIB Left 2/1/2019    Procedure: AMPUTATION BELOW KNEE (BKA); Surgeon: Sarah Figueroa MD;  [facility];  Service: Vascular    [place] Abiel Singh 3RD+ ORD SLCTV ABDL PEL/LXTR [facility] Left 3/9/2018    Procedure: LEFT LOWER EXTREMITY ARTERIOGRAM WITH PTA OF LEFT POPLITEAL ARTERY;  Surgeon: Lilly Pinto MD;  [facility];  Service: Vascular    [facility] Left 10/26/2018    Procedure: ARTERIOGRAM, angioplasty stent and atherectomy;  Surgeon: Lilly Pinto MD;  [facility];  Service: Vascular    THORACENTESIS         Family History   Problem Relation Age of Onset    Lung cancer Mother     Brain cancer Mother     Diabetes Father     Aneurysm Father     Stroke Father     Lung cancer Father     Brain cancer Father     Diabetes Maternal Grandmother      I have reviewed and agree with the history as documented  E-Cigarette/Vaping    E-Cigarette Use Never User      E-Cigarette/Vaping Substances    Nicotine No     THC No     CBD No     Flavoring No     Other No     Unknown No      Social History     Tobacco Use    Smoking status: Former Smoker     Packs/day: 2 00     Years: 15 00     Pack years: 30 00     Types: Cigarettes    Smokeless tobacco: Never Used    Tobacco comment: quit 57 years ago   Vaping Use    Vaping Use: Never used   Substance Use Topics    Alcohol use: Not Currently     Comment: n/a    Drug use: No       Review of Systems   Constitutional: Positive for fatigue and fever  Negative for chills  HENT: Negative for ear pain and sore throat  Eyes: Negative for pain and visual disturbance  Respiratory: Positive for cough and shortness of breath  Cardiovascular: Negative for chest pain and palpitations  Gastrointestinal: Negative for abdominal pain and vomiting  Genitourinary: Negative for dysuria and hematuria  Musculoskeletal: Negative for arthralgias and back pain  Skin: Negative for color change and rash  Neurological: Negative for seizures and syncope     All other systems reviewed and are negative  Physical Exam  Physical Exam  Vitals and nursing note reviewed  Constitutional:       Appearance: He is well-developed  HENT:      Head: Normocephalic and atraumatic  Eyes:      Conjunctiva/sclera: Conjunctivae normal    Cardiovascular:      Rate and Rhythm: Normal rate and regular rhythm  Heart sounds: No murmur heard  Pulmonary:      Effort: Pulmonary effort is normal  No respiratory distress  Breath sounds: Normal breath sounds  Abdominal:      Palpations: Abdomen is soft  Tenderness: There is no abdominal tenderness  Musculoskeletal:      Cervical back: Neck supple  Skin:     General: Skin is warm and dry  Neurological:      Mental Status: He is alert           Vital Signs  ED Triage Vitals   Temperature Pulse Respirations Blood Pressure SpO2   06/08/22 1334 06/08/22 1334 06/08/22 1334 06/08/22 1334 06/08/22 1334   (!) 101 5 °F (38 6 °C) (!) 122 16 159/67 90 %      Temp Source Heart Rate Source Patient Position - Orthostatic VS BP Location FiO2 (%)   06/08/22 1334 06/08/22 1334 06/08/22 1334 06/08/22 1334 06/09/22 0033   Tympanic Monitor Sitting Left arm 40      Pain Score       06/08/22 2321       No Pain           Vitals:    06/09/22 2240 06/10/22 0408 06/10/22 0723 06/10/22 1500   BP: 108/54  108/53 110/69   Pulse: 72 (!) 54 69 69   Patient Position - Orthostatic VS:   Lying Lying         Visual Acuity      ED Medications  Medications   doxycycline hyclate (VIBRAMYCIN) capsule 100 mg (100 mg Oral Given 6/10/22 1314)   heparin (porcine) 25,000 units in D5W 250 mL infusion (premix) (12 Units/kg/hr × 50 kg (Order-Specific) Intravenous Rate/Dose Change 6/10/22 0642)   cefTRIAXone (ROCEPHIN) 1,000 mg in dextrose 5 % 50 mL IVPB (1,000 mg Intravenous New Bag 6/10/22 1315)   dexamethasone (DECADRON) injection 5 4 mg (5 4 mg Intravenous Given 6/10/22 0901)   cholecalciferol (VITAMIN D3) tablet 1,000 Units (1,000 Units Oral Given 6/10/22 0855) ascorbic acid (VITAMIN C) tablet 500 mg (500 mg Oral Given 6/10/22 0855)   zinc sulfate (ZINCATE) capsule 220 mg (220 mg Oral Given 6/10/22 0855)   famotidine (PEPCID) tablet 20 mg (20 mg Oral Given 6/10/22 0855)   benzonatate (TESSALON PERLES) capsule 100 mg (has no administration in time range)   senna (SENOKOT) tablet 17 2 mg (17 2 mg Oral Given 6/10/22 1451)   docusate sodium (COLACE) capsule 100 mg (100 mg Oral Given 6/10/22 1451)   acetaminophen (TYLENOL) tablet 650 mg (650 mg Oral Given 6/8/22 1340)   albuterol inhalation solution 5 mg (5 mg Nebulization Given 6/8/22 1518)   ipratropium (ATROVENT) 0 02 % inhalation solution 0 5 mg (0 5 mg Nebulization Given 6/8/22 1518)   sodium chloride 0 9 % bolus 1,000 mL (0 mL Intravenous Stopped 6/8/22 1700)   cefepime (MAXIPIME) 2 g/50 mL dextrose IVPB (0 mg Intravenous Stopped 6/8/22 1603)   vancomycin 750 mg in sodium chloride 0 9% 250 mL (0 mg/kg × 54 kg Intravenous Stopped 6/8/22 1812)   tocilizumab (ACTEMRA) 400 mg in sodium chloride 0 9 % 80 mL IVPB (400 mg Intravenous New Bag 6/9/22 0146)       Diagnostic Studies  Results Reviewed     Procedure Component Value Units Date/Time    Blood culture #1 [[id number]] Collected: 06/08/22 1523    Lab Status: Preliminary result Specimen: Blood from Arm, Left Updated: 06/09/22 2003     Blood Culture No Growth at 24 hrs  Blood culture #2 [[id number]] Collected: 06/08/22 1523    Lab Status: Preliminary result Specimen: Blood from Arm, Left Updated: 06/09/22 2003     Blood Culture No Growth at 24 hrs  D-dimer, quantitative [[id number]]  (Abnormal) Collected: 06/09/22 0937    Lab Status: Final result Specimen: Blood from Arm, Right Updated: 06/09/22 0958     D-Dimer, Quant 1 57 ug/ml FEU     Narrative:       In the evaluation for possible pulmonary embolism, in the appropriate (Well's Score of 4 or less) patient, the age adjusted d-dimer cutoff for this patient can be calculated as:    Age x 0 01 (in ug/mL) for Age-adjusted D-dimer exclusion threshold for a patient over 50 years  C-reactive protein [[id number]]  (Abnormal) Collected: 06/08/22 1523    Lab Status: Final result Specimen: Blood from Arm, Left Updated: 06/08/22 2206     CRP 53 1 mg/L     HS Troponin I 4hr [[id number]]  (Abnormal) Collected: 06/08/22 2035    Lab Status: Final result Specimen: Blood from Arm, Left Updated: 06/08/22 2122     hs TnI 4hr 54 ng/L      Delta 4hr hsTnI 18 ng/L     HS Troponin I 2hr [[id number]]  (Normal) Collected: 06/08/22 1703    Lab Status: Final result Specimen: Blood from Arm, Left Updated: 06/08/22 1730     hs TnI 2hr 35 ng/L      Delta 2hr hsTnI -1 ng/L     Procalcitonin [[id number]]  (Normal) Collected: 06/08/22 1523    Lab Status: Final result Specimen: Blood from Arm, Left Updated: 06/08/22 1602     Procalcitonin 0 19 ng/ml     NT-BNP PRO [[id number]]  (Abnormal) Collected: 06/08/22 1523    Lab Status: Final result Specimen: Blood from Arm, Left Updated: 06/08/22 1601     NT-proBNP 1,705 pg/mL     HS Troponin 0hr (reflex protocol) [[id number]]  (Normal) Collected: 06/08/22 1523    Lab Status: Final result Specimen: Blood from Arm, Left Updated: 06/08/22 1559     hs TnI 0hr 36 ng/L     Lactic acid [[id number]]  (Normal) Collected: 06/08/22 1523    Lab Status: Final result Specimen: Blood from Arm, Left Updated: 06/08/22 1553     LACTIC ACID 1 8 mmol/L     Narrative:      Result may be elevated if tourniquet was used during collection      Comprehensive metabolic panel [[id number]]  (Abnormal) Collected: 06/08/22 1523    Lab Status: Final result Specimen: Blood from Arm, Left Updated: 06/08/22 1550     Sodium 139 mmol/L      Potassium 4 1 mmol/L      Chloride 101 mmol/L      CO2 30 mmol/L      ANION GAP 8 mmol/L      BUN 34 mg/dL      Creatinine 1 24 mg/dL      Glucose 84 mg/dL      Calcium 8 9 mg/dL      Corrected Calcium 9 9 mg/dL      AST 24 U/L      ALT 35 U/L      Alkaline Phosphatase 53 U/L      Total Protein 6 8 g/dL      Albumin 2 8 g/dL Total Bilirubin 1 18 mg/dL      eGFR 51 ml/min/1 73sq m     Narrative:      National Kidney Disease Foundation guidelines for Chronic Kidney Disease (CKD):     Stage 1 with normal or high GFR (GFR > 90 mL/min/1 73 square meters)    Stage 2 Mild CKD (GFR = 60-89 mL/min/1 73 square meters)    Stage 3A Moderate CKD (GFR = 45-59 mL/min/1 73 square meters)    Stage 3B Moderate CKD (GFR = 30-44 mL/min/1 73 square meters)    Stage 4 Severe CKD (GFR = 15-29 mL/min/1 73 square meters)    Stage 5 End Stage CKD (GFR <15 mL/min/1 73 square meters)  Note: GFR calculation is accurate only with a steady state creatinine    Protime-INR [[id number]]  (Normal) Collected: 06/08/22 1523    Lab Status: Final result Specimen: Blood from Arm, Left Updated: 06/08/22 1548     Protime 14 2 seconds      INR 1 15    APTT [[id number]]  (Abnormal) Collected: 06/08/22 1523    Lab Status: Final result Specimen: Blood from Arm, Left Updated: 06/08/22 1548     PTT 40 seconds     CBC and differential [[id number]]  (Abnormal) Collected: 06/08/22 1523    Lab Status: Final result Specimen: Blood from Arm, Left Updated: 06/08/22 1534     WBC 11 07 Thousand/uL      RBC 3 44 Million/uL      Hemoglobin 10 6 g/dL      Hematocrit 32 6 %      MCV 95 fL      MCH 30 8 pg      MCHC 32 5 g/dL      RDW 16 3 %      MPV 11 4 fL      Platelets 689 Thousands/uL      nRBC 0 /100 WBCs      Neutrophils Relative 81 %      Immat GRANS % 1 %      Lymphocytes Relative 9 %      Monocytes Relative 4 %      Eosinophils Relative 5 %      Basophils Relative 0 %      Neutrophils Absolute 8 96 Thousands/µL      Immature Grans Absolute 0 09 Thousand/uL      Lymphocytes Absolute 0 94 Thousands/µL      Monocytes Absolute 0 49 Thousand/µL      Eosinophils Absolute 0 58 Thousand/µL      Basophils Absolute 0 01 Thousands/µL                  CT chest without contrast   Final Result by Rashida Stephens MD (06/08 1706)      Mild diffuse groundglass opacity which is extremely nonspecific  This could be due to pulmonary edema, atypical infection, or acute exacerbation of interstitial lung disease  The appearance is not typical of the sequela of Covid-19  Redemonstration of UIP pattern of pulmonary fibrosis  Given calcified pleural plaques, this could be due to asbestosis  Pulmonary artery enlargement which can be seen with pulmonary hypertension  Workstation performed: [id number]         XR chest 1 view portable   Final Result by Charity Gaming MD (06/08 1643)      Increasing groundglass infiltrates on a background of pulmonary fibrosis  Findings likely worsening Covid pneumonia  Workstation performed: [id number]                    Procedures  Procedures         ED Course               Identification of Seniors at [address] [place] [address] Most Recent Value   (ISAR) Identification of Seniors at Risk    Before the illness or injury that brought you to the Emergency, did you need someone to help you on a regular basis? 0 Filed at: 06/08/2022 1336   In the last 24 hours, have you needed more help than usual? 1 Filed at: 06/08/2022 1336   Have you been hospitalized for one or more nights during the past 6 months? 1 Filed at: 06/08/2022 1336   In general, do you see well? 0 Filed at: 06/08/2022 1336   In general, do you have serious problems with your memory? 0 Filed at: 06/08/2022 1336   Do you take more than three different medications every day? 1 Filed at: 06/08/2022 1336   ISAR Score 3 Filed at: 06/08/2022 1336                                    MDM  Number of Diagnoses or Management Options  COVID-19: new and requires workup  Diagnosis management comments: DDX including but not limited to: pneumonia, pleural effusion, CHF, PE, PTX, ACS, MI, asthma exacerbation, COPD exacerbation, COVID 19, EVALI, anemia, metabolic abnormality, renal failure  Plan: Xr chest  Sepsis workup          Amount and/or Complexity of Data Reviewed  Clinical lab tests: ordered and reviewed  Tests in the radiology section of CPT®: ordered and reviewed    Risk of Complications, Morbidity, and/or Mortality  Presenting problems: moderate  Management options: low  General comments: 81 yo with sob  Increasing  Recent COVID 19 infection  Cannot r/o superimposed pneumonia  Will give dose of abx for this reason  Admit for further evaluation and management  Pt in agreement  Stable at time of admit  Patient Progress  Patient progress: stable      Disposition  Final diagnoses:   [id number]     Time reflects when diagnosis was documented in both MDM as applicable and the Disposition within this note     Time User Action Codes Description Comment    6/8/2022  5:39 PM Stephan Marquez Add [U07 1] COVID-19       ED Disposition     ED Disposition   Admit    Condition   Stable    Date/Time   Wed Jun 8, 2022  5:39 PM    Comment   Case was discussed with Dr Batsheva Salinas and the patient's admission status was agreed to be Admission Status: inpatient status to the service of Dr Batsheva Salinas   Follow-up Information     Follow up With Specialties Details Why [address]  Follow up  [address] [place] [address]  [phone]            Current Discharge Medication List      CONTINUE these medications which have NOT CHANGED    Details   albuterol (2 5 mg/3 mL) 0 083 % nebulizer solution Take 3 mL (2 5 mg total) by nebulization every 6 (six) hours as needed for shortness of breath  Qty: 180 mL, Refills: 0    Associated Diagnoses: ILD (interstitial lung disease) ([address] ); Acute respiratory failure with hypoxia ([address] ); Pulmonary fibrosis (HCC)      albuterol (PROVENTIL HFA,VENTOLIN HFA) 90 mcg/act inhaler Inhale 2 puffs every 6 (six) hours as needed for wheezing  Qty: 54 g, Refills: 1    Comments: Substitution to a formulary equivalent within the same pharmaceutical class is authorized    Associated Diagnoses: Interstitial lung disease ([address] )      aspirin 81 MG tablet Take by mouth      Cholecalciferol (VITAMIN D) 2000 units CAPS Take by mouth      CYANOCOBALAMIN PO cyanocobalamin (vitamin B-12)      ferrous sulfate 325 (65 Fe) mg tablet Take 1 tablet (325 mg total) by mouth 2 (two) times a day  Qty: 180 tablet, Refills: 1    Associated Diagnoses: Iron deficiency anemia, unspecified iron deficiency anemia type      !! ipratropium-albuterol (DUO-NEB) 0 5-2 5 mg/3 mL nebulizer solution Take 3 mL by nebulization in the morning and 3 mL at noon and 3 mL in the evening and 3 mL before bedtime  Qty: 360 mL, Refills: 11    Associated Diagnoses: Interstitial lung disease ([facility] )      ! ! ipratropium-albuterol (DUO-NEB) 0 5-2 5 mg/3 mL nebulizer solution Take 3 mL by nebulization every 8 (eight) hours as needed for wheezing or shortness of breath  Qty: 180 mL, Refills: 0    Associated Diagnoses: ILD (interstitial lung disease) ([facility] ); Acute respiratory failure with hypoxia ([facility] ); Pulmonary fibrosis (HCC)      pantoprazole (PROTONIX) 40 mg tablet Take 1 tablet (40 mg total) by mouth daily  Qty: 90 tablet, Refills: 3    Associated Diagnoses: Dysphagia, unspecified type      predniSONE 20 mg tablet Take 1 5 tablets (30 mg total) by mouth daily for 5 days, THEN 1 tablet (20 mg total) daily for 5 days, THEN 0 5 tablets (10 mg total) daily for 5 days  Qty: 15 tablet, Refills: 0    Associated Diagnoses: Pneumonia due to COVID-19 virus      simvastatin (ZOCOR) 20 mg tablet Take 1 tablet (20 mg total) by mouth daily  Qty: 90 tablet, Refills: 1    Associated Diagnoses: Hyperlipidemia, unspecified hyperlipidemia type      VITAMIN E COMPLEX PO Take by mouth       !! - Potential duplicate medications found  Please discuss with provider  No discharge procedures on file      PDMP Review     None          ED Provider  Electronically Signed by           Edilma Hernandez PA-C  06/10/22 4795

## 2022-06-09 PROBLEM — B97.89 VIRAL SEPSIS (HCC): Status: ACTIVE | Noted: 2022-01-01

## 2022-06-09 PROBLEM — E46 PROTEIN CALORIE MALNUTRITION (HCC): Status: ACTIVE | Noted: 2019-03-05

## 2022-06-09 PROBLEM — A41.89 VIRAL SEPSIS (HCC): Status: ACTIVE | Noted: 2022-01-01

## 2022-06-09 NOTE — ASSESSMENT & PLAN NOTE
Malnutrition Findings:           Patient does have decreased albumin and a BMI of 17  This is due to protein calorie malnutrition  Obtain nutritionist consult                    BMI Findings: There is no height or weight on file to calculate BMI

## 2022-06-09 NOTE — CASE MANAGEMENT
Case Management Discharge Planning Note    Patient name Gordo German  Location [address] [id number] MRN [id number]  : 1933 Date 2022       Current Admission Date: 2022  Current Admission Diagnosis:Acute on chronic respiratory failure [facility])   Patient Active Problem List    Diagnosis Date Noted    Viral sepsis ([facility] ) 2022    Acute on chronic respiratory failure ([facility] ) 2022    Pneumonia due to COVID-19 virus 2022    Sepsis due to COVID-19 [facility]) 2022    Chronic respiratory failure ([facility] ) 2022    Elevated brain natriuretic peptide (BNP) level 2022    LADY (acute kidney injury) ([facility] ) 2022    Dizziness 2022    Acute respiratory failure with hypoxia ([facility] ) 2022    Chronic [facility] failure with preserved [facility] fraction [facility] 2022    Pulmonary fibrosis ([facility] 2022    Dysphagia 2020    Leukocytosis 2020    Proteinuria 2020    Shortness of breath 2020    Pulmonary infiltrates 2020    Interstitial lung disease ([facility] ) 2020    Fatigue 2020    Fall at home 2020    Unintentional weight loss 2019    Encounter for immunization 2019    Medicare annual wellness visit, subsequent 2019    Atherosclerosis of artery of extremity with ulceration ([facility] ) 2019    Phantom pain after amputation of lower extremity ([facility] ) 2019    Protein calorie malnutrition ([facility] ) 2019    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 2019    Carpal tunnel syndrome 2019    Decubitus ulcer of right heel, unstageable ([facility] ) 2019    Ambulatory dysfunction 10/29/2018    Physical deconditioning 10/29/2018    CKD (chronic kidney disease), stage III ([facility] ) 2018    Severe peripheral arterial disease ([facility] ) 2018    Bifascicular block 10/31/2017    Chronic fatigue 10/31/2017    Vitamin D deficiency 10/25/2017    Anemia 10/15/2017    Unstable gait 53/99/2633    Lichen simplex chronicus 07/10/2017    Seborrheic keratosis 07/10/2017    Change in multiple pigmented skin lesions 05/16/2017    Impaired fasting glucose 03/07/2017    Hyperlipidemia 07/14/2016    Chronic lower back pain 01/14/2016    Generalized osteoarthritis 01/14/2016    Herniated lumbar intervertebral disc 01/14/2016    Rotator cuff tear arthropathy, right 01/14/2016      LOS (days): 1  Geometric Mean LOS (GMLOS) (days): 5 40  Days to GMLOS:4 6     OBJECTIVE:  Risk of Unplanned Readmission Score: 19 56         Current admission status: Inpatient   Preferred Pharmacy:   [address], [address] [place] [address]  Phone: [phone] Fax: [phone]    Primary Care Provider: Wiliam Byrd MD    Primary Insurance: [facility]  Secondary Insurance:     DISCHARGE DETAILS:  CM ATTEMPTED D/C PLANNING ASSESSMENT  UNABLE TO COMPLETE  FAMILY TO HAVE GOALS OF CARE DISCUSSION  HXC COVID, RESPIRATORY FAILURE  DNI DNR  CM DEPT WILL FOLLOW THROUGH D/C

## 2022-06-09 NOTE — ASSESSMENT & PLAN NOTE
· Requires 2-3L O2 at baseline due to pulmonary fibrosis   · Now on high-flow nasal cannula, this is likely a combination of COVID-19 pneumonia on top of his pulmonary fibrosis    Continue treatment for COVID-19  Continue vitamin-C, D, zinc  Patient is status post tocilizumab on 06/08  Continue anticoagulation  Continue dexamethasone weight based  Continue ceftriaxone doxycycline for the time being  Continue to attempt to wean oxygen    Patient remains a DNR/DNI and does not want any further escalation of care except for the current medical therapy  If his respiratory or cardiovascular status worsens or if he develops end-stage organ failure at that point he would be willing to entertain hospice  Prognosis remains guarded  Discussed with family

## 2022-06-09 NOTE — RESPIRATORY THERAPY NOTE
06/09/22 1637   Non-Invasive Information   SpO2 (!) 88 %   $ Pulse Oximetry Spot Check Charge Completed   Non-Invasive Settings   FiO2 (%) (S)  50   Flow (lpm) (S)  35

## 2022-06-09 NOTE — PROGRESS NOTES
[facility]  Progress Note - David Delarosa 1933, 80 y o  male MRN: [id number]  Unit/Bed#: MS [facility] Encounter: [id number]  Primary Care Provider: Renetta Jorgensen MD   Date and time admitted to hospital: 6/8/2022  2:13 PM    * Acute on chronic [facility] ([facility] )  Assessment & Plan  · Requires 2-3L O2 at baseline due to pulmonary fibrosis   · Now on high-flow nasal cannula, this is likely a combination of COVID-19 pneumonia on top of his pulmonary fibrosis    Continue treatment for COVID-19  Continue vitamin-C, D, zinc  Patient is status post tocilizumab on 06/08  Continue anticoagulation  Continue dexamethasone weight based  Continue ceftriaxone doxycycline for the time being  Continue to attempt to wean oxygen    Patient remains a DNR/DNI and does not want any further escalation of care except for the current medical therapy  If his respiratory or cardiovascular status worsens or if he develops end-stage organ failure at that point he would be willing to entertain hospice  Prognosis remains guarded  Discussed with family  Viral sepsis ([facility] )  Assessment & Plan    In the setting of COVID 19 as evidenced by  fever(temp)-101 5 and [id number]    Pneumonia due to COVID-19 virus  Assessment & Plan  Recently hospitalized for COVID pneumonia, received IV remdesivir/decadron x3days and was discharged 06/02 with prednisone taper  Present with worsening O2 requirements, need for HFNC  CT scan w/o contrast reveals mild diffuse GGO which may represent pulm edema vs atypical infection vs ILD  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 plaques, 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     Will continue treatment as mentioned above  Prognosis guarded  Protein calorie malnutrition ([facility] 75 )  Assessment & Plan  Malnutrition Findings:           Patient does have decreased albumin and a BMI of 17    This is due to protein calorie malnutrition  Obtain nutritionist consult                    BMI Findings: There is no height or weight on file to calculate BMI  Physical deconditioning  Assessment & Plan  Once respiratory status improves will obtain PT/OT evaluation    VTE Pharmacologic Prophylaxis:   Pharmacologic: Heparin Drip  Mechanical VTE Prophylaxis in Place: Yes    Patient Centered Rounds: I have performed bedside rounds with nursing staff today  Discussions with Specialists or Other Care Team Provider: Discussed with care management team    Education and Discussions with Family / Patient:  Discussed with wife in detail over the phone    Time Spent for Care: 45 minutes  More than 50% of total time spent on counseling and coordination of care as described above  Current Length of Stay: 1 day(s)    Current Patient Status: Inpatient   Certification Statement: The patient will continue to require additional inpatient hospital stay due to need for IV treatments, HFNC    Discharge Plan: Once stable    Code Status: Level 3 - DNAR and DNI      Subjective:     Patient evaluated this morning  On high-flow nasal cannula around 50 L  Otherwise hemodynamically stable, alert and oriented  No other events reported  Objective:     Vitals:   Temp (24hrs), Av 7 °F (37 6 °C), Min:98 1 °F (36 7 °C), Max:101 5 °F (38 6 °C)    Temp:  [98 1 °F (36 7 °C)-101 5 °F (38 6 °C)] 98 1 °F (36 7 °C)  HR:  [] 87  Resp:  [16-36] 36  BP: ()/(48-67) 123/52  SpO2:  [90 %-100 %] 98 %  There is no height or weight on file to calculate BMI  Input and Output Summary (last 24 hours): Intake/Output Summary (Last 24 hours) at 2022 1320  Last data filed at 2022 0146  Gross per 24 hour   Intake 1150 ml   Output --   Net 1150 ml       Physical Exam:     Physical Exam  Vitals and nursing note reviewed  Constitutional:       Appearance: Normal appearance        Comments: Male patient in bed, awake   HENT:      Head: Normocephalic and atraumatic  Right Ear: External ear normal       Left Ear: External ear normal       Nose: Nose normal  No congestion or rhinorrhea  Mouth/Throat:      Mouth: Mucous membranes are moist       Pharynx: Oropharynx is clear  No oropharyngeal exudate or posterior oropharyngeal erythema  Eyes:      General: No scleral icterus  Right eye: No discharge  Left eye: No discharge  Pupils: Pupils are equal, round, and reactive to light  Neck:      Vascular: No carotid bruit  Cardiovascular:      Rate and Rhythm: Normal rate and regular rhythm  Pulses: Normal pulses  Heart sounds: No murmur heard  No friction rub  No gallop  Pulmonary:      Effort: Respiratory distress present  Breath sounds: Normal breath sounds  No stridor  No wheezing, rhonchi or rales  Comments: On high-flow nasal cannula, crackles heard bilaterally  Abdominal:      General: Abdomen is flat  Bowel sounds are normal  There is no distension  Palpations: Abdomen is soft  There is no mass  Tenderness: There is no abdominal tenderness  There is no guarding or rebound  Hernia: No hernia is present  Musculoskeletal:         General: No swelling, tenderness, deformity or signs of injury  Normal range of motion  Cervical back: Normal range of motion  No rigidity  No muscular tenderness  Lymphadenopathy:      Cervical: No cervical adenopathy  Skin:     General: Skin is warm and dry  Capillary Refill: Capillary refill takes less than 2 seconds  Coloration: Skin is not jaundiced or pale  Findings: No bruising or erythema  Neurological:      General: No focal deficit present  Mental Status: He is alert and oriented to person, place, and time  Mental status is at baseline  Cranial Nerves: No cranial nerve deficit  Sensory: No sensory deficit  Motor: No weakness        Coordination: Coordination normal       Deep Tendon Reflexes: Reflexes normal    Psychiatric:         Mood and Affect: Mood normal          Behavior: Behavior normal          Thought Content: Thought content normal          Judgment: Judgment normal            Additional Data:     Labs:    Results from last 7 days   Lab Units 06/08/22  1523   WBC Thousand/uL 11 07*   HEMOGLOBIN g/dL 10 6*   HEMATOCRIT % 32 6*   PLATELETS Thousands/uL 191   NEUTROS PCT % 81*   LYMPHS PCT % 9*   MONOS PCT % 4   EOS PCT % 5     Results from last 7 days   Lab Units 06/08/22  1523   SODIUM mmol/L 139   POTASSIUM mmol/L 4 1   CHLORIDE mmol/L 101   CO2 mmol/L 30   BUN mg/dL 34*   CREATININE mg/dL 1 24   ANION GAP mmol/L 8   CALCIUM mg/dL 8 9   ALBUMIN g/dL 2 8*   TOTAL BILIRUBIN mg/dL 1 18*   ALK PHOS U/L 53   ALT U/L 35   AST U/L 24   GLUCOSE RANDOM mg/dL 84     Results from last 7 days   Lab Units 06/08/22  1523   INR  1 15             Results from last 7 days   Lab Units 06/08/22  1523   LACTIC ACID mmol/L 1 8   PROCALCITONIN ng/ml 0 19           * I Have Reviewed All Lab Data Listed Above  * Additional Pertinent Lab Tests Reviewed: [facility] Admission Reviewed      Recent Cultures (last 7 days):     Results from last 7 days   Lab Units 06/08/22  1523   BLOOD CULTURE  Received in Microbiology Lab  Culture in Progress  Received in Microbiology Lab  Culture in Progress         Last 24 Hours Medication List:   Current Facility-Administered Medications   Medication Dose Route Frequency Provider Last Rate    ascorbic acid  500 mg Oral Daily Jihan lFores MD      cefTRIAXone  1,000 mg Intravenous Q24H Jihan Flores MD 1,000 mg (06/09/22 1131)    cholecalciferol  1,000 Units Oral Daily Jihan Flores MD      dexamethasone  0 1 mg/kg Intravenous Q12H Jihan Flores MD      doxycycline hyclate  100 mg Oral [address]      [START ON 6/10/2022] famotidine  20 mg Oral Daily Jihan Flores MD      heparin (porcine)  3-20 Units/kg/hr (Order-Specific) Intravenous Titrated Claudia Prakash PA-C 10 Units/kg/hr (06/09/22 1043)    zinc sulfate  220 mg Oral Daily Minoo Kinney MD          Today, Patient Was Seen By: Minoo Kinney MD    ** Please Note: Dictation voice to text software may have been used in the creation of this document   **

## 2022-06-09 NOTE — ASSESSMENT & PLAN NOTE
· Requires 2L O2 at baseline due to pulmonary fibrosis   · Initially required 6L, now on HF    · Continue supplemental O2

## 2022-06-09 NOTE — RESPIRATORY THERAPY NOTE
RT Ventilator Management Note  Shaka Light 80 y o  male MRN: [id number]  Unit/Bed#: -01 Encounter: [id number]      Daily Screen    No data found in the last 10 encounters  Physical Exam:   Assessment Type: Assess only  General Appearance: Drowsy, Eyes open/responds to voice  Respiratory Pattern: Shallow, Spontaneous, Assisted  Chest Assessment: Chest expansion symmetrical  Bilateral Breath Sounds: Diminished  Cough: None  O2 Device: HFNC  Subjective Data: drowsy      Resp Comments: called by nursing for pateint with acute desatuiration requiring NRB mask to maintain adeqauet ioxygenation  patient slightly tachypneic found on 6 lpm nasal cannula and NRB  patient placed on HFNC with noted improvement in work of breathing and oxygenation   SLIM made aware plan: continue current oxygen therapy and wean as tolerated

## 2022-06-09 NOTE — UTILIZATION REVIEW
Initial Clinical Review    Admission: Date/Time/Statement:   Admission Orders (From admission, onward)     Ordered        06/08/22 1740  Inpatient Admission  Once                      Orders Placed This Encounter   Procedures    Inpatient Admission     Standing Status:   Standing     Number of Occurrences:   1     Order Specific Question:   Level of Care     Answer:   Med Surg [16]     Order Specific Question:   Estimated length of stay     Answer:   More than 2 Midnights     Order Specific Question:   Certification     Answer:   I certify that inpatient services are medically necessary for this patient for a duration of greater than two midnights  See H&P and MD Progress Notes for additional information about the patient's course of treatment  ED Arrival Information     Expected   -    Arrival   6/8/2022 13:30    Acuity   Emergent            Means of arrival   Walk-In    Escorted by   Spouse    Service   Hospitalist    Admission type   Emergency            Arrival complaint   WEAKNESS (COVID+)           Chief Complaint   Patient presents with    Shortness of Breath     Pt was diagnosed with Covid last tuesday, wears 3 L of O2 at home, c/o increasing sob  Initial Presentation: 80 y o  male to the ED from home with complaints of shortness of breath, COVID + 5/31 and was admitted at that time, treated with remdesivir, decadron, discharged on 6/2  Admitted to inpatient for acute on chronic respiratory failure, pneumonia due to COVID 19  H/O CKD, pulmonary fibrosis on chronic 3 LNC at home  Arrives tachycardic, febrile  He has a cough  Now requiring 5 LNC  Wheezing and rhonchi heard  CRp and D-dimer elevated, BNP elevated  Start toculiuzimab, heparin drip  Refusing steroids  Considering comfort care and needs to speak with wife  Date: 6/9    Day 2:  Continue with vitamin c, d, zinc   Continue dacadron, iv abx  WIhs not to escalate care at this time  Continues to require 50LHFNC   Crackles heard bilaterally       ED Triage Vitals   Temperature Pulse Respirations Blood Pressure SpO2   06/08/22 1334 06/08/22 1334 06/08/22 1334 06/08/22 1334 06/08/22 1334   (!) 101 5 °F (38 6 °C) (!) 122 16 159/67 90 %      Temp Source Heart Rate Source Patient Position - Orthostatic VS BP Location FiO2 (%)   06/08/22 1334 06/08/22 1334 06/08/22 1334 06/08/22 1334 06/09/22 0033   Tympanic Monitor Sitting Left arm 40      Pain Score       06/08/22 2321       No Pain          Wt Readings from Last 1 Encounters:   05/31/22 54 kg (119 lb)     Additional Vital Signs:   Time Temp Pulse Resp BP MAP (mmHg) SpO2 FiO2 (%) Calculated FIO2 (%) - Nasal Cannula O2 Flow Rate (L/min) Nasal Cannula O2 Flow Rate (L/min) O2 Device O2 Interface Device Patient Position - Orthostatic VS   06/09/22 0739 -- -- -- -- -- 98 % 55 -- -- -- High flow nasal cannula HFNC prongs --   06/09/22 0421 -- 87 36 Abnormal  -- -- 96 % -- -- -- -- -- HFNC prongs --   06/09/22 0224 -- -- -- -- -- 98 % 55   -- 30 L/min -- High flow nasal cannula -- --   FiO2 (%): fio2 increased due to chronic desaturation at 06/09/22 0224   06/09/22 0033 -- -- -- -- -- 94 % 40 -- 30 L/min -- High flow nasal cannula -- --   06/09/22 0026 -- 80 28 Abnormal  -- -- 94 % -- -- -- -- -- HFNC prongs --   06/08/22 2240 98 1 °F (36 7 °C) 71 22 123/52 76 90 % -- -- -- -- Nasal cannula -- Lying   06/08/22 2100 -- 70 -- -- -- -- -- -- -- -- -- -- --   06/08/22 2030 -- 74 -- 90/55 69 99 % -- -- -- -- -- -- --   06/08/22 2000 -- 77 -- 97/50 71 97 % -- -- -- -- -- -- --   06/08/22 1845 -- 79 -- 96/51 71 100 % -- -- -- -- -- -- --   06/08/22 1824 99 4 °F (37 4 °C) -- -- -- -- -- -- -- -- -- -- -- --   06/08/22 1645 -- 108 Abnormal  16 91/48 Abnormal  66 93 % -- -- -- -- Nasal cannula -- Sitting   06/08/22 1500 -- 111 Abnormal  -- 98/55 70 93 % -- -- -- -- -- -- --   06/08/22 1334 101 5 °F (38 6 °C) Abnormal  122 Abnormal  16 159/67 -- 90 % -- 44 -- 6 L/min Nasal cannula -- Sitting     Pertinent Labs/Diagnostic Test Results:   6/1 EKG: Normal sinus rhythm  Left axis deviation  Right bundle branch block  Moderate voltage criteria for LVH, may be normal variant  Abnormal ECG  CT chest without contrast   Final Result by Janine Teixeira MD (06/08 1706)      Mild diffuse groundglass opacity which is extremely nonspecific  This could be due to pulmonary edema, atypical infection, or acute exacerbation of interstitial lung disease  The appearance is not typical of the sequela of Covid-19  Redemonstration of UIP pattern of pulmonary fibrosis  Given calcified pleural plaques, this could be due to asbestosis  Pulmonary artery enlargement which can be seen with pulmonary hypertension  Workstation performed: [id number]         XR chest 1 view portable   Final Result by Shaylee Gardiner MD (06/08 1643)      Increasing groundglass infiltrates on a background of pulmonary fibrosis  Findings likely worsening Covid pneumonia                    Workstation performed: [id number]               Results from last 7 days   Lab Units 06/08/22  1523   WBC Thousand/uL 11 07*   HEMOGLOBIN g/dL 10 6*   HEMATOCRIT % 32 6*   PLATELETS Thousands/uL 191   NEUTROS ABS Thousands/µL 8 96*         Results from last 7 days   Lab Units 06/08/22  1523   SODIUM mmol/L 139   POTASSIUM mmol/L 4 1   CHLORIDE mmol/L 101   CO2 mmol/L 30   ANION GAP mmol/L 8   BUN mg/dL 34*   CREATININE mg/dL 1 24   EGFR ml/min/1 73sq m 51   CALCIUM mg/dL 8 9     Results from last 7 days   Lab Units 06/08/22  1523   AST U/L 24   ALT U/L 35   ALK PHOS U/L 53   TOTAL PROTEIN g/dL 6 8   ALBUMIN g/dL 2 8*   TOTAL BILIRUBIN mg/dL 1 18*         Results from last 7 days   Lab Units 06/08/22  1523   GLUCOSE RANDOM mg/dL 84         Results from last 7 days   Lab Units 06/08/22  2035 06/08/22  1703 06/08/22  1523   HS TNI 0HR ng/L  --   --  36   HS TNI 2HR ng/L  --  35  --    HSTNI D2 ng/L  --  -1  --    HS TNI 4HR ng/L 54*  --   --    HSTNI D4 ng/L 18  --   --      Results from last 7 days   Lab Units 06/09/22  0937   D-DIMER QUANTITATIVE ug/ml FEU 1 57*     Results from last 7 days   Lab Units 06/09/22  0937 06/09/22  0318 06/08/22  1523   PROTIME seconds  --   --  14 2   INR   --   --  1 15   PTT seconds 94* 49* 40*         Results from last 7 days   Lab Units 06/08/22  1523   PROCALCITONIN ng/ml 0 19     Results from last 7 days   Lab Units 06/08/22  1523   LACTIC ACID mmol/L 1 8       Results from last 7 days   Lab Units 06/08/22  1523   NT-PRO BNP pg/mL 1,705*     Results from last 7 days   Lab Units 06/08/22  1523   CRP mg/L 53 1*     Results from last 7 days   Lab Units 06/08/22  1523   BLOOD CULTURE  Received in Microbiology Lab  Culture in Progress  Received in Microbiology Lab  Culture in Progress         ED Treatment:   Medication Administration from 06/08/2022 1330 to 06/08/2022 2249       Date/Time Order Dose Route Action     06/08/2022 1340 acetaminophen (TYLENOL) tablet 650 mg 650 mg Oral Given     06/08/2022 1518 albuterol inhalation solution 5 mg 5 mg Nebulization Given     06/08/2022 1518 ipratropium (ATROVENT) 0 02 % inhalation solution 0 5 mg 0 5 mg Nebulization Given     06/08/2022 1519 sodium chloride 0 9 % bolus 1,000 mL 1,000 mL Intravenous New Bag     06/08/2022 1518 cefepime (MAXIPIME) 2 g/50 mL dextrose IVPB 2,000 mg Intravenous New Bag     06/08/2022 1659 vancomycin 750 mg in sodium chloride 0 9% 250 mL 750 mg Intravenous New Bag        Past Medical History:   Diagnosis Date    Anemia     Arthritis     Atherosclerosis of artery of extremity with ulceration ([facility] ) 3/27/2019    Bifascicular block     Cellulitis of chest wall     Chronic kidney disease     Cough     Dupuytren contracture     DVT femoral (deep venous thrombosis) with thrombophlebitis, right (HCC)     Elevated glucose     Hemothorax     Left    Hyperlipidemia     Interstitial lung disease (HCC)     [facility]    SOB (shortness of breath)     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 2/19/2019    Vascular disease      Admitting Diagnosis: Generalized weakness [R53 1]  COVID-19 [U07 1]  Age/Sex: 80 y o  male  Admission Orders:  Heparin drip with routine PTT  CBC, CMP, mag    Scheduled Medications:  ascorbic acid, 500 mg, Oral, Daily  cefTRIAXone, 1,000 mg, Intravenous, Q24H  cholecalciferol, 1,000 Units, Oral, Daily  dexamethasone, 0 1 mg/kg, Intravenous, Q12H  doxycycline hyclate, 100 mg, Oral, Q12H  [START ON 6/10/2022] famotidine, 20 mg, Oral, Daily  zinc sulfate, 220 mg, Oral, Daily      Continuous IV Infusions:  heparin (porcine), 3-20 Units/kg/hr (Order-Specific), Intravenous, Titrated      PRN Meds:         Network Utilization Review Department  ATTENTION: Please call with any questions or concerns to [phone] and carefully listen to the prompts so that you are directed to the right person  All voicemails are confidential   Chrystine Can all requests for admission clinical reviews, approved or denied determinations and any other requests to dedicated fax number below belonging to the campus where the patient is receiving treatment   List of dedicated fax numbers for the Facilities:  [address] DENIALS (Administrative/Medical Necessity) [phone]   [address] (Maternity/NICU/Pediatrics) [phone]   [address] [phone]   [address]  [address] [address] [place] [address] [address] [place] [address] [phone]   [place] [address] 951 [phone]

## 2022-06-09 NOTE — ASSESSMENT & PLAN NOTE
Recently hospitalized for COVID pneumonia, received IV remdesivir/decadron x3days and was discharged 06/02 with prednisone taper  Present with worsening O2 requirements, need for HFNC  CT scan w/o contrast reveals mild diffuse GGO which may represent pulm edema vs atypical infection vs ILD  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 plaques, 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     Will continue treatment as mentioned above  Prognosis guarded

## 2022-06-09 NOTE — CASE MANAGEMENT
Case Management Assessment & Discharge Planning Note    Patient name Sav Soto  Location [address] 230/-[id number] MRN [id number]  : 1933 Date 2022       Current Admission Date: 2022  Current Admission Diagnosis:Acute on chronic respiratory failure [facility])   Patient Active Problem List    Diagnosis Date Noted    Viral sepsis ([facility] ) 2022    Acute on chronic respiratory failure ([facility] ) 2022    Pneumonia due to COVID-19 virus 2022    Sepsis due to COVID-19 [facility]) 2022    Chronic respiratory failure ([facility] ) 2022    Elevated brain natriuretic peptide (BNP) level 2022    LADY (acute kidney injury) ([facility] ) 2022    Dizziness 2022    Acute respiratory failure with hypoxia ([facility] ) 2022    Chronic heart failure with preserved ejection fraction ([facility] ) 2022    Pulmonary fibrosis ([facility] ) 2022    Dysphagia 2020    Leukocytosis 2020    Proteinuria 2020    Shortness of breath 2020    Pulmonary infiltrates 2020    Interstitial lung disease ([facility] ) 2020    Fatigue 2020    Fall at home 2020    Unintentional weight loss 2019    Encounter for immunization 2019    Medicare annual wellness visit, subsequent 2019    Atherosclerosis of artery of extremity with ulceration ([facility] ) 2019    Phantom pain after amputation of lower extremity ([facility] ) 2019    Protein calorie malnutrition ([facility] ) 2019    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 2019    Carpal tunnel syndrome 2019    Decubitus ulcer of right heel, unstageable ([facility] ) 2019    Ambulatory dysfunction 10/29/2018    Physical deconditioning 10/29/2018    CKD (chronic kidney disease), stage III ([facility] ) 2018    Severe peripheral arterial disease ([facility] ) 2018    Bifascicular block 10/31/2017    Chronic fatigue 10/31/2017    Vitamin D deficiency 10/25/2017    Anemia 10/15/2017    Unstable gait 83/79/9878    Lichen simplex chronicus 07/10/2017    Seborrheic keratosis 07/10/2017    Change in multiple pigmented skin lesions 05/16/2017    Impaired fasting glucose 03/07/2017    Hyperlipidemia 07/14/2016    Chronic lower back pain 01/14/2016    Generalized osteoarthritis 01/14/2016    Herniated lumbar intervertebral disc 01/14/2016    Rotator cuff tear arthropathy, right 01/14/2016      LOS (days): 1  Geometric Mean LOS (GMLOS) (days): 5 40  Days to GMLOS:4 5     OBJECTIVE:  PATIENT READMITTED TO HOSPITAL  Risk of Unplanned Readmission Score: 19 56         Current admission status: Inpatient       Preferred Pharmacy:   [address]  [place] [address]  Phone: [phone] Fax: [phone]    Primary Care Provider: Jovita Smith MD    Primary Insurance: [facility]  Secondary Insurance:     ASSESSMENT:  [address] [place] [address] - Spouse   Primary Phone: [phone] (Home)               Advance Directives  Does patient have a [address]?: Yes  Does patient have Advance Directives?: Yes  Advance Directives: Living will, Power of  for health care  Primary Contact: David Garvey  [phone] (H)              Patient Information  Admitted from[de-identified] Home  Mental Status: Other (Comment) (COVID ISOLATION)  During Assessment patient was accompanied by: Spouse (Jose Álvarez [id number])  Assessment information provided by[de-identified] Spouse  Primary Caregiver: Self  Support Systems: Spouse/significant other  [place] of Residence: [address] [place] [address] Avenue do you live in?: [address] entry access options  Select all that apply : Stairs  Number of steps to enter home  : 1  Do the steps have railings?: No  Type of Current Residence: [place]  In the last 12 months, was there a time when you were not able to pay the mortgage or rent on time?: No  In the last 12 months, how many places have you lived?: 1  In the last 12 months, was there a time when you did not have a steady place to sleep or slept in a shelter (including now)?: No  Homeless/housing insecurity resource given?: N/A  Living Arrangements: Lives w/ Spouse/significant other  Is patient a ?: Yes  Is patient active with [facility])?: No    Activities of Daily Living Prior to Admission  Functional Status: Independent  Completes ADLs independently?: Yes  Ambulates independently?: Yes  Does patient use assisted devices?: Yes  Assisted Devices (DME) used: Straight Cane, Walker, Bedside Commode, Wheelchair, Home Oxygen concentrator, Portable Oxygen tanks, Portable Oxygen concentrator, [address], [employer] Name (respiratory supplies):  [employer]  O2 Rate(s): 3L  Does patient currently own DME?: Yes  What DME does the patient currently own?: Bedside Commode, Portable Oxygen concentrator, Wheelchair, Portable Oxygen tanks, Home Oxygen concentrator, Shower Chair, Straight Cane, Walker, Nebulizer  Does patient have a history of Outpatient Therapy (PT/OT)?: No  Does the patient have a history of Short-Term Rehab?: Yes (WIFE ADAMANTLY [facility] )  Does patient have a history of HHC?: Yes (TRADITIONAL HHC)  Does patient currently have [employer]?: Yes (TRADITIONAL HHC)    Current Home Health Care  Type of Current Home Care Services: Home health aide, Home PT, Nurse visit  [address][de-identified] Other (please enter name in comment) ([facility]  [facility] Provider[de-identified] PCP    Patient Information Continued  Income Source: Pension/MC[employer]  Does patient have prescription coverage?: Yes  Within the past 12 months, you worried that your food would run out before you got the money to buy more : Never true  Within the past 12 months, the food you bought just didn't last and you didn't have money to get more : Never true  Food insecurity resource given?: N/A  Does patient receive dialysis treatments?: No  Does patient have a history of substance abuse?: No  Does patient have a history of Mental Health Diagnosis?: No         Means of Transportation  Means of Transport to Appts[de-identified] Family transport  In the past 12 months, has lack of transportation kept you from medical appointments or from getting medications?: No  In the past 12 months, has lack of transportation kept you from meetings, work, or from getting things needed for daily living?: No  Was application for public transport provided?: N/A        DISCHARGE DETAILS:    Discharge planning discussed with[de-identified] Alfred Edwards [phone] (H) AND IRVIN GLASGOW  Freedom of Choice: Yes  Comments - Freedom of Choice: CM DISCUSSED FREEDOM OF CHOICE W/ [facility]  DESMOND/SPOUSE/POA ADAMANTLY DECLINES [facility] [[email or web address]], WANTS [facility] W/ TRADITIONAL  CM contacted family/caregiver?: Yes  Were Treatment Team discharge recommendations reviewed with patient/caregiver?: Yes  Did patient/caregiver verbalize understanding of patient care needs?: Yes  Were patient/caregiver advised of the risks associated with not following Treatment Team discharge recommendations?: Yes    Contacts  Patient Contacts: Edis Cervantes  Relationship to Patient[de-identified] Family  Contact Method:  In Person  Reason/Outcome: Emergency Contact, Discharge [address]         Is the patient interested in [facility] at discharge?: Yes  [address] requested[de-identified] Nursing, Occupational Therapy, Physical [address] Name[de-identified] Other (TRADITIONAL)  [facility] [address] Provider[de-identified] Referring Provider  Home Health Services Needed[de-identified] Evaluate Functional Status and Safety, Gait/ADL Training, Strengthening/Theraputic Exercises to Improve Function, Oxygen Via Nasal Cannula  Oxygen LPM Ordered (if applicable based on home health services needed):: 3 LPM  Homebound Criteria Met[de-identified] Communicable Disease, Requires the Assistance of Another Person for Safe Ambulation or to Leave the Home, Uses an Assist Device (i e  cane, walker, etc)  Supporting Clincal Findings[de-identified] Requires Oxygen, Limited Endurance, Fatigues Easliy in Short Distances, Dyspnea with Exertion    DME Referral Provided  Referral made for DME?: No    Other Referral/Resources/Interventions Provided:  Interventions: HHC  Referral Comments: REFERRAL TO TRADITIONAL HHC AWAIT INTAKE DETERMINATION      Would you like to participate in our [address] service program?  : No - Declined    Treatment Team Recommendation: Other  Discharge Destination Plan[de-identified] Home, Home with [facility]

## 2022-06-09 NOTE — ASSESSMENT & PLAN NOTE
Recently hospitalized for COVID pneumonia, received IV remdesivir/decadron x3days and was discharged 06/02 with prednisone taper  · Present with worsening O2 requirements, need for HFNC  · CT scan w/o contrast reveals mild diffuse GGO which may represent pulm edema vs atypical infection vs ILD  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 plaques, 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   · BNP 1700   · Repeat CRP, d-dimer   · Begin tociluzimab   · Heparin   · Patient requesting to be DNR/DNI- refusing steroids   He is considering comfort care, would like to speak with his wife in am

## 2022-06-09 NOTE — H&P
[address] [facility]  H&P- Nedra Moreno 1933, 80 y o  male MRN: [id number]  Unit/Bed#: -Genaro Encounter: [id number]  Primary Care Provider: Chuck Solis MD   Date and time admitted to hospital: 6/8/2022  2:13 PM    * Acute on chronic respiratory failure [facility])  Assessment & Plan  · Requires 2L O2 at baseline due to pulmonary fibrosis   · Initially required 6L, now on HF    · Continue supplemental O2     Pneumonia due to COVID-19 virus  Assessment & Plan  Recently hospitalized for COVID pneumonia, received IV remdesivir/decadron x3days and was discharged 06/02 with prednisone taper  · Present with worsening O2 requirements, need for HFNC  · CT scan w/o contrast reveals mild diffuse GGO which may represent pulm edema vs atypical infection vs ILD  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 plaques, 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   · BNP 1700   · Repeat CRP, d-dimer   · Begin tociluzimab   · Heparin   · Patient requesting to be DNR/DNI- refusing steroids  He is considering comfort care, would like to speak with his wife in am    VTE Pharmacologic Prophylaxis: VTE Score: 6 High Risk (Score >/= 5) - Pharmacological DVT Prophylaxis Ordered: enoxaparin (Lovenox)  Sequential Compression Devices Ordered  Code Status: Level 3 - DNAR and DNI  Discussion with family: Attempted to update  (wife) via phone  Left voicemail  Anticipated Length of Stay: Patient will be admitted on an inpatient basis with an anticipated length of stay of greater than 2 midnights secondary to Respiratory failure  Total Time for Visit, including Counseling / Coordination of Care: 60 minutes Greater than 50% of this total time spent on direct patient counseling and coordination of care      Chief Complaint:   Chief Complaint   Patient presents with    Shortness of Breath     Pt was diagnosed with Covid last tuesday, wears 3 L of O2 at home, c/o increasing sob  History of Present Illness:   Gordo German is a 80 y o  male with a PMH of pulmonary fibrosis, CKD and recent diagnosis of covid pneumonia who presents to the ED with worsening shortness of breath and O2 requirements  Patient was recently admitted for COVID pneumonia and had been discharged home on 06/02 with steroid taper  He initially had been feeling better however reports that over the past 2 days he has been feeling fatigue and shortness of breath   worse with increasing  Patient bumped his home oxygen up to 5 L without improvement in shortness of breath  Reports he has also been having fever and chills for the past 2 days  ROS  A 10-point review of systems was obtained  Pertinent positives and negatives are outlined in the HPI above  Remainder of review of systems are otherwise negative  Past Medical and Surgical History:   Past Medical History:   Diagnosis Date    Anemia     Arthritis     Atherosclerosis of artery of extremity with ulceration ([facility] ) 3/27/2019    Bifascicular block     Cellulitis of chest wall     Chronic kidney disease     Cough     Dupuytren contracture     DVT femoral (deep venous thrombosis) with thrombophlebitis, right (HCC)     Elevated glucose     Hemothorax     Left    Hyperlipidemia     Interstitial lung disease (HCC)     Phantom pain after amputation of lower extremity ([facility] ) 3/27/2019    SOB (shortness of breath)     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 2/19/2019    Vascular disease        Past Surgical History:   Procedure Laterality Date    AMPUTATION  1972    L thumb    CARPAL TUNNEL RELEASE  2016    L hand    CATARACT EXTRACTION      COLONOSCOPY      EYE SURGERY      FRACTURE SURGERY      HAND SURGERY      IR LOWER EXTREMITY / INTERVENTION  10/26/2018    KNEE ARTHROSCOPY      NERVE BLOCK      MO AMPUTATION LOW LEG THRU TIB/FIB Left 2/1/2019    Procedure: AMPUTATION BELOW KNEE (BKA);   Surgeon: Raji Diaz MD;  Location: [facility];  Service: Vascular    NV SLCTV CATHJ 3RD+ ORD SLCTV ABDL PEL/LXTR [facility] Left 3/9/2018    Procedure: LEFT LOWER EXTREMITY ARTERIOGRAM WITH PTA OF LEFT POPLITEAL ARTERY;  Surgeon: Radha Francois MD;  Location: [facility];  Service: Vascular    NV Amish Heidi 3RD+ ORD SLCTV ABDL PEL/LXTR [facility] Left 10/26/2018    Procedure: ARTERIOGRAM, angioplasty stent and atherectomy;  Surgeon: Radha Francois MD;  Location: [facility];  Service: Vascular    THORACENTESIS         Meds/Allergies:  Prior to Admission medications    Medication Sig Start Date End Date Taking? Authorizing Provider   albuterol (2 5 mg/3 mL) 0 083 % nebulizer solution Take 3 mL (2 5 mg total) by nebulization every 6 (six) hours as needed for shortness of breath 5/23/22   Elenita Hightower PA-C   albuterol (PROVENTIL HFA,VENTOLIN HFA) 90 mcg/act inhaler Inhale 2 puffs every 6 (six) hours as needed for wheezing 5/3/22   Jovita Smith MD   aspirin 81 MG tablet Take by mouth    Historical Provider, MD   Cholecalciferol (VITAMIN D) 2000 units CAPS Take by mouth    Historical Provider, MD   CYANOCOBALAMIN PO cyanocobalamin (vitamin B-12)    Historical Provider, MD   ferrous sulfate 325 (65 Fe) mg tablet Take 1 tablet (325 mg total) by mouth 2 (two) times a day 6/23/21   Jovita Smith MD   ipratropium-albuterol (DUO-NEB) 0 5-2 5 mg/3 mL nebulizer solution Take 3 mL by nebulization in the morning and 3 mL at noon and 3 mL in the evening and 3 mL before bedtime   5/16/22 8/14/22  Erica Gaffney MD   ipratropium-albuterol (DUO-NEB) 0 5-2 5 mg/3 mL nebulizer solution Take 3 mL by nebulization every 8 (eight) hours as needed for wheezing or shortness of breath 5/23/22   Lilia Hernandez PA-C   pantoprazole (PROTONIX) 40 mg tablet Take 1 tablet (40 mg total) by mouth daily 7/14/21   Eli Reyna PA-C   predniSONE 20 mg tablet Take 1 5 tablets (30 mg total) by mouth daily for 5 days, THEN 1 tablet (20 mg total) daily for 5 days, THEN 0 5 tablets (10 mg total) daily for 5 days  6/2/22 6/17/22  Rudy Pitts MD   simvastatin (ZOCOR) 20 mg tablet Take 1 tablet (20 mg total) by mouth daily 4/5/22   Leanne Che MD   VITAMIN E COMPLEX PO Take by mouth    Historical Provider, MD     I have reviewed home medications using recent Epic encounter  Allergies: No Known Allergies    Social History:  Marital Status: /Civil Union   Occupation:   Patient Pre-hospital Living Situation: Home  Patient Pre-hospital Level of Mobility: walks  Patient Pre-hospital Diet Restrictions:   Substance Use History:   Social History     Substance and Sexual Activity   Alcohol Use Not Currently    Comment: n/a     Social History     Tobacco Use   Smoking Status Former Smoker    Packs/day: 2 00    Years: 15 00    Pack years: 30 00    Types: Cigarettes   Smokeless Tobacco Never Used   Tobacco Comment    quit 57 years ago     Social History     Substance and Sexual Activity   Drug Use No       Family History:  Family History   Problem Relation Age of Onset    Lung cancer Mother    [facility] Brain cancer Mother     Diabetes Father     Aneurysm Father     Stroke Father     Lung cancer Father     Brain cancer Father     Diabetes Maternal Grandmother        Physical Exam:     Vitals:   Blood Pressure: 123/52 (06/08/22 2240)  Pulse: 80 (06/09/22 0026)  Temperature: 98 1 °F (36 7 °C) (06/08/22 2240)  Temp Source: Oral (06/08/22 2240)  Respirations: (!) 28 (06/09/22 0026)  SpO2: 98 % (06/09/22 0224)    Physical Exam  Vitals and nursing note reviewed  Constitutional:       Appearance: He is well-developed  HENT:      Head: Normocephalic and atraumatic  Eyes:      General: No scleral icterus  Extraocular Movements: Extraocular movements intact  Conjunctiva/sclera: Conjunctivae normal       Pupils: Pupils are equal, round, and reactive to light  Cardiovascular:      Rate and Rhythm: Normal rate and regular rhythm        Heart sounds: Normal heart sounds  No murmur heard  Pulmonary:      Effort: Pulmonary effort is normal  No respiratory distress  Breath sounds: Wheezing and rhonchi present  Abdominal:      General: Abdomen is flat  Bowel sounds are normal       Palpations: Abdomen is soft  Tenderness: There is no abdominal tenderness  Musculoskeletal:      Cervical back: Neck supple  Skin:     General: Skin is warm and dry  Neurological:      General: No focal deficit present  Mental Status: He is alert and oriented to person, place, and time  Additional Data:     Lab Results:  Results from last 7 days   Lab Units 06/08/22  1523   WBC Thousand/uL 11 07*   HEMOGLOBIN g/dL 10 6*   HEMATOCRIT % 32 6*   PLATELETS Thousands/uL 191   NEUTROS PCT % 81*   LYMPHS PCT % 9*   MONOS PCT % 4   EOS PCT % 5     Results from last 7 days   Lab Units 06/08/22  1523   SODIUM mmol/L 139   POTASSIUM mmol/L 4 1   CHLORIDE mmol/L 101   CO2 mmol/L 30   BUN mg/dL 34*   CREATININE mg/dL 1 24   ANION GAP mmol/L 8   CALCIUM mg/dL 8 9   ALBUMIN g/dL 2 8*   TOTAL BILIRUBIN mg/dL 1 18*   ALK PHOS U/L 53   ALT U/L 35   AST U/L 24   GLUCOSE RANDOM mg/dL 84     Results from last 7 days   Lab Units 06/08/22  1523   INR  1 15             Results from last 7 days   Lab Units 06/08/22  1523 06/02/22  0606   LACTIC ACID mmol/L 1 8  --    PROCALCITONIN ng/ml 0 19 0 20       Imaging: Reviewed radiology reports from this admission including: chest CT scan  CT chest without contrast   Final Result by Jorge Paul MD (06/08 1706)      Mild diffuse groundglass opacity which is extremely nonspecific  This could be due to pulmonary edema, atypical infection, or acute exacerbation of interstitial lung disease  The appearance is not typical of the sequela of Covid-19  Redemonstration of UIP pattern of pulmonary fibrosis  Given calcified pleural plaques, this could be due to asbestosis        Pulmonary artery enlargement which can be seen with pulmonary hypertension  Workstation performed: [id number]         XR chest 1 view portable   Final Result by Caitlin Lal MD (06/08 1643)      Increasing groundglass infiltrates on a background of pulmonary fibrosis  Findings likely worsening Covid pneumonia  Workstation performed: [id number]             EKG and Other Studies Reviewed on Admission:   · EKG: NSR   with PACs   LAD  Incomplete RBBB  ** Please Note: This note has been constructed using a voice recognition system   **

## 2022-06-09 NOTE — RESPIRATORY THERAPY NOTE
06/09/22 0739   Respiratory Assessment   Assessment Type Assess only   General Appearance Sleeping   Respiratory Pattern Assisted   Chest Assessment Chest expansion symmetrical   Bilateral Breath Sounds Diminished   Non-Invasive Information   O2 Interface Device HFNC prongs   Non-Invasive Ventilation Mode HFNC (High flow)   SpO2 98 %   $ Pulse Oximetry Spot Check Charge Completed   Non-Invasive Settings   FiO2 (%) (S)  45   Flow (lpm) 30   Temperature (Set) 31   Non-Invasive Readings   Skin Intervention Skin intact   Heater Temperature (Obs) 31   Maintenance   Water bag changed No

## 2022-06-09 NOTE — PLAN OF CARE
Problem: MOBILITY - ADULT  Goal: Maintain or return to baseline ADL function  Description: INTERVENTIONS:  -  Assess patient's ability to carry out ADLs; assess patient's baseline for ADL function and identify physical deficits which impact ability to perform ADLs (bathing, care of mouth/teeth, toileting, grooming, dressing, etc )  - Assess/evaluate cause of self-care deficits   - Assess range of motion  - Assess patient's mobility; develop plan if impaired  - Assess patient's need for assistive devices and provide as appropriate  - Encourage maximum independence but intervene and supervise when necessary  - Involve family in performance of ADLs  - Assess for home care needs following discharge   - Consider OT consult to assist with ADL evaluation and planning for discharge  - Provide patient education as appropriate  Outcome: Progressing  Goal: Maintains/Returns to pre admission functional level  Description: INTERVENTIONS:  - Perform BMAT or MOVE assessment daily    - Set and communicate daily mobility goal to care team and patient/family/caregiver  - Collaborate with rehabilitation services on mobility goals if consulted  - Perform Range of Motion 2 times a day  - Reposition patient every 2 hours    - Dangle patient 2 times a day  - Stand patient 2 times a day  - Ambulate patient 2 times a day  - Out of bed to chair 2 times a day   - Out of bed for meals 2 times a day  - Out of bed for toileting  - Record patient progress and toleration of activity level   Outcome: Progressing     Problem: Potential for Falls  Goal: Patient will remain free of falls  Description: INTERVENTIONS:  - Educate patient/family on patient safety including physical limitations  - Instruct patient to call for assistance with activity   - Consult OT/PT to assist with strengthening/mobility   - Keep Call bell within reach  - Keep bed low and locked with side rails adjusted as appropriate  - Keep care items and personal belongings within reach  - Initiate and maintain comfort rounds  - Make Fall Risk Sign visible to staff  - Offer Toileting every 2 Hours, in advance of need  - Initiate/Maintain 2alarm  - Obtain necessary fall risk management equipment: 2  - Apply yellow socks and bracelet for high fall risk patients  - Consider moving patient to room near nurses station  Outcome: Progressing     Problem: RESPIRATORY - ADULT  Goal: Achieves optimal ventilation and oxygenation  Description: INTERVENTIONS:  - Assess for changes in respiratory status  - Assess for changes in mentation and behavior  - Position to facilitate oxygenation and minimize respiratory effort  - Oxygen administered by appropriate delivery if ordered  - Initiate smoking cessation education as indicated  - Encourage broncho-pulmonary hygiene including cough, deep breathe, Incentive Spirometry  - Assess the need for suctioning and aspirate as needed  - Assess and instruct to report SOB or any respiratory difficulty  - Respiratory Therapy support as indicated  Outcome: Progressing

## 2022-06-09 NOTE — RESPIRATORY THERAPY NOTE
06/09/22 1521   Non-Invasive Information   O2 Interface Device HFNC prongs   Non-Invasive Ventilation Mode HFNC (High flow)   SpO2 96 %   $ Pulse Oximetry Spot Check Charge Completed   Non-Invasive Settings   FiO2 (%) (S)  40   Flow (lpm) 30   Temperature (Set) 31   Non-Invasive Readings   Skin Intervention Skin intact   Heater Temperature (Obs) 31   Maintenance   Water bag changed No

## 2022-06-09 NOTE — PLAN OF CARE
Problem: MOBILITY - ADULT  Goal: Maintain or return to baseline ADL function  Description: INTERVENTIONS:  -  Assess patient's ability to carry out ADLs; assess patient's baseline for ADL function and identify physical deficits which impact ability to perform ADLs (bathing, care of mouth/teeth, toileting, grooming, dressing, etc )  - Assess/evaluate cause of self-care deficits   - Assess range of motion  - Assess patient's mobility; develop plan if impaired  - Assess patient's need for assistive devices and provide as appropriate  - Encourage maximum independence but intervene and supervise when necessary  - Involve family in performance of ADLs  - Assess for home care needs following discharge   - Consider OT consult to assist with ADL evaluation and planning for discharge  - Provide patient education as appropriate  Outcome: Progressing  Goal: Maintains/Returns to pre admission functional level  Description: INTERVENTIONS:  - Perform BMAT or MOVE assessment daily    - Set and communicate daily mobility goal to care team and patient/family/caregiver  - Collaborate with rehabilitation services on mobility goals if consulted  - Perform Range of Motion 3 times a day  - Reposition patient every 2 hours    - Dangle patient 3 times a day  - Stand patient 3 times a day  - Ambulate patient 3 times a day  - Out of bed to chair 3 times a day   - Out of bed for meals 3 times a day  - Out of bed for toileting  - Record patient progress and toleration of activity level   Outcome: Progressing     Problem: Potential for Falls  Goal: Patient will remain free of falls  Description: INTERVENTIONS:  - Educate patient/family on patient safety including physical limitations  - Instruct patient to call for assistance with activity   - Consult OT/PT to assist with strengthening/mobility   - Keep Call bell within reach  - Keep bed low and locked with side rails adjusted as appropriate  - Keep care items and personal belongings within reach  - Initiate and maintain comfort rounds  - Make Fall Risk Sign visible to staff  - Offer Toileting every 2 Hours, in advance of need  - Initiate/Maintain bed alarm  - Obtain necessary fall risk management equipment  - Apply yellow socks and bracelet for high fall risk patients  - Consider moving patient to room near nurses station  Outcome: Progressing

## 2022-06-10 NOTE — PLAN OF CARE
Problem: MOBILITY - ADULT  Goal: Maintain or return to baseline ADL function  Description: INTERVENTIONS:  -  Assess patient's ability to carry out ADLs; assess patient's baseline for ADL function and identify physical deficits which impact ability to perform ADLs (bathing, care of mouth/teeth, toileting, grooming, dressing, etc )  - Assess/evaluate cause of self-care deficits   - Assess range of motion  - Assess patient's mobility; develop plan if impaired  - Assess patient's need for assistive devices and provide as appropriate  - Encourage maximum independence but intervene and supervise when necessary  - Involve family in performance of ADLs  - Assess for home care needs following discharge   - Consider OT consult to assist with ADL evaluation and planning for discharge  - Provide patient education as appropriate  Outcome: Progressing  Goal: Maintains/Returns to pre admission functional level  Description: INTERVENTIONS:  - Perform BMAT or MOVE assessment daily    - Set and communicate daily mobility goal to care team and patient/family/caregiver  - Collaborate with rehabilitation services on mobility goals if consulted  - Perform Range of Motion  times a day  - Reposition patient every  hours    - Dangle patient  times a day  - Stand patient  times a day  - Ambulate patient  times a day  - Out of bed to chair  times a day   - Out of bed for meals  times a day  - Out of bed for toileting  - Record patient progress and toleration of activity level   Outcome: Progressing     Problem: Potential for Falls  Goal: Patient will remain free of falls  Description: INTERVENTIONS:  - Educate patient/family on patient safety including physical limitations  - Instruct patient to call for assistance with activity   - Consult OT/PT to assist with strengthening/mobility   - Keep Call bell within reach  - Keep bed low and locked with side rails adjusted as appropriate  - Keep care items and personal belongings within reach  - Initiate and maintain comfort rounds  - Make Fall Risk Sign visible to staff  - Offer Toileting every  Hours, in advance of need  - Initiate/Maintain alarm  - Obtain necessary fall risk management equipment  - Apply yellow socks and bracelet for high fall risk patients  - Consider moving patient to room near nurses station  Outcome: Progressing     Problem: RESPIRATORY - ADULT  Goal: Achieves optimal ventilation and oxygenation  Description: INTERVENTIONS:  - Assess for changes in respiratory status  - Assess for changes in mentation and behavior  - Position to facilitate oxygenation and minimize respiratory effort  - Oxygen administered by appropriate delivery if ordered  - Initiate smoking cessation education as indicated  - Encourage broncho-pulmonary hygiene including cough, deep breathe, Incentive Spirometry  - Assess the need for suctioning and aspirate as needed  - Assess and instruct to report SOB or any respiratory difficulty  - Respiratory Therapy support as indicated  Outcome: Progressing

## 2022-06-10 NOTE — PLAN OF CARE
Problem: MOBILITY - ADULT  Goal: Maintain or return to baseline ADL function  Description: INTERVENTIONS:  -  Assess patient's ability to carry out ADLs; assess patient's baseline for ADL function and identify physical deficits which impact ability to perform ADLs (bathing, care of mouth/teeth, toileting, grooming, dressing, etc )  - Assess/evaluate cause of self-care deficits   - Assess range of motion  - Assess patient's mobility; develop plan if impaired  - Assess patient's need for assistive devices and provide as appropriate  - Encourage maximum independence but intervene and supervise when necessary  - Involve family in performance of ADLs  - Assess for home care needs following discharge   - Consider OT consult to assist with ADL evaluation and planning for discharge  - Provide patient education as appropriate  Outcome: Progressing  Goal: Maintains/Returns to pre admission functional level  Description: INTERVENTIONS:  - Perform BMAT or MOVE assessment daily    - Set and communicate daily mobility goal to care team and patient/family/caregiver  - Collaborate with rehabilitation services on mobility goals if consulted  - Perform Range of Motion  times a day  - Reposition patient every  hours    - Dangle patient  times a day  - Stand patient  times a day  - Ambulate patient  times a day  - Out of bed to chair  times a day   - Out of bed for meals  times a day  - Out of bed for toileting  - Record patient progress and toleration of activity level   Outcome: Progressing     Problem: Potential for Falls  Goal: Patient will remain free of falls  Description: INTERVENTIONS:  - Educate patient/family on patient safety including physical limitations  - Instruct patient to call for assistance with activity   - Consult OT/PT to assist with strengthening/mobility   - Keep Call bell within reach  - Keep bed low and locked with side rails adjusted as appropriate  - Keep care items and personal belongings within reach  - Initiate and maintain comfort rounds  - Make Fall Risk Sign visible to staff  - Offer Toileting every  Hours, in advance of need  - Initiate/Maintain alarm  - Obtain necessary fall risk management equipment  - Apply yellow socks and bracelet for high fall risk patients  - Consider moving patient to room near nurses station  Outcome: Progressing     Problem: RESPIRATORY - ADULT  Goal: Achieves optimal ventilation and oxygenation  Description: INTERVENTIONS:  - Assess for changes in respiratory status  - Assess for changes in mentation and behavior  - Position to facilitate oxygenation and minimize respiratory effort  - Oxygen administered by appropriate delivery if ordered  - Initiate smoking cessation education as indicated  - Encourage broncho-pulmonary hygiene including cough, deep breathe, Incentive Spirometry  - Assess the need for suctioning and aspirate as needed  - Assess and instruct to report SOB or any respiratory difficulty  - Respiratory Therapy support as indicated  Outcome: Not Progressing

## 2022-06-10 NOTE — CASE MANAGEMENT
Case Management Discharge Planning Note    Patient name Nicole Shape  Location [address] 230/-[id number] MRN [id number]  : 1933 Date 6/10/2022       Current Admission Date: 2022  Current Admission Diagnosis:Acute on chronic respiratory failure [facility])   Patient Active Problem List    Diagnosis Date Noted    Viral sepsis ([facility] ) 2022    Acute on chronic respiratory failure ([facility] ) 2022    Pneumonia due to COVID-19 virus 2022    Sepsis due to COVID-19 [facility]) 2022    [facility] 20[facility]    Elevated brain natriuretic peptide (BNP) level 2022    LADY (acute kidney injury) ([facility] ) 2022    Dizziness 2022    Acute respiratory failure with hypoxia ([facility] ) 2022    Chronic heart failure with preserved ejection fraction [facility] ) 2022    Pulmonary fibrosis [facility] ) 2022    Dysphagia 2020    Leukocytosis 2020    Proteinuria 2020    Shortness of breath 2020    Pulmonary infiltrates 2020    Interstitial lung disease ([facility] ) 2020    Fatigue 2020    Fall at home 2020    Unintentional weight loss 2019    Encounter for immunization 2019    Medicare annual wellness visit, subsequent 2019    Atherosclerosis of artery of extremity with ulceration ([facility] ) 2019    Phantom pain after amputation of lower extremity ([facility] ) 2019    Protein calorie malnutrition ([facility] ) 2019    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 2019    Carpal tunnel syndrome 2019    Decubitus ulcer of right heel, unstageable ([facility] ) 2019    Ambulatory dysfunction 10/29/2018    Physical deconditioning 10/29/2018    CKD (chronic kidney disease), stage III ([facility] ) 2018    Severe peripheral arterial disease ([facility] ) 2018    Bifascicular block 10/31/2017    Chronic fatigue 10/31/2017    Vitamin D deficiency 10/25/2017    Anemia 10/15/2017    Unstable gait 26/88/8256    Lichen simplex chronicus 07/10/2017    Seborrheic keratosis 07/10/2017    Change in multiple pigmented skin lesions 05/16/2017    Impaired fasting glucose 03/07/2017    Hyperlipidemia 07/14/2016    Chronic lower back pain 01/14/2016    Generalized osteoarthritis 01/14/2016    Herniated lumbar intervertebral disc 01/14/2016    Rotator cuff tear arthropathy, right 01/14/2016      LOS (days): 2  Geometric Mean LOS (GMLOS) (days): 5 40  Days to GMLOS:3 7     OBJECTIVE:  Risk of Unplanned Readmission Score: 19 79         Current admission status: Inpatient   Preferred Pharmacy:   [address]  Phone: [phone] Fax: [phone]    Primary Care Provider: Constance Cisneros MD    Primary Insurance: [facility]  Secondary Insurance:     DISCHARGE DETAILS:    Discharge planning discussed with[de-identified] Terence Ji [phone] (H)  Freedom of Choice: Yes  Comments - Freedom of Choice: CM DISCUSSED FREEDOM OF CHOICE W/ DESMOND GLASGOW AND INFORMED HER THAT DR HAS TO GIVE MEDICAL UPDATES, AS SHE HAD KEFT A VOICEMIAL REQUESTING A MEDICAL Iggy Razor    CM contacted family/caregiver?: Yes  Were Treatment Team discharge recommendations reviewed with patient/caregiver?: Yes  Did patient/caregiver verbalize understanding of patient care needs?: Yes  Were patient/caregiver advised of the risks associated with not following Treatment Team discharge recommendations?: Yes    Contacts  Patient Contacts: Rafael Eason  Relationship to Patient[de-identified] Family  Contact Method: Phone  Phone Number: [phone]  Reason/Outcome: Emergency Contact, Discharge Planning

## 2022-06-10 NOTE — ASSESSMENT & PLAN NOTE
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

## 2022-06-10 NOTE — ASSESSMENT & PLAN NOTE
· Requires 2-3L O2 at baseline due to pulmonary fibrosis   · Patient was previously on high-flow nasal cannula, this is likely a combination of COVID-19 pneumonia on top of his pulmonary fibrosis    Continue treatment for COVID-19  Continue vitamin-C, D, zinc  Patient is status post tocilizumab on 06/08  Continue anticoagulation  Continue dexamethasone weight based  Continue ceftriaxone doxycycline for the time being  Continue to attempt to wean oxygen - patient requiring less oxygen today    Patient remains a DNR/DNI and does not want any further escalation of care except for the current medical therapy  If his respiratory or cardiovascular status worsens or if he develops end-stage organ failure at that point he would be willing to entertain hospice  Prognosis remains guarded  Discussed with wife again today

## 2022-06-10 NOTE — PLAN OF CARE
Problem: MOBILITY - ADULT  Goal: Maintain or return to baseline ADL function  Description: INTERVENTIONS:  -  Assess patient's ability to carry out ADLs; assess patient's baseline for ADL function and identify physical deficits which impact ability to perform ADLs (bathing, care of mouth/teeth, toileting, grooming, dressing, etc )  - Assess/evaluate cause of self-care deficits   - Assess range of motion  - Assess patient's mobility; develop plan if impaired  - Assess patient's need for assistive devices and provide as appropriate  - Encourage maximum independence but intervene and supervise when necessary  - Involve family in performance of ADLs  - Assess for home care needs following discharge   - Consider OT consult to assist with ADL evaluation and planning for discharge  - Provide patient education as appropriate  Outcome: Progressing  Goal: Maintains/Returns to pre admission functional level  Description: INTERVENTIONS:  - Perform BMAT or MOVE assessment daily    - Set and communicate daily mobility goal to care team and patient/family/caregiver  - Collaborate with rehabilitation services on mobility goals if consulted  - Perform Range of Motion  times a day  - Reposition patient every  hours    - Dangle patient  times a day  - Stand patient  times a day  - Ambulate patient  times a day  - Out of bed to chair  times a day   - Out of bed for meals times a day  - Out of bed for toileting  - Record patient progress and toleration of activity level   Outcome: Progressing     Problem: Potential for Falls  Goal: Patient will remain free of falls  Description: INTERVENTIONS:  - Educate patient/family on patient safety including physical limitations  - Instruct patient to call for assistance with activity   - Consult OT/PT to assist with strengthening/mobility   - Keep Call bell within reach  - Keep bed low and locked with side rails adjusted as appropriate  - Keep care items and personal belongings within reach  - Initiate and maintain comfort rounds  - Make Fall Risk Sign visible to staff  - Offer Toileting every  Hours, in advance of need  - Initiate/[place] alarm  - Obtain necessary fall risk management equipment:   - Apply yellow socks and bracelet for high fall risk patients  - Consider moving patient to room near nurses station  Outcome: Progressing     Problem: RESPIRATORY - ADULT  Goal: Achieves optimal ventilation and oxygenation  Description: INTERVENTIONS:  - Assess for changes in respiratory status  - Assess for changes in mentation and behavior  - Position to facilitate oxygenation and minimize respiratory effort  - Oxygen administered by appropriate delivery if ordered  - Initiate smoking cessation education as indicated  - Encourage broncho-pulmonary hygiene including cough, deep breathe, Incentive Spirometry  - Assess the need for suctioning and aspirate as needed  - Assess and instruct to report SOB or any respiratory difficulty  - Respiratory Therapy support as indicated  Outcome: Progressing

## 2022-06-10 NOTE — RESPIRATORY THERAPY NOTE
06/10/22 0832   Respiratory Assessment   Assessment Type Assess only   General Appearance Alert; Awake   Respiratory Pattern Tachypneic   Chest Assessment Chest expansion symmetrical   Cough Non-productive   Resp Comments Pt transitioned to [address]  Will wean as able     O2 Device MFNC   Oxygen Therapy/Pulse Ox   O2 Device (S)  Mid flow nasal cannula   O2 Therapy Oxygen humidified   Nasal Cannula O2 Flow Rate (L/min) (S)  6 L/min   Calculated FIO2 (%) - Nasal Cannula 44   SpO2 91 %   SpO2 Activity At Rest   $ Pulse Oximetry Spot Check Charge Completed

## 2022-06-10 NOTE — PROGRESS NOTES
[address] [facility]  Progress Note - Lina Coon 1933, 80 y o  male MRN: [id number]  Unit/Bed#: -[facility] Encounter: [id number]  Primary Care Provider: Elaina Cheung MD   Date and time admitted to hospital: 6/8/2022  2:13 PM    * Acute on chronic respiratory failure ([facility] [address] )  Assessment & Plan  · Requires 2-3L O2 at baseline due to pulmonary fibrosis   · Patient was previously on high-flow nasal cannula, this is likely a combination of COVID-19 pneumonia on top of his pulmonary fibrosis    Continue treatment for COVID-19  Continue vitamin-C, D, zinc  Patient is status post tocilizumab on 06/08  Continue anticoagulation  Continue dexamethasone weight based  Continue ceftriaxone doxycycline for the time being  Continue to attempt to wean oxygen - patient requiring less oxygen today    Patient remains a DNR/DNI and does not want any further escalation of care except for the current medical therapy  If his respiratory or cardiovascular status worsens or if he develops end-stage organ failure at that point he would be willing to entertain hospice  Prognosis remains guarded  Discussed with wife again today  Pneumonia due to COVID-19 virus  Assessment & Plan  Recently hospitalized for COVID pneumonia, received IV remdesivir/decadron x3days and was discharged 06/02 with prednisone taper  Present with worsening O2 requirements, need for HFNC  CT scan w/o contrast reveals mild diffuse GGO which may represent pulm edema vs atypical infection vs ILD  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 plaques, 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     Continue treatment as mentioned above  Prognosis guarded  Protein calorie malnutrition ([facility] )  Assessment & Plan  Malnutrition Findings:           Patient does have decreased albumin and a BMI of 17  This is due to protein calorie malnutrition      Obtain nutritionist consult                    BMI Findings: There is no height or weight on file to calculate BMI  Physical deconditioning  Assessment & Plan  Once respiratory status improves will obtain PT/OT evaluation    VTE Pharmacologic Prophylaxis:   Pharmacologic: Heparin Drip  Mechanical VTE Prophylaxis in Place: Yes    Patient Centered Rounds: I have performed bedside rounds with nursing staff today  Discussions with Specialists or Other Care Team Provider:  Discussed with care management team    Education and Discussions with Family / Patient:  Discussed with wife in detail over the phone    Time Spent for Care: 45 minutes  More than 50% of total time spent on counseling and coordination of care as described above  Current Length of Stay: 2 day(s)    Current Patient Status: Inpatient   Certification Statement: The patient will continue to require additional inpatient hospital stay due to Need for IV steroids    Discharge Plan:  Once stable    Code Status: Level 3 - DNAR and DNI      Subjective:     Patient evaluated this morning  He is in unless amount of oxygen today, he feels slightly better  Still fairly tired and fatigued  Dyspnea is improving subjectively  Does mention some dry cough which is bothersome but otherwise is nontoxic  Tolerating p o  Intake, denies nausea vomiting diarrhea constipation  Other events reported  Objective:     Vitals:   Temp (24hrs), Av 1 °F (36 7 °C), Min:98 1 °F (36 7 °C), Max:98 1 °F (36 7 °C)    Temp:  [98 1 °F (36 7 °C)] 98 1 °F (36 7 °C)  HR:  [54-82] 69  Resp:  [20-28] 20  BP: (108)/(53-54) 108/53  SpO2:  [84 %-99 %] 91 %  There is no height or weight on file to calculate BMI  Input and Output Summary (last 24 hours): Intake/Output Summary (Last 24 hours) at 6/10/2022 1203  Last data filed at 2022 1300  Gross per 24 hour   Intake 480 ml   Output --   Net 480 ml       Physical Exam:     Physical Exam  Vitals and nursing note reviewed     Constitutional:       Appearance: Normal appearance  Comments: Male patient in bed, awake   HENT:      Head: Normocephalic and atraumatic  Right Ear: External ear normal       Left Ear: External ear normal       Nose: Nose normal  No congestion or rhinorrhea  Mouth/Throat:      Mouth: Mucous membranes are moist       Pharynx: Oropharynx is clear  No oropharyngeal exudate or posterior oropharyngeal erythema  Eyes:      General: No scleral icterus  Right eye: No discharge  Left eye: No discharge  Pupils: Pupils are equal, round, and reactive to light  Neck:      Vascular: No carotid bruit  Cardiovascular:      Rate and Rhythm: Normal rate and regular rhythm  Pulses: Normal pulses  Heart sounds: No murmur heard  No friction rub  No gallop  Pulmonary:      Effort: Respiratory distress present  Breath sounds: Normal breath sounds  No stridor  No wheezing, rhonchi or rales  Comments: On high-flow nasal cannula, crackles heard bilaterally  Abdominal:      General: Abdomen is flat  Bowel sounds are normal  There is no distension  Palpations: Abdomen is soft  There is no mass  Tenderness: There is no abdominal tenderness  There is no guarding or rebound  Hernia: No hernia is present  Musculoskeletal:         General: No swelling, tenderness, deformity or signs of injury  Normal range of motion  Cervical back: Normal range of motion  No rigidity  No muscular tenderness  Lymphadenopathy:      Cervical: No cervical adenopathy  Skin:     General: Skin is warm and dry  Capillary Refill: Capillary refill takes less than 2 seconds  Coloration: Skin is not jaundiced or pale  Findings: No bruising or erythema  Neurological:      General: No focal deficit present  Mental Status: He is alert and oriented to person, place, and time  Mental status is at baseline  Cranial Nerves: No cranial nerve deficit  Sensory: No sensory deficit        Motor: No weakness  Coordination: Coordination normal       Deep Tendon Reflexes: Reflexes normal    Psychiatric:         Mood and Affect: Mood normal          Behavior: Behavior normal          Thought Content: Thought content normal          Judgment: Judgment normal          Additional Data:     Labs:    Results from last 7 days   Lab Units 06/10/22  0555   WBC Thousand/uL 8 34   HEMOGLOBIN g/dL 9 1*   HEMATOCRIT % 27 5*   PLATELETS Thousands/uL 137*   NEUTROS PCT % 93*   LYMPHS PCT % 5*   MONOS PCT % 1*   EOS PCT % 0     Results from last 7 days   Lab Units 06/10/22  0555   SODIUM mmol/L 136   POTASSIUM mmol/L 4 2   CHLORIDE mmol/L 103   CO2 mmol/L 25   BUN mg/dL 36*   CREATININE mg/dL 1 17   ANION GAP mmol/L 8   CALCIUM mg/dL 8 3   ALBUMIN g/dL 2 3*   TOTAL BILIRUBIN mg/dL 0 59   ALK PHOS U/L 57   ALT U/L 27   AST U/L 28   GLUCOSE RANDOM mg/dL 159*     Results from last 7 days   Lab Units 06/08/22  1523   INR  1 15             Results from last 7 days   Lab Units 06/08/22  1523   LACTIC ACID mmol/L 1 8   PROCALCITONIN ng/ml 0 19           * I Have Reviewed All Lab Data Listed Above  * Additional Pertinent Lab Tests Reviewed: Kath [address] Admission Reviewed      Recent Cultures (last 7 days):     Results from last 7 days   Lab Units 06/08/22  1523   BLOOD CULTURE  No Growth at 24 hrs  No Growth at 24 hrs         Last 24 Hours Medication List:   Current Facility-Administered Medications   Medication Dose Route Frequency Provider Last Rate    ascorbic acid  500 mg Oral Daily Marine Santiago MD      benzonatate  100 mg Oral TID PRN Marine Santiago MD      cefTRIAXone  1,000 mg Intravenous Q24H Marine Santiago MD 1,000 mg (06/09/22 1131)    cholecalciferol  1,000 Units Oral Daily Marine Santiago MD      dexamethasone  0 1 mg/kg Intravenous Q12H Marine Santiago MD      doxycycline hyclate  100 mg Oral [address] [place], [place]      famotidine  20 mg Oral Daily Jackilane Schneider Esbjekita BAE, MD      heparin (porcine)  3-20 Units/kg/hr (Order-Specific) Intravenous Titrated [place]ed[place] Durbin, PA-C 12 Units/kg/hr (06/10/22 8081)    zinc sulfate  220 mg Oral Daily Antwon Rivera MD          Today, Patient Was Seen By: Antwon Rivera MD    ** Please Note: Dictation voice to text software may have been used in the creation of this document   **

## 2022-06-11 NOTE — ASSESSMENT & PLAN NOTE
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

## 2022-06-11 NOTE — PLAN OF CARE
Problem: MOBILITY - ADULT  Goal: Maintain or return to baseline ADL function  Description: INTERVENTIONS:  -  Assess patient's ability to carry out ADLs; assess patient's baseline for ADL function and identify physical deficits which impact ability to perform ADLs (bathing, care of mouth/teeth, toileting, grooming, dressing, etc )  - Assess/evaluate cause of self-care deficits   - Assess range of motion  - Assess patient's mobility; develop plan if impaired  - Assess patient's need for assistive devices and provide as appropriate  - Encourage maximum independence but intervene and supervise when necessary  - Involve family in performance of ADLs  - Assess for home care needs following discharge   - Consider OT consult to assist with ADL evaluation and planning for discharge  - Provide patient education as appropriate  Outcome: Progressing  Goal: Maintains/Returns to pre admission functional level  Description: INTERVENTIONS:  - Perform BMAT or MOVE assessment daily    - Set and communicate daily mobility goal to care team and patient/family/caregiver  - Collaborate with rehabilitation services on mobility goals if consulted  - Perform Range of Motion times a day  - Reposition patient every hours    - Dangle patien times a day  - Stand patient  times a day  - Ambulate patient times a day  - Out of bed to chair  times a day   - Out of bed for times a day  - Out of bed for toileting  - Record patient progress and toleration of activity level   Outcome: Progressing     Problem: Potential for Falls  Goal: Patient will remain free of falls  Description: INTERVENTIONS:  - Educate patient/family on patient safety including physical limitations  - Instruct patient to call for assistance with activity   - Consult OT/PT to assist with strengthening/mobility   - Keep Call bell within reach  - Keep bed low and locked with side rails adjusted as appropriate  - Keep care items and personal belongings within reach  - Initiate and maintain comfort rounds  - Make Fall Risk Sign visible to staff  - Offer Toileting every  Hours, in advance of need  - Initiate/Maintain alarm  - Obtain necessary fall risk management equipment:   - Apply yellow socks and bracelet for high fall risk patients  - Consider moving patient to room near nurses station  Outcome: Progressing     Problem: RESPIRATORY - ADULT  Goal: Achieves optimal ventilation and oxygenation  Description: INTERVENTIONS:  - Assess for changes in respiratory status  - Assess for changes in mentation and behavior  - Position to facilitate oxygenation and minimize respiratory effort  - Oxygen administered by appropriate delivery if ordered  - Initiate smoking cessation education as indicated  - Encourage broncho-pulmonary hygiene including cough, deep breathe, Incentive Spirometry  - Assess the need for suctioning and aspirate as needed  - Assess and instruct to report SOB or any respiratory difficulty  - Respiratory Therapy support as indicated  Outcome: Progressing

## 2022-06-11 NOTE — ASSESSMENT & PLAN NOTE
· Requires 2-3L O2 at baseline due to pulmonary fibrosis   · Patient was previously on high-flow nasal cannula, this is likely a combination of COVID-19 pneumonia on top of his pulmonary fibrosis    Continue treatment for COVID-19  Continue vitamin-C, D, zinc  Patient is status post tocilizumab on 06/08  Continue anticoagulation - but will switch to Lovenox  Continue dexamethasone weight based  Continue ceftriaxone doxycycline for the time being  Continue to attempt to wean oxygen - patient requiring less oxygen today    Patient has significantly improved getting back to his baseline oxygen requirement  Despite that he does have pulmonary fibrosis with an overall poor prognosis  He does mention interesting going home hospice  I have discussed this with wife in detail  She does mention that she would be agreeing with his wishes    Will contact case management to see if he can set up home hospice in the next 24-48 hours

## 2022-06-11 NOTE — PROGRESS NOTES
[facility]  Progress Note - Griecl Hayden 1933, 80 y o  male MRN: [id number]  Unit/Bed#: [facility] Encounter: [id number]  Primary Care Provider: Warren Chaves MD   Date and time admitted to hospital: 6/8/2022  2:13 PM    * Acute on chronic respiratory failure ([facility] [address] )  Assessment & Plan  · Requires 2-3L O2 at baseline due to pulmonary fibrosis   · Patient was previously on high-flow nasal cannula, this is likely a combination of COVID-19 pneumonia on top of his pulmonary fibrosis    Continue treatment for COVID-19  Continue vitamin-C, D, zinc  Patient is status post tocilizumab on 06/08  Continue anticoagulation - but will switch to Lovenox  Continue dexamethasone weight based  Continue ceftriaxone doxycycline for the time being  Continue to attempt to wean oxygen - patient requiring less oxygen today    Patient improving today compared to yesterday, currently on regular nasal cannula  Patient states that he is entertaining hospice, he ultimately would like to go home on home hospice if possible if he does improving the next 48 hours  I have discussed this with wife the appear to be in the same page  If patient continues to not feel well make be considered for hospice at home  V[facility] sepsis ([facility] )  Assessment & Plan    In the setting of COVID 19 as evidenced by  fever(temp)-101 5 and [id number]    Pneumonia due to COVID-19 virus  Assessment & Plan  Recently hospitalized for COVID pneumonia, received IV remdesivir/decadron x3days and was discharged 06/02 with prednisone taper  Present with worsening O2 requirements, need for HFNC  CT scan w/o contrast reveals mild diffuse GGO which may represent pulm edema vs atypical infection vs ILD  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 plaques, 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     Continue treatment as mentioned above  Prognosis guarded      Protein calorie malnutrition [facility])  Assessment & Plan  Malnutrition Findings:           Patient does have decreased albumin and a BMI of 17  This is due to protein calorie malnutrition  Obtain nutritionist consult                    BMI Findings: There is no height or weight on file to calculate BMI  Physical deconditioning  Assessment & Plan  Once respiratory status improves will obtain PT/OT evaluation    VTE Pharmacologic Prophylaxis:   Pharmacologic: Heparin Drip  Mechanical VTE Prophylaxis in Place: Yes    Patient Centered Rounds: I have performed bedside rounds with nursing staff today  Discussions with Specialists or Other Care Team Provider:  Discussed with care management team    Education and Discussions with Family / Patient:  Discussed with wife in detail over the phone    Time Spent for Care: 45 minutes  More than 50% of total time spent on counseling and coordination of care as described above  Current Length of Stay: 3 day(s)    Current Patient Status: Inpatient   Certification Statement: The patient will continue to require additional inpatient hospital stay due to Need for IV steroids    Discharge Plan:  Once stable    Code Status: Level 3 - DNAR and DNI      Subjective:     Patient evaluated this morning  He is in unless amount of oxygen today, he feels slightly better  Still fairly tired and fatigued  Dyspnea is improving subjectively  Does mention some dry cough which is bothersome but otherwise is nontoxic  Tolerating p o  Intake, denies nausea vomiting diarrhea constipation  Other events reported  Objective:     Vitals:   Temp (24hrs), Av 1 °F (36 7 °C), Min:98 1 °F (36 7 °C), Max:98 1 °F (36 7 °C)    Temp:  [98 1 °F (36 7 °C)] 98 1 °F (36 7 °C)  HR:  [64-69] 64  Resp:  [18] 18  BP: (105-114)/(41-69) 105/41  SpO2:  [91 %-96 %] 95 %  There is no height or weight on file to calculate BMI       Input and Output Summary (last 24 hours):     No intake or output data in the 24 hours ending 06/11/22 1008    Physical Exam:     Physical Exam  Vitals and nursing note reviewed  Constitutional:       Appearance: Normal appearance  Comments: Male patient in bed, awake   HENT:      Head: Normocephalic and atraumatic  Right Ear: External ear normal       Left Ear: External ear normal       Nose: Nose normal  No congestion or rhinorrhea  Mouth/Throat:      Mouth: Mucous membranes are moist       Pharynx: Oropharynx is clear  No oropharyngeal exudate or posterior oropharyngeal erythema  Eyes:      General: No scleral icterus  Right eye: No discharge  Left eye: No discharge  Pupils: Pupils are equal, round, and reactive to light  Neck:      Vascular: No carotid bruit  Cardiovascular:      Rate and Rhythm: Normal rate and regular rhythm  Pulses: Normal pulses  Heart sounds: No murmur heard  No friction rub  No gallop  Pulmonary:      Effort: No respiratory distress  Breath sounds: No stridor  Rales present  No wheezing or rhonchi  Comments: On nasal cannula  Abdominal:      General: Abdomen is flat  Bowel sounds are normal  There is no distension  Palpations: Abdomen is soft  There is no mass  Tenderness: There is no abdominal tenderness  There is no guarding or rebound  Hernia: No hernia is present  Musculoskeletal:         General: No swelling, tenderness, deformity or signs of injury  Normal range of motion  Cervical back: Normal range of motion  No rigidity  No muscular tenderness  Lymphadenopathy:      Cervical: No cervical adenopathy  Skin:     General: Skin is warm and dry  Capillary Refill: Capillary refill takes less than 2 seconds  Coloration: Skin is not jaundiced or pale  Findings: No bruising or erythema  Neurological:      General: No focal deficit present  Mental Status: He is alert and oriented to person, place, and time  Mental status is at baseline        Cranial Nerves: No cranial nerve deficit  Sensory: No sensory deficit  Motor: No weakness  Coordination: Coordination normal       Deep Tendon Reflexes: Reflexes normal    Psychiatric:         Mood and Affect: Mood normal          Behavior: Behavior normal          Thought Content: Thought content normal          Judgment: Judgment normal          Additional Data:     Labs:    Results from last 7 days   Lab Units 06/10/22  0555   WBC Thousand/uL 8 34   HEMOGLOBIN g/dL 9 1*   HEMATOCRIT % 27 5*   PLATELETS Thousands/uL 137*   NEUTROS PCT % 93*   LYMPHS PCT % 5*   MONOS PCT % 1*   EOS PCT % 0     Results from last 7 days   Lab Units 06/10/22  0555   SODIUM mmol/L 136   POTASSIUM mmol/L 4 2   CHLORIDE mmol/L 103   CO2 mmol/L 25   BUN mg/dL 36*   CREATININE mg/dL 1 17   ANION GAP mmol/L 8   CALCIUM mg/dL 8 3   ALBUMIN g/dL 2 3*   TOTAL BILIRUBIN mg/dL 0 59   ALK PHOS U/L 57   ALT U/L 27   AST U/L 28   GLUCOSE RANDOM mg/dL 159*     Results from last 7 days   Lab Units 06/08/22  1523   INR  1 15             Results from last 7 days   Lab Units 06/08/22  1523   LACTIC ACID mmol/L 1 8   PROCALCITONIN ng/ml 0 19           * I Have Reviewed All Lab Data Listed Above  * Additional Pertinent Lab Tests Reviewed: Kath [address] Admission Reviewed      Recent Cultures (last 7 days):     Results from last 7 days   Lab Units 06/08/22  1523   BLOOD CULTURE  No Growth at 48 hrs  No Growth at 48 hrs         Last 24 Hours Medication List:   Current Facility-Administered Medications   Medication Dose Route Frequency Provider Last Rate    ascorbic acid  500 mg Oral Daily Claudia Rivera MD      benzonatate  100 mg Oral TID PRN Claudia Rivera MD      cefTRIAXone  1,000 mg Intravenous Q24H Claudia Rivera MD 1,000 mg (06/10/22 1315)    cholecalciferol  1,000 Units Oral Daily Claudia Rivera MD      dexamethasone  0 1 mg/kg Intravenous Q12H Claudia Rivera MD      docusate sodium  100 mg Oral BID Claudia Rivera MD      doxycycline hyclate  100 mg Oral [address], [place]      enoxaparin  1 mg/kg Subcutaneous Q12H [address] Mary Beth Pena MD      famotidine  20 mg Oral Daily Mary Beth Pena MD      magnesium sulfate  4 g Intravenous Once Mary Beth Pena MD      senna  2 tablet Oral HS Mary Beth Pena MD      zinc sulfate  220 mg Oral Daily Mary Beth Pena MD          Today, Patient Was Seen By: Mary Beth Pena MD    ** Please Note: Dictation voice to text software may have been used in the creation of this document   **

## 2022-06-12 NOTE — PROGRESS NOTES
[facility]  Progress Note - Prudence Canter 1933, 80 y o  male MRN: [id number]  Unit/Bed#: [facility] Encounter: [id number]  Primary Care Provider: Leanne Che MD   Date and time admitted to hospital: 6/8/2022  2:13 PM    * Acute on chronic respiratory failure ([address] )  Assessment & Plan  · Requires 2-3L O2 at baseline due to pulmonary fibrosis   · Patient was previously on high-flow nasal cannula, this is likely a combination of COVID-19 pneumonia on top of his pulmonary fibrosis    Continue treatment for COVID-19  Continue vitamin-C, D, zinc  Patient is status post tocilizumab on 06/08  Continue anticoagulation - but will switch to Lovenox  Continue dexamethasone weight based  Continue ceftriaxone doxycycline for the time being  Continue to attempt to wean oxygen - patient requiring less oxygen today    Patient has significantly improved getting back to his baseline oxygen requirement  Despite that he does have pulmonary fibrosis with an overall poor prognosis  He does mention interesting going home hospice  I have discussed this with wife in detail  She does mention that she would be agreeing with his wishes  Will contact case management to see if he can set up home hospice in the next 24-48 hours      Viral sepsis [facility])  Assessment & Plan    In the setting of COVID 19 as evidenced by  fever(temp)-101 5 and [id number]    Pneumonia due to COVID-19 virus  Assessment & Plan  Recently hospitalized for COVID pneumonia, received IV remdesivir/decadron x3days and was discharged 06/02 with prednisone taper  Present with worsening O2 requirements, need for HFNC  CT scan w/o contrast reveals mild diffuse GGO which may represent pulm edema vs atypical infection vs ILD  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 plaques, 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     Continue treatment as mentioned above  Prognosis guarded    Over 10 days of symptomatic infection, will lift restrictions given that and patient going towards hospice status  Protein calorie malnutrition ([address] )  Assessment & Plan  Malnutrition Findings:           Patient does have decreased albumin and a BMI of 17  This is due to protein calorie malnutrition  Obtain nutritionist consult                    BMI Findings: There is no height or weight on file to calculate BMI  Physical deconditioning  Assessment & Plan  Once respiratory status improves will obtain PT/OT evaluation    VTE Pharmacologic Prophylaxis:   Pharmacologic: Enoxaparin (Lovenox)  Mechanical VTE Prophylaxis in Place: Yes    Patient Centered Rounds: I have performed bedside rounds with nursing staff today  Discussions with Specialists or Other Care Team Provider:   Discussed with care management team    Education and Discussions with Family / Patient:     Time Spent for Care: 45 minutes  More than 50% of total time spent on counseling and coordination of care as described above  Current Length of Stay: 4 day(s)    Current Patient Status: Inpatient   Certification Statement: The patient will continue to require additional inpatient hospital stay due to need for IV treatments    Discharge Plan: Once stable    Code Status: Level 3 - DNAR and DNI      Subjective:     Patient evaluated this morning  He appears to be a little more upbeat today  Currently on 5 L of oxygen  Denies any chest pain nausea vomiting diarrhea constipation  No other events reported    Objective:     Vitals:   Temp (24hrs), Av 6 °F (36 4 °C), Min:97 3 °F (36 3 °C), Max:97 7 °F (36 5 °C)    Temp:  [97 3 °F (36 3 °C)-97 7 °F (36 5 °C)] 97 7 °F (36 5 °C)  HR:  [56-69] 69  Resp:  [16] 16  BP: (108-131)/(49-51) 131/50  SpO2:  [70 %-97 %] 70 %  Body mass index is 17 07 kg/m²       Input and Output Summary (last 24 hours):     No intake or output data in the 24 hours ending 22 1222    Physical Exam:     Physical Exam  Vitals and nursing note reviewed  Constitutional:       Appearance: Normal appearance  Comments: Male patient in bed, awake   HENT:      Head: Normocephalic and atraumatic  Right Ear: External ear normal       Left Ear: External ear normal       Nose: Nose normal  No congestion or rhinorrhea  Mouth/Throat:      Mouth: Mucous membranes are moist       Pharynx: Oropharynx is clear  No oropharyngeal exudate or posterior oropharyngeal erythema  Eyes:      General: No scleral icterus  Right eye: No discharge  Left eye: No discharge  Pupils: Pupils are equal, round, and reactive to light  Neck:      Vascular: No carotid bruit  Cardiovascular:      Rate and Rhythm: Normal rate and regular rhythm  Pulses: Normal pulses  Heart sounds: No murmur heard  No friction rub  No gallop  Pulmonary:      Effort: Pulmonary effort is normal  No respiratory distress  Breath sounds: Normal breath sounds  No stridor  No wheezing, rhonchi or rales  Comments: On oxygen cannula  Abdominal:      General: Abdomen is flat  Bowel sounds are normal  There is no distension  Palpations: Abdomen is soft  There is no mass  Tenderness: There is no abdominal tenderness  There is no guarding or rebound  Hernia: No hernia is present  Musculoskeletal:         General: No swelling, tenderness, deformity or signs of injury  Normal range of motion  Cervical back: Normal range of motion  No rigidity  No muscular tenderness  Lymphadenopathy:      Cervical: No cervical adenopathy  Skin:     General: Skin is warm and dry  Capillary Refill: Capillary refill takes less than 2 seconds  Coloration: Skin is not jaundiced or pale  Findings: No bruising or erythema  Neurological:      General: No focal deficit present  Mental Status: He is alert and oriented to person, place, and time  Mental status is at baseline        Cranial Nerves: No cranial nerve deficit  Sensory: No sensory deficit  Motor: No weakness  Coordination: Coordination normal       Deep Tendon Reflexes: Reflexes normal    Psychiatric:         Mood and Affect: Mood normal          Behavior: Behavior normal          Thought Content: Thought content normal          Judgment: Judgment normal            Additional Data:     Labs:    Results from last 7 days   Lab Units 06/12/22  0456   WBC Thousand/uL 9 42   HEMOGLOBIN g/dL 9 2*   HEMATOCRIT % 28 2*   PLATELETS Thousands/uL 137*   NEUTROS PCT % 88*   LYMPHS PCT % 8*   MONOS PCT % 3*   EOS PCT % 0     Results from last 7 days   Lab Units 06/12/22  0456   SODIUM mmol/L 138   POTASSIUM mmol/L 4 9   CHLORIDE mmol/L 101   CO2 mmol/L 29   BUN mg/dL 31*   CREATININE mg/dL 1 23   ANION GAP mmol/L 8   CALCIUM mg/dL 8 6   ALBUMIN g/dL 2 4*   TOTAL BILIRUBIN mg/dL 0 59   ALK PHOS U/L 56   ALT U/L 44   AST U/L 31   GLUCOSE RANDOM mg/dL 118     Results from last 7 days   Lab Units 06/08/22  1523   INR  1 15             Results from last 7 days   Lab Units 06/12/22  0456 06/08/22  1523   LACTIC ACID mmol/L  --  1 8   PROCALCITONIN ng/ml 0 77* 0 19           * I Have Reviewed All Lab Data Listed Above  * Additional Pertinent [facility] Tests Reviewed: [facility]        Recent Cultures (last 7 days):     Results from last 7 days   Lab Units 06/08/22  1523   BLOOD CULTURE  No Growth at 72 hrs  No Growth at 72 hrs         Last 24 Hours Medication List:   Current Facility-Administered Medications   Medication Dose Route Frequency Provider Last Rate    ascorbic acid  500 mg Oral Daily Steven Funes MD      benzonatate  100 mg Oral TID PRN Steven Funes MD      cefTRIAXone  1,000 mg Intravenous Q24H Steven Funes MD 1,000 mg (06/11/22 1153)    cholecalciferol  1,000 Units Oral Daily Steven Funes MD      dexamethasone  0 1 mg/kg Intravenous Q12H Stveen Funes MD      docusate sodium  100 mg Oral BID Otilio Campos MD      doxycycline hyclate  100 mg Oral [address], [place]      enoxaparin  1 mg/kg Subcutaneous Q12H [facility] Otilio Campos MD      famotidine  20 mg Oral Daily Otilio Campos MD      senna  2 tablet Oral HS Otilio Campos MD      zinc sulfate  220 mg Oral Daily Otilio Campos MD          Today, Patient Was Seen By: Otilio Campos MD    ** Please Note: Dictation voice to text software may have been used in the creation of this document   **

## 2022-06-12 NOTE — ACP (ADVANCE CARE PLANNING)
Serious Illness Conversation    1  What is your understanding now of where you are with your illness? Prognostic Understanding: appropriate understanding of prognosis    Patient does have pulmonary fibrosis to begin with, on oxygen, and now he has COVID-19 as well  Patient has expressed wishes in different occasions of not willing to undergo any further treatment  He understands that his prognosis is overall poor given background of pulmonary fibrosis  He does have interest in staying comfortable not been hospitalized anymore  He does not want to go to a facility  Patient states that he would like to die at home  I have discussed this with wife and she verbalizes understanding  2  How much information about what is likely to be ahead with your illness would you like to have? Information: patient wants to be fully informed  Patient wishes to be full informed  What abilities are so critical to your life that you cannot imagine living without them? [place]       If you become sicker, how much are you willing to go through for the possibility of gaining more time? No     How much does your proxy and family know about your priorities and wishes? Patient is talking to family     [place] Card heard you say that comfort is really important to you  Keeping that in mind, and what we know about your illness, I recommend that we focus on keeping you comfortable as you wish  This will help us make sure that your treatment plans reflect whats important to you  How does this plan sound to you? I will do everything I can to help you through this    Patient verbalized understanding of the plan     I have spent 45 minutes speaking with my patient on advanced care planning today or during this visit     Advanced directives:  DNR/DNI

## 2022-06-12 NOTE — PLAN OF CARE
Problem: MOBILITY - ADULT  Goal: Maintain or return to baseline ADL function  Description: INTERVENTIONS:  -  Assess patient's ability to carry out ADLs; assess patient's baseline for ADL function and identify physical deficits which impact ability to perform ADLs (bathing, care of mouth/teeth, toileting, grooming, dressing, etc )  - Assess/evaluate cause of self-care deficits   - Assess range of motion  - Assess patient's mobility; develop plan if impaired  - Assess patient's need for assistive devices and provide as appropriate  - Encourage maximum independence but intervene and supervise when necessary  - Involve family in performance of ADLs  - Assess for home care needs following discharge   - Consider OT consult to assist with ADL evaluation and planning for discharge  - Provide patient education as appropriate  6/12/2022 0335 by Tariq Snyder RN  Outcome: Progressing  6/12/2022 0333 by Tariq Snyder RN  Outcome: Progressing  Goal: Maintains/Returns to pre admission functional level  Description: INTERVENTIONS:  - Perform BMAT or MOVE assessment daily    - Set and communicate daily mobility goal to care team and patient/family/caregiver  - Collaborate with rehabilitation services on mobility goals if consulted  - Perform Range of Motion  times a day  - Reposition patient every  hours    - Dangle patient  times a day  - Stand patient  times a day  - Ambulate patient  times a day  - Out of bed to chair  times a day   - Out of bed for meals  times a day  - Out of bed for toileting  - Record patient progress and toleration of activity level   6/12/2022 0335 by Tariq Snyder RN  Outcome: Progressing  6/12/2022 0333 by Tariq nSyder RN  Outcome: Progressing     Problem: Potential for Falls  Goal: Patient will remain free of falls  Description: INTERVENTIONS:  - Educate patient/family on patient safety including physical limitations  - Instruct patient to call for assistance with activity   - Consult OT/PT to assist with strengthening/mobility   - Keep Call bell within reach  - Keep bed low and locked with side rails adjusted as appropriate  - Keep care items and personal belongings within reach  - Initiate and maintain comfort rounds  - Make Fall Risk Sign visible to staff  - Offer Toileting every  Hours, in advance of need  - Initiate/Maintain alarm  - Obtain necessary fall risk management equipment  - Apply yellow socks and bracelet for high fall risk patients  - Consider moving patient to room near nurses station  6/12/2022 0335 by Jevon Pan RN  Outcome: Progressing  6/12/2022 0333 by Jevon Pan RN  Outcome: Progressing     Problem: RESPIRATORY - ADULT  Goal: Achieves optimal ventilation and oxygenation  Description: INTERVENTIONS:  - Assess for changes in respiratory status  - Assess for changes in mentation and behavior  - Position to facilitate oxygenation and minimize respiratory effort  - Oxygen administered by appropriate delivery if ordered  - Initiate smoking cessation education as indicated  - Encourage broncho-pulmonary hygiene including cough, deep breathe, Incentive Spirometry  - Assess the need for suctioning and aspirate as needed  - Assess and instruct to report SOB or any respiratory difficulty  - Respiratory Therapy support as indicated  6/12/2022 0335 by Jevon Pan RN  Outcome: Progressing  6/12/2022 0333 by Jevon Pan RN  Outcome: Progressing     Problem: COPING  Goal: Pt/Family able to verbalize concerns and demonstrate effective coping strategies  Description: INTERVENTIONS:  - Assist patient/family to identify coping skills, available support systems and cultural and spiritual values  - Provide emotional support, including active listening and acknowledgement of concerns of patient and caregivers  - Reduce environmental stimuli, as able  - Provide patient education  - Assess for spiritual pain/suffering and initiate spiritual care, including notification of [facility] or [place] based community as needed  - Assess effectiveness of coping strategies  Outcome: Progressing  Goal: Will report anxiety at manageable levels  Description: INTERVENTIONS:  - Administer medication as ordered  - Teach and encourage coping skills  - Provide emotional support  - Assess patient/family for anxiety and ability to cope  Outcome: Progressing

## 2022-06-12 NOTE — PLAN OF CARE
Problem: MOBILITY - ADULT  Goal: Maintain or return to baseline ADL function  Description: INTERVENTIONS:  -  Assess patient's ability to carry out ADLs; assess patient's baseline for ADL function and identify physical deficits which impact ability to perform ADLs (bathing, care of mouth/teeth, toileting, grooming, dressing, etc )  - Assess/evaluate cause of self-care deficits   - Assess range of motion  - Assess patient's mobility; develop plan if impaired  - Assess patient's need for assistive devices and provide as appropriate  - Encourage maximum independence but intervene and supervise when necessary  - Involve family in performance of ADLs  - Assess for home care needs following discharge   - Consider OT consult to assist with ADL evaluation and planning for discharge  - Provide patient education as appropriate  Outcome: Progressing  Goal: Maintains/Returns to pre admission functional level  Description: INTERVENTIONS:  - Perform BMAT or MOVE assessment daily    - Set and communicate daily mobility goal to care team and patient/family/caregiver  - Collaborate with rehabilitation services on mobility goals if consulted  - Perform Range of Motion  times a day  - Reposition patient every  hours    - Dangle patien times a day  - Stand patient  times a day  - Ambulate patient  times a day  - Out of bed to chair  times a day   - Out of bed for meals  times a day  - Out of bed for toileting  - Record patient progress and toleration of activity level   Outcome: Progressing     Problem: Potential for Falls  Goal: Patient will remain free of falls  Description: INTERVENTIONS:  - Educate patient/family on patient safety including physical limitations  - Instruct patient to call for assistance with activity   - Consult OT/PT to assist with strengthening/mobility   - Keep Call bell within reach  - Keep bed low and locked with side rails adjusted as appropriate  - Keep care items and personal belongings within reach  - Initiate and maintain comfort rounds  - Make Fall Risk Sign visible to staff  - Offer Toileting every Hours, in advance of need  - Initiate/Maintain alarm  - Obtain necessary fall risk management equipment:  - Apply yellow socks and bracelet for high fall risk patients  - Consider moving patient to room near nurses station  Outcome: Progressing     Problem: RESPIRATORY - ADULT  Goal: Achieves optimal ventilation and oxygenation  Description: INTERVENTIONS:  - Assess for changes in respiratory status  - Assess for changes in mentation and behavior  - Position to facilitate oxygenation and minimize respiratory effort  - Oxygen administered by appropriate delivery if ordered  - Initiate smoking cessation education as indicated  - Encourage broncho-pulmonary hygiene including cough, deep breathe, Incentive Spirometry  - Assess the need for suctioning and aspirate as needed  - Assess and instruct to report SOB or any respiratory difficulty  - Respiratory Therapy support as indicated  Outcome: Progressing     Problem: COPING  Goal: Pt/Family able to verbalize concerns and demonstrate effective coping strategies  Description: INTERVENTIONS:  - Assist patient/family to identify coping skills, available support systems and cultural and spiritual values  - Provide emotional support, including active listening and acknowledgement of concerns of patient and caregivers  - Reduce environmental stimuli, as able  - Provide patient education  - Assess for spiritual pain/suffering and initiate spiritual care, including notification of [facility] or [place] based community as needed  - Assess effectiveness of coping strategies  Outcome: Progressing  Goal: Will report anxiety at manageable levels  Description: INTERVENTIONS:  - Administer medication as ordered  - Teach and encourage coping skills  - Provide emotional support  - Assess patient/family for anxiety and ability to cope  Outcome: Progressing

## 2022-06-13 NOTE — ASSESSMENT & PLAN NOTE
· Requires 2-3L O2 at baseline due to pulmonary fibrosis   · Patient was previously on high-flow nasal cannula, this is likely a combination of COVID-19 pneumonia on top of his pulmonary fibrosis    Continue treatment for COVID-19 until discharge  Continue vitamin-C, D, zinc  Patient is status post tocilizumab on 06/08  Continue anticoagulation with lovenox  Continue dexamethasone weight based  Discontinue antibiotics ceftriaxone doxycycline given the fact he is going on hospice  Continue to attempt to wean oxygen - patient requiring less oxygen today    Patient has significantly improved getting back to his baseline oxygen requirement  Despite that he does have pulmonary fibrosis and is O2 dependent to begin with, having an overall poor prognosis and high chance of readmission  I have had a prolonged discussion with him in different occasions, have talked to wife as well  Patient does not wish to return to hospital and does not wish to continue any further escalation of treatment if needed  He does mention interesting going home hospice  I have discussed this with wife in detail  She does mention that she would be agreeing with his wishes  Case management on board, consult for hospice in place  Arrangements in progress  Tentative discharge to home hospice on 6/14 if all arranged

## 2022-06-13 NOTE — PROGRESS NOTES
[address] [facility]  Progress Note - Jose Montenegrocal 1933, 80 y o  male MRN: [id number]  Unit/Bed#: MS [facility] Encounter: [id number]  Primary Care Provider: Leni Marcus MD   Date and time admitted to hospital: 6/8/2022  2:13 PM    * Acute on chronic respiratory failure ([facility] [address] )  Assessment & Plan  · Requires 2-3L O2 at baseline due to pulmonary fibrosis   · Patient was previously on high-flow nasal cannula, this is likely a combination of COVID-19 pneumonia on top of his pulmonary fibrosis    Continue treatment for COVID-19 until discharge  Continue vitamin-C, D, zinc  Patient is status post tocilizumab on 06/08  Continue anticoagulation with lovenox  Continue dexamethasone weight based  Discontinue antibiotics ceftriaxone doxycycline given the fact he is going on hospice  Continue to attempt to wean oxygen - patient requiring less oxygen today    Patient has significantly improved getting back to his baseline oxygen requirement  Despite that he does have pulmonary fibrosis and is O2 dependent to begin with, having an overall poor prognosis and high chance of readmission  I have had a prolonged discussion with him in different occasions, have talked to wife as well  Patient does not wish to return to hospital and does not wish to continue any further escalation of treatment if needed  He does mention interesting going home hospice  I have discussed this with wife in detail  She does mention that she would be agreeing with his wishes  Case management on board, consult for hospice in place  Arrangements in progress  Tentative discharge to home hospice on 6/14 if all arranged  Viral sepsis ([facility] [address] )  Assessment & Plan    In the setting of COVID 19 as evidenced by  fever(temp)-101 5 and [id number]    Pneumonia due to COVID-19 virus  Assessment & Plan  Recently hospitalized for COVID pneumonia, received IV remdesivir/decadron x3days and was discharged 06/02 with prednisone taper  Present with worsening O2 requirements, need for HFNC  CT scan w/o contrast reveals mild diffuse GGO which may represent pulm edema vs atypical infection vs ILD  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 plaques, 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     Continue treatment as mentioned above  Prognosis guarded  Over 10 days of symptomatic infection, will lift restrictions given that and patient going towards hospice status  Protein calorie malnutrition ([address] )  Assessment & Plan  Malnutrition Findings:           Patient does have decreased albumin and a BMI of 17  This is due to protein calorie malnutrition  Obtain nutritionist consult                    BMI Findings: Body mass index is 17 07 kg/m²  Physical deconditioning  Assessment & Plan  Once respiratory status improves will obtain PT/OT evaluation    VTE Pharmacologic Prophylaxis:   Pharmacologic: Enoxaparin (Lovenox)  Mechanical VTE Prophylaxis in Place: Yes    Patient Centered Rounds: I have performed bedside rounds with nursing staff today  Discussions with Specialists or Other Care Team Provider:   Discussed with care management team    Education and Discussions with Family / Patient:  Attempted to call wife, she did not     Time Spent for Care: 45 minutes  More than 50% of total time spent on counseling and coordination of care as described above  Current Length of Stay: 5 day(s)    Current Patient Status: Inpatient   Certification Statement: The patient will continue to require additional inpatient hospital stay due to Need for hospice arrangements    Discharge Plan:  Once stable    Code Status: Level 3 - DNAR and DNI      Subjective:     Patient evaluated this morning  He appears to be in good spirits  Still wishes to go home on home hospice  Denies any nausea vomiting, diarrhea constipation  No other events reported      Objective:     Vitals:   Temp (24hrs), Av 7 °F (37 1 °C), Min:98 7 °F (37 1 °C), Max:98 7 °F (37 1 °C)    Temp:  [98 7 °F (37 1 °C)] 98 7 °F (37 1 °C)  HR:  [66-71] 66  Resp:  [18] 18  BP: (109-122)/(44-50) 109/44  SpO2:  [90 %-97 %] 96 %  Body mass index is 17 07 kg/m²  Input and Output Summary (last 24 hours): Intake/Output Summary (Last 24 hours) at 2022 1626  Last data filed at 2022 1301  Gross per 24 hour   Intake --   Output 225 ml   Net -225 ml       Physical Exam:     Physical Exam  Vitals and nursing note reviewed  Constitutional:       Appearance: Normal appearance  Comments: Male patient in bed, awake   HENT:      Head: Normocephalic and atraumatic  Right Ear: External ear normal       Left Ear: External ear normal       Nose: Nose normal  No congestion or rhinorrhea  Mouth/Throat:      Mouth: Mucous membranes are moist       Pharynx: Oropharynx is clear  No oropharyngeal exudate or posterior oropharyngeal erythema  Eyes:      General: No scleral icterus  Right eye: No discharge  Left eye: No discharge  Pupils: Pupils are equal, round, and reactive to light  Neck:      Vascular: No carotid bruit  Cardiovascular:      Rate and Rhythm: Normal rate and regular rhythm  Pulses: Normal pulses  Heart sounds: No murmur heard  No friction rub  No gallop  Pulmonary:      Effort: Pulmonary effort is normal  No respiratory distress  Breath sounds: Normal breath sounds  No stridor  No wheezing, rhonchi or rales  Comments: On oxygen cannula  Abdominal:      General: Abdomen is flat  Bowel sounds are normal  There is no distension  Palpations: Abdomen is soft  There is no mass  Tenderness: There is no abdominal tenderness  There is no guarding or rebound  Hernia: No hernia is present  Musculoskeletal:         General: No swelling, tenderness, deformity or signs of injury  Normal range of motion  Cervical back: Normal range of motion   No rigidity  No muscular tenderness  Lymphadenopathy:      Cervical: No cervical adenopathy  Skin:     General: Skin is warm and dry  Capillary Refill: Capillary refill takes less than 2 seconds  Coloration: Skin is not jaundiced or pale  Findings: No bruising or erythema  Neurological:      General: No focal deficit present  Mental Status: He is alert and oriented to person, place, and time  Mental status is at baseline  Cranial Nerves: No cranial nerve deficit  Sensory: No sensory deficit  Motor: No weakness  Coordination: Coordination normal       Deep Tendon Reflexes: Reflexes normal    Psychiatric:         Mood and Affect: Mood normal          Behavior: Behavior normal          Thought Content: Thought content normal          Judgment: Judgment normal            Additional Data:     Labs:    Results from last 7 days   Lab Units 06/13/22  0433   WBC Thousand/uL 8 27   HEMOGLOBIN g/dL 9 8*   HEMATOCRIT % 30 9*   PLATELETS Thousands/uL 129*   NEUTROS PCT % 88*   LYMPHS PCT % 8*   MONOS PCT % 3*   EOS PCT % 0     Results from last 7 days   Lab Units 06/13/22  0433 06/12/22  0456   SODIUM mmol/L 138 138   POTASSIUM mmol/L 4 5 4 9   CHLORIDE mmol/L 103 101   CO2 mmol/L 27 29   BUN mg/dL 32* 31*   CREATININE mg/dL 1 25 1 23   ANION GAP mmol/L 8 8   CALCIUM mg/dL 8 6 8 6   ALBUMIN g/dL  --  2 4*   TOTAL BILIRUBIN mg/dL  --  0 59   ALK PHOS U/L  --  56   ALT U/L  --  44   AST U/L  --  31   GLUCOSE RANDOM mg/dL 119 118     Results from last 7 days   Lab Units 06/08/22  1523   INR  1 15             Results from last 7 days   Lab Units 06/12/22  0456 06/08/22  1523   LACTIC ACID mmol/L  --  1 8   PROCALCITONIN ng/ml 0 77* 0 19           * I Have Reviewed All Lab Data Listed Above  * Additional Pertinent Lab Tests Reviewed:  [address] Admission Reviewed      Recent Cultures (last 7 days):     Results from last 7 days   Lab Units 06/08/22  1523   BLOOD CULTURE No Growth After 4 Days  No Growth After 4 Days  Last 24 Hours Medication List:   Current Facility-Administered Medications   Medication Dose Route Frequency Provider Last Rate    ascorbic acid  500 mg Oral Daily Jessika Mukherjee MD      benzonatate  100 mg Oral TID PRN Jessika Mukherjee MD      cholecalciferol  1,000 Units Oral Daily Jessika Mukherjee MD      dexamethasone  0 1 mg/kg Intravenous Q12H Jessika Mukherjee MD      docusate sodium  100 mg Oral BID Jessika Mukherjee MD      enoxaparin  1 mg/kg Subcutaneous Q12H [address]      famotidine  20 mg Oral Daily Jessika Mukherjee MD      senna  2 tablet Oral HS Jessika Mukherjee MD      zinc sulfate  220 mg Oral Daily Jessika Mukherjee MD          Today, Patient Was Seen By: Jessika Mukherjee MD    ** Please Note: Dictation voice to text software may have been used in the creation of this document   **

## 2022-06-13 NOTE — ASSESSMENT & PLAN NOTE
Malnutrition Findings:           Patient does have decreased albumin and a BMI of 17  This is due to protein calorie malnutrition  Obtain nutritionist consult                    BMI Findings: Body mass index is 17 07 kg/m²

## 2022-06-13 NOTE — CASE COMMUNICATION
Gricel Hayden is an  81 yo male who [place] [facility] [place] [facility] with SOB  Pt was recently admitted for COVID on 5-31-22; now having worsening SOB  He was feeling better over the past week but suddenly had worsening symptoms of cough and fever  Pt felt fatigued and couldn't get out of bed  Increased his home O2 from 2L up to 3L  He has been using all prescribed meds as instructed  Pt has difficulty walking short distances due to his dy spnea  Pt has PCM as evidenced by 15% weight loss last 7 months, hollow supraclavicular space, wasted temporalis, sunken orbital, visible ribs, BMI is 18 5  Hospital diagnosis is acute on chronic respitory failure  PMH of pulmonary fibrosis  PPS 30   Requesting RLOC

## 2022-06-13 NOTE — CASE MANAGEMENT
Case Management Discharge Planning Note    Patient name Marielle Yang  Location [address] 230/-01 MRN [id number]  : 1933 Date 2022       Current Admission Date: 2022  Current Admission Diagnosis:Acute on chronic respiratory failure [facility])   Patient Active Problem List    Diagnosis Date Noted    Viral sepsis ([facility] [address] ) 2022    Acute on chronic respiratory failure ([facility] [address] ) 2022    Pneumonia due to COVID-19 virus 2022    Sepsis due to COVID-19 [facility]) 2022    [facility] 20[facility]    Elevated brain natriuretic peptide (BNP) level 2022    LADY (acute kidney injury) ([facility] [address] ) 2022    Dizziness 2022    Acute respiratory failure with hypoxia ([facility] [address] ) 2022    Chronic heart failure with preserved ejection fraction [facility] 2022    Pulmonary fibrosis ([facility] 2022    Dysphagia 2020    Leukocytosis 2020    Proteinuria 2020    Shortness of breath 2020    Pulmonary infiltrates 2020    Interstitial lung disease ([facility] [address] ) 2020    Fatigue 2020    Fall at home 2020    Unintentional weight loss 2019    Encounter for immunization 2019    Medicare annual wellness visit, subsequent 2019    Atherosclerosis of artery of extremity with ulceration ([facility] [address] ) 2019    Phantom pain after amputation of lower extremity ([facility] [address] ) 2019    Protein calorie malnutrition ([facility] [address] ) 2019    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 2019    Carpal tunnel syndrome 2019    Decubitus ulcer of right heel, unstageable ([facility] [address] ) 2019    Ambulatory dysfunction 10/29/2018    Physical deconditioning 10/29/2018    CKD (chronic kidney disease), stage III ([address] ) 2018    Severe peripheral arterial disease ([facility] [address] ) 2018    Bifascicular block 10/31/2017    Chronic fatigue 10/31/2017    Vitamin D deficiency 10/25/2017    Anemia 10/15/2017    Unstable gait 81/35/0109    Lichen simplex chronicus 07/10/2017    Seborrheic keratosis 07/10/2017    Change in multiple pigmented skin lesions 05/16/2017    Impaired fasting glucose 03/07/2017    Hyperlipidemia 07/14/2016    Chronic lower back pain 01/14/2016    Generalized osteoarthritis 01/14/2016    Herniated lumbar intervertebral disc 01/14/2016    Rotator cuff tear arthropathy, right 01/14/2016      LOS (days): 5  Geometric Mean LOS (GMLOS) (days): 4 80  Days to GMLOS:-0 1     OBJECTIVE:  Risk of Unplanned Readmission Score: 21 91         Current admission status: Inpatient   Preferred Pharmacy:   [address], [address] [id number]  Phone: [phone] Fax: [phone]    Primary Care Provider: Josef Fairbanks MD    Primary Insurance: [facility]  Secondary Insurance:     DISCHARGE DETAILS:                                          Other Referral/Resources/Interventions Provided:  Interventions: Hospice  Referral Comments: [facility] liaison asking for transport to be set up tomorrow  Referral placed-awaiting transport time              Discharge Destination Plan[de-identified] Hospice  Transport at Discharge : BLS Ambulance           ETA of Transport (Date): 06/14/22

## 2022-06-13 NOTE — PLAN OF CARE
Problem: MOBILITY - ADULT  Goal: Maintain or return to baseline ADL function  Description: INTERVENTIONS:  -  Assess patient's ability to carry out ADLs; assess patient's baseline for ADL function and identify physical deficits which impact ability to perform ADLs (bathing, care of mouth/teeth, toileting, grooming, dressing, etc )  - Assess/evaluate cause of self-care deficits   - Assess range of motion  - Assess patient's mobility; develop plan if impaired  - Assess patient's need for assistive devices and provide as appropriate  - Encourage maximum independence but intervene and supervise when necessary  - Involve family in performance of ADLs  - Assess for home care needs following discharge   - Consider OT consult to assist with ADL evaluation and planning for discharge  - Provide patient education as appropriate  Outcome: Progressing  Goal: Maintains/Returns to pre admission functional level  Description: INTERVENTIONS:  - Perform BMAT or MOVE assessment daily    - Set and communicate daily mobility goal to care team and patient/family/caregiver  - Collaborate with rehabilitation services on mobility goals if consulted  - Perform Range of Motion  times a day  - Reposition patient every  hours    - Dangle patient  times a day  - Stand patient  times a day  - Ambulate patient  times a day  - Out of bed to chair  times a day   - Out of bed for meals  times a day  - Out of bed for toileting  - Record patient progress and toleration of activity level   Outcome: Progressing     Problem: Potential for Falls  Goal: Patient will remain free of falls  Description: INTERVENTIONS:  - Educate patient/family on patient safety including physical limitations  - Instruct patient to call for assistance with activity   - Consult OT/PT to assist with strengthening/mobility   - Keep Call bell within reach  - Keep bed low and locked with side rails adjusted as appropriate  - Keep care items and personal belongings within reach  - Initiate and maintain comfort rounds  - Make Fall Risk Sign visible to staff  - Offer Toileting every  Hours, in advance of need  - Initiate/Maintain alarm  - Obtain necessary fall risk management equipment:  - Apply yellow socks and bracelet for high fall risk patients  - Consider moving patient to room near nurses station  Outcome: Progressing     Problem: RESPIRATORY - ADULT  Goal: Achieves optimal ventilation and oxygenation  Description: INTERVENTIONS:  - Assess for changes in respiratory status  - Assess for changes in mentation and behavior  - Position to facilitate oxygenation and minimize respiratory effort  - Oxygen administered by appropriate delivery if ordered  - Initiate smoking cessation education as indicated  - Encourage broncho-pulmonary hygiene including cough, deep breathe, Incentive Spirometry  - Assess the need for suctioning and aspirate as needed  - Assess and instruct to report SOB or any respiratory difficulty  - Respiratory Therapy support as indicated  Outcome: Progressing     Problem: COPING  Goal: Pt/Family able to verbalize concerns and demonstrate effective coping strategies  Description: INTERVENTIONS:  - Assist patient/family to identify coping skills, available support systems and cultural and spiritual values  - Provide emotional support, including active listening and acknowledgement of concerns of patient and caregivers  - Reduce environmental stimuli, as able  - Provide patient education  - Assess for spiritual pain/suffering and initiate spiritual care, including notification of Pastoral Care or josie based community as needed  - Assess effectiveness of coping strategies  Outcome: Progressing  Goal: Will report anxiety at manageable levels  Description: INTERVENTIONS:  - Administer medication as ordered  - Teach and encourage coping skills  - Provide emotional support  - Assess patient/family for anxiety and ability to cope  Outcome: Progressing

## 2022-06-13 NOTE — CASE MANAGEMENT
Case Management Discharge Planning Note    Patient name Brandin Barajas  Location [address] 230/-01 MRN [id number]  : 1933 Date 2022       Current Admission Date: 2022  Current Admission Diagnosis:Acute on chronic respiratory failure [facility])   Patient Active Problem List    Diagnosis Date Noted    Viral sepsis ([facility] [address] ) 2022    [facility] 2022    Pneumonia due to COVID-19 virus 2022    Sepsis due to COVID-19 [facility]) 2022    Chronic respiratory failure ([facility] ) 2022    Elevated brain natriuretic peptide (BNP) level 2022    LADY (acute kidney injury) ([facility] ) 2022    Dizziness 2022    Acute respiratory failure with hypoxia ([facility] ) 2022    Chronic heart failure with preserved ejection fraction ([facility] ) 2022    Pulmonary fibrosis ([facility] ) 2022    Dysphagia 2020    Leukocytosis 2020    Proteinuria 2020    Shortness of breath 2020    Pulmonary infiltrates 2020    Interstitial lung disease ([facility] [address] ) 2020    Fatigue 2020    Fall at home 2020    Unintentional weight loss 2019    Encounter for immunization 2019    Medicare annual wellness visit, subsequent 2019    Atherosclerosis of artery of extremity with ulceration ([facility] [address] ) 2019    Phantom pain after amputation of lower extremity ([facility] [address] ) 2019    Protein calorie malnutrition ([facility] [address] ) 2019    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 2019    Carpal tunnel syndrome 2019    Decubitus ulcer of right heel, unstageable ([facility] [address] ) 2019    Ambulatory dysfunction 10/29/2018    Physical deconditioning 10/29/2018    CKD (chronic kidney disease), stage III ([facility] [address] ) 2018    Severe peripheral arterial disease ([facility] [address] ) 2018    Bifascicular block 10/31/2017    Chronic fatigue 10/31/2017    Vitamin D deficiency 10/25/2017    Anemia 10/15/2017    Unstable gait 31/46/1194    Lichen simplex chronicus 07/10/2017    Seborrheic keratosis 07/10/2017    Change in multiple pigmented skin lesions 05/16/2017    Impaired fasting glucose 03/07/2017    Hyperlipidemia 07/14/2016    Chronic lower back pain 01/14/2016    Generalized osteoarthritis 01/14/2016    Herniated lumbar intervertebral disc 01/14/2016    Rotator cuff tear arthropathy, right 01/14/2016      LOS (days): 5  Geometric Mean LOS (GMLOS) (days): 5 40  Days to GMLOS:0 7     OBJECTIVE:  Risk of Unplanned Readmission Score: 21 26         Current admission status: Inpatient   Preferred Pharmacy:   [address], [address] [facility] [id number] [place] [address]  Phone: [phone] Fax: [phone]    Primary Care Provider: Amado Valenzuela MD    Primary Insurance: [facility]  Secondary Insurance:     DISCHARGE DETAILS:                                          Other Referral/Resources/Interventions Provided:  Interventions: Hospice  Referral Comments: Pt and his family have decided on home hospice  Referral placed to [facility] & [facility] to see if they can evaluate and accept  Also to let CM know when equipment could be delivered if they can accept    CM will continue to follow    Would you like to participate in our [address] service program?  : No - Declined       Discharge Destination Plan[de-identified] Hospice  Transport at Discharge : BLS Ambulance

## 2022-06-13 NOTE — QUICK NOTE
Discharge summary to be used once patient leaves    ============    [address]  Discharge- Wilda Speed 1933, 80 y o  male MRN: [id number]  Unit/Bed#: [facility]-01 Encounter: [id number]  Primary Care Provider: Mamadou Montoya MD   Date and time admitted to hospital: 6/8/2022  2:13 PM    Discharge Diagnosis:    Acute on chronic respiratory failure with hypoxia  COVID-19 pneumonia  Pulmonary fibrosis, chronically on oxygen - patient wishes for home hospice  Viral sepsis  Protein calorie malnutrition  Physical deconditioning    Discharging Physician / Practitioner: Amanda Sandoval MD  PCP: Mamadou Montoya MD  Admission Date:   Admission Orders (From admission, onward)     Ordered        06/08/22 1740  Inpatient Admission  Once                      Discharge Date: 06/13/22        Significant Findings / Test Results:   CT chest without contrast [[id number]] Collected: 06/08/22 1657   Order Status: Completed Updated: 06/08/22 1707   Narrative:     CT CHEST WITHOUT IV CONTRAST     INDICATION:   sob   Covid positive on 5/31/2022  COMPARISON:  Chest CT from 5/16/2022, 2/28/2022, and 10/5/2019  TECHNIQUE: Chest CT without intravenous contrast   Axial, sagittal, coronal 2D reformats and coronal MIPS from source data  Radiation dose length product (DLP):  776 mGy-cm   Radiation dose exposure minimized using iterative reconstruction and automated exposure control  FINDINGS:     LUNGS:  Mild diffuse groundglass opacity  Redemonstration of moderate UIP pattern of pulmonary fibrosis with mid and lower lung predominant reticulation, traction bronchiectasis/bronchiolectasis, and honeycombing  Benign biapical pleural-parenchymal scar, calcified  AIRWAYS: No significant filling defects  PLEURA:  A few calcified pleural plaques  HEART/GREAT VESSELS:  Mild cardiomegaly  Pulmonary artery enlargement  Severe coronary artery calcification indicating atherosclerotic heart disease  MEDIASTINUM AND APOLINAR:  Unremarkable  CHEST WALL AND LOWER NECK: Unremarkable  UPPER ABDOMEN:  Partially imaged hyperdense right renal cyst   Dense calcification of the celiac artery  OSSEOUS STRUCTURES: Mild degenerative disease in the spine with old compression deformities  Impression:       Mild diffuse groundglass opacity which is extremely nonspecific   This could be due to pulmonary edema, atypical infection, or acute exacerbation of interstitial lung disease   The appearance is not typical of the sequela of Covid-19  Redemonstration of UIP pattern of pulmonary fibrosis   Given calcified pleural plaques, this could be due to asbestosis  Pulmonary artery enlargement which can be seen with pulmonary hypertension          Workstation performed: [id number]    XR chest 1 view portable [[id number]] Collected: 06/08/22 1641   Order Status: Completed Updated: 06/08/22 1644   Narrative:     CHEST     INDICATION:   SOB, fever  Patient has confirmed COVID-19  COMPARISON:  5/31/2022     EXAM PERFORMED/VIEWS:  AP CHEST PORTABLE       FINDINGS:     Cardia mediastinal silhouette is stable  Pulmonary fibrotic changes and increasing patchy groundglass densities in the lungs likely representing Covid pneumonia   There appear to be pleural calcifications in the upper portions of the chest bilaterally   No pleural fluid or pneumothorax   appreciated  Osseous structures appear within normal limits for patient age  Impression:       Increasing groundglass infiltrates on a background of pulmonary fibrosis   Findings likely worsening Covid pneumonia  Workstation performed: [id number]    XR chest 2 views [[id number]] Collected: 06/01/22 7785   Order Status: Completed Updated: 06/01/22 0636   Narrative:     CHEST     INDICATION:   SOB  Patient has confirmed COVID-19       COMPARISON:  05/16/2022, 07/27/2020     EXAM PERFORMED/VIEWS:  NU CHEST PA & LATERAL   Images: 2     FINDINGS: Cardiomediastinal silhouette appears enlarged  Pulmonary fibrosis is present ]  Groundglass opacities are present  Osseous structures appear within normal limits for patient age  Impression:         1   Pulmonary fibrosis  2   Added groundglass opacities, correlate for heart failure and/or pneumonia including Covid 19 infection  Outpatient follow-up Requested:  · Hospice    Complications:  None    Reason for Admission: SOB, hypoxia  Hospital Course:     Patient was hospitalized with shortness of breath and worse of his baseline hypoxemia  This was believed to be secondary to COVID-19 pneumonia  Patient does have a history of pulmonary fibrosis which already complicates his respiratory status  He was requiring very high amounts of oxygen high-flow nasal cannula initially  He was treated with IV steroids, Actemra, anticoagulation, antibiotics  His oxygen improved currently back around 5-6 L  After prolonged discussion with patient he decided that he does not wish to continue any aggressive treatment for this  He also does not wish to be hospitalized or institutionalized  Patient started oriented x4  We have discussed the concept of home hospice  Patient wishes to go home hospice  Case management on board, they have made arrangements for home hospice  Patient being discharged to home hospice  Condition at Discharge: poor     Discharge Day Visit / Exam:     Subjective:    Patient evaluated this AM   On oxygen, some respiratory distress but appears comfortable and in peace overall, AAOx4, wishes to go home  Denies n/v/d/c    Vitals: Blood Pressure: (!) 109/44 (06/13/22 1459)  Pulse: 66 (06/13/22 1459)  Temperature: 98 7 °F (37 1 °C) (06/13/22 1459)  Temp Source: Oral (06/13/22 1459)  Respirations: 18 (06/13/22 1459)  Height: 5' 10" (177 8 cm) (06/12/22 0246)  Weight - Scale: 54 kg (119 lb) (06/12/22 0246)  SpO2: 96 % (06/13/22 1459)     Exam:   Physical Exam  Vitals and nursing note reviewed  Constitutional:       Appearance: Normal appearance  He is ill-appearing  Comments: Male in bed, awake, on O2 cannula   HENT:      Head: Normocephalic and atraumatic  Right Ear: External ear normal       Left Ear: External ear normal       Nose: Nose normal  No congestion or rhinorrhea  Mouth/Throat:      Mouth: Mucous membranes are moist       Pharynx: Oropharynx is clear  No oropharyngeal exudate or posterior oropharyngeal erythema  Eyes:      General: No scleral icterus  Right eye: No discharge  Left eye: No discharge  Pupils: Pupils are equal, round, and reactive to light  Neck:      Vascular: No carotid bruit  Cardiovascular:      Rate and Rhythm: Normal rate and regular rhythm  Pulses: Normal pulses  Heart sounds: No murmur heard  No friction rub  No gallop  Pulmonary:      Effort: Pulmonary effort is normal  No respiratory distress  Breath sounds: Normal breath sounds  No stridor  No wheezing, rhonchi or rales  Abdominal:      General: Abdomen is flat  Bowel sounds are normal  There is no distension  Palpations: Abdomen is soft  There is no mass  Tenderness: There is no abdominal tenderness  There is no guarding or rebound  Hernia: No hernia is present  Musculoskeletal:         General: No swelling, tenderness, deformity or signs of injury  Normal range of motion  Cervical back: Normal range of motion  No rigidity  No muscular tenderness  Lymphadenopathy:      Cervical: No cervical adenopathy  Skin:     General: Skin is warm and dry  Capillary Refill: Capillary refill takes less than 2 seconds  Coloration: Skin is not jaundiced or pale  Findings: No bruising or erythema  Neurological:      General: No focal deficit present  Mental Status: He is alert and oriented to person, place, and time  Mental status is at baseline  Cranial Nerves: No cranial nerve deficit        Sensory: No sensory deficit  Motor: No weakness  Coordination: Coordination normal       Deep Tendon Reflexes: Reflexes normal    Psychiatric:         Mood and Affect: Mood normal          Behavior: Behavior normal          Thought Content: Thought content normal          Judgment: Judgment normal            Discussion with Family: Wife aware of discharge planning to go home on home hospice    Discharge instructions/Information to patient and family:   See after visit summary for information provided to patient and family  Provisions for Follow-Up Care:  See after visit summary for information related to follow-up care and any pertinent home health orders  Disposition:     Other: Home hospice    For Discharges to [facility] SNF:   · Not Applicable to this Patient - Not Applicable to this Patient    Planned Readmission: No going home on home hospice     Discharge Statement:  I spent 45 minutes discharging the patient  This time was spent on the day of discharge  I had direct contact with the patient on the day of discharge  Greater than 50% of the total time was spent examining patient, answering all patient questions, arranging and discussing plan of care with patient as well as directly providing post-discharge instructions  Additional time then spent on discharge activities  Discharge Medications:  See after visit summary for reconciled discharge medications provided to patient and family        ** Please Note: This note has been constructed using a voice recognition system **

## 2022-06-14 NOTE — ASSESSMENT & PLAN NOTE
Malnutrition Findings:          Patient does have decreased albumin and a BMI of 17  This is due to protein calorie malnutrition  BMI Findings: Body mass index is 17 07 kg/m²

## 2022-06-14 NOTE — DISCHARGE SUMMARY
[address] [facility]  Discharge- Erik Search 1933, 80 y o  male MRN: [id number]  Unit/Bed#: MS [facility] Encounter: [id number]  Primary Care Provider: Garth Nunez MD   Date and time admitted to hospital: 6/8/2022  2:13 PM    * Acute on chronic respiratory failure [facility])  Assessment & Plan  · Requires 2-3L O2 at baseline due to pulmonary fibrosis   · Patient was previously on high-flow nasal cannula, this is likely a combination of COVID-19 pneumonia on top of his pulmonary fibrosis    · s/p treatment for COVID-19 until discharge  · Patient is status post tocilizumab on 06/08  · s/p anticoagulation with lovenox  · S/p dexamethasone weight based - on prednisone taper at dc  · Discontinued antibiotics ceftriaxone doxycycline given the fact he is going on hospice  · Continue to attempt to wean oxygen - patient on 5 L at DC    - After prolonged discussion with him,  Patient does not wish to return to hospital and does not wish to continue any further escalation of treatment if needed  He will be returning home with hospice today      Viral sepsis [facility])  Assessment & Plan  · In the setting of COVID 19 as evidenced by  fever(temp)-101 5 and [id number] on admission    Pneumonia due to COVID-19 virus  Assessment & Plan  · Recently hospitalized for COVID pneumonia, received IV remdesivir/decadron x3days and was discharged 06/02 with prednisone taper  · Presented with worsening O2 requirements, need for HFNC  · CT scan w/o contrast revealed mild diffuse GGO which may represent pulm edema vs atypical infection vs ILD  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 plaques, 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   · Returning home with hospice today    Protein calorie malnutrition [facility])  Assessment & Plan  Malnutrition Findings:          Patient does have decreased albumin and a BMI of 17  This is due to protein calorie malnutrition  BMI Findings: Body mass index is 17 07 kg/m²  Physical deconditioning  Assessment & Plan  · Returning home with hospice today      Discharging Physician / Practitioner: Cas Hartman MD  PCP: Jil Leyva MD  Admission Date:   Admission Orders (From admission, onward)     Ordered        06/08/22 1740  Inpatient Admission  Once                      Discharge Date: 06/14/22    Disposition:      · Home with hospice    Reason for Admission: hypoxia    Discharge Diagnoses:     Please see assessment and plan section above for further details regarding discharge diagnoses  Medical Problems             Resolved Problems  Date Reviewed: 6/8/2022   None               Medication Adjustments and Discharge Medications:  · Summary of Medication Adjustments made as a result of this hospitalization: see med rec  · Medication Dosing Tapers - Please refer to Discharge Medication List for details on any medication dosing tapers (if applicable to patient)  · Medications being temporarily held (include recommended restart time): see med rec  · Discharge Medication List: See after visit summary for reconciled discharge medications  Diet Recommendations at Discharge:  Diet -        Diet Orders   (From admission, onward)             Start     Ordered    06/09/22 1106  Diet Regular; Regular House  Diet effective now        References:    Nutrtion Support Algorithm Enteral vs  Parenteral   Question Answer Comment   Diet Type Regular    Regular Regular House    RD to adjust diet per protocol? Yes        06/09/22 1105              Hospital Course:     Adan Martin is a 80 y o  male Patient who was hospitalized with shortness of breath and worsening of his baseline hypoxemia  This was believed to be secondary to COVID-19 pneumonia  Patient does have a history of pulmonary fibrosis which already complicates his respiratory status    He was requiring very high amounts of oxygen high-flow nasal cannula initially  He was treated with IV steroids, Actemra, anticoagulation, antibiotics  His oxygen improved currently back around 5-6 L  After prolonged discussion with patient he decided that he does not wish to continue any aggressive treatment for this  He also does not wish to be hospitalized or institutionalized  Patient started oriented x4  We have discussed the concept of home hospice  Patient wishes to go home hospice  Case management on board, they have made arrangements for home hospice  Patient being discharged to home hospice  Condition at Discharge: guarded     Discharge Day Visit / Exam:     Vitals: Blood Pressure: (!) 115/45 (06/14/22 0700)  Pulse: 64 (06/14/22 0700)  Temperature: 97 6 °F (36 4 °C) (06/14/22 0700)  Temp Source: Oral (06/14/22 0700)  Respirations: 20 (06/14/22 0700)  Height: 5' 10" (177 8 cm) (06/12/22 0246)  Weight - Scale: 54 kg (119 lb) (06/12/22 0246)  SpO2: 91 % (06/14/22 0700)  Exam:   Physical Exam    Goals of Care Discussions:  · Code Status at Discharge: Level 4 - Comfort Care    Discharge instructions/Information to patient and family:   See after visit summary section titled Discharge Instructions for information provided to patient and family  Discharge Statement:  I spent 35 minutes discharging the patient  This time was spent on the day of discharge  I had direct contact with the patient on the day of discharge  Greater than 50% of the total time was spent examining patient, answering all patient questions, arranging and discussing plan of care with patient as well as directly providing post-discharge instructions  Additional time then spent on discharge activities      ** Please Note: This note has been constructed using a voice recognition system **

## 2022-06-14 NOTE — CASE MANAGEMENT
Case Management Discharge Planning Note    Patient name Wilda Speed  Location [address] 230[address]01 MRN [id number]  : 1933 Date 2022       Current Admission Date: 2022  Current Admission Diagnosis:Acute on chronic respiratory failure [facility])   Patient Active Problem List    Diagnosis Date Noted    Viral sepsis ([facility] ) 2022    Acute on chronic respiratory failure ([facility] ) 2022    Pneumonia due to COVID-19 virus 2022    Sepsis due to COVID-19 [facility]) 2022    [facility] 20[facility]    Elevated brain natriuretic peptide (BNP) level 2022    LADY (acute kidney injury) ([facility] ) 2022    Dizziness 2022    Acute respiratory failure with hypoxia ([facility] ) 2022    Chronic heart failure with preserved ejection fraction [facility] ) 2022    Pulmonary fibrosis ([facility] ) 2022    Dysphagia 2020    Leukocytosis 2020    Proteinuria 2020    Shortness of breath 2020    Pulmonary infiltrates 2020    Interstitial lung disease ([facility] ) 2020    Fatigue 2020    Fall at home 2020    Unintentional weight loss 2019    Encounter for immunization 2019    Medicare annual wellness visit, subsequent 2019    Atherosclerosis of artery of extremity with ulceration ([facility] ) 2019    Phantom pain after amputation of lower extremity ([facility] ) 2019    Protein calorie malnutrition ([facility] ) 2019    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 2019    Carpal tunnel syndrome 2019    Decubitus ulcer of right heel, unstageable ([facility] ) 2019    Ambulatory dysfunction 10/29/2018    Physical deconditioning 10/29/2018    CKD (chronic kidney disease), stage III ([facility] ) 2018    Severe peripheral arterial disease ([facility] ) 2018    Bifascicular block 10/31/2017    Chronic fatigue 10/31/2017    Vitamin D deficiency 10/25/2017    Anemia 10/15/2017    Unstable gait 52/33/2963    Lichen simplex chronicus 07/10/2017    Seborrheic keratosis 07/10/2017    Change in multiple pigmented skin lesions 05/16/2017    Impaired fasting glucose 03/07/2017    Hyperlipidemia 07/14/2016    Chronic lower back pain 01/14/2016    Generalized osteoarthritis 01/14/2016    Herniated lumbar intervertebral disc 01/14/2016    Rotator cuff tear arthropathy, right 01/14/2016      LOS (days): 6  Geometric Mean LOS (GMLOS) (days): 4 80  Days to GMLOS:-0 9     OBJECTIVE:  Risk of Unplanned Readmission Score: 21 77         Current admission status: Inpatient   Preferred Pharmacy:   [facility]  [address]  Phone: [phone] Fax: [phone]    Primary Care Provider: Adam Hill MD    Primary Insurance: [facility]  Secondary Insurance:     DISCHARGE DETAILS:    Discharge planning discussed with[de-identified] Cristiane Driver [phone] (H)  Freedom of Choice: Yes  Comments - Freedom of Choice: CM DISCUSSED FREEDOM OF CHOICE W/ DESMOND GLASGOW AND INFORMED HER OF PICKUP TIME  TODAY AT THIS MOMENT 1100  NEW PICKUP TIME  CM contacted family/caregiver?: Yes  Were Treatment Team discharge recommendations reviewed with patient/caregiver?: Yes  Did patient/caregiver verbalize understanding of patient care needs?: Yes  Were patient/caregiver advised of the risks associated with not following Treatment Team discharge recommendations?: Yes    Contacts  Patient Contacts: Faiza Rodriguez  Relationship to Patient[de-identified] Family  Contact Method: Phone  Phone Number: [phone]  Reason/Outcome: Emergency Contact, Discharge [address]         Is the patient interested in [address] at discharge?: No    DME Referral Provided  Referral made for DME?: No    Other Referral/Resources/Interventions Provided:  Interventions: Hospice  Referral Comments: SEE NOTES ABOVE  [facility] INFORMED OF PICKUP TIME           Treatment Team Recommendation: Hospice  Discharge Destination Plan[de-identified] Hospice  Transport at Discharge : BLS Ambulance           ETA of Transport (Date): 06/14/22  ETA of Transport (Time): 1100

## 2022-06-14 NOTE — ASSESSMENT & PLAN NOTE
· Requires 2-3L O2 at baseline due to pulmonary fibrosis   · Patient was previously on high-flow nasal cannula, this is likely a combination of COVID-19 pneumonia on top of his pulmonary fibrosis    · s/p treatment for COVID-19 until discharge  · Patient is status post tocilizumab on 06/08  · s/p anticoagulation with lovenox  · S/p dexamethasone weight based - on prednisone taper at dc  · Discontinued antibiotics ceftriaxone doxycycline given the fact he is going on hospice  · Continue to attempt to wean oxygen - patient on 5 L at DC    - After prolonged discussion with him,  Patient does not wish to return to hospital and does not wish to continue any further escalation of treatment if needed   He will be returning home with hospice today

## 2022-06-14 NOTE — ASSESSMENT & PLAN NOTE
· Recently hospitalized for COVID pneumonia, received IV remdesivir/decadron x3days and was discharged 06/02 with prednisone taper  · Presented with worsening O2 requirements, need for HFNC  · CT scan w/o contrast revealed mild diffuse GGO which may represent pulm edema vs atypical infection vs ILD  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 plaques, 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   · Returning home with hospice today

## 2022-06-14 NOTE — PLAN OF CARE
Problem: MOBILITY - ADULT  Goal: Maintain or return to baseline ADL function  Description: INTERVENTIONS:  -  Assess patient's ability to carry out ADLs; assess patient's baseline for ADL function and identify physical deficits which impact ability to perform ADLs (bathing, care of mouth/teeth, toileting, grooming, dressing, etc )  - Assess/evaluate cause of self-care deficits   - Assess range of motion  - Assess patient's mobility; develop plan if impaired  - Assess patient's need for assistive devices and provide as appropriate  - Encourage maximum independence but intervene and supervise when necessary  - Involve family in performance of ADLs  - Assess for home care needs following discharge   - Consider OT consult to assist with ADL evaluation and planning for discharge  - Provide patient education as appropriate  Outcome: Progressing  Goal: Maintains/Returns to pre admission functional level  Description: INTERVENTIONS:  - Perform BMAT or MOVE assessment daily    - Set and communicate daily mobility goal to care team and patient/family/caregiver  - Collaborate with rehabilitation services on mobility goals if consulted  - Perform Range of Motion 2 times a day  - Reposition patient every 2 hours    - Dangle patient 2 times a day  - Stand patient 2 times a day  - Ambulate patient 2 times a day  - Out of bed to chair 2 times a day   - Out of bed for meals 2 times a day  - Out of bed for toileting  - Record patient progress and toleration of activity level   Outcome: Progressing     Problem: Potential for Falls  Goal: Patient will remain free of falls  Description: INTERVENTIONS:  - Educate patient/family on patient safety including physical limitations  - Instruct patient to call for assistance with activity   - Consult OT/PT to assist with strengthening/mobility   - Keep Call bell within reach  - Keep bed low and locked with side rails adjusted as appropriate  - Keep care items and personal belongings within reach  - Initiate and maintain comfort rounds  - Make Fall Risk Sign visible to staff  - Offer Toileting every 2 Hours, in advance of need  - Initiate/Maintain 2alarm  - Obtain necessary fall risk management equipment: 2  - Apply yellow socks and bracelet for high fall risk patients  - Consider moving patient to room near nurses station  Outcome: Progressing     Problem: RESPIRATORY - ADULT  Goal: Achieves optimal ventilation and oxygenation  Description: INTERVENTIONS:  - Assess for changes in respiratory status  - Assess for changes in mentation and behavior  - Position to facilitate oxygenation and minimize respiratory effort  - Oxygen administered by appropriate delivery if ordered  - Initiate smoking cessation education as indicated  - Encourage broncho-pulmonary hygiene including cough, deep breathe, Incentive Spirometry  - Assess the need for suctioning and aspirate as needed  - Assess and instruct to report SOB or any respiratory difficulty  - Respiratory Therapy support as indicated  Outcome: Progressing     Problem: COPING  Goal: Pt/Family able to verbalize concerns and demonstrate effective coping strategies  Description: INTERVENTIONS:  - Assist patient/family to identify coping skills, available support systems and cultural and spiritual values  - Provide emotional support, including active listening and acknowledgement of concerns of patient and caregivers  - Reduce environmental stimuli, as able  - Provide patient education  - Assess for spiritual pain/suffering and initiate spiritual care, including notification of Pastoral Care or josie based community as needed  - Assess effectiveness of coping strategies  Outcome: Progressing  Goal: Will report anxiety at manageable levels  Description: INTERVENTIONS:  - Administer medication as ordered  - Teach and encourage coping skills  - Provide emotional support  - Assess patient/family for anxiety and ability to cope  Outcome: Progressing     Problem: Nutrition/Hydration-ADULT  Goal: Nutrient/Hydration intake appropriate for improving, restoring or maintaining nutritional needs  Description: Monitor and assess patient's nutrition/hydration status for malnutrition  Collaborate with interdisciplinary team and initiate plan and interventions as ordered  Monitor patient's weight and dietary intake as ordered or per policy  Utilize nutrition screening tool and intervene as necessary  Determine patient's food preferences and provide high-protein, high-caloric foods as appropriate       INTERVENTIONS:  - Monitor oral intake, urinary output, labs, and treatment plans  - Assess nutrition and hydration status and recommend course of action  - Evaluate amount of meals eaten  - Assist patient with eating if necessary   - Allow adequate time for meals  - Recommend/ encourage appropriate diets, oral nutritional supplements, and vitamin/mineral supplements  - Order, calculate, and assess calorie counts as needed  - Recommend, monitor, and adjust tube feedings and TPN/PPN based on assessed needs  - Assess need for intravenous fluids  - Provide specific nutrition/hydration education as appropriate  - Include patient/family/caregiver in decisions related to nutrition  Outcome: Progressing     Problem: Prexisting or High Potential for Compromised Skin Integrity  Goal: Skin integrity is maintained or improved  Description: INTERVENTIONS:  - Identify patients at risk for skin breakdown  - Assess and monitor skin integrity  - Assess and monitor nutrition and hydration status  - Monitor labs   - Assess for incontinence   - Turn and reposition patient  - Assist with mobility/ambulation  - Relieve pressure over bony prominences  - Avoid friction and shearing  - Provide appropriate hygiene as needed including keeping skin clean and dry  - Evaluate need for skin moisturizer/barrier cream  - Collaborate with interdisciplinary team   - Patient/family teaching  - Consider wound care consult Outcome: Progressing

## 2022-06-14 NOTE — PLAN OF CARE
Problem: MOBILITY - ADULT  Goal: Maintain or return to baseline ADL function  Description: INTERVENTIONS:  -  Assess patient's ability to carry out ADLs; assess patient's baseline for ADL function and identify physical deficits which impact ability to perform ADLs (bathing, care of mouth/teeth, toileting, grooming, dressing, etc )  - Assess/evaluate cause of self-care deficits   - Assess range of motion  - Assess patient's mobility; develop plan if impaired  - Assess patient's need for assistive devices and provide as appropriate  - Encourage maximum independence but intervene and supervise when necessary  - Involve family in performance of ADLs  - Assess for home care needs following discharge   - Consider OT consult to assist with ADL evaluation and planning for discharge  - Provide patient education as appropriate  6/14/2022 1200 by Hector Patel RN  Outcome: Adequate for Discharge  6/14/2022 1157 by Hector Patel RN  Outcome: Progressing  Goal: Maintains/Returns to pre admission functional level  Description: INTERVENTIONS:  - Perform BMAT or MOVE assessment daily    - Set and communicate daily mobility goal to care team and patient/family/caregiver  - Collaborate with rehabilitation services on mobility goals if consulted  - Perform Range of Motion 2 times a day  - Reposition patient every 2 hours    - Dangle patient 2 times a day  - Stand patient 2 times a day  - Ambulate patient 2 times a day  - Out of bed to chair 2 times a day   - Out of bed for meals 2 times a day  - Out of bed for toileting  - Record patient progress and toleration of activity level   6/14/2022 1200 by Hector Patel RN  Outcome: Adequate for Discharge  6/14/2022 1157 by Hector Patel RN  Outcome: Progressing     Problem: Potential for Falls  Goal: Patient will remain free of falls  Description: INTERVENTIONS:  - Educate patient/family on patient safety including physical limitations  - Instruct patient to call for assistance with activity - Consult OT/PT to assist with strengthening/mobility   - Keep Call bell within reach  - Keep bed low and locked with side rails adjusted as appropriate  - Keep care items and personal belongings within reach  - Initiate and maintain comfort rounds  - Make Fall Risk Sign visible to staff  - Offer Toileting every 2 Hours, in advance of need  - Initiate/Maintain 2alarm  - Obtain necessary fall risk management equipment: 2  - Apply yellow socks and bracelet for high fall risk patients  - Consider moving patient to room near nurses station  6/14/2022 1200 by Angle Braun RN  Outcome: Adequate for Discharge  6/14/2022 1157 by Angle Braun RN  Outcome: Progressing     Problem: RESPIRATORY - ADULT  Goal: Achieves optimal ventilation and oxygenation  Description: INTERVENTIONS:  - Assess for changes in respiratory status  - Assess for changes in mentation and behavior  - Position to facilitate oxygenation and minimize respiratory effort  - Oxygen administered by appropriate delivery if ordered  - Initiate smoking cessation education as indicated  - Encourage broncho-pulmonary hygiene including cough, deep breathe, Incentive Spirometry  - Assess the need for suctioning and aspirate as needed  - Assess and instruct to report SOB or any respiratory difficulty  - Respiratory Therapy support as indicated  6/14/2022 1200 by Angle Braun RN  Outcome: Adequate for Discharge  6/14/2022 1157 by Angle Braun RN  Outcome: Progressing     Problem: COPING  Goal: Pt/Family able to verbalize concerns and demonstrate effective coping strategies  Description: INTERVENTIONS:  - Assist patient/family to identify coping skills, available support systems and cultural and spiritual values  - Provide emotional support, including active listening and acknowledgement of concerns of patient and caregivers  - Reduce environmental stimuli, as able  - Provide patient education  - Assess for spiritual pain/suffering and initiate spiritual care, including notification of Pastoral Care or [facility] as needed  - Assess effectiveness of coping strategies  6/14/2022 1200 by Gabriel Alfred RN  Outcome: Adequate for Discharge  6/14/2022 1157 by Gabriel Alfred RN  Outcome: Progressing  Goal: Will report anxiety at manageable levels  Description: INTERVENTIONS:  - Administer medication as ordered  - Teach and encourage coping skills  - Provide emotional support  - Assess patient/family for anxiety and ability to cope  6/14/2022 1200 by Gabriel Alfred RN  Outcome: Adequate for Discharge  6/14/2022 1157 by Gabriel Alfred RN  Outcome: Progressing     Problem: Nutrition/Hydration-ADULT  Goal: Nutrient/Hydration intake appropriate for improving, restoring or maintaining nutritional needs  Description: Monitor and assess patient's nutrition/hydration status for malnutrition  Collaborate with interdisciplinary team and initiate plan and interventions as ordered  Monitor patient's weight and dietary intake as ordered or per policy  Utilize nutrition screening tool and intervene as necessary  Determine patient's food preferences and provide high-protein, high-caloric foods as appropriate       INTERVENTIONS:  - Monitor oral intake, urinary output, labs, and treatment plans  - Assess nutrition and hydration status and recommend course of action  - Evaluate amount of meals eaten  - Assist patient with eating if necessary   - Allow adequate time for meals  - Recommend/ encourage appropriate diets, oral nutritional supplements, and vitamin/mineral supplements  - Order, calculate, and assess calorie counts as needed  - Recommend, monitor, and adjust tube feedings and TPN/PPN based on assessed needs  - Assess need for intravenous fluids  - Provide specific nutrition/hydration education as appropriate  - Include patient/family/caregiver in decisions related to nutrition  6/14/2022 1200 by Gabriel Alfred RN  Outcome: Adequate for Discharge  6/14/2022 1157 by Gabriel Alfred RN  Outcome: Progressing     Problem: Prexisting or High Potential for Compromised Skin Integrity  Goal: Skin integrity is maintained or improved  Description: INTERVENTIONS:  - Identify patients at risk for skin breakdown  - Assess and monitor skin integrity  - Assess and monitor nutrition and hydration status  - Monitor labs   - Assess for incontinence   - Turn and reposition patient  - Assist with mobility/ambulation  - Relieve pressure over bony prominences  - Avoid friction and shearing  - Provide appropriate hygiene as needed including keeping skin clean and dry  - Evaluate need for skin moisturizer/barrier cream  - Collaborate with interdisciplinary team   - Patient/family teaching  - Consider wound care consult   6/14/2022 1200 by Jayy Valera RN  Outcome: Adequate for Discharge  6/14/2022 1157 by Jayy aVlera RN  Outcome: Progressing

## 2022-06-14 NOTE — CASE MANAGEMENT
Case Management Discharge Planning Note    Patient name Shaka Light  Location [place] 230/-01 MRN [id number]  : 1933 Date 2022       Current Admission Date: 2022  Current Admission Diagnosis:Acute on chronic respiratory failure [facility])   Patient Active Problem List    Diagnosis Date Noted    Viral sepsis ([facility] ) 2022    Acute on chronic respiratory failure ([facility] ) 2022    Pneumonia due to COVID-19 virus 2022    Sepsis due to COVID-19 [facility]) 2022    Chronic respiratory failure ([facility] ) 2022    Elevated brain natriuretic peptide (BNP) level 2022    L[facility] 2022    Dizziness 2022    Acute respiratory failure with hypoxia ([facility] ) 2022    Chronic heart failure with preserved ejection fraction ([facility] ) 2022    Pulmonary fibrosis ([facility] ) 2022    Dysphagia 2020    Leukocytosis 2020    Proteinuria 2020    Shortness of breath 2020    Pulmonary infiltrates 2020    Interstitial lung disease ([facility] ) 2020    Fatigue 2020    Fall at home 2020    Unintentional weight loss 2019    Encounter for immunization 2019    Medicare annual wellness visit, subsequent 2019    Atherosclerosis of artery of extremity with ulceration ([facility] ) 2019    Phantom pain after amputation of lower extremity ([facility] ) 2019    Protein calorie malnutrition ([facility] ) 2019    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 2019    Carpal tunnel syndrome 2019    Decubitus ulcer of right heel, unstageable ([facility] ) 2019    Ambulatory dysfunction 10/29/2018    Physical deconditioning 10/29/2018    CKD (chronic kidney disease), stage III ([facility] ) 2018    Severe peripheral arterial disease ([facility] ) 2018    Bifascicular block 10/31/2017    Chronic fatigue 10/31/2017    Vitamin D deficiency 10/25/2017    Anemia 10/15/2017    Unstable gait 51/80/8526    Lichen simplex chronicus 07/10/2017    Seborrheic keratosis 07/10/2017    Change in multiple pigmented skin lesions 05/16/2017    Impaired fasting glucose 03/07/2017    Hyperlipidemia 07/14/2016    Chronic lower back pain 01/14/2016    Generalized osteoarthritis 01/14/2016    Herniated lumbar intervertebral disc 01/14/2016    Rotator cuff tear arthropathy, right 01/14/2016      LOS (days): 6  Geometric Mean LOS (GMLOS) (days): 4 80  Days to GMLOS:-0 9     OBJECTIVE:  Risk of Unplanned Readmission Score: 21 77         Current admission status: Inpatient   Preferred Pharmacy:   [facility] [address]  Phone: [phone] Fax: [phone]    Primary Care Provider: Jil Leyva MD    Primary Insurance: [facility]  Secondary Insurance:     DISCHARGE DETAILS:    Discharge planning discussed with[de-identified] Danica [facility] [phone] (S)  Freedom of Choice: Yes  Comments - Freedom of Choice: CM DISCUSSED FREEDOM OF CHOICE W/ DESMOND GLASGOW AND INFORMED HER OF PICKUP TIME  TODAY AT 1330  CM contacted family/caregiver?: Yes  Were Treatment Team discharge recommendations reviewed with patient/caregiver?: Yes  Did patient/caregiver verbalize understanding of patient care needs?: Yes  Were patient/caregiver advised of the risks associated with not following Treatment Team discharge recommendations?: Yes    Contacts  Patient Contacts: Letty Villafuerte  Relationship to Patient[de-identified] Family  Contact Method: Phone  Phone Number: [phone]  Reason/Outcome: Emergency Contact, Discharge [address]         Is the patient interested in [address] at discharge?: No    DME Referral Provided  Referral made for DME?: No    Other Referral/Resources/Interventions Provided:  Interventions: Hospice  Referral Comments: [facility] liaison informed of pickup time today at 1330           Treatment Team Recommendation: Hospice  Discharge Destination Plan[de-identified] Hospice  Transport at Discharge : S Ambulance  Dispatcher Contacted: Yes (María Elena Shah 116 CM 6/13/2022 )  Number/Name of Dispatcher: Mio Lees  Transported by [employer] and Unit #):  SLETS  ETA of Transport (Date): 06/14/22  ETA of Transport (Time): 1330     Transfer Mode: Stretcher  Accompanied by: Alone

## 2022-06-14 NOTE — CASE MANAGEMENT
Case Management Progress Note    Patient name Oksana Lowery  Location [address] 230/-01 MRN [id number]  : 1933 Date 2022       LOS (days): 6  Geometric Mean LOS (GMLOS) (days): 4 80  Days to GMLOS:-0 9        OBJECTIVE:        Current admission status: Inpatient  Preferred Pharmacy:   [facility] [address] [place], [place] [address] [facility]  Phone: [phone] Fax: [phone]    Primary Care Provider: Constantine Luevano MD    Primary Insurance: [facility]  Secondary Insurance:     PROGRESS NOTE:  CM SPOKE TO ED HAJENAEA/MEENA DISPATCHER WHO STATED NEW PICKUP TIME STILL BEING PROCESSED

## 2022-06-14 NOTE — PLAN OF CARE
Problem: MOBILITY - ADULT  Goal: Maintain or return to baseline ADL function  Description: INTERVENTIONS:  -  Assess patient's ability to carry out ADLs; assess patient's baseline for ADL function and identify physical deficits which impact ability to perform ADLs (bathing, care of mouth/teeth, toileting, grooming, dressing, etc )  - Assess/evaluate cause of self-care deficits   - Assess range of motion  - Assess patient's mobility; develop plan if impaired  - Assess patient's need for assistive devices and provide as appropriate  - Encourage maximum independence but intervene and supervise when necessary  - Involve family in performance of ADLs  - Assess for home care needs following discharge   - Consider OT consult to assist with ADL evaluation and planning for discharge  - Provide patient education as appropriate  Outcome: Not Progressing  Goal: Maintains/Returns to pre admission functional level  Description: INTERVENTIONS:  - Perform BMAT or MOVE assessment daily    - Set and communicate daily mobility goal to care team and patient/family/caregiver  - Collaborate with rehabilitation services on mobility goals if consulted  - Perform Range of Motion  times a day  - Reposition patient every  hours    - Dangle patient  times a day  - Stand patient  times a day  - Ambulate patient  times a day  - Out of bed to chair  times a day   - Out of bed for meals  times a day  - Out of bed for toileting  - Record patient progress and toleration of activity level   Outcome: Not Progressing     Problem: Potential for Falls  Goal: Patient will remain free of falls  Description: INTERVENTIONS:  - Educate patient/family on patient safety including physical limitations  - Instruct patient to call for assistance with activity   - Consult OT/PT to assist with strengthening/mobility   - Keep Call bell within reach  - Keep bed low and locked with side rails adjusted as appropriate  - Keep care items and personal belongings within reach  - Initiate and maintain comfort rounds  - Make Fall Risk Sign visible to staff  - Offer Toileting every  Hours, in advance of need  - Initiate/Maintain alarm  - Obtain necessary fall risk management equipment:  - Apply yellow socks and bracelet for high fall risk patients  - Consider moving patient to room near nurses station  Outcome: Not Progressing     Problem: RESPIRATORY - ADULT  Goal: Achieves optimal ventilation and oxygenation  Description: INTERVENTIONS:  - Assess for changes in respiratory status  - Assess for changes in mentation and behavior  - Position to facilitate oxygenation and minimize respiratory effort  - Oxygen administered by appropriate delivery if ordered  - Initiate smoking cessation education as indicated  - Encourage broncho-pulmonary hygiene including cough, deep breathe, Incentive Spirometry  - Assess the need for suctioning and aspirate as needed  - Assess and instruct to report SOB or any respiratory difficulty  - Respiratory Therapy support as indicated  Outcome: Not Progressing     Problem: COPING  Goal: Pt/Family able to verbalize concerns and demonstrate effective coping strategies  Description: INTERVENTIONS:  - Assist patient/family to identify coping skills, available support systems and cultural and spiritual values  - Provide emotional support, including active listening and acknowledgement of concerns of patient and caregivers  - Reduce environmental stimuli, as able  - Provide patient education  - Assess for spiritual pain/suffering and initiate spiritual care, including notification of Pastoral Care or josie based community as needed  - Assess effectiveness of coping strategies  Outcome: Not Progressing  Goal: Will report anxiety at manageable levels  Description: INTERVENTIONS:  - Administer medication as ordered  - Teach and encourage coping skills  - Provide emotional support  - Assess patient/family for anxiety and ability to cope  Outcome: Not Progressing Problem: Nutrition/Hydration-ADULT  Goal: Nutrient/Hydration intake appropriate for improving, restoring or maintaining nutritional needs  Description: Monitor and assess patient's nutrition/hydration status for malnutrition  Collaborate with interdisciplinary team and initiate plan and interventions as ordered  Monitor patient's weight and dietary intake as ordered or per policy  Utilize nutrition screening tool and intervene as necessary  Determine patient's food preferences and provide high-protein, high-caloric foods as appropriate       INTERVENTIONS:  - Monitor oral intake, urinary output, labs, and treatment plans  - Assess nutrition and hydration status and recommend course of action  - Evaluate amount of meals eaten  - Assist patient with eating if necessary   - Allow adequate time for meals  - Recommend/ encourage appropriate diets, oral nutritional supplements, and vitamin/mineral supplements  - Order, calculate, and assess calorie counts as needed  - Recommend, monitor, and adjust tube feedings and TPN/PPN based on assessed needs  - Assess need for intravenous fluids  - Provide specific nutrition/hydration education as appropriate  - Include patient/family/caregiver in decisions related to nutrition  Outcome: Not Progressing

## 2022-06-15 NOTE — TELEPHONE ENCOUNTER
Naseem Crew from [employer] for an update on script that was faxed over on 5/31, 6/7, 6/8 and 6/9  She said it was faxed to the [place] office and the [facility] office  Please advise and call Naseem Crew with an update on this or if she needs to re-fax script so we can send it back asap   [phone]

## 2022-06-15 NOTE — UTILIZATION REVIEW
Notification of Discharge   This is a Notification of Discharge from our facility [address]  Please be advised that this patient has been discharge from our facility  Below you will find the admission and discharge date and time including the patients disposition  UTILIZATION REVIEW CONTACT:  Selena Tamez  Utilization   Network Utilization Review Department  Phone: [phone] x carefully listen to the prompts  All voicemails are confidential   Email: [email or web address]     PHYSICIAN ADVISORY SERVICES:  FOR [id number] REVIEW - MEDICAL NECESSITY DENIAL  Phone: [phone]  Fax: [phone]  Email: [email or web address]     PRESENTATION DATE: 6/8/2022  2:13 PM  OBERVATION ADMISSION DATE:   INPATIENT ADMISSION DATE: 6/8/22  5:40 PM   DISCHARGE DATE: 6/14/2022 12:03 PM  DISPOSITION: Home with [address] with [facility]      IMPORTANT INFORMATION:  Send all requests for admission clinical reviews, approved or denied determinations and any other requests to dedicated fax number below belonging to the campus where the patient is receiving treatment   List of dedicated fax numbers:  [address] DENIALS (Administrative/Medical Necessity) [phone]   [address] [place] [address] (Maternity/NICU/Pediatrics) [phone]   [facility] [phone]   [address] [place] [address] [phone]   [address] [place] [address] [phone]   [address] [place] [address] [address] [place] [address],[address] [place] [address] [phone]   [facility]  [phone]   [address] [place] [address], [facility]  [address] [facility] [address] [facility] [phone]

## 2022-06-15 NOTE — CASE COMMUNICATION
Please put patient on schedule for [facility] visit 1 x visit this week and 2 to 3 visits starting next week    Thank you

## 2022-06-17 NOTE — HOSPICE
PATIENT PASSED  FAMILY PRESENT  Mirella Rowley RN CALLED TO PRONOUNCE  DOLORES CARR PCM NOTIFIED  REPOSITIONED PATIENT  LAVENDER BATH OFFERED BUT FAMILY REFUSED  WANTED TIME WITH PATIENT BEFORE RN ARRIVED  EMOTIONAL SUPPORT PROVIDED TO FAMILY  FAMILY EMOTIONAL BUT COPING EFFECTIVELY  VIOLETTE  AS PER SON  LEFT ROOM TO GIVE FAMILY PRIVACY BUT MADE MYSELF AVAILABLE TO THEM

## 2022-11-10 LAB
DME PARACHUTE DELIVERY DATE ACTUAL: NORMAL
DME PARACHUTE DELIVERY DATE REQUESTED: NORMAL
DME PARACHUTE ITEM DESCRIPTION: NORMAL
DME PARACHUTE ORDER STATUS: NORMAL
DME PARACHUTE SUPPLIER NAME: NORMAL
DME PARACHUTE SUPPLIER PHONE: NORMAL
